# Patient Record
Sex: MALE | Race: BLACK OR AFRICAN AMERICAN | NOT HISPANIC OR LATINO | URBAN - METROPOLITAN AREA
[De-identification: names, ages, dates, MRNs, and addresses within clinical notes are randomized per-mention and may not be internally consistent; named-entity substitution may affect disease eponyms.]

---

## 2017-10-10 PROBLEM — Z00.00 ENCOUNTER FOR PREVENTIVE HEALTH EXAMINATION: Status: ACTIVE | Noted: 2017-10-10

## 2017-12-08 ENCOUNTER — OUTPATIENT (OUTPATIENT)
Dept: OUTPATIENT SERVICES | Facility: HOSPITAL | Age: 77
LOS: 1 days | Discharge: HOME | End: 2017-12-08

## 2017-12-08 ENCOUNTER — APPOINTMENT (OUTPATIENT)
Dept: PULMONOLOGY | Facility: CLINIC | Age: 77
End: 2017-12-08

## 2017-12-08 VITALS
WEIGHT: 196 LBS | HEIGHT: 67 IN | DIASTOLIC BLOOD PRESSURE: 77 MMHG | HEART RATE: 96 BPM | BODY MASS INDEX: 30.76 KG/M2 | SYSTOLIC BLOOD PRESSURE: 133 MMHG

## 2017-12-08 DIAGNOSIS — Z78.9 OTHER SPECIFIED HEALTH STATUS: ICD-10-CM

## 2017-12-11 DIAGNOSIS — R06.02 SHORTNESS OF BREATH: ICD-10-CM

## 2017-12-11 DIAGNOSIS — G47.33 OBSTRUCTIVE SLEEP APNEA (ADULT) (PEDIATRIC): ICD-10-CM

## 2017-12-11 DIAGNOSIS — J44.9 CHRONIC OBSTRUCTIVE PULMONARY DISEASE, UNSPECIFIED: ICD-10-CM

## 2017-12-12 ENCOUNTER — OUTPATIENT (OUTPATIENT)
Dept: OUTPATIENT SERVICES | Facility: HOSPITAL | Age: 77
LOS: 1 days | Discharge: HOME | End: 2017-12-12

## 2017-12-12 DIAGNOSIS — R06.02 SHORTNESS OF BREATH: ICD-10-CM

## 2017-12-12 DIAGNOSIS — J44.9 CHRONIC OBSTRUCTIVE PULMONARY DISEASE, UNSPECIFIED: ICD-10-CM

## 2017-12-12 DIAGNOSIS — G47.33 OBSTRUCTIVE SLEEP APNEA (ADULT) (PEDIATRIC): ICD-10-CM

## 2018-02-08 ENCOUNTER — APPOINTMENT (OUTPATIENT)
Dept: UROLOGY | Facility: CLINIC | Age: 78
End: 2018-02-08
Payer: MEDICARE

## 2018-02-08 VITALS
HEART RATE: 97 BPM | SYSTOLIC BLOOD PRESSURE: 156 MMHG | DIASTOLIC BLOOD PRESSURE: 102 MMHG | BODY MASS INDEX: 31.66 KG/M2 | HEIGHT: 66 IN | WEIGHT: 197 LBS

## 2018-02-08 DIAGNOSIS — T83.490A OTHER MECHANICAL COMPLICATION OF IMPLANTED PENILE PROSTHESIS, INITIAL ENCOUNTER: ICD-10-CM

## 2018-02-08 DIAGNOSIS — R35.1 NOCTURIA: ICD-10-CM

## 2018-02-08 PROCEDURE — 99203 OFFICE O/P NEW LOW 30 MIN: CPT

## 2018-02-08 PROCEDURE — 51798 US URINE CAPACITY MEASURE: CPT

## 2018-02-08 RX ORDER — QUINAPRIL HYDROCHLORIDE 20 MG/1
20 TABLET, FILM COATED ORAL
Refills: 0 | Status: COMPLETED | COMMUNITY
End: 2018-02-08

## 2018-02-08 RX ORDER — OXYBUTYNIN CHLORIDE 5 MG/1
5 TABLET ORAL
Refills: 0 | Status: COMPLETED | COMMUNITY
End: 2018-02-08

## 2018-02-08 RX ORDER — INSULIN GLARGINE 100 [IU]/ML
100 INJECTION, SOLUTION SUBCUTANEOUS
Qty: 3 | Refills: 0 | Status: COMPLETED | COMMUNITY
Start: 2017-06-12 | End: 2018-02-08

## 2018-02-08 RX ORDER — GLIPIZIDE 10 MG/1
10 TABLET, FILM COATED, EXTENDED RELEASE ORAL
Refills: 0 | Status: COMPLETED | COMMUNITY
End: 2018-02-08

## 2018-02-08 RX ORDER — ESOMEPRAZOLE MAGNESIUM 40 MG/1
40 CAPSULE, DELAYED RELEASE ORAL
Refills: 0 | Status: COMPLETED | COMMUNITY
End: 2018-02-08

## 2018-02-08 RX ORDER — NIFEDIPINE 60 MG/1
60 TABLET, EXTENDED RELEASE ORAL
Qty: 30 | Refills: 0 | Status: COMPLETED | COMMUNITY
Start: 2018-01-29 | End: 2018-02-08

## 2018-02-08 RX ORDER — NIFEDIPINE 90 MG/1
90 TABLET, EXTENDED RELEASE ORAL
Qty: 30 | Refills: 0 | Status: COMPLETED | COMMUNITY
Start: 2017-06-12 | End: 2018-02-08

## 2018-02-08 RX ORDER — BLOOD-GLUCOSE METER
70 EACH MISCELLANEOUS
Qty: 100 | Refills: 0 | Status: COMPLETED | COMMUNITY
Start: 2017-08-08 | End: 2018-02-08

## 2018-02-08 RX ORDER — ASPIRIN ENTERIC COATED TABLETS 81 MG 81 MG/1
81 TABLET, DELAYED RELEASE ORAL
Qty: 30 | Refills: 0 | Status: COMPLETED | COMMUNITY
Start: 2017-08-08 | End: 2018-02-08

## 2018-02-08 NOTE — HISTORY OF PRESENT ILLNESS
[Urinary Retention] : urinary retention [Urinary Urgency] : urinary urgency [Urinary Frequency] : urinary frequency [Nocturia] : nocturia [Straining] : straining [Weak Stream] : weak stream [Intermittency] : intermittency [None] : None [Erectile Dysfunction] : Erectile Dysfunction [FreeTextEntry1] : EZ ALCARAZ is a 77 year old male with a past medical history of DM. Presents to the office today for LUTS. Patient currently experiencing urinary urgency and frequency along with weak stream and straining for approx 5 years that has been worsening over time. Nocturia 5-6 x a night. Patient states he has seen a Urologist approx 2 years ago and was prescribed Oxybutynin ER 5 mg daily, patient continues to take Oxybutynin but states that he does not find it to be working.\par currently\par IPSS = 30\par Incomplete Emptying = 5\par Frequency = 4\par Intermittency = 5\par Urgency = 5\par Weak Stream = 4\par Straining = 2\par Nocturia = 5\par \par implant done and now not working last time was 1990 [Hematuria - Gross] : no gross hematuria

## 2018-02-08 NOTE — ASSESSMENT
[FreeTextEntry1] : He has been worked up in the past and been tried on anticholinergics therapy but we do not have any of the data.\par \par Whatever he has had done was many many months in the past and I also wonder if some of this is secondary to poor glucose control\par \par He will have him keep a record of his intake and output; he will consider a flow study. As far as the previous evaluation he will try and get us the records.\par \par With respect to his lack of erections, he would like to be able to once again have sex and if possible he would like the implant replaced. I would need his Deforest criteria classification and if his doctor feels he is an acceptable candidate then we will have him see Dr. Omalley. I’m also going to check his hormones and PSA and we will check his urine to make sure there is nothing in the bladder that may be contributing to his symptoms as well as a renal ultrasound to make sure there is no upper tract damage.\par

## 2018-02-08 NOTE — LETTER BODY
[Dear  ___] : Dear  [unfilled], [Attached please find my note.] : Attached please find my note. [Thank you very much for allowing me to participate in the care of this patient. If you have any questions, please do not hesitate to contact me] : Thank you very much for allowing me to participate in the care of this patient. If you have any questions, please do not hesitate to contact me. [FreeTextEntry2] : [facility]\par Dr Ganga Holt\par [address]\[place] [address]

## 2018-02-08 NOTE — PHYSICAL EXAM
[General Appearance - Well Developed] : well developed [General Appearance - Well Nourished] : well nourished [Normal Appearance] : normal appearance [Well Groomed] : well groomed [General Appearance - In No Acute Distress] : no acute distress [Edema] : no peripheral edema [Respiration, Rhythm And Depth] : normal respiratory rhythm and effort [Exaggerated Use Of Accessory Muscles For Inspiration] : no accessory muscle use [Abdomen Soft] : soft [Abdomen Tenderness] : non-tender [Costovertebral Angle Tenderness] : no ~M costovertebral angle tenderness [Normal Station and Gait] : the gait and station were normal for the patient's age [] : no rash [No Focal Deficits] : no focal deficits [Oriented To Time, Place, And Person] : oriented to person, place, and time [Affect] : the affect was normal [Mood] : the mood was normal [Not Anxious] : not anxious [Heart Rate And Rhythm] : Heart rate and rhythm were normal [Abdomen Hernia] : no hernia was discovered [Penis Abnormality] : normal uncircumcised penis [Anus Abnormality] : the anus and perineum were normal [Rectal Exam - Rectum] : digital rectal exam was normal [Prostate Enlargement] : the prostate was not enlarged [No Prostate Nodules] : no prostate nodules [Prostate Size ___ gm] : prostate size [unfilled] gm [FreeTextEntry1] : peripheral and BC reflexes are intact

## 2018-02-08 NOTE — LETTER HEADER
[Angelica Moreno MD] : Angelica Moreno MD [Director of Urology] : Director of Urology [[address]] : [address] [[address]] : [address] [[place] [address]] : [place] [address] [Tel [phone]] : Tel [phone] [Fax [phone]] : Fax [phone]

## 2018-02-08 NOTE — REVIEW OF SYSTEMS
[see HPI] : see HPI [Nocturia] : nocturia [Negative] : Heme/Lymph [Erectile Dysfunction] : erectile dysfunction [Dysuria] : no dysuria [Incontinence] : no incontinence [Testicular Pain] : no testicular pain

## 2018-03-16 ENCOUNTER — APPOINTMENT (OUTPATIENT)
Dept: PULMONOLOGY | Facility: CLINIC | Age: 78
End: 2018-03-16

## 2018-04-13 ENCOUNTER — APPOINTMENT (OUTPATIENT)
Dept: UROLOGY | Facility: CLINIC | Age: 78
End: 2018-04-13
Payer: MEDICARE

## 2018-04-13 VITALS
WEIGHT: 197 LBS | HEART RATE: 76 BPM | DIASTOLIC BLOOD PRESSURE: 82 MMHG | HEIGHT: 66 IN | BODY MASS INDEX: 31.66 KG/M2 | SYSTOLIC BLOOD PRESSURE: 150 MMHG

## 2018-04-13 DIAGNOSIS — R39.198 OTHER DIFFICULTIES WITH MICTURITION: ICD-10-CM

## 2018-04-13 DIAGNOSIS — R30.0 DYSURIA: ICD-10-CM

## 2018-04-13 DIAGNOSIS — R79.89 OTHER SPECIFIED ABNORMAL FINDINGS OF BLOOD CHEMISTRY: ICD-10-CM

## 2018-04-13 DIAGNOSIS — R39.9 UNSPECIFIED SYMPTOMS AND SIGNS INVOLVING THE GENITOURINARY SYSTEM: ICD-10-CM

## 2018-04-13 LAB
BILIRUB UR QL STRIP: NORMAL
CLARITY UR: CLEAR
COLLECTION METHOD: NORMAL
GLUCOSE UR-MCNC: NORMAL
HCG UR QL: NORMAL EU/DL
HGB UR QL STRIP.AUTO: NORMAL
KETONES UR-MCNC: NORMAL
LEUKOCYTE ESTERASE UR QL STRIP: NORMAL
NITRITE UR QL STRIP: NORMAL
PH UR STRIP: 7
PROT UR STRIP-MCNC: NORMAL
SP GR UR STRIP: 1

## 2018-04-13 PROCEDURE — 51798 US URINE CAPACITY MEASURE: CPT

## 2018-04-13 PROCEDURE — 99213 OFFICE O/P EST LOW 20 MIN: CPT | Mod: 57,25

## 2018-04-13 PROCEDURE — 81003 URINALYSIS AUTO W/O SCOPE: CPT | Mod: QW

## 2018-04-13 PROCEDURE — 51741 ELECTRO-UROFLOWMETRY FIRST: CPT

## 2018-04-13 RX ORDER — OXYBUTYNIN CHLORIDE 5 MG/1
5 TABLET, EXTENDED RELEASE ORAL
Qty: 30 | Refills: 0 | Status: COMPLETED | COMMUNITY
Start: 2017-08-08 | End: 2018-04-13

## 2018-04-13 RX ORDER — OXYCODONE AND ACETAMINOPHEN 5; 325 MG/1; MG/1
5-325 TABLET ORAL
Qty: 90 | Refills: 0 | Status: COMPLETED | COMMUNITY
Start: 2017-08-15 | End: 2018-04-13

## 2018-04-13 RX ORDER — OXYCODONE AND ACETAMINOPHEN 10; 325 MG/1; MG/1
10-325 TABLET ORAL
Qty: 90 | Refills: 0 | Status: COMPLETED | COMMUNITY
Start: 2018-04-10

## 2018-04-13 NOTE — REVIEW OF SYSTEMS
[see HPI] : see HPI [Dysuria] : no dysuria [Incontinence] : no incontinence [Nocturia] : nocturia [Testicular Pain] : no testicular pain [Erectile Dysfunction] : erectile dysfunction [Negative] : Heme/Lymph

## 2018-04-13 NOTE — LETTER BODY
[Dear  ___] : Dear  [unfilled], [Courtesy Letter:] : I had the pleasure of seeing your patient, [unfilled], in my office today. [Please see my note below.] : Please see my note below. [Sincerely,] : Sincerely, [FreeTextEntry2] : [facility]\par Dr Ganga Holt\par [address]\[place] [address]

## 2018-04-13 NOTE — ASSESSMENT
[FreeTextEntry1] : The uroflow looks like he is voiding strictly with Julianne, Wiley. He tells me this is the way he’s been urinating essentially by pushing for a long time so I wonder if he is a diabetic neurogenic bladder. His residual here was high but his residual by the ultrasound at the radiologist was much better and I just have a feeling he was made to wait too long so he overfilled. I need to have a record of his intake and output so I can see if any of this is subject to behavior modification especially if he has a neurogenic bladder.\par \par With respect to the implant I need to know his Bunkie criteria classification and once I know that, and assuming it is equal to “I” and his voiding is not going to need surgical intervention then we can arrange to replace the implant. The question of what to do with his low hormones is another issue. He is 77 years old, he has a normal libido and testosterone replacement is not without risk. However if we keep his testosterone at a hypogonadal level, acknowledging that the range listed is normal for free and bioavailable are more prevalence and not normal healthy levels, then he has an increased risk of metabolic syndrome something that is ready an issue because of his diabetes and obesity.\par

## 2018-04-13 NOTE — PHYSICAL EXAM
[General Appearance - Well Developed] : well developed [General Appearance - Well Nourished] : well nourished [Normal Appearance] : normal appearance [Well Groomed] : well groomed [General Appearance - In No Acute Distress] : no acute distress [FreeTextEntry1] : mild obesity [] : no respiratory distress [Respiration, Rhythm And Depth] : normal respiratory rhythm and effort [Exaggerated Use Of Accessory Muscles For Inspiration] : no accessory muscle use [Oriented To Time, Place, And Person] : oriented to person, place, and time [Affect] : the affect was normal [Mood] : the mood was normal [Not Anxious] : not anxious [Normal Station and Gait] : the gait and station were normal for the patient's age

## 2018-04-13 NOTE — LETTER HEADER
[Angelica Moreno MD] : Angelica Moreno MD [Director of Urology] : Director of Urology [Director of Male Infertility] : Director of Male Infertility [[address] : [address] [[address]] : [address] [[place] [address]] : [place] [address] [Tel [phone]] : Tel [phone] [Fax [phone]] : Fax [phone]

## 2018-04-13 NOTE — HISTORY OF PRESENT ILLNESS
[FreeTextEntry1] : Jeremy is a very pleasant 77-year-old -American male who was seen in February. He had significant lower urinary tract symptoms, and he also had a broken implant. He had had numerous workups in the past, he was supposed to try and get me the results, he was supposed to get me the Kite criteria classification, keep a record of his intake and output and come in and depending on the results would get a flow. He was sent for an ultrasound, urine tests as well as a check of his hormone values.\par \par He came in today; he tells me we did not ask keep a record of his intake and output. He asked his doctor for the Kite criteria classification but I do not seed in the chart. So we went ahead with the uroflow. Please see the flow study for the specifics.\par  [Urinary Frequency] : urinary frequency [Nocturia] : nocturia [Straining] : straining [Weak Stream] : weak stream [Intermittency] : intermittency [Post-Void Dribbling] : post-void dribbling [Erectile Dysfunction] : Erectile Dysfunction [None] : None

## 2018-06-01 ENCOUNTER — APPOINTMENT (OUTPATIENT)
Dept: PULMONOLOGY | Facility: CLINIC | Age: 78
End: 2018-06-01

## 2018-06-01 ENCOUNTER — OUTPATIENT (OUTPATIENT)
Dept: OUTPATIENT SERVICES | Facility: HOSPITAL | Age: 78
LOS: 1 days | Discharge: HOME | End: 2018-06-01

## 2018-06-01 VITALS
HEART RATE: 72 BPM | DIASTOLIC BLOOD PRESSURE: 74 MMHG | WEIGHT: 196 LBS | HEIGHT: 66 IN | BODY MASS INDEX: 31.5 KG/M2 | SYSTOLIC BLOOD PRESSURE: 114 MMHG

## 2018-06-01 NOTE — HISTORY OF PRESENT ILLNESS
[de-identified] : 76M with a PMH of HTN, DMII, DLD, Chronic pancreatitis presents for follow up for SOB. He states the dyspnea with worse with exertion.He recently saw his cardiologist who performed an echocardiogram which he does not know the result of. he states he feels like he is choking sensation on his saliva at night.

## 2018-06-01 NOTE — PLAN
[FreeTextEntry1] : 76M with a PMH of DM, chronic pancreatitis, HTN, DLD presents for worsening dyspnea that he states is now with mild exertion. He recently say his cardiologist and had an echo done there.\par \par Dyspnea\par -currently non-smoker has a 45pack/yr history\par - Chest xray PA/Lateral completed and unremarkable\par -PFT done\par -Sleep study as he has a history of CHESTER currently not treated\par - 2D echo results from Dr. Patel pending, on stress test in 2016, resting EF was 62 and stressed was 73%\par \par

## 2018-07-23 PROBLEM — Z78.9 ALCOHOL USE: Status: ACTIVE | Noted: 2017-12-08

## 2019-05-30 ENCOUNTER — OUTPATIENT (OUTPATIENT)
Dept: OUTPATIENT SERVICES | Facility: HOSPITAL | Age: 79
LOS: 1 days | Discharge: HOME | End: 2019-05-30

## 2019-05-30 DIAGNOSIS — R05 COUGH: ICD-10-CM

## 2019-08-02 ENCOUNTER — OUTPATIENT (OUTPATIENT)
Dept: OUTPATIENT SERVICES | Facility: HOSPITAL | Age: 79
LOS: 1 days | Discharge: HOME | End: 2019-08-02

## 2019-08-05 DIAGNOSIS — G47.33 OBSTRUCTIVE SLEEP APNEA (ADULT) (PEDIATRIC): ICD-10-CM

## 2019-09-24 ENCOUNTER — APPOINTMENT (OUTPATIENT)
Dept: VASCULAR SURGERY | Facility: CLINIC | Age: 79
End: 2019-09-24
Payer: MEDICARE

## 2019-09-24 VITALS
BODY MASS INDEX: 34.55 KG/M2 | HEIGHT: 66 IN | WEIGHT: 215 LBS | DIASTOLIC BLOOD PRESSURE: 84 MMHG | SYSTOLIC BLOOD PRESSURE: 120 MMHG

## 2019-09-24 PROCEDURE — 99213 OFFICE O/P EST LOW 20 MIN: CPT

## 2019-09-24 PROCEDURE — 93925 LOWER EXTREMITY STUDY: CPT

## 2019-09-24 NOTE — HISTORY OF PRESENT ILLNESS
[FreeTextEntry1] : The patient is a 78-year-old male with a history of a left superficial femoral artery angioplasty and stent. The patient presents today complaining of lower extremity pain which radiates from his hips down to his feet. He has a history of lumbar sacral spine disease. The patient has a history of DM and know PVD. in 2014 has a left SFA stent.

## 2019-09-24 NOTE — ASSESSMENT
[FreeTextEntry1] : The patient is a 78-year-old gentleman with particular symptoms is likely arising from his lumbar sacral spine. The patient has classic diabetic disease with single-vessel runoff to the foot. He is a left facial femoral artery stent in place with no evidence of restenosis. The patient denies claudication symptoms at this time. I like to monitor him conservatively and see him back in my office in 6 months time for repeat arterial duplex.

## 2019-09-24 NOTE — REASON FOR VISIT
[Follow-Up: _____] : a [unfilled] follow-up visit [FreeTextEntry1] : lower extremity leg pain which radiates from his buttock down to his foot.

## 2019-09-24 NOTE — DATA REVIEWED
[FreeTextEntry1] : Arterial duplex-\par  right diffuse atherosclerotic plaque flush out the femoral tibial and arteries with some flow disturbance noted in the proximal segment of the superficial femoral artery. The popliteal artery and one-vessel peroneal artery at the foot her pain and diminished well. \par \par Left SFA stent the superficial femoral artery stent is patent no evidence of in-stent stenosis of the total artery and one-vessel runoff via posterior tibial artery to the foot.

## 2020-05-20 ENCOUNTER — RX RENEWAL (OUTPATIENT)
Age: 80
End: 2020-05-20

## 2020-05-22 ENCOUNTER — RX RENEWAL (OUTPATIENT)
Age: 80
End: 2020-05-22

## 2020-06-23 ENCOUNTER — APPOINTMENT (OUTPATIENT)
Dept: VASCULAR SURGERY | Facility: CLINIC | Age: 80
End: 2020-06-23

## 2021-04-14 ENCOUNTER — APPOINTMENT (OUTPATIENT)
Dept: VASCULAR SURGERY | Facility: CLINIC | Age: 81
End: 2021-04-14
Payer: MEDICARE

## 2021-04-14 PROCEDURE — 99072 ADDL SUPL MATRL&STAF TM PHE: CPT

## 2021-04-14 PROCEDURE — 99213 OFFICE O/P EST LOW 20 MIN: CPT

## 2021-04-14 PROCEDURE — 93925 LOWER EXTREMITY STUDY: CPT

## 2021-04-14 NOTE — HISTORY OF PRESENT ILLNESS
[FreeTextEntry1] : The patient is a 80-year-old gentleman a former patient of my partner with a history of a left superficial femoral artery angioplasty and stenting in 2015 for a left foot wound which is healed. The patient also has known history for lumbar sacral spine disease with radiating symptoms down his legs. Patient presents today with a new right foot second toe ulcer which is nonhealing.

## 2021-04-21 ENCOUNTER — OUTPATIENT (OUTPATIENT)
Dept: OUTPATIENT SERVICES | Facility: HOSPITAL | Age: 81
LOS: 1 days | Discharge: HOME | End: 2021-04-21
Payer: MEDICARE

## 2021-04-21 ENCOUNTER — RESULT REVIEW (OUTPATIENT)
Age: 81
End: 2021-04-21

## 2021-04-21 VITALS
HEIGHT: 66 IN | TEMPERATURE: 98 F | DIASTOLIC BLOOD PRESSURE: 74 MMHG | WEIGHT: 179.46 LBS | OXYGEN SATURATION: 99 % | HEART RATE: 76 BPM | RESPIRATION RATE: 18 BRPM | SYSTOLIC BLOOD PRESSURE: 166 MMHG

## 2021-04-21 DIAGNOSIS — Z87.19 PERSONAL HISTORY OF OTHER DISEASES OF THE DIGESTIVE SYSTEM: Chronic | ICD-10-CM

## 2021-04-21 DIAGNOSIS — Z01.818 ENCOUNTER FOR OTHER PREPROCEDURAL EXAMINATION: ICD-10-CM

## 2021-04-21 DIAGNOSIS — Z95.0 PRESENCE OF CARDIAC PACEMAKER: Chronic | ICD-10-CM

## 2021-04-21 DIAGNOSIS — Z98.890 OTHER SPECIFIED POSTPROCEDURAL STATES: Chronic | ICD-10-CM

## 2021-04-21 DIAGNOSIS — I70.213 ATHEROSCLEROSIS OF NATIVE ARTERIES OF EXTREMITIES WITH INTERMITTENT CLAUDICATION, BILATERAL LEGS: ICD-10-CM

## 2021-04-21 DIAGNOSIS — Z90.49 ACQUIRED ABSENCE OF OTHER SPECIFIED PARTS OF DIGESTIVE TRACT: Chronic | ICD-10-CM

## 2021-04-21 DIAGNOSIS — Z96.0 PRESENCE OF UROGENITAL IMPLANTS: Chronic | ICD-10-CM

## 2021-04-21 LAB
ALBUMIN SERPL ELPH-MCNC: 4 G/DL — SIGNIFICANT CHANGE UP (ref 3.5–5.2)
ALP SERPL-CCNC: 87 U/L — SIGNIFICANT CHANGE UP (ref 30–115)
ALT FLD-CCNC: 23 U/L — SIGNIFICANT CHANGE UP (ref 0–41)
ANION GAP SERPL CALC-SCNC: 13 MMOL/L — SIGNIFICANT CHANGE UP (ref 7–14)
APTT BLD: 37.2 SEC — SIGNIFICANT CHANGE UP (ref 27–39.2)
AST SERPL-CCNC: 48 U/L — HIGH (ref 0–41)
BASOPHILS # BLD AUTO: 0.02 K/UL — SIGNIFICANT CHANGE UP (ref 0–0.2)
BASOPHILS NFR BLD AUTO: 0.3 % — SIGNIFICANT CHANGE UP (ref 0–1)
BILIRUB SERPL-MCNC: 0.5 MG/DL — SIGNIFICANT CHANGE UP (ref 0.2–1.2)
BUN SERPL-MCNC: 7 MG/DL — LOW (ref 10–20)
CALCIUM SERPL-MCNC: 9.4 MG/DL — SIGNIFICANT CHANGE UP (ref 8.5–10.1)
CHLORIDE SERPL-SCNC: 101 MMOL/L — SIGNIFICANT CHANGE UP (ref 98–110)
CO2 SERPL-SCNC: 27 MMOL/L — SIGNIFICANT CHANGE UP (ref 17–32)
CREAT SERPL-MCNC: 0.8 MG/DL — SIGNIFICANT CHANGE UP (ref 0.7–1.5)
EOSINOPHIL # BLD AUTO: 0.17 K/UL — SIGNIFICANT CHANGE UP (ref 0–0.7)
EOSINOPHIL NFR BLD AUTO: 2.8 % — SIGNIFICANT CHANGE UP (ref 0–8)
GLUCOSE SERPL-MCNC: 115 MG/DL — HIGH (ref 70–99)
HCT VFR BLD CALC: 39.2 % — LOW (ref 42–52)
HGB BLD-MCNC: 13.4 G/DL — LOW (ref 14–18)
IMM GRANULOCYTES NFR BLD AUTO: 0.2 % — SIGNIFICANT CHANGE UP (ref 0.1–0.3)
INR BLD: 1.24 RATIO — SIGNIFICANT CHANGE UP (ref 0.65–1.3)
LYMPHOCYTES # BLD AUTO: 2.24 K/UL — SIGNIFICANT CHANGE UP (ref 1.2–3.4)
LYMPHOCYTES # BLD AUTO: 37.3 % — SIGNIFICANT CHANGE UP (ref 20.5–51.1)
MCHC RBC-ENTMCNC: 31.2 PG — HIGH (ref 27–31)
MCHC RBC-ENTMCNC: 34.2 G/DL — SIGNIFICANT CHANGE UP (ref 32–37)
MCV RBC AUTO: 91.2 FL — SIGNIFICANT CHANGE UP (ref 80–94)
MONOCYTES # BLD AUTO: 0.7 K/UL — HIGH (ref 0.1–0.6)
MONOCYTES NFR BLD AUTO: 11.7 % — HIGH (ref 1.7–9.3)
NEUTROPHILS # BLD AUTO: 2.86 K/UL — SIGNIFICANT CHANGE UP (ref 1.4–6.5)
NEUTROPHILS NFR BLD AUTO: 47.7 % — SIGNIFICANT CHANGE UP (ref 42.2–75.2)
NRBC # BLD: 0 /100 WBCS — SIGNIFICANT CHANGE UP (ref 0–0)
PLATELET # BLD AUTO: 190 K/UL — SIGNIFICANT CHANGE UP (ref 130–400)
POTASSIUM SERPL-MCNC: 3.6 MMOL/L — SIGNIFICANT CHANGE UP (ref 3.5–5)
POTASSIUM SERPL-SCNC: 3.6 MMOL/L — SIGNIFICANT CHANGE UP (ref 3.5–5)
PROT SERPL-MCNC: 7.5 G/DL — SIGNIFICANT CHANGE UP (ref 6–8)
PROTHROM AB SERPL-ACNC: 14.3 SEC — HIGH (ref 9.95–12.87)
RBC # BLD: 4.3 M/UL — LOW (ref 4.7–6.1)
RBC # FLD: 13.4 % — SIGNIFICANT CHANGE UP (ref 11.5–14.5)
SODIUM SERPL-SCNC: 141 MMOL/L — SIGNIFICANT CHANGE UP (ref 135–146)
WBC # BLD: 6 K/UL — SIGNIFICANT CHANGE UP (ref 4.8–10.8)
WBC # FLD AUTO: 6 K/UL — SIGNIFICANT CHANGE UP (ref 4.8–10.8)

## 2021-04-21 PROCEDURE — 71046 X-RAY EXAM CHEST 2 VIEWS: CPT | Mod: 26

## 2021-04-21 PROCEDURE — 93010 ELECTROCARDIOGRAM REPORT: CPT

## 2021-04-21 RX ORDER — EMPAGLIFLOZIN 10 MG/1
1 TABLET, FILM COATED ORAL
Qty: 0 | Refills: 0 | DISCHARGE

## 2021-04-21 NOTE — H&P PST ADULT - VENOUS THROMBOEMBOLISM CURRENT STATUS
(1) abnormal pulmonary function (COPD)/(1) swollen legs (current)/(1) varicose veins/(1) other risk factor (includes escalating BMI, pack-years of smoking, diabetes requiring insulin, chemotherapy, female gender and length of surgery)

## 2021-04-21 NOTE — H&P PST ADULT - HISTORY OF PRESENT ILLNESS
79 y/o male presents to PAST in preparation for right lower extremity angiogram, possible endovascular revascularization in [facility] with Dr. Evans under LSB on 5/3/21  Pt has a hx of a left superficial femoral artery angioplasty and stenting in 2015 for a left foot wound which is healed. Pt has a right foot second toe wound which is nonhealing. A Arterial duplex was completed that showed deep femoral artery stenosis and the posterior tibial artery of the foot is severely disease. No evidence of flow seen in the anterior tibial or dorsalis pedis artery suggesting occlusion. It was recommended to have above procedure due to pt having tibial vessel disease with non healing wound.     Pt has COPD, currently on O2 at home. In office without O2 sat at 99%. Lungs CTA B/L. Pt is managed by Dr. Augustus Arthur.   Pt has a hx of chronic pancreatitis being followed by pcp.  Pt has pacemaker in place since 1995, being followed by Dr. Elizabeth. [employer] last implant 9/25/15 Model # [id number]    PATIENT CURRENTLY DENIES CHEST PAIN  SHORTNESS OF BREATH  PALPITATIONS,  DYSURIA, OR UPPER RESPIRATORY INFECTION IN PAST 2 WEEKS  EXERCISE  TOLERANCE: pt states can walk max 50 ft without getting short of breath. Pt gets short of breath very easily.  denies travel outside the [place] in the past 30 days  pt denies any covid s/s, or tested positive in the past  pt advised self quarantine till day of procedure  Pt states fully vaccinated (pfizer) 3/20/21    Anesthesia Alert  NO--Difficult Airway, Class IV airway  NO--History of neck surgery or radiation  NO--Limited ROM of neck  NO--History of Malignant hyperthermia  NO--No personal or family history of Pseudocholinesterase deficiency.  NO--Prior Anesthesia Complication  NO--Latex Allergy  NO--Loose teeth  NO--History of Rheumatoid Arthritis  Yes--CHESTER, on cpap but not currently using it  NO--Other_____  Class IV airway    ^[id number]                                                           05/03/21 [facility]

## 2021-04-21 NOTE — H&P PST ADULT - NSICDXPASTSURGICALHX_GEN_ALL_CORE_FT
PAST SURGICAL HISTORY:  Cardiac pacemaker     H/O intestinal obstruction     History of cholecystectomy     History of pancreatic surgery     History of penile implant

## 2021-04-21 NOTE — H&P PST ADULT - REASON FOR ADMISSION
79 y/o male presents to PAST in preparation for right lower extremity angiogram, possible endovascular revascularization in [facility] with Dr. Evans under LSB on 5/3/21

## 2021-04-21 NOTE — H&P PST ADULT - NSICDXPASTMEDICALHX_GEN_ALL_CORE_FT
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

## 2021-04-22 LAB
A1C WITH ESTIMATED AVERAGE GLUCOSE RESULT: 6.8 % — HIGH (ref 4–5.6)
ESTIMATED AVERAGE GLUCOSE: 148 MG/DL — HIGH (ref 68–114)

## 2021-04-30 ENCOUNTER — LABORATORY RESULT (OUTPATIENT)
Age: 81
End: 2021-04-30

## 2021-04-30 ENCOUNTER — OUTPATIENT (OUTPATIENT)
Dept: OUTPATIENT SERVICES | Facility: HOSPITAL | Age: 81
LOS: 1 days | Discharge: HOME | End: 2021-04-30

## 2021-04-30 DIAGNOSIS — Z87.19 PERSONAL HISTORY OF OTHER DISEASES OF THE DIGESTIVE SYSTEM: Chronic | ICD-10-CM

## 2021-04-30 DIAGNOSIS — Z95.0 PRESENCE OF CARDIAC PACEMAKER: Chronic | ICD-10-CM

## 2021-04-30 DIAGNOSIS — Z11.59 ENCOUNTER FOR SCREENING FOR OTHER VIRAL DISEASES: ICD-10-CM

## 2021-04-30 DIAGNOSIS — Z98.890 OTHER SPECIFIED POSTPROCEDURAL STATES: Chronic | ICD-10-CM

## 2021-04-30 DIAGNOSIS — Z96.0 PRESENCE OF UROGENITAL IMPLANTS: Chronic | ICD-10-CM

## 2021-04-30 DIAGNOSIS — Z90.49 ACQUIRED ABSENCE OF OTHER SPECIFIED PARTS OF DIGESTIVE TRACT: Chronic | ICD-10-CM

## 2021-04-30 PROBLEM — G47.33 OBSTRUCTIVE SLEEP APNEA (ADULT) (PEDIATRIC): Chronic | Status: ACTIVE | Noted: 2021-04-21

## 2021-04-30 PROBLEM — E78.5 HYPERLIPIDEMIA, UNSPECIFIED: Chronic | Status: ACTIVE | Noted: 2021-04-21

## 2021-04-30 PROBLEM — J44.9 CHRONIC OBSTRUCTIVE PULMONARY DISEASE, UNSPECIFIED: Chronic | Status: ACTIVE | Noted: 2021-04-21

## 2021-04-30 PROBLEM — E11.9 TYPE 2 DIABETES MELLITUS WITHOUT COMPLICATIONS: Chronic | Status: ACTIVE | Noted: 2021-04-21

## 2021-04-30 PROBLEM — I10 ESSENTIAL (PRIMARY) HYPERTENSION: Chronic | Status: ACTIVE | Noted: 2021-04-21

## 2021-04-30 PROBLEM — I70.209 UNSPECIFIED ATHEROSCLEROSIS OF NATIVE ARTERIES OF EXTREMITIES, UNSPECIFIED EXTREMITY: Chronic | Status: ACTIVE | Noted: 2021-04-21

## 2021-05-03 ENCOUNTER — OUTPATIENT (OUTPATIENT)
Dept: OUTPATIENT SERVICES | Facility: HOSPITAL | Age: 81
LOS: 1 days | Discharge: HOME | End: 2021-05-03
Payer: MEDICARE

## 2021-05-03 ENCOUNTER — APPOINTMENT (OUTPATIENT)
Dept: VASCULAR SURGERY | Facility: HOSPITAL | Age: 81
End: 2021-05-03

## 2021-05-03 VITALS — RESPIRATION RATE: 18 BRPM | OXYGEN SATURATION: 99 % | HEART RATE: 61 BPM

## 2021-05-03 VITALS
DIASTOLIC BLOOD PRESSURE: 78 MMHG | HEIGHT: 66.5 IN | HEART RATE: 57 BPM | OXYGEN SATURATION: 100 % | WEIGHT: 173.94 LBS | TEMPERATURE: 98 F | SYSTOLIC BLOOD PRESSURE: 169 MMHG

## 2021-05-03 DIAGNOSIS — L97.509 NON-PRESSURE CHRONIC ULCER OF OTHER PART OF UNSPECIFIED FOOT WITH UNSPECIFIED SEVERITY: ICD-10-CM

## 2021-05-03 DIAGNOSIS — Z96.0 PRESENCE OF UROGENITAL IMPLANTS: Chronic | ICD-10-CM

## 2021-05-03 DIAGNOSIS — Z90.49 ACQUIRED ABSENCE OF OTHER SPECIFIED PARTS OF DIGESTIVE TRACT: Chronic | ICD-10-CM

## 2021-05-03 DIAGNOSIS — Z98.890 OTHER SPECIFIED POSTPROCEDURAL STATES: Chronic | ICD-10-CM

## 2021-05-03 DIAGNOSIS — Z95.0 PRESENCE OF CARDIAC PACEMAKER: Chronic | ICD-10-CM

## 2021-05-03 DIAGNOSIS — Z87.19 PERSONAL HISTORY OF OTHER DISEASES OF THE DIGESTIVE SYSTEM: Chronic | ICD-10-CM

## 2021-05-03 LAB — GLUCOSE BLDC GLUCOMTR-MCNC: 133 MG/DL — HIGH (ref 70–99)

## 2021-05-03 PROCEDURE — 75630 X-RAY AORTA LEG ARTERIES: CPT | Mod: 26

## 2021-05-03 PROCEDURE — 75710 ARTERY X-RAYS ARM/LEG: CPT | Mod: 26,59

## 2021-05-03 PROCEDURE — 76937 US GUIDE VASCULAR ACCESS: CPT | Mod: 26

## 2021-05-03 PROCEDURE — 37225: CPT | Mod: RT

## 2021-05-03 PROCEDURE — 36247 INS CATH ABD/L-EXT ART 3RD: CPT

## 2021-05-03 PROCEDURE — 36248 INS CATH ABD/L-EXT ART ADDL: CPT | Mod: 59

## 2021-05-03 RX ORDER — HYDROMORPHONE HYDROCHLORIDE 2 MG/ML
2 INJECTION INTRAMUSCULAR; INTRAVENOUS; SUBCUTANEOUS
Refills: 0 | Status: DISCONTINUED | OUTPATIENT
Start: 2021-05-03 | End: 2021-05-03

## 2021-05-03 RX ORDER — HYDROMORPHONE HYDROCHLORIDE 2 MG/ML
1 INJECTION INTRAMUSCULAR; INTRAVENOUS; SUBCUTANEOUS
Refills: 0 | Status: DISCONTINUED | OUTPATIENT
Start: 2021-05-03 | End: 2021-05-03

## 2021-05-03 RX ORDER — MEPERIDINE HYDROCHLORIDE 50 MG/ML
12.5 INJECTION INTRAMUSCULAR; INTRAVENOUS; SUBCUTANEOUS
Refills: 0 | Status: DISCONTINUED | OUTPATIENT
Start: 2021-05-03 | End: 2021-05-03

## 2021-05-03 RX ORDER — OXYCODONE AND ACETAMINOPHEN 5; 325 MG/1; MG/1
2 TABLET ORAL ONCE
Refills: 0 | Status: DISCONTINUED | OUTPATIENT
Start: 2021-05-03 | End: 2021-05-03

## 2021-05-03 RX ORDER — ONDANSETRON 8 MG/1
4 TABLET, FILM COATED ORAL ONCE
Refills: 0 | Status: DISCONTINUED | OUTPATIENT
Start: 2021-05-03 | End: 2021-05-03

## 2021-05-03 RX ORDER — OXYCODONE AND ACETAMINOPHEN 5; 325 MG/1; MG/1
1 TABLET ORAL ONCE
Refills: 0 | Status: DISCONTINUED | OUTPATIENT
Start: 2021-05-03 | End: 2021-05-03

## 2021-05-03 RX ORDER — SODIUM CHLORIDE 9 MG/ML
1000 INJECTION, SOLUTION INTRAVENOUS
Refills: 0 | Status: DISCONTINUED | OUTPATIENT
Start: 2021-05-03 | End: 2021-05-03

## 2021-05-03 RX ADMIN — HYDROMORPHONE HYDROCHLORIDE 1 MILLIGRAM(S): 2 INJECTION INTRAMUSCULAR; INTRAVENOUS; SUBCUTANEOUS at 12:09

## 2021-05-03 RX ADMIN — HYDROMORPHONE HYDROCHLORIDE 1 MILLIGRAM(S): 2 INJECTION INTRAMUSCULAR; INTRAVENOUS; SUBCUTANEOUS at 12:25

## 2021-05-03 NOTE — ASU DISCHARGE PLAN (ADULT/PEDIATRIC) - CARE PROVIDER_API CALL
Clyde Evans)  Surgery; Vascular Surgery  [address] [place] [address]  Phone: [phone]  Fax: [phone]  Follow Up Time: 2 weeks

## 2021-05-03 NOTE — ASU PATIENT PROFILE, ADULT - PSH
Cardiac pacemaker    H/O intestinal obstruction    History of cholecystectomy    History of pancreatic surgery    History of penile implant

## 2021-05-03 NOTE — ASU PATIENT PROFILE, ADULT - PMH
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

## 2021-05-12 DIAGNOSIS — L97.519 NON-PRESSURE CHRONIC ULCER OF OTHER PART OF RIGHT FOOT WITH UNSPECIFIED SEVERITY: ICD-10-CM

## 2021-05-12 DIAGNOSIS — Z95.0 PRESENCE OF CARDIAC PACEMAKER: ICD-10-CM

## 2021-05-12 DIAGNOSIS — I10 ESSENTIAL (PRIMARY) HYPERTENSION: ICD-10-CM

## 2021-05-12 DIAGNOSIS — G47.33 OBSTRUCTIVE SLEEP APNEA (ADULT) (PEDIATRIC): ICD-10-CM

## 2021-05-12 DIAGNOSIS — E78.00 PURE HYPERCHOLESTEROLEMIA, UNSPECIFIED: ICD-10-CM

## 2021-05-12 DIAGNOSIS — I70.234 ATHEROSCLEROSIS OF NATIVE ARTERIES OF RIGHT LEG WITH ULCERATION OF HEEL AND MIDFOOT: ICD-10-CM

## 2021-05-12 DIAGNOSIS — Z87.891 PERSONAL HISTORY OF NICOTINE DEPENDENCE: ICD-10-CM

## 2021-05-12 DIAGNOSIS — E11.9 TYPE 2 DIABETES MELLITUS WITHOUT COMPLICATIONS: ICD-10-CM

## 2021-05-12 DIAGNOSIS — I48.91 UNSPECIFIED ATRIAL FIBRILLATION: ICD-10-CM

## 2021-05-12 DIAGNOSIS — J44.9 CHRONIC OBSTRUCTIVE PULMONARY DISEASE, UNSPECIFIED: ICD-10-CM

## 2021-05-12 DIAGNOSIS — Z79.01 LONG TERM (CURRENT) USE OF ANTICOAGULANTS: ICD-10-CM

## 2021-05-19 ENCOUNTER — APPOINTMENT (OUTPATIENT)
Dept: VASCULAR SURGERY | Facility: CLINIC | Age: 81
End: 2021-05-19
Payer: MEDICARE

## 2021-05-19 PROCEDURE — 99213 OFFICE O/P EST LOW 20 MIN: CPT

## 2021-05-19 NOTE — REASON FOR VISIT
[Follow-Up: _____] : a [unfilled] follow-up visit [FreeTextEntry1] : for fright toe wound S/p Procedure- right leg angio SFA angioplasty with shock-wave

## 2021-05-19 NOTE — HISTORY OF PRESENT ILLNESS
[FreeTextEntry1] : The patient is a 80-year-old gentleman a former patient of my partner with a history of a left superficial femoral artery angioplasty and stenting in 2015 for a left foot wound which is healed. The patient also has known history for lumbar sacral spine disease with radiating symptoms down his legs. Patient presents today sp Right Shock wave therapy of the SFA. The patient right foot wound is healing well.

## 2021-05-19 NOTE — PHYSICAL EXAM
[2+] : left 2+ [0] : right 0 [1+] : left 1+ [FreeTextEntry1] : right foot second toe ulcer decreased in size.

## 2021-08-13 ENCOUNTER — OUTPATIENT (OUTPATIENT)
Dept: OUTPATIENT SERVICES | Facility: HOSPITAL | Age: 81
LOS: 1 days | Discharge: HOME | End: 2021-08-13

## 2021-08-13 DIAGNOSIS — Z90.49 ACQUIRED ABSENCE OF OTHER SPECIFIED PARTS OF DIGESTIVE TRACT: Chronic | ICD-10-CM

## 2021-08-13 DIAGNOSIS — Z95.0 PRESENCE OF CARDIAC PACEMAKER: Chronic | ICD-10-CM

## 2021-08-13 DIAGNOSIS — Z87.19 PERSONAL HISTORY OF OTHER DISEASES OF THE DIGESTIVE SYSTEM: Chronic | ICD-10-CM

## 2021-08-13 DIAGNOSIS — Z96.0 PRESENCE OF UROGENITAL IMPLANTS: Chronic | ICD-10-CM

## 2021-08-13 DIAGNOSIS — Z11.59 ENCOUNTER FOR SCREENING FOR OTHER VIRAL DISEASES: ICD-10-CM

## 2021-08-13 DIAGNOSIS — Z98.890 OTHER SPECIFIED POSTPROCEDURAL STATES: Chronic | ICD-10-CM

## 2021-08-16 ENCOUNTER — RESULT REVIEW (OUTPATIENT)
Age: 81
End: 2021-08-16

## 2021-08-16 ENCOUNTER — OUTPATIENT (OUTPATIENT)
Dept: OUTPATIENT SERVICES | Facility: HOSPITAL | Age: 81
LOS: 1 days | Discharge: HOME | End: 2021-08-16

## 2021-08-16 ENCOUNTER — OUTPATIENT (OUTPATIENT)
Dept: OUTPATIENT SERVICES | Facility: HOSPITAL | Age: 81
LOS: 1 days | Discharge: HOME | End: 2021-08-16
Payer: MEDICARE

## 2021-08-16 DIAGNOSIS — Z95.0 PRESENCE OF CARDIAC PACEMAKER: Chronic | ICD-10-CM

## 2021-08-16 DIAGNOSIS — Z87.19 PERSONAL HISTORY OF OTHER DISEASES OF THE DIGESTIVE SYSTEM: Chronic | ICD-10-CM

## 2021-08-16 DIAGNOSIS — Z90.49 ACQUIRED ABSENCE OF OTHER SPECIFIED PARTS OF DIGESTIVE TRACT: Chronic | ICD-10-CM

## 2021-08-16 DIAGNOSIS — M54.5 LOW BACK PAIN: ICD-10-CM

## 2021-08-16 DIAGNOSIS — Z96.0 PRESENCE OF UROGENITAL IMPLANTS: Chronic | ICD-10-CM

## 2021-08-16 DIAGNOSIS — Z11.59 ENCOUNTER FOR SCREENING FOR OTHER VIRAL DISEASES: ICD-10-CM

## 2021-08-16 DIAGNOSIS — M54.2 CERVICALGIA: ICD-10-CM

## 2021-08-16 DIAGNOSIS — Z98.890 OTHER SPECIFIED POSTPROCEDURAL STATES: Chronic | ICD-10-CM

## 2021-08-16 LAB — GLUCOSE BLDC GLUCOMTR-MCNC: 149 MG/DL — HIGH (ref 70–99)

## 2021-08-16 PROCEDURE — 72270 MYELOGPHY 2/> SPINE REGIONS: CPT | Mod: 26,59

## 2021-08-16 PROCEDURE — 72125 CT NECK SPINE W/O DYE: CPT | Mod: 26

## 2021-08-16 PROCEDURE — 72131 CT LUMBAR SPINE W/O DYE: CPT | Mod: 26

## 2021-08-19 ENCOUNTER — OUTPATIENT (OUTPATIENT)
Dept: OUTPATIENT SERVICES | Facility: HOSPITAL | Age: 81
LOS: 1 days | Discharge: HOME | End: 2021-08-19

## 2021-08-19 DIAGNOSIS — M54.5 LOW BACK PAIN: ICD-10-CM

## 2021-08-19 DIAGNOSIS — M54.2 CERVICALGIA: ICD-10-CM

## 2021-08-19 DIAGNOSIS — Z95.0 PRESENCE OF CARDIAC PACEMAKER: Chronic | ICD-10-CM

## 2021-08-19 DIAGNOSIS — Z87.19 PERSONAL HISTORY OF OTHER DISEASES OF THE DIGESTIVE SYSTEM: Chronic | ICD-10-CM

## 2021-08-19 DIAGNOSIS — Z98.890 OTHER SPECIFIED POSTPROCEDURAL STATES: Chronic | ICD-10-CM

## 2021-08-19 DIAGNOSIS — Z96.0 PRESENCE OF UROGENITAL IMPLANTS: Chronic | ICD-10-CM

## 2021-08-19 DIAGNOSIS — Z90.49 ACQUIRED ABSENCE OF OTHER SPECIFIED PARTS OF DIGESTIVE TRACT: Chronic | ICD-10-CM

## 2021-08-25 ENCOUNTER — APPOINTMENT (OUTPATIENT)
Dept: VASCULAR SURGERY | Facility: CLINIC | Age: 81
End: 2021-08-25
Payer: MEDICARE

## 2021-08-25 VITALS — HEART RATE: 76 BPM | SYSTOLIC BLOOD PRESSURE: 143 MMHG | DIASTOLIC BLOOD PRESSURE: 70 MMHG | TEMPERATURE: 94.5 F

## 2021-08-25 PROCEDURE — 93925 LOWER EXTREMITY STUDY: CPT

## 2021-08-25 PROCEDURE — 99214 OFFICE O/P EST MOD 30 MIN: CPT

## 2021-09-03 ENCOUNTER — OUTPATIENT (OUTPATIENT)
Dept: OUTPATIENT SERVICES | Facility: HOSPITAL | Age: 81
LOS: 1 days | Discharge: HOME | End: 2021-09-03
Payer: MEDICARE

## 2021-09-03 DIAGNOSIS — Z87.19 PERSONAL HISTORY OF OTHER DISEASES OF THE DIGESTIVE SYSTEM: Chronic | ICD-10-CM

## 2021-09-03 DIAGNOSIS — Z95.0 PRESENCE OF CARDIAC PACEMAKER: Chronic | ICD-10-CM

## 2021-09-03 DIAGNOSIS — R63.0 ANOREXIA: ICD-10-CM

## 2021-09-03 DIAGNOSIS — Z96.0 PRESENCE OF UROGENITAL IMPLANTS: Chronic | ICD-10-CM

## 2021-09-03 DIAGNOSIS — Z90.49 ACQUIRED ABSENCE OF OTHER SPECIFIED PARTS OF DIGESTIVE TRACT: Chronic | ICD-10-CM

## 2021-09-03 DIAGNOSIS — Z98.890 OTHER SPECIFIED POSTPROCEDURAL STATES: Chronic | ICD-10-CM

## 2021-09-03 PROCEDURE — 76700 US EXAM ABDOM COMPLETE: CPT | Mod: 26

## 2021-10-06 NOTE — H&P PST ADULT - SPO2 (%)
Ongoing SW/CM Assessment/Plan of Care Note     See SW/CM flowsheets for goals and other objective data.    Patient/Family discharge goal (s):  Goal #1: Psychosocial needs assessed          PT Recommendation:     Recommendation for Discharge: PT IL: Patient requires  intermittent assistance to perform mobility and/or ADLs safely    OT Recommendation:     Recommendations for Discharge: OT IL: Patient requires  intermittent assistance to perform mobility and/or ADLs safely    SLP Recommendation:       Disposition:       Progress note:   Message left for jesus at [id number] [phone] to follow up with wound vac acceptance. Awaiting call back.TW    2:54 PM   Spoke to jesus at [facility] [phone].  Informed [employer] form signed and uploaded to [employer]. She will follow up with wound vac acceptance and delivery. Jesus verbalize earliest delivery will be on tomorrow once approved. CM to follow up with approval and delivery.TW         99

## 2022-03-23 ENCOUNTER — APPOINTMENT (OUTPATIENT)
Dept: VASCULAR SURGERY | Facility: CLINIC | Age: 82
End: 2022-03-23
Payer: MEDICARE

## 2022-03-23 VITALS
BODY MASS INDEX: 27.16 KG/M2 | DIASTOLIC BLOOD PRESSURE: 89 MMHG | WEIGHT: 169 LBS | HEIGHT: 66 IN | SYSTOLIC BLOOD PRESSURE: 139 MMHG

## 2022-03-23 PROCEDURE — 99213 OFFICE O/P EST LOW 20 MIN: CPT

## 2022-03-23 PROCEDURE — 93925 LOWER EXTREMITY STUDY: CPT

## 2022-05-26 ENCOUNTER — INPATIENT (INPATIENT)
Facility: HOSPITAL | Age: 82
LOS: 9 days | Discharge: NURSING, REHAB OR LONG-TERM CARE | End: 2022-06-05
Attending: SURGERY | Admitting: SURGERY
Payer: MEDICARE

## 2022-05-26 VITALS
OXYGEN SATURATION: 100 % | DIASTOLIC BLOOD PRESSURE: 87 MMHG | HEART RATE: 105 BPM | SYSTOLIC BLOOD PRESSURE: 141 MMHG | RESPIRATION RATE: 18 BRPM | HEIGHT: 66.5 IN | TEMPERATURE: 98 F

## 2022-05-26 DIAGNOSIS — Z95.0 PRESENCE OF CARDIAC PACEMAKER: Chronic | ICD-10-CM

## 2022-05-26 DIAGNOSIS — Z87.19 PERSONAL HISTORY OF OTHER DISEASES OF THE DIGESTIVE SYSTEM: Chronic | ICD-10-CM

## 2022-05-26 DIAGNOSIS — Z96.0 PRESENCE OF UROGENITAL IMPLANTS: Chronic | ICD-10-CM

## 2022-05-26 DIAGNOSIS — Z90.49 ACQUIRED ABSENCE OF OTHER SPECIFIED PARTS OF DIGESTIVE TRACT: Chronic | ICD-10-CM

## 2022-05-26 DIAGNOSIS — Z98.890 OTHER SPECIFIED POSTPROCEDURAL STATES: Chronic | ICD-10-CM

## 2022-05-26 LAB
ALBUMIN SERPL ELPH-MCNC: 3.6 G/DL — SIGNIFICANT CHANGE UP (ref 3.5–5.2)
ALP SERPL-CCNC: 90 U/L — SIGNIFICANT CHANGE UP (ref 30–115)
ALT FLD-CCNC: 19 U/L — SIGNIFICANT CHANGE UP (ref 0–41)
ANION GAP SERPL CALC-SCNC: 13 MMOL/L — SIGNIFICANT CHANGE UP (ref 7–14)
APPEARANCE UR: CLEAR — SIGNIFICANT CHANGE UP
AST SERPL-CCNC: 39 U/L — SIGNIFICANT CHANGE UP (ref 0–41)
BASOPHILS # BLD AUTO: 0.02 K/UL — SIGNIFICANT CHANGE UP (ref 0–0.2)
BASOPHILS NFR BLD AUTO: 0.3 % — SIGNIFICANT CHANGE UP (ref 0–1)
BILIRUB SERPL-MCNC: 0.6 MG/DL — SIGNIFICANT CHANGE UP (ref 0.2–1.2)
BILIRUB UR-MCNC: NEGATIVE — SIGNIFICANT CHANGE UP
BUN SERPL-MCNC: 16 MG/DL — SIGNIFICANT CHANGE UP (ref 10–20)
CALCIUM SERPL-MCNC: 9.7 MG/DL — SIGNIFICANT CHANGE UP (ref 8.5–10.1)
CHLORIDE SERPL-SCNC: 99 MMOL/L — SIGNIFICANT CHANGE UP (ref 98–110)
CO2 SERPL-SCNC: 26 MMOL/L — SIGNIFICANT CHANGE UP (ref 17–32)
COLOR SPEC: YELLOW — SIGNIFICANT CHANGE UP
CREAT SERPL-MCNC: 0.9 MG/DL — SIGNIFICANT CHANGE UP (ref 0.7–1.5)
DIFF PNL FLD: NEGATIVE — SIGNIFICANT CHANGE UP
EGFR: 86 ML/MIN/1.73M2 — SIGNIFICANT CHANGE UP
EOSINOPHIL # BLD AUTO: 0.17 K/UL — SIGNIFICANT CHANGE UP (ref 0–0.7)
EOSINOPHIL NFR BLD AUTO: 2.4 % — SIGNIFICANT CHANGE UP (ref 0–8)
GLUCOSE SERPL-MCNC: 142 MG/DL — HIGH (ref 70–99)
GLUCOSE UR QL: NEGATIVE — SIGNIFICANT CHANGE UP
HCT VFR BLD CALC: 37.6 % — LOW (ref 42–52)
HGB BLD-MCNC: 12.8 G/DL — LOW (ref 14–18)
IMM GRANULOCYTES NFR BLD AUTO: 0.4 % — HIGH (ref 0.1–0.3)
KETONES UR-MCNC: ABNORMAL
LACTATE SERPL-SCNC: 2.4 MMOL/L — HIGH (ref 0.7–2)
LEUKOCYTE ESTERASE UR-ACNC: NEGATIVE — SIGNIFICANT CHANGE UP
LIDOCAIN IGE QN: 9 U/L — SIGNIFICANT CHANGE UP (ref 7–60)
LYMPHOCYTES # BLD AUTO: 1.1 K/UL — LOW (ref 1.2–3.4)
LYMPHOCYTES # BLD AUTO: 15.5 % — LOW (ref 20.5–51.1)
MCHC RBC-ENTMCNC: 31.6 PG — HIGH (ref 27–31)
MCHC RBC-ENTMCNC: 34 G/DL — SIGNIFICANT CHANGE UP (ref 32–37)
MCV RBC AUTO: 92.8 FL — SIGNIFICANT CHANGE UP (ref 80–94)
MONOCYTES # BLD AUTO: 0.68 K/UL — HIGH (ref 0.1–0.6)
MONOCYTES NFR BLD AUTO: 9.6 % — HIGH (ref 1.7–9.3)
NEUTROPHILS # BLD AUTO: 5.1 K/UL — SIGNIFICANT CHANGE UP (ref 1.4–6.5)
NEUTROPHILS NFR BLD AUTO: 71.8 % — SIGNIFICANT CHANGE UP (ref 42.2–75.2)
NITRITE UR-MCNC: NEGATIVE — SIGNIFICANT CHANGE UP
NRBC # BLD: 0 /100 WBCS — SIGNIFICANT CHANGE UP (ref 0–0)
PH UR: 7.5 — SIGNIFICANT CHANGE UP (ref 5–8)
PLATELET # BLD AUTO: 349 K/UL — SIGNIFICANT CHANGE UP (ref 130–400)
POTASSIUM SERPL-MCNC: 4.2 MMOL/L — SIGNIFICANT CHANGE UP (ref 3.5–5)
POTASSIUM SERPL-SCNC: 4.2 MMOL/L — SIGNIFICANT CHANGE UP (ref 3.5–5)
PROT SERPL-MCNC: 7.3 G/DL — SIGNIFICANT CHANGE UP (ref 6–8)
PROT UR-MCNC: SIGNIFICANT CHANGE UP
RBC # BLD: 4.05 M/UL — LOW (ref 4.7–6.1)
RBC # FLD: 13.7 % — SIGNIFICANT CHANGE UP (ref 11.5–14.5)
SARS-COV-2 RNA SPEC QL NAA+PROBE: SIGNIFICANT CHANGE UP
SODIUM SERPL-SCNC: 138 MMOL/L — SIGNIFICANT CHANGE UP (ref 135–146)
SP GR SPEC: 1.02 — SIGNIFICANT CHANGE UP (ref 1.01–1.03)
UROBILINOGEN FLD QL: ABNORMAL
WBC # BLD: 7.1 K/UL — SIGNIFICANT CHANGE UP (ref 4.8–10.8)
WBC # FLD AUTO: 7.1 K/UL — SIGNIFICANT CHANGE UP (ref 4.8–10.8)

## 2022-05-26 PROCEDURE — 74177 CT ABD & PELVIS W/CONTRAST: CPT | Mod: 26,MA

## 2022-05-26 PROCEDURE — 99285 EMERGENCY DEPT VISIT HI MDM: CPT | Mod: 25

## 2022-05-26 PROCEDURE — 43753 TX GASTRO INTUB W/ASP: CPT

## 2022-05-26 RX ORDER — SODIUM CHLORIDE 9 MG/ML
1000 INJECTION, SOLUTION INTRAVENOUS ONCE
Refills: 0 | Status: COMPLETED | OUTPATIENT
Start: 2022-05-26 | End: 2022-05-26

## 2022-05-26 RX ORDER — DIATRIZOATE MEGLUMINE 180 MG/ML
30 INJECTION, SOLUTION INTRAVESICAL ONCE
Refills: 0 | Status: COMPLETED | OUTPATIENT
Start: 2022-05-26 | End: 2022-05-26

## 2022-05-26 RX ADMIN — DIATRIZOATE MEGLUMINE 30 MILLILITER(S): 180 INJECTION, SOLUTION INTRAVESICAL at 19:47

## 2022-05-26 RX ADMIN — SODIUM CHLORIDE 1000 MILLILITER(S): 9 INJECTION, SOLUTION INTRAVENOUS at 20:50

## 2022-05-26 NOTE — ED PROVIDER NOTE - PROGRESS NOTE DETAILS
I supervised PA fellow care ngt placed by JENNY Chambers, tolerated well. xray for placement confirmation ordered. awaiting further surg recs.

## 2022-05-26 NOTE — ED PROVIDER NOTE - OBJECTIVE STATEMENT
81-year-old male past medical history of chronic pancreatitis, COPD, heart failure, type 2 diabetes, hypertension, hyperlipidemia, GERD, presents to the ED for rule out SBO from [facility].  Patient states he has been having intermittent mid epigastric abdominal pain for the last 4 days made worse with eating.  Patient states he had 1 episode of vomiting last night and has been able to move his bowels over the last 4 days.  Patient states he has a history of cholecysectomy 50 years ago.  Denies chest pain, shortness of breath, fever or chills, dysuria,

## 2022-05-26 NOTE — ED PROVIDER NOTE - PHYSICAL EXAMINATION
VITAL SIGNS: I have reviewed nursing notes and confirm.  CONSTITUTIONAL: Well-developed; well-nourished; in no acute distress.  SKIN: Skin exam is warm and dry,   EYES:  EOM intact; conjunctiva and sclera clear.  ENT: No nasal discharge; airway clear. TMs clear.  NECK: Supple; non tender.  CARD: S1, S2 normal; no murmurs, gallops, or rubs. paced rate and rhythm.  RESP: No wheezes, rales or rhonchi. Speaking in full sentences.   ABD: large healed surgical scar from suprapubic area up to epigastric region. multiple herniations surrounding scar. ttp of epigastric region, Normal bowel sounds; soft; non-distended; No rebound or guarding. No CVA tenderness.  EXT: decreased  ROM in lower extremities b/l. No clubbing, cyanosis or edema.  NEURO: Alert, oriented. Grossly unremarkable. No focal deficits.

## 2022-05-26 NOTE — ED PROVIDER NOTE - CLINICAL SUMMARY MEDICAL DECISION MAKING FREE TEXT BOX
81-year-old male past medical history of chronic pancreatitis, COPD, heart failure, type 2 diabetes, hypertension, hyperlipidemia, GERD, presents to the ED for rule out SBO from [facility]. Labs and imaging reviewed. SBO confirmed on imaging NGT placed, and surgery consulted. Pt admitted for further mgmt.

## 2022-05-26 NOTE — ED ADULT TRIAGE NOTE - CHIEF COMPLAINT QUOTE
pt c/o constipation x2 days and abdominal pain. pt has distended abdomen. sent in by kendra ELDRIDGE to rule out SBO

## 2022-05-26 NOTE — ED PROVIDER NOTE - NS ED ROS FT
Review of Systems  Constitutional:  No fever, chills, malaise, generalized weakness.  Eyes:  No visual changes, eye pain, or discharge.  ENMT:  No hearing changes, pain, or discharge. No nasal congestion, discharge, or bleeding. No throat pain, swelling, or difficulty swallowing.  Cardiac:  No chest pain, palpitations, syncope, or edema.  Respiratory:  No dyspnea, orthopnea, stridor, wheezing, or cough. No hemoptysis.  GI:  No nausea,  1 episode vomiting, - diarrhea,  abdominal pain.  :  No dysuria,  MS:  No myalgia, muscle weakness, gait abnormality, joint swelling, joint pain, or back pain.  Skin:  No skin rash, pruritis, jaundice, or lesions.  Neuro:  No headache, dizziness, loss of sensation, or focal weakness.

## 2022-05-26 NOTE — ED PROVIDER NOTE - ATTENDING APP SHARED VISIT CONTRIBUTION OF CARE
I personally evaluated the patient. I reviewed the Resident’s or Physician Assistant’s note (as assigned above), and agree with the findings and plan except as documented in my note.     81 year old male here for abdominal discomfort with nausea but no vomiting or obstipation or constipation. Pt is a limited historian to [facility] but admits to prior gall bladder surgery ~>50 years ago with laparotomy.     ROS otherwise unremarkable    PE: male in no distress. CV: pulses intact. CHEST: normal work of breathing. ABD: midline scar noted. anterior wall hernias noted reducible. tender to palpation. mild distention noted. SKIN: normal. NEURO: deconditioned, no gross focal deficits    Impression: abdominal pain    Plan: IV labs imaging supportive care and reevaluation

## 2022-05-27 LAB
ANION GAP SERPL CALC-SCNC: 17 MMOL/L — HIGH (ref 7–14)
APTT BLD: 31.6 SEC — SIGNIFICANT CHANGE UP (ref 27–39.2)
BLD GP AB SCN SERPL QL: SIGNIFICANT CHANGE UP
BUN SERPL-MCNC: 21 MG/DL — HIGH (ref 10–20)
CALCIUM SERPL-MCNC: 9.2 MG/DL — SIGNIFICANT CHANGE UP (ref 8.5–10.1)
CHLORIDE SERPL-SCNC: 101 MMOL/L — SIGNIFICANT CHANGE UP (ref 98–110)
CO2 SERPL-SCNC: 22 MMOL/L — SIGNIFICANT CHANGE UP (ref 17–32)
CREAT SERPL-MCNC: 1 MG/DL — SIGNIFICANT CHANGE UP (ref 0.7–1.5)
EGFR: 76 ML/MIN/1.73M2 — SIGNIFICANT CHANGE UP
GLUCOSE BLDC GLUCOMTR-MCNC: 128 MG/DL — HIGH (ref 70–99)
GLUCOSE BLDC GLUCOMTR-MCNC: 157 MG/DL — HIGH (ref 70–99)
GLUCOSE BLDC GLUCOMTR-MCNC: 197 MG/DL — HIGH (ref 70–99)
GLUCOSE SERPL-MCNC: 126 MG/DL — HIGH (ref 70–99)
HCT VFR BLD CALC: 31.7 % — LOW (ref 42–52)
HGB BLD-MCNC: 10.7 G/DL — LOW (ref 14–18)
INR BLD: 1.34 RATIO — HIGH (ref 0.65–1.3)
LACTATE SERPL-SCNC: 0.7 MMOL/L — SIGNIFICANT CHANGE UP (ref 0.7–2)
MAGNESIUM SERPL-MCNC: 2.4 MG/DL — SIGNIFICANT CHANGE UP (ref 1.8–2.4)
MCHC RBC-ENTMCNC: 31.6 PG — HIGH (ref 27–31)
MCHC RBC-ENTMCNC: 33.8 G/DL — SIGNIFICANT CHANGE UP (ref 32–37)
MCV RBC AUTO: 93.5 FL — SIGNIFICANT CHANGE UP (ref 80–94)
NRBC # BLD: 0 /100 WBCS — SIGNIFICANT CHANGE UP (ref 0–0)
PHOSPHATE SERPL-MCNC: 4 MG/DL — SIGNIFICANT CHANGE UP (ref 2.1–4.9)
PLATELET # BLD AUTO: 371 K/UL — SIGNIFICANT CHANGE UP (ref 130–400)
POTASSIUM SERPL-MCNC: 4.4 MMOL/L — SIGNIFICANT CHANGE UP (ref 3.5–5)
POTASSIUM SERPL-SCNC: 4.4 MMOL/L — SIGNIFICANT CHANGE UP (ref 3.5–5)
PROTHROM AB SERPL-ACNC: 15.4 SEC — HIGH (ref 9.95–12.87)
RBC # BLD: 3.39 M/UL — LOW (ref 4.7–6.1)
RBC # FLD: 13.7 % — SIGNIFICANT CHANGE UP (ref 11.5–14.5)
SODIUM SERPL-SCNC: 140 MMOL/L — SIGNIFICANT CHANGE UP (ref 135–146)
WBC # BLD: 12.28 K/UL — HIGH (ref 4.8–10.8)
WBC # FLD AUTO: 12.28 K/UL — HIGH (ref 4.8–10.8)

## 2022-05-27 PROCEDURE — 93280 PM DEVICE PROGR EVAL DUAL: CPT | Mod: 26

## 2022-05-27 PROCEDURE — 71045 X-RAY EXAM CHEST 1 VIEW: CPT | Mod: 26,77

## 2022-05-27 PROCEDURE — 99223 1ST HOSP IP/OBS HIGH 75: CPT | Mod: NC

## 2022-05-27 PROCEDURE — 93306 TTE W/DOPPLER COMPLETE: CPT | Mod: 26

## 2022-05-27 PROCEDURE — 99223 1ST HOSP IP/OBS HIGH 75: CPT

## 2022-05-27 PROCEDURE — 93010 ELECTROCARDIOGRAM REPORT: CPT

## 2022-05-27 PROCEDURE — 74018 RADEX ABDOMEN 1 VIEW: CPT | Mod: 26

## 2022-05-27 PROCEDURE — 93010 ELECTROCARDIOGRAM REPORT: CPT | Mod: 77

## 2022-05-27 PROCEDURE — 71045 X-RAY EXAM CHEST 1 VIEW: CPT | Mod: 26

## 2022-05-27 RX ORDER — DIAZEPAM 5 MG
2 TABLET ORAL ONCE
Refills: 0 | Status: DISCONTINUED | OUTPATIENT
Start: 2022-05-27 | End: 2022-05-27

## 2022-05-27 RX ORDER — HEPARIN SODIUM 5000 [USP'U]/ML
5000 INJECTION INTRAVENOUS; SUBCUTANEOUS EVERY 8 HOURS
Refills: 0 | Status: DISCONTINUED | OUTPATIENT
Start: 2022-05-27 | End: 2022-05-27

## 2022-05-27 RX ORDER — INSULIN LISPRO 100/ML
VIAL (ML) SUBCUTANEOUS
Refills: 0 | Status: DISCONTINUED | OUTPATIENT
Start: 2022-05-27 | End: 2022-05-27

## 2022-05-27 RX ORDER — BUDESONIDE AND FORMOTEROL FUMARATE DIHYDRATE 160; 4.5 UG/1; UG/1
2 AEROSOL RESPIRATORY (INHALATION)
Refills: 0 | Status: DISCONTINUED | OUTPATIENT
Start: 2022-05-27 | End: 2022-06-05

## 2022-05-27 RX ORDER — PANTOPRAZOLE SODIUM 20 MG/1
40 TABLET, DELAYED RELEASE ORAL DAILY
Refills: 0 | Status: DISCONTINUED | OUTPATIENT
Start: 2022-05-27 | End: 2022-06-04

## 2022-05-27 RX ORDER — HALOPERIDOL DECANOATE 100 MG/ML
5 INJECTION INTRAMUSCULAR ONCE
Refills: 0 | Status: COMPLETED | OUTPATIENT
Start: 2022-05-27 | End: 2022-05-27

## 2022-05-27 RX ORDER — OLANZAPINE 15 MG/1
5 TABLET, FILM COATED ORAL ONCE
Refills: 0 | Status: COMPLETED | OUTPATIENT
Start: 2022-05-27 | End: 2022-05-27

## 2022-05-27 RX ORDER — FUROSEMIDE 40 MG
0 TABLET ORAL
Qty: 0 | Refills: 0 | DISCHARGE

## 2022-05-27 RX ORDER — FUROSEMIDE 40 MG
1 TABLET ORAL
Qty: 0 | Refills: 0 | DISCHARGE

## 2022-05-27 RX ORDER — AZELASTINE HCL 0.05 %
1 DROPS OPHTHALMIC (EYE)
Qty: 0 | Refills: 0 | DISCHARGE

## 2022-05-27 RX ORDER — OXYBUTYNIN CHLORIDE 5 MG
1 TABLET ORAL
Qty: 0 | Refills: 0 | DISCHARGE

## 2022-05-27 RX ORDER — MORPHINE SULFATE 50 MG/1
4 CAPSULE, EXTENDED RELEASE ORAL
Refills: 0 | Status: DISCONTINUED | OUTPATIENT
Start: 2022-05-27 | End: 2022-05-27

## 2022-05-27 RX ORDER — SODIUM CHLORIDE 9 MG/ML
1000 INJECTION, SOLUTION INTRAVENOUS
Refills: 0 | Status: DISCONTINUED | OUTPATIENT
Start: 2022-05-27 | End: 2022-05-29

## 2022-05-27 RX ORDER — IVABRADINE 7.5 MG/1
0 TABLET, FILM COATED ORAL
Qty: 0 | Refills: 0 | DISCHARGE

## 2022-05-27 RX ORDER — MUPIROCIN 20 MG/G
1 OINTMENT TOPICAL
Qty: 0 | Refills: 0 | DISCHARGE

## 2022-05-27 RX ORDER — OXYMORPHONE HYDROCHLORIDE 10 MG/1
0 TABLET, EXTENDED RELEASE ORAL
Qty: 0 | Refills: 0 | DISCHARGE

## 2022-05-27 RX ORDER — CARVEDILOL PHOSPHATE 80 MG/1
1 CAPSULE, EXTENDED RELEASE ORAL
Qty: 0 | Refills: 0 | DISCHARGE

## 2022-05-27 RX ORDER — DULAGLUTIDE 4.5 MG/.5ML
0 INJECTION, SOLUTION SUBCUTANEOUS
Qty: 0 | Refills: 0 | DISCHARGE

## 2022-05-27 RX ORDER — PIPERACILLIN AND TAZOBACTAM 4; .5 G/20ML; G/20ML
3.38 INJECTION, POWDER, LYOPHILIZED, FOR SOLUTION INTRAVENOUS EVERY 8 HOURS
Refills: 0 | Status: DISCONTINUED | OUTPATIENT
Start: 2022-05-27 | End: 2022-05-27

## 2022-05-27 RX ORDER — MAGNESIUM SULFATE 500 MG/ML
2 VIAL (ML) INJECTION ONCE
Refills: 0 | Status: COMPLETED | OUTPATIENT
Start: 2022-05-27 | End: 2022-05-27

## 2022-05-27 RX ORDER — ENOXAPARIN SODIUM 100 MG/ML
75 INJECTION SUBCUTANEOUS EVERY 12 HOURS
Refills: 0 | Status: DISCONTINUED | OUTPATIENT
Start: 2022-05-27 | End: 2022-05-31

## 2022-05-27 RX ORDER — APIXABAN 2.5 MG/1
1 TABLET, FILM COATED ORAL
Qty: 0 | Refills: 0 | DISCHARGE

## 2022-05-27 RX ORDER — METOPROLOL TARTRATE 50 MG
5 TABLET ORAL EVERY 6 HOURS
Refills: 0 | Status: DISCONTINUED | OUTPATIENT
Start: 2022-05-27 | End: 2022-05-29

## 2022-05-27 RX ORDER — SODIUM CHLORIDE 9 MG/ML
250 INJECTION INTRAMUSCULAR; INTRAVENOUS; SUBCUTANEOUS ONCE
Refills: 0 | Status: COMPLETED | OUTPATIENT
Start: 2022-05-27 | End: 2022-05-27

## 2022-05-27 RX ORDER — AZELASTINE HCL 0.05 %
0 DROPS OPHTHALMIC (EYE)
Qty: 0 | Refills: 3 | DISCHARGE

## 2022-05-27 RX ORDER — PIPERACILLIN AND TAZOBACTAM 4; .5 G/20ML; G/20ML
3.38 INJECTION, POWDER, LYOPHILIZED, FOR SOLUTION INTRAVENOUS ONCE
Refills: 0 | Status: COMPLETED | OUTPATIENT
Start: 2022-05-27 | End: 2022-05-27

## 2022-05-27 RX ORDER — CHLORHEXIDINE GLUCONATE 213 G/1000ML
1 SOLUTION TOPICAL
Refills: 0 | Status: DISCONTINUED | OUTPATIENT
Start: 2022-05-27 | End: 2022-06-05

## 2022-05-27 RX ORDER — NALOXONE HYDROCHLORIDE 4 MG/.1ML
1 SPRAY NASAL
Qty: 0 | Refills: 0 | DISCHARGE

## 2022-05-27 RX ORDER — METOPROLOL TARTRATE 50 MG
5 TABLET ORAL EVERY 6 HOURS
Refills: 0 | Status: DISCONTINUED | OUTPATIENT
Start: 2022-05-27 | End: 2022-05-27

## 2022-05-27 RX ORDER — SODIUM CHLORIDE 9 MG/ML
250 INJECTION, SOLUTION INTRAVENOUS ONCE
Refills: 0 | Status: DISCONTINUED | OUTPATIENT
Start: 2022-05-27 | End: 2022-05-27

## 2022-05-27 RX ORDER — ACETAMINOPHEN 500 MG
1000 TABLET ORAL ONCE
Refills: 0 | Status: COMPLETED | OUTPATIENT
Start: 2022-05-27 | End: 2022-05-27

## 2022-05-27 RX ORDER — INSULIN LISPRO 100/ML
VIAL (ML) SUBCUTANEOUS EVERY 6 HOURS
Refills: 0 | Status: DISCONTINUED | OUTPATIENT
Start: 2022-05-27 | End: 2022-05-29

## 2022-05-27 RX ADMIN — PANTOPRAZOLE SODIUM 40 MILLIGRAM(S): 20 TABLET, DELAYED RELEASE ORAL at 11:58

## 2022-05-27 RX ADMIN — Medication 2 MILLIGRAM(S): at 21:17

## 2022-05-27 RX ADMIN — Medication 400 MILLIGRAM(S): at 06:57

## 2022-05-27 RX ADMIN — Medication 5 MILLIGRAM(S): at 17:00

## 2022-05-27 RX ADMIN — PIPERACILLIN AND TAZOBACTAM 25 GRAM(S): 4; .5 INJECTION, POWDER, LYOPHILIZED, FOR SOLUTION INTRAVENOUS at 06:58

## 2022-05-27 RX ADMIN — Medication 25 GRAM(S): at 11:57

## 2022-05-27 RX ADMIN — HEPARIN SODIUM 5000 UNIT(S): 5000 INJECTION INTRAVENOUS; SUBCUTANEOUS at 06:57

## 2022-05-27 RX ADMIN — OLANZAPINE 5 MILLIGRAM(S): 15 TABLET, FILM COATED ORAL at 09:00

## 2022-05-27 RX ADMIN — OLANZAPINE 5 MILLIGRAM(S): 15 TABLET, FILM COATED ORAL at 18:41

## 2022-05-27 RX ADMIN — HALOPERIDOL DECANOATE 5 MILLIGRAM(S): 100 INJECTION INTRAMUSCULAR at 05:32

## 2022-05-27 RX ADMIN — Medication 1: at 11:28

## 2022-05-27 RX ADMIN — SODIUM CHLORIDE 3000 MILLILITER(S): 9 INJECTION INTRAMUSCULAR; INTRAVENOUS; SUBCUTANEOUS at 18:09

## 2022-05-27 RX ADMIN — PIPERACILLIN AND TAZOBACTAM 200 GRAM(S): 4; .5 INJECTION, POWDER, LYOPHILIZED, FOR SOLUTION INTRAVENOUS at 05:57

## 2022-05-27 RX ADMIN — Medication 1: at 07:00

## 2022-05-27 NOTE — H&P ADULT - ATTENDING COMMENTS
Patient seen and examined with surgery PA in SICU and discussed management plans with surgical resident Patient has been refusing NG. Now has condom cath for urine  Medical and surgical history reviewed and CT scan reviewed.  Prior small bowel anastomosis left mid abdomen. Abd distended multiple small hernias along the incision reducible with secondary healed scar  midline.  supportive therapy now with iv fluids and NG. hold eliquis for now in case requires surgical intervention

## 2022-05-27 NOTE — CONSULT NOTE ADULT - ASSESSMENT
Assessment & Plan    81y Male pmh copd, chf, dm, pacemaker, dementia, pancreatitis, htn, hld p/w SBO    NEURO:    AOx0, unk baseline, confused and agitated  Tylenol and morphine PRN for pain    RESP:   Sating well on RA  hx of COPD, nebs prn      Current Rx: budesonide  80 MICROgram(s)/formoterol 4.5 MICROgram(s) Inhaler 2 two times a day    Home Rx: Breo Ellipta 100 mcg-25 mcg/inh inhalation powder  montelukast 10 mg oral tablet  montelukast 10 mg tablet      CARDS:   Tachycardic  Hx chf w/ pacemaker  f/u ekg  f/u echo    GI/NUTR:   SBO  -med management vs OR  -NG tube  -NPO  -Protonix    /RENAL:     Labs:          BUN/Cr- 16/0.9  -->          Electrolytes-Na 138 // K 4.2 // Mg -- //  Phos -- (05-26 @ 19:34)      Home Rx:          HEME/ONC:       DVT prophylaxis-heparin   Injectable    Labs: Hb/Hct:  12.8/37.6  -->                      Plts:  349  -->                 PTT/INR:        Home Rx: clopidogrel 75 mg tablet:   Eliquis 5 mg tablet:         Blood Consent-    ID:  WBC- 7.10  --->>  Temp trend- 24hrs T(F): 100.8 (05-27 @ 05:01), Max: 100.8 (05-27 @ 05:01)  Antibiotics-piperacillin/tazobactam IVPB. 3.375 once  piperacillin/tazobactam IVPB.. 3.375 every 8 hours    Cultures:           ENDO:     Glucose, Serum: 142 (05-26 @ 19:34)       HA1C     MSK:    LINES/DRAINS:  PIV,     ADVANCED DIRECTIVES:  Full Code    HCP/Emergency Contact-    INDICATION FOR SICU:  SBO requiring hemodynamic monitoring    DISPO:   SICU Case discussed with attending Dr. Malloy

## 2022-05-27 NOTE — H&P ADULT - HISTORY OF PRESENT ILLNESS
GENERAL SURGERY CONSULT NOTE    Patient: EZ ALCARAZ , 81y ([birth year])Male   MRN: [id number]  Location: [facility] Hold 016 A  Visit: 05-27-22 Inpatient  Date: 05-27-22 @ 03:16    HPI:  81 year old male with a past medical hx of chronic pancreatitis, COPD, CHF, T2D, Pacemaker, HTN. HLD, surgical history of cholecystectomy, exploratory laparotomy with small bowel resection, presented to the ED for abdominal pain. Per report, patient has been constipated for the past 4 days, and was sent in from [facility] to rule out sbo. Admits to 1 episode of vomiting last night. In the ED, vitals stable, CT showing small bowel obstruction with dilated loops of small bowel in left abdomen with a transition point in LLQ. Patient family not bedside and report obtained from verbal with witness. WBC 7.1    ACCESS DEVICES:  [x] Peripheral IV  [ ] Central Venous Line	[ ] R	[ ] L	[ ] IJ	[ ] Fem	[ ] SC	Placed:   [ ] Arterial Line		[ ] R	[ ] L	[ ] Fem	[ ] Rad	[ ] Ax	Placed:   [ ] PICC:					[ ] Mediport  [ ] Urinary Catheter, Date Placed:

## 2022-05-27 NOTE — CHART NOTE - NSCHARTNOTEFT_GEN_A_CORE
Spoke with Dr. Roberson regarding patient's medications and surgical risk stratification. Per Dr. Roberson (who the patient has established cardiac care with the group), patient has a history of TIA and A.fib and therefore needs to be on therapeutic anticoagulation as patient is high risk. He recommended a heparin drip with goal PTT at 55-65 and can be held 2 hrs before surgery, if surgery is needed. Patient has been tachycardic to 107 recently but a high of 155. If patient becomes severely tachycardic again, Dr. Roberson recommends either esmolol drip if patient's blood pressure can handle it or lopressor pushes every 4-6 hrs. Dr. Roberson will see patient on 5/28 and leave a risk stratification if patient doesn't acutely decompensate and need to be taken to the OR.

## 2022-05-27 NOTE — H&P ADULT - NSHPPHYSICALEXAM_GEN_ALL_CORE
VITALS:  T(F): 95.6 (05-26-22 @ 22:09), Max: 98 (05-26-22 @ 15:05)  HR: 88 (05-27-22 @ 02:40) (88 - 105)  BP: 142/97 (05-27-22 @ 02:40) (141/87 - 152/74)  RR: 18 (05-27-22 @ 02:40) (18 - 88)  SpO2: 88% (05-27-22 @ 02:40) (88% - 100%)    PHYSICAL EXAM:  General: NAD, AAOx2  HEENT: NCAT, CALVIN, EOMI, Trachea ML, Neck supple  Cardiac: RRR S1, S2, no Murmurs, rubs or gallops  Respiratory: CTAB, normal respiratory effort  Abdomen: Soft, distended, non-tender, no rebound, no guarding. prior surgical scar noted  Musculoskeletal: ROM intact, compartments soft

## 2022-05-27 NOTE — ED ADULT NURSE REASSESSMENT NOTE - NS ED NURSE REASSESS COMMENT FT1
Pt belonging phone giving to security. Pt AOx2 Pt pulled out Ng tube. [phone] made aware. As per  will place Ng tube when pt is calm. Pt agitated.

## 2022-05-27 NOTE — CONSULT NOTE ADULT - ATTENDING COMMENTS
81y Male pmh copd, chf, dm, pacemaker, dementia, pancreatitis, htn, hld p/w SBO         AOx0, unk baseline, confused and agitated  Tylenol and morphine PRN for pain     Current Rx: budesonide  80 MICROgram(s)/formoterol 4.5 MICROgram(s) Inhaler 2 two times a day    Home Rx: Breo Ellipta 100 mcg-25 mcg/inh inhalation powder  montelukast 10 mg oral tablet  montelukast 10 mg tablet      C   Tachycardic-- nicom  Hx chf w/ pacemaker  f/u ekg  f/u echo    GI/NUTR:   SBO  -med management vs OR  -NG tube  -NPO  -Protonix    /RENAL:     Labs:          BUN/Cr- 16/0.9  -->          Electrolytes-Na 138 // K 4.2 // Mg -- //  Phos -- (05-26 @ 19:34)      Home Rx:          HEME/ONC:       DVT prophylaxis-heparin   Injectable    ID:  WBC- 7.10  --->>  Temp trend- 24hrs T(F): 100.8 (05-27 @ 05:01), Max: 100.8 (05-27 @ 05:01)  Antibiotics-piperacillin/tazobactam IVPB. 3.375 once  piperacillin/tazobactam IVPB.. 3.375 every 8 hours

## 2022-05-27 NOTE — H&P ADULT - NSHPLABSRESULTS_GEN_ALL_CORE
LAB/STUDIES:                        12.8   7.10  )-----------( 349      ( 26 May 2022 19:36 )             37.6         138  |  99  |  16  ----------------------------<  142<H>  4.2   |  26  |  0.9    Ca    9.7      26 May 2022 19:34    TPro  7.3  /  Alb  3.6  /  TBili  0.6  /  DBili  x   /  AST  39  /  ALT  19  /  AlkPhos  90        LIVER FUNCTIONS - ( 26 May 2022 19:34 )  Alb: 3.6 g/dL / Pro: 7.3 g/dL / ALK PHOS: 90 U/L / ALT: 19 U/L / AST: 39 U/L / GGT: x           Urinalysis Basic - ( 26 May 2022 21:50 )    Color: Yellow / Appearance: Clear / S.023 / pH: x  Gluc: x / Ketone: Moderate  / Bili: Negative / Urobili: 12 mg/dL   Blood: x / Protein: Trace / Nitrite: Negative   Leuk Esterase: Negative / RBC: x / WBC x   Sq Epi: x / Non Sq Epi: x / Bacteria: x      IMAGING:  < from: CT Abdomen and Pelvis w/ Oral Cont and w/ IV Cont (22 @ 22:45) >    PERITONEUM/MESENTERY/BOWEL: Oral contrast predominantly within the   stomach. Small bowel anastomosis within the left hemiabdomen. Multiple   dilated loops within the left hemiabdomen with fecalization measuring up   to 2.9cm (series 6, image 280). Transition point within the left lower   quadrant (series 6, image 318). Extensive colonic diverticulosis. No   intraperitoneal free air. Normal caliber appendix.    BONES/SOFT TISSUES: Degenerative changes to the thoracolumbar spine.   Gynecomastia.    VASCULAR: Calcified atherosclerotic disease of the abdominal aorta.   Extensive renal vascular calcifications. Venous collaterals within the   right lateral chest wall. Ventral hernia containing segment of colon   (series6, image 172). Additional fat-containing ventral hernia (series   6, image 182).      IMPRESSION:    Small bowel obstruction with dilated loops of bowel within the left   abdomen measuring up to 2.9 cm. Transition point in the left lower   quadrant.    Intrahepatic biliary ductal dilatation. No definite obstructing lesion   identified within the common bile duct. Consider MRI/MRCP for further   evaluation.    Sequela of chronic pancreatitis including pancreatic atrophy and   pancreatic calculations.    --- End of Report ---      < end of copied text >

## 2022-05-27 NOTE — H&P ADULT - ASSESSMENT
ASSESSMENT:  81 year old male with a past medical hx of chronic pancreatitis, COPD, CHF, T2D, Pacemaker, HTN. HLD, surgical history of cholecystectomy, exploratory laparotomy with small bowel resection, presented to the ED for abdominal pain. CT findings of small bowel obstruction  s/p NGT    PLAN:  - NPO, NGT  - IVF resuscitation  - convert eliquis to lovenox  - Conversion of PO to IV medications  - Pain control  - DVT / GI ppx  - Intrahepatic biliary ductal dilatation. No definite obstructing lesion identified within the common bile duct. Consider MRI/MRCP  - Hospitalist consult  - Patient is elevated risk for surgery, attempted GOC with family however HCP (son) phone is going straight to voice mail, will attempt throughout the day      Above plan discussed with Attending Surgeon Dr. Booth, patient, patient family, and Primary team  05-27-22 @ 03:16

## 2022-05-27 NOTE — CHART NOTE - NSCHARTNOTEFT_GEN_A_CORE
Pt became acutely agitated in ED, ripped out NG tube, required 5mg IM haldol. Will continue to monitor for agitation, will replace NG tube when pt more compliant

## 2022-05-27 NOTE — CONSULT NOTE ADULT - ASSESSMENT
* SUMMARY:  - No contraindication to proceed with this emergent surgery for SBO.  - Moderate risk patient for moderate/high risk surgery.  - F/u TTE ordered per primary team.  - Will need to obtain collateral information per primary team regarding additional cardiac history from prior providers.  - IVF hydration gentle for sinus tachycardia. Tachycardia also in the context of agitation and fever.    * Patient-based characteristics (Functional capacity)  Patient is able to achieve more than 4 MET (walk 4 blocks, climb 2 flights of stairs, etc...)          Y [] / N [x]    High-risk patient features:  - Recent (<30 days) or active MI          Y [] / N [X]  - Unstable or severe angina          Y [] / N [X]  - Decompensated heart failure, or worsening or new-onset heart failure          Y [] / N [X]  - Severe valvular disease          Y [] / N [X]  - Significant arrhythmia (Tachy- or Bradyarrhythmia)          Y [] / N [X]    * Surgery/Procedure-based characteristics (Type of surgery)  - Low-risk procedure (outpatient procedure, elective, endoscopy, etc...)          Y [] / N []  - Elevated or Moderate-risk procedure (Inpatient)          Y [] / N []  - High-risk procedure (urgent/emergent procedure, Intrathoracic, vascular, etc...)          Y [x] / N []    * Revised Cardiac Risk Index (RCRI)  1- History of ischemic heart disease          Y [] / N [X]  2- History of congestive heart failure          Y [x] / N []  3- History of stroke/TIA          Y [] / N [X]  4- History of insulin-dependent diabetes          Y [] / N [X]  5- Chronic kidney disease (Cr >2mg/dL)          Y [] / N [X]  6- Undergoing suprainguinal vascular, intraperitoneal, or intrathoracic surgery          Y [x] / N []    Class II risk (One factor) --> 6% risk (30-day risk of death, MI, or cardiac arrest)

## 2022-05-28 LAB
GLUCOSE BLDC GLUCOMTR-MCNC: 107 MG/DL — HIGH (ref 70–99)
GLUCOSE BLDC GLUCOMTR-MCNC: 112 MG/DL — HIGH (ref 70–99)
GLUCOSE BLDC GLUCOMTR-MCNC: 122 MG/DL — HIGH (ref 70–99)
GLUCOSE BLDC GLUCOMTR-MCNC: 128 MG/DL — HIGH (ref 70–99)

## 2022-05-28 PROCEDURE — 71045 X-RAY EXAM CHEST 1 VIEW: CPT | Mod: 26

## 2022-05-28 PROCEDURE — 93010 ELECTROCARDIOGRAM REPORT: CPT

## 2022-05-28 PROCEDURE — 99291 CRITICAL CARE FIRST HOUR: CPT | Mod: 24,25

## 2022-05-28 PROCEDURE — 74018 RADEX ABDOMEN 1 VIEW: CPT | Mod: 26

## 2022-05-28 PROCEDURE — 99232 SBSQ HOSP IP/OBS MODERATE 35: CPT

## 2022-05-28 PROCEDURE — 99223 1ST HOSP IP/OBS HIGH 75: CPT

## 2022-05-28 RX ORDER — OLANZAPINE 15 MG/1
5 TABLET, FILM COATED ORAL ONCE
Refills: 0 | Status: COMPLETED | OUTPATIENT
Start: 2022-05-28 | End: 2022-05-28

## 2022-05-28 RX ORDER — MAGNESIUM SULFATE 500 MG/ML
2 VIAL (ML) INJECTION ONCE
Refills: 0 | Status: COMPLETED | OUTPATIENT
Start: 2022-05-28 | End: 2022-05-28

## 2022-05-28 RX ORDER — SODIUM CHLORIDE 9 MG/ML
500 INJECTION, SOLUTION INTRAVENOUS ONCE
Refills: 0 | Status: COMPLETED | OUTPATIENT
Start: 2022-05-28 | End: 2022-05-28

## 2022-05-28 RX ORDER — BACITRACIN ZINC 500 UNIT/G
1 OINTMENT IN PACKET (EA) TOPICAL EVERY 12 HOURS
Refills: 0 | Status: DISCONTINUED | OUTPATIENT
Start: 2022-05-28 | End: 2022-06-05

## 2022-05-28 RX ORDER — TAMSULOSIN HYDROCHLORIDE 0.4 MG/1
0.4 CAPSULE ORAL AT BEDTIME
Refills: 0 | Status: DISCONTINUED | OUTPATIENT
Start: 2022-05-28 | End: 2022-06-05

## 2022-05-28 RX ORDER — HALOPERIDOL DECANOATE 100 MG/ML
5 INJECTION INTRAMUSCULAR ONCE
Refills: 0 | Status: DISCONTINUED | OUTPATIENT
Start: 2022-05-28 | End: 2022-05-28

## 2022-05-28 RX ADMIN — PANTOPRAZOLE SODIUM 40 MILLIGRAM(S): 20 TABLET, DELAYED RELEASE ORAL at 12:41

## 2022-05-28 RX ADMIN — Medication 25 GRAM(S): at 11:08

## 2022-05-28 RX ADMIN — SODIUM CHLORIDE 1000 MILLILITER(S): 9 INJECTION, SOLUTION INTRAVENOUS at 16:28

## 2022-05-28 RX ADMIN — CHLORHEXIDINE GLUCONATE 1 APPLICATION(S): 213 SOLUTION TOPICAL at 07:01

## 2022-05-28 RX ADMIN — Medication 5 MILLIGRAM(S): at 12:41

## 2022-05-28 RX ADMIN — ENOXAPARIN SODIUM 75 MILLIGRAM(S): 100 INJECTION SUBCUTANEOUS at 17:49

## 2022-05-28 RX ADMIN — Medication 5 MILLIGRAM(S): at 00:20

## 2022-05-28 RX ADMIN — Medication 5 MILLIGRAM(S): at 17:50

## 2022-05-28 RX ADMIN — OLANZAPINE 5 MILLIGRAM(S): 15 TABLET, FILM COATED ORAL at 21:12

## 2022-05-28 RX ADMIN — Medication 5 MILLIGRAM(S): at 05:58

## 2022-05-28 RX ADMIN — Medication 1 APPLICATION(S): at 17:51

## 2022-05-28 RX ADMIN — SODIUM CHLORIDE 100 MILLILITER(S): 9 INJECTION, SOLUTION INTRAVENOUS at 08:47

## 2022-05-28 RX ADMIN — ENOXAPARIN SODIUM 75 MILLIGRAM(S): 100 INJECTION SUBCUTANEOUS at 06:00

## 2022-05-28 RX ADMIN — OLANZAPINE 5 MILLIGRAM(S): 15 TABLET, FILM COATED ORAL at 12:44

## 2022-05-28 NOTE — CONSULT NOTE ADULT - ASSESSMENT
#Patient presented with abdominal discomfort and found to have SBO. Currently in sicu, restrained, confused.   Management as per Surgical team.   RCRI risk score is >3  If patient needs to go to surgery, then patient is at elevated risk of perioperative CV complications from Bowel surgery under GA  There may be excess bleeding since pt is on eliquis. Need to wait minimum 48 hours after last dose to minimize risk.   No antibiotic prophylaxis is needed.   Monitor perioperatively for arrhythmias.   Post op do ekg.    # h/o Non obstrucitve CAD by cath in 2017.   No active intervention.     # PVD s/p left LE PTA/stent in 2010.   restart statins and plavix when possible.  Sees Clyde Christian.     # Hyperlipidemia  restart statins when possible.    Paroxysmal Afib and SSS s/p PPM, Currently in sinus tachycardia.  However current elevated heart rate is due to agitation and sbo. Hence till the underlying condition is treated, rate control may be an issue.   If patient does go into afib, then consider digoxin 0.5 mg iv push x 2 doses 6 hours apart as long as K is > 4 meq.   and if necessary further iv metoprolol 5 mg every 4-6 hours. but it is short lasting.   Resume oral meds when safe.  As for anticoagulation, since pt is npo, consider putting on IV heparin and keep ptt 55-65 sec.   Hold Iv heparin 2-4 hours before surgery if necessary if pt going to OR.   Resume eliquis as soon as safe.     # Xray chest shows b/l opacities. # History of diastolic heart failure   i would consider repeating xray later today in the early afternoon. if worsening infiltrates then dc fluids and give one does of lasix iv 40 mg   monitor oxygen saturation  aspiration precautions.     # DVT prophylaxis.     # DM   management as per primary team.   may consider getting a1c levels.     Prognosis guarded  Condition serious.

## 2022-05-28 NOTE — PROGRESS NOTE ADULT - ASSESSMENT
Assessment & Plan    81y Male pmh copd, chf, dm, pacemaker, dementia, pancreatitis, htn, hld p/w SBO    NEURO:  AOx0, baseline A&Ox3, confused and agitated, s/p zyprexa 5mg x2  Tylenol and morphine PRN for pain  -4/27 agiated overnight --> Valium 2mg IVx1  -rept ECG -> QTc 474, gave Haldol 5mg IMx1    RESP:   Sating well on RA  hx of COPD, nebs prn  Current Rx: budesonide  80 MICROgram(s)/formoterol 4.5 MICROgram(s) Inhaler 2 two times a day  Home Rx: Breo Ellipta 100 mcg-25 mcg/inh inhalation powder  montelukast 10 mg tablet    CARDS:   Tachycardic  Hx chf w/ pacemaker --> interrogated, recom EP c/s  EKG- , 2gm Mag given, rept Qtc 474  Echo- unable to eval for wall motion abnormalities, EF est 50-55%, mod LVH   cards c/s - rec full AC for afib/CVA, BB for HR note pending tomorrow  EPS c/s - reprogrammed device    GI/NUTR:   SBO  -med management vs OR  -NG tube  -NPO  -Protonix  - KUB unchanged     /RENAL:     jordan inserted -> 1L out, strict i/o's    IVF LR @100  Labs:          BUN/Cr- 16/0.9  -->,  21/1.0  -->          Electrolytes-Na 140 // K 4.4 // Mg 2.4 //  Phos 4.0 (05-27 @ 2000)         HEME/ONC:       DVT prophylaxis-LVX therapeutic 75 q12    Labs: Hb/Hct:  12.8/37.6  -->  10.7/31.7 --> 0430 pending              Plts:  349  -->   371              PTT/INR:        Home Rx: clopidogrel 75 mg tablet:   Eliquis 5 mg tablet:     ID:  WBC- 7.10  --->>12.3  d/c abx       ENDO:     Glucose, Serum: 142 (05-26 @ 19:34)     HA1C in am    LINES/DRAINS:  PIV,     ADVANCED DIRECTIVES:  Full Code    HCP/Emergency Contact-    INDICATION FOR SICU:  SBO requiring hemodynamic monitoring    DISPO:   SICU

## 2022-05-28 NOTE — PROGRESS NOTE ADULT - SUBJECTIVE AND OBJECTIVE BOX
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  COPD (chronic obstructive pulmonary disease)  CHESTER on CPAP  H/O intestinal obstruction  History of cholecystectomy  History of pancreatic surgery  History of penile implant  Cardiac pacemaker      Home Medications:  amitriptyline 25 mg tablet:  (27 May 2022 03:47)  Breo Ellipta 100 mcg-25 mcg/inh inhalation powder: 1 puff(s) inhaled once a day (27 May 2022 03:47)  clopidogrel 75 mg tablet:  (27 May 2022 03:47)  Corlanor 5 mg oral tablet: 1 tab(s) orally 2 times a day (with meals) (27 May 2022 03:47)  Eliquis 5 mg tablet:  (27 May 2022 03:47)  famotidine 40 mg oral tablet: 1 tab(s) orally once a day (at bedtime) (27 May 2022 03:47)  latanoprost 0.005% ophthalmic solution: 1 drop(s) to each affected eye once a day (in the evening) (27 May 2022 03:47)  montelukast 10 mg oral tablet: 1 tab(s) orally once a day (27 May 2022 03:47)  montelukast 10 mg tablet:  (27 May 2022 03:47)  nifedipine ER 30 mg tablet,extended release:  (27 May 2022 03:47)  omeprazole 40 mg oral delayed release capsule: 1 cap(s) orally once a day (27 May 2022 03:47)  oxyMORphone 40 mg oral tablet, extended release: 1 tab(s) orally every 12 hours, As Needed (27 May 2022 03:47)  pregabalin 75 mg capsule:  (27 May 2022 03:47)  simvastatin 10 mg oral tablet: 1 tab(s) orally once a day (at bedtime) (27 May 2022 03:47)  Trulicity Pen 0.75 mg/0.5 mL subcutaneous solution: 1 application subcutaneous once a week (27 May 2022 03:47)        24HRS EVENT:  5/27  Day  - - 2gm Mg  -agitated, zyprexa 5mg x2, restraints   -d/c zosyn  - echo -unable to eval for wall motion abnormalities, EF est 50-55%, mod LVH   - aspiration precautions  - KUB unchanged   f/u family   NGT replaced   bladder scan, jordan   sustained Vtach, lopressor 2.5mg, EPS c/s and cards c/s - rec full AC for afib/CVA, BB for HR note pending tomorrow  started LVX       5/27  Night:  -agiated overnight --> Valium 2mg IVx1  -rept ECG -> QTc 474, gave Haldol 5mg IMx1  -jordan inseted->1L out  -started therapeutic LVX 75 q12  -Hb 12.8->10.7, rept 0430        DVT PTX: therapeutic LVX     GI PTX: PPI    ***Tubes/Lines/Drains  ***  Peripheral IV      REVIEW OF SYSTEMS    [ ] A ten-point review of systems was otherwise negative except as noted.  [x ] Due to altered mental status/intubation, subjective information were not able to be obtained from the patient. History was obtained, to the extent possible, from review of the chart and collateral sources of information.       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  COPD (chronic obstructive pulmonary disease)  CHESTER on CPAP  H/O intestinal obstruction  History of cholecystectomy  History of pancreatic surgery  History of penile implant  Cardiac pacemaker      Home Medications:  amitriptyline 25 mg tablet:  (27 May 2022 03:47)  Breo Ellipta 100 mcg-25 mcg/inh inhalation powder: 1 puff(s) inhaled once a day (27 May 2022 03:47)  clopidogrel 75 mg tablet:  (27 May 2022 03:47)  Corlanor 5 mg oral tablet: 1 tab(s) orally 2 times a day (with meals) (27 May 2022 03:47)  Eliquis 5 mg tablet:  (27 May 2022 03:47)  famotidine 40 mg oral tablet: 1 tab(s) orally once a day (at bedtime) (27 May 2022 03:47)  latanoprost 0.005% ophthalmic solution: 1 drop(s) to each affected eye once a day (in the evening) (27 May 2022 03:47)  montelukast 10 mg oral tablet: 1 tab(s) orally once a day (27 May 2022 03:47)  montelukast 10 mg tablet:  (27 May 2022 03:47)  nifedipine ER 30 mg tablet,extended release:  (27 May 2022 03:47)  omeprazole 40 mg oral delayed release capsule: 1 cap(s) orally once a day (27 May 2022 03:47)  oxyMORphone 40 mg oral tablet, extended release: 1 tab(s) orally every 12 hours, As Needed (27 May 2022 03:47)  pregabalin 75 mg capsule:  (27 May 2022 03:47)  simvastatin 10 mg oral tablet: 1 tab(s) orally once a day (at bedtime) (27 May 2022 03:47)  Trulicity Pen 0.75 mg/0.5 mL subcutaneous solution: 1 application subcutaneous once a week (27 May 2022 03:47)        24HRS EVENT:    Day  - - 2gm Mg  -agitated, zyprexa 5mg x2, restraints   -d/c zosyn  - echo -unable to eval for wall motion abnormalities, EF est 50-55%, mod LVH   - aspiration precautions  - KUB unchanged   f/u family   NGT replaced   bladder scan, jordan   sustained Vtach, lopressor 2.5mg, EPS c/s and cards c/s - rec full AC for afib/CVA, BB for HR note pending tomorrow  started LVX         Night:  -agiated overnight --> Valium 2mg IVx1  -rept ECG -> QTc 474, gave Haldol 5mg IMx1  -jordan inseted->1L out  -started therapeutic LVX 75 q12  -Hb 12.8->10.7, rept 0430        DVT PTX: therapeutic LVX     GI PTX: PPI    ***Tubes/Lines/Drains  ***  Peripheral IV      REVIEW OF SYSTEMS    [ ] A ten-point review of systems was otherwise negative except as noted.  [x ] Due to altered mental status/intubation, subjective information were not able to be obtained from the patient. History was obtained, to the extent possible, from review of the chart and collateral sources of information.    Daily     Daily     Diet, NPO (22 @ 04:22)      CURRENT MEDS:  Neurologic Medications    Respiratory Medications  budesonide  80 MICROgram(s)/formoterol 4.5 MICROgram(s) Inhaler 2 Puff(s) Inhalation two times a day    Cardiovascular Medications  metoprolol tartrate Injectable 5 milliGRAM(s) IV Push every 6 hours    Gastrointestinal Medications  lactated ringers. 1000 milliLiter(s) IV Continuous <Continuous>  pantoprazole  Injectable 40 milliGRAM(s) IV Push daily    Genitourinary Medications    Hematologic/Oncologic Medications  enoxaparin Injectable 75 milliGRAM(s) SubCutaneous every 12 hours    Antimicrobial/Immunologic Medications    Endocrine/Metabolic Medications  insulin lispro (ADMELOG) corrective regimen sliding scale   SubCutaneous every 6 hours    Topical/Other Medications  BACItracin   Ointment 1 Application(s) Topical every 12 hours  chlorhexidine 4% Liquid 1 Application(s) Topical <User Schedule>      ICU Vital Signs Last 24 Hrs  T(C): 36.8 (27 May 2022 23:12), Max: 36.9 (27 May 2022 15:00)  T(F): 98.3 (27 May 2022 23:12), Max: 98.5 (27 May 2022 15:00)  HR: 89 (28 May 2022 04:00) (83 - 155)  BP: 132/58 (28 May 2022 04:00) (100/70 - 153/81)  BP(mean): 83 (28 May 2022 04:00) (78 - 109)  ABP: --  ABP(mean): --  RR: 16 (27 May 2022 20:00) (15 - 18)  SpO2: 97% (28 May 2022 01:00) (91% - 100%)      Adult Advanced Hemodynamics Last 24 Hrs  CVP(mm Hg): --  CVP(cm H2O): --  CO: 6.2 (27 May 2022 18:37) (6.2 - 6.2)  CI: 3 (27 May 2022 18:37) (3 - 3)  PA: --  PA(mean): --  PCWP: --  SVR: --  SVRI: --  PVR: --  PVRI: --          I&O's Summary    27 May 2022 07:  -  28 May 2022 07:00  --------------------------------------------------------  IN: 1450 mL / OUT: 1125 mL / NET: 325 mL    28 May 2022 07:  -  28 May 2022 08:59  --------------------------------------------------------  IN: 200 mL / OUT: 75 mL / NET: 125 mL      I&O's Detail    27 May 2022 07:  -  28 May 2022 07:00  --------------------------------------------------------  IN:    Lactated Ringers: 1200 mL    Sodium Chloride 0.9% Bolus: 250 mL  Total IN: 1450 mL    OUT:    Indwelling Catheter - Urethral (mL): 75 mL    Nasogastric/Oral tube (mL): 700 mL    Voided (mL): 350 mL  Total OUT: 1125 mL    Total NET: 325 mL      28 May 2022 07:01  -  28 May 2022 08:59  --------------------------------------------------------  IN:    Lactated Ringers: 200 mL  Total IN: 200 mL    OUT:    Indwelling Catheter - Urethral (mL): 75 mL  Total OUT: 75 mL    Total NET: 125 mL          PHYSICAL EXAM:    General/Neuro    Deficits: aox0, agitated, no focal deficits  Lungs:      clear to auscultation, Normal expansion/effort.   Cardiovascular : S1, S2.  Regular rate and rhythm.  Peripheral edema   Cardiac Rhythm: Normal Sinus Rhythm  GI: Abdomen distended and tender diffusely, NGT in place.    Extremities: Extremities warm, pink, well-perfused. Pulses:Rt     Lt  Derm: Good skin turgor, no skin breakdown.    :       Jordan catheter in place.      CXR:     LABS:  CAPILLARY BLOOD GLUCOSE      POCT Blood Glucose.: 122 mg/dL (28 May 2022 07:10)  POCT Blood Glucose.: 112 mg/dL (28 May 2022 00:42)  POCT Blood Glucose.: 128 mg/dL (27 May 2022 17:38)  POCT Blood Glucose.: 157 mg/dL (27 May 2022 11:06)                          10.7   12.28 )-----------( 371      ( 27 May 2022 20:03 )             31.7           140  |  101  |  21<H>  ----------------------------<  126<H>  4.4   |  22  |  1.0    Ca    9.2      27 May 2022 20:03  Phos  4.0       Mg     2.4         TPro  7.3  /  Alb  3.6  /  TBili  0.6  /  DBili  x   /  AST  39  /  ALT  19  /  AlkPhos  90        PT/INR - ( 27 May 2022 20:03 )   PT: 15.40 sec;   INR: 1.34 ratio         PTT - ( 27 May 2022 20:03 )  PTT:31.6 sec      Urinalysis Basic - ( 26 May 2022 21:50 )    Color: Yellow / Appearance: Clear / S.023 / pH: x  Gluc: x / Ketone: Moderate  / Bili: Negative / Urobili: 12 mg/dL   Blood: x / Protein: Trace / Nitrite: Negative   Leuk Esterase: Negative / RBC: x / WBC x   Sq Epi: x / Non Sq Epi: x / Bacteria: x

## 2022-05-28 NOTE — CONSULT NOTE ADULT - ASSESSMENT
EP: Dr Demarco ([facility])    Assessment: 81 year old male with a past medical hx of chronic pancreatitis, COPD, CHF, T2D, DC PPM (MDT), HTN. HLD, surgical history of cholecystectomy, exploratory laparotomy with small bowel resection, presented to the ED for abdominal pain. CT showing small bowel obstruction. EP consulted for PPM interrogation and was found to have undersensing and elevated thresholds. Device was reprogrammed for optimization.    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  HTN  COPD  HLD  Chronic Pancreatitis  DM II    Plan:  - Prior to discharge, will discuss with patient and family the option of adding leadless micra PPM versus possible PPM extraction and reimplant  - Patient will need to be fully optimized prior to this procedure and cleared medically  - Please repeat 2D Echo with lumason  - Cont tele monitoring  - Monitor electrolytes, maintain WNL  - Start beta blocker for HR control  - Will follow EP: Dr Demarco ([facility])    Assessment: 81 year old male with a past medical hx of chronic pancreatitis, COPD, CHF, T2D, DC PPM (MDT), HTN. HLD, surgical history of cholecystectomy, exploratory laparotomy with small bowel resection, presented to the ED for abdominal pain. CT showing small bowel obstruction. EP consulted for PPM interrogation and was found to have undersensing and elevated thresholds. Device was reprogrammed for optimization.    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

## 2022-05-28 NOTE — PROGRESS NOTE ADULT - SUBJECTIVE AND OBJECTIVE BOX
GENERAL SURGERY PROGRESS NOTE    Patient: EZ ALCARAZ , 81y (1230-41)Male   MRN: [id number]  Location: [address]  Visit: 22 Inpatient  Date: 22 @ 02:51    Hospital Day #:2  Post-Op Day #:    Procedure/Dx/Injuries: SBO    Events of past 24 hours: Started on therapeutic lovenox for anticoagulation and lopressor for rate control, Echo showing EF of 50-55%, valium 2mg IV x1 and haldol 5 IM x1 given for agitation, QTc noted to be 545 after runs of NSVT, 2 g mag given and cardiology called. Pt's hgb now 10.7 from 12.8, f/u AM repeat. Robledo inserted for no void: 1 L output.    PAST MEDICAL & SURGICAL HISTORY:  Diabetes      Hypertension      Pacemaker      Dyslipidemia      History of chronic pancreatitis      Atherosclerosis of native artery of lower extremity      COPD (chronic obstructive pulmonary disease)      CHESTER on CPAP      H/O intestinal obstruction      History of cholecystectomy      History of pancreatic surgery      History of penile implant      Cardiac pacemaker          Vitals:   T(F): 98.3 (22 @ 23:12), Max: 100.8 (22 @ 05:01)  HR: 96 (22 @ 01:00)  BP: 147/65 (22 @ 01:00)  RR: 16 (22 @ 20:00)  SpO2: 97% (22 @ 01:00)      Diet, NPO      Fluids: lactated ringers.: Solution, 1000 milliLiter(s) infuse at 100 mL/Hr      I & O's:      Bowel Movement: : [] YES [] NO  Flatus: : [] YES [] NO    PHYSICAL EXAM:  General: Agitated  HEENT: NCAT, CALVIN, EOMI, Trachea ML, Neck supple  Cardiac: RRR S1, S2, no Murmurs, rubs or gallops  Respiratory: CTAB, normal respiratory effort, breath sounds equal BL, no wheeze, rhonchi or crackles  Abdomen: Distended, minimally tender    MEDICATIONS  (STANDING):  budesonide  80 MICROgram(s)/formoterol 4.5 MICROgram(s) Inhaler 2 Puff(s) Inhalation two times a day  chlorhexidine 4% Liquid 1 Application(s) Topical <User Schedule>  enoxaparin Injectable 75 milliGRAM(s) SubCutaneous every 12 hours  insulin lispro (ADMELOG) corrective regimen sliding scale   SubCutaneous every 6 hours  lactated ringers. 1000 milliLiter(s) (100 mL/Hr) IV Continuous <Continuous>  magnesium sulfate  IVPB 2 Gram(s) IV Intermittent once  metoprolol tartrate Injectable 5 milliGRAM(s) IV Push every 6 hours  pantoprazole  Injectable 40 milliGRAM(s) IV Push daily    MEDICATIONS  (PRN):      DVT PROPHYLAXIS: enoxaparin Injectable 75 milliGRAM(s) SubCutaneous every 12 hours    GI PROPHYLAXIS: pantoprazole  Injectable 40 milliGRAM(s) IV Push daily    ANTICOAGULATION:   ANTIBIOTICS:            LAB/STUDIES:  Labs:  CAPILLARY BLOOD GLUCOSE      POCT Blood Glucose.: 112 mg/dL (28 May 2022 00:42)  POCT Blood Glucose.: 128 mg/dL (27 May 2022 17:38)  POCT Blood Glucose.: 157 mg/dL (27 May 2022 11:06)  POCT Blood Glucose.: 197 mg/dL (27 May 2022 06:45)                          10.7   12.28 )-----------( 371      ( 27 May 2022 20:03 )             31.7             140  |  101  |  21<H>  ----------------------------<  126<H>  4.4   |  22  |  1.0      Calcium, Total Serum: 9.2 mg/dL (22 @ 20:03)      LFTs:             7.3  | 0.6  | 39       ------------------[90      ( 26 May 2022 19:34 )  3.6  | x    | 19          Lipase:9      Amylase:x         Lactate, Blood: 0.7 mmol/L (22 @ 05:04)  Lactate, Blood: 2.4 mmol/L (22 @ 19:34)      Coags:     15.40  ----< 1.34    ( 27 May 2022 20:03 )     31.6                Urinalysis Basic - ( 26 May 2022 21:50 )    Color: Yellow / Appearance: Clear / S.023 / pH: x  Gluc: x / Ketone: Moderate  / Bili: Negative / Urobili: 12 mg/dL   Blood: x / Protein: Trace / Nitrite: Negative   Leuk Esterase: Negative / RBC: x / WBC x   Sq Epi: x / Non Sq Epi: x / Bacteria: x                   GENERAL SURGERY PROGRESS NOTE    Patient: EZ ALCARAZ , 81y (1230-41)Male   MRN: [id number]  Location: [address]  Visit: 22 Inpatient  Date: 22 @ 02:51    Hospital Day #:2  Post-Op Day #:    Procedure/Dx/Injuries: SBO    Events of past 24 hours: Started on therapeutic lovenox for anticoagulation and lopressor for rate control, Echo showing EF of 50-55%, valium 2mg IV x1 and haldol 5 IM x1 given for agitation, QTc noted to be 545 after runs of NSVT, 2 g mag given and cardiology called. Pt's hgb now 10.7 from 12.8, f/u AM repeat. Robledo inserted for no void: 1 L output.    PAST MEDICAL & SURGICAL HISTORY:  Diabetes      Hypertension      Pacemaker      Dyslipidemia      History of chronic pancreatitis      Atherosclerosis of native artery of lower extremity      COPD (chronic obstructive pulmonary disease)      CHESTER on CPAP      H/O intestinal obstruction      History of cholecystectomy      History of pancreatic surgery      History of penile implant      Cardiac pacemaker          Vitals:   T(F): 98.3 (22 @ 23:12), Max: 100.8 (22 @ 05:01)  HR: 96 (22 @ 01:00)  BP: 147/65 (22 @ 01:00)  RR: 16 (22 @ 20:00)  SpO2: 97% (22 @ 01:00)      Diet, NPO      Fluids: lactated ringers.: Solution, 1000 milliLiter(s) infuse at 100 mL/Hr      I & O's:      Bowel Movement: : [] YES [] NO  Flatus: : [] YES [] NO    PHYSICAL EXAM:  General: Agitated  HEENT: NCAT, CALVIN, EOMI, Trachea ML, Neck supple  Cardiac: RRR S1, S2, no Murmurs, rubs or gallops  Respiratory: CTAB, normal respiratory effort, breath sounds equal BL, no wheeze, rhonchi or crackles  Abdomen: Distended, minimally tender    MEDICATIONS  (STANDING):  budesonide  80 MICROgram(s)/formoterol 4.5 MICROgram(s) Inhaler 2 Puff(s) Inhalation two times a day  chlorhexidine 4% Liquid 1 Application(s) Topical <User Schedule>  enoxaparin Injectable 75 milliGRAM(s) SubCutaneous every 12 hours  insulin lispro (ADMELOG) corrective regimen sliding scale   SubCutaneous every 6 hours  lactated ringers. 1000 milliLiter(s) (100 mL/Hr) IV Continuous <Continuous>  magnesium sulfate  IVPB 2 Gram(s) IV Intermittent once  metoprolol tartrate Injectable 5 milliGRAM(s) IV Push every 6 hours  pantoprazole  Injectable 40 milliGRAM(s) IV Push daily    MEDICATIONS  (PRN):      DVT PROPHYLAXIS: enoxaparin Injectable 75 milliGRAM(s) SubCutaneous every 12 hours    GI PROPHYLAXIS: pantoprazole  Injectable 40 milliGRAM(s) IV Push daily    ANTICOAGULATION:   ANTIBIOTICS:          LAB/STUDIES:  Labs:  CAPILLARY BLOOD GLUCOSE      POCT Blood Glucose.: 112 mg/dL (28 May 2022 00:42)  POCT Blood Glucose.: 128 mg/dL (27 May 2022 17:38)  POCT Blood Glucose.: 157 mg/dL (27 May 2022 11:06)  POCT Blood Glucose.: 197 mg/dL (27 May 2022 06:45)                          10.7   12.28 )-----------( 371      ( 27 May 2022 20:03 )             31.7             140  |  101  |  21<H>  ----------------------------<  126<H>  4.4   |  22  |  1.0      Calcium, Total Serum: 9.2 mg/dL (22 @ 20:03)      LFTs:             7.3  | 0.6  | 39       ------------------[90      ( 26 May 2022 19:34 )  3.6  | x    | 19          Lipase:9      Amylase:x         Lactate, Blood: 0.7 mmol/L (22 @ 05:04)  Lactate, Blood: 2.4 mmol/L (22 @ 19:34)      Coags:     15.40  ----< 1.34    ( 27 May 2022 20:03 )     31.6                Urinalysis Basic - ( 26 May 2022 21:50 )    Color: Yellow / Appearance: Clear / S.023 / pH: x  Gluc: x / Ketone: Moderate  / Bili: Negative / Urobili: 12 mg/dL   Blood: x / Protein: Trace / Nitrite: Negative   Leuk Esterase: Negative / RBC: x / WBC x   Sq Epi: x / Non Sq Epi: x / Bacteria: x

## 2022-05-28 NOTE — PROGRESS NOTE ADULT - ATTENDING COMMENTS
82yo male with PMHx fo DM, HTN, HLD, GERD, COPD, HF, pacemaker, chronic pancreatitis, atrial fibrillation, TIA, previous laparotomy admitted for small bowel obstruction. In ICU for close monitoring. NG tube in place with minimal output. +BM this morning. Monitor NG tube output for possible removal later today. On exam, abdomen is tender in right side, non-tender left side, non-peritoneal. Labs reviewed - WBC 12.28, BUN/Cr 21/1.0, lactate 0.7. Primary care as per ICU.

## 2022-05-28 NOTE — CONSULT NOTE ADULT - NS ATTEND AMEND GEN_ALL_CORE FT
complex patient   cardio: Dr. Roberson  EP: Dr. Demarco    pacemaker malfunction   poor sensing on RV lead leading to inappropriate pacing  PAF    recommend:  -Pacemaker temprarly reprogrammed until SBO resolve  -Echo with lumason  -Micra Pacemaker implant once clinically stable, on day of discharge

## 2022-05-28 NOTE — PROGRESS NOTE ADULT - ASSESSMENT
81 year old male with a past medical hx of chronic pancreatitis, COPD, CHF, T2D, Pacemaker, HTN. HLD, surgical history of cholecystectomy, exploratory laparotomy with small bowel resection, presented to the ED for abdominal pain. CT findings of small bowel obstruction  s/p NGT    PLAN:  - NPO, NGT  - IVF resuscitation  - Therapeutic lovenox  - Hgb now 10.8 from 12.8, am repeat  - Robledo placed 1 L output for retention  - ECHO: EF 50-55%  - Conversion of PO to IV medications  - Pain control  - GI ppx  - Intrahepatic biliary ductal dilatation. No definite obstructing lesion identified within the common bile duct. Consider MRI/MRCP  - Hospitalist consult  - Patient is elevated risk for surgery, attempted GOC with family however HCP (son) phone is going straight to voice mail, will attempt throughout the day  - EPP appreciated  - F/U Cards recs for  with NSVT s/p haldol  - Cards BB and AC for A fib  - Continues to be agitated    Spectra: [id number]

## 2022-05-28 NOTE — PROGRESS NOTE ADULT - ATTENDING COMMENTS
Patient was agitated overnight and required treatment with Zyprexa.   ml/24h.  Had large bowel movement this am.  Abdomen: soft.    ASSESSMENT:  82 y/o male with SBO.  Agitation.  Atelectasis.  COPD.  Paced Tachycardia.  OTC interval prolongation.  At risk for hemodynamic instability.  A.fib.  Frail elderly.    PLAN:  - pain control  - do not use Haldol   - needs OOB, family to help orient him  - aspiration precaution  - encourage IS  - keep normotensive  - Cardiology and EPS eval  - keep NPO  - follow serum electrolytes and UOP  - on Lovenox therapeutic  - PT eval Patient was agitated overnight and required treatment with Zyprexa.   ml/24h.  Had large bowel movement this am.  Abdomen: soft.    ASSESSMENT:  80 y/o male with SBO.  Agitation.  Atelectasis.  COPD.  A.fib. PPM  QTC interval prolongation.  At risk for hemodynamic instability.  A.fib.  Frail elderly.    PLAN:  - pain control  - do not use Haldol   - needs OOB, family to help orient him  - aspiration precaution  - encourage IS  - keep normotensive  - Cardiology and EPS eval  - keep NPO  - follow serum electrolytes and UOP  - on Lovenox therapeutic  - PT eval

## 2022-05-29 LAB
ANION GAP SERPL CALC-SCNC: 15 MMOL/L — HIGH (ref 7–14)
ANION GAP SERPL CALC-SCNC: 19 MMOL/L — HIGH (ref 7–14)
BUN SERPL-MCNC: 10 MG/DL — SIGNIFICANT CHANGE UP (ref 10–20)
BUN SERPL-MCNC: 15 MG/DL — SIGNIFICANT CHANGE UP (ref 10–20)
CALCIUM SERPL-MCNC: 8.7 MG/DL — SIGNIFICANT CHANGE UP (ref 8.5–10.1)
CALCIUM SERPL-MCNC: 8.8 MG/DL — SIGNIFICANT CHANGE UP (ref 8.5–10.1)
CHLORIDE SERPL-SCNC: 101 MMOL/L — SIGNIFICANT CHANGE UP (ref 98–110)
CHLORIDE SERPL-SCNC: 103 MMOL/L — SIGNIFICANT CHANGE UP (ref 98–110)
CO2 SERPL-SCNC: 19 MMOL/L — SIGNIFICANT CHANGE UP (ref 17–32)
CO2 SERPL-SCNC: 21 MMOL/L — SIGNIFICANT CHANGE UP (ref 17–32)
CREAT SERPL-MCNC: 0.7 MG/DL — SIGNIFICANT CHANGE UP (ref 0.7–1.5)
CREAT SERPL-MCNC: 0.8 MG/DL — SIGNIFICANT CHANGE UP (ref 0.7–1.5)
EGFR: 89 ML/MIN/1.73M2 — SIGNIFICANT CHANGE UP
EGFR: 93 ML/MIN/1.73M2 — SIGNIFICANT CHANGE UP
GLUCOSE BLDC GLUCOMTR-MCNC: 137 MG/DL — HIGH (ref 70–99)
GLUCOSE BLDC GLUCOMTR-MCNC: 151 MG/DL — HIGH (ref 70–99)
GLUCOSE BLDC GLUCOMTR-MCNC: 89 MG/DL — SIGNIFICANT CHANGE UP (ref 70–99)
GLUCOSE BLDC GLUCOMTR-MCNC: 90 MG/DL — SIGNIFICANT CHANGE UP (ref 70–99)
GLUCOSE BLDC GLUCOMTR-MCNC: 97 MG/DL — SIGNIFICANT CHANGE UP (ref 70–99)
GLUCOSE BLDC GLUCOMTR-MCNC: 98 MG/DL — SIGNIFICANT CHANGE UP (ref 70–99)
GLUCOSE SERPL-MCNC: 110 MG/DL — HIGH (ref 70–99)
GLUCOSE SERPL-MCNC: 81 MG/DL — SIGNIFICANT CHANGE UP (ref 70–99)
HCT VFR BLD CALC: 29.1 % — LOW (ref 42–52)
HCT VFR BLD CALC: 30.1 % — LOW (ref 42–52)
HGB BLD-MCNC: 10 G/DL — LOW (ref 14–18)
HGB BLD-MCNC: 10.4 G/DL — LOW (ref 14–18)
MAGNESIUM SERPL-MCNC: 1.8 MG/DL — SIGNIFICANT CHANGE UP (ref 1.8–2.4)
MAGNESIUM SERPL-MCNC: 2.2 MG/DL — SIGNIFICANT CHANGE UP (ref 1.8–2.4)
MCHC RBC-ENTMCNC: 31 PG — SIGNIFICANT CHANGE UP (ref 27–31)
MCHC RBC-ENTMCNC: 31.9 PG — HIGH (ref 27–31)
MCHC RBC-ENTMCNC: 34.4 G/DL — SIGNIFICANT CHANGE UP (ref 32–37)
MCHC RBC-ENTMCNC: 34.6 G/DL — SIGNIFICANT CHANGE UP (ref 32–37)
MCV RBC AUTO: 89.9 FL — SIGNIFICANT CHANGE UP (ref 80–94)
MCV RBC AUTO: 93 FL — SIGNIFICANT CHANGE UP (ref 80–94)
NRBC # BLD: 0 /100 WBCS — SIGNIFICANT CHANGE UP (ref 0–0)
NRBC # BLD: 0 /100 WBCS — SIGNIFICANT CHANGE UP (ref 0–0)
PHOSPHATE SERPL-MCNC: 2.9 MG/DL — SIGNIFICANT CHANGE UP (ref 2.1–4.9)
PHOSPHATE SERPL-MCNC: 2.9 MG/DL — SIGNIFICANT CHANGE UP (ref 2.1–4.9)
PLATELET # BLD AUTO: 356 K/UL — SIGNIFICANT CHANGE UP (ref 130–400)
PLATELET # BLD AUTO: 383 K/UL — SIGNIFICANT CHANGE UP (ref 130–400)
POTASSIUM SERPL-MCNC: 3.5 MMOL/L — SIGNIFICANT CHANGE UP (ref 3.5–5)
POTASSIUM SERPL-MCNC: 3.8 MMOL/L — SIGNIFICANT CHANGE UP (ref 3.5–5)
POTASSIUM SERPL-SCNC: 3.5 MMOL/L — SIGNIFICANT CHANGE UP (ref 3.5–5)
POTASSIUM SERPL-SCNC: 3.8 MMOL/L — SIGNIFICANT CHANGE UP (ref 3.5–5)
RBC # BLD: 3.13 M/UL — LOW (ref 4.7–6.1)
RBC # BLD: 3.35 M/UL — LOW (ref 4.7–6.1)
RBC # FLD: 13.2 % — SIGNIFICANT CHANGE UP (ref 11.5–14.5)
RBC # FLD: 13.6 % — SIGNIFICANT CHANGE UP (ref 11.5–14.5)
SODIUM SERPL-SCNC: 139 MMOL/L — SIGNIFICANT CHANGE UP (ref 135–146)
SODIUM SERPL-SCNC: 139 MMOL/L — SIGNIFICANT CHANGE UP (ref 135–146)
WBC # BLD: 11.73 K/UL — HIGH (ref 4.8–10.8)
WBC # BLD: 8.65 K/UL — SIGNIFICANT CHANGE UP (ref 4.8–10.8)
WBC # FLD AUTO: 11.73 K/UL — HIGH (ref 4.8–10.8)
WBC # FLD AUTO: 8.65 K/UL — SIGNIFICANT CHANGE UP (ref 4.8–10.8)

## 2022-05-29 PROCEDURE — 99291 CRITICAL CARE FIRST HOUR: CPT | Mod: 24,25

## 2022-05-29 PROCEDURE — 93306 TTE W/DOPPLER COMPLETE: CPT | Mod: 26

## 2022-05-29 PROCEDURE — 93010 ELECTROCARDIOGRAM REPORT: CPT

## 2022-05-29 PROCEDURE — 99232 SBSQ HOSP IP/OBS MODERATE 35: CPT

## 2022-05-29 PROCEDURE — 71045 X-RAY EXAM CHEST 1 VIEW: CPT | Mod: 26

## 2022-05-29 RX ORDER — MAGNESIUM SULFATE 500 MG/ML
2 VIAL (ML) INJECTION ONCE
Refills: 0 | Status: COMPLETED | OUTPATIENT
Start: 2022-05-29 | End: 2022-05-30

## 2022-05-29 RX ORDER — SODIUM CHLORIDE 9 MG/ML
1000 INJECTION, SOLUTION INTRAVENOUS
Refills: 0 | Status: DISCONTINUED | OUTPATIENT
Start: 2022-05-29 | End: 2022-05-30

## 2022-05-29 RX ORDER — ACETAMINOPHEN 500 MG
1000 TABLET ORAL ONCE
Refills: 0 | Status: COMPLETED | OUTPATIENT
Start: 2022-05-29 | End: 2022-05-29

## 2022-05-29 RX ORDER — ACETAMINOPHEN 500 MG
650 TABLET ORAL EVERY 6 HOURS
Refills: 0 | Status: DISCONTINUED | OUTPATIENT
Start: 2022-05-29 | End: 2022-06-05

## 2022-05-29 RX ORDER — METOPROLOL TARTRATE 50 MG
12.5 TABLET ORAL EVERY 12 HOURS
Refills: 0 | Status: DISCONTINUED | OUTPATIENT
Start: 2022-05-29 | End: 2022-06-05

## 2022-05-29 RX ORDER — POTASSIUM PHOSPHATE, MONOBASIC POTASSIUM PHOSPHATE, DIBASIC 236; 224 MG/ML; MG/ML
15 INJECTION, SOLUTION INTRAVENOUS ONCE
Refills: 0 | Status: COMPLETED | OUTPATIENT
Start: 2022-05-29 | End: 2022-05-29

## 2022-05-29 RX ORDER — INSULIN LISPRO 100/ML
VIAL (ML) SUBCUTANEOUS
Refills: 0 | Status: DISCONTINUED | OUTPATIENT
Start: 2022-05-29 | End: 2022-06-05

## 2022-05-29 RX ORDER — POTASSIUM CHLORIDE 20 MEQ
20 PACKET (EA) ORAL ONCE
Refills: 0 | Status: COMPLETED | OUTPATIENT
Start: 2022-05-29 | End: 2022-05-29

## 2022-05-29 RX ADMIN — SODIUM CHLORIDE 100 MILLILITER(S): 9 INJECTION, SOLUTION INTRAVENOUS at 08:49

## 2022-05-29 RX ADMIN — Medication 1000 MILLIGRAM(S): at 13:46

## 2022-05-29 RX ADMIN — Medication 5 MILLIGRAM(S): at 00:09

## 2022-05-29 RX ADMIN — POTASSIUM PHOSPHATE, MONOBASIC POTASSIUM PHOSPHATE, DIBASIC 62.5 MILLIMOLE(S): 236; 224 INJECTION, SOLUTION INTRAVENOUS at 05:03

## 2022-05-29 RX ADMIN — Medication 50 MILLIEQUIVALENT(S): at 01:20

## 2022-05-29 RX ADMIN — ENOXAPARIN SODIUM 75 MILLIGRAM(S): 100 INJECTION SUBCUTANEOUS at 17:51

## 2022-05-29 RX ADMIN — Medication 400 MILLIGRAM(S): at 13:31

## 2022-05-29 RX ADMIN — Medication 1: at 17:01

## 2022-05-29 RX ADMIN — Medication 5 MILLIGRAM(S): at 17:52

## 2022-05-29 RX ADMIN — PANTOPRAZOLE SODIUM 40 MILLIGRAM(S): 20 TABLET, DELAYED RELEASE ORAL at 12:30

## 2022-05-29 RX ADMIN — Medication 1 APPLICATION(S): at 05:08

## 2022-05-29 RX ADMIN — SODIUM CHLORIDE 100 MILLILITER(S): 9 INJECTION, SOLUTION INTRAVENOUS at 21:42

## 2022-05-29 RX ADMIN — Medication 1 APPLICATION(S): at 17:51

## 2022-05-29 RX ADMIN — Medication 5 MILLIGRAM(S): at 05:05

## 2022-05-29 RX ADMIN — CHLORHEXIDINE GLUCONATE 1 APPLICATION(S): 213 SOLUTION TOPICAL at 06:30

## 2022-05-29 RX ADMIN — TAMSULOSIN HYDROCHLORIDE 0.4 MILLIGRAM(S): 0.4 CAPSULE ORAL at 21:42

## 2022-05-29 RX ADMIN — Medication 5 MILLIGRAM(S): at 12:31

## 2022-05-29 RX ADMIN — ENOXAPARIN SODIUM 75 MILLIGRAM(S): 100 INJECTION SUBCUTANEOUS at 05:03

## 2022-05-29 NOTE — PROGRESS NOTE ADULT - SUBJECTIVE AND OBJECTIVE BOX
EZ [place]  [id number]  81y Male    Indication for ICU admission: SBO  Admit Date: 5/27  ICU Date: 5/27  OR Date:    No Known Allergies    PAST MEDICAL & SURGICAL HISTORY:  Diabetes  Hypertension  Pacemaker  Dyslipidemia  History of chronic pancreatitis  Atherosclerosis of native artery of lower extremity  COPD (chronic obstructive pulmonary disease)  CHESTER on CPAP  H/O intestinal obstruction  History of cholecystectomy  History of pancreatic surgery  History of penile implant  Cardiac pacemaker      Home Medications:  amitriptyline 25 mg tablet:  (27 May 2022 03:47)  Breo Ellipta 100 mcg-25 mcg/inh inhalation powder: 1 puff(s) inhaled once a day (27 May 2022 03:47)  clopidogrel 75 mg tablet:  (27 May 2022 03:47)  Corlanor 5 mg oral tablet: 1 tab(s) orally 2 times a day (with meals) (27 May 2022 03:47)  Eliquis 5 mg tablet:  (27 May 2022 03:47)  famotidine 40 mg oral tablet: 1 tab(s) orally once a day (at bedtime) (27 May 2022 03:47)  latanoprost 0.005% ophthalmic solution: 1 drop(s) to each affected eye once a day (in the evening) (27 May 2022 03:47)  montelukast 10 mg oral tablet: 1 tab(s) orally once a day (27 May 2022 03:47)  montelukast 10 mg tablet:  (27 May 2022 03:47)  nifedipine ER 30 mg tablet,extended release:  (27 May 2022 03:47)  omeprazole 40 mg oral delayed release capsule: 1 cap(s) orally once a day (27 May 2022 03:47)  oxyMORphone 40 mg oral tablet, extended release: 1 tab(s) orally every 12 hours, As Needed (27 May 2022 03:47)  pregabalin 75 mg capsule:  (27 May 2022 03:47)  simvastatin 10 mg oral tablet: 1 tab(s) orally once a day (at bedtime) (27 May 2022 03:47)  Trulicity Pen 0.75 mg/0.5 mL subcutaneous solution: 1 application subcutaneous once a week (27 May 2022 03:47)        24HRS EVENT:  Day  - Rpt EKG- QTC  - F/U with EPS- pacemaker reprogrammed, may need to change device or add a leadless device once pt is optimized ( not necesaarily in this admission)  - Echo with Lumason  - KUB  - OOB  - DG cancelled due to run of VT- 2 Mg given, EPS called, paced tachycardia  - 1:1 observation  -D/C NGT in pm- monitor output  -u/o 20cc/h concentrated, bolused 500cc LR     5/28  Night:  -Midline placed  -Zyprexa 5mg x 1  -did not attempt bedside swallow eval, as patient was agitated   -will need official SLP, once more cooperative   -K3.5, IP 2.9 --> KCL 20, KPhos 15    DVT PTX: therapeutic LVX     GI PTX: PPI    ***Tubes/Lines/Drains  ***  Peripheral IV      REVIEW OF SYSTEMS    [ ] A ten-point review of systems was otherwise negative except as noted.  [x ] Due to altered mental status/intubation, subjective information were not able to be obtained from the patient. History was obtained, to the extent possible, from review of the chart and collateral sources of information.         EZ [place]  [id number]  81y Male    Indication for ICU admission: SBO  Admit Date: 5/27  ICU Date: 5/27  OR Date:    No Known Allergies    PAST MEDICAL & SURGICAL HISTORY:  Diabetes  Hypertension  Pacemaker  Dyslipidemia  History of chronic pancreatitis  Atherosclerosis of native artery of lower extremity  COPD (chronic obstructive pulmonary disease)  CHESTER on CPAP  H/O intestinal obstruction  History of cholecystectomy  History of pancreatic surgery  History of penile implant  Cardiac pacemaker      Home Medications:  amitriptyline 25 mg tablet:  (27 May 2022 03:47)  Breo Ellipta 100 mcg-25 mcg/inh inhalation powder: 1 puff(s) inhaled once a day (27 May 2022 03:47)  clopidogrel 75 mg tablet:  (27 May 2022 03:47)  Corlanor 5 mg oral tablet: 1 tab(s) orally 2 times a day (with meals) (27 May 2022 03:47)  Eliquis 5 mg tablet:  (27 May 2022 03:47)  famotidine 40 mg oral tablet: 1 tab(s) orally once a day (at bedtime) (27 May 2022 03:47)  latanoprost 0.005% ophthalmic solution: 1 drop(s) to each affected eye once a day (in the evening) (27 May 2022 03:47)  montelukast 10 mg oral tablet: 1 tab(s) orally once a day (27 May 2022 03:47)  montelukast 10 mg tablet:  (27 May 2022 03:47)  nifedipine ER 30 mg tablet,extended release:  (27 May 2022 03:47)  omeprazole 40 mg oral delayed release capsule: 1 cap(s) orally once a day (27 May 2022 03:47)  oxyMORphone 40 mg oral tablet, extended release: 1 tab(s) orally every 12 hours, As Needed (27 May 2022 03:47)  pregabalin 75 mg capsule:  (27 May 2022 03:47)  simvastatin 10 mg oral tablet: 1 tab(s) orally once a day (at bedtime) (27 May 2022 03:47)  Trulicity Pen 0.75 mg/0.5 mL subcutaneous solution: 1 application subcutaneous once a week (27 May 2022 03:47)        24HRS EVENT:  Day  - Rpt EKG- QTC  - F/U with EPS- pacemaker reprogrammed, may need to change device or add a leadless device once pt is optimized ( not necesaarily in this admission)  - Echo with Lumason  - KUB  - OOB  - DG cancelled due to run of VT- 2 Mg given, EPS called, paced tachycardia  - 1:1 observation  -D/C NGT in pm- monitor output  -u/o 20cc/h concentrated, bolused 500cc LR     5/28  Night:  -Midline placed  -Zyprexa 5mg x 1  -did not attempt bedside swallow eval, as patient was agitated   -will need official SLP, once more cooperative   -K3.5, IP 2.9 --> KCL 20, KPhos 15    DVT PTX: therapeutic LVX     GI PTX: PPI    Physical Exam:  Neuro- alert and oriented x 0  Resp- clear, no crackle, no rhonchi  Cardiac- S1 S2, RRR, no murmur  Abd- soft, NT, ND, well healed midline scar  MSK- ROM x 4, distal pulses intact- 2+, no pedal edema, no calf tenderness      ***Tubes/Lines/Drains  ***  LUE Midline catheter      REVIEW OF SYSTEMS    [ ] A ten-point review of systems was otherwise negative except as noted.  [x ] Due to altered mental status/intubation, subjective information were not able to be obtained from the patient. History was obtained, to the extent possible, from review of the chart and collateral sources of information.         EZ [place]  [id number]  81y Male    Indication for ICU admission: SBO  Admit Date: 5/27  ICU Date: 5/27  OR Date:    No Known Allergies    PAST MEDICAL & SURGICAL HISTORY:  Diabetes  Hypertension  Pacemaker  Dyslipidemia  History of chronic pancreatitis  Atherosclerosis of native artery of lower extremity  COPD (chronic obstructive pulmonary disease)  CHESTER on CPAP  H/O intestinal obstruction  History of cholecystectomy  History of pancreatic surgery  History of penile implant  Cardiac pacemaker      Home Medications:  amitriptyline 25 mg tablet:  (27 May 2022 03:47)  Breo Ellipta 100 mcg-25 mcg/inh inhalation powder: 1 puff(s) inhaled once a day (27 May 2022 03:47)  clopidogrel 75 mg tablet:  (27 May 2022 03:47)  Corlanor 5 mg oral tablet: 1 tab(s) orally 2 times a day (with meals) (27 May 2022 03:47)  Eliquis 5 mg tablet:  (27 May 2022 03:47)  famotidine 40 mg oral tablet: 1 tab(s) orally once a day (at bedtime) (27 May 2022 03:47)  latanoprost 0.005% ophthalmic solution: 1 drop(s) to each affected eye once a day (in the evening) (27 May 2022 03:47)  montelukast 10 mg oral tablet: 1 tab(s) orally once a day (27 May 2022 03:47)  montelukast 10 mg tablet:  (27 May 2022 03:47)  nifedipine ER 30 mg tablet,extended release:  (27 May 2022 03:47)  omeprazole 40 mg oral delayed release capsule: 1 cap(s) orally once a day (27 May 2022 03:47)  oxyMORphone 40 mg oral tablet, extended release: 1 tab(s) orally every 12 hours, As Needed (27 May 2022 03:47)  pregabalin 75 mg capsule:  (27 May 2022 03:47)  simvastatin 10 mg oral tablet: 1 tab(s) orally once a day (at bedtime) (27 May 2022 03:47)  Trulicity Pen 0.75 mg/0.5 mL subcutaneous solution: 1 application subcutaneous once a week (27 May 2022 03:47)        24HRS EVENT:  Day  - Rpt EKG- QTC  - F/U with EPS- pacemaker reprogrammed, may need to change device or add a leadless device once pt is optimized ( not necessarily in this admission)  - Echo with Lumason  - KUB  - OOB  - DG cancelled due to run of VT- 2 Mg given, EPS called, paced tachycardia  - 1:1 observation  -D/C NGT in pm- monitor output  -u/o 20cc/h concentrated, bolused 500cc LR     5/28  Night:  -Midline placed  -Zyprexa 5mg x 1  -did not attempt bedside swallow eval, as patient was agitated   -will need official SLP, once more cooperative   -K3.5, IP 2.9 --> KCL 20, KPhos 15    DVT PTX: therapeutic LVX     GI PTX: PPI    Physical Exam:  Neuro- alert and oriented x 0  Resp- clear, no crackle, no rhonchi  Cardiac- S1 S2, RRR, no murmur  Abd- soft, NT, ND, well healed midline scar  MSK- ROM x 4, distal pulses intact- 2+, no pedal edema, no calf tenderness      ***Tubes/Lines/Drains  ***  LUE Midline catheter      REVIEW OF SYSTEMS    [ ] A ten-point review of systems was otherwise negative except as noted.  [x ] Due to altered mental status/intubation, subjective information were not able to be obtained from the patient. History was obtained, to the extent possible, from review of the chart and collateral sources of information.

## 2022-05-29 NOTE — PROGRESS NOTE ADULT - ATTENDING COMMENTS
80yo male with PMHx fo DM, HTN, HLD, GERD, COPD, HF, pacemaker, chronic pancreatitis, atrial fibrillation, TIA, previous laparotomy admitted for small bowel obstruction. In ICU for close monitoring. NG tube removed yesterday. +BM/flatus. This morning, patient states that he is feeling better, abdominal pain improving. On exam, abdomen is mildly tender diffusely, non-peritoneal. Labs reviewed - WBC 11.73 from 12.28, BUN/Cr 15/0.8 from 21/1.0. Pending speech and swallow to start CLD if passes. Primary care as per ICU.

## 2022-05-29 NOTE — PROGRESS NOTE ADULT - ATTENDING COMMENTS
Patient had bowel movement and passing flatus, NGT removed  Refused S&S eval yesterday, still NPO this am.  Had some agitation  overnight, but looks cooperative this am.  Still on  1:1 seats.  Was seen by EPS yesterday and again confirmed episode of paced tachy yesterday.  Abdomen: +BS, soft, nontender.    ASSESSMENT:  82 y/o male with SBO.  Agitation.  Atelectasis.  COPD.  A.fib. PPM  QTC interval prolongation.  At risk for hemodynamic instability.  A.fib.  Frail elderly.    PLAN:  - 1:1 seats for intermittent agitation  - needs OOB, family to help orient him  - keep on aspiration precaution  - encourage IS  - keep normotensive cardiac monitoring  - EPS follow up  - get S&S eval today  - follow serum electrolytes and UOP  - on Lovenox therapeutic for A.fib

## 2022-05-29 NOTE — PROGRESS NOTE ADULT - ASSESSMENT
ASSESSMENT:  81 year old male with a past medical hx of chronic pancreatitis, COPD, CHF, T2D, Pacemaker, HTN. HLD, surgical history of cholecystectomy, exploratory laparotomy with small bowel resection, presented to the ED for abdominal pain. CT findings of small bowel obstruction.     PLAN:  - NPO pending S/S eval   - IVF   - Patient having BM and abdomen is now nondistended, nontender, and soft.   - Abdominal exams  - 1:1 sit   - Per EPS: patient will likely need new pacemaker or leadless device once stable   - Monitor heart rhythm   - Therapeutic lvx for hx of A.fib and TIA   - GI ppx     BLUE TEAM SPECTRA: [id number]

## 2022-05-29 NOTE — CHART NOTE - NSCHARTNOTEFT_GEN_A_CORE
SICU DOWNGRADE NOTE    81y Male transferred to floor from SICU    SUBJECTIVE:  -------------------------------------------------------------------------------------------  PMHx/PSHx: PAST MEDICAL & SURGICAL HISTORY:  Diabetes  Hypertension  Pacemaker  Dyslipidemia  History of chronic pancreatitis  Atherosclerosis of native artery of lower extremity  COPD (chronic obstructive pulmonary disease)  CHESTER on CPAP  H/O intestinal obstruction  History of cholecystectomy  History of pancreatic surgery  History of penile implant  Cardiac pacemaker    Allergies: No Known Allergies    -------------------------------------------------------------------------------------------    OBJECTIVE:  -------------------------------------------------------------------------------------------  Vitals:  T(C): 36.2 (05-29-22 @ 04:00), Max: 36.4 (05-28-22 @ 17:00)  HR: 94 (05-29-22 @ 08:00) (82 - 111)  BP: 125/57 (05-29-22 @ 08:00) (101/70 - 154/75)  RR: 19 (05-29-22 @ 08:00) (19 - 26)  SpO2: 100% (05-29-22 @ 08:00) (99% - 100%)  Wt(kg): --    -------------------------------------------------------------------------------------------  I&O's Summary    28 May 2022 07:01  -  29 May 2022 07:00  --------------------------------------------------------  IN: 2650 mL / OUT: 1660 mL / NET: 990 mL    29 May 2022 07:01  -  29 May 2022 10:31  --------------------------------------------------------  IN: 200 mL / OUT: 75 mL / NET: 125 mL  -------------------------------------------------------------------------------------------    Physical Exam:  Neuro- alert and oriented x 0  Resp- clear, no crackles, no wheezing  Cardiac- S1 S2, RRR, no murmur  Abd- soft, NT, ND, + bowel sounds, well- healed midline scar  MSK- ROM x 4,    Labs:  CAPILLARY BLOOD GLUCOSE    POCT Blood Glucose.: 90 mg/dL (29 May 2022 08:10)  POCT Blood Glucose.: 98 mg/dL (29 May 2022 05:56)  POCT Blood Glucose.: 89 mg/dL (29 May 2022 00:06)  POCT Blood Glucose.: 107 mg/dL (28 May 2022 17:29)  POCT Blood Glucose.: 128 mg/dL (28 May 2022 12:50)                          10.0   11.73 )-----------( 356      ( 29 May 2022 00:20 )             29.1         05-29    139  |  101  |  15  ----------------------------<  81  3.5   |  19  |  0.8      Calcium, Total Serum: 8.8 mg/dL (05-29-22 @ 00:20)      LFTs:  Lactate, Blood: 0.7 mmol/L (05-27-22 @ 05:04)  Lactate, Blood: 2.4 mmol/L (05-26-22 @ 19:34)      Coags:     15.40  ----< 1.34    ( 27 May 2022 20:03 )     31.6        -------------------------------------------------------------------------------------------  Active Medications:  MEDICATIONS  (STANDING):  BACItracin   Ointment 1 Application(s) Topical every 12 hours  budesonide  80 MICROgram(s)/formoterol 4.5 MICROgram(s) Inhaler 2 Puff(s) Inhalation two times a day  chlorhexidine 4% Liquid 1 Application(s) Topical <User Schedule>  dextrose 5% + lactated ringers. 1000 milliLiter(s) (100 mL/Hr) IV Continuous <Continuous>  enoxaparin Injectable 75 milliGRAM(s) SubCutaneous every 12 hours  insulin lispro (ADMELOG) corrective regimen sliding scale   SubCutaneous every 6 hours  metoprolol tartrate Injectable 5 milliGRAM(s) IV Push every 6 hours  pantoprazole  Injectable 40 milliGRAM(s) IV Push daily  tamsulosin 0.4 milliGRAM(s) Oral at bedtime    MEDICATIONS  (PRN):    -------------------------------------------------------------------------------------------  Assessment & Plan    81y Male pmh copd, chf, dm, pacemaker, dementia, pancreatitis, htn, hld p/w SBO    NEURO:  Delirium- AOx0, confused and agitated, con't PRN  Zyprexa 5mg IM PRN for agitation  Acute pain- controlled with Tylenol     RESP:   Sating well on RA  hx of COPD, nebs prn  Current Rx: budesonide  80 MICROgram(s)/formoterol 4.5 MICROgram(s) Inhaler 2 two times a day  Home Rx: Breo Ellipta 100 mcg-25 mcg/inh inhalation powder (held)  montelukast 10 mg tablet (held)    CARDS:   Tachycardic  PMHx CHF w/ pacemaker --> interrogated  EKG- , 2gm Mag given  Echo- unable to eval for wall motion abnormalities, EF est 50-55%, mod LVH   cards c/s - rec full AC for afib/CVA, BB for HR   Rpt Echo with [facility] - pending  EPS c/s - reprogrammed device- may need to change device or add a leadless device once pt is optimized  - Arrythmia- wide complex tachycardia noted- paced tachycardias per EPS- NTD    GI/NUTR:   SBO  -med management vs OR  - NG tube removed today  - maintain NPO, formal SLP today  -Protonix for GI ppx  - KUB 5/27- unchanged, normal gas pattern  -  last BM 5/29       /RENAL:      Urinary retention- start Flomax once cleared by SLP     TOV @ 8 pm on 5/29  Labs:          BUN/Cr- 21/1.0  -->,  15/0.8  -->          Electrolytes-Na 139 // K 3.5 // Mg 2.2 //  Phos 2.9 (05-29 @ 00:20)  -hypokalemia and hypophosphotemia repleted       HEME/ONC:       DVT prophylaxis-LVX therapeutic 75 q12 for A-fib      Labs: Hb/Hct:  12.8/37.6  -->  10.7/31.7 -->10/29.1               Plts:  349  -->   371->356              PTT/INR:        Home Rx: clopidogrel 75 mg tablet (held)  Eliquis 5 mg table (held)    ID:  Afebrile  WBC- 7.10  --->>12.3-->11.7  d/c abx       ENDO:     Glucose, Serum: 142 (05-26 @ 19:34)     HA1C in am    LINES/DRAINS:  LUE Midline catheter    Dispo: Downgrade to floor - Tele  Discussed with Dr. Murray    For Follow-up  - TOV @ 8pm tonight  - F/U with EPS for pacemaker exchange vs. adding additional leads  - F/U Echo with Lum[facility]  - F/U SLP eval  - F/U PT/OT consult    SIGN OUT AT 05-29-22 @ 10:31 GIVEN TO: SICU DOWNGRADE NOTE    81y Male transferred to floor from SICU    SUBJECTIVE:  -------------------------------------------------------------------------------------------  PMHx/PSHx: PAST MEDICAL & SURGICAL HISTORY:  Diabetes  Hypertension  Pacemaker  Dyslipidemia  History of chronic pancreatitis  Atherosclerosis of native artery of lower extremity  COPD (chronic obstructive pulmonary disease)  CHESTER on CPAP  H/O intestinal obstruction  History of cholecystectomy  History of pancreatic surgery  History of penile implant  Cardiac pacemaker    Allergies: No Known Allergies    -------------------------------------------------------------------------------------------    OBJECTIVE:  -------------------------------------------------------------------------------------------  Vitals:  T(C): 36.2 (05-29-22 @ 04:00), Max: 36.4 (05-28-22 @ 17:00)  HR: 94 (05-29-22 @ 08:00) (82 - 111)  BP: 125/57 (05-29-22 @ 08:00) (101/70 - 154/75)  RR: 19 (05-29-22 @ 08:00) (19 - 26)  SpO2: 100% (05-29-22 @ 08:00) (99% - 100%)  Wt(kg): --    -------------------------------------------------------------------------------------------  I&O's Summary    28 May 2022 07:01  -  29 May 2022 07:00  --------------------------------------------------------  IN: 2650 mL / OUT: 1660 mL / NET: 990 mL    29 May 2022 07:01  -  29 May 2022 10:31  --------------------------------------------------------  IN: 200 mL / OUT: 75 mL / NET: 125 mL  -------------------------------------------------------------------------------------------    Physical Exam:  Neuro- alert and oriented x 0  Resp- clear, no crackles, no wheezing  Cardiac- S1 S2, RRR, no murmur  Abd- soft, NT, ND, + bowel sounds, well- healed midline scar  MSK- ROM x 4,    Labs:  CAPILLARY BLOOD GLUCOSE    POCT Blood Glucose.: 90 mg/dL (29 May 2022 08:10)  POCT Blood Glucose.: 98 mg/dL (29 May 2022 05:56)  POCT Blood Glucose.: 89 mg/dL (29 May 2022 00:06)  POCT Blood Glucose.: 107 mg/dL (28 May 2022 17:29)  POCT Blood Glucose.: 128 mg/dL (28 May 2022 12:50)                          10.0   11.73 )-----------( 356      ( 29 May 2022 00:20 )             29.1         05-29    139  |  101  |  15  ----------------------------<  81  3.5   |  19  |  0.8      Calcium, Total Serum: 8.8 mg/dL (05-29-22 @ 00:20)      LFTs:  Lactate, Blood: 0.7 mmol/L (05-27-22 @ 05:04)  Lactate, Blood: 2.4 mmol/L (05-26-22 @ 19:34)      Coags:     15.40  ----< 1.34    ( 27 May 2022 20:03 )     31.6        -------------------------------------------------------------------------------------------  Active Medications:  MEDICATIONS  (STANDING):  BACItracin   Ointment 1 Application(s) Topical every 12 hours  budesonide  80 MICROgram(s)/formoterol 4.5 MICROgram(s) Inhaler 2 Puff(s) Inhalation two times a day  chlorhexidine 4% Liquid 1 Application(s) Topical <User Schedule>  dextrose 5% + lactated ringers. 1000 milliLiter(s) (100 mL/Hr) IV Continuous <Continuous>  enoxaparin Injectable 75 milliGRAM(s) SubCutaneous every 12 hours  insulin lispro (ADMELOG) corrective regimen sliding scale   SubCutaneous every 6 hours  metoprolol tartrate Injectable 5 milliGRAM(s) IV Push every 6 hours  pantoprazole  Injectable 40 milliGRAM(s) IV Push daily  tamsulosin 0.4 milliGRAM(s) Oral at bedtime    MEDICATIONS  (PRN):    -------------------------------------------------------------------------------------------  Assessment & Plan    81y Male pmh copd, chf, dm, pacemaker, dementia, pancreatitis, htn, hld p/w SBO    NEURO:  Delirium- AOx0, confused and agitated, con't PRN  Zyprexa 5mg IM PRN for agitation  Acute pain- controlled with Tylenol     RESP:   Sating well on RA  hx of COPD, nebs prn  Current Rx: budesonide  80 MICROgram(s)/formoterol 4.5 MICROgram(s) Inhaler 2 two times a day  Home Rx: Breo Ellipta 100 mcg-25 mcg/inh inhalation powder (held)  montelukast 10 mg tablet (held)    CARDS:   Tachycardic  PMHx CHF w/ pacemaker --> interrogated  EKG- , 2gm Mag given  Echo- unable to eval for wall motion abnormalities, EF est 50-55%, mod LVH   cards c/s - rec full AC for afib/CVA, BB for HR   Rpt Echo with [facility] - pending  EPS c/s - reprogrammed device- may need to change device or add a leadless device once pt is optimized  - Arrythmia- wide complex tachycardia noted- paced tachycardias per EPS- NTD    GI/NUTR:   SBO  -med management vs OR  - NG tube removed today  - maintain NPO, formal SLP today  -Protonix for GI ppx  - KUB 5/27- unchanged, normal gas pattern  -  last BM 5/29       /RENAL:      Urinary retention- start Flomax once cleared by SLP     TOV @ 8 pm on 5/29  Labs:          BUN/Cr- 21/1.0  -->,  15/0.8  -->          Electrolytes-Na 139 // K 3.5 // Mg 2.2 //  Phos 2.9 (05-29 @ 00:20)  -hypokalemia and hypophosphotemia repleted       HEME/ONC:       DVT prophylaxis-LVX therapeutic 75 q12 for A-fib      Labs: Hb/Hct:  12.8/37.6  -->  10.7/31.7 -->10/29.1               Plts:  349  -->   371->356              PTT/INR:        Home Rx: clopidogrel 75 mg tablet (held)  Eliquis 5 mg table (held)    ID:  Afebrile  WBC- 7.10  --->>12.3-->11.7  d/c abx       ENDO:     Glucose, Serum: 142 (05-26 @ 19:34)     HA1C in am    LINES/DRAINS:  LUE Midline catheter    Dispo: Downgrade to floor - Tele  Discussed with Dr. Murray    For Follow-up  - TOV @ 8pm tonight  - F/U with EPS for pacemaker exchange vs. adding additional leads  - F/U Echo with [facility]  - F/U SLP eval  - F/U PT/OT consult    SIGN OUT AT 05-29-22 @ 10:31 GIVEN TO: JOHN SICU DOWNGRADE NOTE    81y Male transferred to floor from SICU    SUBJECTIVE:  -------------------------------------------------------------------------------------------  PMHx/PSHx: PAST MEDICAL & SURGICAL HISTORY:  Diabetes  Hypertension  Pacemaker  Dyslipidemia  History of chronic pancreatitis  Atherosclerosis of native artery of lower extremity  COPD (chronic obstructive pulmonary disease)  CHESTER on CPAP  H/O intestinal obstruction  History of cholecystectomy  History of pancreatic surgery  History of penile implant  Cardiac pacemaker    Allergies: No Known Allergies    -------------------------------------------------------------------------------------------    OBJECTIVE:  -------------------------------------------------------------------------------------------  Vitals:  T(C): 36.2 (05-29-22 @ 04:00), Max: 36.4 (05-28-22 @ 17:00)  HR: 94 (05-29-22 @ 08:00) (82 - 111)  BP: 125/57 (05-29-22 @ 08:00) (101/70 - 154/75)  RR: 19 (05-29-22 @ 08:00) (19 - 26)  SpO2: 100% (05-29-22 @ 08:00) (99% - 100%)  Wt(kg): --    -------------------------------------------------------------------------------------------  I&O's Summary    28 May 2022 07:01  -  29 May 2022 07:00  --------------------------------------------------------  IN: 2650 mL / OUT: 1660 mL / NET: 990 mL    29 May 2022 07:01  -  29 May 2022 10:31  --------------------------------------------------------  IN: 200 mL / OUT: 75 mL / NET: 125 mL  -------------------------------------------------------------------------------------------    Physical Exam:  Neuro- alert and oriented x 0  Resp- clear, no crackles, no wheezing  Cardiac- S1 S2, RRR, no murmur  Abd- soft, NT, ND, + bowel sounds, well- healed midline scar  MSK- ROM x 4,    Labs:  CAPILLARY BLOOD GLUCOSE    POCT Blood Glucose.: 90 mg/dL (29 May 2022 08:10)  POCT Blood Glucose.: 98 mg/dL (29 May 2022 05:56)  POCT Blood Glucose.: 89 mg/dL (29 May 2022 00:06)  POCT Blood Glucose.: 107 mg/dL (28 May 2022 17:29)  POCT Blood Glucose.: 128 mg/dL (28 May 2022 12:50)                          10.0   11.73 )-----------( 356      ( 29 May 2022 00:20 )             29.1         05-29    139  |  101  |  15  ----------------------------<  81  3.5   |  19  |  0.8      Calcium, Total Serum: 8.8 mg/dL (05-29-22 @ 00:20)      LFTs:  Lactate, Blood: 0.7 mmol/L (05-27-22 @ 05:04)  Lactate, Blood: 2.4 mmol/L (05-26-22 @ 19:34)      Coags:     15.40  ----< 1.34    ( 27 May 2022 20:03 )     31.6        -------------------------------------------------------------------------------------------  Active Medications:  MEDICATIONS  (STANDING):  BACItracin   Ointment 1 Application(s) Topical every 12 hours  budesonide  80 MICROgram(s)/formoterol 4.5 MICROgram(s) Inhaler 2 Puff(s) Inhalation two times a day  chlorhexidine 4% Liquid 1 Application(s) Topical <User Schedule>  dextrose 5% + lactated ringers. 1000 milliLiter(s) (100 mL/Hr) IV Continuous <Continuous>  enoxaparin Injectable 75 milliGRAM(s) SubCutaneous every 12 hours  insulin lispro (ADMELOG) corrective regimen sliding scale   SubCutaneous every 6 hours  metoprolol tartrate Injectable 5 milliGRAM(s) IV Push every 6 hours  pantoprazole  Injectable 40 milliGRAM(s) IV Push daily  tamsulosin 0.4 milliGRAM(s) Oral at bedtime    MEDICATIONS  (PRN):    -------------------------------------------------------------------------------------------  Assessment & Plan    81y Male pmh copd, chf, dm, pacemaker, dementia, pancreatitis, htn, hld p/w SBO    NEURO:  Delirium- AOx0, confused and agitated, con't PRN  Zyprexa 5mg IM PRN for agitation  Acute pain- controlled with Tylenol     RESP:   Sating well on RA  hx of COPD, nebs prn  Current Rx: budesonide  80 MICROgram(s)/formoterol 4.5 MICROgram(s) Inhaler 2 two times a day  Home Rx: Breo Ellipta 100 mcg-25 mcg/inh inhalation powder (held)  montelukast 10 mg tablet (held)    CARDS:   Tachycardic  PMHx CHF w/ pacemaker --> interrogated  EKG- 5/29- ,  Echo- unable to eval for wall motion abnormalities, EF est 50-55%, mod LVH   cards c/s - rec full AC for afib/CVA, BB for HR   Rpt Echo with [facility] - pending  EPS c/s - reprogrammed device- may need to change device or add a leadless device once pt is optimized  - Arrythmia- wide complex tachycardia noted- paced tachycardias per EPS- NTD    GI/NUTR:   SBO  -med management vs OR  - NG tube removed today  - maintain NPO, formal SLP today  -Protonix for GI ppx  - KUB 5/27- unchanged, normal gas pattern  -  last BM 5/29       /RENAL:      Urinary retention- start Flomax once cleared by SLP     TOV @ 8 pm on 5/29  Labs:          BUN/Cr- 21/1.0  -->,  15/0.8  -->          Electrolytes-Na 139 // K 3.5 // Mg 2.2 //  Phos 2.9 (05-29 @ 00:20)  -hypokalemia and hypophosphotemia repleted       HEME/ONC:       DVT prophylaxis-LVX therapeutic 75 q12 for A-fib      Labs: Hb/Hct:  12.8/37.6  -->  10.7/31.7 -->10/29.1               Plts:  349  -->   371->356              PTT/INR:        Home Rx: clopidogrel 75 mg tablet (held)  Eliquis 5 mg table (held)    ID:  Afebrile, no abx  WBC- 7.10  --->>12.3-->11.7        ENDO:     Glucose, Serum: 142 (05-26 @ 19:34)     HA1C 4/21- 6.8  Con't RISS before meals    LINES/DRAINS:  LUE Midline catheter    Dispo: Downgrade to floor - Tele  Discussed with Dr. Murray    For Follow-up  - TOV @ 8pm tonight  - F/U with EPS for pacemaker exchange vs. adding additional leads  - F/U Echo with [facility]  - F/U SLP eval  - F/U PT/OT consult    SIGN OUT AT 05-29-22 @ 10:31 GIVEN TO: JOHN SICU DOWNGRADE NOTE    81y Male transferred to floor from SICU    SUBJECTIVE:  -------------------------------------------------------------------------------------------  PMHx/PSHx: PAST MEDICAL & SURGICAL HISTORY:  Diabetes  Hypertension  Pacemaker  Dyslipidemia  History of chronic pancreatitis  Atherosclerosis of native artery of lower extremity  COPD (chronic obstructive pulmonary disease)  CHESTER on CPAP  H/O intestinal obstruction  History of cholecystectomy  History of pancreatic surgery  History of penile implant  Cardiac pacemaker    Allergies: No Known Allergies    -------------------------------------------------------------------------------------------    OBJECTIVE:  -------------------------------------------------------------------------------------------  Vitals:  T(C): 36.2 (05-29-22 @ 04:00), Max: 36.4 (05-28-22 @ 17:00)  HR: 94 (05-29-22 @ 08:00) (82 - 111)  BP: 125/57 (05-29-22 @ 08:00) (101/70 - 154/75)  RR: 19 (05-29-22 @ 08:00) (19 - 26)  SpO2: 100% (05-29-22 @ 08:00) (99% - 100%)  Wt(kg): --    -------------------------------------------------------------------------------------------  I&O's Summary    28 May 2022 07:01  -  29 May 2022 07:00  --------------------------------------------------------  IN: 2650 mL / OUT: 1660 mL / NET: 990 mL    29 May 2022 07:01  -  29 May 2022 10:31  --------------------------------------------------------  IN: 200 mL / OUT: 75 mL / NET: 125 mL  -------------------------------------------------------------------------------------------    Physical Exam:  Neuro- alert and oriented x 0  Resp- clear, no crackles, no wheezing  Cardiac- S1 S2, RRR, no murmur  Abd- soft, NT, ND, + bowel sounds, well- healed midline scar  MSK- ROM x 4,    Labs:  CAPILLARY BLOOD GLUCOSE    POCT Blood Glucose.: 90 mg/dL (29 May 2022 08:10)  POCT Blood Glucose.: 98 mg/dL (29 May 2022 05:56)  POCT Blood Glucose.: 89 mg/dL (29 May 2022 00:06)  POCT Blood Glucose.: 107 mg/dL (28 May 2022 17:29)  POCT Blood Glucose.: 128 mg/dL (28 May 2022 12:50)                          10.0   11.73 )-----------( 356      ( 29 May 2022 00:20 )             29.1         05-29    139  |  101  |  15  ----------------------------<  81  3.5   |  19  |  0.8      Calcium, Total Serum: 8.8 mg/dL (05-29-22 @ 00:20)      LFTs:  Lactate, Blood: 0.7 mmol/L (05-27-22 @ 05:04)  Lactate, Blood: 2.4 mmol/L (05-26-22 @ 19:34)      Coags:     15.40  ----< 1.34    ( 27 May 2022 20:03 )     31.6        -------------------------------------------------------------------------------------------  Active Medications:  MEDICATIONS  (STANDING):  BACItracin   Ointment 1 Application(s) Topical every 12 hours  budesonide  80 MICROgram(s)/formoterol 4.5 MICROgram(s) Inhaler 2 Puff(s) Inhalation two times a day  chlorhexidine 4% Liquid 1 Application(s) Topical <User Schedule>  dextrose 5% + lactated ringers. 1000 milliLiter(s) (100 mL/Hr) IV Continuous <Continuous>  enoxaparin Injectable 75 milliGRAM(s) SubCutaneous every 12 hours  insulin lispro (ADMELOG) corrective regimen sliding scale   SubCutaneous every 6 hours  metoprolol tartrate Injectable 5 milliGRAM(s) IV Push every 6 hours  pantoprazole  Injectable 40 milliGRAM(s) IV Push daily  tamsulosin 0.4 milliGRAM(s) Oral at bedtime    MEDICATIONS  (PRN):    -------------------------------------------------------------------------------------------  Assessment & Plan    81y Male pmh copd, chf, dm, pacemaker, dementia, pancreatitis, htn, hld p/w SBO    NEURO:  Delirium- AOx0, confused and agitated, con't PRN  Zyprexa 5mg IM PRN for agitation  Acute pain- controlled with Tylenol     RESP:   Sating well on RA  hx of COPD, nebs prn  Current Rx: budesonide  80 MICROgram(s)/formoterol 4.5 MICROgram(s) Inhaler 2 two times a day  Home Rx: Breo Ellipta 100 mcg-25 mcg/inh inhalation powder (held)  montelukast 10 mg tablet (held)    CARDS:   Tachycardic  PMHx CHF w/ pacemaker --> interrogated  EKG- 5/29- ,  Echo- unable to eval for wall motion abnormalities, EF est 50-55%, mod LVH   cards c/s - rec full AC for afib/CVA, BB for HR   Rpt Echo with [facility] - pending  EPS c/s - reprogrammed device- may need to change device or add a leadless device once pt is optimized  - Arrythmia- wide complex tachycardia noted- paced tachycardias per EPS- NTD    GI/NUTR:   SBO  -med management vs OR  - NG tube removed today  - maintain NPO, formal SLP today  -Protonix for GI ppx  - KUB 5/27- unchanged, normal gas pattern  -  last BM 5/29       /RENAL:      Urinary retention- start Flomax once cleared by SLP     TOV @ 8 pm on 5/29  Labs:          BUN/Cr- 21/1.0  -->,  15/0.8  -->          Electrolytes-Na 139 // K 3.5 // Mg 2.2 //  Phos 2.9 (05-29 @ 00:20)  -hypokalemia and hypophosphotemia repleted       HEME/ONC:       DVT prophylaxis-LVX therapeutic 75 q12 for A-fib      Labs: Hb/Hct:  12.8/37.6  -->  10.7/31.7 -->10/29.1               Plts:  349  -->   371->356              PTT/INR:        Home Rx: clopidogrel 75 mg tablet (held)  Eliquis 5 mg table (held)    ID:  Afebrile, no abx  WBC- 7.10  --->>12.3-->11.7        ENDO:     Glucose, Serum: 142 (05-26 @ 19:34)     HA1C 4/21- 6.8  Con't RISS before meals    LINES/DRAINS:  LUE Midline catheter    Dispo: Downgrade to floor - Tele  Discussed with Dr. Murray    For Follow-up  - TOV @ 8pm tonight  - F/U with EPS for pacemaker exchange vs. adding additional leads  - F/U Echo with [facility]  - F/U SLP eval  - F/U PT/OT consult  - Resume home meds     SIGN OUT AT 05-29-22 @ 10:31 GIVEN TO: JOHN

## 2022-05-29 NOTE — PROGRESS NOTE ADULT - ASSESSMENT
Assessment & Plan    81y Male pmh copd, chf, dm, pacemaker, dementia, pancreatitis, htn, hld p/w SBO    NEURO:  Delirium- AOx0, confused and agitated, con't PRN  Syprexa 5mg IM (got last night)  Acute pain- controlled with Tylenol and morphine PRN for pain    RESP:   Sating well on RA  hx of COPD, nebs prn  Current Rx: budesonide  80 MICROgram(s)/formoterol 4.5 MICROgram(s) Inhaler 2 two times a day  Home Rx: Breo Ellipta 100 mcg-25 mcg/inh inhalation powder  montelukast 10 mg tablet    CARDS:   Tachycardic  PMHx CHF w/ pacemaker --> interrogated  EKG- , 2gm Mag given  Echo- unable to eval for wall motion abnormalities, EF est 50-55%, mod LVH   cards c/s - rec full AC for afib/CVA, BB for HR   Rpt Echo with Lumason - pending  EPS c/s - reprogrammed device- may need to change device or add a leadless device once pt is optimized  - Arrythmia- wide complex tachycardia noted- paced tachycardiaas per EPS- NTD    GI/NUTR:   SBO  -med management vs OR  - NG tube removed today  - maintiain NPO  -Protonix  - KUB unchanged   +BM 5/28  -did not attempt bedside swallow eval, as patient was agitated   -will need official SLP, once more cooperative     /RENAL:      Urinary retention- jordan inserted -> 1L out, strict i/o's, Q/O- 25/hr    IVF LR @100 and 500 bolus x 1 (5/28) for low U/O  Labs:          BUN/Cr- 21/1.0  -->,  15/0.8  -->          Electrolytes-Na 139 // K 3.5 // Mg 2.2 //  Phos 2.9 (05-29 @ 00:20)  -hypokalemia and hypophosphotemia repleted       HEME/ONC:       DVT prophylaxis-LVX therapeutic 75 q12 for A-fib    Labs: Hb/Hct:  12.8/37.6  -->  10.7/31.7 -->10/29.1               Plts:  349  -->   371->356              PTT/INR:        Home Rx: clopidogrel 75 mg tablet:   Eliquis 5 mg tablet:     ID:  WBC- 7.10  --->>12.3-->11.7  d/c abx       ENDO:     Glucose, Serum: 142 (05-26 @ 19:34)     HA1C in am    LINES/DRAINS:  PIV,     ADVANCED DIRECTIVES:  Full Code    HCP/Emergency Contact-    INDICATION FOR SICU:  SBO requiring hemodynamic monitoring    DISPO:   SICU    Assessment & Plan    81y Male pmh copd, chf, dm, pacemaker, dementia, pancreatitis, htn, hld p/w SBO    NEURO:  Delirium- AOx0, confused and agitated, con't PRN  Zyprexa 5mg IM (got last night)  Acute pain- controlled with Tylenol and morphine PRN for pain    RESP:   Sating well on RA  hx of COPD, nebs prn  Current Rx: budesonide  80 MICROgram(s)/formoterol 4.5 MICROgram(s) Inhaler 2 two times a day  Home Rx: Breo Ellipta 100 mcg-25 mcg/inh inhalation powder  montelukast 10 mg tablet    CARDS:   Tachycardic  PMHx CHF w/ pacemaker --> interrogated  EKG- , 2gm Mag given  Echo- unable to eval for wall motion abnormalities, EF est 50-55%, mod LVH   cards c/s - rec full AC for afib/CVA, BB for HR   Rpt Echo with Lumason - pending  EPS c/s - reprogrammed device- may need to change device or add a leadless device once pt is optimized  - Arrythmia- wide complex tachycardia noted- paced tachycardiaas per EPS- NTD    GI/NUTR:   SBO  -med management vs OR  - NG tube removed today  - maintiain NPO  -Protonix  - KUB unchanged   +BM 5/28  -did not attempt bedside swallow eval, as patient was agitated   -will need official SLP, once more cooperative     /RENAL:      Urinary retention- jordan inserted -> 1L out, strict i/o's, Q/O- 25/hr    IVF LR @100 and 500 bolus x 1 (5/28) for low U/O  Labs:          BUN/Cr- 21/1.0  -->,  15/0.8  -->          Electrolytes-Na 139 // K 3.5 // Mg 2.2 //  Phos 2.9 (05-29 @ 00:20)  -hypokalemia and hypophosphotemia repleted       HEME/ONC:       DVT prophylaxis-LVX therapeutic 75 q12 for A-fib    Labs: Hb/Hct:  12.8/37.6  -->  10.7/31.7 -->10/29.1               Plts:  349  -->   371->356              PTT/INR:        Home Rx: clopidogrel 75 mg tablet:   Eliquis 5 mg tablet:     ID:  WBC- 7.10  --->>12.3-->11.7  d/c abx       ENDO:     Glucose, Serum: 142 (05-26 @ 19:34)     HA1C in am    LINES/DRAINS:  LUE midline catheter     ADVANCED DIRECTIVES:  Full Code    HCP/Emergency Contact-    INDICATION FOR SICU:  SBO requiring hemodynamic monitoring    DISPO:   SICU    Assessment & Plan    81y Male pmh copd, chf, dm, pacemaker, dementia, pancreatitis, htn, hld p/w SBO    NEURO:  Delirium- AOx0, confused and agitated, con't PRN  Zyprexa 5mg IM (got last night)  Acute pain- controlled with Tylenol and morphine PRN for pain    RESP:   Sating well on RA  hx of COPD, nebs prn  Current Rx: budesonide  80 MICROgram(s)/formoterol 4.5 MICROgram(s) Inhaler 2 two times a day  Home Rx: Breo Ellipta 100 mcg-25 mcg/inh inhalation powder  montelukast 10 mg tablet    CARDS:   Tachycardic  PMHx CHF w/ pacemaker --> interrogated  EKG- , 2gm Mag given  Echo- unable to eval for wall motion abnormalities, EF est 50-55%, mod LVH   cards c/s - rec full AC for afib/CVA, BB for HR   Rpt Echo with Lumason - pending  EPS c/s - reprogrammed device- may need to change device or add a leadless device once pt is optimized  - Arrythmia- wide complex tachycardia noted- paced tachycardiaas per EPS- NTD    GI/NUTR:   SBO  -med management vs OR  - NG tube removed today  - maintiain NPO  -Protonix  - KUB unchanged   +BM 5/28  -did not attempt bedside swallow eval, as patient was agitated   -will need official SLP, once more cooperative     /RENAL:      Urinary retention- jordan inserted -> 1L out, strict i/o's, Q/O- 25/hr    IVF LR @100 and 500 bolus x 1 (5/28) for low U/O  Labs:          BUN/Cr- 21/1.0  -->,  15/0.8  -->          Electrolytes-Na 139 // K 3.5 // Mg 2.2 //  Phos 2.9 (05-29 @ 00:20)  -hypokalemia and hypophosphotemia repleted       HEME/ONC:       DVT prophylaxis-LVX therapeutic 75 q12 for A-fib    Labs: Hb/Hct:  12.8/37.6  -->  10.7/31.7 -->10/29.1               Plts:  349  -->   371->356              PTT/INR:        Home Rx: clopidogrel 75 mg tablet:   Eliquis 5 mg tablet:     ID:  WBC- 7.10  --->>12.3-->11.7  d/c abx       ENDO:     Glucose, Serum: 142 (05-26 @ 19:34)     HA1C in am    LINES/DRAINS:  LUE midline catheter     ADVANCED DIRECTIVES:  Full Code    HCP/Emergency Contact-    INDICATION FOR SICU:  SBO requiring hemodynamic monitoring    DISPO:   Downgrade to floor- Tele  Discussed with Dr. Arroyo   Assessment & Plan    81y Male pmh copd, chf, dm, pacemaker, dementia, pancreatitis, htn, hld p/w SBO    NEURO:  Delirium- AOx0, confused and agitated, con't PRN  Zyprexa 5mg IM (got last night)  Acute pain- controlled with Tylenol and morphine PRN for pain    RESP:   Sating well on RA  hx of COPD, nebs prn  Current Rx: budesonide  80 MICROgram(s)/formoterol 4.5 MICROgram(s) Inhaler 2 two times a day  Home Rx: Breo Ellipta 100 mcg-25 mcg/inh inhalation powder  montelukast 10 mg tablet    CARDS:   Tachycardic  PMHx CHF w/ pacemaker --> interrogated  EKG- , 2gm Mag given  Echo- unable to eval for wall motion abnormalities, EF est 50-55%, mod LVH   cards c/s - rec full AC for afib/CVA, BB for HR   Rpt Echo with Lumason - pending  EPS c/s - reprogrammed device- may need to change device or add a leadless device once pt is optimized  - Arrythmia- wide complex tachycardia noted- paced tachycardiaas per EPS- NTD    GI/NUTR:   SBO  -med management vs OR  - NG tube removed today  - maintiain NPO  -Protonix  - KUB unchanged   +BM 5/28  -did not attempt bedside swallow eval, as patient was agitated   -will need official SLP, once more cooperative     /RENAL:      Urinary retention- jordan inserted -> 1L out, strict i/o's, Q/O- 25/hr    IVF LR @100 and 500 bolus x 1 (5/28) for low U/O  Labs:          BUN/Cr- 21/1.0  -->,  15/0.8  -->          Electrolytes-Na 139 // K 3.5 // Mg 2.2 //  Phos 2.9 (05-29 @ 00:20)  -hypokalemia and hypophosphotemia repleted       HEME/ONC:       DVT prophylaxis-LVX therapeutic 75 q12 for A-fib    Labs: Hb/Hct:  12.8/37.6  -->  10.7/31.7 -->10/29.1               Plts:  349  -->   371->356              PTT/INR:        Home Rx: clopidogrel 75 mg tablet:   Eliquis 5 mg tablet:     ID:  WBC- 7.10  --->>12.3-->11.7  d/c abx       ENDO:     Glucose, Serum: 142 (05-26 @ 19:34)     HA1C in am    LINES/DRAINS:  LUE midline catheter     ADVANCED DIRECTIVES:  Full Code    HCP/Emergency Contact-    INDICATION FOR SICU:  SBO requiring hemodynamic monitoring    DISPO:   Downgrade to floor- Tele  Discussed with Dr. Murray

## 2022-05-29 NOTE — PROGRESS NOTE ADULT - SUBJECTIVE AND OBJECTIVE BOX
GENERAL SURGERY PROGRESS NOTE    Patient: EZ ALCARAZ , 81y ([birth year])Male   MRN: [id number]  Location: [address]  Visit: 05-27-22 Inpatient  Date: 05-29-22 @ 03:03    Hospital Day #: 3    Procedure/Dx/Injuries: SBO    Events of past 24 hours: Patient was seen by EPS who stated that patient will likely need new pacemaker or a leadless device once stable. Per EPS, they also recommended Echo with Lumason. Patient had large bowel movement yesterday and repeat KUB showed no obstruction and contrast in colon. Patient had a run of VT and therefore as kept in SICU. Patient grew more agitated as day went on and was removing IV's, therefor a midline was placed and Zyprexa 5mg x1 was given. Pending official S/S eval as patient is uncooperative at present moment.     PAST MEDICAL & SURGICAL HISTORY:  Diabetes      Hypertension      Pacemaker      Dyslipidemia      History of chronic pancreatitis      Atherosclerosis of native artery of lower extremity      COPD (chronic obstructive pulmonary disease)      CHESTER on CPAP      H/O intestinal obstruction      History of cholecystectomy      History of pancreatic surgery      History of penile implant      Cardiac pacemaker          Vitals:   T(F): 97.2 (05-29-22 @ 00:00), Max: 97.6 (05-28-22 @ 08:00)  HR: 97 (05-29-22 @ 02:00)  BP: 132/56 (05-29-22 @ 02:00)  RR: 22 (05-29-22 @ 02:00)  SpO2: 100% (05-29-22 @ 02:00)      Diet, NPO      Fluids:     I & O's:    05-27-22 @ 07:01  -  05-28-22 @ 07:00  --------------------------------------------------------  IN:    Lactated Ringers: 1200 mL    Sodium Chloride 0.9% Bolus: 250 mL  Total IN: 1450 mL    OUT:    Indwelling Catheter - Urethral (mL): 75 mL    Nasogastric/Oral tube (mL): 600 mL    Voided (mL): 350 mL  Total OUT: 1025 mL    Total NET: 425 mL    PHYSICAL EXAM:  General: Agitated  HEENT: NCAT, CALVIN, EOMI, Trachea ML, Neck supple  Cardiac: RRR S1, S2, no Murmurs, rubs or gallops  Respiratory: CTAB, normal respiratory effort, breath sounds equal BL, no wheeze, rhonchi or crackles  Abdomen: Nondistended, nontender, soft     MEDICATIONS  (STANDING):  BACItracin   Ointment 1 Application(s) Topical every 12 hours  budesonide  80 MICROgram(s)/formoterol 4.5 MICROgram(s) Inhaler 2 Puff(s) Inhalation two times a day  chlorhexidine 4% Liquid 1 Application(s) Topical <User Schedule>  enoxaparin Injectable 75 milliGRAM(s) SubCutaneous every 12 hours  insulin lispro (ADMELOG) corrective regimen sliding scale   SubCutaneous every 6 hours  lactated ringers. 1000 milliLiter(s) (100 mL/Hr) IV Continuous <Continuous>  metoprolol tartrate Injectable 5 milliGRAM(s) IV Push every 6 hours  pantoprazole  Injectable 40 milliGRAM(s) IV Push daily  potassium phosphate IVPB 15 milliMole(s) IV Intermittent once  tamsulosin 0.4 milliGRAM(s) Oral at bedtime    MEDICATIONS  (PRN):      DVT PROPHYLAXIS: enoxaparin Injectable 75 milliGRAM(s) SubCutaneous every 12 hours    GI PROPHYLAXIS: pantoprazole  Injectable 40 milliGRAM(s) IV Push daily    ANTICOAGULATION:   ANTIBIOTICS:            LAB/STUDIES:  Labs:  CAPILLARY BLOOD GLUCOSE      POCT Blood Glucose.: 89 mg/dL (29 May 2022 00:06)  POCT Blood Glucose.: 107 mg/dL (28 May 2022 17:29)  POCT Blood Glucose.: 128 mg/dL (28 May 2022 12:50)  POCT Blood Glucose.: 122 mg/dL (28 May 2022 07:10)                          10.0   11.73 )-----------( 356      ( 29 May 2022 00:20 )             29.1         05-29    139  |  101  |  15  ----------------------------<  81  3.5   |  19  |  0.8      Calcium, Total Serum: 8.8 mg/dL (05-29-22 @ 00:20)      LFTs:     Lactate, Blood: 0.7 mmol/L (05-27-22 @ 05:04)  Lactate, Blood: 2.4 mmol/L (05-26-22 @ 19:34)      Coags:     15.40  ----< 1.34    ( 27 May 2022 20:03 )     31.6      IMAGING:  < from: Xray Kidney Ureter Bladder (05.28.22 @ 10:13) >  IMPRESSION: Normal bowel gas pattern. Residual contrast in colon    < end of copied text >

## 2022-05-29 NOTE — PROCEDURE NOTE - ADDITIONAL PROCEDURE DETAILS
EP: Dr. Demarco    No VT on device  AF 3/.5 hours on 2/22/2022  Multiple episodes of 1:1 tachycardia: differential sinus Tc vs. AVNRT vs. AT    Device has poor parameters on RA and RV leads  Impedance Ok, sensing on RV poor, Thresholds on RA and RV elevated  periods of RV undersensing leading to inappropriate RV pacing    I reprogrammed device:  V sensitivity lowered from 0.9 to 0.6  Atrial Outrputs increased to 5.0 V at 1.5 ms    Recommend:   -2D echo  -EP consult in am
Bard Power glide 10 cm Power injectable Midline

## 2022-05-30 LAB
ABO RH CONFIRMATION: SIGNIFICANT CHANGE UP
ANION GAP SERPL CALC-SCNC: 11 MMOL/L — SIGNIFICANT CHANGE UP (ref 7–14)
BUN SERPL-MCNC: 5 MG/DL — LOW (ref 10–20)
CALCIUM SERPL-MCNC: 9 MG/DL — SIGNIFICANT CHANGE UP (ref 8.5–10.1)
CHLORIDE SERPL-SCNC: 106 MMOL/L — SIGNIFICANT CHANGE UP (ref 98–110)
CO2 SERPL-SCNC: 24 MMOL/L — SIGNIFICANT CHANGE UP (ref 17–32)
CREAT SERPL-MCNC: 0.7 MG/DL — SIGNIFICANT CHANGE UP (ref 0.7–1.5)
EGFR: 93 ML/MIN/1.73M2 — SIGNIFICANT CHANGE UP
GLUCOSE BLDC GLUCOMTR-MCNC: 114 MG/DL — HIGH (ref 70–99)
GLUCOSE BLDC GLUCOMTR-MCNC: 129 MG/DL — HIGH (ref 70–99)
GLUCOSE BLDC GLUCOMTR-MCNC: 166 MG/DL — HIGH (ref 70–99)
GLUCOSE SERPL-MCNC: 169 MG/DL — HIGH (ref 70–99)
HCT VFR BLD CALC: 33.2 % — LOW (ref 42–52)
HGB BLD-MCNC: 11.5 G/DL — LOW (ref 14–18)
MAGNESIUM SERPL-MCNC: 1.8 MG/DL — SIGNIFICANT CHANGE UP (ref 1.8–2.4)
MCHC RBC-ENTMCNC: 31 PG — SIGNIFICANT CHANGE UP (ref 27–31)
MCHC RBC-ENTMCNC: 34.6 G/DL — SIGNIFICANT CHANGE UP (ref 32–37)
MCV RBC AUTO: 89.5 FL — SIGNIFICANT CHANGE UP (ref 80–94)
NRBC # BLD: 0 /100 WBCS — SIGNIFICANT CHANGE UP (ref 0–0)
PHOSPHATE SERPL-MCNC: 3.3 MG/DL — SIGNIFICANT CHANGE UP (ref 2.1–4.9)
PLATELET # BLD AUTO: 433 K/UL — HIGH (ref 130–400)
POTASSIUM SERPL-MCNC: 3.6 MMOL/L — SIGNIFICANT CHANGE UP (ref 3.5–5)
POTASSIUM SERPL-SCNC: 3.6 MMOL/L — SIGNIFICANT CHANGE UP (ref 3.5–5)
RBC # BLD: 3.71 M/UL — LOW (ref 4.7–6.1)
RBC # FLD: 13 % — SIGNIFICANT CHANGE UP (ref 11.5–14.5)
SODIUM SERPL-SCNC: 141 MMOL/L — SIGNIFICANT CHANGE UP (ref 135–146)
WBC # BLD: 5.84 K/UL — SIGNIFICANT CHANGE UP (ref 4.8–10.8)
WBC # FLD AUTO: 5.84 K/UL — SIGNIFICANT CHANGE UP (ref 4.8–10.8)

## 2022-05-30 PROCEDURE — 71045 X-RAY EXAM CHEST 1 VIEW: CPT | Mod: 26

## 2022-05-30 PROCEDURE — 99232 SBSQ HOSP IP/OBS MODERATE 35: CPT

## 2022-05-30 RX ORDER — POTASSIUM CHLORIDE 20 MEQ
20 PACKET (EA) ORAL
Refills: 0 | Status: COMPLETED | OUTPATIENT
Start: 2022-05-30 | End: 2022-05-31

## 2022-05-30 RX ORDER — MAGNESIUM SULFATE 500 MG/ML
2 VIAL (ML) INJECTION ONCE
Refills: 0 | Status: COMPLETED | OUTPATIENT
Start: 2022-05-30 | End: 2022-05-30

## 2022-05-30 RX ORDER — CLOPIDOGREL BISULFATE 75 MG/1
75 TABLET, FILM COATED ORAL DAILY
Refills: 0 | Status: DISCONTINUED | OUTPATIENT
Start: 2022-05-30 | End: 2022-06-05

## 2022-05-30 RX ORDER — AMITRIPTYLINE HCL 25 MG
25 TABLET ORAL DAILY
Refills: 0 | Status: DISCONTINUED | OUTPATIENT
Start: 2022-05-30 | End: 2022-06-05

## 2022-05-30 RX ADMIN — Medication 650 MILLIGRAM(S): at 06:06

## 2022-05-30 RX ADMIN — CHLORHEXIDINE GLUCONATE 1 APPLICATION(S): 213 SOLUTION TOPICAL at 06:08

## 2022-05-30 RX ADMIN — PANTOPRAZOLE SODIUM 40 MILLIGRAM(S): 20 TABLET, DELAYED RELEASE ORAL at 12:29

## 2022-05-30 RX ADMIN — Medication 650 MILLIGRAM(S): at 07:27

## 2022-05-30 RX ADMIN — POTASSIUM PHOSPHATE, MONOBASIC POTASSIUM PHOSPHATE, DIBASIC 62.5 MILLIMOLE(S): 236; 224 INJECTION, SOLUTION INTRAVENOUS at 00:18

## 2022-05-30 RX ADMIN — ENOXAPARIN SODIUM 75 MILLIGRAM(S): 100 INJECTION SUBCUTANEOUS at 06:08

## 2022-05-30 RX ADMIN — Medication 1 APPLICATION(S): at 06:07

## 2022-05-30 RX ADMIN — TAMSULOSIN HYDROCHLORIDE 0.4 MILLIGRAM(S): 0.4 CAPSULE ORAL at 21:39

## 2022-05-30 RX ADMIN — Medication 12.5 MILLIGRAM(S): at 06:07

## 2022-05-30 RX ADMIN — ENOXAPARIN SODIUM 75 MILLIGRAM(S): 100 INJECTION SUBCUTANEOUS at 18:02

## 2022-05-30 RX ADMIN — Medication 12.5 MILLIGRAM(S): at 18:03

## 2022-05-30 RX ADMIN — Medication 650 MILLIGRAM(S): at 12:29

## 2022-05-30 RX ADMIN — Medication 1 APPLICATION(S): at 18:03

## 2022-05-30 RX ADMIN — CLOPIDOGREL BISULFATE 75 MILLIGRAM(S): 75 TABLET, FILM COATED ORAL at 12:27

## 2022-05-30 RX ADMIN — Medication 75 MILLIGRAM(S): at 12:28

## 2022-05-30 RX ADMIN — Medication 25 MILLIGRAM(S): at 12:30

## 2022-05-30 RX ADMIN — Medication 25 GRAM(S): at 00:17

## 2022-05-30 NOTE — PROGRESS NOTE ADULT - ATTENDING COMMENTS
82yo male with PMHx fo DM, HTN, HLD, GERD, COPD, HF, pacemaker, chronic pancreatitis, atrial fibrillation, TIA, previous laparotomy admitted for small bowel obstruction. In ICU for close monitoring. NG tube removed 2 days ago. +BM this morning. Tolerating full liquid diet, advance as tolerated. On exam, abdomen is tender diffusely but non-peritoneal. Exam is unreliable. Labs reviewed - WBC 8.65 from 12.28. Patient downgraded to telemetry. Encourage OOB, incentive spirometer, DVT ppx. Has one-to-one for agitation. PT/rehab evaluation. Discharge planning to NH.

## 2022-05-30 NOTE — CONSULT NOTE ADULT - ASSESSMENT
IMPRESSION: Rehab of Debility secondary to chronic medical condition and current SBO. Also, with impaired STM. Cooperative, conversant and following commands    PRECAUTIONS: [  ] Cardiac  [  ] Respiratory  [  ] Seizures [  ] Contact Isolation  [  ] Droplet Isolation  [  ] Other    Weight Bearing Status: wbat    RECOMMENDATION:    Out of Bed to Chair     DVT/Decubiti Prophylaxis    REHAB PLAN:     [ x  ] Bedside P/T 3-5 times a week   [   ]   Bedside O/T  2-3 times a week             [   ] No Rehab Therapy Indicated                   [   ]  Speech Therapy   Conditioning/ROM                                    ADL  Bed Mobility                                               Conditioning/ROM  Transfers                                                     Bed Mobility  Sitting /Standing Balance                         Transfers                                        Gait Training                                               Sitting/Standing Balance  Stair Training [   ]Applicable                    Home equipment Eval                                                                        Splinting  [   ] Only      GOALS:   ADL   [   ]   Independent                    Transfers  [   ] Independent                          Ambulation  [   ] Independent     [   x ] With device                            [ x  ]  CG                                                         [ x  ]  CG/ sup                                                                  [x   ] CG/ sup                            [    ] Min A                                                   [   ] Min A                                                              [   ] Min  A          DISCHARGE PLAN:   [   ]  Good candidate for Intensive Rehabilitation/Hospital based-[facility]A [facility]                                             Will tolerate 3hrs Intensive Rehab Daily                                       [ xx   ]  Short Term Rehab in Skilled Nursing Facility                                       [    ]  Home with Outpatient or VN services                                         [    ]  Possible Candidate for Intensive Hospital based Rehab

## 2022-05-30 NOTE — PROGRESS NOTE ADULT - SUBJECTIVE AND OBJECTIVE BOX
GENERAL SURGERY PROGRESS NOTE    Patient: EZ ALCARAZ , 81y ([birth year])Male   MRN: [id number]  Location: [address]  Visit: 05-27-22 Inpatient  Date: 05-30-22 @ 04:50      Hospital Day #: 4    Procedure/Dx/Injuries: SBO    Events of past 24 hours:  5/29  Night:  -kept IVF due to poor PO intake   -jordan re-inserted  -changed Lopressor to  PO -12.5mg q12  -Mg 1.8, IP 2.9, K3.8 --> KPhos 15mmol, Mg 2g    5/29  Day  - Echo with Lumason- EF 45-55%, entire inf. septum and basal and mid inf. wall are hypokinetic, mildly decrased global LV systolic fxn.  - Jordan remioved- TOV @ 8pm   - DG to Tele  -   - passed SLP for soft, bite sized consistency    PAST MEDICAL & SURGICAL HISTORY:  Diabetes      Hypertension      Pacemaker      Dyslipidemia      History of chronic pancreatitis      Atherosclerosis of native artery of lower extremity      COPD (chronic obstructive pulmonary disease)      CHESTER on CPAP      H/O intestinal obstruction      History of cholecystectomy      History of pancreatic surgery      History of penile implant      Cardiac pacemaker          Vitals:   T(F): 97.2 (05-29-22 @ 23:16), Max: 99.8 (05-29-22 @ 08:00)  HR: 70 (05-30-22 @ 04:00)  BP: 131/108 (05-30-22 @ 04:00)  RR: 18 (05-30-22 @ 04:00)  SpO2: 96% (05-30-22 @ 04:00)          Fluids:     I & O's:    05-28-22 @ 07:01  -  05-29-22 @ 07:00  --------------------------------------------------------  IN:    IV PiggyBack: 350 mL    Lactated Ringers: 1800 mL    Lactated Ringers Bolus: 500 mL  Total IN: 2650 mL    OUT:    Indwelling Catheter - Urethral (mL): 1610 mL    Nasogastric/Oral tube (mL): 50 mL    Oral Fluid: 0 mL  Total OUT: 1660 mL    Total NET: 990 mL    PHYSICAL EXAM:  General: Agitated  HEENT: NCAT, CALVIN, EOMI, Trachea ML, Neck supple  Cardiac: RRR S1, S2, no Murmurs, rubs or gallops  Respiratory: CTAB, normal respiratory effort, breath sounds equal BL, no wheeze, rhonchi or crackles  Abdomen: Nondistended, nontender, soft     MEDICATIONS  (STANDING):  BACItracin   Ointment 1 Application(s) Topical every 12 hours  budesonide  80 MICROgram(s)/formoterol 4.5 MICROgram(s) Inhaler 2 Puff(s) Inhalation two times a day  chlorhexidine 4% Liquid 1 Application(s) Topical <User Schedule>  dextrose 5% + lactated ringers. 1000 milliLiter(s) (100 mL/Hr) IV Continuous <Continuous>  enoxaparin Injectable 75 milliGRAM(s) SubCutaneous every 12 hours  insulin lispro (ADMELOG) corrective regimen sliding scale   SubCutaneous three times a day before meals  metoprolol tartrate 12.5 milliGRAM(s) Oral every 12 hours  pantoprazole  Injectable 40 milliGRAM(s) IV Push daily  tamsulosin 0.4 milliGRAM(s) Oral at bedtime    MEDICATIONS  (PRN):  acetaminophen     Tablet .. 650 milliGRAM(s) Oral every 6 hours PRN Temp greater or equal to 38C (100.4F), Mild Pain (1 - 3)      DVT PROPHYLAXIS: enoxaparin Injectable 75 milliGRAM(s) SubCutaneous every 12 hours    GI PROPHYLAXIS: pantoprazole  Injectable 40 milliGRAM(s) IV Push daily    ANTICOAGULATION:   ANTIBIOTICS:            LAB/STUDIES:  Labs:  CAPILLARY BLOOD GLUCOSE      POCT Blood Glucose.: 137 mg/dL (29 May 2022 22:05)  POCT Blood Glucose.: 151 mg/dL (29 May 2022 16:37)  POCT Blood Glucose.: 97 mg/dL (29 May 2022 12:32)  POCT Blood Glucose.: 90 mg/dL (29 May 2022 08:10)  POCT Blood Glucose.: 98 mg/dL (29 May 2022 05:56)                          10.4   8.65  )-----------( 383      ( 29 May 2022 21:24 )             30.1         05-29    139  |  103  |  10  ----------------------------<  110<H>  3.8   |  21  |  0.7      Calcium, Total Serum: 8.7 mg/dL (05-29-22 @ 21:24)      LFTs:     Lactate, Blood: 0.7 mmol/L (05-27-22 @ 05:04)      Coags:                        IMAGING:      ACCESS/ DEVICES:  [ ] Peripheral IV  [ ] Central Venous Line	[ ] R	[ ] L	[ ] IJ	[ ] Fem	[ ] SC	Placed:   [ ] Arterial Line		[ ] R	[ ] L	[ ] Fem	[ ] Rad	[ ] Ax	Placed:   [ ] PICC:					[ ] Mediport  [ ] Urinary Catheter,  Date Placed:   [ ] Chest tube: [ ] Right, [ ] Left  [ ] MIGUEL ANGEL/Mitesh Drains    \   GENERAL SURGERY PROGRESS NOTE    Patient: EZ ALCARAZ , 81y ([birth year])Male   MRN: [id number]  Location: [address]  Visit: 05-27-22 Inpatient  Date: 05-30-22 @ 04:50      Hospital Day #: 4    Procedure/Dx/Injuries: SBO    Events of past 24 hours:  5/29  Night:  -kept IVF due to poor PO intake   -jordan re-inserted  -changed Lopressor to  PO -12.5mg q12  -Mg 1.8, IP 2.9, K3.8 --> KPhos 15mmol, Mg 2g    5/29  Day  - Echo with Lumason- EF 45-55%, entire inf. septum and basal and mid inf. wall are hypokinetic, mildly decrased global LV systolic fxn.  - Jordan remioved- TOV @ 8pm   - DG to Tele  -   - passed SLP for soft, bite sized consistency    PAST MEDICAL & SURGICAL HISTORY:  Diabetes  Hypertension  Pacemaker  Dyslipidemia  History of chronic pancreatitis  Atherosclerosis of native artery of lower extremity  COPD (chronic obstructive pulmonary disease)  CHESTER on CPAP  H/O intestinal obstruction  History of cholecystectomy  History of pancreatic surgery  History of penile implant  Cardiac pacemaker    Vitals:   T(F): 97.2 (05-29-22 @ 23:16), Max: 99.8 (05-29-22 @ 08:00)  HR: 70 (05-30-22 @ 04:00)  BP: 131/108 (05-30-22 @ 04:00)  RR: 18 (05-30-22 @ 04:00)  SpO2: 96% (05-30-22 @ 04:00)    Fluids:     I & O's:    05-28-22 @ 07:01  -  05-29-22 @ 07:00  --------------------------------------------------------  IN:    IV PiggyBack: 350 mL    Lactated Ringers: 1800 mL    Lactated Ringers Bolus: 500 mL  Total IN: 2650 mL    OUT:    Indwelling Catheter - Urethral (mL): 1610 mL    Nasogastric/Oral tube (mL): 50 mL    Oral Fluid: 0 mL  Total OUT: 1660 mL    Total NET: 990 mL    PHYSICAL EXAM:  General: Agitated  HEENT: NCAT, CALVIN, EOMI, Trachea ML, Neck supple  Cardiac: RRR S1, S2, no Murmurs, rubs or gallops  Respiratory: CTAB, normal respiratory effort, breath sounds equal BL, no wheeze, rhonchi or crackles  Abdomen: Nondistended, nontender, soft     MEDICATIONS  (STANDING):  BACItracin   Ointment 1 Application(s) Topical every 12 hours  budesonide  80 MICROgram(s)/formoterol 4.5 MICROgram(s) Inhaler 2 Puff(s) Inhalation two times a day  chlorhexidine 4% Liquid 1 Application(s) Topical <User Schedule>  dextrose 5% + lactated ringers. 1000 milliLiter(s) (100 mL/Hr) IV Continuous <Continuous>  enoxaparin Injectable 75 milliGRAM(s) SubCutaneous every 12 hours  insulin lispro (ADMELOG) corrective regimen sliding scale   SubCutaneous three times a day before meals  metoprolol tartrate 12.5 milliGRAM(s) Oral every 12 hours  pantoprazole  Injectable 40 milliGRAM(s) IV Push daily  tamsulosin 0.4 milliGRAM(s) Oral at bedtime    MEDICATIONS  (PRN):  acetaminophen     Tablet .. 650 milliGRAM(s) Oral every 6 hours PRN Temp greater or equal to 38C (100.4F), Mild Pain (1 - 3)    DVT PROPHYLAXIS: enoxaparin Injectable 75 milliGRAM(s) SubCutaneous every 12 hours  GI PROPHYLAXIS: pantoprazole  Injectable 40 milliGRAM(s) IV Push daily    ANTICOAGULATION:   ANTIBIOTICS:      LAB/STUDIES:  Labs:  CAPILLARY BLOOD GLUCOSE  POCT Blood Glucose.: 137 mg/dL (29 May 2022 22:05)  POCT Blood Glucose.: 151 mg/dL (29 May 2022 16:37)  POCT Blood Glucose.: 97 mg/dL (29 May 2022 12:32)  POCT Blood Glucose.: 90 mg/dL (29 May 2022 08:10)  POCT Blood Glucose.: 98 mg/dL (29 May 2022 05:56)                          10.4   8.65  )-----------( 383      ( 29 May 2022 21:24 )             30.1         05-29    139  |  103  |  10  ----------------------------<  110<H>  3.8   |  21  |  0.7    Calcium, Total Serum: 8.7 mg/dL (05-29-22 @ 21:24)    LFTs:     Lactate, Blood: 0.7 mmol/L (05-27-22 @ 05:04)    Coags:    IMAGING:    ACCESS/ DEVICES:  [ ] Peripheral IV  [ ] Central Venous Line	[ ] R	[ ] L	[ ] IJ	[ ] Fem	[ ] SC	Placed:   [ ] Arterial Line		[ ] R	[ ] L	[ ] Fem	[ ] Rad	[ ] Ax	Placed:   [ ] PICC:					[ ] Mediport  [ ] Urinary Catheter,  Date Placed:   [ ] Chest tube: [ ] Right, [ ] Left  [ ] MIGUEL ANGEL/Mitesh Drains

## 2022-05-30 NOTE — PROGRESS NOTE ADULT - ASSESSMENT
Assessment & Plan    81y Male pmh copd, chf, dm, pacemaker, dementia, pancreatitis, htn, hld p/w SBO    NEURO:  Delirium- AOx0, confused and agitated, con't PRN  Zyprexa 5mg IM   Acute pain- controlled with Tylenol     RESP:   Sating well on RA  hx of COPD, nebs prn  Current Rx: budesonide  80 MICROgram(s)/formoterol 4.5 MICROgram(s) Inhaler 2 two times a day  Home Rx: Breo Ellipta 100 mcg-25 mcg/inh inhalation powder- held  montelukast 10 mg tablet-held    CARDS:   Episodes of wide complex paced tachycardia- last episode 5/28- EPS following, NTD  PMHx CHF w/ pacemaker --> interrogated and reprogrammed  EKG-   Echo with Lumason- EF 45-55%, entire inf. septum and basal and mid inf. wall are hypokinetic, mildly decrased global LV systolic fxn.  EPS c/s - reprogrammed device- may need to change device or add a leadless device once pt is optimized  -changed Lopressor to  PO -12.5mg q12    GI/NUTR:   SBO- managed conservatively with NGT  - NG tube removed on 5/28, no nausea or vomitting  - + bowel movement  - passed SLP for soft and bite-sized consitency diet  - GI ppx with Protonix    /RENAL:      Urinary retention- jordan re-inserted 5/29    IVF: D5LR @100 (kept due to poor PO intake)   Labs:          BUN/Cr- 15/0.8  -->,  10/0.7  -->          Electrolytes-Na 139 // K 3.8 // Mg 1.8 //  Phos 2.9 (05-29 @ 21:24)  -hypomagnesimia, hypokalemia, and hypophosphotemia treated    HEME/ONC:       DVT prophylaxis-LVX therapeutic 75 q12 for A-fib    Labs: Hb/Hct:  12.8/37.6  -->  10.7/31.7 -->10/29.1-> 10.4/30.1              Plts:  349  -->   371->356->381              PTT/INR:        Home Rx: clopidogrel 75 mg tablet: - held  Eliquis 5 mg tablet: -held    ID:  Afebrile, no Abx  WBC- 7.10  --->>12.3-->11.7->8.6    ENDO:     Glucose, Serum: 142 (05-26 @ 19:34)     HA1C 4/21- 6.8    LINES/DRAINS:  LUE midline catheter    ADVANCED DIRECTIVES:  Full Code    HCP/Emergency Contact-    DISPO:   Downgrade to floor-Tele       Assessment & Plan    81y Male pmh copd, chf, dm, pacemaker, dementia, pancreatitis, htn, hld p/w SBO    NEURO:  Delirium- AOx0, confused and agitated, con't PRN  Zyprexa 5mg IM   Acute pain- controlled with Tylenol     RESP:   Sating well on RA  hx of COPD, nebs prn  Current Rx: budesonide  80 MICROgram(s)/formoterol 4.5 MICROgram(s) Inhaler 2 two times a day  Home Rx: Breo Ellipta 100 mcg-25 mcg/inh inhalation powder- held  montelukast 10 mg tablet-held    CARDS:   Episodes of wide complex paced tachycardia- last episode 5/28- EPS following, NTD  PMHx CHF w/ pacemaker --> interrogated and reprogrammed  EKG-   Echo with Lumason- EF 45-55%, entire inf. septum and basal and mid inf. wall are hypokinetic, mildly decrased global LV systolic fxn.  EPS c/s - reprogrammed device- may need to change device or add a leadless device once pt is optimized  -changed Lopressor to  PO -12.5mg q12    GI/NUTR:   SBO- managed conservatively with NGT, d/c 5/28  - + bowel movement  - passed SLP for soft and bite-sized consitency diet  - GI ppx with Protonix    /RENAL:      Urinary retention- jordan re-inserted 5/29    IVL   Labs:          BUN/Cr- 15/0.8  -->,  10/0.7  -->          Electrolytes-Na 139 // K 3.8 // Mg 1.8 //  Phos 2.9 (05-29 @ 21:24)  -hypomagnesimia, hypokalemia, and hypophosphotemia treated    HEME/ONC:       DVT prophylaxis-LVX therapeutic 75 q12 for A-fib    Labs: Hb/Hct:  12.8/37.6  -->  10.7/31.7 -->10/29.1-> 10.4/30.1              Plts:  349  -->   371->356->381              PTT/INR:        Home Rx: clopidogrel 75 mg tablet: - held  Eliquis 5 mg tablet: -held    ID:  Afebrile, no Abx  WBC- 7.10  --->>12.3-->11.7->8.6 > 11.7    ENDO:     Glucose, Serum: 142 (05-26 @ 19:34)     HA1C 4/21- 6.8    LINES/DRAINS:  LUE midline catheter    ADVANCED DIRECTIVES:  Full Code    HCP/Emergency Contact-    DISPO:   Downgrade to floor-Tele

## 2022-05-30 NOTE — PROGRESS NOTE ADULT - ASSESSMENT
ASSESSMENT:  81 year old male with a past medical hx of chronic pancreatitis, COPD, CHF, T2D, Pacemaker, HTN. HLD, surgical history of cholecystectomy, exploratory laparotomy with small bowel resection, presented to the ED for abdominal pain. CT findings of small bowel obstruction.     PLAN:  -  S/S eval - passed started on softs  - IVF   - Patient having BM   - Abdominal exams  - 1:1 sit   - Per EPS: patient will likely need new pacemaker or leadless device once stable   - Monitor heart rhythm   - Therapeutic lvx for hx of A.fib and TIA   - GI ppx     BLUE TEAM SPECTRA: [id number]   ASSESSMENT:  81 year old male with a past medical hx of chronic pancreatitis, COPD, CHF, T2D, Pacemaker, HTN. HLD, surgical history of cholecystectomy, exploratory laparotomy with small bowel resection, presented to the ED for abdominal pain. CT findings of small bowel obstruction.     PLAN:  -  S/S eval - passed started on softs  - IVF   - Patient having BM   - Abdominal exams  - 1:1 sit   - Per EPS: patient will likely need new pacemaker or leadless device once stable   - Monitor heart rhythm   - Therapeutic lvx for hx of A.fib and TIA   - GI ppx     BLUE TEAM SPECTRA: [id number]

## 2022-05-30 NOTE — PROGRESS NOTE ADULT - SUBJECTIVE AND OBJECTIVE BOX
EZ [place]  [id number]  81y Male    Indication for ICU admission: SBO  Admit Date: 5/27  ICU Date: 5/27  OR Date:    No Known Allergies    PAST MEDICAL & SURGICAL HISTORY:  Diabetes  Hypertension  Pacemaker  Dyslipidemia  History of chronic pancreatitis  Atherosclerosis of native artery of lower extremity  COPD (chronic obstructive pulmonary disease)  CHESTER on CPAP  H/O intestinal obstruction  History of cholecystectomy  History of pancreatic surgery  History of penile implant  Cardiac pacemaker      Home Medications:  amitriptyline 25 mg tablet:  (27 May 2022 03:47)  Breo Ellipta 100 mcg-25 mcg/inh inhalation powder: 1 puff(s) inhaled once a day (27 May 2022 03:47)  clopidogrel 75 mg tablet:  (27 May 2022 03:47)  Corlanor 5 mg oral tablet: 1 tab(s) orally 2 times a day (with meals) (27 May 2022 03:47)  Eliquis 5 mg tablet:  (27 May 2022 03:47)  famotidine 40 mg oral tablet: 1 tab(s) orally once a day (at bedtime) (27 May 2022 03:47)  latanoprost 0.005% ophthalmic solution: 1 drop(s) to each affected eye once a day (in the evening) (27 May 2022 03:47)  montelukast 10 mg oral tablet: 1 tab(s) orally once a day (27 May 2022 03:47)  montelukast 10 mg tablet:  (27 May 2022 03:47)  nifedipine ER 30 mg tablet,extended release:  (27 May 2022 03:47)  omeprazole 40 mg oral delayed release capsule: 1 cap(s) orally once a day (27 May 2022 03:47)  oxyMORphone 40 mg oral tablet, extended release: 1 tab(s) orally every 12 hours, As Needed (27 May 2022 03:47)  pregabalin 75 mg capsule:  (27 May 2022 03:47)  simvastatin 10 mg oral tablet: 1 tab(s) orally once a day (at bedtime) (27 May 2022 03:47)  Trulicity Pen 0.75 mg/0.5 mL subcutaneous solution: 1 application subcutaneous once a week (27 May 2022 03:47)        24HRS EVENT:  5/29  Day  - Echo with Lumason- EF 45-55%, entire inf. septum and basal and mid inf. wall are hypokinetic, mildly decrased global LV systolic fxn.  - Jordan remioved- TOV @ 8pm   - DG to Tele  -   - passed SLP for soft, bite sized consistency    5/29  Night:  -kept IVF due to poor PO intake   -jordan re-inserted  -changed Lopressor to  PO -12.5mg q12  -Mg 1.8, IP 2.9, K3.8 --> KPhos 15mmol, Mg 2g      DVT PTX: therapeutic LVX     GI PTX: PPI    ***Tubes/Lines/Drains  ***  Peripheral IV      REVIEW OF SYSTEMS    [ ] A ten-point review of systems was otherwise negative except as noted.  [x ] Due to altered mental status/intubation, subjective information were not able to be obtained from the patient. History was obtained, to the extent possible, from review of the chart and collateral sources of information.       EZ [place]  [id number]  81y Male    Indication for ICU admission: SBO  Admit Date: 5/27  ICU Date: 5/27  OR Date:    No Known Allergies    PAST MEDICAL & SURGICAL HISTORY:  Diabetes  Hypertension  Pacemaker  Dyslipidemia  History of chronic pancreatitis  Atherosclerosis of native artery of lower extremity  COPD (chronic obstructive pulmonary disease)  CHESTER on CPAP  H/O intestinal obstruction  History of cholecystectomy  History of pancreatic surgery  History of penile implant  Cardiac pacemaker      Home Medications:  amitriptyline 25 mg tablet:  (27 May 2022 03:47)  Breo Ellipta 100 mcg-25 mcg/inh inhalation powder: 1 puff(s) inhaled once a day (27 May 2022 03:47)  clopidogrel 75 mg tablet:  (27 May 2022 03:47)  Corlanor 5 mg oral tablet: 1 tab(s) orally 2 times a day (with meals) (27 May 2022 03:47)  Eliquis 5 mg tablet:  (27 May 2022 03:47)  famotidine 40 mg oral tablet: 1 tab(s) orally once a day (at bedtime) (27 May 2022 03:47)  latanoprost 0.005% ophthalmic solution: 1 drop(s) to each affected eye once a day (in the evening) (27 May 2022 03:47)  montelukast 10 mg oral tablet: 1 tab(s) orally once a day (27 May 2022 03:47)  montelukast 10 mg tablet:  (27 May 2022 03:47)  nifedipine ER 30 mg tablet,extended release:  (27 May 2022 03:47)  omeprazole 40 mg oral delayed release capsule: 1 cap(s) orally once a day (27 May 2022 03:47)  oxyMORphone 40 mg oral tablet, extended release: 1 tab(s) orally every 12 hours, As Needed (27 May 2022 03:47)  pregabalin 75 mg capsule:  (27 May 2022 03:47)  simvastatin 10 mg oral tablet: 1 tab(s) orally once a day (at bedtime) (27 May 2022 03:47)  Trulicity Pen 0.75 mg/0.5 mL subcutaneous solution: 1 application subcutaneous once a week (27 May 2022 03:47)        24HRS EVENT:  5/29  Day  - Echo with Lumason- EF 45-55%, entire inf. septum and basal and mid inf. wall are hypokinetic, mildly decrased global LV systolic fxn.  - Jordan remioved- TOV @ 8pm   - DG to Tele  -   - passed SLP for soft, bite sized consistency    5/29  Night:  -kept IVF due to poor PO intake   -jordan re-inserted  -changed Lopressor to  PO -12.5mg q12  -Mg 1.8, IP 2.9, K3.8 --> KPhos 15mmol, Mg 2g      DVT PTX: therapeutic LVX     GI PTX: PPI    ***Tubes/Lines/Drains  ***  Peripheral IV      REVIEW OF SYSTEMS    [x ] A ten-point review of systems was otherwise negative except as noted.  [] Due to altered mental status/intubation, subjective information were not able to be obtained from the patient. History was obtained, to the extent possible, from review of the chart and collateral sources of information.    Daily     Daily         CURRENT MEDS:  Neurologic Medications  acetaminophen     Tablet .. 650 milliGRAM(s) Oral every 6 hours PRN Temp greater or equal to 38C (100.4F), Mild Pain (1 - 3)    Respiratory Medications  budesonide  80 MICROgram(s)/formoterol 4.5 MICROgram(s) Inhaler 2 Puff(s) Inhalation two times a day    Cardiovascular Medications  metoprolol tartrate 12.5 milliGRAM(s) Oral every 12 hours  tamsulosin 0.4 milliGRAM(s) Oral at bedtime    Gastrointestinal Medications  pantoprazole  Injectable 40 milliGRAM(s) IV Push daily    Genitourinary Medications    Hematologic/Oncologic Medications  enoxaparin Injectable 75 milliGRAM(s) SubCutaneous every 12 hours    Antimicrobial/Immunologic Medications    Endocrine/Metabolic Medications  insulin lispro (ADMELOG) corrective regimen sliding scale   SubCutaneous three times a day before meals    Topical/Other Medications  BACItracin   Ointment 1 Application(s) Topical every 12 hours  chlorhexidine 4% Liquid 1 Application(s) Topical <User Schedule>      ICU Vital Signs Last 24 Hrs  T(C): 36.2 (29 May 2022 23:16), Max: 37.7 (29 May 2022 08:00)  T(F): 97.2 (29 May 2022 23:16), Max: 99.8 (29 May 2022 08:00)  HR: 70 (30 May 2022 04:00) (66 - 94)  BP: 131/108 (30 May 2022 04:00) (122/57 - 132/91)  BP(mean): 118 (30 May 2022 04:00) (78 - 118)  ABP: --  ABP(mean): --  RR: 18 (30 May 2022 04:00) (16 - 22)  SpO2: 96% (30 May 2022 04:00) (96% - 100%)            I&O's Summary    29 May 2022 07:01  -  30 May 2022 07:00  --------------------------------------------------------  IN: 2640 mL / OUT: 1250 mL / NET: 1390 mL      I&O's Detail    29 May 2022 07:01  -  30 May 2022 07:00  --------------------------------------------------------  IN:    dextrose 5% + lactated ringers: 2200 mL    Lactated Ringers: 200 mL    Oral Fluid: 240 mL  Total IN: 2640 mL    OUT:    Indwelling Catheter - Urethral (mL): 1200 mL    Voided (mL): 50 mL  Total OUT: 1250 mL    Total NET: 1390 mL          PHYSICAL EXAM:    General/Neuro  Deficits:                             alert & oriented x 3, no focal deficits  Pupils:    Lungs:      clear to auscultation, Normal expansion/effort.     Cardiovascular : S1, S2.  Regular rate and rhythm.  No Peripheral edema   Cardiac Rhythm: Normal Sinus Rhythm    GI: Abdomen soft, Non-tender, Non-distended.        Extremities: Extremities warm, pink, well-perfused. Pulses: palpable dp     Derm: Good skin turgor, no skin breakdown.      :       Jordan catheter in place.      CXR:     LABS:  CAPILLARY BLOOD GLUCOSE      POCT Blood Glucose.: 129 mg/dL (30 May 2022 06:06)  POCT Blood Glucose.: 137 mg/dL (29 May 2022 22:05)  POCT Blood Glucose.: 151 mg/dL (29 May 2022 16:37)  POCT Blood Glucose.: 97 mg/dL (29 May 2022 12:32)  POCT Blood Glucose.: 90 mg/dL (29 May 2022 08:10)                          10.4   8.65  )-----------( 383      ( 29 May 2022 21:24 )             30.1       05-29    139  |  103  |  10  ----------------------------<  110<H>  3.8   |  21  |  0.7    Ca    8.7      29 May 2022 21:24  Phos  2.9     05-29  Mg     1.8     05-29

## 2022-05-31 LAB
ANION GAP SERPL CALC-SCNC: 11 MMOL/L — SIGNIFICANT CHANGE UP (ref 7–14)
APTT BLD: 36.9 SEC — SIGNIFICANT CHANGE UP (ref 27–39.2)
BUN SERPL-MCNC: 6 MG/DL — LOW (ref 10–20)
CALCIUM SERPL-MCNC: 8.6 MG/DL — SIGNIFICANT CHANGE UP (ref 8.5–10.1)
CHLORIDE SERPL-SCNC: 104 MMOL/L — SIGNIFICANT CHANGE UP (ref 98–110)
CO2 SERPL-SCNC: 21 MMOL/L — SIGNIFICANT CHANGE UP (ref 17–32)
CREAT SERPL-MCNC: 0.8 MG/DL — SIGNIFICANT CHANGE UP (ref 0.7–1.5)
EGFR: 89 ML/MIN/1.73M2 — SIGNIFICANT CHANGE UP
GLUCOSE BLDC GLUCOMTR-MCNC: 141 MG/DL — HIGH (ref 70–99)
GLUCOSE BLDC GLUCOMTR-MCNC: 142 MG/DL — HIGH (ref 70–99)
GLUCOSE BLDC GLUCOMTR-MCNC: 157 MG/DL — HIGH (ref 70–99)
GLUCOSE SERPL-MCNC: 134 MG/DL — HIGH (ref 70–99)
HCT VFR BLD CALC: 32.9 % — LOW (ref 42–52)
HGB BLD-MCNC: 11.4 G/DL — LOW (ref 14–18)
INR BLD: 1.06 RATIO — SIGNIFICANT CHANGE UP (ref 0.65–1.3)
MAGNESIUM SERPL-MCNC: 2 MG/DL — SIGNIFICANT CHANGE UP (ref 1.8–2.4)
MCHC RBC-ENTMCNC: 31.1 PG — HIGH (ref 27–31)
MCHC RBC-ENTMCNC: 34.7 G/DL — SIGNIFICANT CHANGE UP (ref 32–37)
MCV RBC AUTO: 89.9 FL — SIGNIFICANT CHANGE UP (ref 80–94)
NRBC # BLD: 0 /100 WBCS — SIGNIFICANT CHANGE UP (ref 0–0)
PHOSPHATE SERPL-MCNC: 3.1 MG/DL — SIGNIFICANT CHANGE UP (ref 2.1–4.9)
PLATELET # BLD AUTO: 406 K/UL — HIGH (ref 130–400)
POTASSIUM SERPL-MCNC: 4.2 MMOL/L — SIGNIFICANT CHANGE UP (ref 3.5–5)
POTASSIUM SERPL-SCNC: 4.2 MMOL/L — SIGNIFICANT CHANGE UP (ref 3.5–5)
PROTHROM AB SERPL-ACNC: 12.2 SEC — SIGNIFICANT CHANGE UP (ref 9.95–12.87)
RBC # BLD: 3.66 M/UL — LOW (ref 4.7–6.1)
RBC # FLD: 13.5 % — SIGNIFICANT CHANGE UP (ref 11.5–14.5)
SODIUM SERPL-SCNC: 136 MMOL/L — SIGNIFICANT CHANGE UP (ref 135–146)
WBC # BLD: 6.33 K/UL — SIGNIFICANT CHANGE UP (ref 4.8–10.8)
WBC # FLD AUTO: 6.33 K/UL — SIGNIFICANT CHANGE UP (ref 4.8–10.8)

## 2022-05-31 PROCEDURE — 99232 SBSQ HOSP IP/OBS MODERATE 35: CPT

## 2022-05-31 PROCEDURE — 99233 SBSQ HOSP IP/OBS HIGH 50: CPT | Mod: 57

## 2022-05-31 PROCEDURE — 99233 SBSQ HOSP IP/OBS HIGH 50: CPT | Mod: NC

## 2022-05-31 PROCEDURE — 71045 X-RAY EXAM CHEST 1 VIEW: CPT | Mod: 26

## 2022-05-31 RX ORDER — ENOXAPARIN SODIUM 100 MG/ML
75 INJECTION SUBCUTANEOUS ONCE
Refills: 0 | Status: COMPLETED | OUTPATIENT
Start: 2022-05-31 | End: 2022-05-31

## 2022-05-31 RX ORDER — SODIUM CHLORIDE 9 MG/ML
1000 INJECTION, SOLUTION INTRAVENOUS
Refills: 0 | Status: DISCONTINUED | OUTPATIENT
Start: 2022-05-31 | End: 2022-06-01

## 2022-05-31 RX ADMIN — ENOXAPARIN SODIUM 75 MILLIGRAM(S): 100 INJECTION SUBCUTANEOUS at 17:43

## 2022-05-31 RX ADMIN — PANTOPRAZOLE SODIUM 40 MILLIGRAM(S): 20 TABLET, DELAYED RELEASE ORAL at 12:40

## 2022-05-31 RX ADMIN — Medication 25 MILLIGRAM(S): at 12:40

## 2022-05-31 RX ADMIN — Medication 1: at 07:11

## 2022-05-31 RX ADMIN — CHLORHEXIDINE GLUCONATE 1 APPLICATION(S): 213 SOLUTION TOPICAL at 07:00

## 2022-05-31 RX ADMIN — ENOXAPARIN SODIUM 75 MILLIGRAM(S): 100 INJECTION SUBCUTANEOUS at 05:10

## 2022-05-31 RX ADMIN — TAMSULOSIN HYDROCHLORIDE 0.4 MILLIGRAM(S): 0.4 CAPSULE ORAL at 22:25

## 2022-05-31 RX ADMIN — Medication 1 APPLICATION(S): at 05:10

## 2022-05-31 RX ADMIN — Medication 50 MILLIEQUIVALENT(S): at 00:10

## 2022-05-31 RX ADMIN — Medication 1 APPLICATION(S): at 17:31

## 2022-05-31 RX ADMIN — CLOPIDOGREL BISULFATE 75 MILLIGRAM(S): 75 TABLET, FILM COATED ORAL at 12:40

## 2022-05-31 RX ADMIN — Medication 12.5 MILLIGRAM(S): at 05:10

## 2022-05-31 RX ADMIN — Medication 75 MILLIGRAM(S): at 12:40

## 2022-05-31 RX ADMIN — Medication 25 GRAM(S): at 00:09

## 2022-05-31 RX ADMIN — Medication 650 MILLIGRAM(S): at 22:24

## 2022-05-31 RX ADMIN — Medication 50 MILLIEQUIVALENT(S): at 02:25

## 2022-05-31 RX ADMIN — Medication 12.5 MILLIGRAM(S): at 19:39

## 2022-05-31 NOTE — PROGRESS NOTE ADULT - SUBJECTIVE AND OBJECTIVE BOX
GENERAL SURGERY PROGRESS NOTE    Patient: EZ ALCARAZ , 81y ([birth year])Male   MRN: [id number]  Location: [address]  Visit: 05-27-22 Inpatient  Date: 05-31-22 @ 06:50    Hospital Day #: 5  Post-Op Day #: None    Procedure/Dx/Injuries: 81 year old male with a past medical hx of chronic pancreatitis, COPD, CHF, T2D, Pacemaker, HTN. HLD, surgical history of cholecystectomy, exploratory laparotomy with small bowel resection, presented to the ED for abdominal pain. CT findings of small bowel obstruction.     Events of past 24 hours:   NIGHT  -Mg 1.8->2g, K 3.6 --> KCL 20x2  -still waiting for 3C bed     DAY  - pending tele bed  - restart lyrica, amitriptyline, plavix      PAST MEDICAL & SURGICAL HISTORY:  Diabetes      Hypertension      Pacemaker      Dyslipidemia      History of chronic pancreatitis      Atherosclerosis of native artery of lower extremity      COPD (chronic obstructive pulmonary disease)      CHESTER on CPAP      H/O intestinal obstruction      History of cholecystectomy      History of pancreatic surgery      History of penile implant      Cardiac pacemaker          Vitals:   T(F): 97 (05-31-22 @ 00:33), Max: 97.4 (05-30-22 @ 07:00)  HR: 87 (05-31-22 @ 04:00)  BP: 114/58 (05-31-22 @ 04:00)  RR: 18 (05-31-22 @ 00:00)  SpO2: 98% (05-31-22 @ 04:00)          Fluids:     I & O's:    05-29-22 @ 07:01  -  05-30-22 @ 07:00  --------------------------------------------------------  IN:    dextrose 5% + lactated ringers: 2200 mL    Lactated Ringers: 200 mL    Oral Fluid: 520 mL  Total IN: 2920 mL    OUT:    Indwelling Catheter - Urethral (mL): 1200 mL    Voided (mL): 50 mL  Total OUT: 1250 mL    Total NET: 1670 mL    Bowel Movement : [x] YES [] NO  Flatus : [x] YES [] NO    PHYSICAL EXAM:  General: NAD  HEENT: NCAT, CALVIN, EOMI, Trachea ML, Neck supple  Cardiac: RRR S1, S2, no Murmurs, rubs or gallops  Respiratory: CTAB, normal respiratory effort, breath sounds equal BL, no wheeze, rhonchi or crackles  Abdomen: Nondistended, nontender, soft     MEDICATIONS  (STANDING):  amitriptyline 25 milliGRAM(s) Oral daily  BACItracin   Ointment 1 Application(s) Topical every 12 hours  budesonide  80 MICROgram(s)/formoterol 4.5 MICROgram(s) Inhaler 2 Puff(s) Inhalation two times a day  chlorhexidine 4% Liquid 1 Application(s) Topical <User Schedule>  clopidogrel Tablet 75 milliGRAM(s) Oral daily  enoxaparin Injectable 75 milliGRAM(s) SubCutaneous every 12 hours  insulin lispro (ADMELOG) corrective regimen sliding scale   SubCutaneous three times a day before meals  metoprolol tartrate 12.5 milliGRAM(s) Oral every 12 hours  pantoprazole  Injectable 40 milliGRAM(s) IV Push daily  pregabalin 75 milliGRAM(s) Oral daily  tamsulosin 0.4 milliGRAM(s) Oral at bedtime    MEDICATIONS  (PRN):  acetaminophen     Tablet .. 650 milliGRAM(s) Oral every 6 hours PRN Temp greater or equal to 38C (100.4F), Mild Pain (1 - 3)      DVT PROPHYLAXIS: enoxaparin Injectable 75 milliGRAM(s) SubCutaneous every 12 hours    GI PROPHYLAXIS: pantoprazole  Injectable 40 milliGRAM(s) IV Push daily    ANTICOAGULATION:   ANTIBIOTICS:            LAB/STUDIES:  Labs:  CAPILLARY BLOOD GLUCOSE      POCT Blood Glucose.: 157 mg/dL (31 May 2022 05:17)  POCT Blood Glucose.: 166 mg/dL (30 May 2022 21:33)  POCT Blood Glucose.: 114 mg/dL (30 May 2022 18:01)                          11.5   5.84  )-----------( 433      ( 30 May 2022 21:34 )             33.2         05-30    141  |  106  |  5<L>  ----------------------------<  169<H>  3.6   |  24  |  0.7      Calcium, Total Serum: 9.0 mg/dL (05-30-22 @ 21:34)    IMAGING:  < from: Xray Chest 1 View- PORTABLE-Routine (05.30.22 @ 06:33) >    IMPRESSION:    Increased bilateral patchy opacities.    < end of copied text >      ACCESS/ DEVICES:  -LUE midline catheter, jordan

## 2022-05-31 NOTE — PROGRESS NOTE ADULT - ATTENDING COMMENTS
Patient seen and examined with surgery PA in SICU and discussed management plans. SBO resolved on regular diet with + bowel function.  Abd soft no acute discomfort. Cardiology evaluation pending discharge. No remaining surgical issue prior to transfer to his rehab.

## 2022-05-31 NOTE — SWALLOW BEDSIDE ASSESSMENT ADULT - NS SPL SWALLOW CLINIC TRIAL FT
Mild oral dysphagia w/regular solids characterized by prolonged mastication and delayed ANNELIESE + tolerance for regular solids w/thin liquids w/o overt s/s of penetration/ aspiration
mild oral dysphagia

## 2022-05-31 NOTE — PHYSICAL THERAPY INITIAL EVALUATION ADULT - GENERAL OBSERVATIONS, REHAB EVAL
PT IE [id number]. Chart reviewed and case discussed with AMOL Hill. Pt encountered semi-reclined in bed. In NAD. + IV line disconnected by RN for mobility, + cardiac monitoring on, + VSS, . + jameel jordan observer at b/s.

## 2022-05-31 NOTE — PROGRESS NOTE ADULT - SUBJECTIVE AND OBJECTIVE BOX
INTERVAL HPI/OVERNIGHT EVENTS:  Pt status improving. In NSR, no tele events noted.   Patient denies fever, chills, dizziness, syncope, chest pain, palpitations.     MEDICATIONS  (STANDING):  amitriptyline 25 milliGRAM(s) Oral daily  BACItracin   Ointment 1 Application(s) Topical every 12 hours  budesonide  80 MICROgram(s)/formoterol 4.5 MICROgram(s) Inhaler 2 Puff(s) Inhalation two times a day  chlorhexidine 4% Liquid 1 Application(s) Topical <User Schedule>  clopidogrel Tablet 75 milliGRAM(s) Oral daily  enoxaparin Injectable 75 milliGRAM(s) SubCutaneous every 12 hours  insulin lispro (ADMELOG) corrective regimen sliding scale   SubCutaneous three times a day before meals  metoprolol tartrate 12.5 milliGRAM(s) Oral every 12 hours  pantoprazole  Injectable 40 milliGRAM(s) IV Push daily  pregabalin 75 milliGRAM(s) Oral daily  tamsulosin 0.4 milliGRAM(s) Oral at bedtime    MEDICATIONS  (PRN):  acetaminophen     Tablet .. 650 milliGRAM(s) Oral every 6 hours PRN Temp greater or equal to 38C (100.4F), Mild Pain (1 - 3)      Allergies    No Known Allergies    Intolerances        REVIEW OF SYSTEMS    [x] A ten-point review of systems was otherwise negative except as noted.  [ ] Due to altered mental status/intubation, subjective information were not able to be obtained from the patient. History was obtained, to the extent possible, from review of the chart and collateral sources of information.      Vital Signs Last 24 Hrs  T(C): 36.2 (31 May 2022 15:31), Max: 36.7 (31 May 2022 07:00)  T(F): 97.2 (31 May 2022 15:31), Max: 98 (31 May 2022 07:00)  HR: 78 (31 May 2022 15:31) (65 - 87)  BP: 127/54 (31 May 2022 15:31) (107/53 - 166/71)  BP(mean): 78 (31 May 2022 15:31) (77 - 102)  RR: 18 (31 May 2022 15:31) (16 - 18)  SpO2: 99% (31 May 2022 15:31) (98% - 99%)    05-30-22 @ 07:01  -  05-31-22 @ 07:00  --------------------------------------------------------  IN: 238 mL / OUT: 1700 mL / NET: -1462 mL        Physical Exam  GENERAL: In no apparent distress, well nourished, and hydrated.  HEART: Regular rate and rhythm; No murmurs, rubs, or gallops.  PULMONARY: Clear to auscultation and perfusion.  No rales, wheezing, or rhonchi bilaterally.  ABDOMEN: Soft, Nontender, Nondistended; Bowel sounds present  EXTREMITIES:  2+ Peripheral Pulses, No clubbing, cyanosis, or edema  NEUROLOGICAL: AO x3 ,BENSON, speech clear (mental status waxes and wanes)    LABS:                        11.5   5.84  )-----------( 433      ( 30 May 2022 21:34 )             33.2     05-30    141  |  106  |  5<L>  ----------------------------<  169<H>  3.6   |  24  |  0.7    Ca    9.0      30 May 2022 21:34  Phos  3.3     05-30  Mg     1.8     05-30 05-30-22 @ 07:01  -  05-31-22 @ 07:00  --------------------------------------------------------  IN: 238 mL / OUT: 1700 mL / NET: -1462 mL        05-30-22 @ 07:01  -  05-31-22 @ 07:00  --------------------------------------------------------  IN: 238 mL / OUT: 1700 mL / NET: -1462 mL        RADIOLOGY:  < from: TTE Echo Complete w/o Contrast w/ Doppler (05.29.22 @ 13:34) >  Summary:   1. Endocardial visualization was enhanced with intravenous echo contrast.   2. Left ventricular ejection fraction, by visual estimation, is 45 to   50%.   3. Mildly decreased global left ventricular systolic function.   4. Entire inferior septum and basal and mid inferior wall are abnormal   as described above.    PHYSICIAN INTERPRETATION:  Left Ventricle: Endocardial visualization was enhanced with intravenous   echo contrast. Global LV systolic function was mildly decreased. Left   ventricular ejection fraction, by visual estimation, is 45 to 50%.    < end of copied text >

## 2022-05-31 NOTE — SWALLOW BEDSIDE ASSESSMENT ADULT - SWALLOW EVAL: DIAGNOSIS
mild oral dysphagia for soft & bite sized, thins w/no overt s/s of aspiration/penetration
Mild oral dysphagia w/regular solids. + tolerance for regular solids w/thin liquids w/o overt s/s of penetration/ aspiration

## 2022-05-31 NOTE — SWALLOW BEDSIDE ASSESSMENT ADULT - NS ASR SWALLOW FINDINGS DISCUS
RN MD Liz ext [phone] made aware./Physician/Nursing
RNpt received confused. on RA./Physician/Nursing/Patient

## 2022-05-31 NOTE — PROGRESS NOTE ADULT - SUBJECTIVE AND OBJECTIVE BOX
[place]  [id number]  81y Male    Indication for ICU admission: SBO  Admit Date: 5/27  ICU Date: 5/27  OR Date:    No Known Allergies    PAST MEDICAL & SURGICAL HISTORY:  Diabetes  Hypertension  Pacemaker  Dyslipidemia  History of chronic pancreatitis  Atherosclerosis of native artery of lower extremity  COPD (chronic obstructive pulmonary disease)  CHESTER on CPAP  H/O intestinal obstruction  History of cholecystectomy  History of pancreatic surgery  History of penile implant  Cardiac pacemaker      Home Medications:  amitriptyline 25 mg tablet:  (27 May 2022 03:47)  Breo Ellipta 100 mcg-25 mcg/inh inhalation powder: 1 puff(s) inhaled once a day (27 May 2022 03:47)  clopidogrel 75 mg tablet:  (27 May 2022 03:47)  Corlanor 5 mg oral tablet: 1 tab(s) orally 2 times a day (with meals) (27 May 2022 03:47)  Eliquis 5 mg tablet:  (27 May 2022 03:47)  famotidine 40 mg oral tablet: 1 tab(s) orally once a day (at bedtime) (27 May 2022 03:47)  latanoprost 0.005% ophthalmic solution: 1 drop(s) to each affected eye once a day (in the evening) (27 May 2022 03:47)  montelukast 10 mg oral tablet: 1 tab(s) orally once a day (27 May 2022 03:47)  montelukast 10 mg tablet:  (27 May 2022 03:47)  nifedipine ER 30 mg tablet,extended release:  (27 May 2022 03:47)  omeprazole 40 mg oral delayed release capsule: 1 cap(s) orally once a day (27 May 2022 03:47)  oxyMORphone 40 mg oral tablet, extended release: 1 tab(s) orally every 12 hours, As Needed (27 May 2022 03:47)  pregabalin 75 mg capsule:  (27 May 2022 03:47)  simvastatin 10 mg oral tablet: 1 tab(s) orally once a day (at bedtime) (27 May 2022 03:47)  Trulicity Pen 0.75 mg/0.5 mL subcutaneous solution: 1 application subcutaneous once a week (27 May 2022 03:47)        24HRS EVENT:  5/30  day   - pending tele bed  - restart lyrica, amitriptyline, plavix    5/30  Night:  -Mg 1.8->2g, K 3.6 --> KCL 20x2  -still waiting for 3C bed       DVT PTX: therapeutic LVX     GI PTX: PPI    ***Tubes/Lines/Drains  ***  Peripheral IV      REVIEW OF SYSTEMS    [ ] A ten-point review of systems was otherwise negative except as noted.  [x ] Due to altered mental status/intubation, subjective information were not able to be obtained from the patient. History was obtained, to the extent possible, from review of the chart and collateral sources of information.

## 2022-05-31 NOTE — SWALLOW BEDSIDE ASSESSMENT ADULT - SLP PERTINENT HISTORY OF CURRENT PROBLEM
81 year old male with a past medical hx of chronic pancreatitis, COPD, CHF, T2D, Pacemaker, HTN. HLD, surgical history of cholecystectomy, exploratory laparotomy with small bowel resection, presented to the ED for abdominal pain
81 year old male with a past medical hx of chronic pancreatitis, COPD, CHF, T2D, Pacemaker, HTN. HLD, surgical history of cholecystectomy, exploratory laparotomy with small bowel resection, presented to the ED for abdominal pain

## 2022-05-31 NOTE — PROGRESS NOTE ADULT - ASSESSMENT
ASSESSMENT:  81 year old male with a past medical hx of chronic pancreatitis, COPD, CHF, T2D, Pacemaker, HTN. HLD, surgical history of cholecystectomy, exploratory laparotomy with small bowel resection, presented to the ED for abdominal pain. CT findings of small bowel obstruction.     PLAN:  - F/u SW/CM for dispo to EGR  - Patient no longer agitated  - Pain Management  - Strict Ins and Out  - K>4, MG>2, Phos>3    Lines/Tubes: LUE midline catheter, jordan    BLUE TEAM SPECTRA: [id number]

## 2022-05-31 NOTE — PHYSICAL THERAPY INITIAL EVALUATION ADULT - PERTINENT HX OF CURRENT PROBLEM, REHAB EVAL
81 year old male with a past medical hx of chronic pancreatitis, COPD, CHF, T2D, Pacemaker, HTN. HLD, surgical history of cholecystectomy, exploratory laparotomy with small bowel resection, presented to the ED for abdominal pain.

## 2022-05-31 NOTE — PROGRESS NOTE ADULT - ASSESSMENT
EP: Dr Demarco ([facility])  cardiology: Dr. Rboerson    Assessment: 81 year old male with a past medical hx of chronic pancreatitis, COPD, CHF, T2D, DC PPM (MDT), HTN. HLD, surgical history of cholecystectomy, exploratory laparotomy with small bowel resection, presented to the ED for abdominal pain. CT showing small bowel obstruction. EP consulted for PPM interrogation and was found to have undersensing and elevated thresholds. Device was reprogrammed for optimization.    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 Roberson    Assessment: 81 year old male with a past medical hx of chronic pancreatitis, COPD, CHF, T2D, DC PPM (MDT), HTN. HLD, surgical history of cholecystectomy, exploratory laparotomy with small bowel resection, presented to the ED for abdominal pain. CT showing small bowel obstruction. EP consulted for PPM interrogation and was found to have undersensing and elevated thresholds. Device was reprogrammed for optimization.    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

## 2022-05-31 NOTE — PROGRESS NOTE ADULT - ASSESSMENT
Assessment & Plan    81y Male pmh copd, chf, dm, pacemaker, dementia, pancreatitis, htn, hld p/w SBO    NEURO:  Delirium improved, A&O 2-3, con't PRN  Zyprexa 5mg IM   Acute pain- controlled with Tylenol   restart home lyrica, amitriptyline    RESP:   Sating well on RA  hx of COPD, nebs prn  Current Rx: budesonide  80 MICROgram(s)/formoterol 4.5 MICROgram(s) Inhaler 2 two times a day  Home Rx: Breo Ellipta 100 mcg-25 mcg/inh inhalation powder- held  montelukast 10 mg tablet-held    CARDS:   Episodes of wide complex paced tachycardia- last episode 5/28- EPS following, NTD  PMHx CHF w/ pacemaker --> interrogated and reprogrammed  EKG-   Echo with Lumason- EF 45-55%, entire inf. septum and basal and mid inf. wall are hypokinetic, mildly decrased global LV systolic fxn.  EPS c/s - reprogrammed device- may need to change device or add a leadless device once pt is optimized  -changed Lopressor to  PO -12.5mg q12    GI/NUTR:   SBO- managed conservatively with NGT  - NG tube removed on 5/28, no nausea or vomitting  - + bowel movement  - passed SLP for soft and bite-sized consitency diet  - GI ppx with Protonix    /RENAL:      Urinary retention- jordan re-inserted 5/29, started on flomax     IVL  Labs:          BUN/Cr- 10/0.7  -->,  5/0.7  -->          Electrolytes-Na 141 // K 3.6 // Mg 1.8 //  Phos 3.3 (05-30 @ 21:34)  -hypomagnesimia and hypokalemia repleted    HEME/ONC:       DVT prophylaxis-LVX therapeutic 75 q12 for A-fib    Labs: Hb/Hct: 10.7/31.7 -->10/29.1-> 10.4/30.1->11.5/33.2              Plts:  349  --> 371->356->381->433              PTT/INR:        Home Rx: clopidogrel 75 mg tablet: - restarted 5/30  Eliquis 5 mg tablet: -held    ID:  Afebrile, no Abx  WBC- 7.10  --->>12.3-->11.7->8.6-->5.8    ENDO:     Glucose, Serum: 142 (05-26 @ 19:34)     HA1C 4/21- 6.8    LINES/DRAINS:  LUE midline catheter, jordan    ADVANCED DIRECTIVES:  Full Code    HCP/Emergency Contact-    DISPO:   Downgrade to floor-Tele

## 2022-06-01 LAB
ANION GAP SERPL CALC-SCNC: 12 MMOL/L — SIGNIFICANT CHANGE UP (ref 7–14)
APTT BLD: 41.4 SEC — HIGH (ref 27–39.2)
BLD GP AB SCN SERPL QL: SIGNIFICANT CHANGE UP
BUN SERPL-MCNC: 5 MG/DL — LOW (ref 10–20)
CALCIUM SERPL-MCNC: 8.9 MG/DL — SIGNIFICANT CHANGE UP (ref 8.5–10.1)
CHLORIDE SERPL-SCNC: 108 MMOL/L — SIGNIFICANT CHANGE UP (ref 98–110)
CO2 SERPL-SCNC: 21 MMOL/L — SIGNIFICANT CHANGE UP (ref 17–32)
CREAT SERPL-MCNC: 0.7 MG/DL — SIGNIFICANT CHANGE UP (ref 0.7–1.5)
EGFR: 93 ML/MIN/1.73M2 — SIGNIFICANT CHANGE UP
GLUCOSE BLDC GLUCOMTR-MCNC: 102 MG/DL — HIGH (ref 70–99)
GLUCOSE BLDC GLUCOMTR-MCNC: 126 MG/DL — HIGH (ref 70–99)
GLUCOSE BLDC GLUCOMTR-MCNC: 138 MG/DL — HIGH (ref 70–99)
GLUCOSE BLDC GLUCOMTR-MCNC: 94 MG/DL — SIGNIFICANT CHANGE UP (ref 70–99)
GLUCOSE SERPL-MCNC: 112 MG/DL — HIGH (ref 70–99)
HCT VFR BLD CALC: 33.5 % — LOW (ref 42–52)
HGB BLD-MCNC: 11.7 G/DL — LOW (ref 14–18)
INR BLD: 1.11 RATIO — SIGNIFICANT CHANGE UP (ref 0.65–1.3)
MAGNESIUM SERPL-MCNC: 1.8 MG/DL — SIGNIFICANT CHANGE UP (ref 1.8–2.4)
MCHC RBC-ENTMCNC: 31.1 PG — HIGH (ref 27–31)
MCHC RBC-ENTMCNC: 34.9 G/DL — SIGNIFICANT CHANGE UP (ref 32–37)
MCV RBC AUTO: 89.1 FL — SIGNIFICANT CHANGE UP (ref 80–94)
NRBC # BLD: 0 /100 WBCS — SIGNIFICANT CHANGE UP (ref 0–0)
PHOSPHATE SERPL-MCNC: 3.1 MG/DL — SIGNIFICANT CHANGE UP (ref 2.1–4.9)
PLATELET # BLD AUTO: 437 K/UL — HIGH (ref 130–400)
POTASSIUM SERPL-MCNC: 4.2 MMOL/L — SIGNIFICANT CHANGE UP (ref 3.5–5)
POTASSIUM SERPL-SCNC: 4.2 MMOL/L — SIGNIFICANT CHANGE UP (ref 3.5–5)
PROTHROM AB SERPL-ACNC: 12.8 SEC — SIGNIFICANT CHANGE UP (ref 9.95–12.87)
RBC # BLD: 3.76 M/UL — LOW (ref 4.7–6.1)
RBC # FLD: 13.3 % — SIGNIFICANT CHANGE UP (ref 11.5–14.5)
SARS-COV-2 RNA SPEC QL NAA+PROBE: SIGNIFICANT CHANGE UP
SARS-COV-2 RNA SPEC QL NAA+PROBE: SIGNIFICANT CHANGE UP
SODIUM SERPL-SCNC: 141 MMOL/L — SIGNIFICANT CHANGE UP (ref 135–146)
WBC # BLD: 5.75 K/UL — SIGNIFICANT CHANGE UP (ref 4.8–10.8)
WBC # FLD AUTO: 5.75 K/UL — SIGNIFICANT CHANGE UP (ref 4.8–10.8)

## 2022-06-01 PROCEDURE — 71045 X-RAY EXAM CHEST 1 VIEW: CPT | Mod: 26

## 2022-06-01 PROCEDURE — 99233 SBSQ HOSP IP/OBS HIGH 50: CPT

## 2022-06-01 PROCEDURE — 99231 SBSQ HOSP IP/OBS SF/LOW 25: CPT

## 2022-06-01 RX ORDER — SODIUM CHLORIDE 9 MG/ML
1000 INJECTION, SOLUTION INTRAVENOUS
Refills: 0 | Status: DISCONTINUED | OUTPATIENT
Start: 2022-06-01 | End: 2022-06-02

## 2022-06-01 RX ORDER — ENOXAPARIN SODIUM 100 MG/ML
75 INJECTION SUBCUTANEOUS EVERY 12 HOURS
Refills: 0 | Status: COMPLETED | OUTPATIENT
Start: 2022-06-01 | End: 2022-06-01

## 2022-06-01 RX ORDER — SODIUM CHLORIDE 9 MG/ML
1000 INJECTION, SOLUTION INTRAVENOUS
Refills: 0 | Status: DISCONTINUED | OUTPATIENT
Start: 2022-06-01 | End: 2022-06-01

## 2022-06-01 RX ORDER — ENOXAPARIN SODIUM 100 MG/ML
40 INJECTION SUBCUTANEOUS EVERY 24 HOURS
Refills: 0 | Status: DISCONTINUED | OUTPATIENT
Start: 2022-06-01 | End: 2022-06-02

## 2022-06-01 RX ORDER — MAGNESIUM SULFATE 500 MG/ML
2 VIAL (ML) INJECTION ONCE
Refills: 0 | Status: COMPLETED | OUTPATIENT
Start: 2022-06-01 | End: 2022-06-02

## 2022-06-01 RX ADMIN — ENOXAPARIN SODIUM 40 MILLIGRAM(S): 100 INJECTION SUBCUTANEOUS at 23:05

## 2022-06-01 RX ADMIN — Medication 12.5 MILLIGRAM(S): at 17:51

## 2022-06-01 RX ADMIN — Medication 1 APPLICATION(S): at 06:06

## 2022-06-01 RX ADMIN — ENOXAPARIN SODIUM 75 MILLIGRAM(S): 100 INJECTION SUBCUTANEOUS at 17:51

## 2022-06-01 RX ADMIN — SODIUM CHLORIDE 100 MILLILITER(S): 9 INJECTION, SOLUTION INTRAVENOUS at 00:00

## 2022-06-01 RX ADMIN — ENOXAPARIN SODIUM 75 MILLIGRAM(S): 100 INJECTION SUBCUTANEOUS at 11:43

## 2022-06-01 RX ADMIN — Medication 650 MILLIGRAM(S): at 09:44

## 2022-06-01 RX ADMIN — Medication 650 MILLIGRAM(S): at 10:18

## 2022-06-01 RX ADMIN — SODIUM CHLORIDE 100 MILLILITER(S): 9 INJECTION, SOLUTION INTRAVENOUS at 09:45

## 2022-06-01 RX ADMIN — Medication 12.5 MILLIGRAM(S): at 06:06

## 2022-06-01 RX ADMIN — Medication 1 APPLICATION(S): at 17:51

## 2022-06-01 RX ADMIN — TAMSULOSIN HYDROCHLORIDE 0.4 MILLIGRAM(S): 0.4 CAPSULE ORAL at 23:04

## 2022-06-01 RX ADMIN — Medication 650 MILLIGRAM(S): at 23:04

## 2022-06-01 RX ADMIN — CHLORHEXIDINE GLUCONATE 1 APPLICATION(S): 213 SOLUTION TOPICAL at 06:07

## 2022-06-01 NOTE — PROGRESS NOTE ADULT - NS ATTEND AMEND GEN_ALL_CORE FT
doing well. on diet. vitals stable.
I interrogated device in person today  Thresholds stable  Sensing appropriate since RV sensitivity reprogrammed at 0.45  Patient is not pacemaker dependent  Device currently working properly  I called son Myra and I called Dr. Demarco (patient electrophysiologist)  Both patient and son did not want lead reivison or Leadless pacemaker implant at this time  THey want to follow with Dr. Demarco as outpatient
doing well, NG out. diet per kandyry team.
norberto osman, EP for pacemker change. vitals stable. good urine output.
complex patient  dual chamber pacemaker malfunction  device reprogrammed for now  will benefit from Leadless pacemaker implant rather than transvenous lead revision or laser extraction  Will discuss with patient's EP at [facility] [[email or web address]] Dr. Demarco

## 2022-06-01 NOTE — CONSULT NOTE ADULT - CONSULT REQUESTED DATE/TIME
27-May-2022 05:38
27-May-2022 09:28
28-May-2022 09:40
28-May-2022 10:14
01-Jun-2022 16:25
30-May-2022 12:54

## 2022-06-01 NOTE — DIETITIAN INITIAL EVALUATION ADULT - PERTINENT LABORATORY DATA
05-31    136  |  104  |  6<L>  ----------------------------<  134<H>  4.2   |  21  |  0.8    Ca    8.6      31 May 2022 22:00  Phos  3.1     05-31  Mg     2.0     05-31    POCT Blood Glucose.: 94 mg/dL (06-01-22 @ 11:39)  -

## 2022-06-01 NOTE — DIETITIAN INITIAL EVALUATION ADULT - ORAL INTAKE PTA/DIET HISTORY
Hx obtained from pt at bedside. Reports good PO intake PTA. Endorses his diet is balanced with all food groups. Endorses he avoids salt and sugar given his PMHx. No food allergy/intolerance. No supplement use. No chewing/swallowing issues. UBW 160lbs. Ht 5'7".

## 2022-06-01 NOTE — PROGRESS NOTE ADULT - ASSESSMENT
EP: Dr Demarco ([facility])  cardiology: Dr. Roberson    Assessment: 81 year old male with a past medical hx of chronic pancreatitis, COPD, CHF, T2D, DC PPM (MDT), HTN. HLD, surgical history of cholecystectomy, exploratory laparotomy with small bowel resection, presented to the ED for abdominal pain. CT showing small bowel obstruction. EP consulted for PPM interrogation and was found to have undersensing and elevated thresholds. Device was reprogrammed for optimization.  Reinterrogation showed thresholds are stable and sensing is appropriate.  Device is functioning at this time.      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 Law spoke to both Dr. Demarco and pt's son.  Explained to them the initial issues with the PPM and that the PPM has been reprogrammed for optimal functioning.  Both Dr. Demarco and the pt's son would like to hold off on Micra or any intervention at this time.  If pt's PPM fails, then plan would be for Micra.   No procedures planned at this time  Pt can be discharged from EP standpoint  EP will sign off.  Please recall EP if needed

## 2022-06-01 NOTE — DIETITIAN INITIAL EVALUATION ADULT - NS FNS DIET ORDER
Diet, NPO after Midnight:      NPO Start Date: 01-Jun-2022,   NPO Start Time: 23:59 (06-01-22 @ 11:36)  -

## 2022-06-01 NOTE — DIETITIAN INITIAL EVALUATION ADULT - ORAL NUTRITION SUPPLEMENTS
Add Ensure Enlive 1can BID (350kcal, 20g protein each; noted with hx DM but BG within target range 120-180 in-house; Glucerna lower in kcal and pro)  Prosource gelatein 1can BID (160kcal, 20g protein each; same reason as above)

## 2022-06-01 NOTE — PROGRESS NOTE ADULT - SUBJECTIVE AND OBJECTIVE BOX
INTERVAL HPI/OVERNIGHT EVENTS:  no new events    MEDICATIONS  (STANDING):  amitriptyline 25 milliGRAM(s) Oral daily  BACItracin   Ointment 1 Application(s) Topical every 12 hours  budesonide  80 MICROgram(s)/formoterol 4.5 MICROgram(s) Inhaler 2 Puff(s) Inhalation two times a day  chlorhexidine 4% Liquid 1 Application(s) Topical <User Schedule>  clopidogrel Tablet 75 milliGRAM(s) Oral daily  enoxaparin Injectable 75 milliGRAM(s) SubCutaneous every 12 hours  insulin lispro (ADMELOG) corrective regimen sliding scale   SubCutaneous three times a day before meals  metoprolol tartrate 12.5 milliGRAM(s) Oral every 12 hours  pantoprazole  Injectable 40 milliGRAM(s) IV Push daily  pregabalin 75 milliGRAM(s) Oral daily  tamsulosin 0.4 milliGRAM(s) Oral at bedtime    MEDICATIONS  (PRN):  acetaminophen     Tablet .. 650 milliGRAM(s) Oral every 6 hours PRN Temp greater or equal to 38C (100.4F), Mild Pain (1 - 3)    Allergies    No Known Allergies    Intolerances    REVIEW OF SYSTEMS  10 point ROS is negative    Vital Signs Last 24 Hrs  T(C): 36.4 (01 Jun 2022 04:00), Max: 36.4 (01 Jun 2022 04:00)  T(F): 97.6 (01 Jun 2022 04:00), Max: 97.6 (01 Jun 2022 04:00)  HR: 78 (01 Jun 2022 12:00) (69 - 91)  BP: 146/63 (01 Jun 2022 12:00) (110/55 - 146/63)  BP(mean): 90 (01 Jun 2022 12:00) (73 - 96)  RR: 18 (31 May 2022 20:00) (18 - 18)  SpO2: 100% (01 Jun 2022 12:00) (97% - 100%)    05-31-22 @ 07:01  -  06-01-22 @ 07:00  --------------------------------------------------------  IN: 400 mL / OUT: 2500 mL / NET: -2100 mL    06-01-22 @ 07:01  -  06-01-22 @ 15:40  --------------------------------------------------------  IN: 400 mL / OUT: 675 mL / NET: -275 mL      Physical Exam    GENERAL: In no apparent distress, well nourished, and hydrated.  HEART: Regular rate and rhythm; No murmurs, rubs, or gallops.  PULMONARY: Clear to auscultation and perfusion.  No rales, wheezing, or rhonchi bilaterally.  ABDOMEN: Soft, Nontender, Nondistended; Bowel sounds present  EXTREMITIES:  2+ Peripheral Pulses, No clubbing, cyanosis, or edema  NEUROLOGICAL: Grossly nonfocal    LABS:                        11.4   6.33  )-----------( 406      ( 31 May 2022 22:00 )             32.9     05-31    136  |  104  |  6<L>  ----------------------------<  134<H>  4.2   |  21  |  0.8    Ca    8.6      31 May 2022 22:00  Phos  3.1     05-31  Mg     2.0     05-31      PT/INR - ( 31 May 2022 22:00 )   PT: 12.20 sec;   INR: 1.06 ratio         PTT - ( 31 May 2022 22:00 )  PTT:36.9 sec      RADIOLOGY & ADDITIONAL TESTS:

## 2022-06-01 NOTE — PROGRESS NOTE ADULT - ASSESSMENT
ASSESSMENT:  81 year old male with a past medical hx of chronic pancreatitis, COPD, CHF, T2D, Pacemaker, HTN. HLD, surgical history of cholecystectomy, exploratory laparotomy with small bowel resection, presented to the ED for abdominal pain. CT findings of small bowel obstruction.     PLAN:  - F/u SW/CM for dispo to EGR  - EPS to do replacement of pacemaker today  - Maintain Pre-Op Status  - No surgical management necessary at this time  - Patient no longer agitated  - Pain Management  - Strict Ins and Out  - K>4, MG>2, Phos>3    Lines/Tubes: LUE midline catheter, jordan    BLUE TEAM SPECTRA: [id number]

## 2022-06-01 NOTE — PROGRESS NOTE ADULT - SUBJECTIVE AND OBJECTIVE BOX
EZ [place]  [id number]  81y Male    Indication for ICU admission: SBO  Admit Date: 5/27  ICU Date: 5/27  OR Date:    No Known Allergies    PAST MEDICAL & SURGICAL HISTORY:  Diabetes  Hypertension  Pacemaker  Dyslipidemia  History of chronic pancreatitis  Atherosclerosis of native artery of lower extremity  COPD (chronic obstructive pulmonary disease)  CHESTER on CPAP  H/O intestinal obstruction  History of cholecystectomy  History of pancreatic surgery  History of penile implant  Cardiac pacemaker      Home Medications:  amitriptyline 25 mg tablet:  (27 May 2022 03:47)  Breo Ellipta 100 mcg-25 mcg/inh inhalation powder: 1 puff(s) inhaled once a day (27 May 2022 03:47)  clopidogrel 75 mg tablet:  (27 May 2022 03:47)  Corlanor 5 mg oral tablet: 1 tab(s) orally 2 times a day (with meals) (27 May 2022 03:47)  Eliquis 5 mg tablet:  (27 May 2022 03:47)  famotidine 40 mg oral tablet: 1 tab(s) orally once a day (at bedtime) (27 May 2022 03:47)  latanoprost 0.005% ophthalmic solution: 1 drop(s) to each affected eye once a day (in the evening) (27 May 2022 03:47)  montelukast 10 mg oral tablet: 1 tab(s) orally once a day (27 May 2022 03:47)  montelukast 10 mg tablet:  (27 May 2022 03:47)  nifedipine ER 30 mg tablet,extended release:  (27 May 2022 03:47)  omeprazole 40 mg oral delayed release capsule: 1 cap(s) orally once a day (27 May 2022 03:47)  oxyMORphone 40 mg oral tablet, extended release: 1 tab(s) orally every 12 hours, As Needed (27 May 2022 03:47)  pregabalin 75 mg capsule:  (27 May 2022 03:47)  simvastatin 10 mg oral tablet: 1 tab(s) orally once a day (at bedtime) (27 May 2022 03:47)  Trulicity Pen 0.75 mg/0.5 mL subcutaneous solution: 1 application subcutaneous once a week (27 May 2022 03:47)        24HRS EVENT:  Night  -no repletions  -npo after midnight  -waiting for bed    Day  -Pending bed on 3C  -Discussed with EP, added on to schedule 6/1: NPOmn, COVID, hold AM Therapeutic LVX - must restart if not going    DVT PTX: therapeutic LVX     GI PTX: PPI    ***Tubes/Lines/Drains  ***  Peripheral IV      REVIEW OF SYSTEMS    [ ] A ten-point review of systems was otherwise negative except as noted.  [x ] Due to altered mental status/intubation, subjective information were not able to be obtained from the patient. History was obtained, to the extent possible, from review of the chart and collateral sources of information.         EZ [place]  [id number]  81y Male    Indication for ICU admission: SBO  Admit Date: 5/27  ICU Date: 5/27  OR Date:    No Known Allergies    PAST MEDICAL & SURGICAL HISTORY:  Diabetes  Hypertension  Pacemaker  Dyslipidemia  History of chronic pancreatitis  Atherosclerosis of native artery of lower extremity  COPD (chronic obstructive pulmonary disease)  CHESTER on CPAP  H/O intestinal obstruction  History of cholecystectomy  History of pancreatic surgery  History of penile implant  Cardiac pacemaker      Home Medications:  amitriptyline 25 mg tablet:  (27 May 2022 03:47)  Breo Ellipta 100 mcg-25 mcg/inh inhalation powder: 1 puff(s) inhaled once a day (27 May 2022 03:47)  clopidogrel 75 mg tablet:  (27 May 2022 03:47)  Corlanor 5 mg oral tablet: 1 tab(s) orally 2 times a day (with meals) (27 May 2022 03:47)  Eliquis 5 mg tablet:  (27 May 2022 03:47)  famotidine 40 mg oral tablet: 1 tab(s) orally once a day (at bedtime) (27 May 2022 03:47)  latanoprost 0.005% ophthalmic solution: 1 drop(s) to each affected eye once a day (in the evening) (27 May 2022 03:47)  montelukast 10 mg oral tablet: 1 tab(s) orally once a day (27 May 2022 03:47)  montelukast 10 mg tablet:  (27 May 2022 03:47)  nifedipine ER 30 mg tablet,extended release:  (27 May 2022 03:47)  omeprazole 40 mg oral delayed release capsule: 1 cap(s) orally once a day (27 May 2022 03:47)  oxyMORphone 40 mg oral tablet, extended release: 1 tab(s) orally every 12 hours, As Needed (27 May 2022 03:47)  pregabalin 75 mg capsule:  (27 May 2022 03:47)  simvastatin 10 mg oral tablet: 1 tab(s) orally once a day (at bedtime) (27 May 2022 03:47)  Trulicity Pen 0.75 mg/0.5 mL subcutaneous solution: 1 application subcutaneous once a week (27 May 2022 03:47)        24HRS EVENT:  Night  -no repletions  -npo after midnight  -waiting for bed    Day  -Pending bed on 3C  -Discussed with EP, added on to schedule 6/1: NPOmn, COVID, hold AM Therapeutic LVX - must restart if not going    DVT PTX: therapeutic LVX     GI PTX: PPI    ***Tubes/Lines/Drains  ***  Peripheral IV      REVIEW OF SYSTEMS    [ ] A ten-point review of systems was otherwise negative except as noted.  [x ] Due to altered mental status/intubation, subjective information were not able to be obtained from the patient. History was obtained, to the extent possible, from review of the chart and collateral sources of information.        Daily     Daily     Diet, NPO after Midnight:      NPO Start Date: 01-Jun-2022,   NPO Start Time: 23:59 (06-01-22 @ 11:36)      CURRENT MEDS:  Neurologic Medications  acetaminophen     Tablet .. 650 milliGRAM(s) Oral every 6 hours PRN Temp greater or equal to 38C (100.4F), Mild Pain (1 - 3)  amitriptyline 25 milliGRAM(s) Oral daily  pregabalin 75 milliGRAM(s) Oral daily    Respiratory Medications  budesonide  80 MICROgram(s)/formoterol 4.5 MICROgram(s) Inhaler 2 Puff(s) Inhalation two times a day    Cardiovascular Medications  metoprolol tartrate 12.5 milliGRAM(s) Oral every 12 hours  tamsulosin 0.4 milliGRAM(s) Oral at bedtime    Gastrointestinal Medications  pantoprazole  Injectable 40 milliGRAM(s) IV Push daily    Genitourinary Medications    Hematologic/Oncologic Medications  clopidogrel Tablet 75 milliGRAM(s) Oral daily  enoxaparin Injectable 75 milliGRAM(s) SubCutaneous every 12 hours    Antimicrobial/Immunologic Medications    Endocrine/Metabolic Medications  insulin lispro (ADMELOG) corrective regimen sliding scale   SubCutaneous three times a day before meals    Topical/Other Medications  BACItracin   Ointment 1 Application(s) Topical every 12 hours  chlorhexidine 4% Liquid 1 Application(s) Topical <User Schedule>      ICU Vital Signs Last 24 Hrs  T(C): 36.4 (01 Jun 2022 04:00), Max: 36.4 (01 Jun 2022 04:00)  T(F): 97.6 (01 Jun 2022 04:00), Max: 97.6 (01 Jun 2022 04:00)  HR: 74 (01 Jun 2022 07:00) (69 - 91)  BP: 139/61 (01 Jun 2022 07:00) (110/55 - 144/60)  BP(mean): 88 (01 Jun 2022 07:00) (73 - 96)  ABP: --  ABP(mean): --  RR: 18 (31 May 2022 20:00) (18 - 18)  SpO2: 98% (01 Jun 2022 07:00) (97% - 100%)      Adult Advanced Hemodynamics Last 24 Hrs  CVP(mm Hg): --  CVP(cm H2O): --  CO: --  CI: --  PA: --  PA(mean): --  PCWP: --  SVR: --  SVRI: --  PVR: --  PVRI: --          I&O's Summary    31 May 2022 07:01  -  01 Jun 2022 07:00  --------------------------------------------------------  IN: 400 mL / OUT: 2500 mL / NET: -2100 mL    01 Jun 2022 07:01  -  01 Jun 2022 12:31  --------------------------------------------------------  IN: 400 mL / OUT: 0 mL / NET: 400 mL      I&O's Detail    31 May 2022 07:01  -  01 Jun 2022 07:00  --------------------------------------------------------  IN:    Lactated Ringers: 400 mL  Total IN: 400 mL    OUT:    Indwelling Catheter - Urethral (mL): 2500 mL  Total OUT: 2500 mL    Total NET: -2100 mL      01 Jun 2022 07:01  -  01 Jun 2022 12:31  --------------------------------------------------------  IN:    Lactated Ringers: 400 mL  Total IN: 400 mL    OUT:  Total OUT: 0 mL    Total NET: 400 mL          PHYSICAL EXAM:    General/Neuro:  GCS:  15   = E 4  / V 5  / M 6     Deficits:   alert & oriented x 3, no focal deficits, Moving all extremities.    Lungs:   clear to auscultation bilaterally, Normal expansion/effort.     Cardiovascular : S1, S2.  Regular rate and rhythm.  Cardiac Rhythm: Normal Sinus Rhythm    GI: Abdomen soft, Non-tender, Non-distended. Bowel sound present in all 4 quadrants.    Extremities: Extremities warm, pink, and dry.    Derm: Good skin turgor, no skin breakdown.      :       Robledo catheter in place.      CXR:     LABS:  CAPILLARY BLOOD GLUCOSE      POCT Blood Glucose.: 94 mg/dL (01 Jun 2022 11:39)  POCT Blood Glucose.: 102 mg/dL (01 Jun 2022 06:27)  POCT Blood Glucose.: 141 mg/dL (31 May 2022 18:25)  POCT Blood Glucose.: 142 mg/dL (31 May 2022 14:01)                          11.4   6.33  )-----------( 406      ( 31 May 2022 22:00 )             32.9       05-31    136  |  104  |  6<L>  ----------------------------<  134<H>  4.2   |  21  |  0.8    Ca    8.6      31 May 2022 22:00  Phos  3.1     05-31  Mg     2.0     05-31        PT/INR - ( 31 May 2022 22:00 )   PT: 12.20 sec;   INR: 1.06 ratio         PTT - ( 31 May 2022 22:00 )  PTT:36.9 sec

## 2022-06-01 NOTE — CONSULT NOTE ADULT - SUBJECTIVE AND OBJECTIVE BOX
LISSA, EZ  81y, Male  Allergy: No Known Allergies    Hospital Day: 5d    Patient seen and examined earlier today in SICU.     PMH/PSH:    Diabetes  Hypertension  S/P Pacemaker  Dyslipidemia  History of chronic pancreatitis  Atherosclerosis of native artery of lower extremity  COPD (chronic obstructive pulmonary disease)  CHESTER on CPAP      VITALS:  T(F): 98.5 (06-01-22 @ 14:00), Max: 98.5 (06-01-22 @ 14:00)  HR: 79 (06-01-22 @ 15:00)  BP: 145/58 (06-01-22 @ 15:00) (110/55 - 146/63)  RR: 18 (05-31-22 @ 20:00)  SpO2: 97% (06-01-22 @ 15:00) on room air      TESTS & MEASUREMENTS:  Weight/Height/BMI  Height (cm): 168.9 (05-26-22 @ 15:05)  Weight (kg): 75.7 (05-27-22 @ 20:42)  BMI (kg/m2): 26.5 (05-27-22 @ 20:42)    05-30-22 @ 07:01  -  05-31-22 @ 07:00  --------------------------------------------------------  IN: 238 mL / OUT: 1700 mL / NET: -1462 mL    05-31-22 @ 07:01  -  06-01-22 @ 07:00  --------------------------------------------------------  IN: 400 mL / OUT: 2500 mL / NET: -2100 mL    06-01-22 @ 07:01  -  06-01-22 @ 16:35  --------------------------------------------------------  IN: 400 mL / OUT: 675 mL / NET: -275 mL                            11.4   6.33  )-----------( 406      ( 31 May 2022 22:00 )             32.9     PT/INR - ( 31 May 2022 22:00 )   PT: 12.20 sec;   INR: 1.06 ratio         PTT - ( 31 May 2022 22:00 )  PTT:36.9 sec  INR: 1.06 ratio (05-31-22 @ 22:00)  INR: 1.34 ratio (05-27-22 @ 20:03)    05-31    136  |  104  |  6<L>  ----------------------------<  134<H>  4.2   |  21  |  0.8    Ca    8.6      31 May 2022 22:00  Phos  3.1     05-31  Mg     2.0     05-31        COVID-19 PCR: NotDetec (06-01-22 @ 09:33)  COVID-19 PCR: NotDetec (05-31-22 @ 22:00)  COVID-19 PCR: NotDetec (05-26-22 @ 17:49)    Indwelling Urethral Catheter:     Connect To:  Straight Drainage/[place]    Indication:  Urinary Retention / Obstruction (06-01-22 @ 09:27) (not performed)      RADIOLOGY, ECG, & ADDITIONAL TESTS:  12 Lead ECG:   Ventricular Rate 80 BPM  Sinus rhythm with 1st degree A-V block with Premature atrial complexes  Left axis deviation  Right bundle branch block  Possible Lateral infarct , age undetermined  Abnormal ECG    Confirmed by Abdias Melgar ([id number]) on 5/29/2022 3:04:09 PM (05-29-22 @ 10:13)      MEDICATIONS:  MEDICATIONS  (STANDING):  amitriptyline 25 milliGRAM(s) Oral daily  BACItracin   Ointment 1 Application(s) Topical every 12 hours  budesonide  80 MICROgram(s)/formoterol 4.5 MICROgram(s) Inhaler 2 Puff(s) Inhalation two times a day  chlorhexidine 4% Liquid 1 Application(s) Topical <User Schedule>  clopidogrel Tablet 75 milliGRAM(s) Oral daily  enoxaparin Injectable 75 milliGRAM(s) SubCutaneous every 12 hours  insulin lispro (ADMELOG) corrective regimen sliding scale   SubCutaneous three times a day before meals  metoprolol tartrate 12.5 milliGRAM(s) Oral every 12 hours  pantoprazole  Injectable 40 milliGRAM(s) IV Push daily  pregabalin 75 milliGRAM(s) Oral daily  tamsulosin 0.4 milliGRAM(s) Oral at bedtime    MEDICATIONS  (PRN):  acetaminophen     Tablet .. 650 milliGRAM(s) Oral every 6 hours PRN Temp greater or equal to 38C (100.4F), Mild Pain (1 - 3)      HOME MEDICATIONS:  amitriptyline 25 mg tablet (05-27)  Breo Ellipta 100 mcg-25 mcg/inh inhalation powder (05-27)  clopidogrel 75 mg tablet (05-27)  Corlanor 5 mg oral tablet (05-27)  Eliquis 5 mg tablet (05-27)  famotidine 40 mg oral tablet (05-27)  latanoprost 0.005% ophthalmic solution (05-27)  montelukast 10 mg oral tablet (05-27)  montelukast 10 mg tablet (05-27)  nifedipine ER 30 mg tablet,extended release (05-27)  omeprazole 40 mg oral delayed release capsule (05-27)  oxyMORphone 40 mg oral tablet, extended release (05-27)  pregabalin 75 mg capsule (05-27)  simvastatin 10 mg oral tablet (05-27)  Trulicity Pen 0.75 mg/0.5 mL subcutaneous solution (05-27)      PHYSICAL EXAM:  GENERAL: in NAD/P, answers appropriately and A&Ox3 at the time of the examination. Able to recall and identify his primary care physician by name and previous medical care.   CHEST/LUNG: no wheezing, good air entry   HEART: S1S2  ABDOMEN: Soft, multiple old scars  EXTREMITIES:  Chronic skin changes       
EZ ALCARAZ 81y (1940) Male    Patient is a 81y old  Male who presents with a chief complaint of small bowel obstruction (28 May 2022 09:36)      HPI:  GENERAL SURGERY CONSULT NOTE    Patient: EZ ALCARAZ , 81y ([birth year])Male   MRN: [id number]  Location: [address]  Visit: 05-27-22 Inpatient  Date: 05-27-22 @ 03:16    HPI:  81 year old male with a past medical hx of chronic pancreatitis, COPD, CHF, T2D, Pacemaker, HTN. HLD, surgical history of cholecystectomy, exploratory laparotomy with small bowel resection, presented to the ED for abdominal pain. Per report, patient has been constipated for the past 4 days, and was sent in from [facility] to rule out sbo. Admits to 1 episode of vomiting last night. In the ED, vitals stable, CT showing small bowel obstruction with dilated loops of small bowel in left abdomen with a transition point in LLQ. Patient family not bedside and report obtained from verbal with witness. WBC 7.1    Cardiology addendum   pt is in restraints, confused. unable to obtain hx from pt. reviewed above, pt examined.     ACCESS DEVICES:  [x] Peripheral IV  [ ] Central Venous Line	[ ] R	[ ] L	[ ] IJ	[ ] Fem	[ ] SC	Placed:   [ ] Arterial Line		[ ] R	[ ] L	[ ] Fem	[ ] Rad	[ ] Ax	Placed:   [ ] PICC:					[ ] Mediport  [ ] Urinary Catheter, Date Placed:    (27 May 2022 03:16)      PAST MEDICAL & SURGICAL HISTORY:  Diabetes  Hypertension  Pacemaker  Dyslipidemia  History of chronic pancreatitis  Atherosclerosis of native artery of lower extremity  COPD (chronic obstructive pulmonary disease)  CHESTER on CPAP  H/O intestinal obstruction  History of cholecystectomy  History of pancreatic surgery  History of penile implant    FAMILY HISTORY:      Home Medications:  amitriptyline 25 mg tablet:  (27 May 2022 03:47)  Breo Ellipta 100 mcg-25 mcg/inh inhalation powder: 1 puff(s) inhaled once a day (27 May 2022 03:47)  clopidogrel 75 mg tablet:  (27 May 2022 03:47)  Corlanor 5 mg oral tablet: 1 tab(s) orally 2 times a day (with meals) (27 May 2022 03:47)  Eliquis 5 mg tablet:  (27 May 2022 03:47)  famotidine 40 mg oral tablet: 1 tab(s) orally once a day (at bedtime) (27 May 2022 03:47)  latanoprost 0.005% ophthalmic solution: 1 drop(s) to each affected eye once a day (in the evening) (27 May 2022 03:47)  montelukast 10 mg oral tablet: 1 tab(s) orally once a day (27 May 2022 03:47)  montelukast 10 mg tablet:  (27 May 2022 03:47)  nifedipine ER 30 mg tablet,extended release:  (27 May 2022 03:47)  omeprazole 40 mg oral delayed release capsule: 1 cap(s) orally once a day (27 May 2022 03:47)  oxyMORphone 40 mg oral tablet, extended release: 1 tab(s) orally every 12 hours, As Needed (27 May 2022 03:47)  pregabalin 75 mg capsule:  (27 May 2022 03:47)  simvastatin 10 mg oral tablet: 1 tab(s) orally once a day (at bedtime) (27 May 2022 03:47)  Trulicity Pen 0.75 mg/0.5 mL subcutaneous solution: 1 application subcutaneous once a week (27 May 2022 03:47)      REVIEW OF SYSTEMS:    cannot obtain from pt.    ON EXAM:  Vital Signs Last 24 Hrs  T(C): 36.4 (28 May 2022 08:00), Max: 36.9 (27 May 2022 15:00)  T(F): 97.6 (28 May 2022 08:00), Max: 98.5 (27 May 2022 15:00)  HR: 104 (28 May 2022 08:00) (83 - 107)  BP: 139/60 (28 May 2022 08:00) (120/60 - 153/81)  BP(mean): 86 (28 May 2022 08:00) (83 - 109)  RR: 22 (28 May 2022 08:00) (15 - 22)  SpO2: 98% (28 May 2022 08:00) (91% - 98%)    GENERAL: distressed due to confusion and restraints  SKIN: No rashes or lesions  HEAD:  Atraumatic, Normocephalic  EYES:  conjunctiva and sclera clear  ENMT: Moist mucous membranes  NECK: Supple, No JVD  CHEST/LUNG: decreased air entry b/l  HEART: S1, S2 appreciated. Rate , tachycardia. regular.   ABDOMEN/GI: Soft, Nontender, Nondistended; Bowel sounds present  EXTREMITIES: pheral Pulses. No edema  NERVOUS SYSTEM:  Alert but confused.     LABS:                                10.7   12.28 )-----------( 371      ( 27 May 2022 20:03 )             31.7       CBC Full  -  ( 27 May 2022 20:03 )  WBC Count : 12.28 K/uL  RBC Count : 3.39 M/uL  Hemoglobin : 10.7 g/dL  Hematocrit : 31.7 %  Platelet Count - Automated : 371 K/uL  Mean Cell Volume : 93.5 fL  Mean Cell Hemoglobin : 31.6 pg  Mean Cell Hemoglobin Concentration : 33.8 g/dL  Auto Neutrophil # : x  Auto Lymphocyte # : x  Auto Monocyte # : x  Auto Eosinophil # : x  Auto Basophil # : x  Auto Neutrophil % : x  Auto Lymphocyte % : x  Auto Monocyte % : x  Auto Eosinophil % : x  Auto Basophil % : x      05-27    140  |  101  |  21<H>  ----------------------------<  126<H>  4.4   |  22  |  1.0    Ca    9.2      27 May 2022 20:03  Phos  4.0     05-27  Mg     2.4     05-27    TPro  7.3  /  Alb  3.6  /  TBili  0.6  /  DBili  x   /  AST  39  /  ALT  19  /  AlkPhos  90  05-26      MEDICATIONS  (STANDING):  BACItracin   Ointment 1 Application(s) Topical every 12 hours  budesonide  80 MICROgram(s)/formoterol 4.5 MICROgram(s) Inhaler 2 Puff(s) Inhalation two times a day  chlorhexidine 4% Liquid 1 Application(s) Topical <User Schedule>  enoxaparin Injectable 75 milliGRAM(s) SubCutaneous every 12 hours  insulin lispro (ADMELOG) corrective regimen sliding scale   SubCutaneous every 6 hours  lactated ringers. 1000 milliLiter(s) (100 mL/Hr) IV Continuous <Continuous>  metoprolol tartrate Injectable 5 milliGRAM(s) IV Push every 6 hours  pantoprazole  Injectable 40 milliGRAM(s) IV Push daily    MEDICATIONS  (PRN):      ekg sinus tachycardia, prolonged 1st degree av block. rbbb, lahb, pvcs.           
HPI:  GENERAL SURGERY CONSULT NOTE    Patient: EZ ALCARAZ , 81y ([birth year])Male   MRN: [id number]  Location: [facility]  Visit: 05-27-22 Inpatient  Date: 05-27-22 @ 03:16    HPI:  81 year old male with a past medical hx of chronic pancreatitis, COPD, CHF, T2D, Pacemaker, HTN. HLD, surgical history of cholecystectomy, exploratory laparotomy with small bowel resection, presented to the ED for abdominal pain. Per report, patient has been constipated for the past 4 days, and was sent in from [facility] to rule out sbo. Admits to 1 episode of vomiting last night. In the ED, vitals stable, CT showing small bowel obstruction with dilated loops of small bowel in left abdomen with a transition point in LLQ. Patient family not bedside and report obtained from verbal with witness. WBC 7.1  He describes that legs legs have been getting weak for years and he uses a walker from home and has difficulty getting out of a chair.    PAST MEDICAL & SURGICAL HISTORY:  Diabetes      Hypertension      Pacemaker      Dyslipidemia      History of chronic pancreatitis      Atherosclerosis of native artery of lower extremity      COPD (chronic obstructive pulmonary disease)      CHESTER on CPAP      H/O intestinal obstruction      History of cholecystectomy      History of pancreatic surgery      History of penile implant      Cardiac pacemaker          Hospital Course: CT findings of small bowel obstruction. -  S/S eval - passed started on softs  - IVF   - Patient having BM   - Abdominal exams  - 1:1 sit   - Per EPS: patient will likely need new pacemaker or leadless device once stable   - Monitor heart rhythm   - Therapeutic lvx for hx of A.fib and TIA   - GI ppx     TODAY'S SUBJECTIVE & REVIEW OF SYMPTOMS:     Constitutional weakness, chronic abdominal pain   Cardio WNL   Resp WNL   GI WNL  Heme WNL  Endo WNL  Skin WNL  MSK WNL  Neuro WNL  Cognitive memory loss  Psych WNL      MEDICATIONS  (STANDING):  amitriptyline 25 milliGRAM(s) Oral daily  BACItracin   Ointment 1 Application(s) Topical every 12 hours  budesonide  80 MICROgram(s)/formoterol 4.5 MICROgram(s) Inhaler 2 Puff(s) Inhalation two times a day  chlorhexidine 4% Liquid 1 Application(s) Topical <User Schedule>  clopidogrel Tablet 75 milliGRAM(s) Oral daily  enoxaparin Injectable 75 milliGRAM(s) SubCutaneous every 12 hours  insulin lispro (ADMELOG) corrective regimen sliding scale   SubCutaneous three times a day before meals  metoprolol tartrate 12.5 milliGRAM(s) Oral every 12 hours  pantoprazole  Injectable 40 milliGRAM(s) IV Push daily  pregabalin 75 milliGRAM(s) Oral daily  tamsulosin 0.4 milliGRAM(s) Oral at bedtime    MEDICATIONS  (PRN):  acetaminophen     Tablet .. 650 milliGRAM(s) Oral every 6 hours PRN Temp greater or equal to 38C (100.4F), Mild Pain (1 - 3)      FAMILY HISTORY:      Allergies    No Known Allergies    Intolerances        SOCIAL HISTORY:    [ x ] Etoh - occasional, social  [  ] Smoking - denies  [  ] Substance abuse     Home Environment:  [  ] Home Alone  [ x ] Lives with Family - wife  [  ] Home Health Aid    Dwelling:  [ x ] Apartment  [  ] Private House  [  ] Adult Home  [  ] Skilled Nursing Facility      [  ] Short Term  [  ] Long Term  [  ] Stairs       Elevator [ x ]    FUNCTIONAL STATUS PTA: (Check all that apply)  Ambulation: [ x  ]Independent   [   ] Requires Assistance   [  ] Dependent     [  ] Non-Ambulatory       Assistive Device: [  ] SA Cane  [  ]  Q Cane  [ x ] Walker  [  ]  Wheelchair  ADL : [ x ] Independent    [   ] Requires Assistance    [  ]  Dependent       Vital Signs Last 24 Hrs  T(C): 36.3 (30 May 2022 07:00), Max: 36.7 (29 May 2022 16:00)  T(F): 97.4 (30 May 2022 07:00), Max: 98 (29 May 2022 16:00)  HR: 74 (30 May 2022 12:00) (66 - 78)  BP: 105/59 (30 May 2022 12:00) (105/59 - 132/59)  BP(mean): 72 (30 May 2022 12:00) (72 - 118)  RR: 16 (30 May 2022 12:00) (16 - 22)  SpO2: 99% (30 May 2022 12:00) (96% - 100%)      PHYSICAL EXAM: Alert & Oriented X3  GENERAL: NAD, well-groomed, well-developed  HEAD:  Atraumatic, Normocephalic  EYES: EOMI, PERRL. S/P cataract/ lens placement left  NECK: Supple  CHEST/LUNG: Clear to auscultation  HEART: Regular rate and rhythm  ABDOMEN: Soft, Nontender, Nondistended, NABS  EXTREMITIES:  [ x ]  No clubbing, cyanosis, or edema  PROM:  [   ] WFL all extremities  [  ] Abnormal    NERVOUS SYSTEM: Alert and oriented x 3. Speech fluent. STM- 0/3 in 5 minutes. Follows commands well  Cranial Nerves 2-12: [ x  ] intact  [  ] Abnormal   Motor Strength: [   ] WFL all extremities   [x  ] Abnormal:  BUE WFL, though difficulty fully abducting shoulders; RLE 4/5 prox, 5/5 distal; LLE 3+/5 prox, 5/5 distal  Sensation: [ x  ] intact to light touch  [  ] Abnormal   Reflexes: [  x ] Symmetric  [  ]  Abnormal     FUNCTIONAL STATUS: TBA - PT to see  Bed Mobility: [   ]Independent  [  ] Supervision  [  ] Needs Assistance   [  ] N/A   Transfers: [   ] Independent  [  ] Supervision  [  ] Needs Assistance  [  ]  N/A   Ambulation: [   ] Independent  [  ] Supervision  [  ] Needs Assistance  [  ] N/A   ADL: [   ] Independent  [  ] Requires Assistance  [  ] N/A       LABS:                        10.4   8.65  )-----------( 383      ( 29 May 2022 21:24 )             30.1     05-29    139  |  103  |  10  ----------------------------<  110<H>  3.8   |  21  |  0.7    Ca    8.7      29 May 2022 21:24  Phos  2.9     05-29  Mg     1.8     05-29            RADIOLOGY & ADDITIONAL STUDIES:            Assesment:
HISTORY OF PRESENT ILLNESS:   81 year old male with a past medical hx of chronic pancreatitis, COPD, CHF, T2D, Pacemaker, HTN. HLD presented for SBO. Patient planned for OR and cardiology consulted for pre-op. At the time of this eval, patient AOX1 and agitated. Unable to reach family for collateral information. Patient otherwise denied active chest pain; no signs of HF with minimal edema in LE, negative JVP and without resp distress. Patient appears in sinus tachycardia on tele. Unable to assess baseline functional status otherwise.    PAST MEDICAL & SURGICAL HISTORY  Diabetes    Hypertension    Pacemaker    Dyslipidemia    History of chronic pancreatitis    Atherosclerosis of native artery of lower extremity    COPD (chronic obstructive pulmonary disease)    CHESTER on CPAP    H/O intestinal obstruction    History of cholecystectomy    History of pancreatic surgery    History of penile implant    Cardiac pacemaker        FAMILY HISTORY:  FAMILY HISTORY:      SOCIAL HISTORY:  []smoker  []Alcohol  []Drug    ROS:  Negative except as mentioned in HPI    ALLERGIES:  No Known Allergies      MEDICATIONS:  MEDICATIONS  (STANDING):  budesonide  80 MICROgram(s)/formoterol 4.5 MICROgram(s) Inhaler 2 Puff(s) Inhalation two times a day  chlorhexidine 4% Liquid 1 Application(s) Topical <User Schedule>  heparin   Injectable 5000 Unit(s) SubCutaneous every 8 hours  insulin lispro (ADMELOG) corrective regimen sliding scale   SubCutaneous every 6 hours  lactated ringers Bolus 250 milliLiter(s) IV Bolus once  lactated ringers. 1000 milliLiter(s) (100 mL/Hr) IV Continuous <Continuous>  magnesium sulfate  IVPB 2 Gram(s) IV Intermittent once  OLANZapine Injectable 5 milliGRAM(s) IntraMuscular once  pantoprazole  Injectable 40 milliGRAM(s) IV Push daily    MEDICATIONS  (PRN):      HOME MEDICATIONS:  Home Medications:  amitriptyline 25 mg tablet:  (27 May 2022 03:47)  Breo Ellipta 100 mcg-25 mcg/inh inhalation powder: 1 puff(s) inhaled once a day (27 May 2022 03:47)  clopidogrel 75 mg tablet:  (27 May 2022 03:47)  Corlanor 5 mg oral tablet: 1 tab(s) orally 2 times a day (with meals) (27 May 2022 03:47)  Eliquis 5 mg tablet:  (27 May 2022 03:47)  famotidine 40 mg oral tablet: 1 tab(s) orally once a day (at bedtime) (27 May 2022 03:47)  latanoprost 0.005% ophthalmic solution: 1 drop(s) to each affected eye once a day (in the evening) (27 May 2022 03:47)  montelukast 10 mg oral tablet: 1 tab(s) orally once a day (27 May 2022 03:47)  montelukast 10 mg tablet:  (27 May 2022 03:47)  nifedipine ER 30 mg tablet,extended release:  (27 May 2022 03:47)  omeprazole 40 mg oral delayed release capsule: 1 cap(s) orally once a day (27 May 2022 03:47)  oxyMORphone 40 mg oral tablet, extended release: 1 tab(s) orally every 12 hours, As Needed (27 May 2022 03:47)  pregabalin 75 mg capsule:  (27 May 2022 03:47)  simvastatin 10 mg oral tablet: 1 tab(s) orally once a day (at bedtime) (27 May 2022 03:47)  Trulicity Pen 0.75 mg/0.5 mL subcutaneous solution: 1 application subcutaneous once a week (27 May 2022 03:47)      VITALS:   T(F): 98.2 (05-27 @ 05:55), Max: 100.8 (05-27 @ 05:01)  HR: 105 (05-27 @ 08:00) (88 - 119)  BP: 101/64 (05-27 @ 08:00) (101/64 - 166/87)  BP(mean): 76 (05-27 @ 08:00) (76 - 76)  RR: 17 (05-27 @ 08:00) (17 - 88)  SpO2: 98% (05-27 @ 08:00) (88% - 100%)    I&O's Summary      PHYSICAL EXAM:  GEN: Not in acute distress  HEENT: NCAT, PERRL, EOMI  LUNGS: Clear to auscultation bilaterally   CARDIOVASCULAR: tachycardia  ABD: distended  EXT: 1+ LE swelling  SKIN: Intact  NEURO: AAOx0    LABS:                        12.8   7.10  )-----------( 349      ( 26 May 2022 19:36 )             37.6     05-26    138  |  99  |  16  ----------------------------<  142<H>  4.2   |  26  |  0.9    Ca    9.7      26 May 2022 19:34    TPro  7.3  /  Alb  3.6  /  TBili  0.6  /  DBili  x   /  AST  39  /  ALT  19  /  AlkPhos  90  05-26      Lactate, Blood: 0.7 mmol/L (05-27-22 @ 05:04)  Lactate, Blood: 2.4 mmol/L *H* (05-26-22 @ 19:34)          Hemoglobin A1C   Thyroid    Tele: sinus tachy  EKG: sinus tachy, RBBB unchanged  CXR: r opacities  
SICU Consultation Note  =====================================================  HPI:  GENERAL SURGERY CONSULT NOTE    Patient: EZ ALCARAZ , 81y ([birth year])Male   MRN: [id number]  Location: [facility] A  Visit: 05-27-22 Inpatient  Date: 05-27-22 @ 03:16    HPI:  81 year old male with a past medical hx of chronic pancreatitis, COPD, CHF, T2D, Pacemaker, HTN. HLD, surgical history of cholecystectomy, exploratory laparotomy with small bowel resection, presented to the ED for abdominal pain. Per report, patient has been constipated for the past 4 days, and was sent in from [facility] to rule out sbo. Admits to 1 episode of vomiting last night. In the ED, vitals stable, CT showing small bowel obstruction with dilated loops of small bowel in left abdomen with a transition point in LLQ. Patient family not bedside and report obtained from verbal with witness. SBO seen on CT scan.     PAST MEDICAL & SURGICAL HISTORY:  Diabetes  Hypertension  Pacemaker  Dyslipidemia  History of chronic pancreatitis  Atherosclerosis of native artery of lower extremity  COPD (chronic obstructive pulmonary disease)  CHESTER on CPAP  H/O intestinal obstruction  History of cholecystectomy  History of pancreatic surgery  History of penile implant  Cardiac pacemaker          Allergies  No Known Allergies    SH:  Home Medications:  amitriptyline 25 mg tablet:  (27 May 2022 03:47)  Breo Ellipta 100 mcg-25 mcg/inh inhalation powder: 1 puff(s) inhaled once a day (27 May 2022 03:47)  clopidogrel 75 mg tablet:  (27 May 2022 03:47)  Corlanor 5 mg oral tablet: 1 tab(s) orally 2 times a day (with meals) (27 May 2022 03:47)  Eliquis 5 mg tablet:  (27 May 2022 03:47)  famotidine 40 mg oral tablet: 1 tab(s) orally once a day (at bedtime) (27 May 2022 03:47)  latanoprost 0.005% ophthalmic solution: 1 drop(s) to each affected eye once a day (in the evening) (27 May 2022 03:47)  montelukast 10 mg oral tablet: 1 tab(s) orally once a day (27 May 2022 03:47)  montelukast 10 mg tablet:  (27 May 2022 03:47)  nifedipine ER 30 mg tablet,extended release:  (27 May 2022 03:47)  omeprazole 40 mg oral delayed release capsule: 1 cap(s) orally once a day (27 May 2022 03:47)  oxyMORphone 40 mg oral tablet, extended release: 1 tab(s) orally every 12 hours, As Needed (27 May 2022 03:47)  pregabalin 75 mg capsule:  (27 May 2022 03:47)  simvastatin 10 mg oral tablet: 1 tab(s) orally once a day (at bedtime) (27 May 2022 03:47)  Trulicity Pen 0.75 mg/0.5 mL subcutaneous solution: 1 application subcutaneous once a week (27 May 2022 03:47)    Advanced Directives: Full Code     ROS:  [ ] A ten-point review of systems was otherwise negative except as noted.  [x] Due to altered mental status/intubation, subjective information were not able to be obtained from the patient. History was obtained, to the extent possible, from review of the chart and collateral sources of information.        CURRENT MEDICATIONS:   --------------------------------------------------------------------------------------  Neurologic Medications  acetaminophen   IVPB .. 1000 milliGRAM(s) IV Intermittent once  morphine  - Injectable 4 milliGRAM(s) IV Push five times a day PRN Severe Pain (7 - 10)    Respiratory Medications  budesonide  80 MICROgram(s)/formoterol 4.5 MICROgram(s) Inhaler 2 Puff(s) Inhalation two times a day    Cardiovascular Medications    Gastrointestinal Medications  lactated ringers. 1000 milliLiter(s) IV Continuous <Continuous>  pantoprazole  Injectable 40 milliGRAM(s) IV Push daily    Genitourinary Medications    Hematologic/Oncologic Medications  heparin   Injectable 5000 Unit(s) SubCutaneous every 8 hours    Antimicrobial/Immunologic Medications  piperacillin/tazobactam IVPB. 3.375 Gram(s) IV Intermittent once  piperacillin/tazobactam IVPB.. 3.375 Gram(s) IV Intermittent every 8 hours    Endocrine/Metabolic Medications  insulin lispro (ADMELOG) corrective regimen sliding scale   SubCutaneous three times a day before meals    Topical/Other Medications  chlorhexidine 4% Liquid 1 Application(s) Topical <User Schedule>    --------------------------------------------------------------------------------------    Vital Signs Last 24 Hrs  T(C): 38.2 (27 May 2022 05:01), Max: 38.2 (27 May 2022 05:01)  T(F): 100.8 (27 May 2022 05:01), Max: 100.8 (27 May 2022 05:01)  HR: 115 (27 May 2022 04:41) (88 - 115)  BP: 138/58 (27 May 2022 04:41) (138/58 - 152/74)  BP(mean): --  RR: 18 (27 May 2022 04:41) (18 - 88)  SpO2: 95% (27 May 2022 04:41) (88% - 100%)  I&O's Detail    I&O's Summary      LABS:                          12.8   7.10  )-----------( 349      ( 26 May 2022 19:36 )             37.6     05-26    138  |  99  |  16  ----------------------------<  142<H>  4.2   |  26  |  0.9    Ca    9.7      26 May 2022 19:34    TPro  7.3  /  Alb  3.6  /  TBili  0.6  /  DBili  x   /  AST  39  /  ALT  19  /  AlkPhos  90  05-26    LIVER FUNCTIONS - ( 26 May 2022 19:34 )  Alb: 3.6 g/dL / Pro: 7.3 g/dL / ALK PHOS: 90 U/L / ALT: 19 U/L / AST: 39 U/L / GGT: x               EXAM:    Exam: Pt extremely confused, AOx0, baseline unknown     Respiratory  Exam: Lungs clear to auscultation, Normal expansion/effort on room air    Cardiovascular  Exam: S1, S2.  Patient tachycardic, regular rhythm. Pacemaker present.     GI  Exam: Abdomen tense, diffusely tender, distended, positive rebound and guarding. NGT in place with 300cc gastric content output    Extremities  Exam: Extremities warm, pink, well-perfused.      Derm:  Exam: Good skin turgor, no skin breakdown.      :   No jordan catheter in place    Tubes/Lines/Drains  ***  [x] Peripheral IV      LABS  --------------------------------------------------------------------------------------    OTHER LABS    IMAGING RESULTS      ACC: [id number] EXAM:  CT ABDOMEN AND PELVIS OC IC                          PROCEDURE DATE:  05/26/2022          INTERPRETATION:  CLINICAL HISTORY / REASON FOR EXAM: Abdominal pain.    TECHNIQUE: Contiguous axial CT images were obtained from the lower chest   to the pubic symphysis following administration of 75 mL Omnipaque 350   intravenous contrast. Oral contrast was administered. Reformatted images   in the coronal and sagittal planes were acquired.    COMPARISON CT: None    OTHER STUDIES USED FOR CORRELATION: None.      FINDINGS:    LOWER CHEST: Bibasilar atelectasis/scarring.    HEPATOBILIARY: Intrahepatic biliary ductal dilatation. Post   cholecystectomy.    SPLEEN: Unremarkable.    PANCREAS: Diffuse pancreatic atrophy with coarse calcifications at the   pancreatic head and uncinate process.    ADRENAL GLANDS: Unremarkable.    KIDNEYS: Right renal upper pole cyst. Symmetric enhancement bilaterally.   No evidence of hydronephrosis.    ABDOMINOPELVIC NODES: Unremarkable.    PELVIC ORGANS: Penile prosthesis.    PERITONEUM/MESENTERY/BOWEL: Oral contrast predominantly within the   stomach. Small bowel anastomosis within the left hemiabdomen. Multiple   dilated loops within the left hemiabdomen with fecalization measuring up   to 2.9 cm (series 6, image 280). Transition point within the left lower   quadrant (series 6, image 318). Extensive colonic diverticulosis. No   intraperitoneal free air. Normal caliber appendix.    BONES/SOFT TISSUES: Degenerative changes to the thoracolumbar spine.   Gynecomastia.    VASCULAR: Calcified atherosclerotic disease of the abdominal aorta.   Extensive renal vascular calcifications. Venous collaterals within the   right lateral chest wall. Ventral hernia containing segment of colon   (series 6, image 172). Additional fat-containing ventral hernia (series   6, image 182).      IMPRESSION:    Small bowel obstruction with dilated loops of bowel within the left   abdomen measuring up to 2.9 cm. Transition point in the left lower   quadrant.    Intrahepatic biliary ductal dilatation. No definite obstructing lesion   identified within the common bile duct. Consider MRI/MRCP for further   evaluation.    Sequela of chronic pancreatitis including pancreatic atrophy and   pancreatic calculations.    --- End of Report ---            DANICA THOMAS MD; Attending Radiologist  This document has been electronically signed. May 26 2022 11:08PM    
Patient is a 81y old  Male who presents with a chief complaint of small bowel obstruction (28 May 2022 02:51)    HPI: Patient is an 81 year old male with a past medical hx of chronic pancreatitis, COPD, CHF, T2D, DC PPM (MDT), HTN. HLD, surgical history of cholecystectomy, exploratory laparotomy with small bowel resection, presented to the ED for abdominal pain. Per report, patient has been constipated for the past 4 days, and was sent in from [facility] to rule out sbo. Admits to 1 episode of vomiting last night. In the ED, vitals stable, CT showing small bowel obstruction with dilated loops of small bowel in left abdomen with a transition point in LLQ. Patient family not bedside and report obtained from verbal with witness. EP consulted for PPM interrogation and was found to have undersensing and elevated thresholds. Device was reprogrammed for optimization. Follows with Dr Demarco at [facility] for EP.    PAST MEDICAL & SURGICAL HISTORY:  Diabetes      Hypertension      Pacemaker      Dyslipidemia      History of chronic pancreatitis      Atherosclerosis of native artery of lower extremity      COPD (chronic obstructive pulmonary disease)      CHESTER on CPAP      H/O intestinal obstruction      History of cholecystectomy      History of pancreatic surgery      History of penile implant      Cardiac pacemaker    PREVIOUS DIAGNOSTIC TESTING:      ECHO  FINDINGS:  < from: TTE Echo Complete w/o Contrast w/ Doppler (22 @ 10:21) >  Summary:   1. Suboptimal apical windows. LVEF is not well visualized. recommend   repeat exam with echo contrast (lumason). Unable to evaluate for wall   motion abnormalities.   2. Left ventricular ejection fraction, by visual estimation, is 50 to   55%.   3. Moderately increased LV wall thickness.    < end of copied text >    STRESS  FINDINGS:    CATHETERIZATION  FINDINGS:    ELECTROPHYSIOLOGY STUDY  FINDINGS:    CAROTID ULTRASOUND:  FINDINGS    VENOUS DUPLEX SCAN:  FINDINGS:    CHEST CT PULMONARY ANGIO with IV Contrast:  FINDINGS:    MEDICATIONS  (STANDING):  BACItracin   Ointment 1 Application(s) Topical every 12 hours  budesonide  80 MICROgram(s)/formoterol 4.5 MICROgram(s) Inhaler 2 Puff(s) Inhalation two times a day  chlorhexidine 4% Liquid 1 Application(s) Topical <User Schedule>  enoxaparin Injectable 75 milliGRAM(s) SubCutaneous every 12 hours  insulin lispro (ADMELOG) corrective regimen sliding scale   SubCutaneous every 6 hours  lactated ringers. 1000 milliLiter(s) (100 mL/Hr) IV Continuous <Continuous>  metoprolol tartrate Injectable 5 milliGRAM(s) IV Push every 6 hours  pantoprazole  Injectable 40 milliGRAM(s) IV Push daily    MEDICATIONS  (PRN):      FAMILY HISTORY: No significant hx    SOCIAL HISTORY: No smoking, ETOH or illicit drug use    Past Surgical History:  History of cholecystectomy  History of pancreatic surgery  History of penile implant  Cardiac pacemaker    Allergies:  No Known Allergies      REVIEW OF SYSTEMS:  CONSTITUTIONAL: No fever, weight loss, chills, shakes, or fatigue  RESPIRATORY: No cough, wheezing, hemoptysis, or shortness of breath  CARDIOVASCULAR: No chest pain, dyspnea, palpitations, dizziness, syncope, paroxysmal nocturnal dyspnea, orthopnea, or arm or leg swelling  GASTROINTESTINAL: + Abdominal pain, N/V and constipation  NEUROLOGICAL: No headaches, memory loss, slurred speech, limb weakness, loss of strength, numbness, or tremors  MUSCULOSKELETAL: No joint pain or swelling, muscle, back, or extremity pain      Vital Signs Last 24 Hrs  T(C): 36.8 (27 May 2022 23:12), Max: 36.9 (27 May 2022 15:00)  T(F): 98.3 (27 May 2022 23:12), Max: 98.5 (27 May 2022 15:00)  HR: 89 (28 May 2022 04:00) (83 - 107)  BP: 132/58 (28 May 2022 04:00) (114/56 - 153/81)  BP(mean): 83 (28 May 2022 04:00) (81 - 109)  RR: 16 (27 May 2022 20:00) (15 - 18)  SpO2: 97% (28 May 2022 01:00) (91% - 99%)    PHYSICAL EXAM:  GENERAL: In no apparent distress, well nourished, and hydrated.  ENT: +NGT in place  NECK: Supple, No JVD   HEART: Regular rate and rhythm; No murmurs, rubs, or gallops.  PULMONARY: Clear to auscultation and perfusion.  No rales, wheezing, or rhonchi bilaterally.  EXTREMITIES:  2+ Peripheral Pulses, no LE edema BL  NEUROLOGICAL: Grossly nonfocal      INTERPRETATION OF TELEMETRY: NSR 82 bpm    ECG:  < from: 12 Lead ECG (22 @ 06:40) >  Ventricular Rate 129 BPM    Atrial Rate 129 BPM    P-R Interval 242 ms    QRS Duration 152 ms    Q-T Interval 382 ms    QTC Calculation(Bazett) 559 ms    R Axis -83 degrees    T Axis 68 degrees    Diagnosis Line Sinus tachycardia with 1st degree A-V block with occasional and consecutive   Premature ventricular complexes and Fusion complexes  Left axis deviation  Right bundle branch block  Possible Lateral infarct , age undetermined  Abnormal ECG    Confirmed by Dereje Aguero ([id number]) on 2022 12:25:22 PM    < end of copied text >      I&O's Detail    27 May 2022 07:01  -  28 May 2022 07:00  --------------------------------------------------------  IN:    Lactated Ringers: 1200 mL    Sodium Chloride 0.9% Bolus: 250 mL  Total IN: 1450 mL    OUT:    Indwelling Catheter - Urethral (mL): 75 mL    Nasogastric/Oral tube (mL): 700 mL    Voided (mL): 350 mL  Total OUT: 1125 mL    Total NET: 325 mL      28 May 2022 07:01  -  28 May 2022 09:40  --------------------------------------------------------  IN:    Lactated Ringers: 200 mL  Total IN: 200 mL    OUT:    Indwelling Catheter - Urethral (mL): 75 mL  Total OUT: 75 mL    Total NET: 125 mL          LABS:                        10.7   12. )-----------( 371      ( 27 May 2022 20:03 )             31.7         140  |  101  |  21<H>  ----------------------------<  126<H>  4.4   |  22  |  1.0    Ca    9.2      27 May 2022 20:03  Phos  4.0       Mg     2.4         TPro  7.3  /  Alb  3.6  /  TBili  0.6  /  DBili  x   /  AST  39  /  ALT  19  /  AlkPhos  90          PT/INR - ( 27 May 2022 20:03 )   PT: 15.40 sec;   INR: 1.34 ratio         PTT - ( 27 May 2022 20:03 )  PTT:31.6 sec  Urinalysis Basic - ( 26 May 2022 21:50 )    Color: Yellow / Appearance: Clear / S.023 / pH: x  Gluc: x / Ketone: Moderate  / Bili: Negative / Urobili: 12 mg/dL   Blood: x / Protein: Trace / Nitrite: Negative   Leuk Esterase: Negative / RBC: x / WBC x   Sq Epi: x / Non Sq Epi: x / Bacteria: x      BNP  I&O's Detail    27 May 2022 07:01  -  28 May 2022 07:00  --------------------------------------------------------  IN:    Lactated Ringers: 1200 mL    Sodium Chloride 0.9% Bolus: 250 mL  Total IN: 1450 mL    OUT:    Indwelling Catheter - Urethral (mL): 75 mL    Nasogastric/Oral tube (mL): 700 mL    Voided (mL): 350 mL  Total OUT: 1125 mL    Total NET: 325 mL      28 May 2022 07:01  -  28 May 2022 09:40  --------------------------------------------------------  IN:    Lactated Ringers: 200 mL  Total IN: 200 mL    OUT:    Indwelling Catheter - Urethral (mL): 75 mL  Total OUT: 75 mL    Total NET: 125 mL        Daily     Daily     RADIOLOGY & ADDITIONAL STUDIES:

## 2022-06-01 NOTE — DIETITIAN INITIAL EVALUATION ADULT - PERTINENT MEDS FT
MEDICATIONS  (STANDING):  amitriptyline 25 milliGRAM(s) Oral daily  BACItracin   Ointment 1 Application(s) Topical every 12 hours  budesonide  80 MICROgram(s)/formoterol 4.5 MICROgram(s) Inhaler 2 Puff(s) Inhalation two times a day  chlorhexidine 4% Liquid 1 Application(s) Topical <User Schedule>  clopidogrel Tablet 75 milliGRAM(s) Oral daily  enoxaparin Injectable 75 milliGRAM(s) SubCutaneous every 12 hours  insulin lispro (ADMELOG) corrective regimen sliding scale   SubCutaneous three times a day before meals  metoprolol tartrate 12.5 milliGRAM(s) Oral every 12 hours  pantoprazole  Injectable 40 milliGRAM(s) IV Push daily  pregabalin 75 milliGRAM(s) Oral daily  tamsulosin 0.4 milliGRAM(s) Oral at bedtime    MEDICATIONS  (PRN):  acetaminophen     Tablet .. 650 milliGRAM(s) Oral every 6 hours PRN Temp greater or equal to 38C (100.4F), Mild Pain (1 - 3)  -

## 2022-06-01 NOTE — PROGRESS NOTE ADULT - NS ATTEND OPT1 GEN_ALL_CORE
I attest my time as attending is greater than 50% of the total combined time spent on qualifying patient care activities by the PA/NP and attending.
I independently performed the documented:
I attest my time as attending is greater than 50% of the total combined time spent on qualifying patient care activities by the PA/NP and attending.

## 2022-06-01 NOTE — CONSULT NOTE ADULT - ASSESSMENT
Mr Álvaro is a pleasant gentleman admitted currently under surgery service, we were requested for comanagement of active medical issues.   ·	Resolving bowel obstruction ; under care of primary surgery team   ·	S/P chronic PPM, evaluated by EP; as per finalized EP note: no further need for inpatient cardiac work-up or procedures.   ·	 Mr Álvaro is a pleasant gentleman admitted currently under surgery service, we were requested for comanagement of active medical issues.   ·	Resolving bowel obstruction ; under care of primary surgery team   ·	S/P chronic PPM, evaluated by EP; as per finalized EP note: no further need for inpatient cardiac work-up or procedures.   ·	Chronic, stable  diabetes and HTN    ·	COPD, no evidence of acute exacerbation   ·	NON-obstructive CAD  ·	Paroxysmal AFib, rate controlled; on Novel Oral Anti-Coagulant (NOAC)     REC  ·	Advance diet as tolerate   ·	Once oral intake is established, may resume outpatient medication regimen.   ·	Patient can Follow up with his primary care physician dr Javed as outpatient and cardiology Dr Roberson.   ·	Ambulate as tolerated with assistance    I discussed this case with surgery Attending Dr Booth on the phone; at this time there is no active medical issues.   Discharge and dispo plan as per primary surgery team      Please recall for any issues.

## 2022-06-01 NOTE — PROGRESS NOTE ADULT - NS ATTEND OPT1A GEN_ALL_CORE
History/Medical decision making
History/Exam/Medical decision making
History/Medical decision making

## 2022-06-01 NOTE — DIETITIAN INITIAL EVALUATION ADULT - ENERGY INTAKE
Per flowsheets, pt had 0-50% tray. Pt states he does not like food provided. Denies n/v/d/c. Reports last BM this AM. soft & bite sized, thins

## 2022-06-01 NOTE — PROGRESS NOTE ADULT - ATTENDING COMMENTS
Patient seen and examined with surgery PA in SICU and discussed management plans with ICU fellow.   SBO has resolved and patient is awaiting cardiology for pacemaker change. Patient will be transferred to medical care.

## 2022-06-01 NOTE — DIETITIAN INITIAL EVALUATION ADULT - NSFNSGIIOFT_GEN_A_CORE
-  I&O's Detail    31 May 2022 07:01  -  01 Jun 2022 07:00  --------------------------------------------------------  IN:    Lactated Ringers: 400 mL  Total IN: 400 mL    OUT:    Indwelling Catheter - Urethral (mL): 2500 mL  Total OUT: 2500 mL    Total NET: -2100 mL

## 2022-06-01 NOTE — PROGRESS NOTE ADULT - ASSESSMENT
Assessment & Plan    81y Male pmh copd, chf, dm, pacemaker, dementia, pancreatitis, htn, hld p/w SBO    NEURO:  Delirium improved, A&O 2-3, con't PRN  Zyprexa 5mg IM   Acute pain- controlled with Tylenol   restart home lyrica, amitriptyline    RESP:   Sating well on RA  hx of COPD, nebs prn  Current Rx: budesonide  80 MICROgram(s)/formoterol 4.5 MICROgram(s) Inhaler 2 two times a day  Home Rx: Breo Ellipta 100 mcg-25 mcg/inh inhalation powder- held  montelukast 10 mg tablet-held    CARDS:   Episodes of wide complex paced tachycardia- last episode 5/28- EPS following, NTD  PMHx CHF w/ pacemaker --> interrogated and reprogrammed  EKG-   Echo with Lumason- EF 45-55%, entire inf. septum and basal and mid inf. wall are hypokinetic, mildly decrased global LV systolic fxn.  EPS c/s - plan to change device prior to discharge.       -Added on to schedule 6/1  -changed Lopressor to  PO -12.5mg q12 on 5/31    GI/NUTR:   SBO- managed conservatively with NGT  - NG tube removed on 5/28, no nausea or vomitting  - + bowel movement  - passed SLP for soft and bite-sized consitency diet  - GI ppx with Protonix    /RENAL:      Urinary retention- jordan re-inserted 5/29, started on flomax     IVL    Labs:          BUN/Cr- 5/0.7  -->,  6/0.8  -->          Electrolytes-Na 136 // K 4.2 // Mg 2.0 //  Phos 3.    HEME/ONC:       DVT prophylaxis-LVX therapeutic 75 q12 for A-fib    Labs: Hb/Hct: 11.5/33.2 --> 11.4/32              Plts:  433 --> 406              PTT/INR:        Home Rx: clopidogrel 75 mg tablet: - restarted 5/30  Eliquis 5 mg tablet: -held    ID:  Afebrile, no Abx  WBC- 5.8  --> 6.3    ENDO:     Glucose, Serum: 134     HA1C 4/21- 6.8    LINES/DRAINS:  LUE midline catheter, jordan    ADVANCED DIRECTIVES:  Full Code    HCP/Emergency Contact-    DISPO:   Downgrade to floor-Tele     Assessment & Plan    81y Male pmh copd, chf, dm, pacemaker, dementia, pancreatitis, htn, hld p/w SBO    NEURO:  Delirium improved, A&O 2-3, con't PRN  Zyprexa 5mg IM   Acute pain- controlled with Tylenol   restart home lyrica, amitriptyline    RESP:   Sating well on RA  hx of COPD, nebs prn  Current Rx: budesonide  80 MICROgram(s)/formoterol 4.5 MICROgram(s) Inhaler 2 two times a day  Home Rx: Breo Ellipta 100 mcg-25 mcg/inh inhalation powder- held  montelukast 10 mg tablet-held    CARDS:   Episodes of wide complex paced tachycardia- last episode 5/28- EPS following  PMHx CHF w/ pacemaker --> interrogated and reprogrammed  EKG-   Echo with Lumason- EF 45-55%, entire inf. septum and basal and mid inf. wall are hypokinetic, mildly decrased global LV systolic fxn.  EPS c/s - plan to change device prior to discharge.       -Added on to schedule 6/1 --> cancelled --> possibly 6/2  -changed Lopressor to  PO -12.5mg q12 on 5/31    GI/NUTR:   SBO- managed conservatively with NGT  - NG tube removed on 5/28, no nausea or vomitting  - + bowel movement  - passed SLP for soft and bite-sized consitency diet  - GI ppx with Protonix    /RENAL:      Urinary retention- jordan re-inserted 5/29, started on flomax     IVL    Labs:          BUN/Cr- 5/0.7  -->,  6/0.8  -->          Electrolytes-Na 136 // K 4.2 // Mg 2.0 //  Phos 3.    HEME/ONC:       DVT prophylaxis-LVX therapeutic 75 q12 for A-fib    Labs: Hb/Hct: 11.5/33.2 --> 11.4/32              Plts:  433 --> 406              PTT/INR:        Home Rx: clopidogrel 75 mg tablet: - restarted 5/30  Eliquis 5 mg tablet: -held    ID:  Afebrile, no Abx  WBC- 5.8  --> 6.3    ENDO:     Glucose, Serum: 134     HA1C 4/21- 6.8    LINES/DRAINS:  LUE midline catheter, jordan    ADVANCED DIRECTIVES:  Full Code    HCP/Emergency Contact-    DISPO:   Downgrade to floor-Tele

## 2022-06-01 NOTE — CONSULT NOTE ADULT - CONSULT REASON
Pre-op
SBO requiring hemodynamic monitoring
decreased mobility
Co-Management of Medical Issues (refer to [facility] orders)
Device Interrogation
pre op evaluation.

## 2022-06-01 NOTE — PROGRESS NOTE ADULT - SUBJECTIVE AND OBJECTIVE BOX
GENERAL SURGERY PROGRESS NOTE    Patient: EZ ALCARAZ , 81y ([birth year])Male   MRN: [id number]  Location: [address]  Visit: 05-27-22 Inpatient  Date: 06-01-22 @ 08:55    Hospital Day #: 6  Post-Op Day #: None    Procedure/Dx/Injuries: 81 year old male with a past medical hx of chronic pancreatitis, COPD, CHF, T2D, Pacemaker, HTN. HLD, surgical history of cholecystectomy, exploratory laparotomy with small bowel resection, presented to the ED for abdominal pain. CT findings of small bowel obstruction.     Events of past 24 hours:  NIGHT  -no repletions  -npo after midnight  -waiting for bed  -Pre-Op for OR with EPS    DAY  -Pending bed on 3C  -Discussed with EP, added on to schedule 6/1: NPOmn, COVID, hold AM Therapeutic LVX - must restart if not going      PAST MEDICAL & SURGICAL HISTORY:  Diabetes      Hypertension      Pacemaker      Dyslipidemia      History of chronic pancreatitis      Atherosclerosis of native artery of lower extremity      COPD (chronic obstructive pulmonary disease)      CHESTER on CPAP      H/O intestinal obstruction      History of cholecystectomy      History of pancreatic surgery      History of penile implant      Cardiac pacemaker          Vitals:   T(F): 97.6 (06-01-22 @ 04:00), Max: 97.9 (05-31-22 @ 12:00)  HR: 74 (06-01-22 @ 07:00)  BP: 139/61 (06-01-22 @ 07:00)  RR: 18 (05-31-22 @ 20:00)  SpO2: 98% (06-01-22 @ 07:00)      Diet, NPO after Midnight:      NPO Start Date: 31-May-2022,   NPO Start Time: 23:59      Fluids: lactated ringers.: Solution, 1000 milliLiter(s) infuse at 100 mL/Hr      I & O's:    05-31-22 @ 07:01  -  06-01-22 @ 07:00  --------------------------------------------------------  IN:    Lactated Ringers: 400 mL  Total IN: 400 mL    OUT:    Indwelling Catheter - Urethral (mL): 2500 mL  Total OUT: 2500 mL    Total NET: -2100 mL    Bowel Movement : [x] YES [] NO  Flatus : [x] YES [] NO    PHYSICAL EXAM:  General: NAD  HEENT: NCAT, CALVIN, EOMI, Trachea ML, Neck supple  Cardiac: RRR S1, S2, no Murmurs, rubs or gallops  Respiratory: CTAB, normal respiratory effort, breath sounds equal BL, no wheeze, rhonchi or crackles  Abdomen: Nondistended, nontender, soft     MEDICATIONS  (STANDING):  amitriptyline 25 milliGRAM(s) Oral daily  BACItracin   Ointment 1 Application(s) Topical every 12 hours  budesonide  80 MICROgram(s)/formoterol 4.5 MICROgram(s) Inhaler 2 Puff(s) Inhalation two times a day  chlorhexidine 4% Liquid 1 Application(s) Topical <User Schedule>  clopidogrel Tablet 75 milliGRAM(s) Oral daily  insulin lispro (ADMELOG) corrective regimen sliding scale   SubCutaneous three times a day before meals  lactated ringers. 1000 milliLiter(s) (100 mL/Hr) IV Continuous <Continuous>  metoprolol tartrate 12.5 milliGRAM(s) Oral every 12 hours  pantoprazole  Injectable 40 milliGRAM(s) IV Push daily  pregabalin 75 milliGRAM(s) Oral daily  tamsulosin 0.4 milliGRAM(s) Oral at bedtime    MEDICATIONS  (PRN):  acetaminophen     Tablet .. 650 milliGRAM(s) Oral every 6 hours PRN Temp greater or equal to 38C (100.4F), Mild Pain (1 - 3)      DVT PROPHYLAXIS:   GI PROPHYLAXIS: pantoprazole  Injectable 40 milliGRAM(s) IV Push daily    ANTICOAGULATION:   ANTIBIOTICS:            LAB/STUDIES:  Labs:  CAPILLARY BLOOD GLUCOSE      POCT Blood Glucose.: 102 mg/dL (01 Jun 2022 06:27)  POCT Blood Glucose.: 141 mg/dL (31 May 2022 18:25)  POCT Blood Glucose.: 142 mg/dL (31 May 2022 14:01)                          11.4   6.33  )-----------( 406      ( 31 May 2022 22:00 )             32.9         05-31    136  |  104  |  6<L>  ----------------------------<  134<H>  4.2   |  21  |  0.8      Calcium, Total Serum: 8.6 mg/dL (05-31-22 @ 22:00)      LFTs:         Coags:     12.20  ----< 1.06    ( 31 May 2022 22:00 )     36.9      LINES/DRAINS:  LUE midline catheter, jordan

## 2022-06-02 ENCOUNTER — TRANSCRIPTION ENCOUNTER (OUTPATIENT)
Age: 82
End: 2022-06-02

## 2022-06-02 LAB
GLUCOSE BLDC GLUCOMTR-MCNC: 109 MG/DL — HIGH (ref 70–99)
GLUCOSE BLDC GLUCOMTR-MCNC: 125 MG/DL — HIGH (ref 70–99)
GLUCOSE BLDC GLUCOMTR-MCNC: 130 MG/DL — HIGH (ref 70–99)

## 2022-06-02 PROCEDURE — 99231 SBSQ HOSP IP/OBS SF/LOW 25: CPT

## 2022-06-02 PROCEDURE — 71045 X-RAY EXAM CHEST 1 VIEW: CPT | Mod: 26

## 2022-06-02 RX ORDER — POLYETHYLENE GLYCOL 3350 17 G/17G
17 POWDER, FOR SOLUTION ORAL
Qty: 119 | Refills: 0
Start: 2022-06-02 | End: 2022-06-08

## 2022-06-02 RX ORDER — APIXABAN 2.5 MG/1
5 TABLET, FILM COATED ORAL
Refills: 0 | Status: DISCONTINUED | OUTPATIENT
Start: 2022-06-02 | End: 2022-06-05

## 2022-06-02 RX ORDER — MONTELUKAST 4 MG/1
0 TABLET, CHEWABLE ORAL
Qty: 0 | Refills: 0 | DISCHARGE

## 2022-06-02 RX ADMIN — Medication 25 MILLIGRAM(S): at 11:28

## 2022-06-02 RX ADMIN — Medication 1 APPLICATION(S): at 17:20

## 2022-06-02 RX ADMIN — Medication 1 APPLICATION(S): at 06:05

## 2022-06-02 RX ADMIN — BUDESONIDE AND FORMOTEROL FUMARATE DIHYDRATE 2 PUFF(S): 160; 4.5 AEROSOL RESPIRATORY (INHALATION) at 11:30

## 2022-06-02 RX ADMIN — Medication 650 MILLIGRAM(S): at 16:21

## 2022-06-02 RX ADMIN — APIXABAN 5 MILLIGRAM(S): 2.5 TABLET, FILM COATED ORAL at 17:20

## 2022-06-02 RX ADMIN — CHLORHEXIDINE GLUCONATE 1 APPLICATION(S): 213 SOLUTION TOPICAL at 06:06

## 2022-06-02 RX ADMIN — SODIUM CHLORIDE 100 MILLILITER(S): 9 INJECTION, SOLUTION INTRAVENOUS at 00:00

## 2022-06-02 RX ADMIN — PANTOPRAZOLE SODIUM 40 MILLIGRAM(S): 20 TABLET, DELAYED RELEASE ORAL at 11:28

## 2022-06-02 RX ADMIN — CLOPIDOGREL BISULFATE 75 MILLIGRAM(S): 75 TABLET, FILM COATED ORAL at 11:28

## 2022-06-02 RX ADMIN — TAMSULOSIN HYDROCHLORIDE 0.4 MILLIGRAM(S): 0.4 CAPSULE ORAL at 22:23

## 2022-06-02 RX ADMIN — Medication 12.5 MILLIGRAM(S): at 06:05

## 2022-06-02 RX ADMIN — Medication 25 GRAM(S): at 06:05

## 2022-06-02 RX ADMIN — Medication 75 MILLIGRAM(S): at 11:29

## 2022-06-02 RX ADMIN — Medication 12.5 MILLIGRAM(S): at 17:21

## 2022-06-02 NOTE — PROGRESS NOTE ADULT - ATTENDING COMMENTS
Patient seen and examined with surgery resident on floor and discussed management plans. Transferred from SICU last night no complains except some weakness Cardiology EPS note reviewed. no further treatment required at this time . Patient taking diet PO and pending transfer back to rehab home.

## 2022-06-02 NOTE — DISCHARGE NOTE PROVIDER - NSDCFUSCHEDAPPT_GEN_ALL_CORE_FT
Clyde Evans  [facility]  Surg [address]  Scheduled Appointment: 06/22/2022

## 2022-06-02 NOTE — PROGRESS NOTE ADULT - SUBJECTIVE AND OBJECTIVE BOX
GENERAL SURGERY PROGRESS NOTE    Patient: EZ ALCARAZ , 81y ([birth year])Male   MRN: [id number]  Location: [address]  Visit: 05-27-22 Inpatient  Date: 06-02-22 @ 06:39    Hospital Day #:7    Procedure/Dx/Injuries: SBO     Events of past 24 hours: Pt seen and examined at bedside. Pt reports passing gas and having BM. Pt downgraded to 3C from SICU. EP reports that current PPM has been reprogrammed for optimal functioning. Medicine consulted for co-management. No acute overnight events. Afebrile     PAST MEDICAL & SURGICAL HISTORY:  Diabetes  Hypertension  Pacemaker  Dyslipidemia  History of chronic pancreatitis  Atherosclerosis of native artery of lower extremity  COPD (chronic obstructive pulmonary disease)  CHESTER on CPAP  H/O intestinal obstruction  History of cholecystectomy  History of pancreatic surgery  History of penile implant  Cardiac pacemaker    Vitals:   T(F): 98.2 (06-02-22 @ 04:46), Max: 98.5 (06-01-22 @ 14:00)  HR: 77 (06-02-22 @ 04:46)  BP: 140/63 (06-02-22 @ 04:46)  RR: 18 (06-02-22 @ 04:46)  SpO2: 97% (06-01-22 @ 15:00    Diet, NPO after Midnight:      NPO Start Date: 01-Jun-2022,   NPO Start Time: 23:59    Fluids: lactated ringers.: Solution, 1000 milliLiter(s) infuse at 100 mL/Hr    I & O's:  05-31-22 @ 07:01  -  06-01-22 @ 07:00  --------------------------------------------------------  IN:    Lactated Ringers: 400 mL  Total IN: 400 mL  OUT:    Indwelling Catheter - Urethral (mL): 2500 mL  Total OUT: 2500 mL  Total NET: -2100 mL    PHYSICAL EXAM:  General: NAD, AAOx3, calm and cooperative  HEENT: NCAT, EOMI  Cardiac: S1, S2  Respiratory: normal respiratory effort, b/l breath sounds   Abdomen: Soft, non-distended, non-tender, no rebound, no guarding  Skin: No jaundice    MEDICATIONS  (STANDING):  amitriptyline 25 milliGRAM(s) Oral daily  BACItracin   Ointment 1 Application(s) Topical every 12 hours  budesonide  80 MICROgram(s)/formoterol 4.5 MICROgram(s) Inhaler 2 Puff(s) Inhalation two times a day  chlorhexidine 4% Liquid 1 Application(s) Topical <User Schedule>  clopidogrel Tablet 75 milliGRAM(s) Oral daily  enoxaparin Injectable 40 milliGRAM(s) SubCutaneous every 24 hours  insulin lispro (ADMELOG) corrective regimen sliding scale   SubCutaneous three times a day before meals  lactated ringers. 1000 milliLiter(s) (100 mL/Hr) IV Continuous <Continuous>  metoprolol tartrate 12.5 milliGRAM(s) Oral every 12 hours  pantoprazole  Injectable 40 milliGRAM(s) IV Push daily  pregabalin 75 milliGRAM(s) Oral daily  tamsulosin 0.4 milliGRAM(s) Oral at bedtime    MEDICATIONS  (PRN):  acetaminophen     Tablet .. 650 milliGRAM(s) Oral every 6 hours PRN Temp greater or equal to 38C (100.4F), Mild Pain (1 - 3)    DVT PROPHYLAXIS: enoxaparin Injectable 40 milliGRAM(s) SubCutaneous every 24 hours  GI PROPHYLAXIS: pantoprazole  Injectable 40 milliGRAM(s) IV Push daily    LAB/STUDIES:  Labs:  CAPILLARY BLOOD GLUCOSE  POCT Blood Glucose.: 138 mg/dL (01 Jun 2022 21:32)  POCT Blood Glucose.: 126 mg/dL (01 Jun 2022 17:34)  POCT Blood Glucose.: 94 mg/dL (01 Jun 2022 11:39)                        11.7   5.75  )-----------( 437      ( 01 Jun 2022 22:32 )             33.5       06-01    141  |  108  |  5<L>  ----------------------------<  112<H>  4.2   |  21  |  0.7    Calcium, Total Serum: 8.9 mg/dL (06-01-22 @ 22:32)    Coags:     12.80  ----< 1.11    ( 01 Jun 2022 22:32 )     41.4      IMAGING:  < from: Xray Chest 1 View- PORTABLE-Routine (06.01.22 @ 06:05) >  Impression:  Bibasilar opacities  --- End of Report ---

## 2022-06-02 NOTE — DISCHARGE NOTE PROVIDER - PROVIDER TOKENS
PROVIDER:[TOKEN:[[id number]],FOLLOWUP:[2 weeks]],PROVIDER:[TOKEN:[[id number]],FOLLOWUP:[2 weeks]],PROVIDER:[TOKEN:[[id number]],FOLLOWUP:[2 weeks]]

## 2022-06-02 NOTE — DISCHARGE NOTE PROVIDER - CARE PROVIDER_API CALL
Behzad Booth)  Surgery  [address] [place] [address]  Phone: [phone]  Fax: [phone]  Follow Up Time: 2 weeks    Saad Demarco)  Cardiac Electrophysiology; Cardiovascular Disease  [address] [place] [address]  Phone: [phone]  Fax: [phone]  Follow Up Time: 2 weeks    Alex Roberson)  Cardiovascular Disease  [address] [facility], [address]  [place] [address]  Phone: [phone]  Fax: [phone]  Follow Up Time: 2 weeks

## 2022-06-02 NOTE — PROGRESS NOTE ADULT - ASSESSMENT
ASSESSMENT:  81 year old male with a past medical hx of chronic pancreatitis, COPD, CHF, T2D, Pacemaker, HTN. HLD, surgical history of cholecystectomy, exploratory laparotomy with small bowel resection admitted for SBO    PLAN:  -SW/JIMI for dispo to [facility]  -Patient no longer agitated; Off of 1:1  -EPS: current pacemaker has been reprogrammed for optimal functioning. Can be discharged and f/u outpatient   -Medicine: no active medical issues, can follow up outpatient  -Monitor bowel function  -Pain control  -DVT/GI ppx     BLUE TEAM SPECTRA: [id number]

## 2022-06-02 NOTE — DISCHARGE NOTE PROVIDER - CARE PROVIDERS DIRECT ADDRESSES
,[email or web address],DirectAddress_Unknown,DirectAddress_Unknown

## 2022-06-02 NOTE — DISCHARGE NOTE PROVIDER - HOSPITAL COURSE
81 year old male with a past medical hx of chronic pancreatitis, COPD, CHF, T2D, Pacemaker, HTN. HLD, surgical history of cholecystectomy, exploratory laparotomy with small bowel resection, presented to the ED for abdominal pain 5/27. Per report, patient has been constipated for the past 4 days, and was sent in from [facility] to rule out sbo. Admitted to 1 episode of vomiting night prior to presentation. In the ED, vitals stable, CT showing small bowel obstruction with dilated loops of small bowel in left abdomen with a transition point in LLQ. Patient family not bedside and report obtained from verbal with witness. SBO seen on CT scan. Patient was admitted to SICU for monitoring, diet was advanced as tolerated as well as monitoring for gas and bowel movements. EPS saw patient for PPM, interrogated pacemaker with plans for replacement at a later date outpatient. Cardiology followed patient for history of afib, recommended continuation of Eliquis on d/c. Patient was downgraded from SICU 5/29, was passing gas and having bowel movements. On the floors was HD stable, was seen by PT/Rehab. Will be discharged back to nursing home.

## 2022-06-02 NOTE — DISCHARGE NOTE PROVIDER - NSDCMRMEDTOKEN_GEN_ALL_CORE_FT
amitriptyline 25 mg tablet:   Breo Ellipta 100 mcg-25 mcg/inh inhalation powder: 1 puff(s) inhaled once a day  clopidogrel 75 mg tablet:   Corlanor 5 mg oral tablet: 1 tab(s) orally 2 times a day (with meals)  Eliquis 5 mg tablet:   famotidine 40 mg oral tablet: 1 tab(s) orally once a day (at bedtime)  latanoprost 0.005% ophthalmic solution: 1 drop(s) to each affected eye once a day (in the evening)  montelukast 10 mg oral tablet: 1 tab(s) orally once a day  nifedipine ER 30 mg tablet,extended release:   omeprazole 40 mg oral delayed release capsule: 1 cap(s) orally once a day  oxyMORphone 40 mg oral tablet, extended release: 1 tab(s) orally every 12 hours, As Needed  pregabalin 75 mg capsule:   simvastatin 10 mg oral tablet: 1 tab(s) orally once a day (at bedtime)  Trulicity Pen 0.75 mg/0.5 mL subcutaneous solution: 1 application subcutaneous once a week   amitriptyline 25 mg tablet:   Breo Ellipta 100 mcg-25 mcg/inh inhalation powder: 1 puff(s) inhaled once a day  clopidogrel 75 mg tablet:   Corlanor 5 mg oral tablet: 1 tab(s) orally 2 times a day (with meals)  Eliquis 5 mg tablet:   famotidine 40 mg oral tablet: 1 tab(s) orally once a day (at bedtime)  latanoprost 0.005% ophthalmic solution: 1 drop(s) to each affected eye once a day (in the evening)  MiraLax oral powder for reconstitution: 17 gram(s) orally once a day (at bedtime)   montelukast 10 mg oral tablet: 1 tab(s) orally once a day  nifedipine ER 30 mg tablet,extended release:   omeprazole 40 mg oral delayed release capsule: 1 cap(s) orally once a day  oxyMORphone 40 mg oral tablet, extended release: 1 tab(s) orally every 12 hours, As Needed  pregabalin 75 mg capsule:   simvastatin 10 mg oral tablet: 1 tab(s) orally once a day (at bedtime)  Trulicity Pen 0.75 mg/0.5 mL subcutaneous solution: 1 application subcutaneous once a week

## 2022-06-03 ENCOUNTER — TRANSCRIPTION ENCOUNTER (OUTPATIENT)
Age: 82
End: 2022-06-03

## 2022-06-03 LAB
GLUCOSE BLDC GLUCOMTR-MCNC: 109 MG/DL — HIGH (ref 70–99)
GLUCOSE BLDC GLUCOMTR-MCNC: 124 MG/DL — HIGH (ref 70–99)
GLUCOSE BLDC GLUCOMTR-MCNC: 134 MG/DL — HIGH (ref 70–99)
GLUCOSE BLDC GLUCOMTR-MCNC: 138 MG/DL — HIGH (ref 70–99)

## 2022-06-03 PROCEDURE — 99238 HOSP IP/OBS DSCHRG MGMT 30/<: CPT

## 2022-06-03 RX ORDER — METOPROLOL TARTRATE 50 MG
12.5 TABLET ORAL
Qty: 0 | Refills: 0 | DISCHARGE
Start: 2022-06-03

## 2022-06-03 RX ORDER — NIFEDIPINE 30 MG
30 TABLET, EXTENDED RELEASE 24 HR ORAL ONCE
Refills: 0 | Status: COMPLETED | OUTPATIENT
Start: 2022-06-03 | End: 2022-06-03

## 2022-06-03 RX ORDER — HYDRALAZINE HCL 50 MG
10 TABLET ORAL ONCE
Refills: 0 | Status: COMPLETED | OUTPATIENT
Start: 2022-06-03 | End: 2022-06-03

## 2022-06-03 RX ORDER — NIFEDIPINE 30 MG
1 TABLET, EXTENDED RELEASE 24 HR ORAL
Qty: 30 | Refills: 0
Start: 2022-06-03 | End: 2022-07-02

## 2022-06-03 RX ORDER — OXYCODONE HYDROCHLORIDE 5 MG/1
5 TABLET ORAL ONCE
Refills: 0 | Status: DISCONTINUED | OUTPATIENT
Start: 2022-06-03 | End: 2022-06-03

## 2022-06-03 RX ORDER — METOPROLOL TARTRATE 50 MG
5 TABLET ORAL ONCE
Refills: 0 | Status: COMPLETED | OUTPATIENT
Start: 2022-06-03 | End: 2022-06-03

## 2022-06-03 RX ORDER — KETOROLAC TROMETHAMINE 30 MG/ML
15 SYRINGE (ML) INJECTION ONCE
Refills: 0 | Status: DISCONTINUED | OUTPATIENT
Start: 2022-06-03 | End: 2022-06-03

## 2022-06-03 RX ORDER — NIFEDIPINE 30 MG
0 TABLET, EXTENDED RELEASE 24 HR ORAL
Qty: 0 | Refills: 1 | DISCHARGE

## 2022-06-03 RX ADMIN — PANTOPRAZOLE SODIUM 40 MILLIGRAM(S): 20 TABLET, DELAYED RELEASE ORAL at 11:56

## 2022-06-03 RX ADMIN — Medication 12.5 MILLIGRAM(S): at 16:57

## 2022-06-03 RX ADMIN — Medication 25 MILLIGRAM(S): at 11:56

## 2022-06-03 RX ADMIN — Medication 75 MILLIGRAM(S): at 11:56

## 2022-06-03 RX ADMIN — BUDESONIDE AND FORMOTEROL FUMARATE DIHYDRATE 2 PUFF(S): 160; 4.5 AEROSOL RESPIRATORY (INHALATION) at 17:58

## 2022-06-03 RX ADMIN — OXYCODONE HYDROCHLORIDE 5 MILLIGRAM(S): 5 TABLET ORAL at 19:18

## 2022-06-03 RX ADMIN — CHLORHEXIDINE GLUCONATE 1 APPLICATION(S): 213 SOLUTION TOPICAL at 05:21

## 2022-06-03 RX ADMIN — Medication 5 MILLIGRAM(S): at 21:05

## 2022-06-03 RX ADMIN — Medication 650 MILLIGRAM(S): at 06:54

## 2022-06-03 RX ADMIN — Medication 30 MILLIGRAM(S): at 17:57

## 2022-06-03 RX ADMIN — TAMSULOSIN HYDROCHLORIDE 0.4 MILLIGRAM(S): 0.4 CAPSULE ORAL at 21:24

## 2022-06-03 RX ADMIN — CLOPIDOGREL BISULFATE 75 MILLIGRAM(S): 75 TABLET, FILM COATED ORAL at 11:56

## 2022-06-03 RX ADMIN — APIXABAN 5 MILLIGRAM(S): 2.5 TABLET, FILM COATED ORAL at 05:23

## 2022-06-03 RX ADMIN — Medication 12.5 MILLIGRAM(S): at 05:22

## 2022-06-03 RX ADMIN — Medication 1 APPLICATION(S): at 17:59

## 2022-06-03 RX ADMIN — APIXABAN 5 MILLIGRAM(S): 2.5 TABLET, FILM COATED ORAL at 17:58

## 2022-06-03 RX ADMIN — Medication 10 MILLIGRAM(S): at 23:41

## 2022-06-03 RX ADMIN — Medication 1 APPLICATION(S): at 05:21

## 2022-06-03 NOTE — DISCHARGE NOTE NURSING/CASE MANAGEMENT/SOCIAL WORK - PATIENT PORTAL LINK FT
You can access the FollowMyHealth Patient Portal offered by [facility] by registering at the following website: [email or web address]. By joining [facility]’s FollowMyHealth portal, you will also be able to view your health information using other applications (apps) compatible with our system.

## 2022-06-03 NOTE — DISCHARGE NOTE NURSING/CASE MANAGEMENT/SOCIAL WORK - NSDCPEFALRISK_GEN_ALL_CORE
For information on Fall & Injury Prevention, visit: [email or web address] OR  [email or web address] OR  https://www.cdc.gov/steadi/patient.html

## 2022-06-03 NOTE — PROGRESS NOTE ADULT - SUBJECTIVE AND OBJECTIVE BOX
GENERAL SURGERY PROGRESS NOTE    Patient: EZ ALCARAZ , 81y ([birth year])Male   MRN: [id number]  Location: [address]  Visit: 05-27-22 Inpatient  Date: 06-03-22 @ 09:27    Hospital Day #:8    Procedure/Dx/Injuries: SBO    Events of past 24 hours: Pt seen and examined at bedside. Pt denies having pain. Reports still passing gas and having BM. Dispo planning. Restarted home eliquis. Passed TOV. No acute overnight events. Afebrile     PAST MEDICAL & SURGICAL HISTORY:  Diabetes  Hypertension  Pacemaker  Dyslipidemia  History of chronic pancreatitis  Atherosclerosis of native artery of lower extremity  COPD (chronic obstructive pulmonary disease)  CHESTER on CPAP  H/O intestinal obstruction  History of cholecystectomy  History of pancreatic surgery  History of penile implant  Cardiac pacemaker    Vitals:   T(F): 98.8 (06-03-22 @ 04:10), Max: 98.8 (06-03-22 @ 04:10)  HR: 73 (06-03-22 @ 04:10)  BP: 134/60 (06-03-22 @ 04:10)  RR: 18 (06-03-22 @ 04:10)  SpO2: 98% (06-03-22 @ 08:08)    I & O's:  06-02-22 @ 07:01  -  06-03-22 @ 07:00  --------------------------------------------------------  IN:    Oral Fluid: 1008 mL  Total IN: 1008 mL  OUT:    Voided (mL): 1000 mL  Total OUT: 1000 mL  Total NET: 8 mL    PHYSICAL EXAM:  General: NAD, AAOx3, calm and cooperative  HEENT: NCAT, CALVIN, EOMI, Trachea ML, Neck supple  Cardiac: S1, S2  Respiratory: normal respiratory effort, b/l breath sounds  Abdomen: Soft, non-distended, non-tender, no rebound, no guarding  Skin: Warm/dry, normal color, no jaundice    MEDICATIONS  (STANDING):  amitriptyline 25 milliGRAM(s) Oral daily  apixaban 5 milliGRAM(s) Oral two times a day  BACItracin   Ointment 1 Application(s) Topical every 12 hours  budesonide  80 MICROgram(s)/formoterol 4.5 MICROgram(s) Inhaler 2 Puff(s) Inhalation two times a day  chlorhexidine 4% Liquid 1 Application(s) Topical <User Schedule>  clopidogrel Tablet 75 milliGRAM(s) Oral daily  insulin lispro (ADMELOG) corrective regimen sliding scale   SubCutaneous three times a day before meals  metoprolol tartrate 12.5 milliGRAM(s) Oral every 12 hours  pantoprazole  Injectable 40 milliGRAM(s) IV Push daily  pregabalin 75 milliGRAM(s) Oral daily  tamsulosin 0.4 milliGRAM(s) Oral at bedtime    MEDICATIONS  (PRN):  acetaminophen     Tablet .. 650 milliGRAM(s) Oral every 6 hours PRN Temp greater or equal to 38C (100.4F), Mild Pain (1 - 3)    GI PROPHYLAXIS: pantoprazole  Injectable 40 milliGRAM(s) IV Push daily    LAB/STUDIES:  Labs:  CAPILLARY BLOOD GLUCOSE  POCT Blood Glucose.: 109 mg/dL (03 Jun 2022 07:31)  POCT Blood Glucose.: 130 mg/dL (02 Jun 2022 21:37)  POCT Blood Glucose.: 125 mg/dL (02 Jun 2022 11:23)                        11.7   5.75  )-----------( 437      ( 01 Jun 2022 22:32 )             33.5       06-01    141  |  108  |  5<L>  ----------------------------<  112<H>  4.2   |  21  |  0.7    Coags:     12.80  ----< 1.11    ( 01 Jun 2022 22:32 )     41.4     IMAGING:  < from: Xray Chest 1 View- PORTABLE-Routine (06.02.22 @ 06:34) >  IMPRESSION:  Unchanged left lung opacity.  --- End of Report ---

## 2022-06-03 NOTE — PROGRESS NOTE ADULT - ASSESSMENT
ASSESSMENT:  81 year old male with a past medical hx of chronic pancreatitis, COPD, CHF, T2D, Pacemaker, HTN. HLD, surgical history of cholecystectomy, exploratory laparotomy with small bowel resection admitted for SBO    PLAN:  -CHIARA/JIMI for dispo to [facility]; authorization pending   -Patient no longer agitated; Off of 1:1  -EPS: current pacemaker has been reprogrammed for optimal functioning. Can be discharged and f/u outpatient   -Medicine: no active medical issues, can follow up outpatient  -Monitor bowel function  -Pain control  -Eliquis restarted   -GI ppx    BLUE TEAM SPECTRA: [id number]

## 2022-06-03 NOTE — PROGRESS NOTE ADULT - ATTENDING COMMENTS
Patient seen and examined with surgery resident  and discussed management plans. Awaiting transfer back to nursing home SBO resolved on reg diet.

## 2022-06-04 LAB
GLUCOSE BLDC GLUCOMTR-MCNC: 112 MG/DL — HIGH (ref 70–99)
GLUCOSE BLDC GLUCOMTR-MCNC: 119 MG/DL — HIGH (ref 70–99)
GLUCOSE BLDC GLUCOMTR-MCNC: 173 MG/DL — HIGH (ref 70–99)
SARS-COV-2 RNA SPEC QL NAA+PROBE: SIGNIFICANT CHANGE UP

## 2022-06-04 PROCEDURE — 99233 SBSQ HOSP IP/OBS HIGH 50: CPT

## 2022-06-04 RX ORDER — PANTOPRAZOLE SODIUM 20 MG/1
40 TABLET, DELAYED RELEASE ORAL
Refills: 0 | Status: DISCONTINUED | OUTPATIENT
Start: 2022-06-04 | End: 2022-06-05

## 2022-06-04 RX ORDER — NIFEDIPINE 30 MG
30 TABLET, EXTENDED RELEASE 24 HR ORAL DAILY
Refills: 0 | Status: DISCONTINUED | OUTPATIENT
Start: 2022-06-04 | End: 2022-06-05

## 2022-06-04 RX ORDER — NIFEDIPINE 30 MG
1 TABLET, EXTENDED RELEASE 24 HR ORAL
Qty: 0 | Refills: 0 | DISCHARGE
Start: 2022-06-04

## 2022-06-04 RX ADMIN — Medication 1: at 11:33

## 2022-06-04 RX ADMIN — Medication 650 MILLIGRAM(S): at 12:20

## 2022-06-04 RX ADMIN — Medication 12.5 MILLIGRAM(S): at 17:16

## 2022-06-04 RX ADMIN — CHLORHEXIDINE GLUCONATE 1 APPLICATION(S): 213 SOLUTION TOPICAL at 05:29

## 2022-06-04 RX ADMIN — Medication 1 APPLICATION(S): at 05:30

## 2022-06-04 RX ADMIN — Medication 25 MILLIGRAM(S): at 11:33

## 2022-06-04 RX ADMIN — PANTOPRAZOLE SODIUM 40 MILLIGRAM(S): 20 TABLET, DELAYED RELEASE ORAL at 12:20

## 2022-06-04 RX ADMIN — Medication 650 MILLIGRAM(S): at 01:32

## 2022-06-04 RX ADMIN — CLOPIDOGREL BISULFATE 75 MILLIGRAM(S): 75 TABLET, FILM COATED ORAL at 11:34

## 2022-06-04 RX ADMIN — Medication 12.5 MILLIGRAM(S): at 05:29

## 2022-06-04 RX ADMIN — TAMSULOSIN HYDROCHLORIDE 0.4 MILLIGRAM(S): 0.4 CAPSULE ORAL at 21:52

## 2022-06-04 RX ADMIN — Medication 30 MILLIGRAM(S): at 05:29

## 2022-06-04 RX ADMIN — Medication 650 MILLIGRAM(S): at 12:50

## 2022-06-04 RX ADMIN — Medication 1 APPLICATION(S): at 17:17

## 2022-06-04 RX ADMIN — Medication 75 MILLIGRAM(S): at 11:33

## 2022-06-04 RX ADMIN — BUDESONIDE AND FORMOTEROL FUMARATE DIHYDRATE 2 PUFF(S): 160; 4.5 AEROSOL RESPIRATORY (INHALATION) at 09:04

## 2022-06-04 RX ADMIN — APIXABAN 5 MILLIGRAM(S): 2.5 TABLET, FILM COATED ORAL at 17:16

## 2022-06-04 RX ADMIN — BUDESONIDE AND FORMOTEROL FUMARATE DIHYDRATE 2 PUFF(S): 160; 4.5 AEROSOL RESPIRATORY (INHALATION) at 20:45

## 2022-06-04 RX ADMIN — APIXABAN 5 MILLIGRAM(S): 2.5 TABLET, FILM COATED ORAL at 05:29

## 2022-06-04 NOTE — PROGRESS NOTE ADULT - SUBJECTIVE AND OBJECTIVE BOX
GENERAL SURGERY PROGRESS NOTE    Patient: EZ ALCARAZ , 81y ([birth year])Male   MRN: [id number]  Location: [address]  Visit: 05-27-22 Inpatient  Date: 06-04-22 @ 02:20    Hospital Day #:9    Procedure/Dx/Injuries: SBO    Events of past 24 hours: Pt seen and examined at bedside. Pt was discharged today but became hypertensive to 180s. Transport denied due hypertension. Pt given home dose of nifedipine, lopressor 5mg IV x1 and hydralazine 10mg x1 throughout the day. Current BP in 152/70. Afebrile     PAST MEDICAL & SURGICAL HISTORY:  Diabetes  Hypertension  Pacemaker  Dyslipidemia  History of chronic pancreatitis  Atherosclerosis of native artery of lower extremity  COPD (chronic obstructive pulmonary disease)  CHESTER on CPAP  H/O intestinal obstruction  History of cholecystectomy  History of pancreatic surgery  History of penile implant  Cardiac pacemaker    Vitals:   T(F): 96.7 (06-03-22 @ 19:49), Max: 98.8 (06-03-22 @ 04:10)  HR: 80 (06-04-22 @ 01:15)  BP: 152/70 (06-04-22 @ 01:15)  RR: 18 (06-03-22 @ 19:49)  SpO2: 98% (06-03-22 @ 08:08)    I & O's:  06-02-22 @ 07:01  -  06-03-22 @ 07:00  --------------------------------------------------------  IN:    Oral Fluid: 1008 mL  Total IN: 1008 mL  OUT:    Voided (mL): 1000 mL  Total OUT: 1000 mL  Total NET: 8 mL    PHYSICAL EXAM:  General: NAD, AAOx3, calm and cooperative  HEENT: NCAT, EOMI  Cardiac: S1, S2  Respiratory: normal respiratory effort, BL breath sounds   Abdomen: Soft, non-distended, non-tender, no rebound, no guarding  Skin: Warm/dry, normal color, no jaundice    MEDICATIONS  (STANDING):  amitriptyline 25 milliGRAM(s) Oral daily  apixaban 5 milliGRAM(s) Oral two times a day  BACItracin   Ointment 1 Application(s) Topical every 12 hours  budesonide  80 MICROgram(s)/formoterol 4.5 MICROgram(s) Inhaler 2 Puff(s) Inhalation two times a day  chlorhexidine 4% Liquid 1 Application(s) Topical <User Schedule>  clopidogrel Tablet 75 milliGRAM(s) Oral daily  insulin lispro (ADMELOG) corrective regimen sliding scale   SubCutaneous three times a day before meals  metoprolol tartrate 12.5 milliGRAM(s) Oral every 12 hours  NIFEdipine XL 30 milliGRAM(s) Oral daily  pantoprazole  Injectable 40 milliGRAM(s) IV Push daily  pregabalin 75 milliGRAM(s) Oral daily  tamsulosin 0.4 milliGRAM(s) Oral at bedtime    MEDICATIONS  (PRN):  acetaminophen     Tablet .. 650 milliGRAM(s) Oral every 6 hours PRN Temp greater or equal to 38C (100.4F), Mild Pain (1 - 3)    GI PROPHYLAXIS: pantoprazole  Injectable 40 milliGRAM(s) IV Push daily    LAB/STUDIES:  Labs:  CAPILLARY BLOOD GLUCOSE  POCT Blood Glucose.: 124 mg/dL (03 Jun 2022 21:43)  POCT Blood Glucose.: 134 mg/dL (03 Jun 2022 16:40)  POCT Blood Glucose.: 138 mg/dL (03 Jun 2022 11:24)  POCT Blood Glucose.: 109 mg/dL (03 Jun 2022 07:31)     IMAGING:  No new imaging past 24hrs

## 2022-06-04 NOTE — PROGRESS NOTE ADULT - ATTENDING COMMENTS
patient has soft abdomen, tolerating diet with positive bowel functions , waiting for placement at NH

## 2022-06-04 NOTE — PROGRESS NOTE ADULT - ASSESSMENT
ASSESSMENT:  81 year old male with a past medical hx of chronic pancreatitis, COPD, CHF, T2D, Pacemaker, HTN. HLD, surgical history of cholecystectomy, exploratory laparotomy with small bowel resection admitted for SBO    PLAN:  -Dispo to [facility]   -Monitor HTN; restarted home rx   -Patient no longer agitated; Off of 1:1  -EPS: current pacemaker has been reprogrammed for optimal functioning. Can be discharged and f/u outpatient   -Medicine: no active medical issues, can follow up outpatient  -Monitor bowel function  -Pain control  -Eliquis for afib   -GI ppx    BLUE TEAM SPECTRA: [id number]

## 2022-06-05 VITALS — HEART RATE: 74 BPM | DIASTOLIC BLOOD PRESSURE: 58 MMHG | SYSTOLIC BLOOD PRESSURE: 112 MMHG | TEMPERATURE: 98 F

## 2022-06-05 LAB — GLUCOSE BLDC GLUCOMTR-MCNC: 136 MG/DL — HIGH (ref 70–99)

## 2022-06-05 PROCEDURE — 99231 SBSQ HOSP IP/OBS SF/LOW 25: CPT

## 2022-06-05 RX ADMIN — PANTOPRAZOLE SODIUM 40 MILLIGRAM(S): 20 TABLET, DELAYED RELEASE ORAL at 06:03

## 2022-06-05 RX ADMIN — Medication 1 APPLICATION(S): at 06:00

## 2022-06-05 RX ADMIN — BUDESONIDE AND FORMOTEROL FUMARATE DIHYDRATE 2 PUFF(S): 160; 4.5 AEROSOL RESPIRATORY (INHALATION) at 08:16

## 2022-06-05 RX ADMIN — Medication 30 MILLIGRAM(S): at 06:00

## 2022-06-05 RX ADMIN — Medication 12.5 MILLIGRAM(S): at 06:00

## 2022-06-05 RX ADMIN — Medication 650 MILLIGRAM(S): at 02:45

## 2022-06-05 RX ADMIN — APIXABAN 5 MILLIGRAM(S): 2.5 TABLET, FILM COATED ORAL at 06:00

## 2022-06-05 NOTE — PROGRESS NOTE ADULT - ATTENDING SUPERVISION STATEMENT
Resident
Resident/Fellow
Resident/Fellow

## 2022-06-05 NOTE — PROGRESS NOTE ADULT - ASSESSMENT
81 year old male with a past medical hx of chronic pancreatitis, COPD, CHF, T2D, Pacemaker, HTN. HLD, surgical history of cholecystectomy, exploratory laparotomy with small bowel resection admitted for SBO.    PLAN:  -Dispo to [facility]   -Monitor BP; restarted home rx for HTN  -Patient no longer agitated; Off of 1:1  -EPS: current pacemaker has been reprogrammed for optimal functioning. Can be discharged and f/u outpatient   -Medicine: no active medical issues, can follow up outpatient  -Monitor bowel function  -Pain control  -Eliquis for afib   -GI ppx    BLUE TEAM SPECTRA: [id number]

## 2022-06-05 NOTE — PROGRESS NOTE ADULT - SUBJECTIVE AND OBJECTIVE BOX
GENERAL SURGERY PROGRESS NOTE    Patient: EZ ALCARAZ , 81y ([birth year])Male   MRN: [id number]  Location: [address]  Visit: 05-27-22 Inpatient  Date: 06-05-22 @ 08:36    Hospital Day #: 10    Procedure/Dx/Injuries: SBO    Events of past 24 hours: Pt seen and examined at bedside. Afebrile, VSS. Awaiting discharge to [facility]. No acute overnight events.     PAST MEDICAL & SURGICAL HISTORY:  Diabetes  Hypertension  Pacemaker  Dyslipidemia  History of chronic pancreatitis  Atherosclerosis of native artery of lower extremity  COPD (chronic obstructive pulmonary disease)  CHESTER on CPAP  H/O intestinal obstruction  History of cholecystectomy  History of pancreatic surgery  History of penile implant  Cardiac pacemaker    Vitals:   T(F): 97.6 (06-05-22 @ 06:00), Max: 98.3 (06-04-22 @ 13:55)  HR: 74 (06-05-22 @ 06:00)  BP: 112/58 (06-05-22 @ 06:00)  RR: --  SpO2: --    Fluids:     I & O's:    06-04-22 @ 07:01  -  06-05-22 @ 07:00  --------------------------------------------------------  IN:    Oral Fluid: 696 mL  Total IN: 696 mL    OUT:    Voided (mL): 325 mL  Total OUT: 325 mL    Total NET: 371 mL    Bowel Movement: : [x] YES [] NO  Flatus: : [x] YES [] NO    PHYSICAL EXAM:  General: NAD, AAOx3, calm and cooperative  HEENT: NCAT, EOMI  Cardiac: S1, S2  Respiratory: normal respiratory effort, BL breath sounds   Abdomen: Midline abdominal scar from previous sx. Soft, non-distended, non-tender, no rebound, no guarding  Skin: Warm/dry, normal color, no jaundice    MEDICATIONS  (STANDING):  amitriptyline 25 milliGRAM(s) Oral daily  apixaban 5 milliGRAM(s) Oral two times a day  BACItracin   Ointment 1 Application(s) Topical every 12 hours  budesonide  80 MICROgram(s)/formoterol 4.5 MICROgram(s) Inhaler 2 Puff(s) Inhalation two times a day  chlorhexidine 4% Liquid 1 Application(s) Topical <User Schedule>  clopidogrel Tablet 75 milliGRAM(s) Oral daily  insulin lispro (ADMELOG) corrective regimen sliding scale   SubCutaneous three times a day before meals  metoprolol tartrate 12.5 milliGRAM(s) Oral every 12 hours  NIFEdipine XL 30 milliGRAM(s) Oral daily  pantoprazole    Tablet 40 milliGRAM(s) Oral before breakfast  pregabalin 75 milliGRAM(s) Oral daily  tamsulosin 0.4 milliGRAM(s) Oral at bedtime    MEDICATIONS  (PRN):  acetaminophen     Tablet .. 650 milliGRAM(s) Oral every 6 hours PRN Temp greater or equal to 38C (100.4F), Mild Pain (1 - 3)      GI PROPHYLAXIS: pantoprazole    Tablet 40 milliGRAM(s) Oral before breakfast    LAB/STUDIES:  Labs:  CAPILLARY BLOOD GLUCOSE  POCT Blood Glucose.: 136 mg/dL (05 Jun 2022 07:54)  POCT Blood Glucose.: 112 mg/dL (04 Jun 2022 16:53)  POCT Blood Glucose.: 173 mg/dL (04 Jun 2022 11:24)

## 2022-06-05 NOTE — PROGRESS NOTE ADULT - PROVIDER SPECIALTY LIST ADULT
SICU
SICU
Surgery
Electrophysiology
SICU
Surgery
Electrophysiology
Surgery

## 2022-06-05 NOTE — PROGRESS NOTE ADULT - REASON FOR ADMISSION
small bowel obstruction

## 2022-06-08 ENCOUNTER — NON-APPOINTMENT (OUTPATIENT)
Age: 82
End: 2022-06-08

## 2022-06-09 ENCOUNTER — APPOINTMENT (OUTPATIENT)
Dept: SURGERY | Facility: CLINIC | Age: 82
End: 2022-06-09

## 2022-06-13 DIAGNOSIS — Z90.49 ACQUIRED ABSENCE OF OTHER SPECIFIED PARTS OF DIGESTIVE TRACT: ICD-10-CM

## 2022-06-13 DIAGNOSIS — I50.32 CHRONIC DIASTOLIC (CONGESTIVE) HEART FAILURE: ICD-10-CM

## 2022-06-13 DIAGNOSIS — I48.0 PAROXYSMAL ATRIAL FIBRILLATION: ICD-10-CM

## 2022-06-13 DIAGNOSIS — R94.31 ABNORMAL ELECTROCARDIOGRAM [ECG] [EKG]: ICD-10-CM

## 2022-06-13 DIAGNOSIS — E78.5 HYPERLIPIDEMIA, UNSPECIFIED: ICD-10-CM

## 2022-06-13 DIAGNOSIS — G47.33 OBSTRUCTIVE SLEEP APNEA (ADULT) (PEDIATRIC): ICD-10-CM

## 2022-06-13 DIAGNOSIS — J44.9 CHRONIC OBSTRUCTIVE PULMONARY DISEASE, UNSPECIFIED: ICD-10-CM

## 2022-06-13 DIAGNOSIS — Z79.02 LONG TERM (CURRENT) USE OF ANTITHROMBOTICS/ANTIPLATELETS: ICD-10-CM

## 2022-06-13 DIAGNOSIS — G47.30 SLEEP APNEA, UNSPECIFIED: ICD-10-CM

## 2022-06-13 DIAGNOSIS — Z79.01 LONG TERM (CURRENT) USE OF ANTICOAGULANTS: ICD-10-CM

## 2022-06-13 DIAGNOSIS — I25.10 ATHEROSCLEROTIC HEART DISEASE OF NATIVE CORONARY ARTERY WITHOUT ANGINA PECTORIS: ICD-10-CM

## 2022-06-13 DIAGNOSIS — K86.1 OTHER CHRONIC PANCREATITIS: ICD-10-CM

## 2022-06-13 DIAGNOSIS — K21.9 GASTRO-ESOPHAGEAL REFLUX DISEASE WITHOUT ESOPHAGITIS: ICD-10-CM

## 2022-06-13 DIAGNOSIS — I44.0 ATRIOVENTRICULAR BLOCK, FIRST DEGREE: ICD-10-CM

## 2022-06-13 DIAGNOSIS — I11.0 HYPERTENSIVE HEART DISEASE WITH HEART FAILURE: ICD-10-CM

## 2022-06-13 DIAGNOSIS — R00.0 TACHYCARDIA, UNSPECIFIED: ICD-10-CM

## 2022-06-13 DIAGNOSIS — Z99.89 DEPENDENCE ON OTHER ENABLING MACHINES AND DEVICES: ICD-10-CM

## 2022-06-13 DIAGNOSIS — E11.51 TYPE 2 DIABETES MELLITUS WITH DIABETIC PERIPHERAL ANGIOPATHY WITHOUT GANGRENE: ICD-10-CM

## 2022-06-13 DIAGNOSIS — K56.600 PARTIAL INTESTINAL OBSTRUCTION, UNSPECIFIED AS TO CAUSE: ICD-10-CM

## 2022-06-13 DIAGNOSIS — Z95.0 PRESENCE OF CARDIAC PACEMAKER: ICD-10-CM

## 2022-06-13 DIAGNOSIS — Z79.899 OTHER LONG TERM (CURRENT) DRUG THERAPY: ICD-10-CM

## 2022-06-13 DIAGNOSIS — F03.91 UNSPECIFIED DEMENTIA WITH BEHAVIORAL DISTURBANCE: ICD-10-CM

## 2022-06-20 ENCOUNTER — INPATIENT (INPATIENT)
Facility: HOSPITAL | Age: 82
LOS: 8 days | Discharge: HOME HEALTH | End: 2022-06-29
Attending: STUDENT IN AN ORGANIZED HEALTH CARE EDUCATION/TRAINING PROGRAM | Admitting: STUDENT IN AN ORGANIZED HEALTH CARE EDUCATION/TRAINING PROGRAM
Payer: MEDICARE

## 2022-06-20 VITALS
RESPIRATION RATE: 18 BRPM | OXYGEN SATURATION: 100 % | TEMPERATURE: 96 F | DIASTOLIC BLOOD PRESSURE: 88 MMHG | HEIGHT: 66.5 IN | SYSTOLIC BLOOD PRESSURE: 122 MMHG | HEART RATE: 105 BPM

## 2022-06-20 DIAGNOSIS — Z90.49 ACQUIRED ABSENCE OF OTHER SPECIFIED PARTS OF DIGESTIVE TRACT: Chronic | ICD-10-CM

## 2022-06-20 DIAGNOSIS — Z98.890 OTHER SPECIFIED POSTPROCEDURAL STATES: Chronic | ICD-10-CM

## 2022-06-20 DIAGNOSIS — Z95.0 PRESENCE OF CARDIAC PACEMAKER: Chronic | ICD-10-CM

## 2022-06-20 DIAGNOSIS — Z87.19 PERSONAL HISTORY OF OTHER DISEASES OF THE DIGESTIVE SYSTEM: Chronic | ICD-10-CM

## 2022-06-20 DIAGNOSIS — Z96.0 PRESENCE OF UROGENITAL IMPLANTS: Chronic | ICD-10-CM

## 2022-06-20 LAB
ALBUMIN SERPL ELPH-MCNC: 3.8 G/DL — SIGNIFICANT CHANGE UP (ref 3.5–5.2)
ALP SERPL-CCNC: 94 U/L — SIGNIFICANT CHANGE UP (ref 30–115)
ALT FLD-CCNC: 9 U/L — SIGNIFICANT CHANGE UP (ref 0–41)
ANION GAP SERPL CALC-SCNC: 13 MMOL/L — SIGNIFICANT CHANGE UP (ref 7–14)
APPEARANCE UR: CLEAR — SIGNIFICANT CHANGE UP
AST SERPL-CCNC: 13 U/L — SIGNIFICANT CHANGE UP (ref 0–41)
BACTERIA # UR AUTO: NEGATIVE — SIGNIFICANT CHANGE UP
BASOPHILS # BLD AUTO: 0.01 K/UL — SIGNIFICANT CHANGE UP (ref 0–0.2)
BASOPHILS NFR BLD AUTO: 0.1 % — SIGNIFICANT CHANGE UP (ref 0–1)
BILIRUB SERPL-MCNC: 0.6 MG/DL — SIGNIFICANT CHANGE UP (ref 0.2–1.2)
BILIRUB UR-MCNC: NEGATIVE — SIGNIFICANT CHANGE UP
BUN SERPL-MCNC: 16 MG/DL — SIGNIFICANT CHANGE UP (ref 10–20)
CALCIUM SERPL-MCNC: 9.4 MG/DL — SIGNIFICANT CHANGE UP (ref 8.5–10.1)
CHLORIDE SERPL-SCNC: 95 MMOL/L — LOW (ref 98–110)
CO2 SERPL-SCNC: 27 MMOL/L — SIGNIFICANT CHANGE UP (ref 17–32)
COLOR SPEC: YELLOW — SIGNIFICANT CHANGE UP
CREAT SERPL-MCNC: 0.8 MG/DL — SIGNIFICANT CHANGE UP (ref 0.7–1.5)
DIFF PNL FLD: NEGATIVE — SIGNIFICANT CHANGE UP
EGFR: 89 ML/MIN/1.73M2 — SIGNIFICANT CHANGE UP
EOSINOPHIL # BLD AUTO: 0.17 K/UL — SIGNIFICANT CHANGE UP (ref 0–0.7)
EOSINOPHIL NFR BLD AUTO: 1.8 % — SIGNIFICANT CHANGE UP (ref 0–8)
EPI CELLS # UR: 1 /HPF — SIGNIFICANT CHANGE UP (ref 0–5)
GLUCOSE SERPL-MCNC: 179 MG/DL — HIGH (ref 70–99)
GLUCOSE UR QL: ABNORMAL
HCT VFR BLD CALC: 37.1 % — LOW (ref 42–52)
HGB BLD-MCNC: 12.4 G/DL — LOW (ref 14–18)
HYALINE CASTS # UR AUTO: 1 /LPF — SIGNIFICANT CHANGE UP (ref 0–7)
IMM GRANULOCYTES NFR BLD AUTO: 0.6 % — HIGH (ref 0.1–0.3)
KETONES UR-MCNC: ABNORMAL
LACTATE SERPL-SCNC: 2.9 MMOL/L — HIGH (ref 0.7–2)
LACTATE SERPL-SCNC: 2.9 MMOL/L — HIGH (ref 0.7–2)
LEUKOCYTE ESTERASE UR-ACNC: NEGATIVE — SIGNIFICANT CHANGE UP
LIDOCAIN IGE QN: 10 U/L — SIGNIFICANT CHANGE UP (ref 7–60)
LYMPHOCYTES # BLD AUTO: 1.23 K/UL — SIGNIFICANT CHANGE UP (ref 1.2–3.4)
LYMPHOCYTES # BLD AUTO: 13.3 % — LOW (ref 20.5–51.1)
MCHC RBC-ENTMCNC: 30.2 PG — SIGNIFICANT CHANGE UP (ref 27–31)
MCHC RBC-ENTMCNC: 33.4 G/DL — SIGNIFICANT CHANGE UP (ref 32–37)
MCV RBC AUTO: 90.5 FL — SIGNIFICANT CHANGE UP (ref 80–94)
MONOCYTES # BLD AUTO: 0.93 K/UL — HIGH (ref 0.1–0.6)
MONOCYTES NFR BLD AUTO: 10.1 % — HIGH (ref 1.7–9.3)
NEUTROPHILS # BLD AUTO: 6.84 K/UL — HIGH (ref 1.4–6.5)
NEUTROPHILS NFR BLD AUTO: 74.1 % — SIGNIFICANT CHANGE UP (ref 42.2–75.2)
NITRITE UR-MCNC: NEGATIVE — SIGNIFICANT CHANGE UP
NRBC # BLD: 0 /100 WBCS — SIGNIFICANT CHANGE UP (ref 0–0)
NT-PROBNP SERPL-SCNC: 337 PG/ML — HIGH (ref 0–300)
PH UR: 6.5 — SIGNIFICANT CHANGE UP (ref 5–8)
PLATELET # BLD AUTO: 288 K/UL — SIGNIFICANT CHANGE UP (ref 130–400)
POTASSIUM SERPL-MCNC: 3.9 MMOL/L — SIGNIFICANT CHANGE UP (ref 3.5–5)
POTASSIUM SERPL-SCNC: 3.9 MMOL/L — SIGNIFICANT CHANGE UP (ref 3.5–5)
PROT SERPL-MCNC: 7.3 G/DL — SIGNIFICANT CHANGE UP (ref 6–8)
PROT UR-MCNC: ABNORMAL
RBC # BLD: 4.1 M/UL — LOW (ref 4.7–6.1)
RBC # FLD: 13.5 % — SIGNIFICANT CHANGE UP (ref 11.5–14.5)
RBC CASTS # UR COMP ASSIST: 2 /HPF — SIGNIFICANT CHANGE UP (ref 0–4)
SARS-COV-2 RNA SPEC QL NAA+PROBE: SIGNIFICANT CHANGE UP
SODIUM SERPL-SCNC: 135 MMOL/L — SIGNIFICANT CHANGE UP (ref 135–146)
SP GR SPEC: >1.05 (ref 1.01–1.03)
TROPONIN T SERPL-MCNC: <0.01 NG/ML — SIGNIFICANT CHANGE UP
UROBILINOGEN FLD QL: ABNORMAL
WBC # BLD: 9.24 K/UL — SIGNIFICANT CHANGE UP (ref 4.8–10.8)
WBC # FLD AUTO: 9.24 K/UL — SIGNIFICANT CHANGE UP (ref 4.8–10.8)
WBC UR QL: 2 /HPF — SIGNIFICANT CHANGE UP (ref 0–5)

## 2022-06-20 PROCEDURE — 99406 BEHAV CHNG SMOKING 3-10 MIN: CPT

## 2022-06-20 PROCEDURE — 93010 ELECTROCARDIOGRAM REPORT: CPT

## 2022-06-20 PROCEDURE — 99285 EMERGENCY DEPT VISIT HI MDM: CPT

## 2022-06-20 PROCEDURE — 71045 X-RAY EXAM CHEST 1 VIEW: CPT | Mod: 26

## 2022-06-20 PROCEDURE — 74177 CT ABD & PELVIS W/CONTRAST: CPT | Mod: 26,MA

## 2022-06-20 PROCEDURE — 70450 CT HEAD/BRAIN W/O DYE: CPT | Mod: 26,MA

## 2022-06-20 PROCEDURE — 93280 PM DEVICE PROGR EVAL DUAL: CPT | Mod: 26

## 2022-06-20 PROCEDURE — 99223 1ST HOSP IP/OBS HIGH 75: CPT | Mod: 25

## 2022-06-20 RX ORDER — SIMVASTATIN 20 MG/1
10 TABLET, FILM COATED ORAL AT BEDTIME
Refills: 0 | Status: DISCONTINUED | OUTPATIENT
Start: 2022-06-20 | End: 2022-06-29

## 2022-06-20 RX ORDER — AMITRIPTYLINE HCL 25 MG
25 TABLET ORAL DAILY
Refills: 0 | Status: DISCONTINUED | OUTPATIENT
Start: 2022-06-20 | End: 2022-06-29

## 2022-06-20 RX ORDER — HALOPERIDOL DECANOATE 100 MG/ML
2.5 INJECTION INTRAMUSCULAR ONCE
Refills: 0 | Status: COMPLETED | OUTPATIENT
Start: 2022-06-20 | End: 2022-06-20

## 2022-06-20 RX ORDER — MONTELUKAST 4 MG/1
10 TABLET, CHEWABLE ORAL DAILY
Refills: 0 | Status: DISCONTINUED | OUTPATIENT
Start: 2022-06-20 | End: 2022-06-29

## 2022-06-20 RX ORDER — OXYMORPHONE HYDROCHLORIDE 10 MG/1
1 TABLET, EXTENDED RELEASE ORAL
Qty: 0 | Refills: 0 | DISCHARGE

## 2022-06-20 RX ORDER — NIFEDIPINE 30 MG
60 TABLET, EXTENDED RELEASE 24 HR ORAL DAILY
Refills: 0 | Status: DISCONTINUED | OUTPATIENT
Start: 2022-06-20 | End: 2022-06-21

## 2022-06-20 RX ORDER — ONDANSETRON 8 MG/1
4 TABLET, FILM COATED ORAL EVERY 8 HOURS
Refills: 0 | Status: DISCONTINUED | OUTPATIENT
Start: 2022-06-20 | End: 2022-06-29

## 2022-06-20 RX ORDER — LANOLIN ALCOHOL/MO/W.PET/CERES
3 CREAM (GRAM) TOPICAL AT BEDTIME
Refills: 0 | Status: DISCONTINUED | OUTPATIENT
Start: 2022-06-20 | End: 2022-06-29

## 2022-06-20 RX ORDER — LABETALOL HCL 100 MG
100 TABLET ORAL ONCE
Refills: 0 | Status: COMPLETED | OUTPATIENT
Start: 2022-06-20 | End: 2022-06-20

## 2022-06-20 RX ORDER — ACETAMINOPHEN 500 MG
650 TABLET ORAL EVERY 6 HOURS
Refills: 0 | Status: DISCONTINUED | OUTPATIENT
Start: 2022-06-20 | End: 2022-06-29

## 2022-06-20 RX ORDER — CHLORHEXIDINE GLUCONATE 213 G/1000ML
1 SOLUTION TOPICAL
Refills: 0 | Status: DISCONTINUED | OUTPATIENT
Start: 2022-06-20 | End: 2022-06-29

## 2022-06-20 RX ORDER — FAMOTIDINE 10 MG/ML
40 INJECTION INTRAVENOUS AT BEDTIME
Refills: 0 | Status: DISCONTINUED | OUTPATIENT
Start: 2022-06-20 | End: 2022-06-29

## 2022-06-20 RX ORDER — CLOPIDOGREL BISULFATE 75 MG/1
75 TABLET, FILM COATED ORAL DAILY
Refills: 0 | Status: DISCONTINUED | OUTPATIENT
Start: 2022-06-20 | End: 2022-06-29

## 2022-06-20 RX ORDER — APIXABAN 2.5 MG/1
5 TABLET, FILM COATED ORAL
Refills: 0 | Status: DISCONTINUED | OUTPATIENT
Start: 2022-06-20 | End: 2022-06-29

## 2022-06-20 RX ORDER — SODIUM CHLORIDE 9 MG/ML
500 INJECTION, SOLUTION INTRAVENOUS ONCE
Refills: 0 | Status: COMPLETED | OUTPATIENT
Start: 2022-06-20 | End: 2022-06-20

## 2022-06-20 RX ADMIN — SODIUM CHLORIDE 500 MILLILITER(S): 9 INJECTION, SOLUTION INTRAVENOUS at 15:51

## 2022-06-20 RX ADMIN — SIMVASTATIN 10 MILLIGRAM(S): 20 TABLET, FILM COATED ORAL at 21:42

## 2022-06-20 RX ADMIN — Medication 100 MILLIGRAM(S): at 20:16

## 2022-06-20 RX ADMIN — HALOPERIDOL DECANOATE 2.5 MILLIGRAM(S): 100 INJECTION INTRAMUSCULAR at 15:50

## 2022-06-20 RX ADMIN — FAMOTIDINE 40 MILLIGRAM(S): 10 INJECTION INTRAVENOUS at 21:42

## 2022-06-20 NOTE — ED ADULT NURSE NOTE - CHIEF COMPLAINT QUOTE
Pt had syncopal episode at nursing home and fell to floor. As per staff, no head injury. Pt takes eliquis.

## 2022-06-20 NOTE — ED PROVIDER NOTE - CLINICAL SUMMARY MEDICAL DECISION MAKING FREE TEXT BOX
81-year-old man past medical history as documented status post syncope at SNF this morning.  EKG with no ischemia or acute changes.  All diagnostic testing reviewed.  Patient became agitated while in the ED requiring Haldol IM.  EP consulted.  Patient admitted to telemetry.

## 2022-06-20 NOTE — ED PROVIDER NOTE - PROGRESS NOTE DETAILS
EP CONSULTED. As per SNF, patient's AICD fired.  Patient does not have an AICD, has a pacemaker. While in active ED patient, patient became agitated, requesting to leave.  Patient picked up an oxygen tank and threw it several nurses.  Patient placed in four-point restraints.  Afterwards, patient given Haldol IM for agitation.  We will continue to monitor.  One-to-one at bedside.

## 2022-06-20 NOTE — ED ADULT NURSE REASSESSMENT NOTE - NS ED NURSE REASSESS COMMENT FT1
Patient assessed bedside.  A/O x 2.  Cardiac monitoring maintained, 1:1 constant observation maintained.  No acute distress or pain at this time.  Pt awaiting neurology consult, EEG, PT eval, and Electrophysiology consult.  Stretcher alarm in place.  Pt admitted to Tele awaiting bed placement.  Safety and comfort maintained.

## 2022-06-20 NOTE — H&P ADULT - NSHPLABSRESULTS_GEN_ALL_CORE
12.4   9.24  )-----------( 288      ( 20 Jun 2022 10:00 )             37.1       06-20    135  |  95<L>  |  16  ----------------------------<  179<H>  3.9   |  27  |  0.8    Ca    9.4      20 Jun 2022 10:00    TPro  7.3  /  Alb  3.8  /  TBili  0.6  /  DBili  x   /  AST  13  /  ALT  9   /  AlkPhos  94  06-20                      Lactate Trend  06-20 @ 10:00 Lactate:2.9       CARDIAC MARKERS ( 20 Jun 2022 10:00 )  x     / <0.01 ng/mL / x     / x     / x            CAPILLARY BLOOD GLUCOSE      ACC: [id number] EXAM:  CT ABDOMEN AND PELVIS IC                          *** ADDENDUM***    Again demonstrated is significant intrahepatic and extra hepatic biliary   ductal dilatation. A short elevated liver function tests, ERCP is   recommended, given the presence of pacer wires and surgical clips from   the cholecystectomy    --- End of Report ---    *** END OF ADDENDUM***      PROCEDURE DATE:  06/20/2022      ACC: [id number] EXAM:  CT BRAIN                          PROCEDURE DATE:  06/20/2022          INTERPRETATION:  CLINICAL INDICATION: Syncope versus seizure    TECHNIQUE: CT of the head was performed without the administration of   intravenous contrast.    COMPARISON: None available    FINDINGS:    There is prominence of the sulci, sylvian fissures, and ventricles,   reflecting mild diffuse parenchymal volume loss.    There is no evidence of acute territorial infarct or intracranial   hemorrhage. There is no space-occupying lesion or midline shift.    There is no evidence of hydrocephalus. There are no extra-axial fluid   collections.    Status post left cataract surgery. The imaged portions of the paranasal   sinuses are aerated. The mastoid air cells are aerated. The visualized   soft tissues and osseous structures appear normal.      IMPRESSION:    No acute intracranial pathology.    Mild cerebral atrophy.    --- End of Report ---

## 2022-06-20 NOTE — H&P ADULT - HISTORY OF PRESENT ILLNESS
81-year-old man past medical history significant for hypertension, dyslipidemia, diabetes, CAD, CHF, COPD, PPM, glaucoma, A. fib on Eliquis, GERD, chronic pancreatitis, admitted last month for small bowel obstruction, status post near syncope versus seizure at the SNF today.  As per patient, he approached the nursing station because he had been having abdominal pain and was going to request to go to the hospital.  As per SNF staff via telephone, patient approached the nursing station and then became dizzy and fell to the ground.  Questionable LOC.  There was a few second episode of patient's arms and legs shaking patient and he was confused after the episode.  Patient with no history of seizures.  Patient now complaining of abdominal pain.  No nausea, vomiting.  Patient reports normal BMs.  No fever, chills.  No chest pain, shortness of breath.  No cough or URI symptoms.  Patient reports normal urination.    In the ED   - ischemic work-up neg: EKG NSL - troponin <0.1 -   81-year-old man past medical history significant for hypertension, dyslipidemia, diabetes, CAD, CHF, COPD, PPM, glaucoma, A. fib on Eliquis, GERD, chronic pancreatitis, admitted last month for small bowel obstruction, status post near syncope versus seizure at the SNF today.    As per patient after interview by ED attending, he approached the nursing station because he had been having abdominal pain and was going to request to go to the hospital.  As per SNF staff via telephone, patient approached the nursing station and then became dizzy and fell to the ground.  He is a very poor historian upon bedside interview his baseline mental status is ANox3, but now is ANox1. Patient has some delusions after questioning; he stated that people at [facility] (his SNF) are not his friends  He also complains of diffuse abdominal pain located to epigastric area and has tenderness diffusely.      Questionable LOC.  There was a few second episode of patient's arms and legs shaking patient and he was confused after the episode.    Patient has no history of seizures.    Patient reports normal BMs.  No fever, chills.  No chest pain, shortness of breath.  Mild cough or URI symptoms.  Patient reports decreasing urine output .    In the ED, vitals significant for paced rythm tachycardia and HTN reaching 180 SBP - afebrile - on RA satting well   He received haldol for agitation and was placed on 1:1 constant observation - calmed down after receiving haldol   CBC/CMP non-significant   - ischemic work-up neg: EKG NSL - troponin <0.1 -    - Lactate 2.9   - UA pending   - CXR showed non-significant RUL opacity   - CTH negative - CTAP significant intrahepatic and extra arachnoid ductal dilatation 81-year-old man past medical history significant for hypertension, dyslipidemia, diabetes, CAD, CHF, COPD, PPM, glaucoma, A. fib on Eliquis, GERD, chronic pancreatitis, admitted last month for small bowel obstruction, status post near syncope versus seizure at the SNF today.    As per patient after interview by ED attending, he approached the nursing station because he had been having abdominal pain and was going to request to go to the hospital.  As per SNF staff via telephone, patient approached the nursing station and then became dizzy and fell to the ground.  He is a very poor historian upon bedside interview his baseline mental status is ANox3, but now is ANox1. Patient has some delusions after questioning; he stated that people at [facility] (his SNF) are not his friends  He also complains of diffuse abdominal pain located to epigastric area and has tenderness diffusely.      Questionable LOC.  There was a few second episode of patient's arms and legs shaking patient and he was confused after the episode.    Patient has no history of seizures.    Patient reports normal BMs.  No fever, chills.  No chest pain, shortness of breath.  Mild cough or URI symptoms.  Patient reports decreasing urine output . No dysuria/frequency     In the ED, vitals significant for paced rythm tachycardia and HTN reaching 180 SBP - afebrile - on RA satting well   He received haldol for agitation and was placed on 1:1 constant observation - calmed down after receiving haldol   CBC/CMP non-significant   - ischemic work-up neg: EKG NSL - troponin <0.1 -    - Lactate 2.9   - UA pending   - CXR showed non-significant RUL opacity   - CTH negative - CTAP significant intrahepatic and extra arachnoid ductal dilatation 81-year-old man past medical history significant for hypertension, dyslipidemia, diabetes, CAD, CHF, COPD, PPM, glaucoma, A. fib on Eliquis, GERD, chronic pancreatitis, admitted last month for small bowel obstruction s/p resection, status post near syncope versus seizure at the SNF today.    As per patient after interview by ED attending, he approached the nursing station because he had been having abdominal pain and was going to request to go to the hospital.  As per SNF staff via telephone, patient approached the nursing station and then became dizzy and fell to the ground.  He is a very poor historian upon bedside interview his baseline mental status is ANox3, but now is ANox1. Patient has some delusions after questioning; he stated that people at [facility] (his SNF) are not his friends  He also complains of diffuse abdominal pain located to epigastric area and has tenderness diffusely.      Questionable LOC.  There was a few second episode of patient's arms and legs shaking patient and he was confused after the episode.    Patient has no history of seizures.    Patient reports normal BMs.  No fever, chills.  No chest pain, shortness of breath.  Mild cough or URI symptoms.  Patient reports decreasing urine output . No dysuria/frequency     In the ED, vitals significant for paced rythm tachycardia and HTN reaching 180 SBP - afebrile - on RA satting well   He received haldol for agitation and was placed on 1:1 constant observation - calmed down after receiving haldol   CBC/CMP non-significant   - ischemic work-up neg: EKG NSL - troponin <0.1 -    - Lactate 2.9   - UA pending   - CXR showed non-significant RUL opacity   - CTH negative - CTAP significant intrahepatic and extra arachnoid ductal dilatation

## 2022-06-20 NOTE — H&P ADULT - NSICDXFAMILYHX_GEN_ALL_CORE_FT
FAMILY HISTORY:  Sibling  Still living? Unknown  FH: hypertension, Age at diagnosis: Age Unknown

## 2022-06-20 NOTE — ED ADULT NURSE REASSESSMENT NOTE - NS ED NURSE REASSESS COMMENT FT1
Patient got out of bed, ripped his gown off, pulled out his IV access threw it on the floor, picked up an oxygen tank and launched it at 2 RNS. RNS dodged the oxygen tank. Security called, Pt 4 pointed in leather restraints for safety. Pt with baseline confusion. Bed alarm on and audible during duration of ER stay. Pt placed on 1:1 sit. Connected to cardiac monitor

## 2022-06-20 NOTE — ED PROVIDER NOTE - NS ED ROS FT
Constitutional: No fever.  Eyes:  No visual changes, eye pain or discharge.  ENMT:  No hearing changes, pain, no sore throat or runny nose, no difficulty swallowing  Cardiac:  No chest pain, SOB or edema. No chest pain with exertion.  Respiratory:  No cough or respiratory distress. No hemoptysis. No history of asthma or RAD.  GI: +abd pain. No nausea, vomiting, diarrhea  :  No dysuria, frequency or burning.  MS:  No myalgia, muscle weakness, joint pain or back pain.  Neuro: +dizziness, near-syncope, ?LOC. No headache or weakness.  Skin:  No skin rash.

## 2022-06-20 NOTE — H&P ADULT - ASSESSMENT
81-year-old man past medical history significant for hypertension, dyslipidemia, diabetes, CAD, CHF, COPD, PPM, glaucoma, A. fib on Eliquis, GERD, chronic pancreatitis, admitted last month for small bowel obstruction, status post near syncope versus seizure at the SNF today    #Witnessed fall   #Post-ictal confusion   Lab: lactate 2.9   Imaging: CTH negative - CTAP significant intrahepatic and extra arachnoid ductal dilatation  Plan  - Labs f/u: Lactate - CPK - INR/PTT   - Orthostatics - PT evaluation needed - ambulate with assistance - fall precautions   - Neurology consult - EEG f/u     #CAD (Plavix)  #HFmrEF (Corlanor 5)   #Sick sinus syndrome s/p PPM   #AFib (on Eliquis)   #HTN (nifedipine 30XL) / DLD (simvastatin 10) / DM (trulicity)  PPM interrogated by Electrophysiology - no abnormalities   - ischemic work-up neg: EKG NSL - troponin <0.1 -    - not overloaded on exam   - A1c/Lipid panel in AM     #COPD (breo-ellipta)   - not on oxygen     #chronic pancreatitis   - CTAP no acute signs of inflammation - lipase 10   - evaluate for need of Creon     #GERD (pepcid/pantoprazole)     #DVT prophylaxis - lovenox sq     #GI prophylaxis - pantoprazole     #Diet - DASH/CC    #Activity - IAT with assistance - pending orthostatics - baseline walks     #Code: Full code    #Disposition: Skilled nursing home  81-year-old man past medical history significant for hypertension, dyslipidemia, diabetes, CAD, CHF, COPD, PPM, glaucoma, A. fib on Eliquis, GERD, chronic pancreatitis, admitted last month for small bowel obstruction, status post near syncope versus seizure at the SNF today    #Witnessed fall   #Post-ictal confusion   Imaging: CTH negative - CTAP significant intrahepatic and extra arachnoid ductal dilatation  Plan  - Labs f/u: Lactate - CPK - INR/PTT   - Orthostatics - PT evaluation needed - ambulate with assistance - fall precautions   - Neurology consult - EEG f/u     #Elevated lactate   #possible D-lactate   small bowel resection on previous admission   Lactate 2.9 - repeat 2.9  - AG 13  - BCx f/u   - f/u UA     #Paced rythm - tachycardic   #Sick sinus syndrome s/p PPM   Not on any anti-arrythmics   - consult EP     #CAD (Plavix)  #HFmrEF (Corlanor 5)   #Sick sinus syndrome s/p PPM   #AFib (on Eliquis)   #HTN (nifedipine 30XL) / DLD (simvastatin 10) / DM (trulicity)  PPM interrogated by Electrophysiology - no abnormalities   - ischemic work-up neg: EKG NSL - troponin <0.1 -    - not overloaded on exam   - A1c/Lipid panel in AM - sliding scale     #COPD (breo-ellipta)   - not on oxygen     #chronic pancreatitis   - CTAP no acute signs of inflammation - lipase 10   - evaluate for need of Creon     #GERD (pepcid/pantoprazole)     #DVT prophylaxis - lovenox sq     #GI prophylaxis - pantoprazole     #Diet - DASH/CC    #Activity - IAT with assistance - pending orthostatics - baseline walks     #Code: Full code    #Disposition: Skilled nursing home from [facility]  81-year-old man past medical history significant for hypertension, dyslipidemia, diabetes, CAD, CHF, COPD, PPM, glaucoma, A. fib on Eliquis, GERD, chronic pancreatitis, admitted last month for small bowel obstruction, status post near syncope versus seizure at the SNF today    Weight is not documented please document for sliding scale and switching heparin sq to lovenox for DVt proph     #Witnessed fall   #Post-ictal confusion   Imaging: CTH negative - CTAP significant intrahepatic and extra arachnoid ductal dilatation  Plan  - Labs f/u: Lactate - CPK - INR/PTT   - Orthostatics - PT evaluation needed - ambulate with assistance - fall precautions   - Neurology consult - EEG f/u     #Elevated lactate   #possible D-lactate   small bowel resection on previous admission   Lactate 2.9 - repeat 2.9  - AG 13  - BCx f/u   - f/u UA     #Paced rythm - tachycardic   #Sick sinus syndrome s/p PPM   Not on any anti-arrythmics   - consult EP     #CAD (Plavix)  #HFmrEF (Corlanor 5)   #Sick sinus syndrome s/p PPM   #AFib (on Eliquis)   #HTN (nifedipine 30XL) / DLD (simvastatin 10) / DM (trulicity)  PPM interrogated by Electrophysiology - no abnormalities   - ischemic work-up neg: EKG NSL - troponin <0.1 -    - not overloaded on exam   - A1c/Lipid panel in AM - sliding scale     #COPD (breo-ellipta)   - not on oxygen     #chronic pancreatitis   - CTAP no acute signs of inflammation - lipase 10   - evaluate for need of Creon     #GERD (pepcid/pantoprazole)     #DVT prophylaxis - lovenox sq     #GI prophylaxis - pantoprazole     #Diet - DASH/CC    #Activity - IAT with assistance - pending orthostatics - baseline walks     #Code: Full code    #Disposition: Skilled nursing home from [facility]  81-year-old man past medical history significant for hypertension, dyslipidemia, diabetes, CAD, CHF, COPD, PPM, glaucoma, A. fib on Eliquis, GERD, chronic pancreatitis, admitted last month for small bowel obstruction, status post near syncope versus seizure at the SNF today    Weight is not documented please document for sliding scale and switching heparin sq to lovenox for DVt proph     #Witnessed fall   #Post-ictal confusion   Imaging: CTH negative - CTAP significant intrahepatic and extra arachnoid ductal dilatation  Plan  - Labs f/u: Lactate - CPK - INR/PTT   - Orthostatics - PT evaluation needed - ambulate with assistance - fall precautions   - Neurology consult - EEG f/u     #Elevated lactate   #possible D-lactate   small bowel resection on previous admission   Lactate 2.9 - repeat 2.9  - AG 13  - BCx f/u   - f/u UA     #Paced rythm - tachycardic   #Sick sinus syndrome s/p PPM   Not on any anti-arrythmics   - consult EP     #CAD (Plavix)  #HFmrEF (Corlanor 5)   #Sick sinus syndrome s/p PPM   #AFib (on Eliquis)   #HTN (nifedipine 30XL) / DLD (simvastatin 10) / DM (trulicity)  PPM interrogated by Electrophysiology - no abnormalities   - ischemic work-up neg: EKG NSL - troponin <0.1 -    - not overloaded on exam   - A1c/Lipid panel in AM - sliding scale     #COPD (breo-ellipta)   - not on oxygen     #chronic pancreatitis   - CTAP no acute signs of inflammation - lipase 10   - evaluate for need of Creon     #GERD (pepcid/pantoprazole)     #DVT prophylaxis - heparin sq     #GI prophylaxis - pantoprazole     #Diet - DASH/CC    #Activity - IAT with assistance - pending orthostatics - baseline walks     #Code: Full code    #Disposition: Skilled nursing home from [facility]  81-year-old man past medical history significant for hypertension, dyslipidemia, diabetes, CAD, CHF, COPD, PPM, glaucoma, A. fib on Eliquis, GERD, chronic pancreatitis, admitted last month for small bowel obstruction, status post near syncope versus seizure at the SNF today    Weight is not documented please document for sliding scale     #Witnessed fall   #Post-ictal confusion   Imaging: CTH negative - CTAP significant intrahepatic and extra arachnoid ductal dilatation  Plan  - Labs f/u: Lactate - CPK - INR/PTT   - Orthostatics - PT evaluation needed - ambulate with assistance - fall precautions   - Neurology consult - EEG f/u     #Elevated lactate   #possible D-lactate   small bowel resection on previous admission   Lactate 2.9 - repeat 2.9  - AG 13  - BCx f/u   - f/u UA     #Paced rythm - tachycardic   #Sick sinus syndrome s/p PPM   Not on any anti-arrythmics   - consult EP     #CAD (Plavix)  #HFmrEF (Corlanor 5)   #Sick sinus syndrome s/p PPM   #AFib (on Eliquis)   #HTN (nifedipine 30XL) / DLD (simvastatin 10) / DM (trulicity)  PPM interrogated by Electrophysiology - no abnormalities   - ischemic work-up neg: EKG NSL - troponin <0.1 -    - not overloaded on exam   - A1c/Lipid panel in AM - sliding scale     #COPD (breo-ellipta)   - not on oxygen     #chronic pancreatitis   - CTAP no acute signs of inflammation - lipase 10   - evaluate for need of Creon     #GERD (pepcid/pantoprazole)     #DVT prophylaxis - Eliquis    #GI prophylaxis - pantoprazole     #Diet - DASH/CC    #Activity - IAT with assistance - pending orthostatics - baseline walks     #Code: Full code    #Disposition: Skilled nursing home from [facility]  81-year-old man past medical history significant for hypertension, dyslipidemia, diabetes, CAD, CHF, COPD, PPM, glaucoma, A. fib on Eliquis, GERD, chronic pancreatitis, admitted last month for small bowel obstruction, status post near syncope versus seizure at the SNF today    Weight is not documented please document for sliding scale     #Witnessed fall   #Post-ictal confusion   #Lactate elevated possible D-lactate vs seizure induced   Imaging: CTH negative - CTAP significant intrahepatic and extra arachnoid ductal dilatation  Plan  - Labs f/u: Lactate - CPK - INR/PTT   - Orthostatics - PT evaluation needed - ambulate with assistance - fall precautions   - Neurology consult - EEG f/u     #Elevated lactate   #possible D-lactate vs seizure induced   small bowel resection on previous admission   Lactate 2.9 - repeat 2.9  - AG 13  - BCx f/u   - f/u UA     #Paced rythm - tachycardic   #Sick sinus syndrome s/p PPM   Not on any anti-arrythmics   - consult EP     #CAD (Plavix)  #HFmrEF (Corlanor 5)   #Sick sinus syndrome s/p PPM   #AFib (on Eliquis)   #HTN (nifedipine 30XL) / DLD (simvastatin 10) / DM (trulicity)  PPM interrogated by Electrophysiology - no abnormalities   - ischemic work-up neg: EKG NSL - troponin <0.1 -    - not overloaded on exam   - A1c/Lipid panel in AM - sliding scale   --Nifedipine switched to 60 for HTN -     #COPD (breo-ellipta)   - not on oxygen     #chronic pancreatitis   - CTAP no acute signs of inflammation - lipase 10   - evaluate for need of Creon     #GERD (pepcid/pantoprazole)     #DVT prophylaxis - Eliquis    #GI prophylaxis - pantoprazole     #Diet - DASH/CC    #Activity - IAT with assistance - pending orthostatics - baseline walks     #Code: Full code    #Disposition: Skilled nursing home from [facility]  81-year-old man past medical history significant for hypertension, dyslipidemia, diabetes, CAD, CHF, COPD, PPM, glaucoma, A. fib on Eliquis, GERD, chronic pancreatitis, admitted last month for small bowel obstruction, status post near syncope versus seizure at the SNF today    Weight is not documented please document for sliding scale     #Witnessed fall   #Post-ictal confusion   #Lactate elevated possible D-lactate vs seizure induced   Imaging: CTH negative - CTAP significant intrahepatic and extra arachnoid ductal dilatation  Plan  - Labs f/u: Lactate - CPK - INR/PTT   - Orthostatics - PT evaluation needed - ambulate with assistance - fall precautions   - Neurology consult - EEG f/u     #Elevated lactate   #possible D-lactate vs seizure induced   small bowel resection on previous admission   Lactate 2.9 - repeat 2.9  - AG 13  - BCx f/u   - f/u UA     #Paced rythm - tachycardic   #Sick sinus syndrome s/p PPM   Not on any anti-arrythmics   - consult EP     #CAD (Plavix)  #HFmrEF (Corlanor 5)   #Sick sinus syndrome s/p PPM   #AFib (on Eliquis)   #HTN (nifedipine 30XL) / DLD (simvastatin 10) / DM (trulicity)  PPM interrogated by Electrophysiology - no abnormalities   - ischemic work-up neg: EKG NSL - troponin <0.1 -    - not overloaded on exam   - A1c/Lipid panel in AM - sliding scale   --Nifedipine switched to 60 for HTN - Labetalol 100 x1    #COPD (breo-ellipta)   - not on oxygen     #chronic pancreatitis   - CTAP no acute signs of inflammation - lipase 10   - evaluate for need of Creon     #GERD (pepcid/pantoprazole)     #DVT prophylaxis - Eliquis    #GI prophylaxis - pantoprazole     #Diet - DASH/CC    #Activity - IAT with assistance - pending orthostatics - baseline walks     #Code: Full code    #Disposition: Skilled nursing home from [facility]

## 2022-06-20 NOTE — ED PROVIDER NOTE - OBJECTIVE STATEMENT
80 y/o M with PMH A fib on Eliquis, COPD not on home O2, chronic pancreatitis, HTN, HLD, DM, CAD, CHF, GERD, recently discharged from hospital for SBO s/p exploratory laparotomy and small bowel resection BIBEMS from SNF for ?near-syncope v seizure. Per EMS, pt shocked by AICD -- pt does not have AICD per chart review. Pt states he remembers feeling shaky and then falling. c/o epigastric abd pain. Per SNF staff, pt appeared dizzy and fell to ground, unknown LOC, appeared to have shaking of arms and legs; deny head trauma; confused after event

## 2022-06-20 NOTE — ED PROVIDER NOTE - PRIOR EKG STATUS
COVID positive on 10/15. Patient admitted to hospital medicine and continued to deteriorate needing higher oxygen requirements and thus Critical Care Medicine was consulted. Intubated 10/26 and eventually placed on NMB and proned. Peak and plateau pressures remain at goal    - s/p remdesivir (10/18-10/22)  - s/p Dexamethasone (10/27)  - s/p vanc x7 days (end date 11/01)  - duo nebs q4 scheduled and q6 PRN  - continue Heparin gtt D-dimer >33  - continue LPV and pronation for PF <150   - received convalescent plasma 2 units 11/7  - Sputum culture sent 10/28 NGTD, reordered 10/30 for leukocytosis and febrile all negative; reordered 11/7 per ID (increasing WBC)  - Zosyn escalated to donta. Fluconazole escalated to micafungun 11/7. Vancomycin restarted and renally dosed.  - Suspect aspiration event 11/4 with worsening oxygenation.    - CTS evaluated; not a candidate for ECMO     - Jessica started 11/4  - Passed away 11/09/2020 due to multi-organ failure 2/2 COVID-19      5/2022/the EKG is unchanged from prior EKG

## 2022-06-20 NOTE — H&P ADULT - ATTENDING COMMENTS
GENERAL: NAD   HEAD:  Atraumatic, Normocephalic  EYES: EOMI, Sclera White   NECK: Supple, No JVD  CHEST/LUNG: clear b/l breath sounds; No wheezing, rhonchi, or crackles  HEART: Regular rate and rhythm; s1, s2, No murmurs, rubs, or gallops  ABDOMEN: Soft, mildy distended, diffusely tender; Bowel sounds present, No rebound or guarding noted   EXTREMITIES:  No lower extremity edema or calf tenderness to palpation.  No clubbing or cyanosis  PSYCH: AAOx3, pleasant, cooperative, not anxious  NEUROLOGY: CN intact, 5/5 strength in all extremities, Sensation grossly intact.   SKIN: No rashes or lesions    Pt is a poor historian; was not able to remember his chief complain. Offers no new complains at my encounter.     81-year-old man with hx of dementia, hypertension, dyslipidemia, diabetes, CAD, CHF, COPD, PPM, glaucoma, A. fib on Eliquis, GERD, chronic pancreatitis, admitted last month for small bowel obstruction, status post near syncope versus seizure at the SNF today    # Witnessed Syncope   - seizure vs neurovascular vs arrhythmia vs orthostatic hypotension vs infection   - CTH negative   - CTAP significant intrahepatic and extra arachnoid ductal dilatation  - check orthostatics   - REEG  - interrogation of PPM  - telemonitoring   - Neurology consult   - GI consult     # Nicotine Abuse  - counselled to quit smoking (3 mins)  - refused nicotine patch     # Rest as above    **Pt seen on 06/20/22

## 2022-06-20 NOTE — H&P ADULT - NSHPPHYSICALEXAM_GEN_ALL_CORE
LOS:     VITALS:   T(C): 37.1 (06-20-22 @ 10:24), Max: 37.1 (06-20-22 @ 10:24)  HR: 116 (06-20-22 @ 16:42) (94 - 140)  BP: 176/100 (06-20-22 @ 16:42) (122/88 - 180/98)  RR: 19 (06-20-22 @ 16:42) (18 - 22)  SpO2: 97% (06-20-22 @ 16:42) (97% - 100%)    GENERAL: NAD, lying in bed comfortably  NECK: Supple, No JVD  CHEST/LUNG: Clear to auscultation bilaterally; No rales, rhonchi, wheezing, or rubs. Unlabored respirations  HEART: Regular rate and rhythm; No murmurs, rubs, or gallops  ABDOMEN: BSx4; Soft, nontender, nondistended  EXTREMITIES:  2+ Peripheral Pulses, brisk capillary refill. No clubbing, cyanosis, or edema  NERVOUS SYSTEM:  A&Ox3, no focal deficits   SKIN: No rashes or lesions LOS:     VITALS:   T(C): 37.1 (06-20-22 @ 10:24), Max: 37.1 (06-20-22 @ 10:24)  HR: 116 (06-20-22 @ 16:42) (94 - 140)  BP: 176/100 (06-20-22 @ 16:42) (122/88 - 180/98)  RR: 19 (06-20-22 @ 16:42) (18 - 22)  SpO2: 97% (06-20-22 @ 16:42) (97% - 100%)    GENERAL: NAD, lying in bed comfortably  NECK: Supple, No JVD  CHEST/LUNG: Clear to auscultation bilaterally; No rales, rhonchi, wheezing, crackles or rubs. Unlabored respirations  HEART: Regular rate and rhythm; No murmurs, rubs, or gallops  ABDOMEN: BSx4; Soft, diffusely tender, distended, non-rigid   EXTREMITIES:  2+ Peripheral Pulses, brisk capillary refill. +LE edema   NERVOUS SYSTEM:  A&Ox3, no focal deficits   SKIN: No rashes or lesions

## 2022-06-20 NOTE — ED ADULT TRIAGE NOTE - CHIEF COMPLAINT QUOTE
Pt had syncopal episode at nursing home and fell to floor. As per staff, no head injury. Pt states he felt his AICD fire. Pt had syncopal episode at nursing home and fell to floor. As per staff, no head injury. Pt takes eliquis.

## 2022-06-20 NOTE — ED PROVIDER NOTE - ATTENDING CONTRIBUTION TO CARE
I personally evaluated the patient. I reviewed the Resident’s or Physician Assistant’s note (as assigned above), and agree with the findings and plan except as documented in my note.   81-year-old man past medical history significant for hypertension, dyslipidemia, diabetes, CAD, CHF, COPD, PPM, glaucoma, A. fib on Eliquis, GERD, I personally evaluated the patient. I reviewed the Resident’s or Physician Assistant’s note (as assigned above), and agree with the findings and plan except as documented in my note.   81-year-old man past medical history significant for hypertension, dyslipidemia, diabetes, CAD, CHF, COPD, PPM, glaucoma, A. fib on Eliquis, GERD, chronic pancreatitis, admitted last month for small bowel obstruction, status post near syncope versus seizure at the SNF today.  As per patient, he approached the nursing station because he had been having abdominal pain and was going to request to go to the hospital.  As per SNF staff via telephone, patient approached the nursing station and then became dizzy and fell to the ground.  Questionable LOC.  There was a few second episode of patient's arms and legs shaking patient and he was confused after the episode.  Patient with no history of seizures.  Patient now complaining of abdominal pain.  No nausea, vomiting.  Patient reports normal BMs.  No fever, chills.  No chest pain, shortness of breath.  No cough or URI symptoms.  Patient reports normal urination.  Vitals noted.  CONSTITUTIONAL: Chronically ill, in no apparent distress.   HEAD: Normocephalic; atraumatic.   EYES: PERRL; EOM intact. Conjunctiva normal B/L.   ENT: Normal pharynx with no tonsillar hypertrophy. MMM.  NECK: Supple; non-tender; no cervical lymphadenopathy.   CHEST: Normal chest excursion with respiration. +PPM.  CARDIOVASCULAR: Normal S1, S2; no murmurs, rubs, or gallops.   RESPIRATORY: + Rhonchi B/L.   GI/: Normal bowel sounds; non-distended; + diffuse tenderness. No rebound or guarding. Well healed midline surgical scar. + ventral hernia.   BACK: No evidence of trauma or deformity. Non-tender to palpation. No CVA tenderness.   EXT: Normal ROM in all four extremities; non-tender to palpation; distal pulses are normal. No leg edema B/L.   SKIN: Normal for age and race; warm; dry; good turgor.  NEURO: A & O x 4; CN 2-12 intact. Grossly unremarkable.

## 2022-06-20 NOTE — CHART NOTE - NSCHARTNOTEFT_GEN_A_CORE
Electrophysiology    Pts device _DC PPM ([employer])_____ was interrogated on 06-20-22  Device working properly  Events: ST ~9am (arrival to ED timing correlates),  setting adjusted to increase device longevity  Preliminary results d/w with primary team  Awaiting / Reviewed by attending    Contact EP ACP with any questions [phone]

## 2022-06-20 NOTE — ED PROVIDER NOTE - PHYSICAL EXAMINATION
CONSTITUTIONAL: Well-developed; well-nourished; in no acute distress.   SKIN: Warm, dry  HEAD: Normocephalic; atraumatic  EYES: PERRL, EOMI, normal sclera and conjunctiva   ENT: No nasal discharge; airway clear.  NECK: Supple; non tender.  CARD:  Regular rate and rhythm. Normal S1, S2. No chest wall tenderness  RESP: No increased WOB. Rhonchi b/l without wheezes, crackles  ABD: Normoactive BS. Soft, nondistended, diffuse tenderness  EXT: Normal ROM.   LYMPH: No acute cervical adenopathy.  NEURO: AAO x3, normal speech, no facial asymmetry  PSYCH: Cooperative, appropriate.

## 2022-06-20 NOTE — ED ADULT NURSE NOTE - OBJECTIVE STATEMENT
Pt. brought in by EMS.  Pt. fell to floor in nursing home. Witnessed by nursing home staff. On blood thinners.  No head injury.  Denies pain.  Defribrillator went off prior to fall.

## 2022-06-20 NOTE — H&P ADULT - NSHPSOCIALHISTORY_GEN_ALL_CORE
Patient has a 30y smoking history Substance Use (street drugs): ( x ) never used  (  ) other:  Tobacco Usage:  (   ) never smoked   (   ) former smoker   ( x  ) current smoker  (     ) pack year  Alcohol Usage: socially

## 2022-06-21 ENCOUNTER — TRANSCRIPTION ENCOUNTER (OUTPATIENT)
Age: 82
End: 2022-06-21

## 2022-06-21 LAB
A1C WITH ESTIMATED AVERAGE GLUCOSE RESULT: 7.3 % — HIGH (ref 4–5.6)
ALBUMIN SERPL ELPH-MCNC: 3.2 G/DL — LOW (ref 3.5–5.2)
ALP SERPL-CCNC: 79 U/L — SIGNIFICANT CHANGE UP (ref 30–115)
ALT FLD-CCNC: 7 U/L — SIGNIFICANT CHANGE UP (ref 0–41)
ANION GAP SERPL CALC-SCNC: 12 MMOL/L — SIGNIFICANT CHANGE UP (ref 7–14)
APTT BLD: 32 SEC — SIGNIFICANT CHANGE UP (ref 27–39.2)
AST SERPL-CCNC: 12 U/L — SIGNIFICANT CHANGE UP (ref 0–41)
BASOPHILS # BLD AUTO: 0.01 K/UL — SIGNIFICANT CHANGE UP (ref 0–0.2)
BASOPHILS NFR BLD AUTO: 0.2 % — SIGNIFICANT CHANGE UP (ref 0–1)
BILIRUB SERPL-MCNC: 0.5 MG/DL — SIGNIFICANT CHANGE UP (ref 0.2–1.2)
BUN SERPL-MCNC: 13 MG/DL — SIGNIFICANT CHANGE UP (ref 10–20)
CALCIUM SERPL-MCNC: 8.9 MG/DL — SIGNIFICANT CHANGE UP (ref 8.5–10.1)
CHLORIDE SERPL-SCNC: 97 MMOL/L — LOW (ref 98–110)
CHOLEST SERPL-MCNC: 127 MG/DL — SIGNIFICANT CHANGE UP
CK SERPL-CCNC: 81 U/L — SIGNIFICANT CHANGE UP (ref 0–225)
CO2 SERPL-SCNC: 25 MMOL/L — SIGNIFICANT CHANGE UP (ref 17–32)
CREAT SERPL-MCNC: 0.6 MG/DL — LOW (ref 0.7–1.5)
EGFR: 98 ML/MIN/1.73M2 — SIGNIFICANT CHANGE UP
EOSINOPHIL # BLD AUTO: 0.26 K/UL — SIGNIFICANT CHANGE UP (ref 0–0.7)
EOSINOPHIL NFR BLD AUTO: 4.9 % — SIGNIFICANT CHANGE UP (ref 0–8)
ESTIMATED AVERAGE GLUCOSE: 163 MG/DL — HIGH (ref 68–114)
GLUCOSE BLDC GLUCOMTR-MCNC: 112 MG/DL — HIGH (ref 70–99)
GLUCOSE BLDC GLUCOMTR-MCNC: 143 MG/DL — HIGH (ref 70–99)
GLUCOSE BLDC GLUCOMTR-MCNC: 151 MG/DL — HIGH (ref 70–99)
GLUCOSE SERPL-MCNC: 102 MG/DL — HIGH (ref 70–99)
HCT VFR BLD CALC: 35.5 % — LOW (ref 42–52)
HDLC SERPL-MCNC: 52 MG/DL — SIGNIFICANT CHANGE UP
HGB BLD-MCNC: 12.1 G/DL — LOW (ref 14–18)
IMM GRANULOCYTES NFR BLD AUTO: 0.4 % — HIGH (ref 0.1–0.3)
INR BLD: 1.33 RATIO — HIGH (ref 0.65–1.3)
LACTATE SERPL-SCNC: 1.3 MMOL/L — SIGNIFICANT CHANGE UP (ref 0.7–2)
LIPID PNL WITH DIRECT LDL SERPL: 58 MG/DL — SIGNIFICANT CHANGE UP
LYMPHOCYTES # BLD AUTO: 1.53 K/UL — SIGNIFICANT CHANGE UP (ref 1.2–3.4)
LYMPHOCYTES # BLD AUTO: 28.5 % — SIGNIFICANT CHANGE UP (ref 20.5–51.1)
MAGNESIUM SERPL-MCNC: 1.7 MG/DL — LOW (ref 1.8–2.4)
MCHC RBC-ENTMCNC: 30.1 PG — SIGNIFICANT CHANGE UP (ref 27–31)
MCHC RBC-ENTMCNC: 34.1 G/DL — SIGNIFICANT CHANGE UP (ref 32–37)
MCV RBC AUTO: 88.3 FL — SIGNIFICANT CHANGE UP (ref 80–94)
MONOCYTES # BLD AUTO: 0.74 K/UL — HIGH (ref 0.1–0.6)
MONOCYTES NFR BLD AUTO: 13.8 % — HIGH (ref 1.7–9.3)
NEUTROPHILS # BLD AUTO: 2.8 K/UL — SIGNIFICANT CHANGE UP (ref 1.4–6.5)
NEUTROPHILS NFR BLD AUTO: 52.2 % — SIGNIFICANT CHANGE UP (ref 42.2–75.2)
NON HDL CHOLESTEROL: 75 MG/DL — SIGNIFICANT CHANGE UP
NRBC # BLD: 0 /100 WBCS — SIGNIFICANT CHANGE UP (ref 0–0)
PLATELET # BLD AUTO: 290 K/UL — SIGNIFICANT CHANGE UP (ref 130–400)
POTASSIUM SERPL-MCNC: 3.6 MMOL/L — SIGNIFICANT CHANGE UP (ref 3.5–5)
POTASSIUM SERPL-SCNC: 3.6 MMOL/L — SIGNIFICANT CHANGE UP (ref 3.5–5)
PROT SERPL-MCNC: 6.4 G/DL — SIGNIFICANT CHANGE UP (ref 6–8)
PROTHROM AB SERPL-ACNC: 15.2 SEC — HIGH (ref 9.95–12.87)
RBC # BLD: 4.02 M/UL — LOW (ref 4.7–6.1)
RBC # FLD: 13.3 % — SIGNIFICANT CHANGE UP (ref 11.5–14.5)
SODIUM SERPL-SCNC: 134 MMOL/L — LOW (ref 135–146)
TRIGL SERPL-MCNC: 88 MG/DL — SIGNIFICANT CHANGE UP
WBC # BLD: 5.36 K/UL — SIGNIFICANT CHANGE UP (ref 4.8–10.8)
WBC # FLD AUTO: 5.36 K/UL — SIGNIFICANT CHANGE UP (ref 4.8–10.8)

## 2022-06-21 PROCEDURE — 99221 1ST HOSP IP/OBS SF/LOW 40: CPT

## 2022-06-21 PROCEDURE — 99233 SBSQ HOSP IP/OBS HIGH 50: CPT

## 2022-06-21 RX ORDER — LOSARTAN POTASSIUM 100 MG/1
25 TABLET, FILM COATED ORAL DAILY
Refills: 0 | Status: DISCONTINUED | OUTPATIENT
Start: 2022-06-21 | End: 2022-06-22

## 2022-06-21 RX ORDER — NIFEDIPINE 30 MG
30 TABLET, EXTENDED RELEASE 24 HR ORAL DAILY
Refills: 0 | Status: DISCONTINUED | OUTPATIENT
Start: 2022-06-21 | End: 2022-06-22

## 2022-06-21 RX ORDER — AMITRIPTYLINE HCL 25 MG
0 TABLET ORAL
Qty: 0 | Refills: 0 | DISCHARGE

## 2022-06-21 RX ORDER — APIXABAN 2.5 MG/1
0 TABLET, FILM COATED ORAL
Qty: 0 | Refills: 0 | DISCHARGE

## 2022-06-21 RX ORDER — CLOPIDOGREL BISULFATE 75 MG/1
0 TABLET, FILM COATED ORAL
Qty: 0 | Refills: 0 | DISCHARGE

## 2022-06-21 RX ADMIN — MONTELUKAST 10 MILLIGRAM(S): 4 TABLET, CHEWABLE ORAL at 12:26

## 2022-06-21 RX ADMIN — CLOPIDOGREL BISULFATE 75 MILLIGRAM(S): 75 TABLET, FILM COATED ORAL at 12:26

## 2022-06-21 RX ADMIN — SIMVASTATIN 10 MILLIGRAM(S): 20 TABLET, FILM COATED ORAL at 21:32

## 2022-06-21 RX ADMIN — APIXABAN 5 MILLIGRAM(S): 2.5 TABLET, FILM COATED ORAL at 17:36

## 2022-06-21 RX ADMIN — Medication 50 MILLIGRAM(S): at 12:27

## 2022-06-21 RX ADMIN — Medication 60 MILLIGRAM(S): at 05:26

## 2022-06-21 RX ADMIN — Medication 25 MILLIGRAM(S): at 12:27

## 2022-06-21 RX ADMIN — APIXABAN 5 MILLIGRAM(S): 2.5 TABLET, FILM COATED ORAL at 05:26

## 2022-06-21 RX ADMIN — FAMOTIDINE 40 MILLIGRAM(S): 10 INJECTION INTRAVENOUS at 21:32

## 2022-06-21 NOTE — DISCHARGE NOTE PROVIDER - HOSPITAL COURSE
81-year-old man past medical history significant for hypertension, dyslipidemia, diabetes, CAD, CHF, COPD, PPM, glaucoma, A.fib on Eliquis, GERD, chronic pancreatitis, admitted last month for small bowel obstruction s/p resection, status post near syncope versus seizure at the SNF today.    As per patient after interview by ED attending, he approached the nursing station because he had been having abdominal pain and was going to request to go to the hospital.  As per SNF staff via telephone, patient approached the nursing station and then became dizzy and fell to the ground.  He is a very poor historian upon bedside interview his baseline mental status is ANox3, but now is ANox1. Patient has some delusions after questioning; he stated that people at [facility] (his SNF) are not his friends  He also complains of diffuse abdominal pain located to epigastric area and has tenderness diffusely.      Questionable LOC.  There was a few second episode of patient's arms and legs shaking patient and he was confused after the episode.    Patient has no history of seizures.    Patient reports normal BMs.  No fever, chills.  No chest pain, shortness of breath.  Mild cough or URI symptoms.  Patient reports decreasing urine output . No dysuria/frequency     In the ED, vitals significant for paced rhythm tachycardia and HTN reaching 180 SBP - afebrile - on RA satting well   He received haldol for agitation and was placed on 1:1 constant observation - calmed down after receiving haldol   CBC/CMP non-significant   - ischemic work-up neg: EKG NSL - troponin <0.1 -    - Lactate 2.9   - UA pending   - CXR showed non-significant RUL opacity   - CTH negative - CTAP significant intrahepatic and extra arachnoid ductal dilatation    During hospital stay, patient was evaluated by Neuro, underwent an eeg (which was negative for seizure-like activity) and monitored. Patient was found have positive orthostatics which was likely the cause of his fall - reanna stockings applied. Patient was seen by PT and ultimately remained stable - bp controlled, repeat labs unremarkable. Patient cleared for d/c with outpatient follow up.    81-year-old man past medical history significant for hypertension, dyslipidemia, diabetes, CAD, CHF, COPD, PPM, glaucoma, A.fib on Eliquis, GERD, chronic pancreatitis, admitted last month for small bowel obstruction s/p resection, status post near syncope versus seizure at the SNF today.    As per patient after interview by ED attending, he approached the nursing station because he had been having abdominal pain and was going to request to go to the hospital.  As per SNF staff via telephone, patient approached the nursing station and then became dizzy and fell to the ground.  He is a very poor historian upon bedside interview his baseline mental status is ANox3, but now is ANox1. Patient has some delusions after questioning; he stated that people at [facility] (his SNF) are not his friends  He also complains of diffuse abdominal pain located to epigastric area and has tenderness diffusely.      Questionable LOC.  There was a few second episode of patient's arms and legs shaking patient and he was confused after the episode.    Patient has no history of seizures.    Patient reports normal BMs.  No fever, chills.  No chest pain, shortness of breath.  Mild cough or URI symptoms.  Patient reports decreasing urine output . No dysuria/frequency     In the ED, vitals significant for paced rhythm tachycardia and HTN reaching 180 SBP - afebrile - on RA satting well   He received haldol for agitation and was placed on 1:1 constant observation - calmed down after receiving haldol   CBC/CMP non-significant   - ischemic work-up neg: EKG NSL - troponin <0.1 -    - Lactate 2.9   - UA pending   - CXR showed non-significant RUL opacity   - CTH negative - CTAP significant intrahepatic and extra arachnoid ductal dilatation    During hospital stay, patient was evaluated by Neuro, underwent an eeg (which was negative for seizure-like activity) and monitored. Patient was found have positive orthostatics which was likely the cause of his fall .Chetan stockings ordered.  Course complicated by lactic acidosis which resolved with IVFs, and hernia/strangulation was ruled out by surgery.   He endorsed having chronic abdominal pain so trial of simethicone was given and pain seem to have improved.   Advanced GI consulted and started creon for chronic pancreatitis.   Course was further complicated by 2 recurrent near syncope episodes- he felt dizzy after getting up from the bathroom. Each time, his BP was undetectable/low and he had to be given midodrine stat, IVF boluses. CTH was negative for any acute intracranial pathologies for the ?questionable fall.    81-year-old man past medical history significant for hypertension, dyslipidemia, diabetes, CAD, CHF, COPD, PPM, glaucoma, A.fib on Eliquis, GERD, chronic pancreatitis, admitted last month for small bowel obstruction s/p resection, status post near syncope versus seizure at the SNF today.    As per patient after interview by ED attending, he approached the nursing station because he had been having abdominal pain and was going to request to go to the hospital.  As per SNF staff via telephone, patient approached the nursing station and then became dizzy and fell to the ground.  He is a very poor historian upon bedside interview his baseline mental status is ANox3, but now is ANox1. Patient has some delusions after questioning; he stated that people at [facility] (his SNF) are not his friends  He also complains of diffuse abdominal pain located to epigastric area and has tenderness diffusely.      Questionable LOC.  There was a few second episode of patient's arms and legs shaking patient and he was confused after the episode.    Patient has no history of seizures.    Patient reports normal BMs.  No fever, chills.  No chest pain, shortness of breath.  Mild cough or URI symptoms.  Patient reports decreasing urine output . No dysuria/frequency     In the ED, vitals significant for paced rhythm tachycardia and HTN reaching 180 SBP - afebrile - on RA satting well   He received haldol for agitation and was placed on 1:1 constant observation - calmed down after receiving haldol   CBC/CMP non-significant   - ischemic work-up neg: EKG NSL - troponin <0.1 -    - Lactate 2.9   - UA pending   - CXR showed non-significant RUL opacity   - CTH negative - CTAP significant intrahepatic and extra arachnoid ductal dilatation    During hospital stay, patient was evaluated by Neuro, underwent an eeg (which was negative for seizure-like activity) and monitored. Patient was found have positive orthostatics which was likely the cause of his fall .Chetan stockings ordered.  Course complicated by lactic acidosis which resolved with IVFs, and hernia/strangulation was ruled out by surgery.   He endorsed having chronic abdominal pain so trial of simethicone was given and pain seem to have improved.   Advanced GI consulted and started creon for chronic pancreatitis.   Course was further complicated by 2 recurrent near syncope episodes- he felt dizzy after getting up from the bathroom. Each time, his BP was undetectable/low and he had to be given midodrine stat, IVF boluses. CTH was negative for any acute intracranial pathologies for the ?questionable fall. Cardiology consulted, c/w current medication management. Vitals stable and pt worked with PT. Stable for discharge with home services. 81-year-old man past medical history significant for hypertension, dyslipidemia, diabetes, CAD, CHF, COPD, PPM, glaucoma, A.fib on Eliquis, GERD, chronic pancreatitis, admitted last month for small bowel obstruction s/p resection, status post near syncope versus seizure at the SNF today.    As per patient after interview by ED attending, he approached the nursing station because he had been having abdominal pain and was going to request to go to the hospital.  As per SNF staff via telephone, patient approached the nursing station and then became dizzy and fell to the ground.  He is a very poor historian upon bedside interview his baseline mental status is ANox3, but now is ANox1. Patient has some delusions after questioning; he stated that people at [facility] (his SNF) are not his friends  He also complains of diffuse abdominal pain located to epigastric area and has tenderness diffusely.      Questionable LOC.  There was a few second episode of patient's arms and legs shaking patient and he was confused after the episode.    Patient has no history of seizures.    Patient reports normal BMs.  No fever, chills.  No chest pain, shortness of breath.  Mild cough or URI symptoms.  Patient reports decreasing urine output . No dysuria/frequency     In the ED, vitals significant for paced rhythm tachycardia and HTN reaching 180 SBP - afebrile - on RA satting well   He received haldol for agitation and was placed on 1:1 constant observation - calmed down after receiving haldol   CBC/CMP non-significant   - ischemic work-up neg: EKG NSL - troponin <0.1 -    - Lactate 2.9   - UA pending   - CXR showed non-significant RUL opacity   - CTH negative - CTAP significant intrahepatic and extra arachnoid ductal dilatation    During hospital stay, patient was evaluated by Neuro, underwent an eeg (which was negative for seizure-like activity) and monitored. Patient was found have positive orthostatics which was likely the cause of his fall .Chetan stockings ordered.  Course complicated by lactic acidosis which resolved with IVFs, and hernia/strangulation was ruled out by surgery.   He endorsed having chronic abdominal pain so trial of simethicone was given and pain seem to have improved.   Advanced GI consulted and started creon for chronic pancreatitis.   Course was further complicated by 2 recurrent near syncope episodes- he felt dizzy after getting up from the bathroom. Each time, his BP was undetectable/low and he had to be given midodrine stat, IVF boluses. CTH was negative for any acute intracranial pathologies for the ?questionable fall. Cardiology consulted, c/w current medication management. Vitals stable and pt worked with PT. Stable for discharge with home services.     I have personally seen and examined patient on the above date.  I discussed the case with Dr. Lew and I agree with findings and plan as detailed per note above, which I have amended where appropriate.

## 2022-06-21 NOTE — DISCHARGE NOTE PROVIDER - NSDCFUSCHEDAPPT_GEN_ALL_CORE_FT
Clyde Evans  [facility]  Surg Vasc [address]  Scheduled Appointment: 06/22/2022

## 2022-06-21 NOTE — DISCHARGE NOTE PROVIDER - CARE PROVIDER_API CALL
RENZO ALVAREZ  Internal Medicine  [address] [place] [id number]  Phone: [phone]  Fax: [phone])-  Follow Up Time:    RENZO ALVAREZ  Internal Medicine  [address] [place] [id number]  Phone: ()-  Fax: ()-  Follow Up Time:     Alex Roberson)  Cardiovascular Disease  [address] [facility], [address]  [place] [address]  Phone: [phone]  Fax: [phone]  Follow Up Time: 2 weeks    John Kang)  Gastroenterology; Internal Medicine  [address] [place] [address]  Phone: [phone]  Fax: [phone]  Follow Up Time: 2 weeks

## 2022-06-21 NOTE — ED ADULT NURSE REASSESSMENT NOTE - NS ED NURSE REASSESS COMMENT FT1
Assumed care from previous RN & roomed in to ED 3 bed 1 from critical care  c/o abdominal pain , dizziness , s/p fall from SNF . Continue constant observation as ordered c/o agitation , delusion , anxiousness , restlessness , confusion . Pt calm as of this time , awake , follows simple commands , no grimaced face noted , no labored breathing noted , denies dizziness this time , no seizure episode , denies chest pain , denies abdominal pain , fall alarm on , fall prevention health teachings done , negative syncopal episode noted , nursing rounds done . PCA at the bedside , continue constant observation as ordered

## 2022-06-21 NOTE — CONSULT NOTE ADULT - SUBJECTIVE AND OBJECTIVE BOX
CC: Fall        HPI:  81-yo m with pMHX significant for hypertension, dyslipidemia, diabetes, CAD, CHF, COPD, PPM, glaucoma, A.fib on Eliquis, GERD, chronic pancreatitis, admitted last month for small bowel obstruction s/p resection, status post near syncope versus seizure at the SNF today. As per SNF staff patient approached the nursing station and then became dizzy and fell to the ground and had a few second episode of LOC after fall with UE/LE shaking and he was confused after the episode. His baseline mental status is A/ox3,  has no history of seizures.  Denies fever chills, or dysuria/frequency . He received haldol for agitation and was placed on 1:1 constant observation - calmed down after receiving haldol.         Home Medications:  Amitriptyline 25 mg tablet:  (20 Jun 2022 18:22)  Breo Ellipta 100 mcg-25 mcg/inh inhalation powder: 1 puff(s) inhaled once a day (20 Jun 2022 18:22)  Clopidogrel 75 mg tablet:  (20 Jun 2022 18:22)  Corlanor 5 mg oral tablet: 1 tab(s) orally 2 times a day (with meals) (20 Jun 2022 18:22)  Eliquis 5 mg tablet:  (20 Jun 2022 18:22)  Famotidine 40 mg oral tablet: 1 tab(s) orally once a day (at bedtime) (20 Jun 2022 18:22)  Latanoprost 0.005% ophthalmic solution: 1 drop(s) to each affected eye once a day (in the evening) (20 Jun 2022 18:22)  Montelukast 10 mg oral tablet: 1 tab(s) orally once a day (20 Jun 2022 18:22)  Nifedipine 30 mg oral tablet, extended release: 1 tab(s) orally once a day (20 Jun 2022 18:22)  Omeprazole 40 mg oral delayed release capsule: 1 cap(s) orally once a day (20 Jun 2022 18:22)  Oxymorphone 40 mg oral tablet, extended release: 1 tab(s) orally every 12 hours (20 Jun 2022 18:26)  Pregabalin 75 mg capsule:  (20 Jun 2022 18:22)  Simvastatin 10 mg oral tablet: 1 tab(s) orally once a day (at bedtime) (20 Jun 2022 18:22)  Trulicity Pen 0.75 mg/0.5 mL subcutaneous solution: 1 application subcutaneous once a week (20 Jun 2022 18:22)        Social history  Unable to obtain info        Neuro Exam:  Orientation: Patient is awake confused and agitated and combative has a 1:1 sit. Following random commands, Not co-operative with exam  Attention: not attentive  Fund of knowledge: Unable to give history  Cranial Nerves: grossly intact except for slurred speech.  Motor: Moving all 4 exts vigorously  Sensory exam: withdrawing to pain in all 4 exts  Coordination:. Unable to assess  Gait: deferred          Allergies    No Known Allergies      Intolerances      MEDICATIONS  (STANDING):  Amitriptyline Oral Tab/Cap - Peds 25 milligram Oral daily  Apixaban 5 milligram Oral two times a day  Chlorhexidine 2% Cloths 1 Application(s) Topical <User Schedule>  Clopidogrel Tablet 75 milligram Oral daily  Famotidine  Oral Tab/Cap - Peds 40 milligrams Oral at bedtime  Montelukast 10 milligram Oral daily  Nifedipine XL 60 milligram Oral daily  Pregabalin 75 milligrams Oral daily  Simvastatin 10 milligram Oral at bedtime        MEDICATIONS  (PRN):  acetaminophen     Tablet .. 650 milliGRAM(s) Oral every 6 hours PRN Temp greater or equal to 38C (100.4F), Mild Pain (1 - 3)  aluminum hydroxide/magnesium hydroxide/simethicone Suspension 30 milliLiter(s) Oral every 4 hours PRN Dyspepsia  melatonin 3 milliGRAM(s) Oral at bedtime PRN Insomnia  ondansetron Injectable 4 milliGRAM(s) IV Push every 8 hours PRN Nausea and/or Vomiting      LABS:                        12.4   9.24  )-----------( 288      ( 20 Jun 2022 10:00 )             37.1     06-20    135  |  95<L>  |  16  ----------------------------<  179<H>  3.9   |  27  |  0.8    Ca    9.4      20 Jun 2022 10:00    TPro  7.3  /  Alb  3.8  /  TBili  0.6  /  DBili  x   /  AST  13  /  ALT  9   /  AlkPhos  94  06-20    COVID-19 PCR (06.20.22 @ 10:00)   COVID-19 PCR: NotDetec        Neuro Imaging:  < from: CT Head No Cont (06.20.22 @ 13:05) >  FINDINGS:    There is prominence of the sulci, sylvian fissures, and ventricles,   reflecting mild diffuse parenchymal volume loss.    There is no evidence of acute territorial infarct or intracranial   hemorrhage. There is no space-occupying lesion or midline shift.    There is no evidence of hydrocephalus. There are no extra-axial fluid   collections.    Status post left cataract surgery. The imaged portions of the paranasal   sinuses are aerated. The mastoid air cells are aerated. The visualized   soft tissues and osseous structures appear normal.      IMPRESSION:    No acute intracranial pathology.    Mild cerebral atrophy.    --- End of Report ---      ANNE KLEIN MD; Attending Radiologist  This document has been electronically signed. Jun 20 2022  3:25PM    < end of copied text >        EEG:     Echo:   Carotid Doppler: N/A  Telemetry:

## 2022-06-21 NOTE — DISCHARGE NOTE PROVIDER - NSDCCPCAREPLAN_GEN_ALL_CORE_FT
PRINCIPAL DISCHARGE DIAGNOSIS  Diagnosis: Syncope  Assessment and Plan of Treatment: You presented after a fall which was likely from orthostatic hypotension which is a drop in blood pressure that occurs when moving from a laying down position to a standing position. You were evaluated by neurology team and underwent an eeg, which ruled out neurologic causes. You were evaluated by cardiac electrophysiologist and had your device interrogated which ruled out cardiac cause. PINA stockings were applied and you are cleared for discharged back to [facility] for continued care.       PRINCIPAL DISCHARGE DIAGNOSIS  Diagnosis: Syncope  Assessment and Plan of Treatment: You presented after a fall which was likely from orthostatic hypotension which is a drop in blood pressure that occurs when moving from a laying down position to a standing position. You were evaluated by neurology team and underwent an eeg, which ruled out neurologic causes. You were evaluated by cardiac electrophysiologist and had your device interrogated which ruled out cardiac cause. PINA stockings were applied.  You had elevated lactate levels that were resolved with fluids and as per surgery, this is not secondary to a hernia causing strangulation. Simethicone seemed to have improved your chronic abdominal pain.  You had 2 recurrent near syncope events where your blood pressure became undetectable after you felt dizzy post going to the bathroom. You had to be given medications and fluids to improve your blood pressure. CT head was negative for any acute pathologies.

## 2022-06-21 NOTE — PROGRESS NOTE ADULT - ASSESSMENT
81-year-old man past medical history significant for hypertension, dyslipidemia, diabetes, CAD, CHF, COPD, PPM, glaucoma, A. fib on Eliquis, GERD, chronic pancreatitis, admitted last month for small bowel obstruction, status post near syncope versus seizure at the SNF today        #Witnessed fall   #Post-ictal confusion   #Lactate elevated possible D-lactate vs seizure induced   - EEG Follow up ic +  Imaging: CTH negative - CTAP significant intrahepatic and extra arachnoid ductal dilatation      - Labs Follow up: Lactate - CPK - INR/PTT   - Orthostatics +, PINA stalkings fluid, if abd binder needed, recheck orthostats  - Neurology consult appreciated  - EEG f/u   - check echo again had inferior wall defecits   - cardiology consult Dr. Roberson   - check trop x2      #Elevated lactate   #possible D-lactate vs seizure induced   small bowel resection on previous admission   lactate trended down 1.3  - BCx f/u   - UA neg       #Paced rythm - tachycardic   #Sick sinus syndrome s/p PPM   - consult EP interrogation no events    #abd pain  -on exam tender, has hernia, elevated lactate possible strangulation?  - lactate stable   - surgical consult    #intrahepatic and extra hepatic biliary ductal dilatation seen on CT  - Advanced GI consult- may need ERCP  - pt has abd tenderness    #CAD (Plavix)  #HFmrEF (Corlanor 5)   #Sick sinus syndrome s/p PPM   #AFib (on Eliquis)   #HTN (nifedipine 30XL) / DLD (simvastatin 10) / DM (trulicity)  - ischemic work-up neg: EKG NSL - troponin <0.1 -    - not overloaded on exam   - continue procardia 30 mg XL added losartan     #COPD (breo-ellipta)   - not on oxygen     #chronic pancreatitis   - CTAP no acute signs of inflammation - lipase 10   - evaluate for need of Creon     #GERD (pepcid/pantoprazole)     #Progress Note Handoff  Pending (specify):  follow up cardiology, GI, Surgery  Disposition: [facility]  Anticipated date: 48 hrs possible 6/23, will anticpate

## 2022-06-21 NOTE — DISCHARGE NOTE PROVIDER - CARE PROVIDERS DIRECT ADDRESSES
,DirectAddress_Unknown ,DirectAddress_Unknown,DirectAddress_Unknown,[email or web address]

## 2022-06-21 NOTE — PROGRESS NOTE ADULT - SUBJECTIVE AND OBJECTIVE BOX
Pt seen and examined at bedside. No CP or SOB.          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    COPD (chronic obstructive pulmonary disease)    CHESTER on CPAP    H/O intestinal obstruction    History of cholecystectomy    History of pancreatic surgery    History of penile implant    Cardiac pacemaker        VITAL SIGNS (Last 24 hrs):  T(C): 36.3 (06-21-22 @ 15:00), Max: 36.8 (06-20-22 @ 23:20)  HR: 69 (06-21-22 @ 15:00) (66 - 124)  BP: 120/59 (06-21-22 @ 15:00) (120/59 - 198/87)  RR: 18 (06-21-22 @ 15:00) (18 - 18)  SpO2: 98% (06-21-22 @ 15:00) (97% - 99%)  Wt(kg): --  Daily Height in cm: 168.9 (21 Jun 2022 15:00)    Daily     I&O's Summary      PHYSICAL EXAM:  GENERAL: NAD, well-developed  HEAD:  Atraumatic, Normocephalic  EYES: EOMI, PERRLA, conjunctiva and sclera clear  NECK: Supple, No JVD  CHEST/LUNG: Clear to auscultation bilaterally; No wheeze  HEART: Regular rate and rhythm; No murmurs, rubs, or gallops  ABDOMEN: Soft, tender over surgical site, Nondistended; Bowel sounds present  EXTREMITIES:  2+ Peripheral Pulses, No clubbing, cyanosis, or edema  PSYCH: alert   NEUROLOGY: non-focal  SKIN: No rashes or lesions    Labs Reviewed  Spoke to patient in regards to abnormal labs.    CBC Full  -  ( 21 Jun 2022 06:49 )  WBC Count : 5.36 K/uL  Hemoglobin : 12.1 g/dL  Hematocrit : 35.5 %  Platelet Count - Automated : 290 K/uL  Mean Cell Volume : 88.3 fL  Mean Cell Hemoglobin : 30.1 pg  Mean Cell Hemoglobin Concentration : 34.1 g/dL  Auto Neutrophil # : 2.80 K/uL  Auto Lymphocyte # : 1.53 K/uL  Auto Monocyte # : 0.74 K/uL  Auto Eosinophil # : 0.26 K/uL  Auto Basophil # : 0.01 K/uL  Auto Neutrophil % : 52.2 %  Auto Lymphocyte % : 28.5 %  Auto Monocyte % : 13.8 %  Auto Eosinophil % : 4.9 %  Auto Basophil % : 0.2 %    BMP:    06-21 @ 06:49    Blood Urea Nitrogen - 13  Calcium - 8.9  Carbond Dioxide - 25  Chloride - 97  Creatinine - 0.6  Glucose - 102  Potassium - 3.6  Sodium - 134      Hemoglobin A1c -   PT/INR - ( 21 Jun 2022 06:49 )   PT: 15.20 sec;   INR: 1.33 ratio         PTT - ( 21 Jun 2022 06:49 )  PTT:32.0 sec  Urine Culture:        COVID Labs  CRP:      D-Dimer:            Imaging reviewed independently and reviewed read  < from: CT Abdomen and Pelvis w/ IV Cont (06.20.22 @ 13:08) >  IMPRESSION:    Again demonstrated is significant intrahepatic and extra arachnoid ductal   dilatation. Patient has elevated liver function tests, ureters CT is   recommended given the presence of pacer wires and surgical clips from the   cholecystectomy.    Findings suggesting chronic pancreatitis. No pseudocyst or evidence of   pancreatic necrosis    Resolution ofpreviously noted small bowel obstruction.    < end of copied text >    < from: CT Abdomen and Pelvis w/ IV Cont (06.20.22 @ 13:08) >  Again demonstrated is significant intrahepatic and extra hepatic biliary   ductal dilatation. A short elevated liver function tests, ERCP is   recommended, given the presence of pacer wires and surgical clips from   the cholecystectomy    < end of copied text >      < from: TTE Echo Complete w/o Contrast w/ Doppler (05.29.22 @ 13:34) >  Summary:   1. Endocardial visualization was enhanced with intravenous echo contrast.   2. Left ventricular ejection fraction, by visual estimation, is 45 to   50%.   3. Mildly decreased global left ventricular systolic function.   4. Entire inferior septum and basal and mid inferior wall are abnormal   as described above.    < end of copied text >        MEDICATIONS  (STANDING):  amitriptyline Oral Tab/Cap - Peds 25 milliGRAM(s) Oral daily  apixaban 5 milliGRAM(s) Oral two times a day  chlorhexidine 2% Cloths 1 Application(s) Topical <User Schedule>  clopidogrel Tablet 75 milliGRAM(s) Oral daily  famotidine  Oral Tab/Cap - Peds 40 milliGRAM(s) Oral at bedtime  losartan 25 milliGRAM(s) Oral daily  montelukast 10 milliGRAM(s) Oral daily  NIFEdipine XL 30 milliGRAM(s) Oral daily  pregabalin 75 milliGRAM(s) Oral daily  simvastatin 10 milliGRAM(s) Oral at bedtime    MEDICATIONS  (PRN):  acetaminophen     Tablet .. 650 milliGRAM(s) Oral every 6 hours PRN Temp greater or equal to 38C (100.4F), Mild Pain (1 - 3)  aluminum hydroxide/magnesium hydroxide/simethicone Suspension 30 milliLiter(s) Oral every 4 hours PRN Dyspepsia  melatonin 3 milliGRAM(s) Oral at bedtime PRN Insomnia  ondansetron Injectable 4 milliGRAM(s) IV Push every 8 hours PRN Nausea and/or Vomiting

## 2022-06-21 NOTE — DISCHARGE NOTE PROVIDER - NSDCHHENCOUNTER_GEN_ALL_CORE
In the morning:   -Wash affected areas with Benzoyl peroxide 5% wash (face)   -Ok to continue Pro active exfoliating wash  -Continue Azelaic acid to affected areas.  -CeraVe AM facial moisturizer for dry skin    In the evening:  -Wash affected areas with Continue Pro active exfoliating or gentle cleanser  -Start Tazorac 0.1% gel Pump a pea-sized amount diffusely to all affected areas.   -CeraVe PM facial moisturizer for dry skin     29-Jun-2022

## 2022-06-21 NOTE — DISCHARGE NOTE PROVIDER - NSDCMRMEDTOKEN_GEN_ALL_CORE_FT
amitriptyline 25 mg tablet: 1 tab(s) orally once a day  Breo Ellipta 100 mcg-25 mcg/inh inhalation powder: 1 puff(s) inhaled once a day  clopidogrel 75 mg tablet: 1 tab(s) orally once a day  Corlanor 5 mg oral tablet: 1 tab(s) orally 2 times a day (with meals)  Eliquis 5 mg tablet: 1 tab(s) orally every 12 hours  famotidine 40 mg oral tablet: 1 tab(s) orally once a day (at bedtime)  latanoprost 0.005% ophthalmic solution: 1 drop(s) to each affected eye once a day (in the evening)  MiraLax oral powder for reconstitution: 17 gram(s) orally once a day (at bedtime)   montelukast 10 mg oral tablet: 1 tab(s) orally once a day  NIFEdipine 30 mg oral tablet, extended release: 1 tab(s) orally once a day  omeprazole 40 mg oral delayed release capsule: 1 cap(s) orally once a day  oxyMORphone 40 mg oral tablet, extended release: 1 tab(s) orally every 12 hours  pregabalin 75 mg capsule: 1 cap(s) orally once a day  simvastatin 10 mg oral tablet: 1 tab(s) orally once a day (at bedtime)  Trulicity Pen 0.75 mg/0.5 mL subcutaneous solution: 1 application subcutaneous once a week   amitriptyline 25 mg tablet: 1 tab(s) orally once a day  Breo Ellipta 100 mcg-25 mcg/inh inhalation powder: 1 puff(s) inhaled once a day  clopidogrel 75 mg tablet: 1 tab(s) orally once a day  Corlanor 5 mg oral tablet: 1 tab(s) orally 2 times a day (with meals)  Eliquis 5 mg tablet: 1 tab(s) orally every 12 hours  famotidine 40 mg oral tablet: 1 tab(s) orally once a day (at bedtime)  latanoprost 0.005% ophthalmic solution: 1 drop(s) to each affected eye once a day (in the evening)  losartan 25 mg oral tablet: 1 tab(s) orally once a day  magnesium oxide 400 mg oral tablet: 1 tab(s) orally 3 times a day (with meals)  midodrine 2.5 mg oral tablet: 1 tab(s) orally 3 times a day  MiraLax oral powder for reconstitution: 17 gram(s) orally once a day (at bedtime)   montelukast 10 mg oral tablet: 1 tab(s) orally once a day  OLANZapine 2.5 mg oral tablet: 1 tab(s) orally once a day (at bedtime)  omeprazole 40 mg oral delayed release capsule: 1 cap(s) orally once a day  oxyMORphone 40 mg oral tablet, extended release: 1 tab(s) orally every 12 hours  pancrelipase 12,000 units-38,000 units-60,000 units oral delayed release capsule: 3 cap(s) orally 4 times a day (with meals and at bedtime)  pregabalin 75 mg capsule: 1 cap(s) orally once a day  simethicone 80 mg oral tablet, chewable: 1 tab(s) orally every 8 hours  simvastatin 10 mg oral tablet: 1 tab(s) orally once a day (at bedtime)  Trulicity Pen 0.75 mg/0.5 mL subcutaneous solution: 1 application subcutaneous once a week

## 2022-06-21 NOTE — PHYSICAL THERAPY INITIAL EVALUATION ADULT - PERTINENT HX OF CURRENT PROBLEM, REHAB EVAL
81-year-old man past medical history significant for hypertension, dyslipidemia, diabetes, CAD, CHF, COPD, PPM, glaucoma, A. fib on Eliquis, GERD, chronic pancreatitis, admitted last month for small bowel obstruction s/p resection, status post near syncope versus seizure at the SNF.

## 2022-06-21 NOTE — PATIENT PROFILE ADULT - FALL HARM RISK - HARM RISK INTERVENTIONS
Assistance with ambulation/Assistance OOB with selected safe patient handling equipment/Communicate Risk of Fall with Harm to all staff/Discuss with provider need for PT consult/Monitor for mental status changes/Monitor gait and stability/Move patient closer to nurses' station/Provide patient with walking aids - walker, cane, crutches/Reinforce activity limits and safety measures with patient and family/Reorient to person, place and time as needed/Tailored Fall Risk Interventions/Toileting schedule using arm’s reach rule for commode and bathroom/Use of alarms - bed, chair and/or voice tab/Visual Cue: Yellow wristband and red socks/Bed in lowest position, wheels locked, appropriate side rails in place/Call bell, personal items and telephone in reach/Instruct patient to call for assistance before getting out of bed or chair/Non-slip footwear when patient is out of bed/[place] to call system/Physically safe environment - no spills, clutter or unnecessary equipment/Purposeful Proactive Rounding/Room/bathroom lighting operational, light cord in reach

## 2022-06-21 NOTE — CONSULT NOTE ADULT - NS ATTEND AMEND GEN_ALL_CORE FT
I have personally seen and examined this patient on 6/21.  I have fully participated in the care of this patient.  I have reviewed all pertinent clinical information, including history, physical exam, plan and note. 81-yo m with pMHX significant for hypertension, dyslipidemia, diabetes, CAD, CHF, COPD, PPM, glaucoma, A.fib on Eliquis, GERD, chronic pancreatitis who is admitted for episode of LOC at the nursing home. Patient is poor historian and denies any episode. He is otherwise calm and cooperative with the exam. Denies history of seizure or head trauma in the past. Reports abdominal pain and reports occasional dizzy feeling upon standing up fast. Exam otherwise shows no focal deficit. Seizure is unlikely but can not totally be ruled out. In any case with first episode, would not recommend AED. PPM showed ST per report. Recommend to obtain REEG and check orthostatics. Cardiology and medical work up for syncopal event. No further work up if REEG shows no seizure or epileptiform activity.     I have reviewed all pertinent clinical information and reviewed all relevant imaging and diagnostic studies personally.  Recommendations as above.  Agree with above assessment except as noted.

## 2022-06-21 NOTE — DISCHARGE NOTE PROVIDER - PROVIDER TOKENS
PROVIDER:[TOKEN:[[id number]]] PROVIDER:[TOKEN:[[id number]]],PROVIDER:[TOKEN:[[id number]],FOLLOWUP:[2 weeks]],PROVIDER:[TOKEN:[[id number]],FOLLOWUP:[2 weeks]]

## 2022-06-21 NOTE — PHYSICAL THERAPY INITIAL EVALUATION ADULT - ADDITIONAL COMMENTS
Pt brought to [facility] from [facility], with PLF needing RW and assist from [facility] staff with ADL's but it able to ambulate short distances. Information acquired from 201 form due to patients current cognitive status.

## 2022-06-22 ENCOUNTER — APPOINTMENT (OUTPATIENT)
Dept: VASCULAR SURGERY | Facility: CLINIC | Age: 82
End: 2022-06-22

## 2022-06-22 LAB
GLUCOSE BLDC GLUCOMTR-MCNC: 114 MG/DL — HIGH (ref 70–99)
GLUCOSE BLDC GLUCOMTR-MCNC: 150 MG/DL — HIGH (ref 70–99)
LACTATE SERPL-SCNC: 2.5 MMOL/L — HIGH (ref 0.7–2)
TROPONIN T SERPL-MCNC: <0.01 NG/ML — SIGNIFICANT CHANGE UP
TSH SERPL-MCNC: 1.7 UIU/ML — SIGNIFICANT CHANGE UP (ref 0.27–4.2)

## 2022-06-22 PROCEDURE — 99233 SBSQ HOSP IP/OBS HIGH 50: CPT

## 2022-06-22 PROCEDURE — 99221 1ST HOSP IP/OBS SF/LOW 40: CPT

## 2022-06-22 PROCEDURE — 95816 EEG AWAKE AND DROWSY: CPT | Mod: 26

## 2022-06-22 RX ORDER — LOSARTAN POTASSIUM 100 MG/1
25 TABLET, FILM COATED ORAL ONCE
Refills: 0 | Status: COMPLETED | OUTPATIENT
Start: 2022-06-22 | End: 2022-06-22

## 2022-06-22 RX ORDER — SODIUM CHLORIDE 9 MG/ML
1000 INJECTION INTRAMUSCULAR; INTRAVENOUS; SUBCUTANEOUS
Refills: 0 | Status: DISCONTINUED | OUTPATIENT
Start: 2022-06-22 | End: 2022-06-22

## 2022-06-22 RX ORDER — LOSARTAN POTASSIUM 100 MG/1
25 TABLET, FILM COATED ORAL
Refills: 0 | Status: DISCONTINUED | OUTPATIENT
Start: 2022-06-22 | End: 2022-06-22

## 2022-06-22 RX ORDER — LOSARTAN POTASSIUM 100 MG/1
25 TABLET, FILM COATED ORAL
Refills: 0 | Status: DISCONTINUED | OUTPATIENT
Start: 2022-06-23 | End: 2022-06-24

## 2022-06-22 RX ORDER — MIDODRINE HYDROCHLORIDE 2.5 MG/1
2.5 TABLET ORAL THREE TIMES A DAY
Refills: 0 | Status: DISCONTINUED | OUTPATIENT
Start: 2022-06-22 | End: 2022-06-23

## 2022-06-22 RX ORDER — SODIUM CHLORIDE 9 MG/ML
1000 INJECTION INTRAMUSCULAR; INTRAVENOUS; SUBCUTANEOUS
Refills: 0 | Status: DISCONTINUED | OUTPATIENT
Start: 2022-06-22 | End: 2022-06-23

## 2022-06-22 RX ADMIN — SIMVASTATIN 10 MILLIGRAM(S): 20 TABLET, FILM COATED ORAL at 21:05

## 2022-06-22 RX ADMIN — Medication 650 MILLIGRAM(S): at 15:00

## 2022-06-22 RX ADMIN — SODIUM CHLORIDE 50 MILLILITER(S): 9 INJECTION INTRAMUSCULAR; INTRAVENOUS; SUBCUTANEOUS at 21:04

## 2022-06-22 RX ADMIN — FAMOTIDINE 40 MILLIGRAM(S): 10 INJECTION INTRAVENOUS at 21:05

## 2022-06-22 RX ADMIN — LOSARTAN POTASSIUM 25 MILLIGRAM(S): 100 TABLET, FILM COATED ORAL at 11:22

## 2022-06-22 RX ADMIN — CLOPIDOGREL BISULFATE 75 MILLIGRAM(S): 75 TABLET, FILM COATED ORAL at 11:22

## 2022-06-22 RX ADMIN — Medication 650 MILLIGRAM(S): at 13:38

## 2022-06-22 RX ADMIN — APIXABAN 5 MILLIGRAM(S): 2.5 TABLET, FILM COATED ORAL at 18:42

## 2022-06-22 RX ADMIN — Medication 75 MILLIGRAM(S): at 11:22

## 2022-06-22 RX ADMIN — MONTELUKAST 10 MILLIGRAM(S): 4 TABLET, CHEWABLE ORAL at 11:22

## 2022-06-22 RX ADMIN — Medication 25 MILLIGRAM(S): at 11:23

## 2022-06-22 RX ADMIN — MIDODRINE HYDROCHLORIDE 2.5 MILLIGRAM(S): 2.5 TABLET ORAL at 18:42

## 2022-06-22 NOTE — PROGRESS NOTE ADULT - ASSESSMENT
81-year-old man past medical history significant for hypertension, dyslipidemia, diabetes, CAD, CHF, COPD, PPM, glaucoma, A. fib on Eliquis, GERD, chronic pancreatitis, admitted last month for small bowel obstruction, status post near syncope versus seizure at the SNF today    #Witnessed fall secondary to orthostatic hypotension  #Post-ictal confusion   Imaging: CTH negative - CTAP significant intrahepatic and extra arachnoid ductal dilatation  Plan  - EEG negative for seizures  - evaluated by neuro  - given PINA stockings for orthostatic hypotension. f/u repeat orthostatics    #Elevated lactate   #possible D-lactate vs seizure induced   small bowel resection on previous admission   Lactate 2.9 - repeat 2.9>1.3>2.5  - BCx NGTD  - trend lactate    #Abdominal pain  - unlikely to be strangulation    #Paced rythm - tachycardic   #Sick sinus syndrome s/p PPM   Not on any anti-arrythmics   - no events on EP interrogation    #CAD (Plavix)  #HFmrEF (Corlanor 5)   #Sick sinus syndrome s/p PPM   #AFib (on Eliquis)   #HTN (nifedipine 30XL) / DLD (simvastatin 10) / DM (trulicity)  PPM interrogated by Electrophysiology - no abnormalities   - ischemic work-up neg: EKG NSL - troponin <0.1 -    - not overloaded on exam   - lipid profile within normal limits, a1c 7.3  --Nifedipine switched to 60 for HTN   - prior echo showed inferior wall abnormalities  - f/u cardiology consult (Dr. Roberson)    #DM  - a1c 7.3  - on home trulicity  - monitor FS, start basal/bolus regimen if >180    #COPD (breo-ellipta)   - not on oxygen     #chronic pancreatitis   #intrahepatic and extrahepatic biliary ductal dilation seen on CT  - CTAP no acute signs of inflammation - lipase 10   - evaluate for need of Creon   - f/u GI consult  - OP GI f/u    #GERD (pepcid/pantoprazole)     #DVT prophylaxis - Eliquis  #GI prophylaxis - pantoprazole   #Diet - DASH/CC  #Activity - IAT with assistance  #Code: Full code  #Disposition: Skilled nursing home from [facility]   #Pending: GI, cardio eval, orthostatics, trend lactate     81-year-old man past medical history significant for hypertension, dyslipidemia, diabetes, CAD, CHF, COPD, PPM, glaucoma, A. fib on Eliquis, GERD, chronic pancreatitis, admitted last month for small bowel obstruction, status post near syncope versus seizure at the SNF today    #Witnessed fall secondary to orthostatic hypotension  #Post-ictal confusion   Imaging: CTH negative - CTAP significant intrahepatic and extra arachnoid ductal dilatation  Plan  - EEG negative for seizures  - evaluated by neuro  - given PINA stockings for orthostatic hypotension. f/u repeat orthostatics    #Elevated lactate   #possible D-lactate vs seizure induced   small bowel resection on previous admission   Lactate 2.9 - repeat 2.9>1.3>2.5  - BCx NGTD  - trend lactate  - start NS @75cc/hr for 24 hours    #Abdominal pain  - unlikely to be strangulation    #Paced rythm - tachycardic   #Sick sinus syndrome s/p PPM   Not on any anti-arrythmics   - no events on EP interrogation    #CAD (Plavix)  #HFmrEF (Corlanor 5)   #Sick sinus syndrome s/p PPM   #AFib (on Eliquis)   #HTN (nifedipine 30XL) / DLD (simvastatin 10) / DM (trulicity)  PPM interrogated by Electrophysiology - no abnormalities   - ischemic work-up neg: EKG NSL - troponin <0.1 -    - not overloaded on exam   - lipid profile within normal limits, a1c 7.3  --Nifedipine switched to 60 for HTN   - prior echo showed inferior wall abnormalities  - f/u cardiology consult (Dr. Roberson)    #DM  - a1c 7.3  - on home trulicity  - monitor FS, start basal/bolus regimen if >180    #COPD (breo-ellipta)   - not on oxygen     #chronic pancreatitis   #intrahepatic and extrahepatic biliary ductal dilation seen on CT  - CTAP no acute signs of inflammation - lipase 10   - evaluate for need of Creon   - f/u GI consult  - OP GI f/u    #GERD (pepcid/pantoprazole)     #DVT prophylaxis - Eliquis  #GI prophylaxis - pantoprazole   #Diet - DASH/CC  #Activity - IAT with assistance  #Code: Full code  #Disposition: Skilled nursing home from [facility]   #Pending: GI, cardio eval, orthostatics, trend lactate     81-year-old man past medical history significant for hypertension, dyslipidemia, diabetes, CAD, CHF, COPD, PPM, glaucoma, A. fib on Eliquis, GERD, chronic pancreatitis, admitted last month for small bowel obstruction, status post near syncope versus seizure at the SNF today    #Witnessed fall secondary to orthostatic hypotension  #Post-ictal confusion   Imaging: CTH negative - CTAP significant intrahepatic and extra arachnoid ductal dilatation  Plan  - EEG negative for seizures  - evaluated by neuro  - given PINA stockings for orthostatic hypotension. f/u repeat orthostatics    #Elevated lactate   #possible D-lactate vs seizure induced   small bowel resection on previous admission   Lactate 2.9 - repeat 2.9>1.3>2.5  - BCx NGTD  - trend lactate  - start NS @50cc/hr for 24 hours  - monitor for any signs of overload as pt with HF hx    #Abdominal pain  - unlikely to be strangulation    #Paced rythm - tachycardic   #Sick sinus syndrome s/p PPM   Not on any anti-arrythmics   - no events on EP interrogation    #CAD (Plavix)  #HFmrEF (Corlanor 5)   #Sick sinus syndrome s/p PPM   #AFib (on Eliquis)   #HTN (nifedipine 30XL) / DLD (simvastatin 10) / DM (trulicity)  PPM interrogated by Electrophysiology - no abnormalities   - ischemic work-up neg: EKG NSL - troponin <0.1 -    - not overloaded on exam   - lipid profile within normal limits, a1c 7.3  --Nifedipine switched to 60 for HTN   - prior echo showed inferior wall abnormalities  - f/u cardiology consult (Dr. Roberson)    #DM  - a1c 7.3  - on home trulicity  - monitor FS, start basal/bolus regimen if >180    #COPD (breo-ellipta)   - not on oxygen     #chronic pancreatitis   #intrahepatic and extrahepatic biliary ductal dilation seen on CT  - CTAP no acute signs of inflammation - lipase 10   - evaluate for need of Creon   - f/u GI consult  - OP GI f/u    #GERD (pepcid/pantoprazole)     #DVT prophylaxis - Eliquis  #GI prophylaxis - pantoprazole   #Diet - DASH/CC  #Activity - IAT with assistance  #Code: Full code  #Disposition: Skilled nursing home from [facility]   #Pending: GI, cardio eval, orthostatics, trend lactate     81-year-old man past medical history significant for hypertension, dyslipidemia, diabetes, CAD, CHF, COPD, PPM, glaucoma, A. fib on Eliquis, GERD, chronic pancreatitis, admitted last month for small bowel obstruction, status post near syncope versus seizure at the SNF today    #Witnessed fall secondary to orthostatic hypotension  #Post-ictal confusion   Imaging: CTH negative - CTAP significant intrahepatic and extra arachnoid ductal dilatation  Plan  - EEG negative for seizures  - evaluated by neuro  - given PINA stockings for orthostatic hypotension. f/u repeat orthostatics    #Elevated lactate   #possible D-lactate vs seizure induced   small bowel resection on previous admission   Lactate 2.9 - repeat 2.9>1.3>2.5  - BCx NGTD  - trend lactate  - start NS @50cc/hr for 24 hours  - monitor for any signs of overload as pt with HF hx  - f/u surgery consult    #Abdominal pain  - unlikely to be strangulation    #Paced rythm - tachycardic   #Sick sinus syndrome s/p PPM   Not on any anti-arrythmics   - no events on EP interrogation    #CAD (Plavix)  #HFmrEF (Corlanor 5)   #Sick sinus syndrome s/p PPM   #AFib (on Eliquis)   #HTN (nifedipine 30XL) / DLD (simvastatin 10) / DM (trulicity)  PPM interrogated by Electrophysiology - no abnormalities   - ischemic work-up neg: EKG NSL - troponin <0.1 -    - not overloaded on exam   - lipid profile within normal limits, a1c 7.3  --Nifedipine switched to 60 for HTN   - prior echo showed inferior wall abnormalities  - f/u cardiology consult (Dr. Roberson)    #DM  - a1c 7.3  - on home trulicity  - monitor FS, start basal/bolus regimen if >180    #COPD (breo-ellipta)   - not on oxygen     #chronic pancreatitis   #intrahepatic and extrahepatic biliary ductal dilation seen on CT  - CTAP no acute signs of inflammation - lipase 10   - evaluate for need of Creon   - f/u GI consult  - OP GI f/u    #GERD (pepcid/pantoprazole)     #DVT prophylaxis - Eliquis  #GI prophylaxis - pantoprazole   #Diet - DASH/CC  #Activity - IAT with assistance  #Code: Full code  #Disposition: Skilled nursing home from [facility]   #Pending: GI, cardio eval, orthostatics, trend lactate

## 2022-06-22 NOTE — PROGRESS NOTE ADULT - ATTENDING COMMENTS
Pt admitted for Fall / Poor Historian   Found to have Orthostatic Hypotension     Pt with PPM will need Interrogation   Get CV Evaluation   Check TSH/B12/FA/TSH/RPR  Trend LA   Psych Eval ?Amytryptyline ?Orthostasis role - Psych rec to c/w this medication and f/u with outpt Psych  d/c Amlopidine/Nifedipine can cause othrostatics   Increase Losartan to 25mg to BID (I know it's once a day medication trying to equalize BP 1st)  Start Midodrine 2.5mg po TID   Abdominal Binders  TEDs Stockings high pressure     Dispo: Acute f/u above w/up / Possible d/c in 48hrs if improves Pt admitted for Witnessed Fall vs Syncope / Poor Historian   - Found to have Orthostatic Hypotension   - Pt with PPM will need Interrogation   - Get CV Evaluation   - Check TSH/B12/FA/TSH/RPR  - Trend LA   - Psych Eval ?Amytryptyline ?Orthostasis role - Psych rec to c/w this medication and f/u with outpt Psych  - d/c Amlopidine/Nifedipine can cause orthrostatics   - Increase Losartan to 25mg to BID (I know it's once a day medication trying to equalize BP 1st)  - Start Midodrine 2.5mg po TID   - Abdominal Binders  - TEDs Stockings high pressure     Unchanged Intrahepatic and Extrahepatic Biliary Dialation   - f/u GI clinic   - may need ERCP/MRCP    Chronic Pancreatitis   - check stool calprotectin / elastace  - may need creon     Dispo: Acute f/u above w/up / Possible d/c in 48hrs if improves

## 2022-06-22 NOTE — PROGRESS NOTE ADULT - SUBJECTIVE AND OBJECTIVE BOX
EZ ALCARAZ 81y Male  MRN#: [id number]   Hospital Day: 2d    SUBJECTIVE  Patient is a 81y old Male who presents with a chief complaint of Witnessed Fall (22 Jun 2022 08:22)  Currently admitted to medicine with the primary diagnosis of Syncope    INTERVAL HPI AND OVERNIGHT EVENTS:  Patient was examined and seen at bedside. This morning he told me to leave him alone or he will "knock me out."    OBJECTIVE  PAST MEDICAL & SURGICAL HISTORY  Diabetes    Hypertension    Pacemaker    Dyslipidemia    History of chronic pancreatitis    Atherosclerosis of native artery of lower extremity    COPD (chronic obstructive pulmonary disease)    CHESTER on CPAP    H/O intestinal obstruction    History of cholecystectomy    History of pancreatic surgery    History of penile implant    Cardiac pacemaker      ALLERGIES:  No Known Allergies    MEDICATIONS:  STANDING MEDICATIONS  amitriptyline Oral Tab/Cap - Peds 25 milliGRAM(s) Oral daily  apixaban 5 milliGRAM(s) Oral two times a day  chlorhexidine 2% Cloths 1 Application(s) Topical <User Schedule>  clopidogrel Tablet 75 milliGRAM(s) Oral daily  famotidine  Oral Tab/Cap - Peds 40 milliGRAM(s) Oral at bedtime  montelukast 10 milliGRAM(s) Oral daily  pregabalin 75 milliGRAM(s) Oral daily  simvastatin 10 milliGRAM(s) Oral at bedtime    PRN MEDICATIONS  acetaminophen     Tablet .. 650 milliGRAM(s) Oral every 6 hours PRN  aluminum hydroxide/magnesium hydroxide/simethicone Suspension 30 milliLiter(s) Oral every 4 hours PRN  melatonin 3 milliGRAM(s) Oral at bedtime PRN  ondansetron Injectable 4 milliGRAM(s) IV Push every 8 hours PRN      VITAL SIGNS: Last 24 Hours  T(C): 35.7 (22 Jun 2022 13:14), Max: 36.5 (21 Jun 2022 14:16)  T(F): 96.2 (22 Jun 2022 13:14), Max: 97.7 (21 Jun 2022 14:16)  HR: 73 (22 Jun 2022 13:14) (66 - 85)  BP: 167/77 (22 Jun 2022 13:14) (111/53 - 167/77)  BP(mean): --  RR: 16 (22 Jun 2022 13:14) (16 - 18)  SpO2: 96% (22 Jun 2022 05:14) (96% - 99%)    LABS:                        12.1   5.36  )-----------( 290      ( 21 Jun 2022 06:49 )             35.5     06-21    134<L>  |  97<L>  |  13  ----------------------------<  102<H>  3.6   |  25  |  0.6<L>    Ca    8.9      21 Jun 2022 06:49  Mg     1.7     06-21    TPro  6.4  /  Alb  3.2<L>  /  TBili  0.5  /  DBili  x   /  AST  12  /  ALT  7   /  AlkPhos  79  06-21    PT/INR - ( 21 Jun 2022 06:49 )   PT: 15.20 sec;   INR: 1.33 ratio         PTT - ( 21 Jun 2022 06:49 )  PTT:32.0 sec  Urinalysis Basic - ( 20 Jun 2022 20:05 )    Color: Yellow / Appearance: Clear / SG: >1.050 / pH: x  Gluc: x / Ketone: Large  / Bili: Negative / Urobili: 3 mg/dL   Blood: x / Protein: 30 mg/dL / Nitrite: Negative   Leuk Esterase: Negative / RBC: 2 /HPF / WBC 2 /HPF   Sq Epi: x / Non Sq Epi: 1 /HPF / Bacteria: Negative        Lactate, Blood: 2.5 mmol/L *H* (06-22-22 @ 11:31)  Troponin T, Serum: <0.01 ng/mL (06-21-22 @ 23:26)      Culture - Blood (collected 20 Jun 2022 21:40)  Source: .Blood None  Preliminary Report (22 Jun 2022 09:01):    No growth to date.      CARDIAC MARKERS ( 21 Jun 2022 23:26 )  x     / <0.01 ng/mL / x     / x     / x      CARDIAC MARKERS ( 21 Jun 2022 06:49 )  x     / x     / 81 U/L / x     / x          RADIOLOGY:    PHYSICAL EXAM:  pt refused exam this AM

## 2022-06-22 NOTE — CONSULT NOTE ADULT - SUBJECTIVE AND OBJECTIVE BOX
GENERAL SURGERY CONSULT NOTE    Patient: EZ ALCARAZ , 81y ([birth year])Male   MRN: [id number]  Location: [facility] T23A 015 B  Visit: 06-20-22 Inpatient  Date: 06-22-22 @ 20:50    HPI:  81-year-old man past medical history significant for hypertension, dyslipidemia, diabetes, CAD, CHF, COPD, PPM, glaucoma, A. fib on Eliquis, GERD, chronic pancreatitis, admitted last month for small bowel obstruction s/p resection, status post near syncope versus seizure at the SNF today.    As per patient after interview by ED attending, he approached the nursing station because he had been having abdominal pain and was going to request to go to the hospital.  As per SNF staff via telephone, patient approached the nursing station and then became dizzy and fell to the ground.  He is a very poor historian upon bedside interview his baseline mental status is ANox3, but now is ANox1. Patient has some delusions after questioning; he stated that people at [facility] (his SNF) are not his friends  He also complains of diffuse abdominal pain located to epigastric area and has tenderness diffusely.      Questionable LOC.  There was a few second episode of patient's arms and legs shaking patient and he was confused after the episode.    Patient has no history of seizures.    Patient reports normal BMs.  No fever, chills.  No chest pain, shortness of breath.  Mild cough or URI symptoms.  Patient reports decreasing urine output . No dysuria/frequency     In the ED, vitals significant for paced rythm tachycardia and HTN reaching 180 SBP - afebrile - on RA satting well   He received haldol for agitation and was placed on 1:1 constant observation - calmed down after receiving haldol   CBC/CMP non-significant   - ischemic work-up neg: EKG NSL - troponin <0.1 -    - Lactate 2.9   - UA pending   - CXR showed non-significant RUL opacity   - CTH negative - CTAP significant intrahepatic and extra arachnoid ductal dilatation (20 Jun 2022 16:52)    Surgery called for abdominal pain  patient well known to surgical service, hx significant for chronic pancreatitis, recent SBO which resolved conservatively, recent discharge to facility, however, patient fell and was brought to the hospital for further workup. denies dizziness/lightheadedness but does not recall event. Currently, patient tolerating diet and having regular BM's, Last bowel movement was this morning. Patient with known abdominal pain/tenderness that is chronic, and unchanged from baseline exam      PAST MEDICAL & SURGICAL HISTORY:  Diabetes      Hypertension      Pacemaker      Dyslipidemia      History of chronic pancreatitis      Atherosclerosis of native artery of lower extremity      COPD (chronic obstructive pulmonary disease)      CHESTER on CPAP      H/O intestinal obstruction      History of cholecystectomy      History of pancreatic surgery      History of penile implant      Cardiac pacemaker          Home Medications:  amitriptyline 25 mg tablet: 1 tab(s) orally once a day (21 Jun 2022 14:17)  Breo Ellipta 100 mcg-25 mcg/inh inhalation powder: 1 puff(s) inhaled once a day (20 Jun 2022 18:22)  clopidogrel 75 mg tablet: 1 tab(s) orally once a day (21 Jun 2022 14:17)  Corlanor 5 mg oral tablet: 1 tab(s) orally 2 times a day (with meals) (20 Jun 2022 18:22)  Eliquis 5 mg tablet: 1 tab(s) orally every 12 hours (21 Jun 2022 14:17)  famotidine 40 mg oral tablet: 1 tab(s) orally once a day (at bedtime) (20 Jun 2022 18:22)  latanoprost 0.005% ophthalmic solution: 1 drop(s) to each affected eye once a day (in the evening) (20 Jun 2022 18:22)  montelukast 10 mg oral tablet: 1 tab(s) orally once a day (20 Jun 2022 18:22)  NIFEdipine 30 mg oral tablet, extended release: 1 tab(s) orally once a day (20 Jun 2022 18:22)  omeprazole 40 mg oral delayed release capsule: 1 cap(s) orally once a day (20 Jun 2022 18:22)  oxyMORphone 40 mg oral tablet, extended release: 1 tab(s) orally every 12 hours (20 Jun 2022 18:26)  pregabalin 75 mg capsule: 1 cap(s) orally once a day (21 Jun 2022 14:17)  simvastatin 10 mg oral tablet: 1 tab(s) orally once a day (at bedtime) (20 Jun 2022 18:22)  Trulicity Pen 0.75 mg/0.5 mL subcutaneous solution: 1 application subcutaneous once a week (20 Jun 2022 18:22)        VITALS:  T(F): 96.2 (06-22-22 @ 13:14), Max: 97.4 (06-22-22 @ 05:14)  HR: 73 (06-22-22 @ 13:14) (69 - 85)  BP: 167/77 (06-22-22 @ 13:14) (144/65 - 167/77)  RR: 16 (06-22-22 @ 13:14) (16 - 18)  SpO2: 96% (06-22-22 @ 05:14) (96% - 96%)    PHYSICAL EXAM:  General: NAD, AAOx3, calm and cooperative  HEENT: NCAT, CALVIN, EOMI, Trachea ML, Neck supple  Cardiac: RRR S1, S2, no Murmurs, rubs or gallops  Respiratory: CTAB, normal respiratory effort, breath sounds equal BL, no wheeze, rhonchi or crackles  Abdomen: Soft, non-distended, moderately tender (although stable from patients baseline), no rebound, no guarding. +BS. No hernias appreciated  Musculoskeletal: Strength 5/5 BL UE/LE, ROM intact, compartments soft  Neuro: Sensation grossly intact and equal throughout, no focal deficits  Vascular: Pulses 2+ throughout, extremities well perfused  Skin: Warm/dry, normal color, no jaundice  Incision/wound: healing well, dressings in place, clean, dry and intact    MEDICATIONS  (STANDING):  amitriptyline Oral Tab/Cap - Peds 25 milliGRAM(s) Oral daily  apixaban 5 milliGRAM(s) Oral two times a day  chlorhexidine 2% Cloths 1 Application(s) Topical <User Schedule>  clopidogrel Tablet 75 milliGRAM(s) Oral daily  famotidine  Oral Tab/Cap - Peds 40 milliGRAM(s) Oral at bedtime  midodrine. 2.5 milliGRAM(s) Oral three times a day  montelukast 10 milliGRAM(s) Oral daily  pregabalin 75 milliGRAM(s) Oral daily  simvastatin 10 milliGRAM(s) Oral at bedtime  sodium chloride 0.9%. 1000 milliLiter(s) (50 mL/Hr) IV Continuous <Continuous>    MEDICATIONS  (PRN):  acetaminophen     Tablet .. 650 milliGRAM(s) Oral every 6 hours PRN Temp greater or equal to 38C (100.4F), Mild Pain (1 - 3)  aluminum hydroxide/magnesium hydroxide/simethicone Suspension 30 milliLiter(s) Oral every 4 hours PRN Dyspepsia  melatonin 3 milliGRAM(s) Oral at bedtime PRN Insomnia  ondansetron Injectable 4 milliGRAM(s) IV Push every 8 hours PRN Nausea and/or Vomiting      LAB/STUDIES:                        12.1   5.36  )-----------( 290      ( 21 Jun 2022 06:49 )             35.5     06-21    134<L>  |  97<L>  |  13  ----------------------------<  102<H>  3.6   |  25  |  0.6<L>    Ca    8.9      21 Jun 2022 06:49  Mg     1.7     06-21    TPro  6.4  /  Alb  3.2<L>  /  TBili  0.5  /  DBili  x   /  AST  12  /  ALT  7   /  AlkPhos  79  06-21    PT/INR - ( 21 Jun 2022 06:49 )   PT: 15.20 sec;   INR: 1.33 ratio         PTT - ( 21 Jun 2022 06:49 )  PTT:32.0 sec  LIVER FUNCTIONS - ( 21 Jun 2022 06:49 )  Alb: 3.2 g/dL / Pro: 6.4 g/dL / ALK PHOS: 79 U/L / ALT: 7 U/L / AST: 12 U/L / GGT: x             CARDIAC MARKERS ( 21 Jun 2022 23:26 )  x     / <0.01 ng/mL / x     / x     / x      CARDIAC MARKERS ( 21 Jun 2022 06:49 )  x     / x     / 81 U/L / x     / x                  Culture - Blood (collected 20 Jun 2022 21:40)  Source: .Blood None  Preliminary Report (22 Jun 2022 09:01):    No growth to date.      IMAGING:      ACCESS DEVICES:  [x] Peripheral IV  [ ] Central Venous Line	[ ] R	[ ] L	[ ] IJ	[ ] Fem	[ ] SC	Placed:   [ ] Arterial Line		[ ] R	[ ] L	[ ] Fem	[ ] Rad	[ ] Ax	Placed:   [ ] PICC:					[ ] Mediport  [ ] Urinary Catheter, Date Placed:

## 2022-06-22 NOTE — CONSULT NOTE ADULT - ATTENDING COMMENTS
Patient seen and examined with surgery resident and discussed management plans. Patient well known to service from prior hospitalization for SBO which resolved with nonoperative management and was discharged back to Nursing home. Patient presented again with fall and hospitalized and c/o abd pain and physical exam showed abdominal tenderness prompting surgical consultation.    Patient does report to pain abd non distended when distracted no significant abd guarding or rigidity. Patient tolerating diet and having BM. continue po diet and no need for any surgical intervention at this time.

## 2022-06-22 NOTE — CONSULT NOTE ADULT - SUBJECTIVE AND OBJECTIVE BOX
Patient is an 81-year-old man past medical history significant for hypertension, dyslipidemia, diabetes, CAD, CHF, COPD, PPM, glaucoma, A. fib on Eliquis, GERD, chronic pancreatitis, admitted last month for small bowel obstruction s/p resection, status post near syncope versus seizure. Advanced GI consulted along with other specialties for ductal dilation. Patient comfortable sleeping, and does not cooperate with ROS/ interview as is tired. Sit noted at bedside.       PAST MEDICAL & SURGICAL HISTORY:  Diabetes  Hypertension  Pacemaker  Dyslipidemia  History of chronic pancreatitis  Atherosclerosis of native artery of lower extremity  COPD (chronic obstructive pulmonary disease)  CHESTER on CPAP  H/O intestinal obstruction  History of cholecystectomy  History of pancreatic surgery  History of penile implant  Cardiac pacemaker            MEDICATIONS  (STANDING):  amitriptyline Oral Tab/Cap - Peds 25 milliGRAM(s) Oral daily  apixaban 5 milliGRAM(s) Oral two times a day  chlorhexidine 2% Cloths 1 Application(s) Topical <User Schedule>  clopidogrel Tablet 75 milliGRAM(s) Oral daily  famotidine  Oral Tab/Cap - Peds 40 milliGRAM(s) Oral at bedtime  losartan 25 milliGRAM(s) Oral daily  montelukast 10 milliGRAM(s) Oral daily  NIFEdipine XL 30 milliGRAM(s) Oral daily  pregabalin 75 milliGRAM(s) Oral daily  simvastatin 10 milliGRAM(s) Oral at bedtime    MEDICATIONS  (PRN):  acetaminophen     Tablet .. 650 milliGRAM(s) Oral every 6 hours PRN Temp greater or equal to 38C (100.4F), Mild Pain (1 - 3)  aluminum hydroxide/magnesium hydroxide/simethicone Suspension 30 milliLiter(s) Oral every 4 hours PRN Dyspepsia  melatonin 3 milliGRAM(s) Oral at bedtime PRN Insomnia  ondansetron Injectable 4 milliGRAM(s) IV Push every 8 hours PRN Nausea and/or Vomiting      Allergies  No Known Allergies          REVIEW OF SYSTEMS  Due to altered mental status/intubation, subjective information were not able to be obtained from the patient. History was obtained, to the extent possible, from review of the chart and collateral sources of information.      Vital Signs Last 24 Hrs  T(C): 36.3 (22 Jun 2022 05:14), Max: 36.5 (21 Jun 2022 14:16)  T(F): 97.4 (22 Jun 2022 05:14), Max: 97.7 (21 Jun 2022 14:16)  HR: 69 (22 Jun 2022 05:14) (66 - 73)  BP: 144/65 (22 Jun 2022 05:14) (111/53 - 144/65)  RR: 18 (22 Jun 2022 05:14) (17 - 18)  SpO2: 96% (22 Jun 2022 05:14) (96% - 99%)  Physical Exam  Gen: NAD  HEENT: NC/AT, Mucosal Membranes  Cardio: S1/S2 No S3/S4, Regular  Resp: CTA B/L  Abdomen: Soft, ND/NT  Extremities: FROM x 4  Skin: No jaundice         Labs:                          12.1   5.36  )-----------( 290      ( 21 Jun 2022 06:49 )             35.5         06-21    134<L>  |  97<L>  |  13  ----------------------------<  102<H>  3.6   |  25  |  0.6<L>          LFTs:             6.4  | 0.5  | 12       ------------------[79      ( 21 Jun 2022 06:49 )  3.2  | x    | 7               Lactate, Blood: 1.3 mmol/L (06-21-22 @ 06:49)      Coags:     15.20  ----< 1.33    ( 21 Jun 2022 06:49 )     32.0        CARDIAC MARKERS ( 21 Jun 2022 23:26 )  x     / <0.01 ng/mL / x     / x     / x      CARDIAC MARKERS ( 21 Jun 2022 06:49 )  x     / x     / 81 U/L / x     / x      CARDIAC MARKERS ( 20 Jun 2022 10:00 )  x     / <0.01 ng/mL / x     / x     / x          Serum Pro-Brain Natriuretic Peptide: 337 pg/mL (06-20-22 @ 10:00)        RADIOLOGY & ADDITIONAL STUDIES:

## 2022-06-23 LAB
ALBUMIN SERPL ELPH-MCNC: 3.3 G/DL — LOW (ref 3.5–5.2)
ALP SERPL-CCNC: 84 U/L — SIGNIFICANT CHANGE UP (ref 30–115)
ALT FLD-CCNC: 7 U/L — SIGNIFICANT CHANGE UP (ref 0–41)
ANION GAP SERPL CALC-SCNC: 12 MMOL/L — SIGNIFICANT CHANGE UP (ref 7–14)
AST SERPL-CCNC: 17 U/L — SIGNIFICANT CHANGE UP (ref 0–41)
BASOPHILS # BLD AUTO: 0.03 K/UL — SIGNIFICANT CHANGE UP (ref 0–0.2)
BASOPHILS NFR BLD AUTO: 0.7 % — SIGNIFICANT CHANGE UP (ref 0–1)
BILIRUB SERPL-MCNC: 0.4 MG/DL — SIGNIFICANT CHANGE UP (ref 0.2–1.2)
BUN SERPL-MCNC: 7 MG/DL — LOW (ref 10–20)
CALCIUM SERPL-MCNC: 9.1 MG/DL — SIGNIFICANT CHANGE UP (ref 8.5–10.1)
CHLORIDE SERPL-SCNC: 101 MMOL/L — SIGNIFICANT CHANGE UP (ref 98–110)
CO2 SERPL-SCNC: 25 MMOL/L — SIGNIFICANT CHANGE UP (ref 17–32)
CREAT SERPL-MCNC: 0.6 MG/DL — LOW (ref 0.7–1.5)
EGFR: 98 ML/MIN/1.73M2 — SIGNIFICANT CHANGE UP
EOSINOPHIL # BLD AUTO: 0.32 K/UL — SIGNIFICANT CHANGE UP (ref 0–0.7)
EOSINOPHIL NFR BLD AUTO: 7.1 % — SIGNIFICANT CHANGE UP (ref 0–8)
FOLATE SERPL-MCNC: 12.7 NG/ML — SIGNIFICANT CHANGE UP
GLUCOSE SERPL-MCNC: 107 MG/DL — HIGH (ref 70–99)
HCT VFR BLD CALC: 38.3 % — LOW (ref 42–52)
HGB BLD-MCNC: 13.1 G/DL — LOW (ref 14–18)
IMM GRANULOCYTES NFR BLD AUTO: 0.2 % — SIGNIFICANT CHANGE UP (ref 0.1–0.3)
LACTATE SERPL-SCNC: 1.4 MMOL/L — SIGNIFICANT CHANGE UP (ref 0.7–2)
LYMPHOCYTES # BLD AUTO: 1.85 K/UL — SIGNIFICANT CHANGE UP (ref 1.2–3.4)
LYMPHOCYTES # BLD AUTO: 41.1 % — SIGNIFICANT CHANGE UP (ref 20.5–51.1)
MAGNESIUM SERPL-MCNC: 1.7 MG/DL — LOW (ref 1.8–2.4)
MCHC RBC-ENTMCNC: 29.5 PG — SIGNIFICANT CHANGE UP (ref 27–31)
MCHC RBC-ENTMCNC: 34.2 G/DL — SIGNIFICANT CHANGE UP (ref 32–37)
MCV RBC AUTO: 86.3 FL — SIGNIFICANT CHANGE UP (ref 80–94)
MONOCYTES # BLD AUTO: 0.58 K/UL — SIGNIFICANT CHANGE UP (ref 0.1–0.6)
MONOCYTES NFR BLD AUTO: 12.9 % — HIGH (ref 1.7–9.3)
NEUTROPHILS # BLD AUTO: 1.71 K/UL — SIGNIFICANT CHANGE UP (ref 1.4–6.5)
NEUTROPHILS NFR BLD AUTO: 38 % — LOW (ref 42.2–75.2)
NRBC # BLD: 0 /100 WBCS — SIGNIFICANT CHANGE UP (ref 0–0)
PLATELET # BLD AUTO: 351 K/UL — SIGNIFICANT CHANGE UP (ref 130–400)
POTASSIUM SERPL-MCNC: 4.2 MMOL/L — SIGNIFICANT CHANGE UP (ref 3.5–5)
POTASSIUM SERPL-SCNC: 4.2 MMOL/L — SIGNIFICANT CHANGE UP (ref 3.5–5)
PROT SERPL-MCNC: 6.7 G/DL — SIGNIFICANT CHANGE UP (ref 6–8)
RBC # BLD: 4.44 M/UL — LOW (ref 4.7–6.1)
RBC # FLD: 13 % — SIGNIFICANT CHANGE UP (ref 11.5–14.5)
SARS-COV-2 RNA SPEC QL NAA+PROBE: SIGNIFICANT CHANGE UP
SODIUM SERPL-SCNC: 138 MMOL/L — SIGNIFICANT CHANGE UP (ref 135–146)
T PALLIDUM AB TITR SER: NEGATIVE — SIGNIFICANT CHANGE UP
VIT B12 SERPL-MCNC: 951 PG/ML — SIGNIFICANT CHANGE UP (ref 232–1245)
WBC # BLD: 4.5 K/UL — LOW (ref 4.8–10.8)
WBC # FLD AUTO: 4.5 K/UL — LOW (ref 4.8–10.8)

## 2022-06-23 PROCEDURE — 99233 SBSQ HOSP IP/OBS HIGH 50: CPT

## 2022-06-23 PROCEDURE — 99231 SBSQ HOSP IP/OBS SF/LOW 25: CPT

## 2022-06-23 RX ORDER — SIMETHICONE 80 MG/1
80 TABLET, CHEWABLE ORAL EVERY 8 HOURS
Refills: 0 | Status: DISCONTINUED | OUTPATIENT
Start: 2022-06-23 | End: 2022-06-29

## 2022-06-23 RX ORDER — LIPASE/PROTEASE/AMYLASE 16-48-48K
1 CAPSULE,DELAYED RELEASE (ENTERIC COATED) ORAL
Refills: 0 | Status: DISCONTINUED | OUTPATIENT
Start: 2022-06-23 | End: 2022-06-25

## 2022-06-23 RX ORDER — MIDODRINE HYDROCHLORIDE 2.5 MG/1
5 TABLET ORAL ONCE
Refills: 0 | Status: COMPLETED | OUTPATIENT
Start: 2022-06-23 | End: 2022-06-23

## 2022-06-23 RX ORDER — SODIUM CHLORIDE 9 MG/ML
500 INJECTION INTRAMUSCULAR; INTRAVENOUS; SUBCUTANEOUS ONCE
Refills: 0 | Status: COMPLETED | OUTPATIENT
Start: 2022-06-23 | End: 2022-06-23

## 2022-06-23 RX ORDER — MIDODRINE HYDROCHLORIDE 2.5 MG/1
2.5 TABLET ORAL THREE TIMES A DAY
Refills: 0 | Status: DISCONTINUED | OUTPATIENT
Start: 2022-06-23 | End: 2022-06-29

## 2022-06-23 RX ORDER — MAGNESIUM SULFATE 500 MG/ML
2 VIAL (ML) INJECTION ONCE
Refills: 0 | Status: COMPLETED | OUTPATIENT
Start: 2022-06-23 | End: 2022-06-23

## 2022-06-23 RX ADMIN — Medication 25 GRAM(S): at 12:31

## 2022-06-23 RX ADMIN — LOSARTAN POTASSIUM 25 MILLIGRAM(S): 100 TABLET, FILM COATED ORAL at 05:37

## 2022-06-23 RX ADMIN — Medication 1 CAPSULE(S): at 23:06

## 2022-06-23 RX ADMIN — SIMETHICONE 80 MILLIGRAM(S): 80 TABLET, CHEWABLE ORAL at 10:40

## 2022-06-23 RX ADMIN — MIDODRINE HYDROCHLORIDE 5 MILLIGRAM(S): 2.5 TABLET ORAL at 10:40

## 2022-06-23 RX ADMIN — Medication 650 MILLIGRAM(S): at 00:37

## 2022-06-23 RX ADMIN — SIMVASTATIN 10 MILLIGRAM(S): 20 TABLET, FILM COATED ORAL at 22:27

## 2022-06-23 RX ADMIN — SODIUM CHLORIDE 2000 MILLILITER(S): 9 INJECTION INTRAMUSCULAR; INTRAVENOUS; SUBCUTANEOUS at 11:16

## 2022-06-23 RX ADMIN — FAMOTIDINE 40 MILLIGRAM(S): 10 INJECTION INTRAVENOUS at 22:27

## 2022-06-23 RX ADMIN — Medication 650 MILLIGRAM(S): at 01:07

## 2022-06-23 RX ADMIN — APIXABAN 5 MILLIGRAM(S): 2.5 TABLET, FILM COATED ORAL at 05:37

## 2022-06-23 RX ADMIN — CLOPIDOGREL BISULFATE 75 MILLIGRAM(S): 75 TABLET, FILM COATED ORAL at 11:23

## 2022-06-23 RX ADMIN — Medication 25 MILLIGRAM(S): at 11:23

## 2022-06-23 RX ADMIN — Medication 1 CAPSULE(S): at 17:36

## 2022-06-23 RX ADMIN — SIMETHICONE 80 MILLIGRAM(S): 80 TABLET, CHEWABLE ORAL at 13:15

## 2022-06-23 RX ADMIN — MONTELUKAST 10 MILLIGRAM(S): 4 TABLET, CHEWABLE ORAL at 11:23

## 2022-06-23 RX ADMIN — MIDODRINE HYDROCHLORIDE 2.5 MILLIGRAM(S): 2.5 TABLET ORAL at 17:35

## 2022-06-23 RX ADMIN — LOSARTAN POTASSIUM 25 MILLIGRAM(S): 100 TABLET, FILM COATED ORAL at 17:35

## 2022-06-23 RX ADMIN — APIXABAN 5 MILLIGRAM(S): 2.5 TABLET, FILM COATED ORAL at 17:36

## 2022-06-23 RX ADMIN — CHLORHEXIDINE GLUCONATE 1 APPLICATION(S): 213 SOLUTION TOPICAL at 05:36

## 2022-06-23 RX ADMIN — Medication 75 MILLIGRAM(S): at 11:23

## 2022-06-23 RX ADMIN — SIMETHICONE 80 MILLIGRAM(S): 80 TABLET, CHEWABLE ORAL at 22:27

## 2022-06-23 RX ADMIN — Medication 30 MILLILITER(S): at 09:22

## 2022-06-23 NOTE — PROGRESS NOTE ADULT - ATTENDING COMMENTS
Patient seen and examined with surgery PA on floor and discussed management plans. Patient and PCA noted that while he was in bathroom patient felt dizzy and brought back to bed no new fall. Initial exam revealed bradycardia to 55 with decreased BP. Patient had no IV prior to going to bathroom as it had pulled out. Patient awake and responsive with c/o continued abd pain no nausea or vomiting reported +BM.  abd not distended mild diffuse abd tenderness only  / etiology. Patient continues to tolerate diet. Labs remain normal. will follow.

## 2022-06-23 NOTE — PROGRESS NOTE ADULT - ASSESSMENT
81-year-old man past medical history significant for hypertension, dyslipidemia, diabetes, CAD, CHF, COPD, PPM, glaucoma, A. fib on Eliquis, GERD, chronic pancreatitis, admitted last month for small bowel obstruction, status post near syncope versus seizure at the SNF today    #Witnessed fall secondary to orthostatic hypotension  #Post-ictal confusion   Imaging: CTH negative - CTAP significant intrahepatic and extra arachnoid ductal dilatation  Plan  - EEG negative for seizures  - evaluated by neuro  - given PINA stockings and abdominal binder for orthostatic hypotension  - c/w midodrine 2.5mg q8  - monitor BP    #Elevated lactate - resolved  #possible D-lactate vs seizure induced   small bowel resection on previous admission   Lactate 2.9 - repeat 2.9>1.3>2.5>1.4  - BCx NGTD  - s/p IVF  - monitor for any signs of overload as pt with HF hx    #Abdominal pain  - unlikely to be strangulation  - as per surgery, no concerns for any hernia or strangulation  - trial on simethicone for possible gas pain    #Paced rythm - tachycardic   #Sick sinus syndrome s/p PPM   Not on any anti-arrythmics   - no events on EP interrogation    #CAD (Plavix)  #HFmrEF (Corlanor 5)   #Sick sinus syndrome s/p PPM   #AFib (on Eliquis)   #HTN (nifedipine 30XL) / DLD (simvastatin 10) / DM (trulicity)  PPM interrogated by Electrophysiology - no abnormalities   - ischemic work-up neg: EKG NSL - troponin <0.1 -    - not overloaded on exam   - lipid profile within normal limits, a1c 7.3  - prior echo showed inferior wall abnormalities  - 6/22: nifedipine dc'ed  - f/u cardiology consult (Dr. Roberson)    #DM  - a1c 7.3  - on home trulicity  - monitor FS, start basal/bolus regimen if >180    #COPD (breo-ellipta)   - not on oxygen     #chronic pancreatitis   #intrahepatic and extrahepatic biliary ductal dilation seen on CT  - CTAP no acute signs of inflammation - lipase 10   - evaluate for need of Creon   - 6/23: as per GI recs, started creon 36,000 QID  - OP GI f/u    #GERD (pepcid/pantoprazole)     #DVT prophylaxis - Eliquis  #GI prophylaxis - pantoprazole   #Diet - DASH/CC  #Activity - IAT with assistance  #Code: Full code  #Disposition: Skilled nursing home from [facility]   #Pending: cardio eval, orthostatic improvement, abdominal pain improvement

## 2022-06-23 NOTE — PROGRESS NOTE ADULT - SUBJECTIVE AND OBJECTIVE BOX
GENERAL SURGERY PROGRESS NOTE    Patient: EZ ALCARAZ , 81y ([birth year])Male   MRN: [id number]  Location: [address]  Visit: 06-20-22 Inpatient  Date: 06-23-22 @ 08:11    Hospital Day #: 4    Procedure/Dx/Injuries: syncope; chronic pancreatitis    Events of past 24 hours: no acute overnight events    PAST MEDICAL & SURGICAL HISTORY:  Diabetes  Hypertension  Pacemaker  Dyslipidemia  History of chronic pancreatitis  Atherosclerosis of native artery of lower extremity  COPD (chronic obstructive pulmonary disease)  CHESTER on CPAP  H/O intestinal obstruction  History of cholecystectomy  History of pancreatic surgery  History of penile implant  Cardiac pacemaker    Vitals:   T(F): 97.5 (06-23-22 @ 05:21), Max: 97.5 (06-23-22 @ 05:21)  HR: 78 (06-23-22 @ 05:21)  BP: 143/65 (06-23-22 @ 05:21)  RR: 18 (06-23-22 @ 05:21)  SpO2: 97% (06-23-22 @ 05:21)    Diet, Regular:   Consistent Carbohydrate No Snacks  DASH/TLC Sodium & Cholesterol Restricted  800mL Fluid Restriction ([id number])    Fluids: sodium chloride 0.9%.: Solution, 1000 milliLiter(s) infuse at 50 mL/Hr, Stop After 24 Hours    I & O's:    PHYSICAL EXAM:  General: NAD, AAOx3, calm and cooperative  Cardiac: RRR S1, S2  Respiratory: CTAB, normal respiratory effort  Abdomen: Soft, non-distended, moderately tender (although stable from patients baseline), no rebound, no guarding. +BS. No hernias appreciated  Vascular: Pulses 2+ throughout, extremities well perfused  Skin: Warm/dry, normal color, no jaundice    MEDICATIONS  (STANDING):  amitriptyline Oral Tab/Cap - Peds 25 milliGRAM(s) Oral daily  apixaban 5 milliGRAM(s) Oral two times a day  chlorhexidine 2% Cloths 1 Application(s) Topical <User Schedule>  clopidogrel Tablet 75 milliGRAM(s) Oral daily  famotidine  Oral Tab/Cap - Peds 40 milliGRAM(s) Oral at bedtime  losartan 25 milliGRAM(s) Oral two times a day  midodrine. 2.5 milliGRAM(s) Oral three times a day  montelukast 10 milliGRAM(s) Oral daily  pregabalin 75 milliGRAM(s) Oral daily  simvastatin 10 milliGRAM(s) Oral at bedtime  sodium chloride 0.9%. 1000 milliLiter(s) (50 mL/Hr) IV Continuous <Continuous>    MEDICATIONS  (PRN):  acetaminophen     Tablet .. 650 milliGRAM(s) Oral every 6 hours PRN Temp greater or equal to 38C (100.4F), Mild Pain (1 - 3)  aluminum hydroxide/magnesium hydroxide/simethicone Suspension 30 milliLiter(s) Oral every 4 hours PRN Dyspepsia  melatonin 3 milliGRAM(s) Oral at bedtime PRN Insomnia  ondansetron Injectable 4 milliGRAM(s) IV Push every 8 hours PRN Nausea and/or Vomiting      LAB/STUDIES:  Labs:  CAPILLARY BLOOD GLUCOSE  POCT Blood Glucose.: 114 mg/dL (22 Jun 2022 16:12)  POCT Blood Glucose.: 150 mg/dL (22 Jun 2022 11:13)                    13.1   4.50  )-----------( 351      ( 23 Jun 2022 05:36 )             38.3       Auto Neutrophil %: 38.0 % (06-23-22 @ 05:36)  Auto Immature Granulocyte %: 0.2 % (06-23-22 @ 05:36)    06-23    138  |  101  |  7<L>  ----------------------------<  107<H>  4.2   |  25  |  0.6<L>      Calcium, Total Serum: 9.1 mg/dL (06-23-22 @ 05:36)    LFTs:             6.7  | 0.4  | 17       ------------------[84      ( 23 Jun 2022 05:36 )  3.3  | x    | 7           Lipase:x      Amylase:x         Lactate, Blood: 2.5 mmol/L (06-22-22 @ 11:31)  Lactate, Blood: 1.3 mmol/L (06-21-22 @ 06:49)  Lactate, Blood: 2.9 mmol/L (06-20-22 @ 17:06)  Lactate, Blood: 2.9 mmol/L (06-20-22 @ 10:00)    Coags:    CARDIAC MARKERS ( 21 Jun 2022 23:26 )  x     / <0.01 ng/mL / x     / x     / x        Serum Pro-Brain Natriuretic Peptide: 337 pg/mL (06-20-22 @ 10:00)    Culture - Blood (collected 20 Jun 2022 21:40)  Source: .Blood None  Preliminary Report (22 Jun 2022 09:01):    No growth to date.

## 2022-06-23 NOTE — PROGRESS NOTE ADULT - SUBJECTIVE AND OBJECTIVE BOX
EZ ALCARAZ 81y Male  MRN#: [id number]   Hospital Day: 3d    SUBJECTIVE  Patient is a 81y old Male who presents with a chief complaint of Witnessed Fall (23 Jun 2022 08:05)  Currently admitted to medicine with the primary diagnosis of Syncope    INTERVAL HPI AND OVERNIGHT EVENTS:  Patient was examined and seen at bedside. This morning he is resting comfortably in bed, reports still having abdominal pain.     OBJECTIVE  PAST MEDICAL & SURGICAL HISTORY  Diabetes    Hypertension    Pacemaker    Dyslipidemia    History of chronic pancreatitis    Atherosclerosis of native artery of lower extremity    COPD (chronic obstructive pulmonary disease)    CHESTER on CPAP    H/O intestinal obstruction    History of cholecystectomy    History of pancreatic surgery    History of penile implant    Cardiac pacemaker      ALLERGIES:  No Known Allergies    MEDICATIONS:  STANDING MEDICATIONS  amitriptyline Oral Tab/Cap - Peds 25 milliGRAM(s) Oral daily  apixaban 5 milliGRAM(s) Oral two times a day  chlorhexidine 2% Cloths 1 Application(s) Topical <User Schedule>  clopidogrel Tablet 75 milliGRAM(s) Oral daily  famotidine  Oral Tab/Cap - Peds 40 milliGRAM(s) Oral at bedtime  losartan 25 milliGRAM(s) Oral two times a day  montelukast 10 milliGRAM(s) Oral daily  pregabalin 75 milliGRAM(s) Oral daily  simethicone 80 milliGRAM(s) Chew every 8 hours  simvastatin 10 milliGRAM(s) Oral at bedtime    PRN MEDICATIONS  acetaminophen     Tablet .. 650 milliGRAM(s) Oral every 6 hours PRN  aluminum hydroxide/magnesium hydroxide/simethicone Suspension 30 milliLiter(s) Oral every 4 hours PRN  melatonin 3 milliGRAM(s) Oral at bedtime PRN  ondansetron Injectable 4 milliGRAM(s) IV Push every 8 hours PRN      VITAL SIGNS: Last 24 Hours  T(C): 36 (23 Jun 2022 12:54), Max: 36.4 (23 Jun 2022 05:21)  T(F): 96.8 (23 Jun 2022 12:54), Max: 97.5 (23 Jun 2022 05:21)  HR: 89 (23 Jun 2022 12:54) (69 - 89)  BP: 144/76 (23 Jun 2022 12:56) (142/74 - 183/83)  BP(mean): --  RR: 16 (23 Jun 2022 12:54) (16 - 18)  SpO2: 97% (23 Jun 2022 05:21) (97% - 99%)    LABS:                        13.1   4.50  )-----------( 351      ( 23 Jun 2022 05:36 )             38.3     06-23    138  |  101  |  7<L>  ----------------------------<  107<H>  4.2   |  25  |  0.6<L>    Ca    9.1      23 Jun 2022 05:36  Mg     1.7     06-23    TPro  6.7  /  Alb  3.3<L>  /  TBili  0.4  /  DBili  x   /  AST  17  /  ALT  7   /  AlkPhos  84  06-23          Lactate, Blood: 1.4 mmol/L (06-23-22 @ 05:36)      Culture - Blood (collected 20 Jun 2022 21:40)  Source: .Blood None  Preliminary Report (22 Jun 2022 09:01):    No growth to date.      CARDIAC MARKERS ( 21 Jun 2022 23:26 )  x     / <0.01 ng/mL / x     / x     / x          RADIOLOGY:    PHYSICAL EXAM:  CONSTITUTIONAL: No acute distress, well-developed, well-groomed, AAOx3  PULMONARY: Clear to auscultation bilaterally; no wheezes, rales, or rhonchi  CARDIOVASCULAR: Regular rate and rhythm; no murmurs, rubs, or gallops  GASTROINTESTINAL: mid abdominal tenderness to palpation  MUSCULOSKELETAL: 2+ peripheral pulses; no clubbing, no cyanosis, no edema  NEUROLOGY: non-focal  SKIN: No rashes or lesions; warm and dry

## 2022-06-23 NOTE — PROGRESS NOTE ADULT - ATTENDING COMMENTS
Pt admitted for Witnessed Fall vs Syncope / Poor Historian   - Found to have Orthostatic Hypotension   - Pt with PPM will need Interrogation   - Get CV Evaluation   - LA now back to NL   - Psych rec to c/w this medication and f/u with outpt Psych  - OFF Amlopidine/Nifedipine can cause orthrostatics   - c/w Losartan to 25mg to BID (I know it's once a day medication trying to equalize BP 1st)  - c/w Midodrine 2.5mg po TID   - c/w Abdominal Binders  - c/w TEDs Stockings high pressure   - IVF Bolus given today due to near syncope episode 2/2 orthostatic hypotension     Unchanged Intrahepatic and Extrahepatic Biliary Dialation   - f/u GI clinic   - may need ERCP/MRCP    Chronic Pancreatitis   - check stool calprotectin / elastace  - may need creon     Acute Near Syncope Episode today am   - IVF / Midodrine stat  - Fall precautions   - CV f/u     Dispo: Acute f/u above w/up / Possible d/c in 48hrs if improves.     Time-based billing (NON-critical care).     35 minutes spent on total encounter; more than 50% of the visit was spent counseling and / or coordinating care by the attending physician.  The necessity of the time spent during the encounter on this date of service was due to:     direct pt care and interdisciplinary rounds Pt admitted for Witnessed Fall vs Syncope / Poor Historian   - Found to have Orthostatic Hypotension   - Pt with PPM will need Interrogation   - Get CV Evaluation   - LA now back to NL   - Psych rec to c/w this medication and f/u with outpt Psych  - OFF Amlopidine/Nifedipine can cause orthrostatics   - c/w Losartan to 25mg to BID (I know it's once a day medication trying to equalize BP 1st)  - c/w Midodrine 2.5mg po TID   - c/w Abdominal Binders  - c/w TEDs Stockings high pressure   - IVF Bolus given today due to near syncope episode 2/2 orthostatic hypotension     Unchanged Intrahepatic and Extrahepatic Biliary Dialation   - f/u GI clinic   - may need ERCP/MRCP    Chronic Pancreatitis   - check stool calprotectin / elastace  - may need creon     Acute Near Syncope Episode today am   - IVF / Midodrine stat  - Fall precautions   - CV f/u     Abdominal Pain RLQ  - 6-7/10   - trial of simethycone  - cleared by surgery  - no dr or constipation   - will follow     Dispo: Acute f/u above w/up / Possible d/c in 48hrs if improves.     Time-based billing (NON-critical care).     35 minutes spent on total encounter; more than 50% of the visit was spent counseling and / or coordinating care by the attending physician.  The necessity of the time spent during the encounter on this date of service was due to:     direct pt care and interdisciplinary rounds

## 2022-06-23 NOTE — PROGRESS NOTE ADULT - ASSESSMENT
81yM w/ PMHx as above as well as chronic pancreatitis who presented with unwitnessed fall, currently undergoing syncopal workup. Physical exam findings, imaging, and labs as documented above.     PLAN:  -Abdominal pain/tenderness at patient's baseline  -No acute surgical intervention  -Surgery will follow  -Pain control  -F/u syncopal workup

## 2022-06-24 LAB
ALBUMIN SERPL ELPH-MCNC: 3.1 G/DL — LOW (ref 3.5–5.2)
ALP SERPL-CCNC: 79 U/L — SIGNIFICANT CHANGE UP (ref 30–115)
ALT FLD-CCNC: 8 U/L — SIGNIFICANT CHANGE UP (ref 0–41)
ANION GAP SERPL CALC-SCNC: 10 MMOL/L — SIGNIFICANT CHANGE UP (ref 7–14)
AST SERPL-CCNC: 16 U/L — SIGNIFICANT CHANGE UP (ref 0–41)
BILIRUB SERPL-MCNC: 0.3 MG/DL — SIGNIFICANT CHANGE UP (ref 0.2–1.2)
BUN SERPL-MCNC: 11 MG/DL — SIGNIFICANT CHANGE UP (ref 10–20)
CALCIUM SERPL-MCNC: 9.2 MG/DL — SIGNIFICANT CHANGE UP (ref 8.5–10.1)
CHLORIDE SERPL-SCNC: 103 MMOL/L — SIGNIFICANT CHANGE UP (ref 98–110)
CO2 SERPL-SCNC: 27 MMOL/L — SIGNIFICANT CHANGE UP (ref 17–32)
CREAT SERPL-MCNC: 0.8 MG/DL — SIGNIFICANT CHANGE UP (ref 0.7–1.5)
EGFR: 89 ML/MIN/1.73M2 — SIGNIFICANT CHANGE UP
GLUCOSE SERPL-MCNC: 140 MG/DL — HIGH (ref 70–99)
MAGNESIUM SERPL-MCNC: 2 MG/DL — SIGNIFICANT CHANGE UP (ref 1.8–2.4)
POTASSIUM SERPL-MCNC: 3.5 MMOL/L — SIGNIFICANT CHANGE UP (ref 3.5–5)
POTASSIUM SERPL-SCNC: 3.5 MMOL/L — SIGNIFICANT CHANGE UP (ref 3.5–5)
PROT SERPL-MCNC: 6.5 G/DL — SIGNIFICANT CHANGE UP (ref 6–8)
SODIUM SERPL-SCNC: 140 MMOL/L — SIGNIFICANT CHANGE UP (ref 135–146)

## 2022-06-24 PROCEDURE — 93010 ELECTROCARDIOGRAM REPORT: CPT

## 2022-06-24 PROCEDURE — 99232 SBSQ HOSP IP/OBS MODERATE 35: CPT

## 2022-06-24 PROCEDURE — 70450 CT HEAD/BRAIN W/O DYE: CPT | Mod: 26

## 2022-06-24 PROCEDURE — 99233 SBSQ HOSP IP/OBS HIGH 50: CPT

## 2022-06-24 PROCEDURE — 74018 RADEX ABDOMEN 1 VIEW: CPT | Mod: 26

## 2022-06-24 RX ORDER — LOSARTAN POTASSIUM 100 MG/1
25 TABLET, FILM COATED ORAL DAILY
Refills: 0 | Status: DISCONTINUED | OUTPATIENT
Start: 2022-06-25 | End: 2022-06-29

## 2022-06-24 RX ORDER — SODIUM CHLORIDE 9 MG/ML
500 INJECTION INTRAMUSCULAR; INTRAVENOUS; SUBCUTANEOUS ONCE
Refills: 0 | Status: COMPLETED | OUTPATIENT
Start: 2022-06-24 | End: 2022-06-24

## 2022-06-24 RX ORDER — MIDODRINE HYDROCHLORIDE 2.5 MG/1
5 TABLET ORAL ONCE
Refills: 0 | Status: COMPLETED | OUTPATIENT
Start: 2022-06-24 | End: 2022-06-24

## 2022-06-24 RX ORDER — SODIUM CHLORIDE 9 MG/ML
1000 INJECTION INTRAMUSCULAR; INTRAVENOUS; SUBCUTANEOUS
Refills: 0 | Status: DISCONTINUED | OUTPATIENT
Start: 2022-06-24 | End: 2022-06-25

## 2022-06-24 RX ADMIN — MONTELUKAST 10 MILLIGRAM(S): 4 TABLET, CHEWABLE ORAL at 12:25

## 2022-06-24 RX ADMIN — FAMOTIDINE 40 MILLIGRAM(S): 10 INJECTION INTRAVENOUS at 21:44

## 2022-06-24 RX ADMIN — SIMVASTATIN 10 MILLIGRAM(S): 20 TABLET, FILM COATED ORAL at 21:44

## 2022-06-24 RX ADMIN — SIMETHICONE 80 MILLIGRAM(S): 80 TABLET, CHEWABLE ORAL at 05:38

## 2022-06-24 RX ADMIN — SIMETHICONE 80 MILLIGRAM(S): 80 TABLET, CHEWABLE ORAL at 14:26

## 2022-06-24 RX ADMIN — Medication 75 MILLIGRAM(S): at 12:27

## 2022-06-24 RX ADMIN — SODIUM CHLORIDE 75 MILLILITER(S): 9 INJECTION INTRAMUSCULAR; INTRAVENOUS; SUBCUTANEOUS at 14:26

## 2022-06-24 RX ADMIN — SODIUM CHLORIDE 2000 MILLILITER(S): 9 INJECTION INTRAMUSCULAR; INTRAVENOUS; SUBCUTANEOUS at 12:24

## 2022-06-24 RX ADMIN — APIXABAN 5 MILLIGRAM(S): 2.5 TABLET, FILM COATED ORAL at 17:35

## 2022-06-24 RX ADMIN — Medication 650 MILLIGRAM(S): at 20:01

## 2022-06-24 RX ADMIN — LOSARTAN POTASSIUM 25 MILLIGRAM(S): 100 TABLET, FILM COATED ORAL at 05:38

## 2022-06-24 RX ADMIN — SIMETHICONE 80 MILLIGRAM(S): 80 TABLET, CHEWABLE ORAL at 21:44

## 2022-06-24 RX ADMIN — Medication 1 CAPSULE(S): at 08:49

## 2022-06-24 RX ADMIN — MIDODRINE HYDROCHLORIDE 2.5 MILLIGRAM(S): 2.5 TABLET ORAL at 17:35

## 2022-06-24 RX ADMIN — APIXABAN 5 MILLIGRAM(S): 2.5 TABLET, FILM COATED ORAL at 05:38

## 2022-06-24 RX ADMIN — Medication 1 CAPSULE(S): at 17:36

## 2022-06-24 RX ADMIN — MIDODRINE HYDROCHLORIDE 5 MILLIGRAM(S): 2.5 TABLET ORAL at 12:25

## 2022-06-24 RX ADMIN — CHLORHEXIDINE GLUCONATE 1 APPLICATION(S): 213 SOLUTION TOPICAL at 05:48

## 2022-06-24 RX ADMIN — Medication 1 CAPSULE(S): at 21:47

## 2022-06-24 RX ADMIN — Medication 650 MILLIGRAM(S): at 19:31

## 2022-06-24 NOTE — CHART NOTE - NSCHARTNOTEFT_GEN_A_CORE
At 12pm, was notified by RN staff that pt was found sitting on the floor of the bathroom and was not sure if he fell or not. Pt was seen at bedside. He stated that he got up to go to the bathroom and as he was getting up from the toilet, he felt dizzy so he went to sat down on the floor to avoid falling over. He denies any LOC or head trauma and remembers everything that happened. On exam, pt was holding his R temporal area of his head and was endorsing he felt dizzy. No cuts, wounds or bumps seen on head exam. No spinal tenderness, moves all extremities on exam, sensory intact. BP was 70s/40s, tachycardic to 117. Before fluid bolus was given, BP improved to 130s/70s, HR 70s.     Plan:  - CTH non contrast to r/o bleed  - stat bolus of fluids  - stat midodrine 5mg   - ECG to assess tachycardia  - CBC to assess for any acute drop in Hgb  - bedrest and constant observation At 12pm, was notified by RN staff that pt was found sitting on the floor of the bathroom and was not sure if he fell or not. Pt was seen at bedside. He stated that he got up to go to the bathroom and as he was getting up from the toilet, he felt dizzy so he went to sat down on the floor to avoid falling over. He denies any LOC or head trauma and remembers everything that happened. On exam, pt was holding his R temporal area of his head and was endorsing he felt dizzy. No cuts, wounds or bumps seen on head exam. No spinal tenderness, moves all extremities on exam, sensory intact. BP was 70s/40s, tachycardic to 117. Before fluid bolus was given, BP improved to 130s/70s, HR 70s.     Plan:  - CTH non contrast to r/o bleed  - stat bolus of fluids  - stat midodrine 5mg   - ECG to assess tachycardia  - bedrest and constant observation

## 2022-06-24 NOTE — PROGRESS NOTE ADULT - SUBJECTIVE AND OBJECTIVE BOX
EZ ALCARAZ 81y Male  MRN#: [id number]   Hospital Day: 4d    SUBJECTIVE  Patient is a 81y old Male who presents with a chief complaint of Witnessed Fall (24 Jun 2022 01:17)  Currently admitted to medicine with the primary diagnosis of Syncope    INTERVAL HPI AND OVERNIGHT EVENTS:  Patient was examined and seen at bedside. This morning he is agitated and yelled at everyone to get out.   In the PM, please refer to chart note about the ?fall episode    OBJECTIVE  PAST MEDICAL & SURGICAL HISTORY  Diabetes    Hypertension    Pacemaker    Dyslipidemia    History of chronic pancreatitis    Atherosclerosis of native artery of lower extremity    COPD (chronic obstructive pulmonary disease)    CHESTER on CPAP    H/O intestinal obstruction    History of cholecystectomy    History of pancreatic surgery    History of penile implant    Cardiac pacemaker      ALLERGIES:  No Known Allergies    MEDICATIONS:  STANDING MEDICATIONS  amitriptyline Oral Tab/Cap - Peds 25 milliGRAM(s) Oral daily  apixaban 5 milliGRAM(s) Oral two times a day  chlorhexidine 2% Cloths 1 Application(s) Topical <User Schedule>  clopidogrel Tablet 75 milliGRAM(s) Oral daily  famotidine  Oral Tab/Cap - Peds 40 milliGRAM(s) Oral at bedtime  midodrine. 2.5 milliGRAM(s) Oral three times a day  montelukast 10 milliGRAM(s) Oral daily  pancrelipase  (CREON 36,000 Lipase Units) 1 Capsule(s) Oral four times a day with meals  pregabalin 75 milliGRAM(s) Oral daily  simethicone 80 milliGRAM(s) Chew every 8 hours  simvastatin 10 milliGRAM(s) Oral at bedtime  sodium chloride 0.9%. 1000 milliLiter(s) IV Continuous <Continuous>    PRN MEDICATIONS  acetaminophen     Tablet .. 650 milliGRAM(s) Oral every 6 hours PRN  aluminum hydroxide/magnesium hydroxide/simethicone Suspension 30 milliLiter(s) Oral every 4 hours PRN  melatonin 3 milliGRAM(s) Oral at bedtime PRN  ondansetron Injectable 4 milliGRAM(s) IV Push every 8 hours PRN      VITAL SIGNS: Last 24 Hours  T(C): 36.1 (24 Jun 2022 12:40), Max: 36.5 (24 Jun 2022 05:29)  T(F): 97 (24 Jun 2022 12:40), Max: 97.7 (24 Jun 2022 05:29)  HR: 77 (24 Jun 2022 12:40) (77 - 82)  BP: 128/61 (24 Jun 2022 12:40) (80/54 - 166/74)  BP(mean): --  RR: 18 (24 Jun 2022 12:40) (17 - 18)  SpO2: 99% (24 Jun 2022 11:45) (99% - 99%)    LABS:                        13.1   4.50  )-----------( 351      ( 23 Jun 2022 05:36 )             38.3     06-23    138  |  101  |  7<L>  ----------------------------<  107<H>  4.2   |  25  |  0.6<L>    Ca    9.1      23 Jun 2022 05:36  Mg     1.7     06-23    TPro  6.7  /  Alb  3.3<L>  /  TBili  0.4  /  DBili  x   /  AST  17  /  ALT  7   /  AlkPhos  84  06-23                  RADIOLOGY:    PHYSICAL EXAM:  CONSTITUTIONAL: No acute distress, well-developed, well-groomed, AAOx3  PULMONARY: Clear to auscultation bilaterally; no wheezes, rales, or rhonchi  CARDIOVASCULAR: Regular rate and rhythm; no murmurs, rubs, or gallops  GASTROINTESTINAL: Soft, non-tender, non-distended; bowel sounds present  MUSCULOSKELETAL: 2+ peripheral pulses; no clubbing, no cyanosis, no edema  NEUROLOGY: non-focal  SKIN: No rashes or lesions; warm and dry   EZ ALCARAZ 81y Male  MRN#: [id number]   Hospital Day: 4d    SUBJECTIVE  Patient is a 81y old Male who presents with a chief complaint of Witnessed Fall (24 Jun 2022 01:17)  Currently admitted to medicine with the primary diagnosis of Syncope    INTERVAL HPI AND OVERNIGHT EVENTS:  Patient was examined and seen at bedside. This morning he is agitated and yelled at everyone to get out.   In the PM, please refer to chart note about the ?fall episode    OBJECTIVE  PAST MEDICAL & SURGICAL HISTORY  Diabetes    Hypertension    Pacemaker    Dyslipidemia    History of chronic pancreatitis    Atherosclerosis of native artery of lower extremity    COPD (chronic obstructive pulmonary disease)    CHESTER on CPAP    H/O intestinal obstruction    History of cholecystectomy    History of pancreatic surgery    History of penile implant    Cardiac pacemaker    ALLERGIES:  No Known Allergies    MEDICATIONS:  STANDING MEDICATIONS  amitriptyline Oral Tab/Cap - Peds 25 milliGRAM(s) Oral daily  apixaban 5 milliGRAM(s) Oral two times a day  chlorhexidine 2% Cloths 1 Application(s) Topical <User Schedule>  clopidogrel Tablet 75 milliGRAM(s) Oral daily  famotidine  Oral Tab/Cap - Peds 40 milliGRAM(s) Oral at bedtime  midodrine. 2.5 milliGRAM(s) Oral three times a day  montelukast 10 milliGRAM(s) Oral daily  pancrelipase  (CREON 36,000 Lipase Units) 1 Capsule(s) Oral four times a day with meals  pregabalin 75 milliGRAM(s) Oral daily  simethicone 80 milliGRAM(s) Chew every 8 hours  simvastatin 10 milliGRAM(s) Oral at bedtime  sodium chloride 0.9%. 1000 milliLiter(s) IV Continuous <Continuous>    PRN MEDICATIONS  acetaminophen     Tablet .. 650 milliGRAM(s) Oral every 6 hours PRN  aluminum hydroxide/magnesium hydroxide/simethicone Suspension 30 milliLiter(s) Oral every 4 hours PRN  melatonin 3 milliGRAM(s) Oral at bedtime PRN  ondansetron Injectable 4 milliGRAM(s) IV Push every 8 hours PRN      VITAL SIGNS: Last 24 Hours  T(C): 36.1 (24 Jun 2022 12:40), Max: 36.5 (24 Jun 2022 05:29)  T(F): 97 (24 Jun 2022 12:40), Max: 97.7 (24 Jun 2022 05:29)  HR: 77 (24 Jun 2022 12:40) (77 - 82)  BP: 128/61 (24 Jun 2022 12:40) (80/54 - 166/74)  RR: 18 (24 Jun 2022 12:40) (17 - 18)  SpO2: 99% (24 Jun 2022 11:45) (99% - 99%)    LABS:                        13.1   4.50  )-----------( 351      ( 23 Jun 2022 05:36 )             38.3     06-23    138  |  101  |  7<L>  ----------------------------<  107<H>  4.2   |  25  |  0.6<L>    Ca    9.1      23 Jun 2022 05:36    Mg     1.7     06-23    TPro  6.7  /  Alb  3.3<L>  /  TBili  0.4  /  DBili  x   /  AST  17  /  ALT  7   /  AlkPhos  84  06-23    RADIOLOGY: reviewed     PHYSICAL EXAM:  CONSTITUTIONAL: No acute distress, well-developed, well-groomed, AAOx3  PULMONARY: Clear to auscultation bilaterally; no wheezes, rales, or rhonchi  CARDIOVASCULAR: Regular rate and rhythm; no murmurs, rubs, or gallops  GASTROINTESTINAL: Soft, non-tender, non-distended; bowel sounds present  MUSCULOSKELETAL: 2+ peripheral pulses; no clubbing, no cyanosis, no edema  NEUROLOGY: non-focal  SKIN: No rashes or lesions; warm and dry

## 2022-06-24 NOTE — PROGRESS NOTE ADULT - ASSESSMENT
81-year-old man past medical history significant for hypertension, dyslipidemia, diabetes, CAD, CHF, COPD, PPM, glaucoma, A. fib on Eliquis, GERD, chronic pancreatitis, admitted last month for small bowel obstruction, status post near syncope versus seizure at the SNF.    #Witnessed fall secondary to orthostatic hypotension  #Post-ictal confusion   Imaging: CTH negative - CTAP significant intrahepatic and extra arachnoid ductal dilatation  Plan  - EEG negative for seizures  - evaluated by neuro  - PINA stockings and abdominal binder ordered for orthostatic hypotension  - c/w midodrine 2.5mg q8  - monitor BP  - 6/24: had a orthostatic episode/?fall (refer to chart note)  - repeat CTH negative for any acute pathologies  - c/w IVFs NS @75cc/hr for 24 hours  - strict bedrest    #Elevated lactate - resolved  #possible D-lactate vs seizure induced   small bowel resection on previous admission   Lactate 2.9 - repeat 2.9>1.3>2.5>1.4  - BCx NGTD  - s/p IVF  - monitor for any signs of overload as pt with HF hx    #Abdominal pain  - unlikely to be strangulation  - as per surgery, no concerns for any hernia or strangulation  - trial on simethicone for possible gas pain  - f/u KUB as per surgery to r/o obstruction    #Paced rythm - tachycardic   #Sick sinus syndrome s/p PPM   Not on any anti-arrythmics   - no events on EP interrogation    #CAD (Plavix)  #HFmrEF (Corlanor 5)   #Sick sinus syndrome s/p PPM   #AFib (on Eliquis)   #HTN (nifedipine 30XL) / DLD (simvastatin 10) / DM (trulicity)  PPM interrogated by Electrophysiology - no abnormalities   - ischemic work-up neg: EKG NSL - troponin <0.1 -    - not overloaded on exam   - lipid profile within normal limits, a1c 7.3  - prior echo showed inferior wall abnormalities  - 6/22: nifedipine dc'ed  - 6/24: losartan 25mg BID decreased to daily  - f/u cardiology consult (Dr. Roberson)    #DM  - a1c 7.3  - on home trulicity  - monitor FS, start basal/bolus regimen if >180    #COPD (breo-ellipta)   - not on oxygen     #chronic pancreatitis   #intrahepatic and extrahepatic biliary ductal dilation seen on CT  - CTAP no acute signs of inflammation - lipase 10   - evaluate for need of Creon   - 6/23: as per GI recs, started creon 36,000 QID  - OP GI f/u    #GERD (pepcid/pantoprazole)     #DVT prophylaxis - Eliquis  #GI prophylaxis - pantoprazole   #Diet - DASH/CC  #Activity - IAT with assistance  #Code: Full code  #Disposition: Skilled nursing home from [facility]   #Pending: cardio eval, orthostatic improvement, abdominal pain improvement, KUB

## 2022-06-24 NOTE — PROGRESS NOTE ADULT - ATTENDING COMMENTS
Pt admitted for:    Syncope 2/2 Orthostatic Hypotension   - Pt has been experiencing re-current near syncopes while in the hospital in the last 3 days   - IVF / Midodrine / Abdominal Binders / TEDS stockings / 1:1 fall risk   - Change Losartan to 25mg daily NOT BID     Delirium 2/2 Being Hospitalized - foreign environment     Dispo: Still Acute severely orthostatics pending improvement

## 2022-06-24 NOTE — PROGRESS NOTE ADULT - ATTENDING COMMENTS
Patient seen and examined with surgery PA on floor and discussed management plans with medical resident on floor.  Patient confused tachycardiac with decreased BP Resident stated that patient fell again in bathroom probable orthostatic. plan for head Ct and repeat labs, abdominal exam unchanged remains soft but tender. EKg reviewed medical management and support increase Iv fluids.

## 2022-06-24 NOTE — PROGRESS NOTE ADULT - SUBJECTIVE AND OBJECTIVE BOX
GENERAL SURGERY PROGRESS NOTE    Patient: EZ ALCARAZ , 81y ([birth year])Male   MRN: [id number]  Location: [address]  Visit: 06-20-22 Inpatient  Date: 06-24-22 @ 01:17    Hospital Day #: 5  Post-Op Day #:    Procedure/Dx/Injuries: syncope; chronic pancreatitis    Events of past 24 hours: Patient having gas and bowel movements, having some diffuse tenderness to palpation, abdominal pain, denies nausea/vomiting.    PAST MEDICAL & SURGICAL HISTORY:  Diabetes      Hypertension      Pacemaker      Dyslipidemia      History of chronic pancreatitis      Atherosclerosis of native artery of lower extremity      COPD (chronic obstructive pulmonary disease)      CHESTER on CPAP      H/O intestinal obstruction      History of cholecystectomy      History of pancreatic surgery      History of penile implant      Cardiac pacemaker          Vitals:   T(F): 97.6 (06-23-22 @ 20:49), Max: 97.6 (06-23-22 @ 20:49)  HR: 77 (06-23-22 @ 20:49)  BP: 166/74 (06-23-22 @ 20:49)  RR: 17 (06-23-22 @ 20:49)  SpO2: 97% (06-23-22 @ 05:21)      Diet, Regular:   Consistent Carbohydrate No Snacks  DASH/TLC Sodium & Cholesterol Restricted  800mL Fluid Restriction ([id number])      Fluids:     I & O's:        PHYSICAL EXAM:  General: NAD, AAOx3, calm and cooperative  HEENT: NCAT, CALVIN, EOMI, Trachea ML, Neck supple  Cardiac: RRR S1, S2, no Murmurs, rubs or gallops  Respiratory: CTAB, normal respiratory effort, breath sounds equal BL, no wheeze, rhonchi or crackles  Abdomen: Soft, diffusely tender, no rebound, +guarding    MEDICATIONS  (STANDING):  amitriptyline Oral Tab/Cap - Peds 25 milliGRAM(s) Oral daily  apixaban 5 milliGRAM(s) Oral two times a day  chlorhexidine 2% Cloths 1 Application(s) Topical <User Schedule>  clopidogrel Tablet 75 milliGRAM(s) Oral daily  famotidine  Oral Tab/Cap - Peds 40 milliGRAM(s) Oral at bedtime  losartan 25 milliGRAM(s) Oral two times a day  midodrine. 2.5 milliGRAM(s) Oral three times a day  montelukast 10 milliGRAM(s) Oral daily  pancrelipase  (CREON 36,000 Lipase Units) 1 Capsule(s) Oral four times a day with meals  pregabalin 75 milliGRAM(s) Oral daily  simethicone 80 milliGRAM(s) Chew every 8 hours  simvastatin 10 milliGRAM(s) Oral at bedtime    MEDICATIONS  (PRN):  acetaminophen     Tablet .. 650 milliGRAM(s) Oral every 6 hours PRN Temp greater or equal to 38C (100.4F), Mild Pain (1 - 3)  aluminum hydroxide/magnesium hydroxide/simethicone Suspension 30 milliLiter(s) Oral every 4 hours PRN Dyspepsia  melatonin 3 milliGRAM(s) Oral at bedtime PRN Insomnia  ondansetron Injectable 4 milliGRAM(s) IV Push every 8 hours PRN Nausea and/or Vomiting      DVT PROPHYLAXIS:   GI PROPHYLAXIS:   ANTICOAGULATION:   ANTIBIOTICS:            LAB/STUDIES:  Labs:  CAPILLARY BLOOD GLUCOSE                              13.1   4.50  )-----------( 351      ( 23 Jun 2022 05:36 )             38.3       Auto Neutrophil %: 38.0 % (06-23-22 @ 05:36)  Auto Immature Granulocyte %: 0.2 % (06-23-22 @ 05:36)    06-23    138  |  101  |  7<L>  ----------------------------<  107<H>  4.2   |  25  |  0.6<L>      Calcium, Total Serum: 9.1 mg/dL (06-23-22 @ 05:36)      LFTs:             6.7  | 0.4  | 17       ------------------[84      ( 23 Jun 2022 05:36 )  3.3  | x    | 7           Lipase:x      Amylase:x         Lactate, Blood: 1.4 mmol/L (06-23-22 @ 05:36)  Lactate, Blood: 2.5 mmol/L (06-22-22 @ 11:31)  Lactate, Blood: 1.3 mmol/L (06-21-22 @ 06:49)      Coags:        Serum Pro-Brain Natriuretic Peptide: 337 pg/mL (06-20-22 @ 10:00)

## 2022-06-25 LAB
ALBUMIN SERPL ELPH-MCNC: 3 G/DL — LOW (ref 3.5–5.2)
ALP SERPL-CCNC: 71 U/L — SIGNIFICANT CHANGE UP (ref 30–115)
ALT FLD-CCNC: 7 U/L — SIGNIFICANT CHANGE UP (ref 0–41)
ANION GAP SERPL CALC-SCNC: 16 MMOL/L — HIGH (ref 7–14)
AST SERPL-CCNC: 15 U/L — SIGNIFICANT CHANGE UP (ref 0–41)
BASOPHILS # BLD AUTO: 0.02 K/UL — SIGNIFICANT CHANGE UP (ref 0–0.2)
BASOPHILS NFR BLD AUTO: 0.4 % — SIGNIFICANT CHANGE UP (ref 0–1)
BILIRUB SERPL-MCNC: 0.3 MG/DL — SIGNIFICANT CHANGE UP (ref 0.2–1.2)
BUN SERPL-MCNC: 10 MG/DL — SIGNIFICANT CHANGE UP (ref 10–20)
CALCIUM SERPL-MCNC: 8.7 MG/DL — SIGNIFICANT CHANGE UP (ref 8.5–10.1)
CHLORIDE SERPL-SCNC: 102 MMOL/L — SIGNIFICANT CHANGE UP (ref 98–110)
CO2 SERPL-SCNC: 20 MMOL/L — SIGNIFICANT CHANGE UP (ref 17–32)
CREAT SERPL-MCNC: 0.8 MG/DL — SIGNIFICANT CHANGE UP (ref 0.7–1.5)
EGFR: 89 ML/MIN/1.73M2 — SIGNIFICANT CHANGE UP
EOSINOPHIL # BLD AUTO: 0.34 K/UL — SIGNIFICANT CHANGE UP (ref 0–0.7)
EOSINOPHIL NFR BLD AUTO: 7.6 % — SIGNIFICANT CHANGE UP (ref 0–8)
GLUCOSE BLDC GLUCOMTR-MCNC: 150 MG/DL — HIGH (ref 70–99)
GLUCOSE SERPL-MCNC: 103 MG/DL — HIGH (ref 70–99)
HCT VFR BLD CALC: 35.6 % — LOW (ref 42–52)
HGB BLD-MCNC: 12 G/DL — LOW (ref 14–18)
IMM GRANULOCYTES NFR BLD AUTO: 0.2 % — SIGNIFICANT CHANGE UP (ref 0.1–0.3)
LYMPHOCYTES # BLD AUTO: 1.76 K/UL — SIGNIFICANT CHANGE UP (ref 1.2–3.4)
LYMPHOCYTES # BLD AUTO: 39.6 % — SIGNIFICANT CHANGE UP (ref 20.5–51.1)
MAGNESIUM SERPL-MCNC: 1.9 MG/DL — SIGNIFICANT CHANGE UP (ref 1.8–2.4)
MCHC RBC-ENTMCNC: 29.8 PG — SIGNIFICANT CHANGE UP (ref 27–31)
MCHC RBC-ENTMCNC: 33.7 G/DL — SIGNIFICANT CHANGE UP (ref 32–37)
MCV RBC AUTO: 88.3 FL — SIGNIFICANT CHANGE UP (ref 80–94)
MONOCYTES # BLD AUTO: 0.63 K/UL — HIGH (ref 0.1–0.6)
MONOCYTES NFR BLD AUTO: 14.2 % — HIGH (ref 1.7–9.3)
NEUTROPHILS # BLD AUTO: 1.69 K/UL — SIGNIFICANT CHANGE UP (ref 1.4–6.5)
NEUTROPHILS NFR BLD AUTO: 38 % — LOW (ref 42.2–75.2)
NRBC # BLD: 0 /100 WBCS — SIGNIFICANT CHANGE UP (ref 0–0)
PLATELET # BLD AUTO: 312 K/UL — SIGNIFICANT CHANGE UP (ref 130–400)
POTASSIUM SERPL-MCNC: 3.7 MMOL/L — SIGNIFICANT CHANGE UP (ref 3.5–5)
POTASSIUM SERPL-SCNC: 3.7 MMOL/L — SIGNIFICANT CHANGE UP (ref 3.5–5)
PROT SERPL-MCNC: 6 G/DL — SIGNIFICANT CHANGE UP (ref 6–8)
RBC # BLD: 4.03 M/UL — LOW (ref 4.7–6.1)
RBC # FLD: 13.4 % — SIGNIFICANT CHANGE UP (ref 11.5–14.5)
SODIUM SERPL-SCNC: 138 MMOL/L — SIGNIFICANT CHANGE UP (ref 135–146)
WBC # BLD: 4.45 K/UL — LOW (ref 4.8–10.8)
WBC # FLD AUTO: 4.45 K/UL — LOW (ref 4.8–10.8)

## 2022-06-25 PROCEDURE — 99233 SBSQ HOSP IP/OBS HIGH 50: CPT

## 2022-06-25 RX ORDER — LIPASE/PROTEASE/AMYLASE 16-48-48K
3 CAPSULE,DELAYED RELEASE (ENTERIC COATED) ORAL
Refills: 0 | Status: DISCONTINUED | OUTPATIENT
Start: 2022-06-25 | End: 2022-06-29

## 2022-06-25 RX ORDER — SODIUM CHLORIDE 9 MG/ML
1000 INJECTION INTRAMUSCULAR; INTRAVENOUS; SUBCUTANEOUS
Refills: 0 | Status: DISCONTINUED | OUTPATIENT
Start: 2022-06-25 | End: 2022-06-27

## 2022-06-25 RX ADMIN — SIMETHICONE 80 MILLIGRAM(S): 80 TABLET, CHEWABLE ORAL at 21:31

## 2022-06-25 RX ADMIN — SIMETHICONE 80 MILLIGRAM(S): 80 TABLET, CHEWABLE ORAL at 14:55

## 2022-06-25 RX ADMIN — SIMETHICONE 80 MILLIGRAM(S): 80 TABLET, CHEWABLE ORAL at 05:27

## 2022-06-25 RX ADMIN — Medication 650 MILLIGRAM(S): at 08:01

## 2022-06-25 RX ADMIN — MONTELUKAST 10 MILLIGRAM(S): 4 TABLET, CHEWABLE ORAL at 11:55

## 2022-06-25 RX ADMIN — LOSARTAN POTASSIUM 25 MILLIGRAM(S): 100 TABLET, FILM COATED ORAL at 05:27

## 2022-06-25 RX ADMIN — Medication 1 CAPSULE(S): at 08:44

## 2022-06-25 RX ADMIN — SODIUM CHLORIDE 75 MILLILITER(S): 9 INJECTION INTRAMUSCULAR; INTRAVENOUS; SUBCUTANEOUS at 06:55

## 2022-06-25 RX ADMIN — Medication 3 CAPSULE(S): at 17:22

## 2022-06-25 RX ADMIN — Medication 650 MILLIGRAM(S): at 18:27

## 2022-06-25 RX ADMIN — MIDODRINE HYDROCHLORIDE 2.5 MILLIGRAM(S): 2.5 TABLET ORAL at 05:28

## 2022-06-25 RX ADMIN — APIXABAN 5 MILLIGRAM(S): 2.5 TABLET, FILM COATED ORAL at 17:27

## 2022-06-25 RX ADMIN — SODIUM CHLORIDE 75 MILLILITER(S): 9 INJECTION INTRAMUSCULAR; INTRAVENOUS; SUBCUTANEOUS at 02:42

## 2022-06-25 RX ADMIN — Medication 650 MILLIGRAM(S): at 17:27

## 2022-06-25 RX ADMIN — MIDODRINE HYDROCHLORIDE 2.5 MILLIGRAM(S): 2.5 TABLET ORAL at 11:55

## 2022-06-25 RX ADMIN — APIXABAN 5 MILLIGRAM(S): 2.5 TABLET, FILM COATED ORAL at 05:27

## 2022-06-25 RX ADMIN — SODIUM CHLORIDE 75 MILLILITER(S): 9 INJECTION INTRAMUSCULAR; INTRAVENOUS; SUBCUTANEOUS at 21:37

## 2022-06-25 RX ADMIN — MIDODRINE HYDROCHLORIDE 2.5 MILLIGRAM(S): 2.5 TABLET ORAL at 17:22

## 2022-06-25 RX ADMIN — Medication 650 MILLIGRAM(S): at 09:45

## 2022-06-25 RX ADMIN — FAMOTIDINE 40 MILLIGRAM(S): 10 INJECTION INTRAVENOUS at 21:32

## 2022-06-25 RX ADMIN — Medication 3 CAPSULE(S): at 11:55

## 2022-06-25 RX ADMIN — CHLORHEXIDINE GLUCONATE 1 APPLICATION(S): 213 SOLUTION TOPICAL at 05:27

## 2022-06-25 RX ADMIN — Medication 75 MILLIGRAM(S): at 11:55

## 2022-06-25 RX ADMIN — SIMVASTATIN 10 MILLIGRAM(S): 20 TABLET, FILM COATED ORAL at 21:32

## 2022-06-25 RX ADMIN — Medication 25 MILLIGRAM(S): at 11:56

## 2022-06-25 RX ADMIN — CLOPIDOGREL BISULFATE 75 MILLIGRAM(S): 75 TABLET, FILM COATED ORAL at 11:55

## 2022-06-25 RX ADMIN — Medication 3 CAPSULE(S): at 21:31

## 2022-06-25 NOTE — PROGRESS NOTE ADULT - ASSESSMENT
81-year-old man past medical history significant for hypertension, dyslipidemia, diabetes, CAD, CHF, COPD, PPM, glaucoma, A. fib on Eliquis, GERD, chronic pancreatitis, admitted last month for small bowel obstruction, status post near syncope versus seizure at the SNF.    #Witnessed fall secondary to orthostatic hypotension  #Post-ictal Confusion   Imaging: CTH negative - CTAP significant intrahepatic and extra arachnoid ductal dilatation  Plan  - EEG negative for seizures  - evaluated by neuro  - PINA stockings and abdominal binder ordered for orthostatic hypotension  - c/w midodrine 2.5mg q8  - monitor BP   - 6/24: Near Syncope Episode 2/2 Orthostatic Hypotension   - repeat CTH negative for any acute pathologies  - c/w IVFs NS @75cc/hr for 24 hours  - strict bedrest     #Elevated lactate - resolved  #possible D-lactate vs seizure induced   small bowel resection on previous admission   Lactate 2.9 - repeat 2.9>1.3>2.5>1.4  - BCx NGTD  - s/p IVF  - monitor for any signs of overload as pt with HF hx    #Abdominal pain  - unlikely to be strangulation  - as per surgery, no concerns for any hernia or strangulation  - trial on simethicone for possible gas pain  - f/u KUB as per surgery to r/o obstruction    #Paced rythm - tachycardic   #Sick sinus syndrome s/p PPM   Not on any anti-arrythmics   - no events on EP interrogation    #CAD (Plavix)  #HFmrEF (Corlanor 5)   #Sick sinus syndrome s/p PPM   #Chronic AFib (on Eliquis)   #HTN (nifedipine 30XL) / DLD (simvastatin 10) / DM (trulicity)  PPM interrogated by Electrophysiology - no abnormalities   - ischemic work-up neg: EKG NSL - troponin <0.1 -    - not overloaded on exam   - lipid profile within normal limits, a1c 7.3  - prior echo showed inferior wall abnormalities  - 6/22: nifedipine dc'ed  - 6/24: losartan 25mg BID decreased to daily  - f/u cardiology consult (Dr. Roberson)    #DM2  - a1c 7.3  - on home trulicity  - monitor FS, start basal/bolus regimen if >180    #COPD (breo-ellipta)   - not on oxygen    #chronic pancreatitis   #intrahepatic and extrahepatic biliary ductal dilation seen on CT  - CTAP no acute signs of inflammation - lipase 10   - evaluate for need of Creon   - 6/23: as per GI recs, started creon 36,000 QID  - OP GI f/u    #GERD (pepcid/pantoprazole)     #DVT prophylaxis - Eliquis  #GI prophylaxis - pantoprazole   #Diet - DASH/CC  #Activity - IAT with assistance  #Code: Full code    #Disposition: Skilled nursing home from [facility].     #Pending: Orthostatic Improvement, recurrent near syncope, abdominal pain improvement cleared by Surgery / not making lactate - suspecting abd pain gas related?

## 2022-06-25 NOTE — CHART NOTE - NSCHARTNOTEFT_GEN_A_CORE
This is a late entry:  I attempted to see patient for cardiac eval yesterday.  He declined to be examined.   Will re-attempt to see patient this weekend.    Bill Bosch MD (covering for Dr. Alex Roberson)

## 2022-06-25 NOTE — PROGRESS NOTE ADULT - SUBJECTIVE AND OBJECTIVE BOX
EZ ALCARAZ 81y Male  MRN#: [id number]     Hospital Day: 5 days     SUBJECTIVE  Patient is a 81y old Male who presents with a chief complaint of Witnessed Fall (24 Jun 2022 01:17)  Currently admitted to medicine with the primary diagnosis of Syncope    INTERVAL HPI AND OVERNIGHT EVENTS:  Patient was examined and seen at bedside. This morning he is agitated and yelled at everyone to get out.   In the PM, please refer to chart note about the ?fall episode    OBJECTIVE  PAST MEDICAL & SURGICAL HISTORY  Diabetes    Hypertension    Pacemaker    Dyslipidemia    History of chronic pancreatitis    Atherosclerosis of native artery of lower extremity    COPD (chronic obstructive pulmonary disease)    CHESTER on CPAP    H/O intestinal obstruction    History of cholecystectomy    History of pancreatic surgery    History of penile implant    Cardiac pacemaker    ALLERGIES:  No Known Allergies    MEDICATIONS:  STANDING MEDICATIONS  amitriptyline Oral Tab/Cap - Peds 25 milliGRAM(s) Oral daily  apixaban 5 milliGRAM(s) Oral two times a day  chlorhexidine 2% Cloths 1 Application(s) Topical <User Schedule>  clopidogrel Tablet 75 milliGRAM(s) Oral daily  famotidine  Oral Tab/Cap - Peds 40 milliGRAM(s) Oral at bedtime  midodrine. 2.5 milliGRAM(s) Oral three times a day  montelukast 10 milliGRAM(s) Oral daily  pancrelipase  (CREON 36,000 Lipase Units) 1 Capsule(s) Oral four times a day with meals  pregabalin 75 milliGRAM(s) Oral daily  simethicone 80 milliGRAM(s) Chew every 8 hours  simvastatin 10 milliGRAM(s) Oral at bedtime  sodium chloride 0.9%. 1000 milliLiter(s) IV Continuous <Continuous>    PRN MEDICATIONS  acetaminophen     Tablet .. 650 milliGRAM(s) Oral every 6 hours PRN  aluminum hydroxide/magnesium hydroxide/simethicone Suspension 30 milliLiter(s) Oral every 4 hours PRN  melatonin 3 milliGRAM(s) Oral at bedtime PRN  ondansetron Injectable 4 milliGRAM(s) IV Push every 8 hours PRN      VITAL SIGNS: Last 24 Hours  T(C): 35.9 (25 Jun 2022 05:00), Max: 36.1 (24 Jun 2022 21:18)  T(F): 96.7 (25 Jun 2022 05:00), Max: 97 (24 Jun 2022 21:18)  HR: 63 (25 Jun 2022 05:00) (63 - 85)  BP: 128/63 (25 Jun 2022 05:00) (91/53 - 130/62)  RR: 18 (25 Jun 2022 05:00) (18 - 18)  SpO2: 97% (25 Jun 2022 05:00) (95% - 97%)    LABS:                        12.0   4.45  )-----------( 312      ( 25 Jun 2022 05:50 )             35.6     06-25    138  |  102  |  10  ----------------------------<  103<H>  3.7   |  20  |  0.8    Ca    8.7      25 Jun 2022 05:50    Mg     1.9     06-25    TPro  6.0  /  Alb  3.0<L>  /  TBili  0.3  /  DBili  x   /  AST  15  /  ALT  7   /  AlkPhos  71  06-25                        13.1   4.50  )-----------( 351      ( 23 Jun 2022 05:36 )             38.3     06-23    138  |  101  |  7<L>  ----------------------------<  107<H>  4.2   |  25  |  0.6<L>    Ca    9.1      23 Jun 2022 05:36    Mg     1.7     06-23    TPro  6.7  /  Alb  3.3<L>  /  TBili  0.4  /  DBili  x   /  AST  17  /  ALT  7   /  AlkPhos  84  06-23    RADIOLOGY: reviewed   Supine view the abdomen.  There is a nonobstructive bowel gas pattern. There are degenerative   changes. Surgical clips are seen in the right upper quadrant.    IMPRESSION:  Nonobstructive bowel gas pattern.    PHYSICAL EXAM:  CONSTITUTIONAL: No acute distress, well-developed, well-groomed, AAOx3  PULMONARY: Clear to auscultation bilaterally; no wheezes, rales, or rhonchi  CARDIOVASCULAR: Regular rate and rhythm; no murmurs, rubs, or gallops  GASTROINTESTINAL: Soft, non-tender, non-distended; bowel sounds present  MUSCULOSKELETAL: 2+ peripheral pulses; no clubbing, no cyanosis, no edema  NEUROLOGY: non-focal  SKIN: No rashes or lesions; warm and dry

## 2022-06-26 LAB
CULTURE RESULTS: SIGNIFICANT CHANGE UP
GLUCOSE BLDC GLUCOMTR-MCNC: 135 MG/DL — HIGH (ref 70–99)
GLUCOSE BLDC GLUCOMTR-MCNC: 163 MG/DL — HIGH (ref 70–99)
SPECIMEN SOURCE: SIGNIFICANT CHANGE UP

## 2022-06-26 PROCEDURE — 99233 SBSQ HOSP IP/OBS HIGH 50: CPT

## 2022-06-26 RX ORDER — KETOROLAC TROMETHAMINE 30 MG/ML
30 SYRINGE (ML) INJECTION ONCE
Refills: 0 | Status: DISCONTINUED | OUTPATIENT
Start: 2022-06-26 | End: 2022-06-29

## 2022-06-26 RX ADMIN — APIXABAN 5 MILLIGRAM(S): 2.5 TABLET, FILM COATED ORAL at 05:27

## 2022-06-26 RX ADMIN — CLOPIDOGREL BISULFATE 75 MILLIGRAM(S): 75 TABLET, FILM COATED ORAL at 12:22

## 2022-06-26 RX ADMIN — LOSARTAN POTASSIUM 25 MILLIGRAM(S): 100 TABLET, FILM COATED ORAL at 05:27

## 2022-06-26 RX ADMIN — SIMETHICONE 80 MILLIGRAM(S): 80 TABLET, CHEWABLE ORAL at 14:25

## 2022-06-26 RX ADMIN — Medication 3 CAPSULE(S): at 21:26

## 2022-06-26 RX ADMIN — Medication 650 MILLIGRAM(S): at 18:25

## 2022-06-26 RX ADMIN — Medication 3 CAPSULE(S): at 17:18

## 2022-06-26 RX ADMIN — Medication 3 CAPSULE(S): at 12:23

## 2022-06-26 RX ADMIN — MONTELUKAST 10 MILLIGRAM(S): 4 TABLET, CHEWABLE ORAL at 12:23

## 2022-06-26 RX ADMIN — CHLORHEXIDINE GLUCONATE 1 APPLICATION(S): 213 SOLUTION TOPICAL at 05:27

## 2022-06-26 RX ADMIN — SIMETHICONE 80 MILLIGRAM(S): 80 TABLET, CHEWABLE ORAL at 05:27

## 2022-06-26 RX ADMIN — Medication 3 CAPSULE(S): at 07:35

## 2022-06-26 RX ADMIN — SIMVASTATIN 10 MILLIGRAM(S): 20 TABLET, FILM COATED ORAL at 21:26

## 2022-06-26 RX ADMIN — MIDODRINE HYDROCHLORIDE 2.5 MILLIGRAM(S): 2.5 TABLET ORAL at 12:22

## 2022-06-26 RX ADMIN — Medication 25 MILLIGRAM(S): at 12:23

## 2022-06-26 RX ADMIN — Medication 75 MILLIGRAM(S): at 12:24

## 2022-06-26 RX ADMIN — APIXABAN 5 MILLIGRAM(S): 2.5 TABLET, FILM COATED ORAL at 17:17

## 2022-06-26 RX ADMIN — MIDODRINE HYDROCHLORIDE 2.5 MILLIGRAM(S): 2.5 TABLET ORAL at 17:17

## 2022-06-26 RX ADMIN — Medication 650 MILLIGRAM(S): at 17:25

## 2022-06-26 RX ADMIN — SIMETHICONE 80 MILLIGRAM(S): 80 TABLET, CHEWABLE ORAL at 21:26

## 2022-06-26 RX ADMIN — FAMOTIDINE 40 MILLIGRAM(S): 10 INJECTION INTRAVENOUS at 21:26

## 2022-06-26 NOTE — CONSULT NOTE ADULT - ASSESSMENT
81-yo m with pMHX significant for hypertension, dyslipidemia, diabetes, CAD, CHF, COPD, PPM, glaucoma, A.fib on Eliquis, GERD, chronic pancreatitis, admitted last month for small bowel obstruction s/p resection, status post near syncope versus seizure at the SNF today. As per SNF staff patient approached the nursing station and then became dizzy and fell to the ground and had a few second episode of LOC with UE/LE shaking and he was confused after the episode.  His baseline mental status is A/ox3. Patient has no history of seizures. He received haldol for agitation and was placed on 1:1 constant observation - calmed down after receiving haldol . Patient is currently confused, not fully co-operative with exam, says random things, not answering specific questions.    #Fall with Syncope and seizure like activity R/o seizure      Plan  ·	REEG  ·	Fall /safety precautions  ·	Cardiology evaluation for syncope  ·	Neuro attending note will follow    
Patient is an 81-year-old man past medical history significant for hypertension, dyslipidemia, diabetes, CAD, CHF, COPD, PPM, glaucoma, A. fib on Eliquis, GERD, chronic pancreatitis, admitted last month for small bowel obstruction s/p resection, status post near syncope versus seizure. Advanced GI consulted along with other specialties for ductal dilation. Patient comfortable sleeping, and does not cooperate with ROS/ interview as is tired. Sit noted at bedside. Patient with normal LFT and stable ductal dilation . [facility]patient follow up advised at [facility] non urgent.    Ductal dilation / Normal LFT/ Chronic Pancreatitis with atrophy and calcification noted  - Stable chronic ductal dilation  - Normal LFT  - On Eliquis and Plavix  - Can get fecal calprotectin and elastase as may benefit from Creon with meals- Likely a degree of Pancreatic insufficieny given appearance of Pancreas on CT, Creon dose should be 36,000 four times daily if initiated   - Follow up with [facility] non urgent , recall advanced GI as needed   
ASSESSMENT:  81yM w/ PMHx as above as well as chronic pancreatitis  who presented with unwitnessed fall, currently undergoing syncopal workup. Physical exam findings, imaging, and labs as documented above.     PLAN:  -Abdominal pain/tenderness at patient's baseline  -No acute surgical intervention  -Surgery will follow    Lines/Tubes: PIV.    Above plan discussed with Attending Surgeon Dr. Booth , patient, and Primary team  06-22-22 @ 20:50
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 Ike Patel/Dr. Alex Roberson)  [phone] Office

## 2022-06-26 NOTE — PROGRESS NOTE ADULT - ASSESSMENT
81-year-old man past medical history significant for hypertension, dyslipidemia, diabetes, CAD, CHF, COPD, PPM, glaucoma, A. fib on Eliquis, GERD, chronic pancreatitis, admitted last month for small bowel obstruction, status post near syncope versus seizure at the SNF.    #Witnessed fall Secondary to Orthostatic Hypotension  #Post-Ictal Confusion   Imaging: CTH negative - CTAP significant intrahepatic and extra arachnoid ductal dilatation  Plan  - EEG negative for seizures  - evaluated by neuro  - PINA stockings and abdominal binder ordered for orthostatic hypotension  - c/w midodrine 2.5mg q8  - monitor BP   - 6/24: Near Syncope Episode 2/2 Orthostatic Hypotension   - repeat CTH negative for any acute pathologies  - c/w IVFs NS @75cc/hr for 24 hours  - strict bedrest     #Elevated lactate - resolved  #Possible D-lactate vs seizure induced   small bowel resection on previous admission   Lactate 2.9 - repeat 2.9>1.3>2.5>1.4  - BCx NGTD  - s/p IVF  - monitor for any signs of overload as pt with HF hx    #Abdominal pain  - unlikely to be strangulation  - as per surgery, no concerns for any hernia or strangulation  - trial on simethicone for possible gas pain  - KUB: Non obstructive bowel gas pattern     #Paced rythm - tachycardic   #Sick sinus syndrome s/p PPM   Not on any anti-arrythmics   - no events on EP interrogation    #CAD (Plavix)  #HFmrEF (Corlanor 5)   #Sick sinus Syndrome s/p PPM   #Chronic AFib (on Eliquis)   #HTN (nifedipine 30XL) / DLD (simvastatin 10) / DM (trulicity)  PPM interrogated by Electrophysiology - no abnormalities   - ischemic work-up neg: EKG NSL - troponin <0.1 -    - not overloaded on exam   - lipid profile within normal limits, a1c 7.3  - prior echo showed inferior wall abnormalities  - 6/22: nifedipine dc'ed  - 6/24: losartan 25mg BID decreased to daily  - f/u cardiology consult (Dr. Roberson)    #DM2  - a1c 7.3  - on home trulicity  - monitor FS, start basal/bolus regimen if >180    #COPD (breo-ellipta)   - Not on oxygen    #Chronic Pancreatitis   #Intrahepatic and extrahepatic biliary ductal dilation seen on CT  - CTAP no acute signs of inflammation - lipase 10   - evaluate for need of Creon   - 6/23: as per GI recs, started creon 36,000 QID  - OP GI f/u    #GERD (pepcid/pantoprazole)     #DVT prophylaxis - Eliquis  #GI prophylaxis - pantoprazole   #Diet - DASH/CC  #Activity - IAT with assistance  #Code: Full code    #Disposition: Skilled nursing home from [facility].     #Pending: Orthostatic Improvement, recurrent near syncope, abdominal pain improvement cleared by Surgery / not making lactate - suspecting abd pain gas related - simethacone standing. Needs Peer to Peer for SNF approval

## 2022-06-26 NOTE — PROGRESS NOTE ADULT - SUBJECTIVE AND OBJECTIVE BOX
EZ ALCARAZ 81y Male  MRN#: [id number]     Hospital Day: 6 days     SUBJECTIVE  Patient is a 81y old Male who presents with a chief complaint of Witnessed Fall (24 Jun 2022 01:17)  Currently admitted to medicine with the primary diagnosis of Syncope    INTERVAL HPI AND OVERNIGHT EVENTS:  Patient was examined and seen at bedside. This morning he is agitated and yelled at everyone to get out.   In the PM, please refer to chart note about the ?fall episode    OBJECTIVE  PAST MEDICAL & SURGICAL HISTORY  Diabetes    Hypertension    Pacemaker    Dyslipidemia    History of chronic pancreatitis    Atherosclerosis of native artery of lower extremity    COPD (chronic obstructive pulmonary disease)    CHESTER on CPAP    H/O intestinal obstruction    History of cholecystectomy    History of pancreatic surgery    History of penile implant    Cardiac pacemaker    ALLERGIES:  No Known Allergies    MEDICATIONS:  STANDING MEDICATIONS  amitriptyline Oral Tab/Cap - Peds 25 milliGRAM(s) Oral daily  apixaban 5 milliGRAM(s) Oral two times a day  chlorhexidine 2% Cloths 1 Application(s) Topical <User Schedule>  clopidogrel Tablet 75 milliGRAM(s) Oral daily  famotidine  Oral Tab/Cap - Peds 40 milliGRAM(s) Oral at bedtime  midodrine. 2.5 milliGRAM(s) Oral three times a day  montelukast 10 milliGRAM(s) Oral daily  pancrelipase  (CREON 36,000 Lipase Units) 1 Capsule(s) Oral four times a day with meals  pregabalin 75 milliGRAM(s) Oral daily  simethicone 80 milliGRAM(s) Chew every 8 hours  simvastatin 10 milliGRAM(s) Oral at bedtime  sodium chloride 0.9%. 1000 milliLiter(s) IV Continuous <Continuous>    PRN MEDICATIONS  acetaminophen     Tablet .. 650 milliGRAM(s) Oral every 6 hours PRN  aluminum hydroxide/magnesium hydroxide/simethicone Suspension 30 milliLiter(s) Oral every 4 hours PRN  melatonin 3 milliGRAM(s) Oral at bedtime PRN  ondansetron Injectable 4 milliGRAM(s) IV Push every 8 hours PRN      VITAL SIGNS: Last 24 Hours  T(C): 36.3 (26 Jun 2022 13:12), Max: 36.6 (25 Jun 2022 16:05)  T(F): 97.4 (26 Jun 2022 13:12), Max: 97.8 (25 Jun 2022 16:05)  HR: 92 (26 Jun 2022 13:12) (65 - 92)  BP: 137/90 (26 Jun 2022 13:12) (136/62 - 161/73)  RR: 18 (26 Jun 2022 13:12) (18 - 18)  SpO2: 98% (26 Jun 2022 13:12) (98% - 99%)    LABS:                        12.0   4.45  )-----------( 312      ( 25 Jun 2022 05:50 )             35.6     06-25    138  |  102  |  10  ----------------------------<  103<H>  3.7   |  20  |  0.8    Ca    8.7      25 Jun 2022 05:50    Mg     1.9     06-25    TPro  6.0  /  Alb  3.0<L>  /  TBili  0.3  /  DBili  x   /  AST  15  /  ALT  7   /  AlkPhos  71  06-25                        13.1   4.50  )-----------( 351      ( 23 Jun 2022 05:36 )             38.3     06-23    138  |  101  |  7<L>  ----------------------------<  107<H>  4.2   |  25  |  0.6<L>    Ca    9.1      23 Jun 2022 05:36    Mg     1.7     06-23    TPro  6.7  /  Alb  3.3<L>  /  TBili  0.4  /  DBili  x   /  AST  17  /  ALT  7   /  AlkPhos  84  06-23    RADIOLOGY: reviewed   Supine view the abdomen.  There is a nonobstructive bowel gas pattern. There are degenerative   changes. Surgical clips are seen in the right upper quadrant.    IMPRESSION:  Nonobstructive bowel gas pattern.    PHYSICAL EXAM:  CONSTITUTIONAL: No acute distress, well-developed, well-groomed, AAOx3  PULMONARY: Clear to auscultation bilaterally; no wheezes, rales, or rhonchi  CARDIOVASCULAR: Regular rate and rhythm; no murmurs, rubs, or gallops  GASTROINTESTINAL: Soft, non-tender, non-distended; bowel sounds present  MUSCULOSKELETAL: 2+ peripheral pulses; no clubbing, no cyanosis, no edema  NEUROLOGY: non-focal  SKIN: No rashes or lesions; warm and dry

## 2022-06-26 NOTE — CONSULT NOTE ADULT - SUBJECTIVE AND OBJECTIVE BOX
Patient is a 81y old  Male who presents with a chief complaint of Witnessed Fall (26 Jun 2022 13:50)      REASON FOR CONSULT     HPI:  81-year-old man past medical history significant for hypertension, dyslipidemia, diabetes, CAD, CHF, COPD, PPM, glaucoma, A. fib on Eliquis, GERD, chronic pancreatitis, admitted last month for small bowel obstruction s/p resection, status post near syncope versus seizure at the SNF today.    As per patient after interview by ED attending, he approached the nursing station because he had been having abdominal pain and was going to request to go to the hospital.  As per SNF staff via telephone, patient approached the nursing station and then became dizzy and fell to the ground.  He is a very poor historian upon bedside interview his baseline mental status is ANox3, but now is ANox1. Patient has some delusions after questioning; he stated that people at [facility] (his SNF) are not his friends  He also complains of diffuse abdominal pain located to epigastric area and has tenderness diffusely.      Questionable LOC.  There was a few second episode of patient's arms and legs shaking patient and he was confused after the episode.    Patient has no history of seizures.    Patient reports normal BMs.  No fever, chills.  No chest pain, shortness of breath.  Mild cough or URI symptoms.  Patient reports decreasing urine output . No dysuria/frequency     In the ED, vitals significant for paced rythm tachycardia and HTN reaching 180 SBP - afebrile - on RA satting well   He received haldol for agitation and was placed on 1:1 constant observation - calmed down after receiving haldol   CBC/CMP non-significant   - ischemic work-up neg: EKG NSL - troponin <0.1 -    - Lactate 2.9   - UA pending   - CXR showed non-significant RUL opacity   - CTH negative - CTAP significant intrahepatic and extra arachnoid ductal dilatation (20 Jun 2022 16:52)      PAST MEDICAL & SURGICAL HISTORY:  Diabetes      Hypertension      Pacemaker      Dyslipidemia      History of chronic pancreatitis      Atherosclerosis of native artery of lower extremity      COPD (chronic obstructive pulmonary disease)      CHESTER on CPAP      H/O intestinal obstruction      History of cholecystectomy      History of pancreatic surgery      History of penile implant      Cardiac pacemaker              SOCIAL HISTORY:     FAMILY HISTORY:  FH: hypertension (Sibling)      No Known Allergies      MEDICATIONS  (STANDING):  amitriptyline Oral Tab/Cap - Peds 25 milliGRAM(s) Oral daily  apixaban 5 milliGRAM(s) Oral two times a day  chlorhexidine 2% Cloths 1 Application(s) Topical <User Schedule>  clopidogrel Tablet 75 milliGRAM(s) Oral daily  famotidine  Oral Tab/Cap - Peds 40 milliGRAM(s) Oral at bedtime  ketorolac   Injectable 30 milliGRAM(s) IV Push once  losartan 25 milliGRAM(s) Oral daily  midodrine. 2.5 milliGRAM(s) Oral three times a day  montelukast 10 milliGRAM(s) Oral daily  pancrelipase  (CREON 12,000 Lipase Units) 3 Capsule(s) Oral four times a day with meals  pregabalin 75 milliGRAM(s) Oral daily  simethicone 80 milliGRAM(s) Chew every 8 hours  simvastatin 10 milliGRAM(s) Oral at bedtime  sodium chloride 0.9%. 1000 milliLiter(s) (75 mL/Hr) IV Continuous <Continuous>    MEDICATIONS  (PRN):  acetaminophen     Tablet .. 650 milliGRAM(s) Oral every 6 hours PRN Temp greater or equal to 38C (100.4F), Mild Pain (1 - 3)  aluminum hydroxide/magnesium hydroxide/simethicone Suspension 30 milliLiter(s) Oral every 4 hours PRN Dyspepsia  melatonin 3 milliGRAM(s) Oral at bedtime PRN Insomnia  ondansetron Injectable 4 milliGRAM(s) IV Push every 8 hours PRN Nausea and/or Vomiting      Vital Signs Last 24 Hrs  T(C): 36.5 (26 Jun 2022 20:22), Max: 36.5 (26 Jun 2022 20:22)  T(F): 97.7 (26 Jun 2022 20:22), Max: 97.7 (26 Jun 2022 20:22)  HR: 92 (26 Jun 2022 20:22) (81 - 92)  BP: 125/77 (26 Jun 2022 20:22) (125/77 - 161/73)  BP(mean): --  RR: 18 (26 Jun 2022 20:22) (18 - 18)  SpO2: 98% (26 Jun 2022 14:17) (98% - 99%) I&O's Detail    25 Jun 2022 07:01  -  26 Jun 2022 07:00  --------------------------------------------------------  IN:    Oral Fluid: 550 mL  Total IN: 550 mL    OUT:    Voided (mL): 1110 mL  Total OUT: 1110 mL    Total NET: -560 mL      26 Jun 2022 07:01 - 26 Jun 2022 21:34  --------------------------------------------------------  IN:    Oral Fluid: 600 mL  Total IN: 600 mL    OUT:    Voided (mL): 250 mL  Total OUT: 250 mL    Total NET: 350 mL        PHYSICAL EXAM:        REVIEW OF SYSTEMS              ECG:  ECHOCARDIOGRAM:  RADIOLOGY & ADDITIONAL STUDIES:      LABS:                        12.0   4.45  )-----------( 312      ( 25 Jun 2022 05:50 )             35.6     06-25    138  |  102  |  10  ----------------------------<  103<H>  3.7   |  20  |  0.8    Ca    8.7      25 Jun 2022 05:50  Mg     1.9     06-25    TPro  6.0  /  Alb  3.0<L>  /  TBili  0.3  /  DBili  x   /  AST  15  /  ALT  7   /  AlkPhos  71  06-25          I&O's Summary    25 Jun 2022 07:01 - 26 Jun 2022 07:00  --------------------------------------------------------  IN: 550 mL / OUT: 1110 mL / NET: -560 mL    26 Jun 2022 07:01  -  26 Jun 2022 21:34  --------------------------------------------------------  IN: 600 mL / OUT: 250 mL / NET: 350 mL        Patient was seen and examined by me earlier today.  Limited interview due to patient's mood disorder.    EMR reviewed.  He states that he is feeling better.  He sees Dr. Ike Patel in the office.  He denies any chest pain and shortness of breath.  _____________________________________________________________    Patient is a 81y old  Male who presents with a chief complaint of Witnessed Fall (26 Jun 2022 13:50)      REASON FOR CONSULT     HPI:  81-year-old man past medical history significant for hypertension, dyslipidemia, diabetes, CAD, CHF, COPD, PPM, glaucoma, A. fib on Eliquis, GERD, chronic pancreatitis, admitted last month for small bowel obstruction s/p resection, status post near syncope versus seizure at the SNF today.    As per patient after interview by ED attending, he approached the nursing station because he had been having abdominal pain and was going to request to go to the hospital.  As per SNF staff via telephone, patient approached the nursing station and then became dizzy and fell to the ground.  He is a very poor historian upon bedside interview his baseline mental status is ANox3, but now is ANox1. Patient has some delusions after questioning; he stated that people at [facility] (his SNF) are not his friends  He also complains of diffuse abdominal pain located to epigastric area and has tenderness diffusely.      Questionable LOC.  There was a few second episode of patient's arms and legs shaking patient and he was confused after the episode.    Patient has no history of seizures.    Patient reports normal BMs.  No fever, chills.  No chest pain, shortness of breath.  Mild cough or URI symptoms.  Patient reports decreasing urine output . No dysuria/frequency     In the ED, vitals significant for paced rhythm tachycardia and HTN reaching 180 SBP - afebrile - on RA satting well   He received haldol for agitation and was placed on 1:1 constant observation - calmed down after receiving haldol   CBC/CMP non-significant   - ischemic work-up neg: EKG NSL - troponin <0.1 -    - Lactate 2.9   - UA pending   - CXR showed non-significant RUL opacity   - CTH negative - CTAP significant intrahepatic and extra arachnoid ductal dilatation (20 Jun 2022 16:52)      PAST MEDICAL & SURGICAL HISTORY:  Diabetes      Hypertension      Pacemaker      Dyslipidemia      History of chronic pancreatitis      Atherosclerosis of native artery of lower extremity      COPD (chronic obstructive pulmonary disease)      CHESTER on CPAP      H/O intestinal obstruction      History of cholecystectomy      History of pancreatic surgery      History of penile implant      Cardiac pacemaker              SOCIAL HISTORY:     FAMILY HISTORY:  FH: hypertension (Sibling)      No Known Allergies      MEDICATIONS  (STANDING):  amitriptyline Oral Tab/Cap - Peds 25 milliGRAM(s) Oral daily  apixaban 5 milliGRAM(s) Oral two times a day  chlorhexidine 2% Cloths 1 Application(s) Topical <User Schedule>  clopidogrel Tablet 75 milliGRAM(s) Oral daily  famotidine  Oral Tab/Cap - Peds 40 milliGRAM(s) Oral at bedtime  ketorolac   Injectable 30 milliGRAM(s) IV Push once  losartan 25 milliGRAM(s) Oral daily  midodrine. 2.5 milliGRAM(s) Oral three times a day  montelukast 10 milliGRAM(s) Oral daily  pancrelipase  (CREON 12,000 Lipase Units) 3 Capsule(s) Oral four times a day with meals  pregabalin 75 milliGRAM(s) Oral daily  simethicone 80 milliGRAM(s) Chew every 8 hours  simvastatin 10 milliGRAM(s) Oral at bedtime  sodium chloride 0.9%. 1000 milliLiter(s) (75 mL/Hr) IV Continuous <Continuous>    MEDICATIONS  (PRN):  acetaminophen     Tablet .. 650 milliGRAM(s) Oral every 6 hours PRN Temp greater or equal to 38C (100.4F), Mild Pain (1 - 3)  aluminum hydroxide/magnesium hydroxide/simethicone Suspension 30 milliLiter(s) Oral every 4 hours PRN Dyspepsia  melatonin 3 milliGRAM(s) Oral at bedtime PRN Insomnia  ondansetron Injectable 4 milliGRAM(s) IV Push every 8 hours PRN Nausea and/or Vomiting      Vital Signs Last 24 Hrs  T(C): 36.5 (26 Jun 2022 20:22), Max: 36.5 (26 Jun 2022 20:22)  T(F): 97.7 (26 Jun 2022 20:22), Max: 97.7 (26 Jun 2022 20:22)  HR: 92 (26 Jun 2022 20:22) (81 - 92)  BP: 125/77 (26 Jun 2022 20:22) (125/77 - 161/73)  BP(mean): --  RR: 18 (26 Jun 2022 20:22) (18 - 18)  SpO2: 98% (26 Jun 2022 14:17) (98% - 99%) I&O's Detail    25 Jun 2022 07:01  -  26 Jun 2022 07:00  --------------------------------------------------------  IN:    Oral Fluid: 550 mL  Total IN: 550 mL    OUT:    Voided (mL): 1110 mL  Total OUT: 1110 mL    Total NET: -560 mL      26 Jun 2022 07:01  -  26 Jun 2022 21:34  --------------------------------------------------------  IN:    Oral Fluid: 600 mL  Total IN: 600 mL    OUT:    Voided (mL): 250 mL  Total OUT: 250 mL    Total NET: 350 mL        PHYSICAL EXAM:  Not in distress  Alert, following simple commands  Irregular rhythm; nl S1S2  Bilateral breath sounds  Abdomen soft  No edema  Moving all extremities    REVIEW OF SYSTEMS: Negative except as stated in HPI    ECG: Atrial Fibrillation    ECHOCARDIOGRAM:  < from: TTE Echo Complete w/o Contrast w/ Doppler (05.29.22 @ 13:34) >  Summary:   1. Endocardial visualization was enhanced with intravenous echo contrast.   2. Left ventricular ejection fraction, by visual estimation, is 45 to   50%.   3. Mildly decreased global left ventricular systolic function.   4. Entire inferior septum and basal and mid inferior wall are abnormal   as described above.    < end of copied text >    RADIOLOGY & ADDITIONAL STUDIES:      LABS:                        12.0   4.45  )-----------( 312      ( 25 Jun 2022 05:50 )             35.6     06-25    138  |  102  |  10  ----------------------------<  103<H>  3.7   |  20  |  0.8    Ca    8.7      25 Jun 2022 05:50  Mg     1.9     06-25    TPro  6.0  /  Alb  3.0<L>  /  TBili  0.3  /  DBili  x   /  AST  15  /  ALT  7   /  AlkPhos  71  06-25          I&O's Summary    25 Jun 2022 07:01 - 26 Jun 2022 07:00  --------------------------------------------------------  IN: 550 mL / OUT: 1110 mL / NET: -560 mL    26 Jun 2022 07:01  -  26 Jun 2022 21:34  --------------------------------------------------------  IN: 600 mL / OUT: 250 mL / NET: 350 mL

## 2022-06-27 LAB
ALBUMIN SERPL ELPH-MCNC: 3.3 G/DL — LOW (ref 3.5–5.2)
ALP SERPL-CCNC: 72 U/L — SIGNIFICANT CHANGE UP (ref 30–115)
ALT FLD-CCNC: 8 U/L — SIGNIFICANT CHANGE UP (ref 0–41)
ANION GAP SERPL CALC-SCNC: 16 MMOL/L — HIGH (ref 7–14)
AST SERPL-CCNC: 16 U/L — SIGNIFICANT CHANGE UP (ref 0–41)
BASOPHILS # BLD AUTO: 0.03 K/UL — SIGNIFICANT CHANGE UP (ref 0–0.2)
BASOPHILS NFR BLD AUTO: 0.5 % — SIGNIFICANT CHANGE UP (ref 0–1)
BILIRUB SERPL-MCNC: 0.3 MG/DL — SIGNIFICANT CHANGE UP (ref 0.2–1.2)
BUN SERPL-MCNC: 9 MG/DL — LOW (ref 10–20)
CALCIUM SERPL-MCNC: 9.1 MG/DL — SIGNIFICANT CHANGE UP (ref 8.5–10.1)
CHLORIDE SERPL-SCNC: 102 MMOL/L — SIGNIFICANT CHANGE UP (ref 98–110)
CO2 SERPL-SCNC: 20 MMOL/L — SIGNIFICANT CHANGE UP (ref 17–32)
CREAT SERPL-MCNC: 0.8 MG/DL — SIGNIFICANT CHANGE UP (ref 0.7–1.5)
EGFR: 89 ML/MIN/1.73M2 — SIGNIFICANT CHANGE UP
EOSINOPHIL # BLD AUTO: 0.38 K/UL — SIGNIFICANT CHANGE UP (ref 0–0.7)
EOSINOPHIL NFR BLD AUTO: 5.8 % — SIGNIFICANT CHANGE UP (ref 0–8)
GLUCOSE BLDC GLUCOMTR-MCNC: 146 MG/DL — HIGH (ref 70–99)
GLUCOSE SERPL-MCNC: 189 MG/DL — HIGH (ref 70–99)
HCT VFR BLD CALC: 34.6 % — LOW (ref 42–52)
HGB BLD-MCNC: 12 G/DL — LOW (ref 14–18)
IMM GRANULOCYTES NFR BLD AUTO: 0.3 % — SIGNIFICANT CHANGE UP (ref 0.1–0.3)
LYMPHOCYTES # BLD AUTO: 3.2 K/UL — SIGNIFICANT CHANGE UP (ref 1.2–3.4)
LYMPHOCYTES # BLD AUTO: 49.2 % — SIGNIFICANT CHANGE UP (ref 20.5–51.1)
MAGNESIUM SERPL-MCNC: 1.5 MG/DL — LOW (ref 1.8–2.4)
MCHC RBC-ENTMCNC: 30.5 PG — SIGNIFICANT CHANGE UP (ref 27–31)
MCHC RBC-ENTMCNC: 34.7 G/DL — SIGNIFICANT CHANGE UP (ref 32–37)
MCV RBC AUTO: 87.8 FL — SIGNIFICANT CHANGE UP (ref 80–94)
MONOCYTES # BLD AUTO: 0.76 K/UL — HIGH (ref 0.1–0.6)
MONOCYTES NFR BLD AUTO: 11.7 % — HIGH (ref 1.7–9.3)
NEUTROPHILS # BLD AUTO: 2.12 K/UL — SIGNIFICANT CHANGE UP (ref 1.4–6.5)
NEUTROPHILS NFR BLD AUTO: 32.5 % — LOW (ref 42.2–75.2)
NRBC # BLD: 0 /100 WBCS — SIGNIFICANT CHANGE UP (ref 0–0)
PLATELET # BLD AUTO: 331 K/UL — SIGNIFICANT CHANGE UP (ref 130–400)
POTASSIUM SERPL-MCNC: 3.2 MMOL/L — LOW (ref 3.5–5)
POTASSIUM SERPL-SCNC: 3.2 MMOL/L — LOW (ref 3.5–5)
PROT SERPL-MCNC: 6.3 G/DL — SIGNIFICANT CHANGE UP (ref 6–8)
RBC # BLD: 3.94 M/UL — LOW (ref 4.7–6.1)
RBC # FLD: 13.5 % — SIGNIFICANT CHANGE UP (ref 11.5–14.5)
SODIUM SERPL-SCNC: 138 MMOL/L — SIGNIFICANT CHANGE UP (ref 135–146)
WBC # BLD: 6.51 K/UL — SIGNIFICANT CHANGE UP (ref 4.8–10.8)
WBC # FLD AUTO: 6.51 K/UL — SIGNIFICANT CHANGE UP (ref 4.8–10.8)

## 2022-06-27 PROCEDURE — 99233 SBSQ HOSP IP/OBS HIGH 50: CPT

## 2022-06-27 RX ORDER — OLANZAPINE 15 MG/1
2.5 TABLET, FILM COATED ORAL AT BEDTIME
Refills: 0 | Status: DISCONTINUED | OUTPATIENT
Start: 2022-06-27 | End: 2022-06-29

## 2022-06-27 RX ADMIN — LOSARTAN POTASSIUM 25 MILLIGRAM(S): 100 TABLET, FILM COATED ORAL at 05:22

## 2022-06-27 RX ADMIN — MONTELUKAST 10 MILLIGRAM(S): 4 TABLET, CHEWABLE ORAL at 12:14

## 2022-06-27 RX ADMIN — SIMETHICONE 80 MILLIGRAM(S): 80 TABLET, CHEWABLE ORAL at 05:21

## 2022-06-27 RX ADMIN — Medication 3 CAPSULE(S): at 17:58

## 2022-06-27 RX ADMIN — MIDODRINE HYDROCHLORIDE 2.5 MILLIGRAM(S): 2.5 TABLET ORAL at 17:59

## 2022-06-27 RX ADMIN — APIXABAN 5 MILLIGRAM(S): 2.5 TABLET, FILM COATED ORAL at 05:21

## 2022-06-27 RX ADMIN — Medication 25 MILLIGRAM(S): at 12:15

## 2022-06-27 RX ADMIN — Medication 1 MILLIGRAM(S): at 07:04

## 2022-06-27 RX ADMIN — Medication 3 CAPSULE(S): at 12:14

## 2022-06-27 RX ADMIN — SIMVASTATIN 10 MILLIGRAM(S): 20 TABLET, FILM COATED ORAL at 21:26

## 2022-06-27 RX ADMIN — SIMETHICONE 80 MILLIGRAM(S): 80 TABLET, CHEWABLE ORAL at 21:26

## 2022-06-27 RX ADMIN — OLANZAPINE 2.5 MILLIGRAM(S): 15 TABLET, FILM COATED ORAL at 21:24

## 2022-06-27 RX ADMIN — CLOPIDOGREL BISULFATE 75 MILLIGRAM(S): 75 TABLET, FILM COATED ORAL at 12:14

## 2022-06-27 RX ADMIN — FAMOTIDINE 40 MILLIGRAM(S): 10 INJECTION INTRAVENOUS at 21:25

## 2022-06-27 RX ADMIN — MIDODRINE HYDROCHLORIDE 2.5 MILLIGRAM(S): 2.5 TABLET ORAL at 12:14

## 2022-06-27 RX ADMIN — CHLORHEXIDINE GLUCONATE 1 APPLICATION(S): 213 SOLUTION TOPICAL at 05:22

## 2022-06-27 RX ADMIN — Medication 3 CAPSULE(S): at 21:25

## 2022-06-27 RX ADMIN — Medication 75 MILLIGRAM(S): at 12:17

## 2022-06-27 RX ADMIN — APIXABAN 5 MILLIGRAM(S): 2.5 TABLET, FILM COATED ORAL at 17:58

## 2022-06-27 RX ADMIN — SIMETHICONE 80 MILLIGRAM(S): 80 TABLET, CHEWABLE ORAL at 14:20

## 2022-06-27 NOTE — PROGRESS NOTE ADULT - ASSESSMENT
81-year-old man past medical history significant for hypertension, dyslipidemia, diabetes, CAD, CHF, COPD, PPM, glaucoma, A. fib on Eliquis, GERD, chronic pancreatitis, admitted last month for small bowel obstruction, status post near syncope versus seizure at the SNF.    #Witnessed fall Secondary to Orthostatic Hypotension  #Post-Ictal Confusion   - CTH negative   - CTAP significant intrahepatic and extra arachnoid ductal dilatation  - EEG negative for seizures  - evaluated by neuro  - 6/24: Near Syncope Episode 2/2 Orthostatic Hypotension   - repeat CTH negative for any acute pathologies  - PINA stockings and abdominal binder ordered for orthostatic hypotension  - c/w midodrine 2.5mg q8  - monitor BP   - strict bedrest     #Elevated lactate - resolved  #Possible D-lactate vs seizure induced   - small bowel resection on previous admission   - Lactate 2.9 - repeat 2.9>1.3>2.5>1.4  - BCx NGTD  - s/p IVF  - monitor for any signs of overload as pt with HF hx    #Abdominal pain  - unlikely to be strangulation  - as per surgery, no concerns for any hernia or strangulation  - trial on simethicone for possible gas pain  - KUB: Non obstructive bowel gas pattern     #Paced rythm - tachycardic   #Sick sinus syndrome s/p PPM   - Not on any anti-arrythmics   - no events on EP interrogation    #CAD (Plavix)  #HFmrEF (Corlanor 5)   #Sick sinus Syndrome s/p PPM   #Chronic AFib (on Eliquis)   #HTN (nifedipine 30XL) / DLD (simvastatin 10) / DM (trulicity)  - PPM interrogated by Electrophysiology - no abnormalities   - ischemic work-up neg: EKG NSL - troponin <0.1 -    - not overloaded on exam   - lipid profile within normal limits, a1c 7.3  - prior echo showed inferior wall abnormalities  - 6/22: nifedipine dc'ed  - 6/24: losartan 25mg BID decreased to daily  - f/u cardiology consult (Dr. Roberson)    #DM2  - a1c 7.3  - on home trulicity  - monitor FS, start basal/bolus regimen if >180    #COPD (breo-ellipta)   - Not on oxygen    #Chronic Pancreatitis   #Intrahepatic and extrahepatic biliary ductal dilation seen on CT  - CTAP no acute signs of inflammation - lipase 10   - evaluate for need of Creon   - 6/23: as per GI recs, started creon 36,000 QID  - OP GI f/u    #GERD - c/w (pepcid/pantoprazole)     Misc:  #DVT ppx: Eliquis  #GI ppx: pantoprazole   #Diet - DASH/CC  #Activity - IAT with assistance  #Code: Full code  #Disposition: Skilled nursing home from [facility].      81-year-old man past medical history significant for hypertension, dyslipidemia, diabetes, CAD, CHF, COPD, PPM, glaucoma, A. fib on Eliquis, GERD, chronic pancreatitis, admitted last month for small bowel obstruction, status post near syncope versus seizure at the SNF.    #Witnessed fall Secondary to Orthostatic Hypotension  #Post-Ictal Confusion   - CTH negative   - CTAP significant intrahepatic and extra arachnoid ductal dilatation  - EEG negative for seizures  - evaluated by neuro  - 6/24: Near Syncope Episode 2/2 Orthostatic Hypotension   - repeat CTH negative for any acute pathologies  - PINA stockings and abdominal binder ordered for orthostatic hypotension  - c/w midodrine 2.5mg q8  - monitor BP   - strict bedrest     #Elevated lactate - resolved  #Possible D-lactate vs seizure induced   - small bowel resection on previous admission   - Lactate 2.9 - repeat 2.9>1.3>2.5>1.4  - BCx NGTD  - s/p IVF  - monitor for any signs of overload as pt with HF hx    #Abdominal pain  - unlikely to be strangulation  - as per surgery, no concerns for any hernia or strangulation  - trial on simethicone for possible gas pain  - KUB: Non obstructive bowel gas pattern     #Paced rythm - tachycardic   #Sick sinus syndrome s/p PPM   - Not on any anti-arrythmics   - no events on EP interrogation    #CAD (Plavix)  #HFmrEF (Corlanor 5)   #Sick sinus Syndrome s/p PPM   #Chronic AFib (on Eliquis)   #HTN (nifedipine 30XL) / DLD (simvastatin 10) / DM (trulicity)  - PPM interrogated by Electrophysiology - no abnormalities   - ischemic work-up neg: EKG NSL - troponin <0.1 -    - not overloaded on exam   - lipid profile within normal limits, a1c 7.3  - prior echo showed inferior wall abnormalities  - 6/22: nifedipine dc'ed  - 6/24: losartan 25mg BID decreased to daily  - Cardiology recs appreciated    #DM2  - a1c 7.3  - on home trulicity  - monitor FS, start basal/bolus regimen if >180    #COPD (breo-ellipta)   - Not on oxygen    #Chronic Pancreatitis   #Intrahepatic and extrahepatic biliary ductal dilation seen on CT  - CTAP no acute signs of inflammation - lipase 10   - evaluate for need of Creon   - 6/23: as per GI recs, started creon 36,000 QID  - OP GI f/u    #GERD - c/w (pepcid/pantoprazole)     Misc:  #DVT ppx: Eliquis  #GI ppx: pantoprazole   #Diet - DASH/CC  #Activity - IAT with assistance  #Code: Full code  #Disposition: Skilled nursing home from [facility].

## 2022-06-27 NOTE — PROGRESS NOTE ADULT - ASSESSMENT
81-year-old man past medical history significant for hypertension, dyslipidemia, diabetes, CAD, CHF, COPD, PPM, glaucoma, A. fib on Eliquis, GERD, chronic pancreatitis, admitted last month for small bowel obstruction, status post near syncope versus seizure at the SNF.    #Delirium due to Advance Age No acute medical condition at this time  - Pt is s/p Ativan and Restraints --> all have been discontinued   - 1:1 for safety   - Zyprexa prn at nights     #Witnessed fall Secondary to Orthostatic Hypotension  #Post-Ictal Confusion   Imaging: CTH negative - CTAP significant intrahepatic and extra arachnoid ductal dilatation  Plan  - EEG negative for seizures  - evaluated by neuro  - PINA stockings and abdominal binder ordered for orthostatic hypotension  - c/w midodrine 2.5mg q8  - monitor BP   - 6/24: Near Syncope Episode 2/2 Orthostatic Hypotension   - repeat CTH negative for any acute pathologies  - c/w IVFs NS @75cc/hr for 24 hours  - strict bedrest     #Elevated lactate - resolved  #Possible D-lactate vs seizure induced   small bowel resection on previous admission   Lactate 2.9 - repeat 2.9>1.3>2.5>1.4  - BCx NGTD  - s/p IVF  - monitor for any signs of overload as pt with HF hx    #Abdominal pain  - unlikely to be strangulation  - as per surgery, no concerns for any hernia or strangulation  - trial on simethicone for possible gas pain  - KUB: Non obstructive bowel gas pattern     #Paced rythm - tachycardic   #Sick sinus syndrome s/p PPM   Not on any anti-arrythmics   - no events on EP interrogation    #CAD (Plavix)  #HFmrEF (Corlanor 5)   #Sick sinus Syndrome s/p PPM   #Chronic AFib (on Eliquis)   #HTN (nifedipine 30XL) / DLD (simvastatin 10) / DM (trulicity)  PPM interrogated by Electrophysiology - no abnormalities   - ischemic work-up neg: EKG NSL - troponin <0.1 -    - not overloaded on exam   - lipid profile within normal limits, a1c 7.3  - prior echo showed inferior wall abnormalities  - 6/22: nifedipine dc'ed  - 6/24: losartan 25mg BID decreased to daily  - f/u cardiology consult (Dr. Roberson)    #DM2  - a1c 7.3  - on home trulicity  - monitor FS, start basal/bolus regimen if >180    #COPD (breo-ellipta)   - Not on oxygen    #Chronic Pancreatitis   #Intrahepatic and extrahepatic biliary ductal dilation seen on CT  - CTAP no acute signs of inflammation - lipase 10   - evaluate for need of Creon   - 6/23: as per GI recs, started creon 36,000 QID  - OP GI f/u    #GERD (pepcid/pantoprazole)     #DVT prophylaxis - Eliquis  #GI prophylaxis - pantoprazole   #Diet - DASH/CC  #Activity - IAT with assistance  #Code: Full code    #Disposition: LTC Facility     #Pending: Pending LTC Placement - Son on board and placement in process beginning 6/27

## 2022-06-27 NOTE — PROGRESS NOTE ADULT - SUBJECTIVE AND OBJECTIVE BOX
EZ ALCARAZ 81y Male  MRN#: [id number]     Hospital Day: 7 days     SUBJECTIVE  Patient is a 81y old Male who presents with a chief complaint of Witnessed Fall (24 Jun 2022 01:17)  Currently admitted to medicine with the primary diagnosis of Syncope    INTERVAL HPI AND OVERNIGHT EVENTS:  Patient was examined and seen at bedside. Overnight pt was very agitated and given Ativan IV and Placed on restraints - overall due to Sundowning - Delirium     OBJECTIVE  PAST MEDICAL & SURGICAL HISTORY  Diabetes    Hypertension    Pacemaker    Dyslipidemia    History of chronic pancreatitis    Atherosclerosis of native artery of lower extremity    COPD (chronic obstructive pulmonary disease)    CHESTER on CPAP    H/O intestinal obstruction    History of cholecystectomy    History of pancreatic surgery    History of penile implant    Cardiac pacemaker    ALLERGIES:  No Known Allergies    MEDICATIONS:  STANDING MEDICATIONS  amitriptyline Oral Tab/Cap - Peds 25 milliGRAM(s) Oral daily  apixaban 5 milliGRAM(s) Oral two times a day  chlorhexidine 2% Cloths 1 Application(s) Topical <User Schedule>  clopidogrel Tablet 75 milliGRAM(s) Oral daily  famotidine  Oral Tab/Cap - Peds 40 milliGRAM(s) Oral at bedtime  midodrine. 2.5 milliGRAM(s) Oral three times a day  montelukast 10 milliGRAM(s) Oral daily  pancrelipase  (CREON 36,000 Lipase Units) 1 Capsule(s) Oral four times a day with meals  pregabalin 75 milliGRAM(s) Oral daily  simethicone 80 milliGRAM(s) Chew every 8 hours  simvastatin 10 milliGRAM(s) Oral at bedtime  sodium chloride 0.9%. 1000 milliLiter(s) IV Continuous <Continuous>    PRN MEDICATIONS  acetaminophen     Tablet .. 650 milliGRAM(s) Oral every 6 hours PRN  aluminum hydroxide/magnesium hydroxide/simethicone Suspension 30 milliLiter(s) Oral every 4 hours PRN  melatonin 3 milliGRAM(s) Oral at bedtime PRN  ondansetron Injectable 4 milliGRAM(s) IV Push every 8 hours PRN      VITAL SIGNS: Last 24 Hours  T(C): 36.2 (27 Jun 2022 14:00), Max: 36.5 (26 Jun 2022 20:22)  T(F): 97.2 (27 Jun 2022 14:00), Max: 97.7 (26 Jun 2022 20:22)  HR: 95 (27 Jun 2022 14:00) (85 - 95)  BP: 108/71 (27 Jun 2022 14:00) (108/71 - 155/88)  RR: 18 (27 Jun 2022 14:00) (18 - 18)  SpO2: 100% (27 Jun 2022 05:13) (100% - 100%)      LABS:                        12.0   6.51  )-----------( 331      ( 27 Jun 2022 07:02 )             34.6     06-27    138  |  102  |  9 /  ----------------------------<  189<H>  3.2<L>   |  20  |  0.8    Ca    9.1      27 Jun 2022 07:02    Mg     1.5     06-27    TPro  6.3  /  Alb  3.3<L>  /  TBili  0.3  /  DBili  x   /  AST  16  /  ALT  8   /  AlkPhos  72  06-27                          12.0   4.45  )-----------( 312      ( 25 Jun 2022 05:50 )             35.6     06-25    138  |  102  |  10  ----------------------------<  103<H>  3.7   |  20  |  0.8    Ca    8.7      25 Jun 2022 05:50    Mg     1.9     06-25    TPro  6.0  /  Alb  3.0<L>  /  TBili  0.3  /  DBili  x   /  AST  15  /  ALT  7   /  AlkPhos  71  06-25                        13.1   4.50  )-----------( 351      ( 23 Jun 2022 05:36 )             38.3     06-23    138  |  101  |  7<L>  ----------------------------<  107<H>  4.2   |  25  |  0.6<L>    Ca    9.1      23 Jun 2022 05:36    Mg     1.7     06-23    TPro  6.7  /  Alb  3.3<L>  /  TBili  0.4  /  DBili  x   /  AST  17  /  ALT  7   /  AlkPhos  84  06-23    RADIOLOGY: reviewed   Supine view the abdomen.  There is a nonobstructive bowel gas pattern. There are degenerative   changes. Surgical clips are seen in the right upper quadrant.    IMPRESSION:  Nonobstructive bowel gas pattern.    PHYSICAL EXAM:  CONSTITUTIONAL: No acute distress, well-developed, well-groomed, AAOx3  PULMONARY: Clear to auscultation bilaterally; no wheezes, rales, or rhonchi  CARDIOVASCULAR: Regular rate and rhythm; no murmurs, rubs, or gallops  GASTROINTESTINAL: Soft, non-tender, non-distended; bowel sounds present  MUSCULOSKELETAL: 2+ peripheral pulses; no clubbing, no cyanosis, no edema  NEUROLOGY: non-focal  SKIN: No rashes or lesions; warm and dry

## 2022-06-27 NOTE — PROGRESS NOTE ADULT - SUBJECTIVE AND OBJECTIVE BOX
EZ ALCARAZ 81y Male  MRN#: [id number]     SUBJECTIVE  Patient is a 81y old Male who presents with a chief complaint of Witnessed Fall (26 Jun 2022 21:33)    Interval/Overnight Events:    Today is hospital day 7d, and this morning he is lying in bed without distress. Limited interview and exam as patient is uncooperative and states that he did not want to be bothered. Patient has refused medications, blood work, IV line, and exams despite being educated multiple times of indications, benefits, and risks of refusing medical care. Patient was agitated last night and was given ativan IM.       OBJECTIVE  PAST MEDICAL & SURGICAL HISTORY  Diabetes    Hypertension    Pacemaker    Dyslipidemia    History of chronic pancreatitis    Atherosclerosis of native artery of lower extremity    COPD (chronic obstructive pulmonary disease)    CHESTER on CPAP    H/O intestinal obstruction    History of cholecystectomy    History of pancreatic surgery    History of penile implant    Cardiac pacemaker      ALLERGIES:  No Known Allergies    MEDICATIONS:  STANDING MEDICATIONS  amitriptyline Oral Tab/Cap - Peds 25 milliGRAM(s) Oral daily  apixaban 5 milliGRAM(s) Oral two times a day  chlorhexidine 2% Cloths 1 Application(s) Topical <User Schedule>  clopidogrel Tablet 75 milliGRAM(s) Oral daily  famotidine  Oral Tab/Cap - Peds 40 milliGRAM(s) Oral at bedtime  ketorolac   Injectable 30 milliGRAM(s) IV Push once  losartan 25 milliGRAM(s) Oral daily  midodrine. 2.5 milliGRAM(s) Oral three times a day  montelukast 10 milliGRAM(s) Oral daily  pancrelipase  (CREON 12,000 Lipase Units) 3 Capsule(s) Oral four times a day with meals  pregabalin 75 milliGRAM(s) Oral daily  simethicone 80 milliGRAM(s) Chew every 8 hours  simvastatin 10 milliGRAM(s) Oral at bedtime  sodium chloride 0.9%. 1000 milliLiter(s) IV Continuous <Continuous>    PRN MEDICATIONS  acetaminophen     Tablet .. 650 milliGRAM(s) Oral every 6 hours PRN  aluminum hydroxide/magnesium hydroxide/simethicone Suspension 30 milliLiter(s) Oral every 4 hours PRN  melatonin 3 milliGRAM(s) Oral at bedtime PRN  ondansetron Injectable 4 milliGRAM(s) IV Push every 8 hours PRN    HOME MEDICATIONS  Home Medications:  amitriptyline 25 mg tablet: 1 tab(s) orally once a day (21 Jun 2022 14:17)  Breo Ellipta 100 mcg-25 mcg/inh inhalation powder: 1 puff(s) inhaled once a day (20 Jun 2022 18:22)  clopidogrel 75 mg tablet: 1 tab(s) orally once a day (21 Jun 2022 14:17)  Corlanor 5 mg oral tablet: 1 tab(s) orally 2 times a day (with meals) (20 Jun 2022 18:22)  Eliquis 5 mg tablet: 1 tab(s) orally every 12 hours (21 Jun 2022 14:17)  famotidine 40 mg oral tablet: 1 tab(s) orally once a day (at bedtime) (20 Jun 2022 18:22)  latanoprost 0.005% ophthalmic solution: 1 drop(s) to each affected eye once a day (in the evening) (20 Jun 2022 18:22)  montelukast 10 mg oral tablet: 1 tab(s) orally once a day (20 Jun 2022 18:22)  NIFEdipine 30 mg oral tablet, extended release: 1 tab(s) orally once a day (20 Jun 2022 18:22)  omeprazole 40 mg oral delayed release capsule: 1 cap(s) orally once a day (20 Jun 2022 18:22)  oxyMORphone 40 mg oral tablet, extended release: 1 tab(s) orally every 12 hours (20 Jun 2022 18:26)  pregabalin 75 mg capsule: 1 cap(s) orally once a day (21 Jun 2022 14:17)  simvastatin 10 mg oral tablet: 1 tab(s) orally once a day (at bedtime) (20 Jun 2022 18:22)  Trulicity Pen 0.75 mg/0.5 mL subcutaneous solution: 1 application subcutaneous once a week (20 Jun 2022 18:22)      LABS:                        12.0   6.51  )-----------( 331      ( 27 Jun 2022 07:02 )             34.6     06-27    138  |  102  |  9<L>  ----------------------------<  189<H>  3.2<L>   |  20  |  0.8    Ca    9.1      27 Jun 2022 07:02  Mg     1.5     06-27    TPro  6.3  /  Alb  3.3<L>  /  TBili  0.3  /  DBili  x   /  AST  16  /  ALT  8   /  AlkPhos  72  06-27    LIVER FUNCTIONS - ( 27 Jun 2022 07:02 )  Alb: 3.3 g/dL / Pro: 6.3 g/dL / ALK PHOS: 72 U/L / ALT: 8 U/L / AST: 16 U/L / GGT: x                         CAPILLARY BLOOD GLUCOSE      POCT Blood Glucose.: 135 mg/dL (26 Jun 2022 11:19)      PHYSICAL EXAM:  T(C): 36.2 (06-27-22 @ 05:13), Max: 36.5 (06-26-22 @ 20:22)  HR: 85 (06-27-22 @ 05:13) (81 - 92)  BP: 155/88 (06-27-22 @ 05:13) (125/77 - 155/88)  RR: 18 (06-27-22 @ 05:13) (18 - 18)  SpO2: 100% (06-27-22 @ 05:13) (98% - 100%)    Unable to perform physical exam as patient is uncooperative and refused to be examined.    ADMISSION SUMMARY  Patient is a 81y old Male who presents with a chief complaint of Witnessed Fall (26 Jun 2022 21:33)

## 2022-06-28 LAB
ALBUMIN SERPL ELPH-MCNC: 3.2 G/DL — LOW (ref 3.5–5.2)
ALP SERPL-CCNC: 71 U/L — SIGNIFICANT CHANGE UP (ref 30–115)
ALT FLD-CCNC: 9 U/L — SIGNIFICANT CHANGE UP (ref 0–41)
ANION GAP SERPL CALC-SCNC: 14 MMOL/L — SIGNIFICANT CHANGE UP (ref 7–14)
AST SERPL-CCNC: 18 U/L — SIGNIFICANT CHANGE UP (ref 0–41)
BASOPHILS # BLD AUTO: 0.03 K/UL — SIGNIFICANT CHANGE UP (ref 0–0.2)
BASOPHILS NFR BLD AUTO: 0.6 % — SIGNIFICANT CHANGE UP (ref 0–1)
BILIRUB SERPL-MCNC: 0.4 MG/DL — SIGNIFICANT CHANGE UP (ref 0.2–1.2)
BUN SERPL-MCNC: 7 MG/DL — LOW (ref 10–20)
CALCIUM SERPL-MCNC: 9.2 MG/DL — SIGNIFICANT CHANGE UP (ref 8.5–10.1)
CHLORIDE SERPL-SCNC: 102 MMOL/L — SIGNIFICANT CHANGE UP (ref 98–110)
CO2 SERPL-SCNC: 23 MMOL/L — SIGNIFICANT CHANGE UP (ref 17–32)
CREAT SERPL-MCNC: 0.7 MG/DL — SIGNIFICANT CHANGE UP (ref 0.7–1.5)
EGFR: 93 ML/MIN/1.73M2 — SIGNIFICANT CHANGE UP
EOSINOPHIL # BLD AUTO: 0.35 K/UL — SIGNIFICANT CHANGE UP (ref 0–0.7)
EOSINOPHIL NFR BLD AUTO: 7 % — SIGNIFICANT CHANGE UP (ref 0–8)
GLUCOSE BLDC GLUCOMTR-MCNC: 143 MG/DL — HIGH (ref 70–99)
GLUCOSE BLDC GLUCOMTR-MCNC: 195 MG/DL — HIGH (ref 70–99)
GLUCOSE SERPL-MCNC: 145 MG/DL — HIGH (ref 70–99)
HCT VFR BLD CALC: 35.5 % — LOW (ref 42–52)
HGB BLD-MCNC: 12.3 G/DL — LOW (ref 14–18)
IMM GRANULOCYTES NFR BLD AUTO: 0.2 % — SIGNIFICANT CHANGE UP (ref 0.1–0.3)
LYMPHOCYTES # BLD AUTO: 1.54 K/UL — SIGNIFICANT CHANGE UP (ref 1.2–3.4)
LYMPHOCYTES # BLD AUTO: 30.7 % — SIGNIFICANT CHANGE UP (ref 20.5–51.1)
MAGNESIUM SERPL-MCNC: 1.5 MG/DL — LOW (ref 1.8–2.4)
MCHC RBC-ENTMCNC: 29.7 PG — SIGNIFICANT CHANGE UP (ref 27–31)
MCHC RBC-ENTMCNC: 34.6 G/DL — SIGNIFICANT CHANGE UP (ref 32–37)
MCV RBC AUTO: 85.7 FL — SIGNIFICANT CHANGE UP (ref 80–94)
MONOCYTES # BLD AUTO: 0.57 K/UL — SIGNIFICANT CHANGE UP (ref 0.1–0.6)
MONOCYTES NFR BLD AUTO: 11.4 % — HIGH (ref 1.7–9.3)
NEUTROPHILS # BLD AUTO: 2.51 K/UL — SIGNIFICANT CHANGE UP (ref 1.4–6.5)
NEUTROPHILS NFR BLD AUTO: 50.1 % — SIGNIFICANT CHANGE UP (ref 42.2–75.2)
NRBC # BLD: 0 /100 WBCS — SIGNIFICANT CHANGE UP (ref 0–0)
PLATELET # BLD AUTO: 370 K/UL — SIGNIFICANT CHANGE UP (ref 130–400)
POTASSIUM SERPL-MCNC: 3.5 MMOL/L — SIGNIFICANT CHANGE UP (ref 3.5–5)
POTASSIUM SERPL-SCNC: 3.5 MMOL/L — SIGNIFICANT CHANGE UP (ref 3.5–5)
PROT SERPL-MCNC: 6.3 G/DL — SIGNIFICANT CHANGE UP (ref 6–8)
RBC # BLD: 4.14 M/UL — LOW (ref 4.7–6.1)
RBC # FLD: 13.4 % — SIGNIFICANT CHANGE UP (ref 11.5–14.5)
SODIUM SERPL-SCNC: 139 MMOL/L — SIGNIFICANT CHANGE UP (ref 135–146)
WBC # BLD: 5.01 K/UL — SIGNIFICANT CHANGE UP (ref 4.8–10.8)
WBC # FLD AUTO: 5.01 K/UL — SIGNIFICANT CHANGE UP (ref 4.8–10.8)

## 2022-06-28 PROCEDURE — 99233 SBSQ HOSP IP/OBS HIGH 50: CPT

## 2022-06-28 RX ORDER — MAGNESIUM OXIDE 400 MG ORAL TABLET 241.3 MG
400 TABLET ORAL
Refills: 0 | Status: DISCONTINUED | OUTPATIENT
Start: 2022-06-28 | End: 2022-06-29

## 2022-06-28 RX ADMIN — SIMETHICONE 80 MILLIGRAM(S): 80 TABLET, CHEWABLE ORAL at 22:20

## 2022-06-28 RX ADMIN — MAGNESIUM OXIDE 400 MG ORAL TABLET 400 MILLIGRAM(S): 241.3 TABLET ORAL at 12:28

## 2022-06-28 RX ADMIN — Medication 3 MILLIGRAM(S): at 23:27

## 2022-06-28 RX ADMIN — Medication 3 CAPSULE(S): at 17:21

## 2022-06-28 RX ADMIN — SIMETHICONE 80 MILLIGRAM(S): 80 TABLET, CHEWABLE ORAL at 06:02

## 2022-06-28 RX ADMIN — Medication 3 CAPSULE(S): at 22:19

## 2022-06-28 RX ADMIN — APIXABAN 5 MILLIGRAM(S): 2.5 TABLET, FILM COATED ORAL at 18:26

## 2022-06-28 RX ADMIN — SIMETHICONE 80 MILLIGRAM(S): 80 TABLET, CHEWABLE ORAL at 13:47

## 2022-06-28 RX ADMIN — MIDODRINE HYDROCHLORIDE 2.5 MILLIGRAM(S): 2.5 TABLET ORAL at 17:20

## 2022-06-28 RX ADMIN — MAGNESIUM OXIDE 400 MG ORAL TABLET 400 MILLIGRAM(S): 241.3 TABLET ORAL at 17:21

## 2022-06-28 RX ADMIN — APIXABAN 5 MILLIGRAM(S): 2.5 TABLET, FILM COATED ORAL at 06:02

## 2022-06-28 RX ADMIN — SIMVASTATIN 10 MILLIGRAM(S): 20 TABLET, FILM COATED ORAL at 22:19

## 2022-06-28 RX ADMIN — Medication 25 MILLIGRAM(S): at 12:28

## 2022-06-28 RX ADMIN — MONTELUKAST 10 MILLIGRAM(S): 4 TABLET, CHEWABLE ORAL at 11:02

## 2022-06-28 RX ADMIN — MIDODRINE HYDROCHLORIDE 2.5 MILLIGRAM(S): 2.5 TABLET ORAL at 06:01

## 2022-06-28 RX ADMIN — MAGNESIUM OXIDE 400 MG ORAL TABLET 400 MILLIGRAM(S): 241.3 TABLET ORAL at 10:59

## 2022-06-28 RX ADMIN — LOSARTAN POTASSIUM 25 MILLIGRAM(S): 100 TABLET, FILM COATED ORAL at 06:02

## 2022-06-28 RX ADMIN — OLANZAPINE 2.5 MILLIGRAM(S): 15 TABLET, FILM COATED ORAL at 22:20

## 2022-06-28 RX ADMIN — Medication 650 MILLIGRAM(S): at 11:08

## 2022-06-28 RX ADMIN — Medication 3 CAPSULE(S): at 08:08

## 2022-06-28 RX ADMIN — MIDODRINE HYDROCHLORIDE 2.5 MILLIGRAM(S): 2.5 TABLET ORAL at 11:03

## 2022-06-28 RX ADMIN — CLOPIDOGREL BISULFATE 75 MILLIGRAM(S): 75 TABLET, FILM COATED ORAL at 11:03

## 2022-06-28 RX ADMIN — Medication 3 CAPSULE(S): at 12:28

## 2022-06-28 RX ADMIN — CHLORHEXIDINE GLUCONATE 1 APPLICATION(S): 213 SOLUTION TOPICAL at 05:59

## 2022-06-28 RX ADMIN — Medication 650 MILLIGRAM(S): at 12:08

## 2022-06-28 RX ADMIN — FAMOTIDINE 40 MILLIGRAM(S): 10 INJECTION INTRAVENOUS at 22:21

## 2022-06-28 NOTE — PROGRESS NOTE ADULT - ASSESSMENT
81-year-old man past medical history significant for hypertension, dyslipidemia, diabetes, CAD, CHF, COPD, PPM, glaucoma, A. fib on Eliquis, GERD, chronic pancreatitis, admitted last month for small bowel obstruction, status post near syncope versus seizure at the SNF.    #Witnessed fall Secondary to Orthostatic Hypotension  #Post-Ictal Confusion   - CTH negative   - CTAP significant intrahepatic and extra arachnoid ductal dilatation  - EEG negative for seizures  - evaluated by neuro  - 6/24: Near Syncope Episode 2/2 Orthostatic Hypotension   - repeat CTH negative for any acute pathologies  - PINA stockings and abdominal binder ordered for orthostatic hypotension  - c/w midodrine 2.5mg q8  - monitor BP   - strict bedrest     #Elevated lactate - resolved  #Possible D-lactate vs seizure induced   - small bowel resection on previous admission   - Lactate 2.9 - repeat 2.9>1.3>2.5>1.4  - BCx NGTD  - s/p IVF  - monitor for any signs of overload as pt with HF hx    #Abdominal pain  - unlikely to be strangulation  - as per surgery, no concerns for any hernia or strangulation  - trial on simethicone for possible gas pain  - KUB: Non obstructive bowel gas pattern     #Paced rythm - tachycardic   #Sick sinus syndrome s/p PPM   - Not on any anti-arrythmics   - no events on EP interrogation    #CAD (Plavix)  #HFmrEF (Corlanor 5)   #Sick sinus Syndrome s/p PPM   #Chronic AFib (on Eliquis)   #HTN (nifedipine 30XL) / DLD (simvastatin 10) / DM (trulicity)  - PPM interrogated by Electrophysiology - no abnormalities   - ischemic work-up neg: EKG NSL - troponin <0.1 -    - not overloaded on exam   - lipid profile within normal limits, a1c 7.3  - prior echo showed inferior wall abnormalities  - 6/22: nifedipine dc'ed  - 6/24: losartan 25mg BID decreased to daily  - Cardiology recs appreciated    #DM2  - a1c 7.3  - on home trulicity  - monitor FS, start basal/bolus regimen if >180    #COPD (breo-ellipta)   - Not on oxygen    #Chronic Pancreatitis   #Intrahepatic and extrahepatic biliary ductal dilation seen on CT  - CTAP no acute signs of inflammation - lipase 10   - evaluate for need of Creon   - 6/23: as per GI recs, started creon 36,000 QID  - OP GI f/u    #GERD - c/w (pepcid/pantoprazole)     Misc:  #DVT ppx: Eliquis  #GI ppx: pantoprazole   #Diet - DASH/CC  #Activity - IAT with assistance  #Code: Full code  #Disposition: Skilled nursing home from [facility].

## 2022-06-28 NOTE — PROGRESS NOTE ADULT - SUBJECTIVE AND OBJECTIVE BOX
EZ ALCARAZ 81y Male  MRN#: [id number]     SUBJECTIVE  Patient is a 81y old Male who presents with a chief complaint of Witnessed Fall (27 Jun 2022 16:12)    Interval/Overnight Events:    Today is hospital day 8d, and this morning he is lying in bed without distress.   No acute overnight events.     OBJECTIVE  PAST MEDICAL & SURGICAL HISTORY  Diabetes    Hypertension    Pacemaker    Dyslipidemia    History of chronic pancreatitis    Atherosclerosis of native artery of lower extremity    COPD (chronic obstructive pulmonary disease)    CHESTER on CPAP    H/O intestinal obstruction    History of cholecystectomy    History of pancreatic surgery    History of penile implant    Cardiac pacemaker      ALLERGIES:  No Known Allergies    MEDICATIONS:  STANDING MEDICATIONS  amitriptyline Oral Tab/Cap - Peds 25 milliGRAM(s) Oral daily  apixaban 5 milliGRAM(s) Oral two times a day  chlorhexidine 2% Cloths 1 Application(s) Topical <User Schedule>  clopidogrel Tablet 75 milliGRAM(s) Oral daily  famotidine  Oral Tab/Cap - Peds 40 milliGRAM(s) Oral at bedtime  ketorolac   Injectable 30 milliGRAM(s) IV Push once  losartan 25 milliGRAM(s) Oral daily  magnesium oxide 400 milliGRAM(s) Oral three times a day with meals  midodrine. 2.5 milliGRAM(s) Oral three times a day  montelukast 10 milliGRAM(s) Oral daily  OLANZapine 2.5 milliGRAM(s) Oral at bedtime  pancrelipase  (CREON 12,000 Lipase Units) 3 Capsule(s) Oral four times a day with meals  simethicone 80 milliGRAM(s) Chew every 8 hours  simvastatin 10 milliGRAM(s) Oral at bedtime    PRN MEDICATIONS  acetaminophen     Tablet .. 650 milliGRAM(s) Oral every 6 hours PRN  aluminum hydroxide/magnesium hydroxide/simethicone Suspension 30 milliLiter(s) Oral every 4 hours PRN  melatonin 3 milliGRAM(s) Oral at bedtime PRN  ondansetron Injectable 4 milliGRAM(s) IV Push every 8 hours PRN    HOME MEDICATIONS  Home Medications:  amitriptyline 25 mg tablet: 1 tab(s) orally once a day (21 Jun 2022 14:17)  Breo Ellipta 100 mcg-25 mcg/inh inhalation powder: 1 puff(s) inhaled once a day (20 Jun 2022 18:22)  clopidogrel 75 mg tablet: 1 tab(s) orally once a day (21 Jun 2022 14:17)  Corlanor 5 mg oral tablet: 1 tab(s) orally 2 times a day (with meals) (20 Jun 2022 18:22)  Eliquis 5 mg tablet: 1 tab(s) orally every 12 hours (21 Jun 2022 14:17)  famotidine 40 mg oral tablet: 1 tab(s) orally once a day (at bedtime) (20 Jun 2022 18:22)  latanoprost 0.005% ophthalmic solution: 1 drop(s) to each affected eye once a day (in the evening) (20 Jun 2022 18:22)  montelukast 10 mg oral tablet: 1 tab(s) orally once a day (20 Jun 2022 18:22)  NIFEdipine 30 mg oral tablet, extended release: 1 tab(s) orally once a day (20 Jun 2022 18:22)  omeprazole 40 mg oral delayed release capsule: 1 cap(s) orally once a day (20 Jun 2022 18:22)  oxyMORphone 40 mg oral tablet, extended release: 1 tab(s) orally every 12 hours (20 Jun 2022 18:26)  pregabalin 75 mg capsule: 1 cap(s) orally once a day (21 Jun 2022 14:17)  simvastatin 10 mg oral tablet: 1 tab(s) orally once a day (at bedtime) (20 Jun 2022 18:22)  Trulicity Pen 0.75 mg/0.5 mL subcutaneous solution: 1 application subcutaneous once a week (20 Jun 2022 18:22)      LABS:                        12.3   5.01  )-----------( 370      ( 28 Jun 2022 05:39 )             35.5     06-28    139  |  102  |  7<L>  ----------------------------<  145<H>  3.5   |  23  |  0.7    Ca    9.2      28 Jun 2022 05:39  Mg     1.5     06-28    TPro  6.3  /  Alb  3.2<L>  /  TBili  0.4  /  DBili  x   /  AST  18  /  ALT  9   /  AlkPhos  71  06-28    LIVER FUNCTIONS - ( 28 Jun 2022 05:39 )  Alb: 3.2 g/dL / Pro: 6.3 g/dL / ALK PHOS: 71 U/L / ALT: 9 U/L / AST: 18 U/L / GGT: x                         CAPILLARY BLOOD GLUCOSE      POCT Blood Glucose.: 146 mg/dL (27 Jun 2022 16:35)      PHYSICAL EXAM:  T(C): 36.4 (06-28-22 @ 04:55), Max: 36.4 (06-28-22 @ 04:55)  HR: 101 (06-28-22 @ 04:55) (85 - 101)  BP: 110/82 (06-28-22 @ 04:55) (108/71 - 145/74)  RR: 19 (06-28-22 @ 04:55) (18 - 19)  SpO2: 97% (06-28-22 @ 04:55) (97% - 100%)    CONSTITUTIONAL: No acute distress, well-developed, well-groomed, AAOx3  PULMONARY: Clear to auscultation bilaterally; no wheezes, rales, or rhonchi  CARDIOVASCULAR: Regular rate and rhythm; no murmurs, rubs, or gallops  GASTROINTESTINAL: Soft, non-tender, non-distended; bowel sounds present  MUSCULOSKELETAL: 2+ peripheral pulses; no clubbing, no cyanosis, no edema  NEUROLOGY: non-focal  SKIN: No rashes or lesions; warm and dry    ADMISSION SUMMARY  Patient is a 81y old Male who presents with a chief complaint of Witnessed Fall (27 Jun 2022 16:12)

## 2022-06-28 NOTE — PROGRESS NOTE ADULT - ASSESSMENT
81-year-old man past medical history significant for hypertension, dyslipidemia, diabetes, CAD, CHF, COPD, PPM, glaucoma, A. fib on Eliquis, GERD, chronic pancreatitis, admitted last month for small bowel obstruction, status post near syncope versus seizure at the SNF.    #Delirium due to Advance Age No acute medical condition at this time  - Pt is s/p Ativan and Restraints --> all have been discontinued   - 1:1 for safety   - Zyprexa prn at nights     #Witnessed fall Secondary to Orthostatic Hypotension  #Post-Ictal Confusion   Imaging: CTH negative - CTAP significant intrahepatic and extra arachnoid ductal dilatation  Plan  - EEG negative for seizures  - evaluated by neuro  - PINA stockings and abdominal binder ordered for orthostatic hypotension  - c/w midodrine 2.5mg q8  - monitor BP   - 6/24: Near Syncope Episode 2/2 Orthostatic Hypotension   - repeat CTH negative for any acute pathologies  - c/w IVFs NS @75cc/hr for 24 hours  - strict bedrest     #Elevated lactate - resolved  #Possible D-lactate vs seizure induced   small bowel resection on previous admission   Lactate 2.9 - repeat 2.9>1.3>2.5>1.4  - BCx NGTD  - s/p IVF  - monitor for any signs of overload as pt with HF hx    #Abdominal pain  - unlikely to be strangulation  - as per surgery, no concerns for any hernia or strangulation  - trial on simethicone for possible gas pain  - KUB: Non obstructive bowel gas pattern     #Paced rythm - tachycardic   #Sick sinus syndrome s/p PPM   Not on any anti-arrythmics   - no events on EP interrogation    #CAD (Plavix)  #HFmrEF (Corlanor 5)   #Sick sinus Syndrome s/p PPM   #Chronic AFib (on Eliquis)   #HTN (nifedipine 30XL) / DLD (simvastatin 10) / DM (trulicity)  PPM interrogated by Electrophysiology - no abnormalities   - ischemic work-up neg: EKG NSL - troponin <0.1 -    - not overloaded on exam   - lipid profile within normal limits, a1c 7.3  - prior echo showed inferior wall abnormalities  - 6/22: nifedipine dc'ed  - 6/24: losartan 25mg BID decreased to daily  - f/u cardiology consult (Dr. Roberson)    #DM2  - a1c 7.3  - on home trulicity  - monitor FS, start basal/bolus regimen if >180    #COPD (breo-ellipta)   - Not on oxygen    #Chronic Pancreatitis   #Intrahepatic and extrahepatic biliary ductal dilation seen on CT  - CTAP no acute signs of inflammation - lipase 10   - evaluate for need of Creon   - 6/23: as per GI recs, started creon 36,000 QID  - OP GI f/u    #GERD (pepcid/pantoprazole)     #DVT prophylaxis - Eliquis  #GI prophylaxis - pantoprazole   #Diet - DASH/CC  #Activity - IAT with assistance  #Code: Full code    #Disposition: LTC Facility     #Pending: Pending LTC vs SNF Placement - Son on board and placement in process / Called [facility] to [facility] [phone] code [id number]

## 2022-06-28 NOTE — PROGRESS NOTE ADULT - TIME BILLING
direct pt care and interdisciplinary rounds
as above
direct pt care and interdisciplinary rounds
direct pt care and interdisciplinary rounds
direct pt care and interdisciplinary rounds / f/u CV eval

## 2022-06-28 NOTE — PROGRESS NOTE ADULT - SUBJECTIVE AND OBJECTIVE BOX
EZ ALCARAZ 81y Male  MRN#: [id number]     Hospital Day:  8 days     SUBJECTIVE  Patient is a 81y old Male who presents with a chief complaint of Witnessed Fall (24 Jun 2022 01:17)  Currently admitted to medicine with the primary diagnosis of Syncope    INTERVAL HPI AND OVERNIGHT EVENTS:  Patient was examined and seen at bedside. Overnight pt was very agitated and given Ativan IV and Placed on restraints - overall due to Sundowning - Delirium     OBJECTIVE  PAST MEDICAL & SURGICAL HISTORY  Diabetes    Hypertension    Pacemaker    Dyslipidemia    History of chronic pancreatitis    Atherosclerosis of native artery of lower extremity    COPD (chronic obstructive pulmonary disease)    CHESTER on CPAP    H/O intestinal obstruction    History of cholecystectomy    History of pancreatic surgery    History of penile implant    Cardiac pacemaker    ALLERGIES:  No Known Allergies    MEDICATIONS:  STANDING MEDICATIONS  amitriptyline Oral Tab/Cap - Peds 25 milliGRAM(s) Oral daily  apixaban 5 milliGRAM(s) Oral two times a day  chlorhexidine 2% Cloths 1 Application(s) Topical <User Schedule>  clopidogrel Tablet 75 milliGRAM(s) Oral daily  famotidine  Oral Tab/Cap - Peds 40 milliGRAM(s) Oral at bedtime  midodrine. 2.5 milliGRAM(s) Oral three times a day  montelukast 10 milliGRAM(s) Oral daily  pancrelipase  (CREON 36,000 Lipase Units) 1 Capsule(s) Oral four times a day with meals  pregabalin 75 milliGRAM(s) Oral daily  simethicone 80 milliGRAM(s) Chew every 8 hours  simvastatin 10 milliGRAM(s) Oral at bedtime  sodium chloride 0.9%. 1000 milliLiter(s) IV Continuous <Continuous>    PRN MEDICATIONS  acetaminophen     Tablet .. 650 milliGRAM(s) Oral every 6 hours PRN  aluminum hydroxide/magnesium hydroxide/simethicone Suspension 30 milliLiter(s) Oral every 4 hours PRN  melatonin 3 milliGRAM(s) Oral at bedtime PRN  ondansetron Injectable 4 milliGRAM(s) IV Push every 8 hours PRN      VITAL SIGNS: Last 24 Hours  T(C): 36.4 (28 Jun 2022 04:55), Max: 36.4 (28 Jun 2022 04:55)  T(F): 97.6 (28 Jun 2022 04:55), Max: 97.6 (28 Jun 2022 04:55)  HR: 101 (28 Jun 2022 04:55) (85 - 101)  BP: 110/82 (28 Jun 2022 04:55) (108/71 - 145/74)  RR: 19 (28 Jun 2022 04:55) (18 - 19)  SpO2: 97% (28 Jun 2022 04:55) (97% - 100%)      LABS:                          12.3   5.01  )-----------( 370      ( 28 Jun 2022 05:39 )             35.5     06-28    139  |  102  |  7<L>  ----------------------------<  145<H>  3.5   |  23  |  0.7    Ca    9.2      28 Jun 2022 05:39    Mg     1.5     06-28    TPro  6.3  /  Alb  3.2<L>  /  TBili  0.4  /  DBili  x   /  AST  18  /  ALT  9   /  AlkPhos  71  06-28                          12.0   6.51  )-----------( 331      ( 27 Jun 2022 07:02 )             34.6     06-27    138  |  102  |  9 /  ----------------------------<  189<H>  3.2<L>   |  20  |  0.8    Ca    9.1      27 Jun 2022 07:02    Mg     1.5     06-27    TPro  6.3  /  Alb  3.3<L>  /  TBili  0.3  /  DBili  x   /  AST  16  /  ALT  8   /  AlkPhos  72  06-27                          12.0   4.45  )-----------( 312      ( 25 Jun 2022 05:50 )             35.6     06-25    138  |  102  |  10  ----------------------------<  103<H>  3.7   |  20  |  0.8    Ca    8.7      25 Jun 2022 05:50    Mg     1.9     06-25    TPro  6.0  /  Alb  3.0<L>  /  TBili  0.3  /  DBili  x   /  AST  15  /  ALT  7   /  AlkPhos  71  06-25                        13.1   4.50  )-----------( 351      ( 23 Jun 2022 05:36 )             38.3     06-23    138  |  101  |  7<L>  ----------------------------<  107<H>  4.2   |  25  |  0.6<L>    Ca    9.1      23 Jun 2022 05:36    Mg     1.7     06-23    TPro  6.7  /  Alb  3.3<L>  /  TBili  0.4  /  DBili  x   /  AST  17  /  ALT  7   /  AlkPhos  84  06-23    RADIOLOGY: reviewed   Supine view the abdomen.  There is a nonobstructive bowel gas pattern. There are degenerative   changes. Surgical clips are seen in the right upper quadrant.    IMPRESSION:  Nonobstructive bowel gas pattern.    PHYSICAL EXAM:  CONSTITUTIONAL: No acute distress, well-developed, well-groomed, AAOx3  PULMONARY: Clear to auscultation bilaterally; no wheezes, rales, or rhonchi  CARDIOVASCULAR: Regular rate and rhythm; no murmurs, rubs, or gallops  GASTROINTESTINAL: Soft, non-tender, non-distended; bowel sounds present  MUSCULOSKELETAL: 2+ peripheral pulses; no clubbing, no cyanosis, no edema  NEUROLOGY: non-focal  SKIN: No rashes or lesions; warm and dry

## 2022-06-29 ENCOUNTER — TRANSCRIPTION ENCOUNTER (OUTPATIENT)
Age: 82
End: 2022-06-29

## 2022-06-29 VITALS
TEMPERATURE: 97 F | SYSTOLIC BLOOD PRESSURE: 139 MMHG | RESPIRATION RATE: 20 BRPM | HEART RATE: 91 BPM | DIASTOLIC BLOOD PRESSURE: 95 MMHG

## 2022-06-29 LAB
ALBUMIN SERPL ELPH-MCNC: 3.3 G/DL — LOW (ref 3.5–5.2)
ALP SERPL-CCNC: 75 U/L — SIGNIFICANT CHANGE UP (ref 30–115)
ALT FLD-CCNC: 9 U/L — SIGNIFICANT CHANGE UP (ref 0–41)
ANION GAP SERPL CALC-SCNC: 12 MMOL/L — SIGNIFICANT CHANGE UP (ref 7–14)
AST SERPL-CCNC: 17 U/L — SIGNIFICANT CHANGE UP (ref 0–41)
BASOPHILS # BLD AUTO: 0.04 K/UL — SIGNIFICANT CHANGE UP (ref 0–0.2)
BASOPHILS NFR BLD AUTO: 0.6 % — SIGNIFICANT CHANGE UP (ref 0–1)
BILIRUB SERPL-MCNC: 0.5 MG/DL — SIGNIFICANT CHANGE UP (ref 0.2–1.2)
BUN SERPL-MCNC: 13 MG/DL — SIGNIFICANT CHANGE UP (ref 10–20)
CALCIUM SERPL-MCNC: 9.1 MG/DL — SIGNIFICANT CHANGE UP (ref 8.5–10.1)
CHLORIDE SERPL-SCNC: 101 MMOL/L — SIGNIFICANT CHANGE UP (ref 98–110)
CO2 SERPL-SCNC: 25 MMOL/L — SIGNIFICANT CHANGE UP (ref 17–32)
CREAT SERPL-MCNC: 0.8 MG/DL — SIGNIFICANT CHANGE UP (ref 0.7–1.5)
EGFR: 89 ML/MIN/1.73M2 — SIGNIFICANT CHANGE UP
EOSINOPHIL # BLD AUTO: 0.22 K/UL — SIGNIFICANT CHANGE UP (ref 0–0.7)
EOSINOPHIL NFR BLD AUTO: 3.2 % — SIGNIFICANT CHANGE UP (ref 0–8)
GLUCOSE BLDC GLUCOMTR-MCNC: 115 MG/DL — HIGH (ref 70–99)
GLUCOSE SERPL-MCNC: 115 MG/DL — HIGH (ref 70–99)
HCT VFR BLD CALC: 35.8 % — LOW (ref 42–52)
HGB BLD-MCNC: 12.3 G/DL — LOW (ref 14–18)
IMM GRANULOCYTES NFR BLD AUTO: 0.3 % — SIGNIFICANT CHANGE UP (ref 0.1–0.3)
LYMPHOCYTES # BLD AUTO: 2.54 K/UL — SIGNIFICANT CHANGE UP (ref 1.2–3.4)
LYMPHOCYTES # BLD AUTO: 37.1 % — SIGNIFICANT CHANGE UP (ref 20.5–51.1)
MAGNESIUM SERPL-MCNC: 1.8 MG/DL — SIGNIFICANT CHANGE UP (ref 1.8–2.4)
MCHC RBC-ENTMCNC: 29.7 PG — SIGNIFICANT CHANGE UP (ref 27–31)
MCHC RBC-ENTMCNC: 34.4 G/DL — SIGNIFICANT CHANGE UP (ref 32–37)
MCV RBC AUTO: 86.5 FL — SIGNIFICANT CHANGE UP (ref 80–94)
MONOCYTES # BLD AUTO: 0.68 K/UL — HIGH (ref 0.1–0.6)
MONOCYTES NFR BLD AUTO: 9.9 % — HIGH (ref 1.7–9.3)
NEUTROPHILS # BLD AUTO: 3.34 K/UL — SIGNIFICANT CHANGE UP (ref 1.4–6.5)
NEUTROPHILS NFR BLD AUTO: 48.9 % — SIGNIFICANT CHANGE UP (ref 42.2–75.2)
NRBC # BLD: 0 /100 WBCS — SIGNIFICANT CHANGE UP (ref 0–0)
PLATELET # BLD AUTO: 396 K/UL — SIGNIFICANT CHANGE UP (ref 130–400)
POTASSIUM SERPL-MCNC: 3.7 MMOL/L — SIGNIFICANT CHANGE UP (ref 3.5–5)
POTASSIUM SERPL-SCNC: 3.7 MMOL/L — SIGNIFICANT CHANGE UP (ref 3.5–5)
PROT SERPL-MCNC: 6.3 G/DL — SIGNIFICANT CHANGE UP (ref 6–8)
RBC # BLD: 4.14 M/UL — LOW (ref 4.7–6.1)
RBC # FLD: 13.9 % — SIGNIFICANT CHANGE UP (ref 11.5–14.5)
SODIUM SERPL-SCNC: 138 MMOL/L — SIGNIFICANT CHANGE UP (ref 135–146)
WBC # BLD: 6.84 K/UL — SIGNIFICANT CHANGE UP (ref 4.8–10.8)
WBC # FLD AUTO: 6.84 K/UL — SIGNIFICANT CHANGE UP (ref 4.8–10.8)

## 2022-06-29 PROCEDURE — 99239 HOSP IP/OBS DSCHRG MGMT >30: CPT

## 2022-06-29 RX ORDER — LOSARTAN POTASSIUM 100 MG/1
1 TABLET, FILM COATED ORAL
Qty: 30 | Refills: 0
Start: 2022-06-29 | End: 2022-07-28

## 2022-06-29 RX ORDER — SIMETHICONE 80 MG/1
1 TABLET, CHEWABLE ORAL
Qty: 90 | Refills: 0
Start: 2022-06-29 | End: 2022-07-28

## 2022-06-29 RX ORDER — LIPASE/PROTEASE/AMYLASE 16-48-48K
3 CAPSULE,DELAYED RELEASE (ENTERIC COATED) ORAL
Qty: 360 | Refills: 0
Start: 2022-06-29 | End: 2022-07-28

## 2022-06-29 RX ORDER — MIDODRINE HYDROCHLORIDE 2.5 MG/1
1 TABLET ORAL
Qty: 90 | Refills: 0
Start: 2022-06-29 | End: 2022-07-28

## 2022-06-29 RX ORDER — OLANZAPINE 15 MG/1
1 TABLET, FILM COATED ORAL
Qty: 30 | Refills: 0
Start: 2022-06-29 | End: 2022-07-28

## 2022-06-29 RX ORDER — MAGNESIUM OXIDE 400 MG ORAL TABLET 241.3 MG
1 TABLET ORAL
Qty: 90 | Refills: 0
Start: 2022-06-29 | End: 2022-07-28

## 2022-06-29 RX ADMIN — MAGNESIUM OXIDE 400 MG ORAL TABLET 400 MILLIGRAM(S): 241.3 TABLET ORAL at 08:14

## 2022-06-29 RX ADMIN — SIMETHICONE 80 MILLIGRAM(S): 80 TABLET, CHEWABLE ORAL at 14:04

## 2022-06-29 RX ADMIN — CLOPIDOGREL BISULFATE 75 MILLIGRAM(S): 75 TABLET, FILM COATED ORAL at 12:21

## 2022-06-29 RX ADMIN — MONTELUKAST 10 MILLIGRAM(S): 4 TABLET, CHEWABLE ORAL at 12:19

## 2022-06-29 RX ADMIN — Medication 3 CAPSULE(S): at 08:14

## 2022-06-29 RX ADMIN — Medication 25 MILLIGRAM(S): at 12:20

## 2022-06-29 RX ADMIN — MAGNESIUM OXIDE 400 MG ORAL TABLET 400 MILLIGRAM(S): 241.3 TABLET ORAL at 12:21

## 2022-06-29 RX ADMIN — Medication 3 CAPSULE(S): at 12:21

## 2022-06-29 NOTE — PROGRESS NOTE ADULT - ASSESSMENT
81-year-old man past medical history significant for hypertension, dyslipidemia, diabetes, CAD, CHF, COPD, PPM, glaucoma, A. fib on Eliquis, GERD, chronic pancreatitis, admitted last month for small bowel obstruction, status post near syncope versus seizure at the SNF.    #Delirium due to Advance Age No acute medical condition at this time  - Pt is s/p Ativan and Restraints --> all have been discontinued   - Zyprexa prn at nights     #Witnessed fall Secondary to Orthostatic Hypotension  #Post-Ictal Confusion   Imaging: CTH negative - CTAP significant intrahepatic and extra arachnoid ductal dilatation  Plan  - EEG negative for seizures  - evaluated by neuro  - PINA stockings and abdominal binder ordered for orthostatic hypotension  - c/w midodrine 2.5mg q8  - monitor BP   - 6/24: Near Syncope Episode 2/2 Orthostatic Hypotension   - repeat CTH negative for any acute pathologies  - c/w IVFs NS @75cc/hr for 24 hours  - strict bedrest     #Elevated lactate - resolved  #Possible D-lactate vs seizure induced   small bowel resection on previous admission   Lactate 2.9 - repeat 2.9>1.3>2.5>1.4  - BCx NGTD  - s/p IVF  - monitor for any signs of overload as pt with HF hx    #Abdominal pain  - unlikely to be strangulation  - as per surgery, no concerns for any hernia or strangulation  - trial on simethicone for possible gas pain  - KUB: Non obstructive bowel gas pattern     #Paced rythm - tachycardic   #Sick sinus syndrome s/p PPM   Not on any anti-arrythmics   - no events on EP interrogation    #CAD (Plavix)  #HFmrEF (Corlanor 5)   #Sick sinus Syndrome s/p PPM   #Chronic AFib (on Eliquis)   #HTN (nifedipine 30XL) / DLD (simvastatin 10) / DM (trulicity)  PPM interrogated by Electrophysiology - no abnormalities   - ischemic work-up neg: EKG NSL - troponin <0.1 -    - not overloaded on exam   - lipid profile within normal limits, a1c 7.3  - prior echo showed inferior wall abnormalities  - 6/22: nifedipine dc'ed  - 6/24: losartan 25mg BID decreased to daily  - f/u cardiology consult (Dr. Roberson)    #DM2  - a1c 7.3  - on home trulicity  - monitor FS, start basal/bolus regimen if >180    #COPD (breo-ellipta)   - Not on oxygen    #Chronic Pancreatitis   #Intrahepatic and extrahepatic biliary ductal dilation seen on CT  - CTAP no acute signs of inflammation - lipase 10   - evaluate for need of Creon   - 6/23: as per GI recs, started creon 36,000 QID  - OP GI f/u    #GERD (pepcid/pantoprazole)     #DVT prophylaxis - Eliquis  #GI prophylaxis - pantoprazole   #Diet - DASH/CC  #Activity - IAT with assistance  #Code: Full code    DC home with home care  >30min spent on discharge planning and counseling on orthostatic hypotension with patient's son

## 2022-06-29 NOTE — PROGRESS NOTE ADULT - PROVIDER SPECIALTY LIST ADULT
Surgery
Surgery
Hospitalist
Internal Medicine
Hospitalist
Internal Medicine
Hospitalist
Internal Medicine
Hospitalist

## 2022-06-29 NOTE — PROGRESS NOTE ADULT - REASON FOR ADMISSION
Witnessed Fall

## 2022-06-29 NOTE — DISCHARGE NOTE NURSING/CASE MANAGEMENT/SOCIAL WORK - PATIENT PORTAL LINK FT
You can access the FollowMyHealth Patient Portal offered by [facility] by registering at the following website: [email or web address]. By joining [facility]’s FollowMyHealth portal, you will also be able to view your health information using other applications (apps) compatible with our system.

## 2022-06-29 NOTE — DISCHARGE NOTE NURSING/CASE MANAGEMENT/SOCIAL WORK - NSDCPEFALRISK_GEN_ALL_CORE
For information on Fall & Injury Prevention, visit: [email or web address] OR  [email or web address] OR  https://www.cdc.gov/steadi/patient.html

## 2022-06-29 NOTE — CHART NOTE - NSCHARTNOTEFT_GEN_A_CORE
<<<RESIDENT DISCHARGE NOTE>>>     EZ ALCARAZ  MRN-[id number]    VITAL SIGNS:  T(F): 97.2 (06-29-22 @ 14:00), Max: 97.7 (06-29-22 @ 04:50)  HR: 91 (06-29-22 @ 14:00)  BP: 139/95 (06-29-22 @ 14:00)  SpO2: 99% (06-29-22 @ 04:50)      PHYSICAL EXAMINATION:  General: NAD  Head & Neck: NC/AT  Pulmonary: CTAL bilaterally  Cardiovascular: RRR. no murmurs  Gastrointestinal/Abdomen & Pelvis: soft, nontender, distended, +BS  Neurologic/Motor: nonfocal, no sensory/motor deficits    TEST RESULTS:                        12.3   6.84  )-----------( 396      ( 29 Jun 2022 07:19 )             35.8       06-29    138  |  101  |  13  ----------------------------<  115<H>  3.7   |  25  |  0.8    Ca    9.1      29 Jun 2022 07:19  Mg     1.8     06-29    TPro  6.3  /  Alb  3.3<L>  /  TBili  0.5  /  DBili  x   /  AST  17  /  ALT  9   /  AlkPhos  75  06-29      FINAL DISCHARGE INTERVIEW:  Resident(s) Present: (Name: Tsering Webb, RN Present: (Name:  ___________)    DISCHARGE MEDICATION RECONCILIATION  reviewed with Attending (Name: Dr. Clark)    DISPOSITION:   [  ] Home,    [ X ] Home with Visiting Nursing Services,   [    ]  SNF/ NH,    [   ] Acute Rehab (4A),   [   ] Other (Specify:_________)

## 2022-06-29 NOTE — PROGRESS NOTE ADULT - SUBJECTIVE AND OBJECTIVE BOX
Patient is a 81y old  Male who presents with a chief complaint of Witnessed Fall (28 Jun 2022 12:06)      Patient seen and examined at bedside.    ALLERGIES:  No Known Allergies    MEDICATIONS:  acetaminophen     Tablet .. 650 milliGRAM(s) Oral every 6 hours PRN  aluminum hydroxide/magnesium hydroxide/simethicone Suspension 30 milliLiter(s) Oral every 4 hours PRN  amitriptyline Oral Tab/Cap - Peds 25 milliGRAM(s) Oral daily  apixaban 5 milliGRAM(s) Oral two times a day  chlorhexidine 2% Cloths 1 Application(s) Topical <User Schedule>  clopidogrel Tablet 75 milliGRAM(s) Oral daily  famotidine  Oral Tab/Cap - Peds 40 milliGRAM(s) Oral at bedtime  ketorolac   Injectable 30 milliGRAM(s) IV Push once  losartan 25 milliGRAM(s) Oral daily  magnesium oxide 400 milliGRAM(s) Oral three times a day with meals  melatonin 3 milliGRAM(s) Oral at bedtime PRN  midodrine. 2.5 milliGRAM(s) Oral three times a day  montelukast 10 milliGRAM(s) Oral daily  OLANZapine 2.5 milliGRAM(s) Oral at bedtime  ondansetron Injectable 4 milliGRAM(s) IV Push every 8 hours PRN  pancrelipase  (CREON 12,000 Lipase Units) 3 Capsule(s) Oral four times a day with meals  simethicone 80 milliGRAM(s) Chew every 8 hours  simvastatin 10 milliGRAM(s) Oral at bedtime    Vital Signs Last 24 Hrs  T(F): 97.2 (29 Jun 2022 14:00), Max: 97.7 (29 Jun 2022 04:50)  HR: 91 (29 Jun 2022 14:00) (65 - 91)  BP: 139/95 (29 Jun 2022 14:00) (119/56 - 183/84)  RR: 20 (29 Jun 2022 14:00) (19 - 20)  SpO2: 99% (29 Jun 2022 04:50) (99% - 99%)  I&O's Summary    28 Jun 2022 07:01  -  29 Jun 2022 07:00  --------------------------------------------------------  IN: 0 mL / OUT: 620 mL / NET: -620 mL        PHYSICAL EXAM:  General: NAD, Alert  ENT: MMM  Neck: Supple, No JVD  Lungs: Clear to auscultation bilaterally  Cardio: RRR, S1/S2, 2/6 systolic murmur   Abdomen: Soft, Nontender, Nondistended; Bowel sounds present  Extremities: No cyanosis, No edema    LABS:                        12.3   6.84  )-----------( 396      ( 29 Jun 2022 07:19 )             35.8     06-29    138  |  101  |  13  ----------------------------<  115  3.7   |  25  |  0.8    Ca    9.1      29 Jun 2022 07:19  Mg     1.8     06-29    TPro  6.3  /  Alb  3.3  /  TBili  0.5  /  DBili  x   /  AST  17  /  ALT  9   /  AlkPhos  75  06-29 06-21 Chol 127 mg/dL LDL -- HDL 52 mg/dL Trig 88 mg/dL              POCT Blood Glucose.: 115 mg/dL (29 Jun 2022 07:31)          COVID-19 PCR: NotDetec (06-23-22 @ 15:50)  COVID-19 PCR: NotDetec (06-20-22 @ 10:00)  COVID-19 PCR: NotDetec (06-04-22 @ 10:57)  COVID-19 PCR: NotDetec (06-01-22 @ 09:33)  COVID-19 PCR: NotDetec (05-31-22 @ 22:00)      RADIOLOGY & ADDITIONAL TESTS:    Care Discussed with Consultants/Other Providers:

## 2022-07-05 DIAGNOSIS — F05 DELIRIUM DUE TO KNOWN PHYSIOLOGICAL CONDITION: ICD-10-CM

## 2022-07-05 DIAGNOSIS — J44.9 CHRONIC OBSTRUCTIVE PULMONARY DISEASE, UNSPECIFIED: ICD-10-CM

## 2022-07-05 DIAGNOSIS — I95.1 ORTHOSTATIC HYPOTENSION: ICD-10-CM

## 2022-07-05 DIAGNOSIS — I48.91 UNSPECIFIED ATRIAL FIBRILLATION: ICD-10-CM

## 2022-07-05 DIAGNOSIS — R55 SYNCOPE AND COLLAPSE: ICD-10-CM

## 2022-07-05 DIAGNOSIS — E11.9 TYPE 2 DIABETES MELLITUS WITHOUT COMPLICATIONS: ICD-10-CM

## 2022-07-05 DIAGNOSIS — I11.0 HYPERTENSIVE HEART DISEASE WITH HEART FAILURE: ICD-10-CM

## 2022-07-05 DIAGNOSIS — I50.22 CHRONIC SYSTOLIC (CONGESTIVE) HEART FAILURE: ICD-10-CM

## 2022-07-05 DIAGNOSIS — R45.1 RESTLESSNESS AND AGITATION: ICD-10-CM
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
## 2022-07-05 DIAGNOSIS — Z79.01 LONG TERM (CURRENT) USE OF ANTICOAGULANTS: ICD-10-CM
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
## 2022-07-05 DIAGNOSIS — I25.10 ATHEROSCLEROTIC HEART DISEASE OF NATIVE CORONARY ARTERY WITHOUT ANGINA PECTORIS: ICD-10-CM

## 2022-07-05 DIAGNOSIS — Z95.0 PRESENCE OF CARDIAC PACEMAKER: ICD-10-CM

## 2022-07-05 DIAGNOSIS — E78.5 HYPERLIPIDEMIA, UNSPECIFIED: ICD-10-CM

## 2022-07-05 DIAGNOSIS — K86.1 OTHER CHRONIC PANCREATITIS: ICD-10-CM

## 2022-07-05 DIAGNOSIS — K21.9 GASTRO-ESOPHAGEAL REFLUX DISEASE WITHOUT ESOPHAGITIS: ICD-10-CM

## 2022-07-26 ENCOUNTER — APPOINTMENT (OUTPATIENT)
Dept: PAIN MANAGEMENT | Facility: CLINIC | Age: 82
End: 2022-07-26

## 2022-10-25 ENCOUNTER — LABORATORY RESULT (OUTPATIENT)
Age: 82
End: 2022-10-25

## 2022-10-25 ENCOUNTER — APPOINTMENT (OUTPATIENT)
Dept: PAIN MANAGEMENT | Facility: CLINIC | Age: 82
End: 2022-10-25

## 2022-10-25 VITALS
WEIGHT: 170 LBS | DIASTOLIC BLOOD PRESSURE: 84 MMHG | SYSTOLIC BLOOD PRESSURE: 157 MMHG | HEART RATE: 97 BPM | HEIGHT: 66 IN | BODY MASS INDEX: 27.32 KG/M2

## 2022-10-25 LAB — BARBITURATES UR-MCNC: NORMAL

## 2022-10-25 PROCEDURE — 99213 OFFICE O/P EST LOW 20 MIN: CPT

## 2022-10-25 PROCEDURE — 80305 DRUG TEST PRSMV DIR OPT OBS: CPT | Mod: QW

## 2022-10-25 NOTE — DISCUSSION/SUMMARY
[de-identified] : I have consulted the  registry for the purpose of reviewing the patient's controlled substance.\par \par Overall there is at least a 30-50% reduction in pain with the prescribed analgesics. The patient denies any adverse side effects due to the medication (sleeping disturbance, constipation, sleepiness, hallucinations and/or urination problems). \par \par Urine drug screening collected today with rapid sample result consistent with given regimen. Sample to be sent for confirmatory testing with additional relief at a later time.\par \par The patient will return to the office in 4 weeks time and is aware to contact me with any question or concerns in the interim.\par \par Amalia Weathers PA-C\par Osito Bear DO\par

## 2022-10-25 NOTE — HISTORY OF PRESENT ILLNESS
[FreeTextEntry1] : HISTORY OF PRESENT ILLNESS: ORIGINAL PRESENTATION: Mr. Rea is an 81-year-old male who has a history of chronic pancreatitis. He also suffers with chronic lumbar and radicular pain secondary to disc displacement and stenosis in addition to a history of chronic cervical pain and radiculopathy, disc bulging and facet arthropathy. Patient has a significant alcoholism history, he denies current use and states that he has abstained from alcohol for "several years". He is to be monitored closely.\par \par TODAY: The reason for the visit is for a continuing active encounter. He suffers with chronic neck and lower back pain with radiculopathy as well as chronic pancreatitis. He has not been seen since May 2022. He had a fall in June. He was hospitalized and then went into a rehabilitation center. \par \par He remains on a regimen of Percocet 10/325 mg 3 times daily along with Oxymorphone ER 40 mg twice daily and lyrica.He is no longer taking amitriptyline 25 mg. He experiences no adverse side effects. His medication regimen provides him 30-40% relief, which he has been satisfied with.\par \par UDS repeated today, 10/25/22 - see separate note. \par

## 2022-10-25 NOTE — ASSESSMENT
[FreeTextEntry1] : Mr. Rea is an 81-year-old male with chronic cervical and lumbar pain with radiculopathy, as well as pain attributed to chronic pancreatitis. He will continue all of his other medications as is. He will follow up in 4 weeks for routine care.

## 2022-10-25 NOTE — DATA REVIEWED
[FreeTextEntry1] : CT of the cervical lumbar spine at [facility]. CT of the cervical spine was completed in December 2020 and there is evidence of multilevel degenerative disc disease with formation of anterior osteophytes as well as multilevel spondylosis and spinal stenosis.\par \par CT of the lumbar spine was completed every did all radiology back in 2019. There is evidence of degenerative disc disease and vacuum phenomenon most appreciated L4-5 and L5-S1. 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 There is multilevel spondylosis and facet arthropathy. Multilevel spinal stenosis.

## 2022-10-25 NOTE — PHYSICAL EXAM
[de-identified] : Constitutional: GENERAL APPEARANCE OF PATIENT IS WELL DEVELOPED, WELL NOURISHED, BODY HABITUS NORMAL, WELL GROOMED, NO DEFORMITIES NOTED.\par Head - Atraumatic and Normocephalic\par Eyes, Nose, and Throat: External inspection of ears and nose are normal overall without scars, lesions, or masses noted. Assessment of hearing is normal\par Neck-Examination of neck shows no masses, overall appearance is normal, neck is symmetric, tracheal position is midline, no crepitus is noted. Examination of thyroid shows no enlargement, tenderness or masses\par Respiratory- Assessment of respiratory effort shows no intercostal retractions, no use of accessory muscles, unlabored breathing, and normal diaphragmatic movement.\par Cardiovascular- Examination of extremities show no edema or varicosities\par Musculoskeletal. Examination of gait is not antalgic and station is normal\par Inspection and palpation of digits and nails shows no clubbing, cyanosis, nodules, drainage, fluctuance, petechiae\par \par • Spine – inspection and palpation shows no misalignment, asymmetry, crepitation, defects, tenderness, masses, effusions. ROM is normal without crepitation or contracture. No instability or subluxation or laxity is noted. No abnormal movements.\par \par • Neck- inspection and palpation shows no misalignment, asymmetry, crepitation, defects, tenderness, masses, effusions. ROM is normal without crepitation or contracture. No instability or subluxation or laxity is noted. No abnormal movements.\par \par • RUE- inspection and palpation shows no misalignment, asymmetry, crepitation, defects, tenderness, masses, effusions. ROM is normal without crepitation or contracture. No instability or subluxation or laxity is noted. No abnormal movements.\par \par • LUE- inspection and palpation shows no misalignment, asymmetry, crepitation, defects, tenderness, masses, effusions. ROM is normal without crepitation or contracture. No instability or subluxation or laxity is noted. No abnormal movements.\par \par • RLE- inspection and palpation shows no misalignment, asymmetry, crepitation, defects, tenderness, masses, effusions. ROM is normal without crepitation or contracture. No instability or subluxation or laxity is noted. No abnormal movements.\par \par • LLE- inspection and palpation shows no misalignment, asymmetry, crepitation, defects, tenderness, masses, effusions. ROM is normal without crepitation or contracture. No instability or subluxation or laxity is noted. No abnormal movements.\par \par • Skin- Inspection of skin and subcutaneous tissue shows no rashes, lesions or ulcers. Palpation of skin and subcutaneous tissue shows no rashes, no indurations, subcutaneous nodules or tightening.\par \par • Abdomen- Nontender\par \par • Neurologic- CN 2-12 are grossly intact. No sensory or motor deficits in the upper and lower extremities. Adequate strength in upper and lower extremities\par \par • Psychiatric- Patient’s judgment and insight are intact. Oriented to time, place and person. Recent and remote memory intact.

## 2022-12-20 ENCOUNTER — LABORATORY RESULT (OUTPATIENT)
Age: 82
End: 2022-12-20

## 2022-12-20 ENCOUNTER — APPOINTMENT (OUTPATIENT)
Dept: PAIN MANAGEMENT | Facility: CLINIC | Age: 82
End: 2022-12-20

## 2022-12-20 VITALS
BODY MASS INDEX: 27.32 KG/M2 | HEIGHT: 66 IN | SYSTOLIC BLOOD PRESSURE: 122 MMHG | DIASTOLIC BLOOD PRESSURE: 65 MMHG | WEIGHT: 170 LBS | HEART RATE: 45 BPM

## 2022-12-20 LAB — PM COCAINE: NEGATIVE

## 2022-12-20 PROCEDURE — 99213 OFFICE O/P EST LOW 20 MIN: CPT

## 2022-12-20 RX ORDER — AMITRIPTYLINE HYDROCHLORIDE 25 MG/1
25 TABLET, FILM COATED ORAL
Qty: 60 | Refills: 1 | Status: DISCONTINUED | COMMUNITY
Start: 2022-07-06 | End: 2022-12-20

## 2022-12-21 NOTE — DATA REVIEWED
[FreeTextEntry1] : CT of the cervical lumbar spine at [facility]. CT of the cervical spine was completed in December 2020 and there is evidence of multilevel degenerative disc disease with formation of anterior osteophytes as well as multilevel spondylosis and spinal stenosis.\par \par CT of the lumbar spine was completed every did all radiology back in 2019. There is evidence of degenerative disc disease and vacuum phenomenon most appreciated L4-5 and L5-S1. 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 There is multilevel spondylosis and facet arthropathy. Multilevel spinal stenosis.

## 2022-12-21 NOTE — DISCUSSION/SUMMARY
[Medication Risks Reviewed] : Medication risks reviewed [de-identified] : I have consulted the  registry for the purpose of reviewing the patient's controlled substance.\par \par Overall there is at least a 30-50% reduction in pain with the prescribed analgesics. The patient denies any adverse side effects due to the medication (sleeping disturbance, constipation, sleepiness, hallucinations and/or urination problems). \par \par Urine drug screening collected today with rapid sample result consistent with given regimen. Sample to be sent for confirmatory testing with additional relief at a later time.\par \par The patient will return to the office in 4 weeks time and is aware to contact me with any question or concerns in the interim.\par \par Amalia Weathers PA-C\par Osito Bear DO\par

## 2022-12-21 NOTE — HISTORY OF PRESENT ILLNESS
[FreeTextEntry1] : HISTORY OF PRESENT ILLNESS: ORIGINAL PRESENTATION: Mr. Rea is an 81-year-old male who has a history of chronic pancreatitis. He also suffers with chronic lumbar and radicular pain secondary to disc displacement and stenosis in addition to a history of chronic cervical pain and radiculopathy, disc bulging and facet arthropathy. Patient has a significant alcoholism history, he denies current use and states that he has abstained from alcohol for "several years". He is to be monitored closely.\par \par TODAY: Last seen on 10/25/2022 and today reports that there has been no new complaints or acute changes to his condition. He suffers with chronic neck and lower back pain with radiculopathy as well as chronic pancreatitis. He continues to use a walker/rollator for ambulation.\par \par He remains on a regimen of Percocet 10/325 mg 3 times daily along with Oxymorphone ER 40 mg twice daily and Lyrica. He experiences no adverse side effects. His medication regimen provides him 30-40% relief, which he has been satisfied with.\par \par UDS done in October is negative for oxymorphone which patient is on. UDS was repeated today, 12/20/22 - see separate note. \par

## 2022-12-21 NOTE — PHYSICAL EXAM
[de-identified] : Constitutional: GENERAL APPEARANCE OF PATIENT IS WELL DEVELOPED, WELL NOURISHED, BODY HABITUS NORMAL, WELL GROOMED, NO DEFORMITIES NOTED.\par Head - Atraumatic and Normocephalic\par Eyes, Nose, and Throat: External inspection of ears and nose are normal overall without scars, lesions, or masses noted. Assessment of hearing is normal\par Neck-Examination of neck shows no masses, overall appearance is normal, neck is symmetric, tracheal position is midline, no crepitus is noted. Examination of thyroid shows no enlargement, tenderness or masses\par Respiratory- Assessment of respiratory effort shows no intercostal retractions, no use of accessory muscles, unlabored breathing, and normal diaphragmatic movement.\par Cardiovascular- Examination of extremities show no edema or varicosities\par Musculoskeletal. Examination of gait is not antalgic and station is normal\par Inspection and palpation of digits and nails shows no clubbing, cyanosis, nodules, drainage, fluctuance, petechiae\par \par • Spine – inspection and palpation shows no misalignment, asymmetry, crepitation, defects, tenderness, masses, effusions. ROM is normal without crepitation or contracture. No instability or subluxation or laxity is noted. No abnormal movements.\par \par • Neck- inspection and palpation shows no misalignment, asymmetry, crepitation, defects, tenderness, masses, effusions. ROM is normal without crepitation or contracture. No instability or subluxation or laxity is noted. No abnormal movements.\par \par • RUE- inspection and palpation shows no misalignment, asymmetry, crepitation, defects, tenderness, masses, effusions. ROM is normal without crepitation or contracture. No instability or subluxation or laxity is noted. No abnormal movements.\par \par • LUE- inspection and palpation shows no misalignment, asymmetry, crepitation, defects, tenderness, masses, effusions. ROM is normal without crepitation or contracture. No instability or subluxation or laxity is noted. No abnormal movements.\par \par • RLE- inspection and palpation shows no misalignment, asymmetry, crepitation, defects, tenderness, masses, effusions. ROM is normal without crepitation or contracture. No instability or subluxation or laxity is noted. No abnormal movements.\par \par • LLE- inspection and palpation shows no misalignment, asymmetry, crepitation, defects, tenderness, masses, effusions. ROM is normal without crepitation or contracture. No instability or subluxation or laxity is noted. No abnormal movements.\par \par • Skin- Inspection of skin and subcutaneous tissue shows no rashes, lesions or ulcers. Palpation of skin and subcutaneous tissue shows no rashes, no indurations, subcutaneous nodules or tightening.\par \par • Abdomen- Nontender\par \par • Neurologic- CN 2-12 are grossly intact. No sensory or motor deficits in the upper and lower extremities. Adequate strength in upper and lower extremities\par \par • Psychiatric- Patient’s judgment and insight are intact. Oriented to time, place and person. Recent and remote memory intact.

## 2023-01-19 ENCOUNTER — APPOINTMENT (OUTPATIENT)
Dept: PAIN MANAGEMENT | Facility: CLINIC | Age: 83
End: 2023-01-19
Payer: MEDICARE

## 2023-01-19 VITALS
WEIGHT: 170 LBS | SYSTOLIC BLOOD PRESSURE: 169 MMHG | BODY MASS INDEX: 27.32 KG/M2 | HEART RATE: 81 BPM | HEIGHT: 66 IN | DIASTOLIC BLOOD PRESSURE: 87 MMHG

## 2023-01-19 DIAGNOSIS — Z79.899 OTHER LONG TERM (CURRENT) DRUG THERAPY: ICD-10-CM

## 2023-01-19 DIAGNOSIS — G89.4 CHRONIC PAIN SYNDROME: ICD-10-CM

## 2023-01-19 PROCEDURE — 99214 OFFICE O/P EST MOD 30 MIN: CPT

## 2023-01-19 NOTE — DISCUSSION/SUMMARY
[Medication Risks Reviewed] : Medication risks reviewed [de-identified] : I have consulted the  registry for the purpose of reviewing the patient's controlled substance.\par \par Overall there is at least a 30-50% reduction in pain with the prescribed analgesics. The patient denies any adverse side effects due to the medication (sleeping disturbance, constipation, sleepiness, hallucinations and/or urination problems). \par \par Urine drug screening collected today with rapid sample result consistent with given regimen. Sample to be sent for confirmatory testing with additional relief at a later time.\par \par The patient will return to the office in 4 weeks time and is aware to contact me with any question or concerns in the interim.\par \par Amalia Weathers PA-C\par Osito Bear DO\par

## 2023-01-19 NOTE — ASSESSMENT
[FreeTextEntry1] : Mr. Rea is an 82-year-old male with chronic cervical and lumbar pain with radiculopathy, as well as pain attributed to chronic pancreatitis. He remains on a regimen of Percocet 10/325 mg 3 times daily along with Oxymorphone ER 40 mg twice daily and Lyrica. He experiences no adverse side effects. His medication regimen provides him 30-40% relief, which he has been satisfied with.  Overall there is at least a 30-50% reduction in pain with the prescribed analgesics. The patient denies any adverse side effects due to the medication (sleeping disturbance, constipation, sleepiness, hallucinations and/or urination problems). \par \par UDS completed 12/20/22 - consistent \par \par I have consulted the  registry for the purpose of reviewing the patients controlled substance\par \par I, Iraida Gillespie, attest that this documentation has been prepared under the direction and in the presence of Provider Donis Quinteros The documentation recorded by the scribe, in my presence, accurately reflects the service I personally performed, and the decisions made by me with my edits as appropriate.\par \par Best Regards, \par SUSANA [email or web address]. \par Diplomat, American Board of Anesthesiology \par Diplomat, American Board of Pain Medicine \par Diplomat, American Board of Pain Management

## 2023-01-19 NOTE — PHYSICAL EXAM
[de-identified] : Constitutional: GENERAL APPEARANCE OF PATIENT IS WELL DEVELOPED, WELL NOURISHED, BODY HABITUS NORMAL, WELL GROOMED, NO DEFORMITIES NOTED.\par Head - Atraumatic and Normocephalic\par Eyes, Nose, and Throat: External inspection of ears and nose are normal overall without scars, lesions, or masses noted. Assessment of hearing is normal\par Neck-Examination of neck shows no masses, overall appearance is normal, neck is symmetric, tracheal position is midline, no crepitus is noted. Examination of thyroid shows no enlargement, tenderness or masses\par Respiratory- Assessment of respiratory effort shows no intercostal retractions, no use of accessory muscles, unlabored breathing, and normal diaphragmatic movement.\par Cardiovascular- Examination of extremities show no edema or varicosities\par Musculoskeletal. Examination of gait is not antalgic and station is normal\par Inspection and palpation of digits and nails shows no clubbing, cyanosis, nodules, drainage, fluctuance, petechiae\par \par • Spine – inspection and palpation shows no misalignment, asymmetry, crepitation, defects, tenderness, masses, effusions. ROM is normal without crepitation or contracture. No instability or subluxation or laxity is noted. No abnormal movements.\par \par • Neck- inspection and palpation shows no misalignment, asymmetry, crepitation, defects, tenderness, masses, effusions. ROM is normal without crepitation or contracture. No instability or subluxation or laxity is noted. No abnormal movements.\par \par • RUE- inspection and palpation shows no misalignment, asymmetry, crepitation, defects, tenderness, masses, effusions. ROM is normal without crepitation or contracture. No instability or subluxation or laxity is noted. No abnormal movements.\par \par • LUE- inspection and palpation shows no misalignment, asymmetry, crepitation, defects, tenderness, masses, effusions. ROM is normal without crepitation or contracture. No instability or subluxation or laxity is noted. No abnormal movements.\par \par • RLE- inspection and palpation shows no misalignment, asymmetry, crepitation, defects, tenderness, masses, effusions. ROM is normal without crepitation or contracture. No instability or subluxation or laxity is noted. No abnormal movements.\par \par • LLE- inspection and palpation shows no misalignment, asymmetry, crepitation, defects, tenderness, masses, effusions. ROM is normal without crepitation or contracture. No instability or subluxation or laxity is noted. No abnormal movements.\par \par • Skin- Inspection of skin and subcutaneous tissue shows no rashes, lesions or ulcers. Palpation of skin and subcutaneous tissue shows no rashes, no indurations, subcutaneous nodules or tightening.\par \par • Abdomen- Nontender\par \par • Neurologic- CN 2-12 are grossly intact. No sensory or motor deficits in the upper and lower extremities. Adequate strength in upper and lower extremities\par \par • Psychiatric- Patient’s judgment and insight are intact. Oriented to time, place and person. Recent and remote memory intact.

## 2023-01-19 NOTE — DATA REVIEWED
[FreeTextEntry1] : CT of the cervical lumbar spine at [facility]. CT of the cervical spine was completed in December 2020 and there is evidence of multilevel degenerative disc disease with formation of anterior osteophytes as well as multilevel spondylosis and spinal stenosis.\par \par CT of the lumbar spine was completed every did all radiology back in 2019. There is evidence of degenerative disc disease and vacuum phenomenon most appreciated L4-5 and L5-S1. 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 There is multilevel spondylosis and facet arthropathy. Multilevel spinal stenosis.

## 2023-01-19 NOTE — HISTORY OF PRESENT ILLNESS
[FreeTextEntry1] : HISTORY OF PRESENT ILLNESS: ORIGINAL PRESENTATION: Mr. Rea is an 81-year-old male who has a history of chronic pancreatitis. He also suffers with chronic lumbar and radicular pain secondary to disc displacement and stenosis in addition to a history of chronic cervical pain and radiculopathy, disc bulging and facet arthropathy. Patient has a significant alcoholism history, he denies current use and states that he has abstained from alcohol for "several years". He is to be monitored closely.\par \par TODAY: In revisit encounter in regard to his continued chronic pain secondary to chronic pancreatitis. Today he reports that there has been no new complaints or acute changes to his condition. He suffers with chronic neck and lower back pain with radiculopathy as well as chronic pancreatitis. He continues to use a walker/rollator for ambulation.\par \par He remains on a regimen of Percocet 10/325 mg 3 times daily along with Oxymorphone ER 40 mg twice daily and Lyrica. He experiences no adverse side effects. His medication regimen provides him 30-40% relief, which he has been satisfied with.\par \par UDS was repeated 12/20/22 - see separate note. \par \par Covid 19 - This in-office encounter has occurred during a Public Health Emergency (PHE). As required by law, due to the risk of respiratory-transmitted infectious diseases, our office provided additional materials, supplies and clinical staff to assist in keeping our patients, physicians and office staff safe during this health emergency.\par

## 2023-02-17 ENCOUNTER — APPOINTMENT (OUTPATIENT)
Dept: VASCULAR SURGERY | Facility: CLINIC | Age: 83
End: 2023-02-17
Payer: MEDICARE

## 2023-02-17 VITALS — HEIGHT: 66 IN | BODY MASS INDEX: 27.64 KG/M2 | WEIGHT: 172 LBS

## 2023-02-17 PROCEDURE — 99212 OFFICE O/P EST SF 10 MIN: CPT

## 2023-02-17 PROCEDURE — 93925 LOWER EXTREMITY STUDY: CPT

## 2023-02-23 ENCOUNTER — APPOINTMENT (OUTPATIENT)
Dept: PAIN MANAGEMENT | Facility: CLINIC | Age: 83
End: 2023-02-23
Payer: MEDICARE

## 2023-02-23 VITALS
BODY MASS INDEX: 27.64 KG/M2 | HEIGHT: 66 IN | WEIGHT: 172 LBS | HEART RATE: 71 BPM | DIASTOLIC BLOOD PRESSURE: 106 MMHG | SYSTOLIC BLOOD PRESSURE: 176 MMHG

## 2023-02-23 VITALS — BODY MASS INDEX: 27.64 KG/M2 | WEIGHT: 172 LBS | HEIGHT: 66 IN

## 2023-02-23 PROCEDURE — 99213 OFFICE O/P EST LOW 20 MIN: CPT

## 2023-02-23 NOTE — PHYSICAL EXAM
[de-identified] : Constitutional: GENERAL APPEARANCE OF PATIENT IS WELL DEVELOPED, WELL NOURISHED, BODY HABITUS NORMAL, WELL GROOMED, NO DEFORMITIES NOTED.\par Head - Atraumatic and Normocephalic\par Eyes, Nose, and Throat: External inspection of ears and nose are normal overall without scars, lesions, or masses noted. Assessment of hearing is normal\par Neck-Examination of neck shows no masses, overall appearance is normal, neck is symmetric, tracheal position is midline, no crepitus is noted. Examination of thyroid shows no enlargement, tenderness or masses\par Respiratory- Assessment of respiratory effort shows no intercostal retractions, no use of accessory muscles, unlabored breathing, and normal diaphragmatic movement.\par Cardiovascular- Examination of extremities show no edema or varicosities\par Musculoskeletal. Examination of gait is not antalgic and station is normal\par Inspection and palpation of digits and nails shows no clubbing, cyanosis, nodules, drainage, fluctuance, petechiae\par \par • Spine – inspection and palpation shows no misalignment, asymmetry, crepitation, defects, tenderness, masses, effusions. ROM is normal without crepitation or contracture. No instability or subluxation or laxity is noted. No abnormal movements.\par \par • Neck- inspection and palpation shows no misalignment, asymmetry, crepitation, defects, tenderness, masses, effusions. ROM is normal without crepitation or contracture. No instability or subluxation or laxity is noted. No abnormal movements.\par \par • RUE- inspection and palpation shows no misalignment, asymmetry, crepitation, defects, tenderness, masses, effusions. ROM is normal without crepitation or contracture. No instability or subluxation or laxity is noted. No abnormal movements.\par \par • LUE- inspection and palpation shows no misalignment, asymmetry, crepitation, defects, tenderness, masses, effusions. ROM is normal without crepitation or contracture. No instability or subluxation or laxity is noted. No abnormal movements.\par \par • RLE- inspection and palpation shows no misalignment, asymmetry, crepitation, defects, tenderness, masses, effusions. ROM is normal without crepitation or contracture. No instability or subluxation or laxity is noted. No abnormal movements.\par \par • LLE- inspection and palpation shows no misalignment, asymmetry, crepitation, defects, tenderness, masses, effusions. ROM is normal without crepitation or contracture. No instability or subluxation or laxity is noted. No abnormal movements.\par \par • Skin- Inspection of skin and subcutaneous tissue shows no rashes, lesions or ulcers. Palpation of skin and subcutaneous tissue shows no rashes, no indurations, subcutaneous nodules or tightening.\par \par • Abdomen- Nontender\par \par • Neurologic- CN 2-12 are grossly intact. No sensory or motor deficits in the upper and lower extremities. Adequate strength in upper and lower extremities\par \par • Psychiatric- Patient’s judgment and insight are intact. Oriented to time, place and person. Recent and remote memory intact.

## 2023-02-23 NOTE — ASSESSMENT
[FreeTextEntry1] : Mr. Rea is an 82-year-old male with chronic cervical and lumbar pain with radiculopathy, as well as pain attributed to chronic pancreatitis. He will continue all of his other medications as is. He will follow up in 4 weeks for routine care.

## 2023-02-23 NOTE — DATA REVIEWED
[FreeTextEntry1] : CT of the cervical lumbar spine at [facility]. CT of the cervical spine was completed in December 2020 and there is evidence of multilevel degenerative disc disease with formation of anterior osteophytes as well as multilevel spondylosis and spinal stenosis.\par \par CT of the lumbar spine was completed every did all radiology back in 2019. There is evidence of degenerative disc disease and vacuum phenomenon most appreciated L4-5 and L5-S1. 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 There is multilevel spondylosis and facet arthropathy. Multilevel spinal stenosis.

## 2023-02-23 NOTE — DISCUSSION/SUMMARY
[Medication Risks Reviewed] : Medication risks reviewed [de-identified] : I have consulted the  registry for the purpose of reviewing the patient's controlled substance.\par \par Overall there is at least a 30-50% reduction in pain with the prescribed analgesics. The patient denies any adverse side effects due to the medication (sleeping disturbance, constipation, sleepiness, hallucinations and/or urination problems). \par The patient will return to the office in 4 weeks time and is aware to contact me with any question or concerns in the interim.\par \par Amalia Weathers PA-C\par Osito Bear DO\par

## 2023-02-23 NOTE — HISTORY OF PRESENT ILLNESS
[FreeTextEntry1] : HISTORY OF PRESENT ILLNESS: ORIGINAL PRESENTATION: Mr. Rea is an 82-year-old male who has a history of chronic pancreatitis. He also suffers with chronic lumbar and radicular pain secondary to disc displacement and stenosis in addition to a history of chronic cervical pain and radiculopathy, disc bulging and facet arthropathy. Patient has a significant alcoholism history, he denies current use and states that he has abstained from alcohol for "several years". He is to be monitored closely.\par \par TODAY: He presents for a revisit appointment for continued pain. His symptoms remain unchanged. He suffers with chronic neck and lower back pain with radiculopathy as well as chronic pancreatitis. He continues to use a walker/rollator for ambulation.\par \par He remains on a regimen of Percocet 10/325 mg 3 times daily along with Oxymorphone ER 40 mg twice daily and Lyrica. He applies Diclofenac gel as needed. He experiences no adverse side effects. His medication regimen provides him 30-40% relief, which he has been satisfied with.\par \par UDS: 12/2022. \par

## 2023-03-23 ENCOUNTER — APPOINTMENT (OUTPATIENT)
Dept: PAIN MANAGEMENT | Facility: CLINIC | Age: 83
End: 2023-03-23
Payer: MEDICARE

## 2023-03-23 ENCOUNTER — LABORATORY RESULT (OUTPATIENT)
Age: 83
End: 2023-03-23

## 2023-03-23 ENCOUNTER — RESULT CHARGE (OUTPATIENT)
Age: 83
End: 2023-03-23

## 2023-03-23 VITALS
SYSTOLIC BLOOD PRESSURE: 164 MMHG | HEART RATE: 87 BPM | BODY MASS INDEX: 27.64 KG/M2 | DIASTOLIC BLOOD PRESSURE: 72 MMHG | WEIGHT: 172 LBS | HEIGHT: 66 IN

## 2023-03-23 LAB
AMP / AMPHETAMINE: NEGATIVE
BAR / SECOBARBITAL: NEGATIVE
BUP / BUPRENORPHINE: NEGATIVE
BZO / OXAZEPAM: NEGATIVE
COC / COCAINE: NEGATIVE
CREATININE: 50 MG/DL
MDMA / METHYLENEDIOXYMETHAMPHETAMINE: NEGATIVE
MET / METHAMPHETAMINES: NEGATIVE
MOP / MORPHINE: NEGATIVE
MTD / METHADONE: NEGATIVE
OXY / OXYCODONE: POSITIVE
PCP / PHENCYCLIDINE: NEGATIVE
PH: 4
SPECIFIC GRAVITY: > 1.03
TEMPERATURE: 92 C
THC / MARIJUANA: NEGATIVE

## 2023-03-23 PROCEDURE — 80305 DRUG TEST PRSMV DIR OPT OBS: CPT | Mod: QW

## 2023-03-23 PROCEDURE — 99213 OFFICE O/P EST LOW 20 MIN: CPT

## 2023-03-23 NOTE — DATA REVIEWED
RT Note  Matias Cortez 55 y o  female MRN: [id number]  Unit/Bed#: ED 06 Encounter: [id number]    Assessment    Active Problems:    * No active hospital problems  *            Physical Exam:   Assessment Type: Assess only  General Appearance: Alert,Awake  Respiratory Pattern: Labored,Dyspnea at rest,Tachypneic  Chest Assessment: Chest expansion symmetrical  Bilateral Breath Sounds: Diminished,Expiratory wheezes  Cough: Non-productive,Dry  Suction: Trach    Vitals:  Blood pressure (!) 140/103, pulse 70, temperature 97 7 °F (36 5 °C), temperature source Oral, resp  rate (!) 40, SpO2 95 %  Imaging and other studies: I have personally reviewed pertinent reports  Resp Comments: (P) Patient arrived in respiratory distress on a NRB mask  Patient has a Shiley #6 cuffless trach  Trach collar placed on her at 35% @10L  She was suctioned via trach with scant results  Inner cannula had dried secretions inside, inner cannula removed, cleaned and replaced  Duoneb treatment ordered and given in-line  Post treatment patient appears to be doing better, increased airation noted  Micheal Soliz [FreeTextEntry1] : CT of the cervical lumbar spine at [facility]. CT of the cervical spine was completed in December 2020 and there is evidence of multilevel degenerative disc disease with formation of anterior osteophytes as well as multilevel spondylosis and spinal stenosis.\par \par CT of the lumbar spine was completed every did all radiology back in 2019. There is evidence of degenerative disc disease and vacuum phenomenon most appreciated L4-5 and L5-S1. 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 There is multilevel spondylosis and facet arthropathy. Multilevel spinal stenosis.

## 2023-03-23 NOTE — PHYSICAL EXAM
[de-identified] : Constitutional: GENERAL APPEARANCE OF PATIENT IS WELL DEVELOPED, WELL NOURISHED, BODY HABITUS NORMAL, WELL GROOMED, NO DEFORMITIES NOTED.\par Head - Atraumatic and Normocephalic\par Eyes, Nose, and Throat: External inspection of ears and nose are normal overall without scars, lesions, or masses noted. Assessment of hearing is normal\par Neck-Examination of neck shows no masses, overall appearance is normal, neck is symmetric, tracheal position is midline, no crepitus is noted. Examination of thyroid shows no enlargement, tenderness or masses\par Respiratory- Assessment of respiratory effort shows no intercostal retractions, no use of accessory muscles, unlabored breathing, and normal diaphragmatic movement.\par Cardiovascular- Examination of extremities show no edema or varicosities\par Musculoskeletal. Examination of gait is not antalgic and station is normal\par Inspection and palpation of digits and nails shows no clubbing, cyanosis, nodules, drainage, fluctuance, petechiae\par \par • Spine – inspection and palpation shows no misalignment, asymmetry, crepitation, defects, tenderness, masses, effusions. ROM is normal without crepitation or contracture. No instability or subluxation or laxity is noted. No abnormal movements.\par \par • Neck- inspection and palpation shows no misalignment, asymmetry, crepitation, defects, tenderness, masses, effusions. ROM is normal without crepitation or contracture. No instability or subluxation or laxity is noted. No abnormal movements.\par \par • RUE- inspection and palpation shows no misalignment, asymmetry, crepitation, defects, tenderness, masses, effusions. ROM is normal without crepitation or contracture. No instability or subluxation or laxity is noted. No abnormal movements.\par \par • LUE- inspection and palpation shows no misalignment, asymmetry, crepitation, defects, tenderness, masses, effusions. ROM is normal without crepitation or contracture. No instability or subluxation or laxity is noted. No abnormal movements.\par \par • RLE- inspection and palpation shows no misalignment, asymmetry, crepitation, defects, tenderness, masses, effusions. ROM is normal without crepitation or contracture. No instability or subluxation or laxity is noted. No abnormal movements.\par \par • LLE- inspection and palpation shows no misalignment, asymmetry, crepitation, defects, tenderness, masses, effusions. ROM is normal without crepitation or contracture. No instability or subluxation or laxity is noted. No abnormal movements.\par \par • Skin- Inspection of skin and subcutaneous tissue shows no rashes, lesions or ulcers. Palpation of skin and subcutaneous tissue shows no rashes, no indurations, subcutaneous nodules or tightening.\par \par • Abdomen- Nontender\par \par • Neurologic- CN 2-12 are grossly intact. No sensory or motor deficits in the upper and lower extremities. Adequate strength in upper and lower extremities\par \par • Psychiatric- Patient’s judgment and insight are intact. Oriented to time, place and person. Recent and remote memory intact.

## 2023-03-23 NOTE — DISCUSSION/SUMMARY
[Medication Risks Reviewed] : Medication risks reviewed [de-identified] : I have consulted the  registry for the purpose of reviewing the patient's controlled substance.\par \par Overall there is at least a 30-50% reduction in pain with the prescribed analgesics. The patient denies any adverse side effects due to the medication (sleeping disturbance, constipation, sleepiness, hallucinations and/or urination problems). \par Urine drug screening collected today with rapid sample result consistent with given regimen. Sample to be sent for confirmatory testing.\par The patient will return to the office in 4 weeks time and is aware to contact me with any question or concerns in the interim.\par \par Amalia Weathers PA-C\par Osito Bear DO\par

## 2023-03-23 NOTE — HISTORY OF PRESENT ILLNESS
[FreeTextEntry1] : HISTORY OF PRESENT ILLNESS: ORIGINAL PRESENTATION: Mr. Rea is an 82-year-old male who has a history of chronic pancreatitis. He also suffers with chronic lumbar and radicular pain secondary to disc displacement and stenosis in addition to a history of chronic cervical pain and radiculopathy, disc bulging and facet arthropathy. Patient has a significant alcoholism history, he denies current use and states that he has abstained from alcohol for "several years". He is to be monitored closely.\par \par TODAY: He presents for a revisit appointment for continued pain. His symptoms remain unchanged. He suffers with chronic neck and lower back pain with radiculopathy as well as chronic pancreatitis. He continues to use a walker/rollator for ambulation. His pain scale today is 5-6/10.\par \par He remains on a regimen of Percocet 10/325 mg 3 times daily along with Oxymorphone ER 40 mg twice daily and Lyrica. He applies Diclofenac gel as needed. He experiences no adverse side effects. His medication regimen provides him 30-40% relief, which he has been satisfied with. He wishes to continue as is.\par \par UDS: repeated today, 3/23/23 - see separate note.

## 2023-03-28 LAB

## 2023-04-18 ENCOUNTER — APPOINTMENT (OUTPATIENT)
Dept: PAIN MANAGEMENT | Facility: CLINIC | Age: 83
End: 2023-04-18
Payer: MEDICARE

## 2023-04-18 VITALS — HEIGHT: 66 IN | BODY MASS INDEX: 27.64 KG/M2 | WEIGHT: 172 LBS

## 2023-04-18 VITALS — BODY MASS INDEX: 27.64 KG/M2 | HEIGHT: 66 IN | WEIGHT: 172 LBS

## 2023-04-18 PROCEDURE — 99213 OFFICE O/P EST LOW 20 MIN: CPT

## 2023-05-17 NOTE — PHYSICAL THERAPY INITIAL EVALUATION ADULT - BED MOBILITY TRAINING, PT EVAL
Yes Yes Yes Yes Yes Yes Yes Yes Yes Yes Yes Yes Yes Yes Yes Yes Yes Pt bed mobility will increase to supervision by AMMI. Yes

## 2023-05-23 ENCOUNTER — APPOINTMENT (OUTPATIENT)
Dept: PAIN MANAGEMENT | Facility: CLINIC | Age: 83
End: 2023-05-23
Payer: MEDICARE

## 2023-05-23 PROCEDURE — 99213 OFFICE O/P EST LOW 20 MIN: CPT

## 2023-05-23 NOTE — PHYSICAL EXAM
[de-identified] : Constitutional: GENERAL APPEARANCE OF PATIENT IS WELL DEVELOPED, WELL NOURISHED, BODY HABITUS NORMAL, WELL GROOMED, NO DEFORMITIES NOTED.\par Head - Atraumatic and Normocephalic\par Eyes, Nose, and Throat: External inspection of ears and nose are normal overall without scars, lesions, or masses noted. Assessment of hearing is normal\par Neck-Examination of neck shows no masses, overall appearance is normal, neck is symmetric, tracheal position is midline, no crepitus is noted. Examination of thyroid shows no enlargement, tenderness or masses\par Respiratory- Assessment of respiratory effort shows no intercostal retractions, no use of accessory muscles, unlabored breathing, and normal diaphragmatic movement.\par Cardiovascular- Examination of extremities show no edema or varicosities\par Musculoskeletal. Examination of gait is not antalgic and station is normal\par Inspection and palpation of digits and nails shows no clubbing, cyanosis, nodules, drainage, fluctuance, petechiae\par \par • Spine – inspection and palpation shows no misalignment, asymmetry, crepitation, defects, tenderness, masses, effusions. ROM is normal without crepitation or contracture. No instability or subluxation or laxity is noted. No abnormal movements.\par \par • Neck- inspection and palpation shows no misalignment, asymmetry, crepitation, defects, tenderness, masses, effusions. ROM is normal without crepitation or contracture. No instability or subluxation or laxity is noted. No abnormal movements.\par \par • RUE- pain on palpation of right elbow.\par \par • LUE- inspection and palpation shows no misalignment, asymmetry, crepitation, defects, tenderness, masses, effusions. ROM is normal without crepitation or contracture. No instability or subluxation or laxity is noted. No abnormal movements.\par \par • RLE- inspection and palpation shows no misalignment, asymmetry, crepitation, defects, tenderness, masses, effusions. ROM is normal without crepitation or contracture. No instability or subluxation or laxity is noted. No abnormal movements.\par \par • LLE- inspection and palpation shows no misalignment, asymmetry, crepitation, defects, tenderness, masses, effusions. ROM is normal without crepitation or contracture. No instability or subluxation or laxity is noted. No abnormal movements.\par \par • Skin- Inspection of skin and subcutaneous tissue shows no rashes, lesions or ulcers. Palpation of skin and subcutaneous tissue shows no rashes, no indurations, subcutaneous nodules or tightening.\par \par • Abdomen- Nontender\par \par • Neurologic- CN 2-12 are grossly intact. No sensory or motor deficits in the upper and lower extremities. Adequate strength in upper and lower extremities\par \par • Psychiatric- Patient’s judgment and insight are intact. Oriented to time, place and person. Recent and remote memory intact.

## 2023-05-23 NOTE — ASSESSMENT
[FreeTextEntry1] : Mr. Rea is an 82-year-old male with chronic cervical and lumbar pain with radiculopathy, as well as pain attributed to chronic pancreatitis. He had a fall in the beginning of the month. He has been experiencing right sided rib pain due to contusions. I advised him to apply heat to the region and they will heal with time. As for his elbow pain, he will follow up with orthopedics for an evaluation. He will continue with all of his medications as is. He will follow up in 4 weeks for routine care. His UDS will be repeated on his next visit.

## 2023-05-23 NOTE — DATA REVIEWED
[FreeTextEntry1] : CT of the cervical lumbar spine at [facility]. CT of the cervical spine was completed in December 2020 and there is evidence of multilevel degenerative disc disease with formation of anterior osteophytes as well as multilevel spondylosis and spinal stenosis.\par \par CT of the lumbar spine was completed every did all radiology back in 2019. There is evidence of degenerative disc disease and vacuum phenomenon most appreciated L4-5 and L5-S1. 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 There is multilevel spondylosis and facet arthropathy. Multilevel spinal stenosis.

## 2023-05-23 NOTE — DISCUSSION/SUMMARY
[de-identified] : I have consulted the  registry for the purpose of reviewing the patient's controlled substance.\par \par Overall there is at least a 30-50% reduction in pain with the prescribed analgesics. The patient denies any adverse side effects due to the medication (sleeping disturbance, constipation, sleepiness, hallucinations and/or urination problems). \par The patient will return to the office in 4 weeks time and is aware to contact me with any question or concerns in the interim.\par \par Amalia Weathers PA-C\par Osito Bear DO\par

## 2023-05-23 NOTE — DATA REVIEWED
[FreeTextEntry1] : CT of the cervical lumbar spine at [facility]. CT of the cervical spine was completed in December 2020 and there is evidence of multilevel degenerative disc disease with formation of anterior osteophytes as well as multilevel spondylosis and spinal stenosis.\par \par CT of the lumbar spine was completed every did all radiology back in 2019. There is evidence of degenerative disc disease and vacuum phenomenon most appreciated L4-5 and L5-S1. 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 There is multilevel spondylosis and facet arthropathy. Multilevel spinal stenosis.

## 2023-05-23 NOTE — DISCUSSION/SUMMARY
[Medication Risks Reviewed] : Medication risks reviewed [de-identified] : I have consulted the  registry for the purpose of reviewing the patient's controlled substance.\par \par Overall there is at least a 30-50% reduction in pain with the prescribed analgesics. The patient denies any adverse side effects due to the medication (sleeping disturbance, constipation, sleepiness, hallucinations and/or urination problems). \par The patient will return to the office in 4 weeks time and is aware to contact me with any question or concerns in the interim.\par \par Amalia Weathers PA-C\par Osito Bear DO\par

## 2023-05-23 NOTE — PHYSICAL EXAM
[de-identified] : Constitutional: GENERAL APPEARANCE OF PATIENT IS WELL DEVELOPED, WELL NOURISHED, BODY HABITUS NORMAL, WELL GROOMED, NO DEFORMITIES NOTED.\par Head - Atraumatic and Normocephalic\par Eyes, Nose, and Throat: External inspection of ears and nose are normal overall without scars, lesions, or masses noted. Assessment of hearing is normal\par Neck-Examination of neck shows no masses, overall appearance is normal, neck is symmetric, tracheal position is midline, no crepitus is noted. Examination of thyroid shows no enlargement, tenderness or masses\par Respiratory- Assessment of respiratory effort shows no intercostal retractions, no use of accessory muscles, unlabored breathing, and normal diaphragmatic movement.\par Cardiovascular- Examination of extremities show no edema or varicosities\par Musculoskeletal. Examination of gait is not antalgic and station is normal\par Inspection and palpation of digits and nails shows no clubbing, cyanosis, nodules, drainage, fluctuance, petechiae\par \par • Spine – inspection and palpation shows no misalignment, asymmetry, crepitation, defects, tenderness, masses, effusions. ROM is normal without crepitation or contracture. No instability or subluxation or laxity is noted. No abnormal movements.\par \par • Neck- inspection and palpation shows no misalignment, asymmetry, crepitation, defects, tenderness, masses, effusions. ROM is normal without crepitation or contracture. No instability or subluxation or laxity is noted. No abnormal movements.\par \par • RUE- inspection and palpation shows no misalignment, asymmetry, crepitation, defects, tenderness, masses, effusions. ROM is normal without crepitation or contracture. No instability or subluxation or laxity is noted. No abnormal movements.\par \par • LUE- inspection and palpation shows no misalignment, asymmetry, crepitation, defects, tenderness, masses, effusions. ROM is normal without crepitation or contracture. No instability or subluxation or laxity is noted. No abnormal movements.\par \par • RLE- inspection and palpation shows no misalignment, asymmetry, crepitation, defects, tenderness, masses, effusions. ROM is normal without crepitation or contracture. No instability or subluxation or laxity is noted. No abnormal movements.\par \par • LLE- inspection and palpation shows no misalignment, asymmetry, crepitation, defects, tenderness, masses, effusions. ROM is normal without crepitation or contracture. No instability or subluxation or laxity is noted. No abnormal movements.\par \par • Skin- Inspection of skin and subcutaneous tissue shows no rashes, lesions or ulcers. Palpation of skin and subcutaneous tissue shows no rashes, no indurations, subcutaneous nodules or tightening.\par \par • Abdomen- Nontender\par \par • Neurologic- CN 2-12 are grossly intact. No sensory or motor deficits in the upper and lower extremities. Adequate strength in upper and lower extremities\par \par • Psychiatric- Patient’s judgment and insight are intact. Oriented to time, place and person. Recent and remote memory intact.

## 2023-05-23 NOTE — HISTORY OF PRESENT ILLNESS
[FreeTextEntry1] : HISTORY OF PRESENT ILLNESS: ORIGINAL PRESENTATION: Mr. Rea is an 82-year-old male who has a history of chronic pancreatitis. He also suffers with chronic lumbar and radicular pain secondary to disc displacement and stenosis in addition to a history of chronic cervical pain and radiculopathy, disc bulging and facet arthropathy. Patient has a significant alcoholism history, he denies current use and states that he has abstained from alcohol for "several years". He is to be monitored closely.\par \par TODAY: He presents for a revisit appointment for continued pain. He suffers with chronic neck and lower back pain with radiculopathy as well as chronic pancreatitis. He sees a GI doctor routinely. He continues to use a walker/rollator for ambulation.\par \par Of note, patient had a fall getting out of his bathtub on 5/1/23. He was seen at [facility] ER. He has been having right sided rib pain due to contusions. Patient's most pressing complaint today is right elbow pain. He is able to move his elbow; however, it is painful for him. He states a x-ray of the elbow was done in the ER, but he doesn't know the results of it. He was seen by his PCP and plans to make an appointment with orthopedics for evaluation.\par \par He remains on a regimen of Percocet 10/325 mg 3 times daily along with Oxymorphone ER 40 mg twice daily and Lyrica. He applies Diclofenac gel as needed. He experiences no adverse side effects. His medication regimen provides him 30-40% relief, which he has been satisfied with.\par \par UDS: 3/23/23 - positive Fentanyl Interp which is likely a false positive. + OXY which is consistent.

## 2023-05-23 NOTE — HISTORY OF PRESENT ILLNESS
[FreeTextEntry1] : HISTORY OF PRESENT ILLNESS: ORIGINAL PRESENTATION: Mr. Rea is an 82-year-old male who has a history of chronic pancreatitis. He also suffers with chronic lumbar and radicular pain secondary to disc displacement and stenosis in addition to a history of chronic cervical pain and radiculopathy, disc bulging and facet arthropathy. Patient has a significant alcoholism history, he denies current use and states that he has abstained from alcohol for "several years". He is to be monitored closely.\par \par TODAY: He presents for a revisit appointment for continued pain. His symptoms remain unchanged. He suffers with chronic neck and lower back pain with radiculopathy as well as chronic pancreatitis. He sees a GI doctor routinely. He continues to use a walker/rollator for ambulation. His pain scale today is 6/10.\par \par He remains on a regimen of Percocet 10/325 mg 3 times daily along with Oxymorphone ER 40 mg twice daily and Lyrica. He applies Diclofenac gel as needed. He experiences no adverse side effects. His medication regimen provides him 30-40% relief, which he has been satisfied with. He wishes to continue as is.\par \par UDS: 3/23/23 - positive Fentanyl Interp which is likely a false positive. + OXY which is consistent.

## 2023-05-23 NOTE — ASSESSMENT
[FreeTextEntry1] : Mr. Rea is an 82-year-old male with chronic cervical and lumbar pain with radiculopathy, as well as pain attributed to chronic pancreatitis. He will continue all of his medications as is. He will follow up in 4 weeks for routine care. His UDS will be repeated in the next 1-2 months.

## 2023-05-30 ENCOUNTER — INPATIENT (INPATIENT)
Facility: HOSPITAL | Age: 83
LOS: 7 days | Discharge: HOME | DRG: 103 | End: 2023-06-07
Attending: INTERNAL MEDICINE | Admitting: STUDENT IN AN ORGANIZED HEALTH CARE EDUCATION/TRAINING PROGRAM
Payer: MEDICARE

## 2023-05-30 VITALS
RESPIRATION RATE: 20 BRPM | SYSTOLIC BLOOD PRESSURE: 197 MMHG | HEIGHT: 67 IN | DIASTOLIC BLOOD PRESSURE: 88 MMHG | WEIGHT: 177.91 LBS | TEMPERATURE: 96 F | OXYGEN SATURATION: 98 % | HEART RATE: 81 BPM

## 2023-05-30 DIAGNOSIS — Z90.49 ACQUIRED ABSENCE OF OTHER SPECIFIED PARTS OF DIGESTIVE TRACT: Chronic | ICD-10-CM

## 2023-05-30 DIAGNOSIS — Z96.0 PRESENCE OF UROGENITAL IMPLANTS: Chronic | ICD-10-CM

## 2023-05-30 DIAGNOSIS — Z95.0 PRESENCE OF CARDIAC PACEMAKER: Chronic | ICD-10-CM

## 2023-05-30 DIAGNOSIS — Z98.890 OTHER SPECIFIED POSTPROCEDURAL STATES: Chronic | ICD-10-CM

## 2023-05-30 DIAGNOSIS — Z87.19 PERSONAL HISTORY OF OTHER DISEASES OF THE DIGESTIVE SYSTEM: Chronic | ICD-10-CM

## 2023-05-30 LAB
ALBUMIN SERPL ELPH-MCNC: 4.5 G/DL — SIGNIFICANT CHANGE UP (ref 3.5–5.2)
ALP SERPL-CCNC: 71 U/L — SIGNIFICANT CHANGE UP (ref 30–115)
ALT FLD-CCNC: 25 U/L — SIGNIFICANT CHANGE UP (ref 0–41)
ANION GAP SERPL CALC-SCNC: 13 MMOL/L — SIGNIFICANT CHANGE UP (ref 7–14)
APTT BLD: 33 SEC — SIGNIFICANT CHANGE UP (ref 27–39.2)
AST SERPL-CCNC: 24 U/L — SIGNIFICANT CHANGE UP (ref 0–41)
BASOPHILS # BLD AUTO: 0.02 K/UL — SIGNIFICANT CHANGE UP (ref 0–0.2)
BASOPHILS NFR BLD AUTO: 0.3 % — SIGNIFICANT CHANGE UP (ref 0–1)
BILIRUB SERPL-MCNC: 0.3 MG/DL — SIGNIFICANT CHANGE UP (ref 0.2–1.2)
BUN SERPL-MCNC: 13 MG/DL — SIGNIFICANT CHANGE UP (ref 10–20)
CALCIUM SERPL-MCNC: 9.8 MG/DL — SIGNIFICANT CHANGE UP (ref 8.4–10.5)
CHLORIDE SERPL-SCNC: 101 MMOL/L — SIGNIFICANT CHANGE UP (ref 98–110)
CO2 SERPL-SCNC: 25 MMOL/L — SIGNIFICANT CHANGE UP (ref 17–32)
CREAT SERPL-MCNC: 0.8 MG/DL — SIGNIFICANT CHANGE UP (ref 0.7–1.5)
EGFR: 89 ML/MIN/1.73M2 — SIGNIFICANT CHANGE UP
EOSINOPHIL # BLD AUTO: 0.17 K/UL — SIGNIFICANT CHANGE UP (ref 0–0.7)
EOSINOPHIL NFR BLD AUTO: 3 % — SIGNIFICANT CHANGE UP (ref 0–8)
GLUCOSE SERPL-MCNC: 123 MG/DL — HIGH (ref 70–99)
HCT VFR BLD CALC: 41.1 % — LOW (ref 42–52)
HGB BLD-MCNC: 13.9 G/DL — LOW (ref 14–18)
IMM GRANULOCYTES NFR BLD AUTO: 0.3 % — SIGNIFICANT CHANGE UP (ref 0.1–0.3)
INR BLD: 1.14 RATIO — SIGNIFICANT CHANGE UP (ref 0.65–1.3)
LIDOCAIN IGE QN: 17 U/L — SIGNIFICANT CHANGE UP (ref 7–60)
LYMPHOCYTES # BLD AUTO: 1.92 K/UL — SIGNIFICANT CHANGE UP (ref 1.2–3.4)
LYMPHOCYTES # BLD AUTO: 33.6 % — SIGNIFICANT CHANGE UP (ref 20.5–51.1)
MAGNESIUM SERPL-MCNC: 1.8 MG/DL — SIGNIFICANT CHANGE UP (ref 1.8–2.4)
MCHC RBC-ENTMCNC: 31.1 PG — HIGH (ref 27–31)
MCHC RBC-ENTMCNC: 33.8 G/DL — SIGNIFICANT CHANGE UP (ref 32–37)
MCV RBC AUTO: 91.9 FL — SIGNIFICANT CHANGE UP (ref 80–94)
MONOCYTES # BLD AUTO: 0.8 K/UL — HIGH (ref 0.1–0.6)
MONOCYTES NFR BLD AUTO: 14 % — HIGH (ref 1.7–9.3)
NEUTROPHILS # BLD AUTO: 2.79 K/UL — SIGNIFICANT CHANGE UP (ref 1.4–6.5)
NEUTROPHILS NFR BLD AUTO: 48.8 % — SIGNIFICANT CHANGE UP (ref 42.2–75.2)
NRBC # BLD: 0 /100 WBCS — SIGNIFICANT CHANGE UP (ref 0–0)
PLATELET # BLD AUTO: 221 K/UL — SIGNIFICANT CHANGE UP (ref 130–400)
PMV BLD: 10.2 FL — SIGNIFICANT CHANGE UP (ref 7.4–10.4)
POTASSIUM SERPL-MCNC: 3.7 MMOL/L — SIGNIFICANT CHANGE UP (ref 3.5–5)
POTASSIUM SERPL-SCNC: 3.7 MMOL/L — SIGNIFICANT CHANGE UP (ref 3.5–5)
PROT SERPL-MCNC: 7.7 G/DL — SIGNIFICANT CHANGE UP (ref 6–8)
PROTHROM AB SERPL-ACNC: 13.1 SEC — HIGH (ref 9.95–12.87)
RBC # BLD: 4.47 M/UL — LOW (ref 4.7–6.1)
RBC # FLD: 13.3 % — SIGNIFICANT CHANGE UP (ref 11.5–14.5)
SODIUM SERPL-SCNC: 139 MMOL/L — SIGNIFICANT CHANGE UP (ref 135–146)
TROPONIN T SERPL-MCNC: <0.01 NG/ML — SIGNIFICANT CHANGE UP
WBC # BLD: 5.72 K/UL — SIGNIFICANT CHANGE UP (ref 4.8–10.8)
WBC # FLD AUTO: 5.72 K/UL — SIGNIFICANT CHANGE UP (ref 4.8–10.8)

## 2023-05-30 PROCEDURE — 71045 X-RAY EXAM CHEST 1 VIEW: CPT | Mod: 26

## 2023-05-30 PROCEDURE — 70450 CT HEAD/BRAIN W/O DYE: CPT | Mod: 26,MA

## 2023-05-30 PROCEDURE — 73552 X-RAY EXAM OF FEMUR 2/>: CPT | Mod: 26,RT

## 2023-05-30 PROCEDURE — 93010 ELECTROCARDIOGRAM REPORT: CPT

## 2023-05-30 PROCEDURE — 72170 X-RAY EXAM OF PELVIS: CPT | Mod: 26

## 2023-05-30 PROCEDURE — 99285 EMERGENCY DEPT VISIT HI MDM: CPT

## 2023-05-30 PROCEDURE — 73090 X-RAY EXAM OF FOREARM: CPT | Mod: 26,RT

## 2023-05-30 PROCEDURE — 72125 CT NECK SPINE W/O DYE: CPT | Mod: 26,MA

## 2023-05-30 PROCEDURE — 73060 X-RAY EXAM OF HUMERUS: CPT | Mod: 26,RT

## 2023-05-30 PROCEDURE — 73080 X-RAY EXAM OF ELBOW: CPT | Mod: 26,RT

## 2023-05-30 RX ORDER — METOCLOPRAMIDE HCL 10 MG
10 TABLET ORAL ONCE
Refills: 0 | Status: COMPLETED | OUTPATIENT
Start: 2023-05-30 | End: 2023-05-30

## 2023-05-30 RX ORDER — MORPHINE SULFATE 50 MG/1
4 CAPSULE, EXTENDED RELEASE ORAL ONCE
Refills: 0 | Status: DISCONTINUED | OUTPATIENT
Start: 2023-05-30 | End: 2023-05-30

## 2023-05-30 RX ORDER — ACETAMINOPHEN 500 MG
650 TABLET ORAL ONCE
Refills: 0 | Status: COMPLETED | OUTPATIENT
Start: 2023-05-30 | End: 2023-05-30

## 2023-05-30 RX ADMIN — Medication 650 MILLIGRAM(S): at 23:20

## 2023-05-30 RX ADMIN — MORPHINE SULFATE 4 MILLIGRAM(S): 50 CAPSULE, EXTENDED RELEASE ORAL at 22:04

## 2023-05-30 RX ADMIN — Medication 104 MILLIGRAM(S): at 22:03

## 2023-05-30 RX ADMIN — Medication 10 MILLIGRAM(S): at 23:20

## 2023-05-30 RX ADMIN — MORPHINE SULFATE 4 MILLIGRAM(S): 50 CAPSULE, EXTENDED RELEASE ORAL at 23:21

## 2023-05-30 RX ADMIN — Medication 325 MILLIGRAM(S): at 22:03

## 2023-05-30 NOTE — ED PROVIDER NOTE - PHYSICAL EXAMINATION
CONSTITUTIONAL: In mild distress.   HEAD: Normocephalic; atraumatic.   EYES: Pupils are round and reactive, extra-ocular muscles are intact. Eyelids are normal in appearance without swelling or lesions.   ENT: Hearing is intact with good acuity to spoken voice.  Patient is speaking clearly, not muffled and airway is intact.   RESPIRATORY: No signs of respiratory distress. Lung sounds are clear in all lobes bilaterally without rales, rhonchi, or wheezes.  CARDIOVASCULAR: irregularly irregular.   GI: Abdomen is soft, non-tender, and without distention. Bowel sounds are present and normoactive in all four quadrants. No masses are noted.   BACK: No evidence of trauma or deformity. No midline tenderness. Normal ROM.   MS: R elbow with no obvious deformity or swelling; tender to palpation; full ROM; sensory function intact; distal pulse present. Rest of the upper and lower extremities unremarkable.   NEURO: A & O x 3. Normal speech. No focal deficit.  PSYCHOLOGICAL: Appropriate mood and affect. Good judgement and insight.

## 2023-05-30 NOTE — ED PROVIDER NOTE - PROGRESS NOTE DETAILS
Labs ok. CT head negative for injury, CT c-spine with DJD, awaiting CTA reads. Pt does not appear meningitic however he is uncomfortable. SAH seems unlikely as well given unilateral sx. Endorsed to Dr Atkinson to f/u imaging and dispo. Patient is admitted for headache. Pt is aware of the plan and agrees. Pt has been endorsed to [facility].

## 2023-05-30 NOTE — ED PROVIDER NOTE - ATTENDING APP SHARED VISIT CONTRIBUTION OF CARE
82-year-old man, history of multiple medical problems including hypertension, COPD, hyperlipidemia, diabetes, presents with unilateral right-sided headache radiating into the right posterior neck that began 3 days ago suddenly.  No visual disturbances, no trauma, no photophobia, nausea, vomiting.  No fever or chills.  On exam patient is hypertensive, and very uncomfortable, writhing in bed and holding his right temple.  He has diffusely engorged blood vessels on his scalp which are compressible, soft and nontender, without overlying rash or erythema.  There is tenderness over the right paracervical musculature and limited range of motion in both directions due to pain.  Lungs clear, CV S1-S2, abdomen soft.  Neuro grossly intact.  Given degree of discomfort, concern for bleed, less likely infectious etiology or vasculitis.  Labs, pain control, imaging, reassess.

## 2023-05-30 NOTE — ED ADULT NURSE REASSESSMENT NOTE - NS ED NURSE REASSESS COMMENT FT1
Assumed care from previous nurse Kailyn c/o headache , pt AO x 4 , denies chest pain , dizziness , abdominal pain, nausea , vomiting , palpitation this time , no SOB noted , fall alarm on

## 2023-05-30 NOTE — ED PROVIDER NOTE - CLINICAL SUMMARY MEDICAL DECISION MAKING FREE TEXT BOX
pt with intractable facial/neck pain.  labs, imaging, supportive care  afebrile.   admit for pain control and further eval.

## 2023-05-30 NOTE — ED ADULT TRIAGE NOTE - CHIEF COMPLAINT QUOTE
BIBA from home c/o headache x1 week, pt states headache radiates to his right axilla area. denies nausea/vomiting. reports he has had "blurry vision for a while". pt also c/o right arm pain after a fall he had at the end of April. pt states he takes medications for HTN

## 2023-05-30 NOTE — ED PROVIDER NOTE - OBJECTIVE STATEMENT
82-year-old male with past medical history of hypertension, hyperlipidemia, diabetes, CAD, CHF, COPD not on home oxygen, A-fib on Eliquis, glucoma, and chronic pancreatitis who presents with headache.  Reports that pain started a week ago; pain is intermittent, sharp, and a right-sided headache, and there is no alleviating or worsening factors. Reports that he had a fall about 2 weeks ago, but did not have headache immediately.  Also endorses pain in his right elbow area.  Reports that he went to an ER and had imaging done and was told they were normal.  Denies fever, shortness of breath, chest pain, nausea, vomiting, abdominal pain, urinary symptoms, change in bowel movement.

## 2023-05-31 DIAGNOSIS — R51.9 HEADACHE, UNSPECIFIED: ICD-10-CM

## 2023-05-31 LAB
GLUCOSE BLDC GLUCOMTR-MCNC: 132 MG/DL — HIGH (ref 70–99)
GLUCOSE BLDC GLUCOMTR-MCNC: 242 MG/DL — HIGH (ref 70–99)

## 2023-05-31 PROCEDURE — 72040 X-RAY EXAM NECK SPINE 2-3 VW: CPT

## 2023-05-31 PROCEDURE — 97110 THERAPEUTIC EXERCISES: CPT | Mod: GO

## 2023-05-31 PROCEDURE — 94660 CPAP INITIATION&MGMT: CPT

## 2023-05-31 PROCEDURE — 70498 CT ANGIOGRAPHY NECK: CPT | Mod: 26,MA

## 2023-05-31 PROCEDURE — 70496 CT ANGIOGRAPHY HEAD: CPT | Mod: 26,MA

## 2023-05-31 PROCEDURE — 99223 1ST HOSP IP/OBS HIGH 75: CPT

## 2023-05-31 PROCEDURE — 82550 ASSAY OF CK (CPK): CPT

## 2023-05-31 PROCEDURE — 94640 AIRWAY INHALATION TREATMENT: CPT

## 2023-05-31 PROCEDURE — 95819 EEG AWAKE AND ASLEEP: CPT

## 2023-05-31 PROCEDURE — 80061 LIPID PANEL: CPT

## 2023-05-31 PROCEDURE — 80048 BASIC METABOLIC PNL TOTAL CA: CPT

## 2023-05-31 PROCEDURE — 97116 GAIT TRAINING THERAPY: CPT | Mod: GP

## 2023-05-31 PROCEDURE — 82962 GLUCOSE BLOOD TEST: CPT

## 2023-05-31 PROCEDURE — 97166 OT EVAL MOD COMPLEX 45 MIN: CPT | Mod: GO

## 2023-05-31 PROCEDURE — 70491 CT SOFT TISSUE NECK W/DYE: CPT

## 2023-05-31 PROCEDURE — 83605 ASSAY OF LACTIC ACID: CPT

## 2023-05-31 PROCEDURE — 97535 SELF CARE MNGMENT TRAINING: CPT | Mod: GO

## 2023-05-31 PROCEDURE — 85025 COMPLETE CBC W/AUTO DIFF WBC: CPT

## 2023-05-31 PROCEDURE — 80053 COMPREHEN METABOLIC PANEL: CPT

## 2023-05-31 PROCEDURE — 36415 COLL VENOUS BLD VENIPUNCTURE: CPT

## 2023-05-31 PROCEDURE — 83036 HEMOGLOBIN GLYCOSYLATED A1C: CPT

## 2023-05-31 PROCEDURE — 93998 UNLISTD NONINVAS VASC DX STD: CPT

## 2023-05-31 PROCEDURE — 86140 C-REACTIVE PROTEIN: CPT

## 2023-05-31 PROCEDURE — 85027 COMPLETE CBC AUTOMATED: CPT

## 2023-05-31 PROCEDURE — 83735 ASSAY OF MAGNESIUM: CPT

## 2023-05-31 PROCEDURE — 97163 PT EVAL HIGH COMPLEX 45 MIN: CPT | Mod: GP

## 2023-05-31 PROCEDURE — 85652 RBC SED RATE AUTOMATED: CPT

## 2023-05-31 PROCEDURE — 86787 VARICELLA-ZOSTER ANTIBODY: CPT

## 2023-05-31 PROCEDURE — 82803 BLOOD GASES ANY COMBINATION: CPT

## 2023-05-31 PROCEDURE — 93005 ELECTROCARDIOGRAM TRACING: CPT

## 2023-05-31 RX ORDER — SODIUM CHLORIDE 9 MG/ML
1000 INJECTION, SOLUTION INTRAVENOUS
Refills: 0 | Status: DISCONTINUED | OUTPATIENT
Start: 2023-05-31 | End: 2023-06-07

## 2023-05-31 RX ORDER — NIFEDIPINE 30 MG
30 TABLET, EXTENDED RELEASE 24 HR ORAL DAILY
Refills: 0 | Status: DISCONTINUED | OUTPATIENT
Start: 2023-05-31 | End: 2023-06-04

## 2023-05-31 RX ORDER — DEXTROSE 50 % IN WATER 50 %
15 SYRINGE (ML) INTRAVENOUS ONCE
Refills: 0 | Status: DISCONTINUED | OUTPATIENT
Start: 2023-05-31 | End: 2023-06-07

## 2023-05-31 RX ORDER — SIMVASTATIN 20 MG/1
20 TABLET, FILM COATED ORAL AT BEDTIME
Refills: 0 | Status: DISCONTINUED | OUTPATIENT
Start: 2023-05-31 | End: 2023-06-07

## 2023-05-31 RX ORDER — MORPHINE SULFATE 50 MG/1
4 CAPSULE, EXTENDED RELEASE ORAL ONCE
Refills: 0 | Status: DISCONTINUED | OUTPATIENT
Start: 2023-05-31 | End: 2023-05-31

## 2023-05-31 RX ORDER — IBUPROFEN 200 MG
400 TABLET ORAL ONCE
Refills: 0 | Status: COMPLETED | OUTPATIENT
Start: 2023-05-31 | End: 2023-05-31

## 2023-05-31 RX ORDER — DULAGLUTIDE 4.5 MG/.5ML
1 INJECTION, SOLUTION SUBCUTANEOUS
Qty: 0 | Refills: 0 | DISCHARGE

## 2023-05-31 RX ORDER — IPRATROPIUM/ALBUTEROL SULFATE 18-103MCG
3 AEROSOL WITH ADAPTER (GRAM) INHALATION EVERY 6 HOURS
Refills: 0 | Status: DISCONTINUED | OUTPATIENT
Start: 2023-05-31 | End: 2023-06-06

## 2023-05-31 RX ORDER — FUROSEMIDE 40 MG
20 TABLET ORAL
Refills: 0 | Status: DISCONTINUED | OUTPATIENT
Start: 2023-05-31 | End: 2023-06-07

## 2023-05-31 RX ORDER — PANTOPRAZOLE SODIUM 20 MG/1
40 TABLET, DELAYED RELEASE ORAL
Refills: 0 | Status: DISCONTINUED | OUTPATIENT
Start: 2023-05-31 | End: 2023-06-07

## 2023-05-31 RX ORDER — CHLORHEXIDINE GLUCONATE 213 G/1000ML
1 SOLUTION TOPICAL DAILY
Refills: 0 | Status: DISCONTINUED | OUTPATIENT
Start: 2023-05-31 | End: 2023-06-07

## 2023-05-31 RX ORDER — DEXTROSE 50 % IN WATER 50 %
25 SYRINGE (ML) INTRAVENOUS ONCE
Refills: 0 | Status: DISCONTINUED | OUTPATIENT
Start: 2023-05-31 | End: 2023-06-07

## 2023-05-31 RX ORDER — DEXTROSE 50 % IN WATER 50 %
12.5 SYRINGE (ML) INTRAVENOUS ONCE
Refills: 0 | Status: DISCONTINUED | OUTPATIENT
Start: 2023-05-31 | End: 2023-06-07

## 2023-05-31 RX ORDER — METOPROLOL TARTRATE 50 MG
25 TABLET ORAL DAILY
Refills: 0 | Status: DISCONTINUED | OUTPATIENT
Start: 2023-05-31 | End: 2023-05-31

## 2023-05-31 RX ORDER — FAMOTIDINE 10 MG/ML
1 INJECTION INTRAVENOUS
Qty: 0 | Refills: 0 | DISCHARGE

## 2023-05-31 RX ORDER — GLUCAGON INJECTION, SOLUTION 0.5 MG/.1ML
1 INJECTION, SOLUTION SUBCUTANEOUS ONCE
Refills: 0 | Status: DISCONTINUED | OUTPATIENT
Start: 2023-05-31 | End: 2023-06-07

## 2023-05-31 RX ORDER — IVABRADINE 7.5 MG/1
1 TABLET, FILM COATED ORAL
Qty: 0 | Refills: 0 | DISCHARGE

## 2023-05-31 RX ORDER — INSULIN GLARGINE 100 [IU]/ML
15 INJECTION, SOLUTION SUBCUTANEOUS AT BEDTIME
Refills: 0 | Status: DISCONTINUED | OUTPATIENT
Start: 2023-05-31 | End: 2023-06-07

## 2023-05-31 RX ORDER — INSULIN LISPRO 100/ML
5 VIAL (ML) SUBCUTANEOUS
Refills: 0 | Status: DISCONTINUED | OUTPATIENT
Start: 2023-05-31 | End: 2023-06-07

## 2023-05-31 RX ORDER — ACETAMINOPHEN 500 MG
650 TABLET ORAL EVERY 6 HOURS
Refills: 0 | Status: DISCONTINUED | OUTPATIENT
Start: 2023-05-31 | End: 2023-05-31

## 2023-05-31 RX ORDER — INSULIN LISPRO 100/ML
VIAL (ML) SUBCUTANEOUS
Refills: 0 | Status: DISCONTINUED | OUTPATIENT
Start: 2023-05-31 | End: 2023-06-07

## 2023-05-31 RX ORDER — MONTELUKAST 4 MG/1
10 TABLET, CHEWABLE ORAL DAILY
Refills: 0 | Status: DISCONTINUED | OUTPATIENT
Start: 2023-05-31 | End: 2023-06-07

## 2023-05-31 RX ORDER — AMITRIPTYLINE HCL 25 MG
1 TABLET ORAL
Qty: 0 | Refills: 0 | DISCHARGE

## 2023-05-31 RX ORDER — LATANOPROST 0.05 MG/ML
1 SOLUTION/ DROPS OPHTHALMIC; TOPICAL AT BEDTIME
Refills: 0 | Status: DISCONTINUED | OUTPATIENT
Start: 2023-05-31 | End: 2023-06-07

## 2023-05-31 RX ORDER — FAMOTIDINE 10 MG/ML
20 INJECTION INTRAVENOUS DAILY
Refills: 0 | Status: DISCONTINUED | OUTPATIENT
Start: 2023-05-31 | End: 2023-05-31

## 2023-05-31 RX ORDER — APIXABAN 2.5 MG/1
5 TABLET, FILM COATED ORAL
Refills: 0 | Status: DISCONTINUED | OUTPATIENT
Start: 2023-05-31 | End: 2023-06-07

## 2023-05-31 RX ORDER — BUDESONIDE AND FORMOTEROL FUMARATE DIHYDRATE 160; 4.5 UG/1; UG/1
2 AEROSOL RESPIRATORY (INHALATION)
Refills: 0 | Status: DISCONTINUED | OUTPATIENT
Start: 2023-05-31 | End: 2023-06-07

## 2023-05-31 RX ORDER — MAGNESIUM SULFATE 500 MG/ML
2 VIAL (ML) INJECTION ONCE
Refills: 0 | Status: COMPLETED | OUTPATIENT
Start: 2023-05-31 | End: 2023-05-31

## 2023-05-31 RX ORDER — CLOPIDOGREL BISULFATE 75 MG/1
1 TABLET, FILM COATED ORAL
Qty: 0 | Refills: 0 | DISCHARGE

## 2023-05-31 RX ORDER — ALBUTEROL 90 UG/1
2 AEROSOL, METERED ORAL
Refills: 0 | Status: DISCONTINUED | OUTPATIENT
Start: 2023-05-31 | End: 2023-06-07

## 2023-05-31 RX ORDER — LIPASE/PROTEASE/AMYLASE 16-48-48K
3 CAPSULE,DELAYED RELEASE (ENTERIC COATED) ORAL
Refills: 0 | Status: DISCONTINUED | OUTPATIENT
Start: 2023-05-31 | End: 2023-06-07

## 2023-05-31 RX ORDER — SIMVASTATIN 20 MG/1
1 TABLET, FILM COATED ORAL
Qty: 0 | Refills: 0 | DISCHARGE

## 2023-05-31 RX ORDER — LOSARTAN POTASSIUM 100 MG/1
25 TABLET, FILM COATED ORAL DAILY
Refills: 0 | Status: DISCONTINUED | OUTPATIENT
Start: 2023-05-31 | End: 2023-06-07

## 2023-05-31 RX ADMIN — MORPHINE SULFATE 4 MILLIGRAM(S): 50 CAPSULE, EXTENDED RELEASE ORAL at 01:51

## 2023-05-31 RX ADMIN — Medication 3 CAPSULE(S): at 17:53

## 2023-05-31 RX ADMIN — APIXABAN 5 MILLIGRAM(S): 2.5 TABLET, FILM COATED ORAL at 17:53

## 2023-05-31 RX ADMIN — LOSARTAN POTASSIUM 25 MILLIGRAM(S): 100 TABLET, FILM COATED ORAL at 12:26

## 2023-05-31 RX ADMIN — Medication 3 MILLILITER(S): at 14:25

## 2023-05-31 RX ADMIN — INSULIN GLARGINE 15 UNIT(S): 100 INJECTION, SOLUTION SUBCUTANEOUS at 22:28

## 2023-05-31 RX ADMIN — Medication 75 MILLIGRAM(S): at 17:53

## 2023-05-31 RX ADMIN — Medication 25 GRAM(S): at 12:25

## 2023-05-31 RX ADMIN — Medication 3 CAPSULE(S): at 22:29

## 2023-05-31 RX ADMIN — Medication 400 MILLIGRAM(S): at 08:55

## 2023-05-31 RX ADMIN — Medication 30 MILLIGRAM(S): at 12:26

## 2023-05-31 RX ADMIN — SIMVASTATIN 20 MILLIGRAM(S): 20 TABLET, FILM COATED ORAL at 22:29

## 2023-05-31 RX ADMIN — Medication 3 CAPSULE(S): at 12:26

## 2023-05-31 RX ADMIN — MORPHINE SULFATE 4 MILLIGRAM(S): 50 CAPSULE, EXTENDED RELEASE ORAL at 02:17

## 2023-05-31 RX ADMIN — FAMOTIDINE 20 MILLIGRAM(S): 10 INJECTION INTRAVENOUS at 11:09

## 2023-05-31 RX ADMIN — BUDESONIDE AND FORMOTEROL FUMARATE DIHYDRATE 2 PUFF(S): 160; 4.5 AEROSOL RESPIRATORY (INHALATION) at 20:22

## 2023-05-31 RX ADMIN — LATANOPROST 1 DROP(S): 0.05 SOLUTION/ DROPS OPHTHALMIC; TOPICAL at 22:47

## 2023-05-31 RX ADMIN — Medication 3 MILLILITER(S): at 20:20

## 2023-05-31 RX ADMIN — MONTELUKAST 10 MILLIGRAM(S): 4 TABLET, CHEWABLE ORAL at 12:26

## 2023-05-31 NOTE — H&P ADULT - ASSESSMENT
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  Hypertension  Dyslipidemia  COPD not in exacerbation  Glaucoma  Osteoarthritis    PLAN:    CNS:    avoid CNS depressant  Fall risk precautions  Neurosurgery consult for Disc bulgeC3-4 w RUE weakness neck pain and mild spinal stenosis  Trauma work up negative; CT head reviewed  pain medications at home:  pregabalin 75 mg q 12  oxymorphone 40 mg BID  Oxycodone/acetaminophen 10/325 mg q 8    HEENT:     c/w latanoprost eye drop right eye  Oral care    PULMONARY:      HOB @ 45 degrees  c/w Bipap at night  Duoneb and Symbicort ordered  takes Singulair 10 mg daily at home    CARDIOVASCULAR:    Hold home Toprol 25 mg view ME prolongation; recheck EKG  start losartan 25 mg daily  c/w home nifedipine 30 mg daily  c/w Simvastatin 20 mg daily  c/w Eliquis 5 mg q 12  c/w Furosemide 20 mg every other day  Fluid restriction  intake output monitoring     GASTROINTESTINAL    GI prophylaxis.  Feeding   C/W PANCRELIPASE with meals      RENAL:      Follow up lytes.  Correct as needed    INFECTIOUS DISEASE:     no active infections  maintain normothermia      HEMATOLOGICAL:      DVT prophylaxis. Eliquis 5 mg q 12    ENDOCRINE:      Follow up FS.  Insulin protocol if needed.  Takes TOUJEO Solostar  30 units at bedtime  start Lantus 15 units daily; Lispro 5 with meals + corrective scale 1:50  keep -180  avoid hypoglycemia  check A1c lipid panel  c/w Simvastatin 40 mg daily     MUSCULOSKELETAL:    Trauma work up negative   PT/OT evaluation    Full Code    pharmacy number

## 2023-05-31 NOTE — H&P ADULT - HISTORY OF PRESENT ILLNESS
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 Patient reports a mechanical fall end of april when he was admitted to [facility] where he was diagnosed with rib contusion w/o Fx. Patient was discharged home on pain medications.  However last week patient started complaining of right sided headache, temporal side, radiating to the neck. He still reports right arm weakness secondary to the fall. He reports chronic blurry vision. No tingling or numbness. He denies fever, seizures or LOC. He has no neck stiffness. ROS otherwise negative  ED vitals remarkable for hypertension

## 2023-05-31 NOTE — H&P ADULT - ATTENDING COMMENTS
Patients seen and evaluated in ER earlier today and plan of care discussed with the resident.   Patient presented with a one week history of headache which got progressively worse. Prior to that he had a mechanical fall at home but said he did not hit his head or lose consciousness. At [facility], trauma work up was negative and he was discharged after which he developed headache.    He describes the headache as severe, initially intermittent, radiating to his right neck and shoulder, throbbing with no relief to medications. No photophobia, blurry vision, N/V or aggravating factor. No history of migraine in the past.     ICU Vital Signs Last 24 Hrs  T(C): 36.4 (31 May 2023 08:15), Max: 36.7 (30 May 2023 19:50)  T(F): 97.6 (31 May 2023 08:15), Max: 98 (30 May 2023 19:50)  HR: 75 (31 May 2023 08:15) (72 - 81)  BP: 210/95 (31 May 2023 08:15) (136/66 - 210/95)  BP(mean): 136 (31 May 2023 08:15) (91 - 136)  RR: 18 (31 May 2023 08:15) (18 - 20)  SpO2: 98% (31 May 2023 08:15) (96% - 98%)    O2 Parameters below as of 31 May 2023 08:15  Patient On (Oxygen Delivery Method): room air    General: Elderly male was in severe distress due to headache  Neurology: A&Ox3, nonfocal, BENSON x 4  Eyes: PERRLA/ EOMI, Gross vision intact  ENT/Neck: Neck supple, trachea midline, No JVD, Gross hearing intact  Respiratory: CTA B/L, No wheezing, rales, rhonchi  CV: RRR, S1S2, no murmurs, rubs or gallops  Abdominal: Soft, NT, ND +BS,   Extremities: No edema, + peripheral pulses  Skin: No Rashes, Hematoma, Ecchymosis    Reviewed his labs and radiologic workups    HE CANNOT HAVE MRI BECAUSE HE HAS A PPM    Assessment  1. Severe headache etiology unknown  2. Chronic spinal stenosis- Per patient said he was informed by neuro sx in the past that risk of surgery outweighs the benefits  3. Chronic A fib on Eliquis s/p PPM  4.Chronic pancreatitis  5. Type 2 IDDM  6. Chronic systolic HF   7. Hypertension  8. Dyslipidemia  9, COPD not in exacerbation  10. Glaucoma  11. Osteoarthritis      Plan  Patient was consoled and received stat rainey in the ER which relieved his headache after 15 minutes.   Neuro consulted for evaluation and recommendation of his chronic spinal stenosis  Patient lives alone but has family members  Continue Tylenol prn ordered for headache and resume home medications  Patient was educated about his care and was informed about his radiology tests which had no acute findings suggestive of cause of his headache.   Will observe patient overnight and if his condition remains under control, he will be discharged tomorrow for outpatient follow up with his PMD.   PFD ordered. Patients seen and evaluated in ER earlier today and plan of care discussed with the resident.   Patient presented with a one week history of headache which got progressively worse. Prior to that he had a mechanical fall at home but said he did not hit his head or lose consciousness. At [facility], trauma work up was negative and he was discharged after which he developed headache.    He describes the headache as severe, initially intermittent, radiating to his right neck and shoulder, throbbing with no relief to medications. No photophobia, blurry vision, N/V or aggravating factor. No history of migraine in the past.     ICU Vital Signs Last 24 Hrs  T(C): 36.4 (31 May 2023 08:15), Max: 36.7 (30 May 2023 19:50)  T(F): 97.6 (31 May 2023 08:15), Max: 98 (30 May 2023 19:50)  HR: 75 (31 May 2023 08:15) (72 - 81)  BP: 210/95 (31 May 2023 08:15) (136/66 - 210/95)  BP(mean): 136 (31 May 2023 08:15) (91 - 136)  RR: 18 (31 May 2023 08:15) (18 - 20)  SpO2: 98% (31 May 2023 08:15) (96% - 98%)    O2 Parameters below as of 31 May 2023 08:15  Patient On (Oxygen Delivery Method): room air    General: Elderly male was in severe distress due to headache  Neurology: A&Ox3, nonfocal, BENSON x 4  Eyes: PERRLA/ EOMI, Gross vision intact  ENT/Neck: Neck supple, trachea midline, No JVD, Gross hearing intact  Respiratory: CTA B/L, No wheezing, rales, rhonchi  CV: RRR, S1S2, no murmurs, rubs or gallops  Abdominal: Soft, NT, ND +BS,   Extremities: No edema, + peripheral pulses  Skin: No Rashes, Hematoma, Ecchymosis    Reviewed his labs and radiologic workups    HE CANNOT HAVE MRI BECAUSE HE HAS A PPM    Assessment  1. Severe headache etiology unknown probable tension headache,   2. Chronic spinal stenosis- Per patient said he was informed by neuro sx in the past that risk of surgery outweighs the benefits  3. Chronic A fib on Eliquis s/p PPM  4.Chronic pancreatitis  5. Type 2 IDDM  6. Chronic systolic HF   7. Hypertension  8. Dyslipidemia  9, COPD on home 02  10. Glaucoma  11. Osteoarthritis      Plan  Patient was consoled and received stat rainey in the ER which relieved his headache after 15 minutes.   Neuro consulted for evaluation and recommendation of his chronic spinal stenosis  Patient lives alone but has family members  Continue Tylenol prn ordered for headache and resume home medications  Patient was educated about his care and was informed about his radiology tests which had no acute findings suggestive of cause of his headache.   Will observe patient overnight and if his condition remains under control, he will be discharged tomorrow for outpatient follow up with his PMD.   PFD ordered. Patients seen and evaluated in ER earlier today and plan of care discussed with the resident.   Patient presented with a one week history of headache which got progressively worse. Prior to that he had a mechanical fall at home but said he did not hit his head or lose consciousness. At [facility], trauma work up was negative and he was discharged after which he developed headache.    He describes the headache as severe, initially intermittent, radiating to his right neck and shoulder, throbbing with no relief to medications. No photophobia, blurry vision, N/V or aggravating factor. No history of migraine in the past.     ICU Vital Signs Last 24 Hrs  T(C): 36.4 (31 May 2023 08:15), Max: 36.7 (30 May 2023 19:50)  T(F): 97.6 (31 May 2023 08:15), Max: 98 (30 May 2023 19:50)  HR: 75 (31 May 2023 08:15) (72 - 81)  BP: 210/95 (31 May 2023 08:15) (136/66 - 210/95)  BP(mean): 136 (31 May 2023 08:15) (91 - 136)  RR: 18 (31 May 2023 08:15) (18 - 20)  SpO2: 98% (31 May 2023 08:15) (96% - 98%)    O2 Parameters below as of 31 May 2023 08:15  Patient On (Oxygen Delivery Method): room air    General: Elderly male was in severe distress due to headache  Neurology: A&Ox3, nonfocal, BENSON x 4  Eyes: PERRLA/ EOMI, Gross vision intact  ENT/Neck: Neck supple, trachea midline, No JVD, Gross hearing intact  Respiratory: CTA B/L, No wheezing, rales, rhonchi  CV: RRR, S1S2, no murmurs, rubs or gallops  Abdominal: Soft, NT, ND +BS,   Extremities: No edema, + peripheral pulses  Skin: No Rashes, Hematoma, Ecchymosis    Reviewed his labs and radiologic workups    HE CANNOT HAVE MRI BECAUSE HE HAS A PPM    Assessment  1. 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 Chronic spinal stenosis- Per patient said he was informed by neuro sx in the past that risk of surgery outweighs the benefits  3. Chronic A fib on Eliquis s/p PPM  4.Chronic pancreatitis  5. Type 2 IDDM  6. Chronic systolic HF   7. Hypertension  8. Dyslipidemia  9, COPD on home 02  10. Glaucoma  11. Osteoarthritis      Plan  Patient was consoled and received stat rainey in the ER which relieved his headache after 15 minutes.   Neuro consulted for evaluation and recommendation of his chronic spinal stenosis  Patient lives alone but has family members  Continue Tylenol prn ordered for headache and resume home medications  Patient was educated about his care and was informed about his radiology tests which had no acute findings suggestive of cause of his headache.   Will observe patient overnight and if his condition remains under control, he will be discharged tomorrow for outpatient follow up with his PMD.   PFD ordered.

## 2023-05-31 NOTE — H&P ADULT - NSHPPHYSICALEXAM_GEN_ALL_CORE
PHYSICAL EXAM:      Constitutional: NAD in pain    Neck: tenderness right side    Respiratory: clear to auscultation bilaterally     Cardiovascular: RRR no audible murmur     Gastrointestinal: surgical scar, LLQ tenderness, soft    Extremities: no LE edema chronic vascular changes     Neurological: A&O x 3 cranial nerves intact ; RUE weakness, no focal motor or sensory deficit     Limited ROM in RUE secondary to pain no joint swelling

## 2023-05-31 NOTE — H&P ADULT - NSHPLABSRESULTS_GEN_ALL_CORE
13.9   5.72  )-----------( 221      ( 30 May 2023 22:20 )             41.1     05-30    139  |  101  |  13  ----------------------------<  123<H>  3.7   |  25  |  0.8    Ca    9.8      30 May 2023 22:20  Mg     1.8     05-30    TPro  7.7  /  Alb  4.5  /  TBili  0.3  /  DBili  x   /  AST  24  /  ALT  25  /  AlkPhos  71  05-30    < from: CT Angio Neck w/ IV Cont (05.31.23 @ 00:15) >    IMPRESSION:    No large vessel occlusion or high-grade vascular stenosis.    Mild atherosclerotic stenosis in the proximal bilateral cervical ICAs,   right more than left.    Mild atherosclerotic stenosis in bilateral carotid siphons.      < from: CT Head No Cont (05.30.23 @ 20:53) >    No acute intracranial hemorrhage, mass-effect or midline shift.    < end of copied text >  < from: CT Cervical Spine No Cont (05.30.23 @ 20:58) >    No acute cervical fracture or facet subluxation.    Multilevel cervical spondylosis and spondylolisthesis, worse at C3-4 with   uncovered disc bulge contributing to mild spinal stenosis, and severe   left/moderate-severe right foraminal stenosis.    < end of copied text >

## 2023-05-31 NOTE — PHYSICAL THERAPY INITIAL EVALUATION ADULT - PERTINENT HX OF CURRENT PROBLEM, REHAB EVAL
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 Patient reports a mechanical fall end of april when he was admitted to [facility] where he was diagnosed with rib contusion w/o Fx. Patient was discharged home on pain medications.  However last week patient started complaining of right sided headache, temporal side, radiating to the neck. He still reports right arm weakness secondary to the fall. He reports chronic blurry vision. No tingling or numbness. He denies fever, seizures or LOC. He has no neck stiffness. ROS otherwise negative  ED vitals remarkable for hypertension

## 2023-05-31 NOTE — PHYSICAL THERAPY INITIAL EVALUATION ADULT - GENERAL OBSERVATIONS, REHAB EVAL
[id number] pm Chart reviewed. Pt. seen semirecline in bed , in No apparent distress , + IV lock, c/o pain on right cervical area down to right elbow, Pt. agreed to activity/therapy. Pt. alert and oriented X4

## 2023-06-01 LAB
A1C WITH ESTIMATED AVERAGE GLUCOSE RESULT: 7.4 % — HIGH (ref 4–5.6)
ALBUMIN SERPL ELPH-MCNC: 4.1 G/DL — SIGNIFICANT CHANGE UP (ref 3.5–5.2)
ALP SERPL-CCNC: 63 U/L — SIGNIFICANT CHANGE UP (ref 30–115)
ALT FLD-CCNC: 29 U/L — SIGNIFICANT CHANGE UP (ref 0–41)
ANION GAP SERPL CALC-SCNC: 14 MMOL/L — SIGNIFICANT CHANGE UP (ref 7–14)
AST SERPL-CCNC: 57 U/L — HIGH (ref 0–41)
BASOPHILS # BLD AUTO: 0.01 K/UL — SIGNIFICANT CHANGE UP (ref 0–0.2)
BASOPHILS NFR BLD AUTO: 0.1 % — SIGNIFICANT CHANGE UP (ref 0–1)
BILIRUB SERPL-MCNC: 0.6 MG/DL — SIGNIFICANT CHANGE UP (ref 0.2–1.2)
BUN SERPL-MCNC: 16 MG/DL — SIGNIFICANT CHANGE UP (ref 10–20)
CALCIUM SERPL-MCNC: 9.4 MG/DL — SIGNIFICANT CHANGE UP (ref 8.4–10.4)
CHLORIDE SERPL-SCNC: 99 MMOL/L — SIGNIFICANT CHANGE UP (ref 98–110)
CHOLEST SERPL-MCNC: 165 MG/DL — SIGNIFICANT CHANGE UP
CO2 SERPL-SCNC: 21 MMOL/L — SIGNIFICANT CHANGE UP (ref 17–32)
CREAT SERPL-MCNC: 0.8 MG/DL — SIGNIFICANT CHANGE UP (ref 0.7–1.5)
CRP SERPL-MCNC: 34.5 MG/L — HIGH
EGFR: 89 ML/MIN/1.73M2 — SIGNIFICANT CHANGE UP
EOSINOPHIL # BLD AUTO: 0.22 K/UL — SIGNIFICANT CHANGE UP (ref 0–0.7)
EOSINOPHIL NFR BLD AUTO: 2.9 % — SIGNIFICANT CHANGE UP (ref 0–8)
ERYTHROCYTE [SEDIMENTATION RATE] IN BLOOD: 12 MM/HR — HIGH (ref 0–10)
ESTIMATED AVERAGE GLUCOSE: 166 MG/DL — HIGH (ref 68–114)
GLUCOSE BLDC GLUCOMTR-MCNC: 118 MG/DL — HIGH (ref 70–99)
GLUCOSE BLDC GLUCOMTR-MCNC: 140 MG/DL — HIGH (ref 70–99)
GLUCOSE BLDC GLUCOMTR-MCNC: 155 MG/DL — HIGH (ref 70–99)
GLUCOSE BLDC GLUCOMTR-MCNC: 166 MG/DL — HIGH (ref 70–99)
GLUCOSE BLDC GLUCOMTR-MCNC: 170 MG/DL — HIGH (ref 70–99)
GLUCOSE BLDC GLUCOMTR-MCNC: 186 MG/DL — HIGH (ref 70–99)
GLUCOSE SERPL-MCNC: 138 MG/DL — HIGH (ref 70–99)
HCT VFR BLD CALC: 42.4 % — SIGNIFICANT CHANGE UP (ref 42–52)
HDLC SERPL-MCNC: 67 MG/DL — SIGNIFICANT CHANGE UP
HGB BLD-MCNC: 14.5 G/DL — SIGNIFICANT CHANGE UP (ref 14–18)
IMM GRANULOCYTES NFR BLD AUTO: 0.3 % — SIGNIFICANT CHANGE UP (ref 0.1–0.3)
LIPID PNL WITH DIRECT LDL SERPL: 67 MG/DL — SIGNIFICANT CHANGE UP
LYMPHOCYTES # BLD AUTO: 1.6 K/UL — SIGNIFICANT CHANGE UP (ref 1.2–3.4)
LYMPHOCYTES # BLD AUTO: 21.3 % — SIGNIFICANT CHANGE UP (ref 20.5–51.1)
MAGNESIUM SERPL-MCNC: 2.3 MG/DL — SIGNIFICANT CHANGE UP (ref 1.8–2.4)
MCHC RBC-ENTMCNC: 31.7 PG — HIGH (ref 27–31)
MCHC RBC-ENTMCNC: 34.2 G/DL — SIGNIFICANT CHANGE UP (ref 32–37)
MCV RBC AUTO: 92.8 FL — SIGNIFICANT CHANGE UP (ref 80–94)
MONOCYTES # BLD AUTO: 0.96 K/UL — HIGH (ref 0.1–0.6)
MONOCYTES NFR BLD AUTO: 12.8 % — HIGH (ref 1.7–9.3)
NEUTROPHILS # BLD AUTO: 4.7 K/UL — SIGNIFICANT CHANGE UP (ref 1.4–6.5)
NEUTROPHILS NFR BLD AUTO: 62.6 % — SIGNIFICANT CHANGE UP (ref 42.2–75.2)
NON HDL CHOLESTEROL: 98 MG/DL — SIGNIFICANT CHANGE UP
NRBC # BLD: 0 /100 WBCS — SIGNIFICANT CHANGE UP (ref 0–0)
PLATELET # BLD AUTO: 174 K/UL — SIGNIFICANT CHANGE UP (ref 130–400)
PMV BLD: 9.7 FL — SIGNIFICANT CHANGE UP (ref 7.4–10.4)
POTASSIUM SERPL-MCNC: 4.3 MMOL/L — SIGNIFICANT CHANGE UP (ref 3.5–5)
POTASSIUM SERPL-SCNC: 4.3 MMOL/L — SIGNIFICANT CHANGE UP (ref 3.5–5)
PROT SERPL-MCNC: 7.5 G/DL — SIGNIFICANT CHANGE UP (ref 6–8)
RBC # BLD: 4.57 M/UL — LOW (ref 4.7–6.1)
RBC # FLD: 13.5 % — SIGNIFICANT CHANGE UP (ref 11.5–14.5)
SODIUM SERPL-SCNC: 134 MMOL/L — LOW (ref 135–146)
TRIGL SERPL-MCNC: 152 MG/DL — HIGH
WBC # BLD: 7.51 K/UL — SIGNIFICANT CHANGE UP (ref 4.8–10.8)
WBC # FLD AUTO: 7.51 K/UL — SIGNIFICANT CHANGE UP (ref 4.8–10.8)

## 2023-06-01 PROCEDURE — 99222 1ST HOSP IP/OBS MODERATE 55: CPT

## 2023-06-01 PROCEDURE — 99232 SBSQ HOSP IP/OBS MODERATE 35: CPT

## 2023-06-01 PROCEDURE — 93010 ELECTROCARDIOGRAM REPORT: CPT

## 2023-06-01 RX ORDER — DIPHENHYDRAMINE HCL 50 MG
25 CAPSULE ORAL ONCE
Refills: 0 | Status: COMPLETED | OUTPATIENT
Start: 2023-06-01 | End: 2023-06-01

## 2023-06-01 RX ORDER — HYDROMORPHONE HYDROCHLORIDE 2 MG/ML
0.5 INJECTION INTRAMUSCULAR; INTRAVENOUS; SUBCUTANEOUS ONCE
Refills: 0 | Status: DISCONTINUED | OUTPATIENT
Start: 2023-06-01 | End: 2023-06-01

## 2023-06-01 RX ORDER — OXYCODONE HYDROCHLORIDE 5 MG/1
5 TABLET ORAL EVERY 6 HOURS
Refills: 0 | Status: DISCONTINUED | OUTPATIENT
Start: 2023-06-01 | End: 2023-06-06

## 2023-06-01 RX ORDER — HYDRALAZINE HCL 50 MG
25 TABLET ORAL ONCE
Refills: 0 | Status: COMPLETED | OUTPATIENT
Start: 2023-06-01 | End: 2023-06-01

## 2023-06-01 RX ORDER — OXYCODONE HYDROCHLORIDE 5 MG/1
10 TABLET ORAL EVERY 6 HOURS
Refills: 0 | Status: DISCONTINUED | OUTPATIENT
Start: 2023-06-01 | End: 2023-06-01

## 2023-06-01 RX ORDER — OXYCODONE HYDROCHLORIDE 5 MG/1
5 TABLET ORAL EVERY 6 HOURS
Refills: 0 | Status: DISCONTINUED | OUTPATIENT
Start: 2023-06-01 | End: 2023-06-01

## 2023-06-01 RX ORDER — ACETAMINOPHEN 500 MG
650 TABLET ORAL EVERY 6 HOURS
Refills: 0 | Status: COMPLETED | OUTPATIENT
Start: 2023-06-01 | End: 2023-06-03

## 2023-06-01 RX ORDER — SENNA PLUS 8.6 MG/1
2 TABLET ORAL AT BEDTIME
Refills: 0 | Status: DISCONTINUED | OUTPATIENT
Start: 2023-06-01 | End: 2023-06-07

## 2023-06-01 RX ORDER — KETOROLAC TROMETHAMINE 30 MG/ML
15 SYRINGE (ML) INJECTION ONCE
Refills: 0 | Status: DISCONTINUED | OUTPATIENT
Start: 2023-06-01 | End: 2023-06-01

## 2023-06-01 RX ADMIN — Medication 3 CAPSULE(S): at 09:51

## 2023-06-01 RX ADMIN — SIMVASTATIN 20 MILLIGRAM(S): 20 TABLET, FILM COATED ORAL at 21:44

## 2023-06-01 RX ADMIN — Medication 3 CAPSULE(S): at 21:44

## 2023-06-01 RX ADMIN — LATANOPROST 1 DROP(S): 0.05 SOLUTION/ DROPS OPHTHALMIC; TOPICAL at 22:58

## 2023-06-01 RX ADMIN — Medication 650 MILLIGRAM(S): at 23:00

## 2023-06-01 RX ADMIN — Medication 3 MILLILITER(S): at 20:43

## 2023-06-01 RX ADMIN — Medication 20 MILLIGRAM(S): at 07:38

## 2023-06-01 RX ADMIN — HYDROMORPHONE HYDROCHLORIDE 0.5 MILLIGRAM(S): 2 INJECTION INTRAMUSCULAR; INTRAVENOUS; SUBCUTANEOUS at 15:49

## 2023-06-01 RX ADMIN — OXYCODONE HYDROCHLORIDE 5 MILLIGRAM(S): 5 TABLET ORAL at 22:15

## 2023-06-01 RX ADMIN — Medication 650 MILLIGRAM(S): at 23:30

## 2023-06-01 RX ADMIN — OXYCODONE HYDROCHLORIDE 5 MILLIGRAM(S): 5 TABLET ORAL at 16:12

## 2023-06-01 RX ADMIN — OXYCODONE HYDROCHLORIDE 5 MILLIGRAM(S): 5 TABLET ORAL at 21:44

## 2023-06-01 RX ADMIN — Medication 3 CAPSULE(S): at 17:34

## 2023-06-01 RX ADMIN — SENNA PLUS 2 TABLET(S): 8.6 TABLET ORAL at 21:44

## 2023-06-01 RX ADMIN — Medication 3 CAPSULE(S): at 13:24

## 2023-06-01 RX ADMIN — LOSARTAN POTASSIUM 25 MILLIGRAM(S): 100 TABLET, FILM COATED ORAL at 06:00

## 2023-06-01 RX ADMIN — Medication 30 MILLIGRAM(S): at 06:01

## 2023-06-01 RX ADMIN — Medication 25 MILLIGRAM(S): at 08:53

## 2023-06-01 RX ADMIN — APIXABAN 5 MILLIGRAM(S): 2.5 TABLET, FILM COATED ORAL at 06:02

## 2023-06-01 RX ADMIN — Medication 75 MILLIGRAM(S): at 17:33

## 2023-06-01 RX ADMIN — INSULIN GLARGINE 15 UNIT(S): 100 INJECTION, SOLUTION SUBCUTANEOUS at 22:58

## 2023-06-01 RX ADMIN — Medication 75 MILLIGRAM(S): at 06:00

## 2023-06-01 RX ADMIN — APIXABAN 5 MILLIGRAM(S): 2.5 TABLET, FILM COATED ORAL at 17:34

## 2023-06-01 RX ADMIN — Medication 25 MILLIGRAM(S): at 12:07

## 2023-06-01 RX ADMIN — OXYCODONE HYDROCHLORIDE 5 MILLIGRAM(S): 5 TABLET ORAL at 13:22

## 2023-06-01 NOTE — CONSULT NOTE ADULT - SUBJECTIVE AND OBJECTIVE BOX
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 Patient reports a mechanical fall end of april when he was admitted to [facility] where he was diagnosed with rib contusion w/o Fx. Patient was discharged home on pain medications.  However last week patient started complaining of right sided headache, temporal side, radiating to the neck. He still reports right arm weakness secondary to the fall. He reports chronic blurry vision. No tingling or numbness. He denies fever, seizures or LOC. He has no neck stiffness. ROS otherwise negative  ED vitals remarkable for hypertension  (31 May 2023 11:26)      PAST MEDICAL & SURGICAL HISTORY:  Diabetes  Hypertension  Pacemaker  Dyslipidemia  History of chronic pancreatitis  Atherosclerosis of native artery of lower extremity  COPD (chronic obstructive pulmonary disease)  CHESTER on CPAP  H/O intestinal obstruction  History of cholecystectomy  History of pancreatic surgery  History of penile implant  Cardiac pacemaker      Home Medications:  Breo Ellipta 100 mcg-25 mcg/inh inhalation powder: 1 puff(s) inhaled once a day (20 Jun 2022 18:22)  Eliquis 5 mg tablet: 1 tab(s) orally every 12 hours (21 Jun 2022 14:17)  latanoprost 0.005% ophthalmic solution: 1 drop(s) to each affected eye once a day (in the evening) (20 Jun 2022 18:22)  montelukast 10 mg oral tablet: 1 tab(s) orally once a day (20 Jun 2022 18:22)  omeprazole 40 mg oral delayed release capsule: 1 cap(s) orally once a day (20 Jun 2022 18:22)  oxyMORphone 40 mg oral tablet, extended release: 1 tab(s) orally every 12 hours (20 Jun 2022 18:26)  pregabalin 75 mg capsule: 1 cap(s) orally once a day (21 Jun 2022 14:17)      Allergies    No Known Allergies    Intolerances      ROS:  [X] A ten-point review of systems is negative except as noted   [  ] Due to altered mental status/intubation, subjective information were not able to be obtained from the patient. History was obtained, to the extent possible, from review of the chart and collateral sources of information    MEDICATIONS  (STANDING):  acetaminophen     Tablet .. 650 milliGRAM(s) Oral every 6 hours  albuterol    90 MICROgram(s) HFA Inhaler 2 Puff(s) Inhalation two times a day  albuterol/ipratropium for Nebulization 3 milliLiter(s) Nebulizer every 6 hours  apixaban 5 milliGRAM(s) Oral two times a day  budesonide 160 MICROgram(s)/formoterol 4.5 MICROgram(s) Inhaler 2 Puff(s) Inhalation two times a day  chlorhexidine 2% Cloths 1 Application(s) Topical daily  dextrose 5%. 1000 milliLiter(s) (100 mL/Hr) IV Continuous <Continuous>  dextrose 5%. 1000 milliLiter(s) (50 mL/Hr) IV Continuous <Continuous>  dextrose 50% Injectable 25 Gram(s) IV Push once  dextrose 50% Injectable 12.5 Gram(s) IV Push once  dextrose 50% Injectable 25 Gram(s) IV Push once  furosemide    Tablet 20 milliGRAM(s) Oral <User Schedule>  glucagon  Injectable 1 milliGRAM(s) IntraMuscular once  insulin glargine Injectable (LANTUS) 15 Unit(s) SubCutaneous at bedtime  insulin lispro (ADMELOG) corrective regimen sliding scale   SubCutaneous three times a day before meals  insulin lispro Injectable (ADMELOG) 5 Unit(s) SubCutaneous three times a day before meals  latanoprost 0.005% Ophthalmic Solution 1 Drop(s) Right EYE at bedtime  losartan 25 milliGRAM(s) Oral daily  montelukast 10 milliGRAM(s) Oral daily  NIFEdipine XL 30 milliGRAM(s) Oral daily  pancrelipase  (CREON 12,000 Lipase Units) 3 Capsule(s) Oral four times a day with meals  pantoprazole    Tablet 40 milliGRAM(s) Oral before breakfast  pregabalin 75 milliGRAM(s) Oral two times a day  senna 2 Tablet(s) Oral at bedtime  simvastatin 20 milliGRAM(s) Oral at bedtime    MEDICATIONS  (PRN):  dextrose Oral Gel 15 Gram(s) Oral once PRN Blood Glucose LESS THAN 70 milliGRAM(s)/deciliter  oxyCODONE    IR 5 milliGRAM(s) Oral every 6 hours PRN Moderate Pain (4 - 6)      ICU Vital Signs Last 24 Hrs  T(C): 36.3 (01 Jun 2023 17:58), Max: 37 (01 Jun 2023 04:22)  T(F): 97.3 (01 Jun 2023 17:58), Max: 98.6 (01 Jun 2023 04:22)  HR: 87 (01 Jun 2023 17:58) (68 - 89)  BP: 130/64 (01 Jun 2023 17:58) (130/64 - 179/84)  BP(mean): --  ABP: --  ABP(mean): --  RR: 18 (01 Jun 2023 17:58) (18 - 18)  SpO2: 98% (01 Jun 2023 17:58) (98% - 98%)    O2 Parameters below as of 01 Jun 2023 17:58  Patient On (Oxygen Delivery Method): room air      I&O's Detail                            14.5   7.51  )-----------( 174      ( 01 Jun 2023 06:24 )             42.4     06-01    134<L>  |  99  |  16  ----------------------------<  138<H>  4.3   |  21  |  0.8    Ca    9.4      01 Jun 2023 06:24  Mg     2.3     06-01    TPro  7.5  /  Alb  4.1  /  TBili  0.6  /  DBili  x   /  AST  57<H>  /  ALT  29  /  AlkPhos  63  06-01    CARDIAC MARKERS ( 30 May 2023 22:20 )  x     / <0.01 ng/mL / x     / x     / x          On PE:    Strength LUE 5/5  RUE 5/5 prox, wrist flex/ext 5/5,  5-/5  Tremors in RUE  B/L LE 5/5  sensation equal to light touch  + B/L mild hoffmans  BB 1+ B/L  No clonus      Radiology:  < from: CT Cervical Spine No Cont (05.30.23 @ 20:58) >  No acute cervical fracture or facet subluxation.    Multilevel cervical spondylosis and spondylolisthesis, worse at C3-4 with   uncovered disc bulge contributing to mild spinal stenosis, and severe   left/moderate-severe right foraminal stenosis.    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 Patient reports a mechanical fall end of april when he was admitted to [facility] where he was diagnosed with rib contusion w/o Fx. Patient was discharged home on pain medications.  However last week patient started complaining of right sided headache, temporal side, radiating to the neck. He still reports right arm weakness secondary to the fall. He reports chronic blurry vision. No tingling or numbness. He denies fever, seizures or LOC. He has no neck stiffness. ROS otherwise negative  ED vitals remarkable for hypertension  (31 May 2023 11:26)      PAST MEDICAL & SURGICAL HISTORY:  Diabetes  Hypertension  Pacemaker  Dyslipidemia  History of chronic pancreatitis  Atherosclerosis of native artery of lower extremity  COPD (chronic obstructive pulmonary disease)  CHESTER on CPAP  H/O intestinal obstruction  History of cholecystectomy  History of pancreatic surgery  History of penile implant  Cardiac pacemaker      Home Medications:  Breo Ellipta 100 mcg-25 mcg/inh inhalation powder: 1 puff(s) inhaled once a day (20 Jun 2022 18:22)  Eliquis 5 mg tablet: 1 tab(s) orally every 12 hours (21 Jun 2022 14:17)  latanoprost 0.005% ophthalmic solution: 1 drop(s) to each affected eye once a day (in the evening) (20 Jun 2022 18:22)  montelukast 10 mg oral tablet: 1 tab(s) orally once a day (20 Jun 2022 18:22)  omeprazole 40 mg oral delayed release capsule: 1 cap(s) orally once a day (20 Jun 2022 18:22)  oxyMORphone 40 mg oral tablet, extended release: 1 tab(s) orally every 12 hours (20 Jun 2022 18:26)  pregabalin 75 mg capsule: 1 cap(s) orally once a day (21 Jun 2022 14:17)      Allergies    No Known Allergies    Intolerances      ROS:  [X] A ten-point review of systems is negative except as noted   [  ] Due to altered mental status/intubation, subjective information were not able to be obtained from the patient. History was obtained, to the extent possible, from review of the chart and collateral sources of information    MEDICATIONS  (STANDING):  acetaminophen     Tablet .. 650 milliGRAM(s) Oral every 6 hours  albuterol    90 MICROgram(s) HFA Inhaler 2 Puff(s) Inhalation two times a day  albuterol/ipratropium for Nebulization 3 milliLiter(s) Nebulizer every 6 hours  apixaban 5 milliGRAM(s) Oral two times a day  budesonide 160 MICROgram(s)/formoterol 4.5 MICROgram(s) Inhaler 2 Puff(s) Inhalation two times a day  chlorhexidine 2% Cloths 1 Application(s) Topical daily  dextrose 5%. 1000 milliLiter(s) (100 mL/Hr) IV Continuous <Continuous>  dextrose 5%. 1000 milliLiter(s) (50 mL/Hr) IV Continuous <Continuous>  dextrose 50% Injectable 25 Gram(s) IV Push once  dextrose 50% Injectable 12.5 Gram(s) IV Push once  dextrose 50% Injectable 25 Gram(s) IV Push once  furosemide    Tablet 20 milliGRAM(s) Oral <User Schedule>  glucagon  Injectable 1 milliGRAM(s) IntraMuscular once  insulin glargine Injectable (LANTUS) 15 Unit(s) SubCutaneous at bedtime  insulin lispro (ADMELOG) corrective regimen sliding scale   SubCutaneous three times a day before meals  insulin lispro Injectable (ADMELOG) 5 Unit(s) SubCutaneous three times a day before meals  latanoprost 0.005% Ophthalmic Solution 1 Drop(s) Right EYE at bedtime  losartan 25 milliGRAM(s) Oral daily  montelukast 10 milliGRAM(s) Oral daily  NIFEdipine XL 30 milliGRAM(s) Oral daily  pancrelipase  (CREON 12,000 Lipase Units) 3 Capsule(s) Oral four times a day with meals  pantoprazole    Tablet 40 milliGRAM(s) Oral before breakfast  pregabalin 75 milliGRAM(s) Oral two times a day  senna 2 Tablet(s) Oral at bedtime  simvastatin 20 milliGRAM(s) Oral at bedtime    MEDICATIONS  (PRN):  dextrose Oral Gel 15 Gram(s) Oral once PRN Blood Glucose LESS THAN 70 milliGRAM(s)/deciliter  oxyCODONE    IR 5 milliGRAM(s) Oral every 6 hours PRN Moderate Pain (4 - 6)      ICU Vital Signs Last 24 Hrs  T(C): 36.3 (01 Jun 2023 17:58), Max: 37 (01 Jun 2023 04:22)  T(F): 97.3 (01 Jun 2023 17:58), Max: 98.6 (01 Jun 2023 04:22)  HR: 87 (01 Jun 2023 17:58) (68 - 89)  BP: 130/64 (01 Jun 2023 17:58) (130/64 - 179/84)  BP(mean): --  ABP: --  ABP(mean): --  RR: 18 (01 Jun 2023 17:58) (18 - 18)  SpO2: 98% (01 Jun 2023 17:58) (98% - 98%)    O2 Parameters below as of 01 Jun 2023 17:58  Patient On (Oxygen Delivery Method): room air      I&O's Detail                            14.5   7.51  )-----------( 174      ( 01 Jun 2023 06:24 )             42.4     06-01    134<L>  |  99  |  16  ----------------------------<  138<H>  4.3   |  21  |  0.8    Ca    9.4      01 Jun 2023 06:24  Mg     2.3     06-01    TPro  7.5  /  Alb  4.1  /  TBili  0.6  /  DBili  x   /  AST  57<H>  /  ALT  29  /  AlkPhos  63  06-01    CARDIAC MARKERS ( 30 May 2023 22:20 )  x     / <0.01 ng/mL / x     / x     / x          On PE:    Strength LUE 5/5  RUE 5/5 prox, wrist flex/ext 5/5,  5-/5  Tremors in RUE  B/L LE 5/5  sensation equal to light touch  + B/L mild hoffmans  BB 1+ B/L  No clonus      Radiology:  < from: CT Cervical Spine No Cont (05.30.23 @ 20:58) >  No acute cervical fracture or facet subluxation.    Multilevel cervical spondylosis and spondylolisthesis, worse at C3-4 with   uncovered disc bulge contributing to mild spinal stenosis, and severe   left/moderate-severe right foraminal stenosis.    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 Patient reports a mechanical fall end of april when he was admitted to [facility] where he was diagnosed with rib contusion w/o Fx. Patient was discharged home on pain medications.  However last week patient started complaining of right sided headache, temporal side, radiating to the neck. He still reports right arm weakness secondary to the fall. He reports chronic blurry vision. No tingling or numbness. He denies fever, seizures or LOC. He has no neck stiffness. ROS otherwise negative  ED vitals remarkable for hypertension  (31 May 2023 11:26)      PAST MEDICAL & SURGICAL HISTORY:  Diabetes  Hypertension  Pacemaker  Dyslipidemia  History of chronic pancreatitis  Atherosclerosis of native artery of lower extremity  COPD (chronic obstructive pulmonary disease)  CHESTER on CPAP  H/O intestinal obstruction  History of cholecystectomy  History of pancreatic surgery  History of penile implant  Cardiac pacemaker      Home Medications:  Breo Ellipta 100 mcg-25 mcg/inh inhalation powder: 1 puff(s) inhaled once a day (20 Jun 2022 18:22)  Eliquis 5 mg tablet: 1 tab(s) orally every 12 hours (21 Jun 2022 14:17)  latanoprost 0.005% ophthalmic solution: 1 drop(s) to each affected eye once a day (in the evening) (20 Jun 2022 18:22)  montelukast 10 mg oral tablet: 1 tab(s) orally once a day (20 Jun 2022 18:22)  omeprazole 40 mg oral delayed release capsule: 1 cap(s) orally once a day (20 Jun 2022 18:22)  oxyMORphone 40 mg oral tablet, extended release: 1 tab(s) orally every 12 hours (20 Jun 2022 18:26)  pregabalin 75 mg capsule: 1 cap(s) orally once a day (21 Jun 2022 14:17)      Allergies    No Known Allergies    Intolerances      ROS:  [X] A ten-point review of systems is negative except as noted   [  ] Due to altered mental status/intubation, subjective information were not able to be obtained from the patient. History was obtained, to the extent possible, from review of the chart and collateral sources of information    MEDICATIONS  (STANDING):  acetaminophen     Tablet .. 650 milliGRAM(s) Oral every 6 hours  albuterol    90 MICROgram(s) HFA Inhaler 2 Puff(s) Inhalation two times a day  albuterol/ipratropium for Nebulization 3 milliLiter(s) Nebulizer every 6 hours  apixaban 5 milliGRAM(s) Oral two times a day  budesonide 160 MICROgram(s)/formoterol 4.5 MICROgram(s) Inhaler 2 Puff(s) Inhalation two times a day  chlorhexidine 2% Cloths 1 Application(s) Topical daily  dextrose 5%. 1000 milliLiter(s) (100 mL/Hr) IV Continuous <Continuous>  dextrose 5%. 1000 milliLiter(s) (50 mL/Hr) IV Continuous <Continuous>  dextrose 50% Injectable 25 Gram(s) IV Push once  dextrose 50% Injectable 12.5 Gram(s) IV Push once  dextrose 50% Injectable 25 Gram(s) IV Push once  furosemide    Tablet 20 milliGRAM(s) Oral <User Schedule>  glucagon  Injectable 1 milliGRAM(s) IntraMuscular once  insulin glargine Injectable (LANTUS) 15 Unit(s) SubCutaneous at bedtime  insulin lispro (ADMELOG) corrective regimen sliding scale   SubCutaneous three times a day before meals  insulin lispro Injectable (ADMELOG) 5 Unit(s) SubCutaneous three times a day before meals  latanoprost 0.005% Ophthalmic Solution 1 Drop(s) Right EYE at bedtime  losartan 25 milliGRAM(s) Oral daily  montelukast 10 milliGRAM(s) Oral daily  NIFEdipine XL 30 milliGRAM(s) Oral daily  pancrelipase  (CREON 12,000 Lipase Units) 3 Capsule(s) Oral four times a day with meals  pantoprazole    Tablet 40 milliGRAM(s) Oral before breakfast  pregabalin 75 milliGRAM(s) Oral two times a day  senna 2 Tablet(s) Oral at bedtime  simvastatin 20 milliGRAM(s) Oral at bedtime    MEDICATIONS  (PRN):  dextrose Oral Gel 15 Gram(s) Oral once PRN Blood Glucose LESS THAN 70 milliGRAM(s)/deciliter  oxyCODONE    IR 5 milliGRAM(s) Oral every 6 hours PRN Moderate Pain (4 - 6)      ICU Vital Signs Last 24 Hrs  T(C): 36.3 (01 Jun 2023 17:58), Max: 37 (01 Jun 2023 04:22)  T(F): 97.3 (01 Jun 2023 17:58), Max: 98.6 (01 Jun 2023 04:22)  HR: 87 (01 Jun 2023 17:58) (68 - 89)  BP: 130/64 (01 Jun 2023 17:58) (130/64 - 179/84)  BP(mean): --  ABP: --  ABP(mean): --  RR: 18 (01 Jun 2023 17:58) (18 - 18)  SpO2: 98% (01 Jun 2023 17:58) (98% - 98%)    O2 Parameters below as of 01 Jun 2023 17:58  Patient On (Oxygen Delivery Method): room air      I&O's Detail                            14.5   7.51  )-----------( 174      ( 01 Jun 2023 06:24 )             42.4     06-01    134<L>  |  99  |  16  ----------------------------<  138<H>  4.3   |  21  |  0.8    Ca    9.4      01 Jun 2023 06:24  Mg     2.3     06-01    TPro  7.5  /  Alb  4.1  /  TBili  0.6  /  DBili  x   /  AST  57<H>  /  ALT  29  /  AlkPhos  63  06-01    CARDIAC MARKERS ( 30 May 2023 22:20 )  x     / <0.01 ng/mL / x     / x     / x          On PE:    Strength LUE 5/5  RUE 5/5 prox, wrist flex/ext 5/5,  5/5  Tremors in RUE  B/L LE 5/5  sensation equal to light touch, no saddle anesthesia  no hoffmans B/L  1+ B/L bicep and patellar DTRs  No clonus      Radiology:  < from: CT Cervical Spine No Cont (05.30.23 @ 20:58) >  No acute cervical fracture or facet subluxation.    Multilevel cervical spondylosis and spondylolisthesis, worse at C3-4 with   uncovered disc bulge contributing to mild spinal stenosis, and severe   left/moderate-severe right foraminal stenosis.    < end of copied text >      Assessment:  As above    Plan:  No need for acute neurosurgical intervention

## 2023-06-01 NOTE — CONSULT NOTE ADULT - SUBJECTIVE AND OBJECTIVE BOX
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 Patient reports a mechanical fall end of april when he was admitted to [facility] where he was diagnosed with rib contusion w/o Fx. Patient was discharged home on pain medications.  However last week patient started complaining of right sided headache, temporal side, radiating to the neck. He still reports right arm weakness secondary to the fall. He reports chronic blurry vision. No tingling or numbness. He denies fever, seizures or LOC. He has no neck stiffness. ROS otherwise negative  ED vitals remarkable for hypertension  (31 May 2023 11:26)       ROS:  Negative except for:    PAST MEDICAL & SURGICAL HISTORY:  Diabetes      Hypertension      Pacemaker      Dyslipidemia      History of chronic pancreatitis      Atherosclerosis of native artery of lower extremity      COPD (chronic obstructive pulmonary disease)      CHESTER on CPAP      H/O intestinal obstruction      History of cholecystectomy      History of pancreatic surgery      History of penile implant      Cardiac pacemaker          SOCIAL HISTORY:    FAMILY HISTORY:  FH: hypertension (Sibling)        MEDICATIONS  (STANDING):  albuterol    90 MICROgram(s) HFA Inhaler 2 Puff(s) Inhalation two times a day  albuterol/ipratropium for Nebulization 3 milliLiter(s) Nebulizer every 6 hours  apixaban 5 milliGRAM(s) Oral two times a day  budesonide 160 MICROgram(s)/formoterol 4.5 MICROgram(s) Inhaler 2 Puff(s) Inhalation two times a day  chlorhexidine 2% Cloths 1 Application(s) Topical daily  dextrose 5%. 1000 milliLiter(s) (100 mL/Hr) IV Continuous <Continuous>  dextrose 5%. 1000 milliLiter(s) (50 mL/Hr) IV Continuous <Continuous>  dextrose 50% Injectable 25 Gram(s) IV Push once  dextrose 50% Injectable 25 Gram(s) IV Push once  dextrose 50% Injectable 12.5 Gram(s) IV Push once  furosemide    Tablet 20 milliGRAM(s) Oral <User Schedule>  glucagon  Injectable 1 milliGRAM(s) IntraMuscular once  insulin glargine Injectable (LANTUS) 15 Unit(s) SubCutaneous at bedtime  insulin lispro (ADMELOG) corrective regimen sliding scale   SubCutaneous three times a day before meals  insulin lispro Injectable (ADMELOG) 5 Unit(s) SubCutaneous three times a day before meals  latanoprost 0.005% Ophthalmic Solution 1 Drop(s) Right EYE at bedtime  losartan 25 milliGRAM(s) Oral daily  montelukast 10 milliGRAM(s) Oral daily  NIFEdipine XL 30 milliGRAM(s) Oral daily  pancrelipase  (CREON 12,000 Lipase Units) 3 Capsule(s) Oral four times a day with meals  pantoprazole    Tablet 40 milliGRAM(s) Oral before breakfast  pregabalin 75 milliGRAM(s) Oral two times a day  simvastatin 20 milliGRAM(s) Oral at bedtime    MEDICATIONS  (PRN):  dextrose Oral Gel 15 Gram(s) Oral once PRN Blood Glucose LESS THAN 70 milliGRAM(s)/deciliter  oxycodone    5 mG/acetaminophen 325 mG 2 Tablet(s) Oral every 6 hours PRN Mild Pain (1 - 3)      Allergies    No Known Allergies    Intolerances        Vital Signs Last 24 Hrs  T(C): 37 (01 Jun 2023 04:22), Max: 37 (01 Jun 2023 04:22)  T(F): 98.6 (01 Jun 2023 04:22), Max: 98.6 (01 Jun 2023 04:22)  HR: 68 (01 Jun 2023 08:35) (68 - 89)  BP: 179/84 (01 Jun 2023 08:35) (130/66 - 179/84)  BP(mean): --  RR: 18 (01 Jun 2023 08:35) (18 - 18)  SpO2: 98% (01 Jun 2023 08:35) (98% - 100%)    Parameters below as of 01 Jun 2023 04:22  Patient On (Oxygen Delivery Method): room air        PHYSICAL EXAM  General: adult in NAD  HEENT: clear oropharynx, anicteric sclera, pink conjunctiva  Neck: supple  CV: normal S1/S2 with no murmur rubs or gallops  Lungs: positive air movement b/l ant lungs,clear to auscultation, no wheezes, no rales  Abdomen: soft non-tender non-distended, no hepatosplenomegaly  Ext: no clubbing cyanosis or edema  Skin: no rashes and no petechiae  Neuro: alert and oriented X 4, no focal deficits      LABS:                          14.5   7.51  )-----------( 174      ( 01 Jun 2023 06:24 )             42.4         Mean Cell Volume : 92.8 fL  Mean Cell Hemoglobin : 31.7 pg  Mean Cell Hemoglobin Concentration : 34.2 g/dL  Auto Neutrophil # : 4.70 K/uL  Auto Lymphocyte # : 1.60 K/uL  Auto Monocyte # : 0.96 K/uL  Auto Eosinophil # : 0.22 K/uL  Auto Basophil # : 0.01 K/uL  Auto Neutrophil % : 62.6 %  Auto Lymphocyte % : 21.3 %  Auto Monocyte % : 12.8 %  Auto Eosinophil % : 2.9 %  Auto Basophil % : 0.1 %      06-01    134<L>  |  99  |  16  ----------------------------<  138<H>  4.3   |  21  |  0.8    Ca    9.4      01 Jun 2023 06:24  Mg     2.3     06-01    TPro  7.5  /  Alb  4.1  /  TBili  0.6  /  DBili  x   /  AST  57<H>  /  ALT  29  /  AlkPhos  63  06-01      PT/INR - ( 30 May 2023 22:20 )   PT: 13.10 sec;   INR: 1.14 ratio         PTT - ( 30 May 2023 22:20 )  PTT:33.0 sec                BLOOD SMEAR INTERPRETATION:       RADIOLOGY & ADDITIONAL STUDIES:       Rheumatology CONSULT     Patient is a 82y old  Male who presents with a chief complaint of Right sided headache (31 May 2023 11:26)      HPI:  81 yo male, HO paroxysmal Afib on Eliquis s/p PPM, COPD on Bipap and as needed 2 L o2 at home, 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 Patient reports a mechanical fall end of april when he was admitted to [facility] [place]note 2[place] where he was diagnosed with rib contusion w/o Fx. Patient was discharged home on pain medications.  However last week patient started complaining of right sided headache, temporal side, radiating to the neck. He still reports right arm weakness secondary to the fall. He reports chronic blurry vision. No tingling or numbness. He denies fever, seizures or LOC. He has no neck stiffness. ROS otherwise negative  ED vitals remarkable for hypertension        ROS:  Negative except for: hpi  PAST MEDICAL & SURGICAL HISTORY:  Diabetes      Hypertension      Pacemaker      Dyslipidemia      History of chronic pancreatitis      Atherosclerosis of native artery of lower extremity      COPD (chronic obstructive pulmonary disease)      CHESTER on CPAP      H/O intestinal obstruction      History of cholecystectomy      History of pancreatic surgery      History of penile implant      Cardiac pacemaker          SOCIAL HISTORY: ex smoker. ex alcohol    FAMILY HISTORY:  FH: hypertension (Sibling)        MEDICATIONS  (STANDING):  albuterol    90 MICROgram(s) HFA Inhaler 2 Puff(s) Inhalation two times a day  albuterol/ipratropium for Nebulization 3 milliLiter(s) Nebulizer every 6 hours  apixaban 5 milliGRAM(s) Oral two times a day  budesonide 160 MICROgram(s)/formoterol 4.5 MICROgram(s) Inhaler 2 Puff(s) Inhalation two times a day  chlorhexidine 2% Cloths 1 Application(s) Topical daily  dextrose 5%. 1000 milliLiter(s) (100 mL/Hr) IV Continuous <Continuous>  dextrose 5%. 1000 milliLiter(s) (50 mL/Hr) IV Continuous <Continuous>  dextrose 50% Injectable 25 Gram(s) IV Push once  dextrose 50% Injectable 25 Gram(s) IV Push once  dextrose 50% Injectable 12.5 Gram(s) IV Push once  furosemide    Tablet 20 milliGRAM(s) Oral <User Schedule>  glucagon  Injectable 1 milliGRAM(s) IntraMuscular once  insulin glargine Injectable (LANTUS) 15 Unit(s) SubCutaneous at bedtime  insulin lispro (ADMELOG) corrective regimen sliding scale   SubCutaneous three times a day before meals  insulin lispro Injectable (ADMELOG) 5 Unit(s) SubCutaneous three times a day before meals  latanoprost 0.005% Ophthalmic Solution 1 Drop(s) Right EYE at bedtime  losartan 25 milliGRAM(s) Oral daily  montelukast 10 milliGRAM(s) Oral daily  NIFEdipine XL 30 milliGRAM(s) Oral daily  pancrelipase  (CREON 12,000 Lipase Units) 3 Capsule(s) Oral four times a day with meals  pantoprazole    Tablet 40 milliGRAM(s) Oral before breakfast  pregabalin 75 milliGRAM(s) Oral two times a day  simvastatin 20 milliGRAM(s) Oral at bedtime    MEDICATIONS  (PRN):  dextrose Oral Gel 15 Gram(s) Oral once PRN Blood Glucose LESS THAN 70 milliGRAM(s)/deciliter  oxycodone    5 mG/acetaminophen 325 mG 2 Tablet(s) Oral every 6 hours PRN Mild Pain (1 - 3)      Allergies    No Known Allergies    Intolerances        Vital Signs Last 24 Hrs  T(C): 37 (01 Jun 2023 04:22), Max: 37 (01 Jun 2023 04:22)  T(F): 98.6 (01 Jun 2023 04:22), Max: 98.6 (01 Jun 2023 04:22)  HR: 68 (01 Jun 2023 08:35) (68 - 89)  BP: 179/84 (01 Jun 2023 08:35) (130/66 - 179/84)  BP(mean): --  RR: 18 (01 Jun 2023 08:35) (18 - 18)  SpO2: 98% (01 Jun 2023 08:35) (98% - 100%)    Parameters below as of 01 Jun 2023 04:22  Patient On (Oxygen Delivery Method): room air        PHYSICAL EXAM  General: adult in distress  HEENT: clear oropharynx, anicteric sclera, pink conjunctiva. no facial tenderness  Neck: severe neck pain and tenderness  CV: normal S1/S2 with no murmur rubs or gallops  Lungs: positive air movement b/l ant lungs,clear to auscultation, no wheezes, no rales  Abdomen: soft non-tender non-distended, no hepatosplenomegaly  Ext: no clubbing cyanosis or edema  Skin: no rashes and no petechiae  Neuro: alert and oriented X 4, no focal deficits      LABS:                          14.5   7.51  )-----------( 174      ( 01 Jun 2023 06:24 )             42.4         Mean Cell Volume : 92.8 fL  Mean Cell Hemoglobin : 31.7 pg  Mean Cell Hemoglobin Concentration : 34.2 g/dL  Auto Neutrophil # : 4.70 K/uL  Auto Lymphocyte # : 1.60 K/uL  Auto Monocyte # : 0.96 K/uL  Auto Eosinophil # : 0.22 K/uL  Auto Basophil # : 0.01 K/uL  Auto Neutrophil % : 62.6 %  Auto Lymphocyte % : 21.3 %  Auto Monocyte % : 12.8 %  Auto Eosinophil % : 2.9 %  Auto Basophil % : 0.1 %      06-01    134<L>  |  99  |  16  ----------------------------<  138<H>  4.3   |  21  |  0.8    Ca    9.4      01 Jun 2023 06:24  Mg     2.3     06-01    TPro  7.5  /  Alb  4.1  /  TBili  0.6  /  DBili  x   /  AST  57<H>  /  ALT  29  /  AlkPhos  63  06-01      PT/INR - ( 30 May 2023 22:20 )   PT: 13.10 sec;   INR: 1.14 ratio         PTT - ( 30 May 2023 22:20 )  PTT:33.0 sec                BLOOD SMEAR INTERPRETATION:       RADIOLOGY & ADDITIONAL STUDIES:

## 2023-06-01 NOTE — OCCUPATIONAL THERAPY INITIAL EVALUATION ADULT - PERTINENT HX OF CURRENT PROBLEM, REHAB EVAL
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 Patient reports a mechanical fall end of april when he was admitted to [facility] where he was diagnosed with rib contusion w/o Fx. Patient was discharged home on pain medications.  However last week patient started complaining of right sided headache, temporal side, radiating to the neck. He still reports right arm weakness secondary to the fall. He reports chronic blurry vision. No tingling or numbness. He denies fever, seizures or LOC. He has no neck stiffness. ROS otherwise negative  ED vitals remarkable for hypertension

## 2023-06-01 NOTE — PROGRESS NOTE ADULT - ASSESSMENT
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, DL, HTN, glaucoma, chronic pancreatitis presenting for right temporal headache and progressive decrease in vision, ruling out GCA, ruling out closed angle glaucoma, possibly cervicogenic headache    # Right sided temporal headache and decreased vision more in right eye  - d/w rheum regarding GCA, recommend ESR/CRP and then possibly temporal artery biopsy if elevated depending on symptoms, will see pt later today, recommending to hold off on stress dose steroids for the moment  - CT cervical spine showing 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, will hold off on steroid taper as concerned for worsening glaucoma, c/w pregabalin, will be seen by neurosurgery however patient has PPM  - symptoms may reflect closed angle glaucoma, right sided vision has been worsening however not acutely since being here, no conjunctival injection, however has known glaucoma with decreased vision on the right and right sided HA, optho consulted for exam, c/w latanoprost  - on Eliquis will try to avoid NSAIDs as much as possible, will do standing tylenol for now and pregabalin    # HTN  - nifedipine XL 30 qd for now / losartan 25    # Afib on Eliquis  - c/w eliquis has ppm, toprol being held on admission bc of long UT interval 382 ms, will repeat EKG    # Glaucoma  - c/w latanoprost right eye  - ophtho consulted for exam    # 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

## 2023-06-01 NOTE — OCCUPATIONAL THERAPY INITIAL EVALUATION ADULT - TRANSFER TRAINING, PT EVAL
Ms. Frank,    It was nice meeting you today! You were seen for your medicare wellness visit.    We will order labs today and will call you with the results. We will also administer the pneumococcal vaccine.     Lastly, we will order physical therapy for your balance problems.     Dr. Jose Maciel       By discharge pt will increase toilet transfer to supervision

## 2023-06-01 NOTE — PROGRESS NOTE ADULT - SUBJECTIVE AND OBJECTIVE BOX
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 Patient reports a mechanical fall end of april when he was admitted to [facility] where he was diagnosed with rib contusion w/o Fx. Patient was discharged home on pain medications.  However last week patient started complaining of right sided headache, temporal side, radiating to the neck. He still reports right arm weakness secondary to the fall. He reports chronic blurry vision. No tingling or numbness. He denies fever, seizures or LOC. He has no neck stiffness. ROS otherwise negative  ED vitals remarkable for hypertension  (31 May 2023 11:26)      Currently admitted to medicine with the primary diagnosis of Headache    patient complaining of right sided headache started two weeks ago, states he is having worsening vision loss on right side however is more chronic started about 7 months ago and both sides not as severe on the left    Besides the pertinent positives and negatives described above, the ROS was within normal limits.    PAST MEDICAL & SURGICAL HISTORY  Diabetes    Hypertension    Pacemaker    Dyslipidemia    History of chronic pancreatitis    Atherosclerosis of native artery of lower extremity    COPD (chronic obstructive pulmonary disease)    CHESTER on CPAP    H/O intestinal obstruction    History of cholecystectomy    History of pancreatic surgery    History of penile implant    Cardiac pacemaker      SOCIAL HISTORY:    ALLERGIES:  No Known Allergies    MEDICATIONS:  STANDING MEDICATIONS  albuterol    90 MICROgram(s) HFA Inhaler 2 Puff(s) Inhalation two times a day  albuterol/ipratropium for Nebulization 3 milliLiter(s) Nebulizer every 6 hours  apixaban 5 milliGRAM(s) Oral two times a day  budesonide 160 MICROgram(s)/formoterol 4.5 MICROgram(s) Inhaler 2 Puff(s) Inhalation two times a day  chlorhexidine 2% Cloths 1 Application(s) Topical daily  dextrose 5%. 1000 milliLiter(s) IV Continuous <Continuous>  dextrose 5%. 1000 milliLiter(s) IV Continuous <Continuous>  dextrose 50% Injectable 25 Gram(s) IV Push once  dextrose 50% Injectable 12.5 Gram(s) IV Push once  dextrose 50% Injectable 25 Gram(s) IV Push once  furosemide    Tablet 20 milliGRAM(s) Oral <User Schedule>  glucagon  Injectable 1 milliGRAM(s) IntraMuscular once  insulin glargine Injectable (LANTUS) 15 Unit(s) SubCutaneous at bedtime  insulin lispro (ADMELOG) corrective regimen sliding scale   SubCutaneous three times a day before meals  insulin lispro Injectable (ADMELOG) 5 Unit(s) SubCutaneous three times a day before meals  latanoprost 0.005% Ophthalmic Solution 1 Drop(s) Right EYE at bedtime  losartan 25 milliGRAM(s) Oral daily  montelukast 10 milliGRAM(s) Oral daily  NIFEdipine XL 30 milliGRAM(s) Oral daily  pancrelipase  (CREON 12,000 Lipase Units) 3 Capsule(s) Oral four times a day with meals  pantoprazole    Tablet 40 milliGRAM(s) Oral before breakfast  pregabalin 75 milliGRAM(s) Oral two times a day  simvastatin 20 milliGRAM(s) Oral at bedtime    PRN MEDICATIONS  dextrose Oral Gel 15 Gram(s) Oral once PRN  oxycodone    5 mG/acetaminophen 325 mG 2 Tablet(s) Oral every 6 hours PRN    VITALS:   T(F): 98.6  HR: 68  BP: 179/84  RR: 18  SpO2: 98%    LABS:                        14.5   7.51  )-----------( 174      ( 01 Jun 2023 06:24 )             42.4     06-01    134<L>  |  99  |  16  ----------------------------<  138<H>  4.3   |  21  |  0.8    Ca    9.4      01 Jun 2023 06:24  Mg     2.3     06-01    TPro  7.5  /  Alb  4.1  /  TBili  0.6  /  DBili  x   /  AST  57<H>  /  ALT  29  /  AlkPhos  63  06-01    PT/INR - ( 30 May 2023 22:20 )   PT: 13.10 sec;   INR: 1.14 ratio         PTT - ( 30 May 2023 22:20 )  PTT:33.0 sec          CARDIAC MARKERS ( 30 May 2023 22:20 )  x     / <0.01 ng/mL / x     / x     / x          Headache      RADIOLOGY:    PHYSICAL EXAM:  General: WN/WD NAD  Neurology: A&Ox3, nonfocal, BENSON x 4  Head:  Normocephalic, atraumatic, mild right sided temporal tenderness  ENT:  Mucosa moist, no ulcerations  Neck:  Supple, no sinuses or palpable masses  Lymphatic:  No palpable cervical, supraclavicular, axillary or inguinal adenopathy  Respiratory: CTA B/L  CV: RRR, S1S2, no murmur  Abdominal: Soft, NT, ND no palpable mass  MSK: No edema, + peripheral pulses  Incisions: intact, no erythema or drainage    Intravenous access: yes  NG tube: no  Robledo Catheter: no

## 2023-06-01 NOTE — PATIENT PROFILE ADULT - FUNCTIONAL ASSESSMENT - DAILY ACTIVITY 5.
Called patient, advised that message was sent to provider, will advise at due time.   4 = No assist / stand by assistance

## 2023-06-01 NOTE — ED ADULT NURSE REASSESSMENT NOTE - NS ED NURSE REASSESS COMMENT FT1
Received patient on stretcher awake alert and oriented, able to make needs known. Complaints of pain on the right side of his head and neck, PRN pain meds given with good effect. Denies SOB or .

## 2023-06-01 NOTE — PATIENT PROFILE ADULT - FALL HARM RISK - HARM RISK INTERVENTIONS

## 2023-06-01 NOTE — CONSULT NOTE ADULT - ASSESSMENT
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    # Right temporal headache and neck pain  - Patient with 2 week hx of right sided headache radiating to neck  - Patient with 1 month hx of fall on right arm and chest  - no hx of headaches or migraine in past except in childhood  - chronic blurry vision and worsening vision since 7 months ( hx of glaucoma)  - no acute visual changes  - physical exam showing neck tenderness but no facial tenderness  - ESR 12, CRP 34  - CT neck --> worsening C3 -C4 stenosis     recs:  - doppler US of temporal artery if available  - ENT consult ( pain is around ear ?TMJ)  - consider CT soft tissue facial  - if acute visual changes --> start steroids  - if all eval is negative. consider temporal artery biopsy

## 2023-06-01 NOTE — PATIENT PROFILE ADULT - NSPROSPHOSPCHAPLAINYN_GEN_A_NUR
CONSTITUTIONAL: awake, sitting in chair in no acute distress  SKIN: Warm, dry  HEAD: Normocephalic; atraumatic  EYES: No conjunctival injection. EOMI. PERRL  ENT: No nasal discharge; oropharynx nonerythematous; airway clear  NECK: Supple; non tender, no lymphadenopathy  CARD:  Regular rate and rhythm; S1, S2 normal; no murmurs, gallops, or rubs  RESP: CTAB; No wheezes, crackles, rales or rhonchi  BACK: right>left CVA tenderness, no mildline tenderness  ABD: Soft, suprapubic tenderness, nondistended, nonrigid, no guarding or rebound tenderness  EXT: Normal ROM,  no edema, good radial pulse  NEURO: A&O x3, speaking in full clear sentences, no facial asymmetry, moving all extremities no

## 2023-06-02 ENCOUNTER — TRANSCRIPTION ENCOUNTER (OUTPATIENT)
Age: 83
End: 2023-06-02

## 2023-06-02 LAB
ALBUMIN SERPL ELPH-MCNC: 3.5 G/DL — SIGNIFICANT CHANGE UP (ref 3.5–5.2)
ALBUMIN SERPL ELPH-MCNC: 4 G/DL — SIGNIFICANT CHANGE UP (ref 3.5–5.2)
ALP SERPL-CCNC: 57 U/L — SIGNIFICANT CHANGE UP (ref 30–115)
ALP SERPL-CCNC: 60 U/L — SIGNIFICANT CHANGE UP (ref 30–115)
ALT FLD-CCNC: 20 U/L — SIGNIFICANT CHANGE UP (ref 0–41)
ALT FLD-CCNC: 23 U/L — SIGNIFICANT CHANGE UP (ref 0–41)
ANION GAP SERPL CALC-SCNC: 12 MMOL/L — SIGNIFICANT CHANGE UP (ref 7–14)
ANION GAP SERPL CALC-SCNC: 14 MMOL/L — SIGNIFICANT CHANGE UP (ref 7–14)
AST SERPL-CCNC: 21 U/L — SIGNIFICANT CHANGE UP (ref 0–41)
AST SERPL-CCNC: 25 U/L — SIGNIFICANT CHANGE UP (ref 0–41)
BILIRUB SERPL-MCNC: 0.4 MG/DL — SIGNIFICANT CHANGE UP (ref 0.2–1.2)
BILIRUB SERPL-MCNC: <0.2 MG/DL — SIGNIFICANT CHANGE UP (ref 0.2–1.2)
BUN SERPL-MCNC: 21 MG/DL — HIGH (ref 10–20)
BUN SERPL-MCNC: 23 MG/DL — HIGH (ref 10–20)
CALCIUM SERPL-MCNC: 8.7 MG/DL — SIGNIFICANT CHANGE UP (ref 8.4–10.5)
CALCIUM SERPL-MCNC: 9.1 MG/DL — SIGNIFICANT CHANGE UP (ref 8.4–10.5)
CHLORIDE SERPL-SCNC: 102 MMOL/L — SIGNIFICANT CHANGE UP (ref 98–110)
CHLORIDE SERPL-SCNC: 99 MMOL/L — SIGNIFICANT CHANGE UP (ref 98–110)
CK SERPL-CCNC: 85 U/L — SIGNIFICANT CHANGE UP (ref 0–225)
CO2 SERPL-SCNC: 22 MMOL/L — SIGNIFICANT CHANGE UP (ref 17–32)
CO2 SERPL-SCNC: 24 MMOL/L — SIGNIFICANT CHANGE UP (ref 17–32)
CREAT SERPL-MCNC: 0.9 MG/DL — SIGNIFICANT CHANGE UP (ref 0.7–1.5)
CREAT SERPL-MCNC: 1 MG/DL — SIGNIFICANT CHANGE UP (ref 0.7–1.5)
EGFR: 76 ML/MIN/1.73M2 — SIGNIFICANT CHANGE UP
EGFR: 86 ML/MIN/1.73M2 — SIGNIFICANT CHANGE UP
GLUCOSE BLDC GLUCOMTR-MCNC: 130 MG/DL — HIGH (ref 70–99)
GLUCOSE BLDC GLUCOMTR-MCNC: 131 MG/DL — HIGH (ref 70–99)
GLUCOSE BLDC GLUCOMTR-MCNC: 212 MG/DL — HIGH (ref 70–99)
GLUCOSE BLDC GLUCOMTR-MCNC: 230 MG/DL — HIGH (ref 70–99)
GLUCOSE SERPL-MCNC: 162 MG/DL — HIGH (ref 70–99)
GLUCOSE SERPL-MCNC: 247 MG/DL — HIGH (ref 70–99)
HCT VFR BLD CALC: 40.9 % — LOW (ref 42–52)
HGB BLD-MCNC: 13.9 G/DL — LOW (ref 14–18)
MAGNESIUM SERPL-MCNC: 2.1 MG/DL — SIGNIFICANT CHANGE UP (ref 1.8–2.4)
MCHC RBC-ENTMCNC: 31.8 PG — HIGH (ref 27–31)
MCHC RBC-ENTMCNC: 34 G/DL — SIGNIFICANT CHANGE UP (ref 32–37)
MCV RBC AUTO: 93.6 FL — SIGNIFICANT CHANGE UP (ref 80–94)
NRBC # BLD: 0 /100 WBCS — SIGNIFICANT CHANGE UP (ref 0–0)
PLATELET # BLD AUTO: 195 K/UL — SIGNIFICANT CHANGE UP (ref 130–400)
PMV BLD: 10.1 FL — SIGNIFICANT CHANGE UP (ref 7.4–10.4)
POTASSIUM SERPL-MCNC: 3.5 MMOL/L — SIGNIFICANT CHANGE UP (ref 3.5–5)
POTASSIUM SERPL-MCNC: 3.5 MMOL/L — SIGNIFICANT CHANGE UP (ref 3.5–5)
POTASSIUM SERPL-SCNC: 3.5 MMOL/L — SIGNIFICANT CHANGE UP (ref 3.5–5)
POTASSIUM SERPL-SCNC: 3.5 MMOL/L — SIGNIFICANT CHANGE UP (ref 3.5–5)
PROT SERPL-MCNC: 6.4 G/DL — SIGNIFICANT CHANGE UP (ref 6–8)
PROT SERPL-MCNC: 6.9 G/DL — SIGNIFICANT CHANGE UP (ref 6–8)
RBC # BLD: 4.37 M/UL — LOW (ref 4.7–6.1)
RBC # FLD: 13.7 % — SIGNIFICANT CHANGE UP (ref 11.5–14.5)
SODIUM SERPL-SCNC: 136 MMOL/L — SIGNIFICANT CHANGE UP (ref 135–146)
SODIUM SERPL-SCNC: 137 MMOL/L — SIGNIFICANT CHANGE UP (ref 135–146)
WBC # BLD: 5.29 K/UL — SIGNIFICANT CHANGE UP (ref 4.8–10.8)
WBC # FLD AUTO: 5.29 K/UL — SIGNIFICANT CHANGE UP (ref 4.8–10.8)

## 2023-06-02 PROCEDURE — 99221 1ST HOSP IP/OBS SF/LOW 40: CPT

## 2023-06-02 PROCEDURE — 92004 COMPRE OPH EXAM NEW PT 1/>: CPT

## 2023-06-02 PROCEDURE — 72040 X-RAY EXAM NECK SPINE 2-3 VW: CPT | Mod: 26

## 2023-06-02 PROCEDURE — 99232 SBSQ HOSP IP/OBS MODERATE 35: CPT

## 2023-06-02 PROCEDURE — 70491 CT SOFT TISSUE NECK W/DYE: CPT | Mod: 26

## 2023-06-02 RX ORDER — OXYCODONE HYDROCHLORIDE 5 MG/1
5 TABLET ORAL ONCE
Refills: 0 | Status: DISCONTINUED | OUTPATIENT
Start: 2023-06-02 | End: 2023-06-02

## 2023-06-02 RX ORDER — SODIUM CHLORIDE 9 MG/ML
1000 INJECTION INTRAMUSCULAR; INTRAVENOUS; SUBCUTANEOUS
Refills: 0 | Status: DISCONTINUED | OUTPATIENT
Start: 2023-06-02 | End: 2023-06-04

## 2023-06-02 RX ADMIN — Medication 75 MILLIGRAM(S): at 17:27

## 2023-06-02 RX ADMIN — Medication 3 CAPSULE(S): at 22:46

## 2023-06-02 RX ADMIN — PANTOPRAZOLE SODIUM 40 MILLIGRAM(S): 20 TABLET, DELAYED RELEASE ORAL at 06:23

## 2023-06-02 RX ADMIN — LATANOPROST 1 DROP(S): 0.05 SOLUTION/ DROPS OPHTHALMIC; TOPICAL at 21:15

## 2023-06-02 RX ADMIN — Medication 3 MILLILITER(S): at 13:56

## 2023-06-02 RX ADMIN — Medication 650 MILLIGRAM(S): at 17:24

## 2023-06-02 RX ADMIN — Medication 3 CAPSULE(S): at 17:23

## 2023-06-02 RX ADMIN — Medication 650 MILLIGRAM(S): at 05:06

## 2023-06-02 RX ADMIN — Medication 650 MILLIGRAM(S): at 05:30

## 2023-06-02 RX ADMIN — CHLORHEXIDINE GLUCONATE 1 APPLICATION(S): 213 SOLUTION TOPICAL at 11:25

## 2023-06-02 RX ADMIN — MONTELUKAST 10 MILLIGRAM(S): 4 TABLET, CHEWABLE ORAL at 11:24

## 2023-06-02 RX ADMIN — OXYCODONE HYDROCHLORIDE 5 MILLIGRAM(S): 5 TABLET ORAL at 09:58

## 2023-06-02 RX ADMIN — Medication 3 CAPSULE(S): at 11:43

## 2023-06-02 RX ADMIN — SODIUM CHLORIDE 50 MILLILITER(S): 9 INJECTION INTRAMUSCULAR; INTRAVENOUS; SUBCUTANEOUS at 16:07

## 2023-06-02 RX ADMIN — Medication 650 MILLIGRAM(S): at 11:25

## 2023-06-02 RX ADMIN — OXYCODONE HYDROCHLORIDE 5 MILLIGRAM(S): 5 TABLET ORAL at 07:12

## 2023-06-02 RX ADMIN — SIMVASTATIN 20 MILLIGRAM(S): 20 TABLET, FILM COATED ORAL at 21:14

## 2023-06-02 RX ADMIN — Medication 75 MILLIGRAM(S): at 05:08

## 2023-06-02 RX ADMIN — OXYCODONE HYDROCHLORIDE 5 MILLIGRAM(S): 5 TABLET ORAL at 06:43

## 2023-06-02 RX ADMIN — Medication 3 CAPSULE(S): at 08:18

## 2023-06-02 RX ADMIN — Medication 650 MILLIGRAM(S): at 23:34

## 2023-06-02 RX ADMIN — Medication 3 MILLILITER(S): at 19:35

## 2023-06-02 RX ADMIN — APIXABAN 5 MILLIGRAM(S): 2.5 TABLET, FILM COATED ORAL at 05:06

## 2023-06-02 RX ADMIN — Medication 1 DROP(S): at 17:40

## 2023-06-02 RX ADMIN — Medication 5 UNIT(S): at 12:19

## 2023-06-02 RX ADMIN — APIXABAN 5 MILLIGRAM(S): 2.5 TABLET, FILM COATED ORAL at 17:23

## 2023-06-02 RX ADMIN — Medication 2: at 12:20

## 2023-06-02 RX ADMIN — SENNA PLUS 2 TABLET(S): 8.6 TABLET ORAL at 21:14

## 2023-06-02 RX ADMIN — INSULIN GLARGINE 15 UNIT(S): 100 INJECTION, SOLUTION SUBCUTANEOUS at 22:00

## 2023-06-02 RX ADMIN — Medication 650 MILLIGRAM(S): at 23:04

## 2023-06-02 RX ADMIN — OXYCODONE HYDROCHLORIDE 5 MILLIGRAM(S): 5 TABLET ORAL at 16:06

## 2023-06-02 RX ADMIN — BUDESONIDE AND FORMOTEROL FUMARATE DIHYDRATE 2 PUFF(S): 160; 4.5 AEROSOL RESPIRATORY (INHALATION) at 21:14

## 2023-06-02 RX ADMIN — LOSARTAN POTASSIUM 25 MILLIGRAM(S): 100 TABLET, FILM COATED ORAL at 05:06

## 2023-06-02 RX ADMIN — Medication 30 MILLIGRAM(S): at 05:06

## 2023-06-02 NOTE — CONSULT NOTE ADULT - SUBJECTIVE AND OBJECTIVE BOX
ALLERGIES: No Known Allergies      HEALTH ISSUES------------------------------------------------------  Headache    FH: hypertension (Sibling)    Handoff    MEWS Score    Diabetes    Hypertension    Pacemaker    Dyslipidemia    History of chronic pancreatitis    Atherosclerosis of native artery of lower extremity    COPD (chronic obstructive pulmonary disease)    CHESTER on CPAP    Headache    H/O intestinal obstruction    History of cholecystectomy    History of pancreatic surgery    History of penile implant    Cardiac pacemaker    HEADACHE    90+    SysAdmin_VisitLink          PRESCRIPTIONS-----------------------------------------------------  Breo Ellipta 100 mcg-25 mcg/inh inhalation powder: 1 puff(s) inhaled once a day  Eliquis 5 mg tablet: 1 tab(s) orally every 12 hours  latanoprost 0.005% ophthalmic solution: 1 drop(s) to each affected eye once a day (in the evening)  montelukast 10 mg oral tablet: 1 tab(s) orally once a day  omeprazole 40 mg oral delayed release capsule: 1 cap(s) orally once a day  oxyMORphone 40 mg oral tablet, extended release: 1 tab(s) orally every 12 hours  pancrelipase 12,000 units-38,000 units-60,000 units oral delayed release capsule: 3 cap(s) orally 4 times a day (with meals and at bedtime)  pregabalin 75 mg capsule: 1 cap(s) orally once a day          VISIT-------------------------------------------------------------------        T(C): 35.8 (06-02-23 @ 04:00), Max: 36.3 (06-01-23 @ 17:00)  HR: 71 (06-02-23 @ 04:00) (71 - 87)  BP: 142/61 (06-02-23 @ 04:00) (130/64 - 142/61)  RR: 18 (06-01-23 @ 17:58) (18 - 18)  SpO2: 99% (06-02-23 @ 04:00) (98% - 99%)        EYE EXAM-----------------------------------------------------------  performed at eye clinic    Chief Complaint:   HA x 2 weeks, right side only   (+)glaucoma, diagnosed 1 year ago. Latanoprost OU   OS s/p CE   has glasses for reading, prescribed distance glasses but does not wear them   (+)FBS OD  endorses chronic blurry vision, denies visual changes since onset of HA    ASHLEY: 4-5 mo ago with Dr. Bundy.      Entrance Testing  VAsc: OD 20/70+2 PH 20/60+1           OS 20/30+2  Pupils: PERRL OU, (-)APD OD, OS   EOMs: full OD, OS  CF: full OD, OS    IOP taken via GAT  OD 13 mmHg   OS 14 mmHg    Anterior Segment, assessed via slit lamp  Adnexa: WNL OU  Lids: ptosis RUL/YESICA (MRD-1: 1mm OD, OS)  Conjunctiva: inferior scleral show OU, cl OU   Cornea: arcus OU  TF: tr-1+ SPK OD>OS, decreased TBUT  AC: deep & quiet   Iris:    Dilated with 1gtt tropicamide 1% OU and phenylephrine 2.5% OU @ 1:12pm    Posterior Segment, assessed via BIO+20D  Lens:   Vitreous:  Optic Nerve:  Macula:  Vessels:  Periphery:          ALLERGIES: No Known Allergies      HEALTH ISSUES------------------------------------------------------  Headache    FH: hypertension (Sibling)    Handoff    MEWS Score    Diabetes    Hypertension    Pacemaker    Dyslipidemia    History of chronic pancreatitis    Atherosclerosis of native artery of lower extremity    COPD (chronic obstructive pulmonary disease)    CHESTER on CPAP    Headache    H/O intestinal obstruction    History of cholecystectomy    History of pancreatic surgery    History of penile implant    Cardiac pacemaker    HEADACHE    90+    SysAdmin_VisitLink          PRESCRIPTIONS-----------------------------------------------------  Breo Ellipta 100 mcg-25 mcg/inh inhalation powder: 1 puff(s) inhaled once a day  Eliquis 5 mg tablet: 1 tab(s) orally every 12 hours  latanoprost 0.005% ophthalmic solution: 1 drop(s) to each affected eye once a day (in the evening)  montelukast 10 mg oral tablet: 1 tab(s) orally once a day  omeprazole 40 mg oral delayed release capsule: 1 cap(s) orally once a day  oxyMORphone 40 mg oral tablet, extended release: 1 tab(s) orally every 12 hours  pancrelipase 12,000 units-38,000 units-60,000 units oral delayed release capsule: 3 cap(s) orally 4 times a day (with meals and at bedtime)  pregabalin 75 mg capsule: 1 cap(s) orally once a day          VISIT-------------------------------------------------------------------        T(C): 35.8 (06-02-23 @ 04:00), Max: 36.3 (06-01-23 @ 17:00)  HR: 71 (06-02-23 @ 04:00) (71 - 87)  BP: 142/61 (06-02-23 @ 04:00) (130/64 - 142/61)  RR: 18 (06-01-23 @ 17:58) (18 - 18)  SpO2: 99% (06-02-23 @ 04:00) (98% - 99%)        EYE EXAM-----------------------------------------------------------  performed at eye clinic    Chief Complaint: 82 year old male with PMHx of paroxysmal Afib on Eliquis s/p PPM, COPD on Bipap and as needed 2 L o2 at home, 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 Patient reports a mechanical fall end of April when he was admitted to [facility] where he was diagnosed with rib contusion w/o Fx. Patient was discharged home on pain medications. Pt reports right arm weakness secondary to the fall.    Pt reports chronic, serve right sided headache that onset 2 weeks ago, progressively worsening. Patient denies vision changes and denies eye pain. Pt endorses foreign body sensation of the right eye. Pt endorses blurry vision OD that has been chronic for years, pt denies acute visual changes. Active diagnosis of glaucoma, using latanoprost as directed. Pt endorses right sided jaw claudication stating "the whole right side of my head/face hurts."     Ocular History:  ASHLEY: 4-5 mo ago with Dr. Bundy.    - diagnosed with glaucoma 1 year ago, currently using latanoprost 1gtt qPM OU as directed   - s/p cataract extraction OS   - prescribed glasses for reading and distance, however pt does not wear them for distance as he feels glasses do not improve his distance vision      Entrance Testing  VAsc: OD 20/70+2 PH 20/60+1           OS 20/30+2  Pupils: PERRL OU, (-)APD OD, OS   EOMs: full OD, OS  CF: full OD, OS    IOP taken via GAT  OD 13 mmHg   OS 14 mmHg    Anterior Segment, assessed via slit lamp  Adnexa: WNL OU  Lids: ptosis RUL/YESICA (MRD-1: 0.5mm OD, OS)  Conjunctiva: inferior scleral show OU, cl OU   Cornea: arcus OU  TF: tr-1+ SPK OD>OS, decreased TBUT  AC: deep & quiet   Iris: WNL OU     Dilated with 1gtt tropicamide 1% OU and phenylephrine 2.5% OU @ 1:12pm    Posterior Segment, assessed via BIO+20D  Lens:   Vitreous:  Optic Nerve:  Macula:  Vessels:  Periphery:          ALLERGIES: No Known Allergies      HEALTH ISSUES------------------------------------------------------  Headache    FH: hypertension (Sibling)    Handoff    MEWS Score    Diabetes    Hypertension    Pacemaker    Dyslipidemia    History of chronic pancreatitis    Atherosclerosis of native artery of lower extremity    COPD (chronic obstructive pulmonary disease)    CHESTER on CPAP    Headache    H/O intestinal obstruction    History of cholecystectomy    History of pancreatic surgery    History of penile implant    Cardiac pacemaker    HEADACHE    90+    SysAdmin_VisitLink          PRESCRIPTIONS-----------------------------------------------------  Breo Ellipta 100 mcg-25 mcg/inh inhalation powder: 1 puff(s) inhaled once a day  Eliquis 5 mg tablet: 1 tab(s) orally every 12 hours  latanoprost 0.005% ophthalmic solution: 1 drop(s) to each affected eye once a day (in the evening)  montelukast 10 mg oral tablet: 1 tab(s) orally once a day  omeprazole 40 mg oral delayed release capsule: 1 cap(s) orally once a day  oxyMORphone 40 mg oral tablet, extended release: 1 tab(s) orally every 12 hours  pancrelipase 12,000 units-38,000 units-60,000 units oral delayed release capsule: 3 cap(s) orally 4 times a day (with meals and at bedtime)  pregabalin 75 mg capsule: 1 cap(s) orally once a day          VISIT-------------------------------------------------------------------        T(C): 35.8 (06-02-23 @ 04:00), Max: 36.3 (06-01-23 @ 17:00)  HR: 71 (06-02-23 @ 04:00) (71 - 87)  BP: 142/61 (06-02-23 @ 04:00) (130/64 - 142/61)  RR: 18 (06-01-23 @ 17:58) (18 - 18)  SpO2: 99% (06-02-23 @ 04:00) (98% - 99%)        EYE EXAM-----------------------------------------------------------  performed at eye clinic    Chief Complaint: 82 year old male with PMHx of paroxysmal Afib on Eliquis s/p PPM, COPD on Bipap and as needed 2 L o2 at home, 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 Patient reports a mechanical fall end of April when he was admitted to [facility] where he was diagnosed with rib contusion w/o Fx. Patient was discharged home on pain medications. Pt reports right arm weakness secondary to the fall.    Pt reports chronic, serve right sided headache that onset 2 weeks ago, progressively worsening. Patient denies vision changes and denies eye pain. Pt endorses foreign body sensation of the right eye. Pt endorses blurry vision OD that has been chronic for years, pt denies acute visual changes. Active diagnosis of glaucoma, using latanoprost as directed. Pt endorses right sided jaw claudication stating "the whole right side of my head/face hurts."     Ocular History:  ASHLEY: 4-5 mo ago with Dr. Bundy.    - diagnosed with glaucoma 1 year ago, currently using latanoprost 1gtt qPM OU as directed   - s/p cataract extraction OS   - prescribed glasses for reading and distance, however pt does not wear them for distance as he feels glasses do not improve his distance vision      Entrance Testing  VAsc: OD 20/70+2 PH 20/60+1           OS 20/30+2  Pupils: PERRL OU, (-)APD OD, OS   EOMs: full OD, OS  CF: full OD, OS    IOP taken via GAT  OD 13 mmHg   OS 14 mmHg    Anterior Segment, assessed via slit lamp  Adnexa: WNL OU  Lids: ptosis RUL/YESICA (MRD-1: 0.5mm OD, OS)  Conjunctiva: inferior scleral show OU, cl OU   Cornea: arcus OU  TF: tr-1+ SPK OD>OS, decreased TBUT  AC: deep & quiet   Iris: WNL OU     Dilated with 1gtt tropicamide 1% OU and phenylephrine 2.5% OU @ 1:12pm    Posterior Segment, assessed via BIO+20D  Lens: 2-3+ NS, 2+ diffuse PSC, 1+ ACC OD; PCIOL, cl and centered OS  Vitreous: syneresis OD, PVD OS  Optic Nerve: 0.65 OD, 0.70 OS, distinct margins, well perfused OU   Macula: flat, even pigmentation OU   Vessels: normal caliber OU   Periphery: flat & intact 360 OU

## 2023-06-02 NOTE — DISCHARGE NOTE PROVIDER - ATTENDING DISCHARGE PHYSICAL EXAMINATION:
T(F): , Max: 98.6 (06-07-23 @ 05:00)  HR: 66 (06-07-23 @ 13:25) (62 - 81)  BP: 182/78 (06-07-23 @ 13:25)  RR: 18 (06-07-23 @ 13:25)  SpO2: 95% (06-07-23 @ 13:25)  General: No apparent distress  Cardiovascular: S1, S2  Gastrointestinal: Soft, Non-tender, Non-distended  Respiratory: Good air entry bilaterally  Musculoskeletal: Moves all extremities  Lymphatic: No edema  Neurologic: No gross motor deficit  Dermatologic: Skin dry                          12.5   6.26  )-----------( 216      ( 07 Jun 2023 05:06 )             37.2     06-07    134<L>  |  101  |  22<H>  ----------------------------<  129<H>  4.2   |  20  |  1.0    Ca    8.8      07 Jun 2023 05:06  Mg     2.0     06-07    TPro  6.6  /  Alb  3.8  /  TBili  0.2  /  DBili  x   /  AST  19  /  ALT  16  /  AlkPhos  57  06-07

## 2023-06-02 NOTE — CONSULT NOTE ADULT - ASSESSMENT
ASSESSMENT      RECOMMENDATIONS  ASSESSMENT        RECOMMENDATIONS  ASSESSMENT  1. Distinct Optic Nerve Margins  - optic nerves appear glaucomatous with enlarged cupping  - No signs of optic nerve edema on dilated exam or OCT imaging   2. Open Angle Glaucoma OU, indeterminate stage   3. Combined Cataracts OD  - consistent with vision  4. Pseudophakia OS   5. Dry Eye Syndrome OU       RECOMMENDATIONS  - Continue care as directed by neurology and primary team  - No ophthalmic signs or ramifications of GCA/ischemic optic neuropathy   ASSESSMENT  1. Distinct Optic Nerve Margins OU   - optic nerves appear glaucomatous with enlarged cupping  - No signs of optic nerve edema on dilated exam or OCT imaging, distinct margins OU    2. Open Angle Glaucoma OU, indeterminate stage   3. Combined Cataracts OD  - consistent with vision  4. Pseudophakia OS   5. Dry Eye Syndrome OU   6. Type 2 Diabetes Mellitus, without retinopathy OU       RECOMMENDATIONS  - Continue care as directed by neurology and primary team  - No ophthalmic signs or ramifications of GCA/ischemic optic neuropathy    - Start artificial tears 3x/day OU   - Continue latanoprost 1gtt qPM OU   - Follow up with routine eye care provider as directed    Reconsult as needed

## 2023-06-02 NOTE — DISCHARGE NOTE PROVIDER - NSDCHHENCOUNTER_GEN_ALL_CORE
Erythromycin Pregnancy And Lactation Text: This medication is Pregnancy Category B and is considered safe during pregnancy. It is also excreted in breast milk. Bactrim Counseling:  I discussed with the patient the risks of sulfa antibiotics including but not limited to GI upset, allergic reaction, drug rash, diarrhea, dizziness, photosensitivity, and yeast infections.  Rarely, more serious reactions can occur including but not limited to aplastic anemia, agranulocytosis, methemoglobinemia, blood dyscrasias, liver or kidney failure, lung infiltrates or desquamative/blistering drug rashes. Benzoyl Peroxide Pregnancy And Lactation Text: This medication is Pregnancy Category C. It is unknown if benzoyl peroxide is excreted in breast milk. Topical Sulfur Applications Pregnancy And Lactation Text: This medication is Pregnancy Category C and has an unknown safety profile during pregnancy. It is unknown if this topical medication is excreted in breast milk. Benzoyl Peroxide Counseling: Patient counseled that medicine may cause skin irritation and bleach clothing.  In the event of skin irritation, the patient was advised to reduce the amount of the drug applied or use it less frequently.   The patient verbalized understanding of the proper use and possible adverse effects of benzoyl peroxide.  All of the patient's questions and concerns were addressed. High Dose Vitamin A Pregnancy And Lactation Text: High dose vitamin A therapy is contraindicated during pregnancy and breast feeding. Tazorac Pregnancy And Lactation Text: This medication is not safe during pregnancy. It is unknown if this medication is excreted in breast milk. Spironolactone Counseling: Patient advised regarding risks of diarrhea, abdominal pain, hyperkalemia, birth defects (for female patients), liver toxicity and renal toxicity. The patient may need blood work to monitor liver and kidney function and potassium levels while on therapy. The patient verbalized understanding of the proper use and possible adverse effects of spironolactone.  All of the patient's questions and concerns were addressed. Dapsone Counseling: I discussed with the patient the risks of dapsone including but not limited to hemolytic anemia, agranulocytosis, rashes, methemoglobinemia, kidney failure, peripheral neuropathy, headaches, GI upset, and liver toxicity.  Patients who start dapsone require monitoring including baseline LFTs and weekly CBCs for the first month, then every month thereafter.  The patient verbalized understanding of the proper use and possible adverse effects of dapsone.  All of the patient's questions and concerns were addressed. Bactrim Pregnancy And Lactation Text: This medication is Pregnancy Category D and is known to cause fetal risk.  It is also excreted in breast milk. Topical Retinoid counseling:  Patient advised to apply a pea-sized amount only at bedtime and wait 30 minutes after washing their face before applying.  If too drying, patient may add a non-comedogenic moisturizer. The patient verbalized understanding of the proper use and possible adverse effects of retinoids.  All of the patient's questions and concerns were addressed. Use Enhanced Medication Counseling?: No Minocycline Pregnancy And Lactation Text: This medication is Pregnancy Category D and not consider safe during pregnancy. It is also excreted in breast milk. Detail Level: Zone Minocycline Counseling: Patient advised regarding possible photosensitivity and discoloration of the teeth, skin, lips, tongue and gums.  Patient instructed to avoid sunlight, if possible.  When exposed to sunlight, patients should wear protective clothing, sunglasses, and sunscreen.  The patient was instructed to call the office immediately if the following severe adverse effects occur:  hearing changes, easy bruising/bleeding, severe headache, or vision changes.  The patient verbalized understanding of the proper use and possible adverse effects of minocycline.  All of the patient's questions and concerns were addressed. Topical Clindamycin Counseling: Patient counseled that this medication may cause skin irritation or allergic reactions.  In the event of skin irritation, the patient was advised to reduce the amount of the drug applied or use it less frequently.   The patient verbalized understanding of the proper use and possible adverse effects of clindamycin.  All of the patient's questions and concerns were addressed. Dapsone Pregnancy And Lactation Text: This medication is Pregnancy Category C and is not considered safe during pregnancy or breast feeding. Spironolactone Pregnancy And Lactation Text: This medication can cause feminization of the male fetus and should be avoided during pregnancy. The active metabolite is also found in breast milk. Isotretinoin Counseling: Patient should get monthly blood tests, not donate blood, not drive at night if vision affected, not share medication, and not undergo elective surgery for 6 months after tx completed. Side effects reviewed, pt to contact office should one occur. Topical Retinoid Pregnancy And Lactation Text: This medication is Pregnancy Category C. It is unknown if this medication is excreted in breast milk. Sarecycline Counseling: Patient advised regarding possible photosensitivity and discoloration of the teeth, skin, lips, tongue and gums.  Patient instructed to avoid sunlight, if possible.  When exposed to sunlight, patients should wear protective clothing, sunglasses, and sunscreen.  The patient was instructed to call the office immediately if the following severe adverse effects occur:  hearing changes, easy bruising/bleeding, severe headache, or vision changes.  The patient verbalized understanding of the proper use and possible adverse effects of sarecycline.  All of the patient's questions and concerns were addressed. 05-Jun-2023 Doxycycline Pregnancy And Lactation Text: This medication is Pregnancy Category D and not consider safe during pregnancy. It is also excreted in breast milk but is considered safe for shorter treatment courses. Tetracycline Counseling: Patient counseled regarding possible photosensitivity and increased risk for sunburn.  Patient instructed to avoid sunlight, if possible.  When exposed to sunlight, patients should wear protective clothing, sunglasses, and sunscreen.  The patient was instructed to call the office immediately if the following severe adverse effects occur:  hearing changes, easy bruising/bleeding, severe headache, or vision changes.  The patient verbalized understanding of the proper use and possible adverse effects of tetracycline.  All of the patient's questions and concerns were addressed. Patient understands to avoid pregnancy while on therapy due to potential birth defects. Doxycycline Counseling:  Patient counseled regarding possible photosensitivity and increased risk for sunburn.  Patient instructed to avoid sunlight, if possible.  When exposed to sunlight, patients should wear protective clothing, sunglasses, and sunscreen.  The patient was instructed to call the office immediately if the following severe adverse effects occur:  hearing changes, easy bruising/bleeding, severe headache, or vision changes.  The patient verbalized understanding of the proper use and possible adverse effects of doxycycline.  All of the patient's questions and concerns were addressed. Isotretinoin Pregnancy And Lactation Text: This medication is Pregnancy Category X and is considered extremely dangerous during pregnancy. It is unknown if it is excreted in breast milk. Topical Clindamycin Pregnancy And Lactation Text: This medication is Pregnancy Category B and is considered safe during pregnancy. It is unknown if it is excreted in breast milk. Birth Control Pills Counseling: Birth Control Pill Counseling: I discussed with the patient the potential side effects of OCPs including but not limited to increased risk of stroke, heart attack, thrombophlebitis, deep venous thrombosis, hepatic adenomas, breast changes, GI upset, headaches, and depression.  The patient verbalized understanding of the proper use and possible adverse effects of OCPs. All of the patient's questions and concerns were addressed. Birth Control Pills Pregnancy And Lactation Text: This medication should be avoided if pregnant and for the first 30 days post-partum. Erythromycin Counseling:  I discussed with the patient the risks of erythromycin including but not limited to GI upset, allergic reaction, drug rash, diarrhea, increase in liver enzymes, and yeast infections. Azithromycin Pregnancy And Lactation Text: This medication is considered safe during pregnancy and is also secreted in breast milk. Azithromycin Counseling:  I discussed with the patient the risks of azithromycin including but not limited to GI upset, allergic reaction, drug rash, diarrhea, and yeast infections. Topical Sulfur Applications Counseling: Topical Sulfur Counseling: Patient counseled that this medication may cause skin irritation or allergic reactions.  In the event of skin irritation, the patient was advised to reduce the amount of the drug applied or use it less frequently.   The patient verbalized understanding of the proper use and possible adverse effects of topical sulfur application.  All of the patient's questions and concerns were addressed. Tazorac Counseling:  Patient advised that medication is irritating and drying.  Patient may need to apply sparingly and wash off after an hour before eventually leaving it on overnight.  The patient verbalized understanding of the proper use and possible adverse effects of tazorac.  All of the patient's questions and concerns were addressed. High Dose Vitamin A Counseling: Side effects reviewed, pt to contact office should one occur.

## 2023-06-02 NOTE — PROGRESS NOTE ADULT - ASSESSMENT
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, will hold off on steroid taper as concerned for worsening glaucoma, c/w pregabalin  - symptoms may reflect closed angle glaucoma - no acute changes in vision overnight or in eye pain on 6/2;  f/u optho   - Neurosugx consulted - f/u C-Spine Xrays   - c/w oxycodone for pain     # HTN  - c/w procardia and losartan     # Afib on Eliquis  - c/w eliquis has ppm, toprol being held on admission bc of long MA interval 382 ms, will repeat EKG    # Glaucoma  - c/w latanoprost right eye  - ophtho consulted for exam  - avoiding steroids for now     # 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

## 2023-06-02 NOTE — DISCHARGE NOTE PROVIDER - HOSPITAL COURSE
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   CT Imaging showed signficant spondylosis/spondylisthesis in c-spine.     Patient evaulated by rheumatology to r/o GCA - deemed low risk given low ESR.   Seen by optho to r/o acute angle closure glaucoma - no acute findings or concerns.   Per neurosurgery, can f/u outpatient for trigger point or epidural steroid infections.     CT Soft Tissue and ENT consult pending.      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   CT Imaging showed signficant spondylosis/spondylisthesis in c-spine.     Patient evaulated by rheumatology to r/o GCA - deemed low risk given low ESR.   Seen by optho to r/o acute angle closure glaucoma - no acute findings or concerns.   Per neurosurgery, can f/u outpatient for trigger point or epidural steroid infections.   # Right sided temporal headache w questionable dec in rt eye vision, s/p fall to right side of the body last month  #  r/u GCA VS TMJ arthritis VS rt mastoiditis   - cleared from NS for cervical radiculopathy  - atypical for GCA but given age still possible, seen by Rheum,   - TMJ arthritis ? neg CT neck soft tissue  - ID eval noted- monitor off abx. on chronic steroids. f/u IgM IgG varicella zoster  - ENT eval- no acute intervention. 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 cont eliquis 5mg BID    # Chronic pancreatitis  - cont creol    # DM  - cont diabetes insulin protocol    # COPD  #Asthma  - albuterol PRN  - cont montelukast    # HLD  - cont statin    # Glaucoma  - cont latanoprost    - dvt ppx  - gi ppx  - from home .           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   CT Imaging showed signficant spondylosis/spondylisthesis in c-spine.     Patient evaulated by rheumatology to r/o GCA - deemed low risk given low ESR.   Seen by optho to r/o acute angle closure glaucoma - no acute findings or concerns.   Per neurosurgery, can f/u outpatient for trigger point or epidural steroid infections.   # Right sided temporal headache w questionable dec in rt eye vision, s/p fall to right side of the body last month  #  r/u GCA VS TMJ arthritis VS rt mastoiditis   - cleared from NS for cervical radiculopathy  - atypical for GCA but given age still possible, seen by Rheum,   - TMJ arthritis ? neg CT neck soft tissue  - ID eval noted- monitor off abx. on chronic steroids. f/u IgM IgG varicella zoster  - ENT eval- no acute intervention. warm compress  - if not and pt still c/o pain then should get biopsy, surgery as outpt (with vascular surgery)   - pain control- pain regimen adjusted: oxycodone ER 40mg q8H, oxycodone IR 15mg q4H PRN, will keep dilaudid same as patient easily becomes lethargic, cont acetaminophen 975mg q8H, cont senna, miralax  - if GCA ruled out can benefit from trigger point injections versus epidural steroid injections  - spoke with rheum. 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 cont eliquis 5mg BID    # Chronic pancreatitis  - cont creol    # DM  - cont diabetes insulin protocol    # COPD  #Asthma  - albuterol PRN  - cont montelukast    # HLD  - cont statin    # Glaucoma  - cont latanoprost    - dvt ppx  - gi ppx  - from home .           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   CT Imaging showed signficant spondylosis/spondylisthesis in c-spine.     Patient evaulated by rheumatology to r/o GCA - deemed low risk given low ESR.   Seen by optho to r/o acute angle closure glaucoma - no acute findings or concerns.   Per neurosurgery, can f/u outpatient for trigger point or epidural steroid infections.   # Right sided temporal headache w questionable dec in rt eye vision, s/p fall to right side of the body last month  #  r/u GCA VS TMJ arthritis VS rt mastoiditis   - cleared from NS for cervical radiculopathy  - atypical for GCA but given age still possible, seen by Rheum,   - TMJ arthritis ? neg CT neck soft tissue  - ID eval noted- monitor off abx. on chronic steroids. f/u IgM IgG varicella zoster  - ENT eval- no acute intervention. warm compress  - if not and pt still c/o pain then should get biopsy, surgery as outpt (with vascular surgery)   - pain control- pain regimen adjusted: oxycodone ER 40mg q8H, oxycodone IR 15mg q4H PRN, will keep dilaudid same as patient easily becomes lethargic, cont acetaminophen 975mg q8H, cont senna, miralax  - if GCA ruled out can benefit from trigger point injections versus epidural steroid injections  - spoke with rheum. 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 cont eliquis 5mg BID    # Chronic pancreatitis  - cont creol    # DM  - cont diabetes insulin protocol    # COPD  #Asthma  - albuterol PRN  - cont montelukast    # HLD  - cont statin    # Glaucoma  - cont latanoprost    - dvt ppx  - gi ppx  - from home .

## 2023-06-02 NOTE — DISCHARGE NOTE PROVIDER - CARE PROVIDER_API CALL
Jamila Javed.  Internal Medicine  [address] [place] [id number]  Phone: [phone]  Fax: [phone]  Established Patient  Follow Up Time: 1 week

## 2023-06-02 NOTE — DISCHARGE NOTE PROVIDER - NSDCFUSCHEDAPPT_GEN_ALL_CORE_FT
Amalia Weathers  [facility]  ONCPAINMGT [id number]  Scheduled Appointment: 06/20/2023    Clyde Evans  [facility]  VASCULAR [address]  Scheduled Appointment: 08/18/2023

## 2023-06-02 NOTE — PROGRESS NOTE ADULT - ATTENDING COMMENTS
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 pending CT neck soft tissue, if neg and pt still c/o pain then should get biopsy, or if  change in vision then start high dose steroid as per rheum  - seen by opthalmology  - pain control  - if GCA ruled out can benefit from trigger point injections versus epidural steroid injections  - dvt ppx  - from home

## 2023-06-02 NOTE — PROGRESS NOTE ADULT - SUBJECTIVE AND OBJECTIVE BOX
EZ ALCARAZ 82y Male  MRN#: [id number]   Hospital Day: 2d    SUBJECTIVE  Patient is a 82y old Male who presents with a chief complaint of Right sided headache (01 Jun 2023 17:59)  Currently admitted to medicine with the primary diagnosis of Headache      INTERVAL HPI AND OVERNIGHT EVENTS:  Patient was examined and seen at bedside. This morning he is resting comfortably in bed and reports no issues or overnight events.      OBJECTIVE  PAST MEDICAL & SURGICAL HISTORY  Diabetes    Hypertension    Pacemaker    Dyslipidemia    History of chronic pancreatitis    Atherosclerosis of native artery of lower extremity    COPD (chronic obstructive pulmonary disease)    CHESTER on CPAP    H/O intestinal obstruction    History of cholecystectomy    History of pancreatic surgery    History of penile implant    Cardiac pacemaker      ALLERGIES:  No Known Allergies    MEDICATIONS:  STANDING MEDICATIONS  acetaminophen     Tablet .. 650 milliGRAM(s) Oral every 6 hours  albuterol    90 MICROgram(s) HFA Inhaler 2 Puff(s) Inhalation two times a day  albuterol/ipratropium for Nebulization 3 milliLiter(s) Nebulizer every 6 hours  apixaban 5 milliGRAM(s) Oral two times a day  budesonide 160 MICROgram(s)/formoterol 4.5 MICROgram(s) Inhaler 2 Puff(s) Inhalation two times a day  chlorhexidine 2% Cloths 1 Application(s) Topical daily  dextrose 5%. 1000 milliLiter(s) IV Continuous <Continuous>  dextrose 5%. 1000 milliLiter(s) IV Continuous <Continuous>  dextrose 50% Injectable 25 Gram(s) IV Push once  dextrose 50% Injectable 25 Gram(s) IV Push once  dextrose 50% Injectable 12.5 Gram(s) IV Push once  furosemide    Tablet 20 milliGRAM(s) Oral <User Schedule>  glucagon  Injectable 1 milliGRAM(s) IntraMuscular once  insulin glargine Injectable (LANTUS) 15 Unit(s) SubCutaneous at bedtime  insulin lispro (ADMELOG) corrective regimen sliding scale   SubCutaneous three times a day before meals  insulin lispro Injectable (ADMELOG) 5 Unit(s) SubCutaneous three times a day before meals  latanoprost 0.005% Ophthalmic Solution 1 Drop(s) Right EYE at bedtime  losartan 25 milliGRAM(s) Oral daily  montelukast 10 milliGRAM(s) Oral daily  NIFEdipine XL 30 milliGRAM(s) Oral daily  pancrelipase  (CREON 12,000 Lipase Units) 3 Capsule(s) Oral four times a day with meals  pantoprazole    Tablet 40 milliGRAM(s) Oral before breakfast  pregabalin 75 milliGRAM(s) Oral two times a day  senna 2 Tablet(s) Oral at bedtime  simvastatin 20 milliGRAM(s) Oral at bedtime    PRN MEDICATIONS  dextrose Oral Gel 15 Gram(s) Oral once PRN  oxyCODONE    IR 5 milliGRAM(s) Oral every 6 hours PRN      PHYSICAL EXAM:  CONSTITUTIONAL: No acute distress  HEAD: Atraumatic, normocephalic  PULMONARY: Clear to auscultation bilaterally; no wheezes, rales, or rhonchi  CARDIOVASCULAR: Regular rate and rhythm; no murmurs, rubs, or gallops  GASTROINTESTINAL: Soft, epigastrium tender to palpation, non-distended  MUSCULOSKELETAL:  no clubbing, no cyanosis, no edema  NEUROLOGY: non-focal      VITAL SIGNS: Last 24 Hours  T(C): 35.8 (02 Jun 2023 04:00), Max: 36.3 (01 Jun 2023 17:00)  T(F): 96.4 (02 Jun 2023 04:00), Max: 97.3 (01 Jun 2023 17:00)  HR: 71 (02 Jun 2023 04:00) (71 - 87)  BP: 142/61 (02 Jun 2023 04:00) (130/64 - 142/61)  BP(mean): --  RR: 18 (01 Jun 2023 17:58) (18 - 18)  SpO2: 99% (02 Jun 2023 04:00) (98% - 99%)    LABS:                        14.5   7.51  )-----------( 174      ( 01 Jun 2023 06:24 )             42.4     06-01    134<L>  |  99  |  16  ----------------------------<  138<H>  4.3   |  21  |  0.8    Ca    9.4      01 Jun 2023 06:24  Mg     2.3     06-01    TPro  7.5  /  Alb  4.1  /  TBili  0.6  /  DBili  x   /  AST  57<H>  /  ALT  29  /  AlkPhos  63  06-01          Sedimentation Rate, Erythrocyte: 12 mm/Hr *H* (06-01-23 @ 12:12)         EZ ALCARAZ 82y Male  MRN#: [id number]   Hospital Day: 2d    SUBJECTIVE  Patient is a 82y old Male who presents with a chief complaint of Right sided headache (01 Jun 2023 17:59)  Currently admitted to medicine with the primary diagnosis of Headache      INTERVAL HPI AND OVERNIGHT EVENTS:  Patient was examined and seen at bedside. This morning he is resting comfortably in bed and reports no issues or overnight events but still c/o right temporal HA but no vision changes       OBJECTIVE  PAST MEDICAL & SURGICAL HISTORY  Diabetes    Hypertension    Pacemaker    Dyslipidemia    History of chronic pancreatitis    Atherosclerosis of native artery of lower extremity    COPD (chronic obstructive pulmonary disease)    CHESTER on CPAP    H/O intestinal obstruction    History of cholecystectomy    History of pancreatic surgery    History of penile implant    Cardiac pacemaker      ALLERGIES:  No Known Allergies    MEDICATIONS:  STANDING MEDICATIONS  acetaminophen     Tablet .. 650 milliGRAM(s) Oral every 6 hours  albuterol    90 MICROgram(s) HFA Inhaler 2 Puff(s) Inhalation two times a day  albuterol/ipratropium for Nebulization 3 milliLiter(s) Nebulizer every 6 hours  apixaban 5 milliGRAM(s) Oral two times a day  budesonide 160 MICROgram(s)/formoterol 4.5 MICROgram(s) Inhaler 2 Puff(s) Inhalation two times a day  chlorhexidine 2% Cloths 1 Application(s) Topical daily  dextrose 5%. 1000 milliLiter(s) IV Continuous <Continuous>  dextrose 5%. 1000 milliLiter(s) IV Continuous <Continuous>  dextrose 50% Injectable 25 Gram(s) IV Push once  dextrose 50% Injectable 25 Gram(s) IV Push once  dextrose 50% Injectable 12.5 Gram(s) IV Push once  furosemide    Tablet 20 milliGRAM(s) Oral <User Schedule>  glucagon  Injectable 1 milliGRAM(s) IntraMuscular once  insulin glargine Injectable (LANTUS) 15 Unit(s) SubCutaneous at bedtime  insulin lispro (ADMELOG) corrective regimen sliding scale   SubCutaneous three times a day before meals  insulin lispro Injectable (ADMELOG) 5 Unit(s) SubCutaneous three times a day before meals  latanoprost 0.005% Ophthalmic Solution 1 Drop(s) Right EYE at bedtime  losartan 25 milliGRAM(s) Oral daily  montelukast 10 milliGRAM(s) Oral daily  NIFEdipine XL 30 milliGRAM(s) Oral daily  pancrelipase  (CREON 12,000 Lipase Units) 3 Capsule(s) Oral four times a day with meals  pantoprazole    Tablet 40 milliGRAM(s) Oral before breakfast  pregabalin 75 milliGRAM(s) Oral two times a day  senna 2 Tablet(s) Oral at bedtime  simvastatin 20 milliGRAM(s) Oral at bedtime    PRN MEDICATIONS  dextrose Oral Gel 15 Gram(s) Oral once PRN  oxyCODONE    IR 5 milliGRAM(s) Oral every 6 hours PRN      PHYSICAL EXAM:  CONSTITUTIONAL: No acute distress  HEAD: Atraumatic, normocephalic  PULMONARY: Clear to auscultation bilaterally; no wheezes, rales, or rhonchi  CARDIOVASCULAR: Regular rate and rhythm; no murmurs, rubs, or gallops  GASTROINTESTINAL: Soft, epigastrium tender to palpation, non-distended  MUSCULOSKELETAL:  no clubbing, no cyanosis, no edema  NEUROLOGY: AAOx3, moves all exts,  non-focal      VITAL SIGNS: Last 24 Hours  T(C): 35.8 (02 Jun 2023 04:00), Max: 36.3 (01 Jun 2023 17:00)  T(F): 96.4 (02 Jun 2023 04:00), Max: 97.3 (01 Jun 2023 17:00)  HR: 71 (02 Jun 2023 04:00) (71 - 87)  BP: 142/61 (02 Jun 2023 04:00) (130/64 - 142/61)  BP(mean): --  RR: 18 (01 Jun 2023 17:58) (18 - 18)  SpO2: 99% (02 Jun 2023 04:00) (98% - 99%)    LABS:                        14.5   7.51  )-----------( 174      ( 01 Jun 2023 06:24 )             42.4     06-01    134<L>  |  99  |  16  ----------------------------<  138<H>  4.3   |  21  |  0.8    Ca    9.4      01 Jun 2023 06:24  Mg     2.3     06-01    TPro  7.5  /  Alb  4.1  /  TBili  0.6  /  DBili  x   /  AST  57<H>  /  ALT  29  /  AlkPhos  63  06-01          Sedimentation Rate, Erythrocyte: 12 mm/Hr *H* (06-01-23 @ 12:12)

## 2023-06-02 NOTE — DISCHARGE NOTE PROVIDER - NSDCMRMEDTOKEN_GEN_ALL_CORE_FT
Breo Ellipta 100 mcg-25 mcg/inh inhalation powder: 1 puff(s) inhaled once a day  Eliquis 5 mg tablet: 1 tab(s) orally every 12 hours  latanoprost 0.005% ophthalmic solution: 1 drop(s) to each affected eye once a day (in the evening)  montelukast 10 mg oral tablet: 1 tab(s) orally once a day  omeprazole 40 mg oral delayed release capsule: 1 cap(s) orally once a day  oxyMORphone 40 mg oral tablet, extended release: 1 tab(s) orally every 12 hours  pancrelipase 12,000 units-38,000 units-60,000 units oral delayed release capsule: 3 cap(s) orally 4 times a day (with meals and at bedtime)  pregabalin 75 mg capsule: 1 cap(s) orally once a day   Breo Ellipta 100 mcg-25 mcg/inh inhalation powder: 1 puff(s) inhaled once a day  Eliquis 5 mg tablet: 1 tab(s) orally every 12 hours  furosemide 20 mg oral tablet: 1 tab(s) orally every other day  latanoprost 0.005% ophthalmic solution: 1 drop(s) to each affected eye once a day (in the evening)  losartan 25 mg oral tablet: 1 tab(s) orally once a day  montelukast 10 mg oral tablet: 1 tab(s) orally once a day  NIFEdipine 60 mg oral tablet, extended release: 1 tab(s) orally once a day  omeprazole 40 mg oral delayed release capsule: 1 cap(s) orally once a day  oxyMORphone 40 mg oral tablet, extended release: 1 tab(s) orally every 12 hours  pancrelipase 12,000 units-38,000 units-60,000 units oral delayed release capsule: 3 cap(s) orally 4 times a day (with meals and at bedtime)  pregabalin 75 mg capsule: 1 cap(s) orally once a day  simvastatin 20 mg oral tablet: 1 tab(s) orally once a day (at bedtime)

## 2023-06-02 NOTE — DISCHARGE NOTE PROVIDER - NSDCCPCAREPLAN_GEN_ALL_CORE_FT
PRINCIPAL DISCHARGE DIAGNOSIS  Diagnosis: Headache  Assessment and Plan of Treatment: You presented with headache with blurry vision. Your vision is bettr. With pain medications, your headache improved. Please continue taking current pain meds. Please follow up with vascular surgery for biopsy evaluation to further check your headache in case. If confirmed you have temporal arteritis (inflamamation of artery vessel) you will need to start steroids after seeing a rheumatologist.  if you have fevers, chills, headaches, chest pain, shortness of breath, abdominal pain, nausea/vomiting, please return to the hospital.

## 2023-06-03 LAB
ALBUMIN SERPL ELPH-MCNC: 3.9 G/DL — SIGNIFICANT CHANGE UP (ref 3.5–5.2)
ALP SERPL-CCNC: 62 U/L — SIGNIFICANT CHANGE UP (ref 30–115)
ALT FLD-CCNC: 21 U/L — SIGNIFICANT CHANGE UP (ref 0–41)
ANION GAP SERPL CALC-SCNC: 16 MMOL/L — HIGH (ref 7–14)
AST SERPL-CCNC: 23 U/L — SIGNIFICANT CHANGE UP (ref 0–41)
BILIRUB SERPL-MCNC: 0.4 MG/DL — SIGNIFICANT CHANGE UP (ref 0.2–1.2)
BUN SERPL-MCNC: 16 MG/DL — SIGNIFICANT CHANGE UP (ref 10–20)
CALCIUM SERPL-MCNC: 8.9 MG/DL — SIGNIFICANT CHANGE UP (ref 8.4–10.5)
CHLORIDE SERPL-SCNC: 102 MMOL/L — SIGNIFICANT CHANGE UP (ref 98–110)
CO2 SERPL-SCNC: 19 MMOL/L — SIGNIFICANT CHANGE UP (ref 17–32)
CREAT SERPL-MCNC: 0.7 MG/DL — SIGNIFICANT CHANGE UP (ref 0.7–1.5)
EGFR: 93 ML/MIN/1.73M2 — SIGNIFICANT CHANGE UP
GLUCOSE BLDC GLUCOMTR-MCNC: 134 MG/DL — HIGH (ref 70–99)
GLUCOSE BLDC GLUCOMTR-MCNC: 151 MG/DL — HIGH (ref 70–99)
GLUCOSE BLDC GLUCOMTR-MCNC: 196 MG/DL — HIGH (ref 70–99)
GLUCOSE BLDC GLUCOMTR-MCNC: 206 MG/DL — HIGH (ref 70–99)
GLUCOSE SERPL-MCNC: 141 MG/DL — HIGH (ref 70–99)
HCT VFR BLD CALC: 41.2 % — LOW (ref 42–52)
HGB BLD-MCNC: 14 G/DL — SIGNIFICANT CHANGE UP (ref 14–18)
MAGNESIUM SERPL-MCNC: 2 MG/DL — SIGNIFICANT CHANGE UP (ref 1.8–2.4)
MCHC RBC-ENTMCNC: 31.3 PG — HIGH (ref 27–31)
MCHC RBC-ENTMCNC: 34 G/DL — SIGNIFICANT CHANGE UP (ref 32–37)
MCV RBC AUTO: 92 FL — SIGNIFICANT CHANGE UP (ref 80–94)
NRBC # BLD: 0 /100 WBCS — SIGNIFICANT CHANGE UP (ref 0–0)
PLATELET # BLD AUTO: 208 K/UL — SIGNIFICANT CHANGE UP (ref 130–400)
PMV BLD: 10.1 FL — SIGNIFICANT CHANGE UP (ref 7.4–10.4)
POTASSIUM SERPL-MCNC: 3.6 MMOL/L — SIGNIFICANT CHANGE UP (ref 3.5–5)
POTASSIUM SERPL-SCNC: 3.6 MMOL/L — SIGNIFICANT CHANGE UP (ref 3.5–5)
PROT SERPL-MCNC: 7 G/DL — SIGNIFICANT CHANGE UP (ref 6–8)
RBC # BLD: 4.48 M/UL — LOW (ref 4.7–6.1)
RBC # FLD: 13.3 % — SIGNIFICANT CHANGE UP (ref 11.5–14.5)
SODIUM SERPL-SCNC: 137 MMOL/L — SIGNIFICANT CHANGE UP (ref 135–146)
WBC # BLD: 5.16 K/UL — SIGNIFICANT CHANGE UP (ref 4.8–10.8)
WBC # FLD AUTO: 5.16 K/UL — SIGNIFICANT CHANGE UP (ref 4.8–10.8)

## 2023-06-03 PROCEDURE — 99232 SBSQ HOSP IP/OBS MODERATE 35: CPT

## 2023-06-03 PROCEDURE — 93886 INTRACRANIAL COMPLETE STUDY: CPT | Mod: 26

## 2023-06-03 RX ORDER — HYDROMORPHONE HYDROCHLORIDE 2 MG/ML
0.5 INJECTION INTRAMUSCULAR; INTRAVENOUS; SUBCUTANEOUS ONCE
Refills: 0 | Status: DISCONTINUED | OUTPATIENT
Start: 2023-06-03 | End: 2023-06-03

## 2023-06-03 RX ORDER — OXYCODONE HYDROCHLORIDE 5 MG/1
5 TABLET ORAL ONCE
Refills: 0 | Status: DISCONTINUED | OUTPATIENT
Start: 2023-06-03 | End: 2023-06-03

## 2023-06-03 RX ADMIN — Medication 1: at 17:51

## 2023-06-03 RX ADMIN — OXYCODONE HYDROCHLORIDE 5 MILLIGRAM(S): 5 TABLET ORAL at 00:04

## 2023-06-03 RX ADMIN — OXYCODONE HYDROCHLORIDE 5 MILLIGRAM(S): 5 TABLET ORAL at 16:20

## 2023-06-03 RX ADMIN — Medication 3 CAPSULE(S): at 09:31

## 2023-06-03 RX ADMIN — OXYCODONE HYDROCHLORIDE 5 MILLIGRAM(S): 5 TABLET ORAL at 22:10

## 2023-06-03 RX ADMIN — Medication 20 MILLIGRAM(S): at 06:01

## 2023-06-03 RX ADMIN — SIMVASTATIN 20 MILLIGRAM(S): 20 TABLET, FILM COATED ORAL at 21:07

## 2023-06-03 RX ADMIN — PANTOPRAZOLE SODIUM 40 MILLIGRAM(S): 20 TABLET, DELAYED RELEASE ORAL at 06:01

## 2023-06-03 RX ADMIN — INSULIN GLARGINE 15 UNIT(S): 100 INJECTION, SOLUTION SUBCUTANEOUS at 21:11

## 2023-06-03 RX ADMIN — APIXABAN 5 MILLIGRAM(S): 2.5 TABLET, FILM COATED ORAL at 05:31

## 2023-06-03 RX ADMIN — Medication 1 DROP(S): at 17:50

## 2023-06-03 RX ADMIN — Medication 3 CAPSULE(S): at 17:52

## 2023-06-03 RX ADMIN — OXYCODONE HYDROCHLORIDE 5 MILLIGRAM(S): 5 TABLET ORAL at 09:32

## 2023-06-03 RX ADMIN — APIXABAN 5 MILLIGRAM(S): 2.5 TABLET, FILM COATED ORAL at 17:50

## 2023-06-03 RX ADMIN — Medication 3 CAPSULE(S): at 12:05

## 2023-06-03 RX ADMIN — Medication 75 MILLIGRAM(S): at 06:02

## 2023-06-03 RX ADMIN — Medication 5 UNIT(S): at 17:51

## 2023-06-03 RX ADMIN — Medication 3 CAPSULE(S): at 21:07

## 2023-06-03 RX ADMIN — OXYCODONE HYDROCHLORIDE 5 MILLIGRAM(S): 5 TABLET ORAL at 10:02

## 2023-06-03 RX ADMIN — OXYCODONE HYDROCHLORIDE 5 MILLIGRAM(S): 5 TABLET ORAL at 15:38

## 2023-06-03 RX ADMIN — Medication 3 MILLILITER(S): at 08:15

## 2023-06-03 RX ADMIN — Medication 5 UNIT(S): at 12:03

## 2023-06-03 RX ADMIN — LATANOPROST 1 DROP(S): 0.05 SOLUTION/ DROPS OPHTHALMIC; TOPICAL at 21:11

## 2023-06-03 RX ADMIN — CHLORHEXIDINE GLUCONATE 1 APPLICATION(S): 213 SOLUTION TOPICAL at 12:05

## 2023-06-03 RX ADMIN — Medication 2: at 12:04

## 2023-06-03 RX ADMIN — Medication 650 MILLIGRAM(S): at 05:32

## 2023-06-03 RX ADMIN — SENNA PLUS 2 TABLET(S): 8.6 TABLET ORAL at 21:07

## 2023-06-03 RX ADMIN — Medication 1 DROP(S): at 05:52

## 2023-06-03 RX ADMIN — Medication 650 MILLIGRAM(S): at 06:02

## 2023-06-03 RX ADMIN — OXYCODONE HYDROCHLORIDE 5 MILLIGRAM(S): 5 TABLET ORAL at 00:34

## 2023-06-03 RX ADMIN — MONTELUKAST 10 MILLIGRAM(S): 4 TABLET, CHEWABLE ORAL at 12:05

## 2023-06-03 RX ADMIN — Medication 75 MILLIGRAM(S): at 17:56

## 2023-06-03 RX ADMIN — LOSARTAN POTASSIUM 25 MILLIGRAM(S): 100 TABLET, FILM COATED ORAL at 05:32

## 2023-06-03 RX ADMIN — HYDROMORPHONE HYDROCHLORIDE 0.5 MILLIGRAM(S): 2 INJECTION INTRAMUSCULAR; INTRAVENOUS; SUBCUTANEOUS at 23:37

## 2023-06-03 RX ADMIN — Medication 3 MILLILITER(S): at 19:28

## 2023-06-03 RX ADMIN — Medication 3 MILLILITER(S): at 13:52

## 2023-06-03 RX ADMIN — OXYCODONE HYDROCHLORIDE 5 MILLIGRAM(S): 5 TABLET ORAL at 02:15

## 2023-06-03 RX ADMIN — Medication 30 MILLIGRAM(S): at 05:31

## 2023-06-03 RX ADMIN — OXYCODONE HYDROCHLORIDE 5 MILLIGRAM(S): 5 TABLET ORAL at 01:43

## 2023-06-03 NOTE — PROGRESS NOTE ADULT - SUBJECTIVE AND OBJECTIVE BOX
Patient is a 82y old  Male who presents with a chief complaint of Right sided headache (03 Jun 2023 08:33)      OVERNIGHT EVENTS: no vision changes or blurry vision but still have pain at rt temporal and around the ear      SUBJECTIVE / INTERVAL HPI: Patient seen and examined at bedside.     VITAL SIGNS:  Vital Signs Last 24 Hrs  T(C): 36.3 (03 Jun 2023 05:00), Max: 36.4 (02 Jun 2023 13:21)  T(F): 97.4 (03 Jun 2023 05:00), Max: 97.6 (02 Jun 2023 13:21)  HR: 80 (03 Jun 2023 05:00) (79 - 90)  BP: 149/73 (03 Jun 2023 05:00) (98/55 - 149/73)  BP(mean): --  RR: 18 (03 Jun 2023 05:00) (18 - 18)  SpO2: 95% (02 Jun 2023 20:30) (95% - 98%)    Parameters below as of 02 Jun 2023 20:30  Patient On (Oxygen Delivery Method): room air        PHYSICAL EXAM:    CONSTITUTIONAL: old male, looks in pain  HEAD: Atraumatic, normocephalic  EYES: EOM intact, PERRLA, conjunctiva and sclera clear  ENT: Supple, no masses, no thyromegaly, no bruits, no JVD; moist mucous membranes  tenderness at the rt temporal and around the right ear   PULMONARY: Clear to auscultation bilaterally; no wheezes, rales, or rhonchi  CARDIOVASCULAR: Regular rate and rhythm; no murmurs, rubs, or gallops  GASTROINTESTINAL: Soft, non-tender, non-distended; bowel sounds present  MUSCULOSKELETAL: 2+ peripheral pulses; no clubbing, no cyanosis, no edema  NEUROLOGY: AAOx4, moves all ext, non-focal  SKIN: No rashes or lesions; warm and dry    MEDICATIONS:  MEDICATIONS  (STANDING):  albuterol    90 MICROgram(s) HFA Inhaler 2 Puff(s) Inhalation two times a day  albuterol/ipratropium for Nebulization 3 milliLiter(s) Nebulizer every 6 hours  apixaban 5 milliGRAM(s) Oral two times a day  artificial  tears Solution 1 Drop(s) Both EYES two times a day  budesonide 160 MICROgram(s)/formoterol 4.5 MICROgram(s) Inhaler 2 Puff(s) Inhalation two times a day  chlorhexidine 2% Cloths 1 Application(s) Topical daily  dextrose 5%. 1000 milliLiter(s) (100 mL/Hr) IV Continuous <Continuous>  dextrose 5%. 1000 milliLiter(s) (50 mL/Hr) IV Continuous <Continuous>  dextrose 50% Injectable 25 Gram(s) IV Push once  dextrose 50% Injectable 12.5 Gram(s) IV Push once  dextrose 50% Injectable 25 Gram(s) IV Push once  furosemide    Tablet 20 milliGRAM(s) Oral <User Schedule>  glucagon  Injectable 1 milliGRAM(s) IntraMuscular once  insulin glargine Injectable (LANTUS) 15 Unit(s) SubCutaneous at bedtime  insulin lispro (ADMELOG) corrective regimen sliding scale   SubCutaneous three times a day before meals  insulin lispro Injectable (ADMELOG) 5 Unit(s) SubCutaneous three times a day before meals  latanoprost 0.005% Ophthalmic Solution 1 Drop(s) Right EYE at bedtime  losartan 25 milliGRAM(s) Oral daily  montelukast 10 milliGRAM(s) Oral daily  NIFEdipine XL 30 milliGRAM(s) Oral daily  pancrelipase  (CREON 12,000 Lipase Units) 3 Capsule(s) Oral four times a day with meals  pantoprazole    Tablet 40 milliGRAM(s) Oral before breakfast  pregabalin 75 milliGRAM(s) Oral two times a day  senna 2 Tablet(s) Oral at bedtime  simvastatin 20 milliGRAM(s) Oral at bedtime  sodium chloride 0.9%. 1000 milliLiter(s) (50 mL/Hr) IV Continuous <Continuous>    MEDICATIONS  (PRN):  dextrose Oral Gel 15 Gram(s) Oral once PRN Blood Glucose LESS THAN 70 milliGRAM(s)/deciliter  oxyCODONE    IR 5 milliGRAM(s) Oral every 6 hours PRN Moderate Pain (4 - 6)      ALLERGIES:  Allergies    No Known Allergies    Intolerances        LABS:                        14.0   5.16  )-----------( 208      ( 03 Jun 2023 07:14 )             41.2     06-03    137  |  102  |  16  ----------------------------<  141<H>  3.6   |  19  |  0.7    Ca    8.9      03 Jun 2023 07:14  Mg     2.0     06-03    TPro  7.0  /  Alb  3.9  /  TBili  0.4  /  DBili  x   /  AST  23  /  ALT  21  /  AlkPhos  62  06-03        CAPILLARY BLOOD GLUCOSE      POCT Blood Glucose.: 206 mg/dL (03 Jun 2023 11:27)      RADIOLOGY & ADDITIONAL TESTS: Reviewed.    ASSESSMENT:    PLAN:

## 2023-06-03 NOTE — PROGRESS NOTE ADULT - ASSESSMENT
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 pending CT neck soft tissue, if neg and pt still c/o pain then should get biopsy, or if  change in vision then start high dose steroid as per rheum  - seen by opthalmology  - pain control  - if GCA ruled out can benefit from trigger point injections versus epidural steroid injections  - dvt ppx  - from home . 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 pending CT neck soft tissue,  - if neg will get ID eval for mastoiditis, doubt as esr, crp and wbc not suggestive   - if neg and pt still c/o pain then should get biopsy, or if change in vision then start high dose steroid as per rheum  - seen by opthalmology  - pain control  - if GCA ruled out can benefit from trigger point injections versus epidural steroid injections  - dvt ppx  - from home .

## 2023-06-04 LAB
ALBUMIN SERPL ELPH-MCNC: 3.9 G/DL — SIGNIFICANT CHANGE UP (ref 3.5–5.2)
ALP SERPL-CCNC: 59 U/L — SIGNIFICANT CHANGE UP (ref 30–115)
ALT FLD-CCNC: 17 U/L — SIGNIFICANT CHANGE UP (ref 0–41)
ANION GAP SERPL CALC-SCNC: 14 MMOL/L — SIGNIFICANT CHANGE UP (ref 7–14)
AST SERPL-CCNC: 19 U/L — SIGNIFICANT CHANGE UP (ref 0–41)
BASE EXCESS BLDA CALC-SCNC: -4 MMOL/L — LOW (ref -2–3)
BILIRUB SERPL-MCNC: <0.2 MG/DL — SIGNIFICANT CHANGE UP (ref 0.2–1.2)
BUN SERPL-MCNC: 16 MG/DL — SIGNIFICANT CHANGE UP (ref 10–20)
CALCIUM SERPL-MCNC: 8.9 MG/DL — SIGNIFICANT CHANGE UP (ref 8.4–10.5)
CHLORIDE SERPL-SCNC: 108 MMOL/L — SIGNIFICANT CHANGE UP (ref 98–110)
CO2 SERPL-SCNC: 19 MMOL/L — SIGNIFICANT CHANGE UP (ref 17–32)
CREAT SERPL-MCNC: 0.9 MG/DL — SIGNIFICANT CHANGE UP (ref 0.7–1.5)
EGFR: 86 ML/MIN/1.73M2 — SIGNIFICANT CHANGE UP
GAS PNL BLDA: SIGNIFICANT CHANGE UP
GLUCOSE BLDC GLUCOMTR-MCNC: 149 MG/DL — HIGH (ref 70–99)
GLUCOSE BLDC GLUCOMTR-MCNC: 156 MG/DL — HIGH (ref 70–99)
GLUCOSE BLDC GLUCOMTR-MCNC: 160 MG/DL — HIGH (ref 70–99)
GLUCOSE BLDC GLUCOMTR-MCNC: 187 MG/DL — HIGH (ref 70–99)
GLUCOSE SERPL-MCNC: 155 MG/DL — HIGH (ref 70–99)
HCO3 BLDA-SCNC: 20 MMOL/L — LOW (ref 21–28)
HCT VFR BLD CALC: 38.9 % — LOW (ref 42–52)
HGB BLD-MCNC: 13.1 G/DL — LOW (ref 14–18)
HOROWITZ INDEX BLDA+IHG-RTO: 21 — SIGNIFICANT CHANGE UP
MAGNESIUM SERPL-MCNC: 1.9 MG/DL — SIGNIFICANT CHANGE UP (ref 1.8–2.4)
MCHC RBC-ENTMCNC: 31.6 PG — HIGH (ref 27–31)
MCHC RBC-ENTMCNC: 33.7 G/DL — SIGNIFICANT CHANGE UP (ref 32–37)
MCV RBC AUTO: 93.7 FL — SIGNIFICANT CHANGE UP (ref 80–94)
NRBC # BLD: 0 /100 WBCS — SIGNIFICANT CHANGE UP (ref 0–0)
PCO2 BLDA: 31 MMHG — LOW (ref 35–48)
PH BLDA: 7.41 — SIGNIFICANT CHANGE UP (ref 7.35–7.45)
PLATELET # BLD AUTO: 209 K/UL — SIGNIFICANT CHANGE UP (ref 130–400)
PMV BLD: 10.6 FL — HIGH (ref 7.4–10.4)
PO2 BLDA: 74 MMHG — LOW (ref 83–108)
POTASSIUM SERPL-MCNC: 4 MMOL/L — SIGNIFICANT CHANGE UP (ref 3.5–5)
POTASSIUM SERPL-SCNC: 4 MMOL/L — SIGNIFICANT CHANGE UP (ref 3.5–5)
PROT SERPL-MCNC: 6.7 G/DL — SIGNIFICANT CHANGE UP (ref 6–8)
RBC # BLD: 4.15 M/UL — LOW (ref 4.7–6.1)
RBC # FLD: 13.7 % — SIGNIFICANT CHANGE UP (ref 11.5–14.5)
SAO2 % BLDA: 96.7 % — SIGNIFICANT CHANGE UP (ref 94–98)
SODIUM SERPL-SCNC: 141 MMOL/L — SIGNIFICANT CHANGE UP (ref 135–146)
WBC # BLD: 5.8 K/UL — SIGNIFICANT CHANGE UP (ref 4.8–10.8)
WBC # FLD AUTO: 5.8 K/UL — SIGNIFICANT CHANGE UP (ref 4.8–10.8)

## 2023-06-04 PROCEDURE — 99232 SBSQ HOSP IP/OBS MODERATE 35: CPT

## 2023-06-04 PROCEDURE — 99222 1ST HOSP IP/OBS MODERATE 55: CPT

## 2023-06-04 RX ORDER — HYDROMORPHONE HYDROCHLORIDE 2 MG/ML
0.5 INJECTION INTRAMUSCULAR; INTRAVENOUS; SUBCUTANEOUS ONCE
Refills: 0 | Status: DISCONTINUED | OUTPATIENT
Start: 2023-06-04 | End: 2023-06-04

## 2023-06-04 RX ORDER — NIFEDIPINE 30 MG
60 TABLET, EXTENDED RELEASE 24 HR ORAL DAILY
Refills: 0 | Status: DISCONTINUED | OUTPATIENT
Start: 2023-06-05 | End: 2023-06-07

## 2023-06-04 RX ORDER — SUMATRIPTAN SUCCINATE 4 MG/.5ML
6 INJECTION, SOLUTION SUBCUTANEOUS ONCE
Refills: 0 | Status: COMPLETED | OUTPATIENT
Start: 2023-06-04 | End: 2023-06-04

## 2023-06-04 RX ADMIN — OXYCODONE HYDROCHLORIDE 5 MILLIGRAM(S): 5 TABLET ORAL at 22:44

## 2023-06-04 RX ADMIN — Medication 1: at 08:20

## 2023-06-04 RX ADMIN — HYDROMORPHONE HYDROCHLORIDE 0.5 MILLIGRAM(S): 2 INJECTION INTRAMUSCULAR; INTRAVENOUS; SUBCUTANEOUS at 04:43

## 2023-06-04 RX ADMIN — SENNA PLUS 2 TABLET(S): 8.6 TABLET ORAL at 21:37

## 2023-06-04 RX ADMIN — HYDROMORPHONE HYDROCHLORIDE 0.5 MILLIGRAM(S): 2 INJECTION INTRAMUSCULAR; INTRAVENOUS; SUBCUTANEOUS at 17:54

## 2023-06-04 RX ADMIN — Medication 3 CAPSULE(S): at 08:21

## 2023-06-04 RX ADMIN — Medication 75 MILLIGRAM(S): at 17:55

## 2023-06-04 RX ADMIN — Medication 3 MILLILITER(S): at 14:01

## 2023-06-04 RX ADMIN — Medication 1: at 17:56

## 2023-06-04 RX ADMIN — HYDROMORPHONE HYDROCHLORIDE 0.5 MILLIGRAM(S): 2 INJECTION INTRAMUSCULAR; INTRAVENOUS; SUBCUTANEOUS at 11:06

## 2023-06-04 RX ADMIN — Medication 3 CAPSULE(S): at 17:54

## 2023-06-04 RX ADMIN — MONTELUKAST 10 MILLIGRAM(S): 4 TABLET, CHEWABLE ORAL at 12:03

## 2023-06-04 RX ADMIN — Medication 5 UNIT(S): at 12:02

## 2023-06-04 RX ADMIN — OXYCODONE HYDROCHLORIDE 5 MILLIGRAM(S): 5 TABLET ORAL at 08:32

## 2023-06-04 RX ADMIN — Medication 3 MILLILITER(S): at 08:31

## 2023-06-04 RX ADMIN — Medication 3 CAPSULE(S): at 12:03

## 2023-06-04 RX ADMIN — Medication 3 MILLILITER(S): at 19:31

## 2023-06-04 RX ADMIN — LATANOPROST 1 DROP(S): 0.05 SOLUTION/ DROPS OPHTHALMIC; TOPICAL at 21:37

## 2023-06-04 RX ADMIN — Medication 3 CAPSULE(S): at 21:37

## 2023-06-04 RX ADMIN — APIXABAN 5 MILLIGRAM(S): 2.5 TABLET, FILM COATED ORAL at 17:54

## 2023-06-04 RX ADMIN — PANTOPRAZOLE SODIUM 40 MILLIGRAM(S): 20 TABLET, DELAYED RELEASE ORAL at 05:25

## 2023-06-04 RX ADMIN — LOSARTAN POTASSIUM 25 MILLIGRAM(S): 100 TABLET, FILM COATED ORAL at 05:25

## 2023-06-04 RX ADMIN — INSULIN GLARGINE 15 UNIT(S): 100 INJECTION, SOLUTION SUBCUTANEOUS at 21:02

## 2023-06-04 RX ADMIN — Medication 75 MILLIGRAM(S): at 06:25

## 2023-06-04 RX ADMIN — HYDROMORPHONE HYDROCHLORIDE 0.5 MILLIGRAM(S): 2 INJECTION INTRAMUSCULAR; INTRAVENOUS; SUBCUTANEOUS at 10:36

## 2023-06-04 RX ADMIN — Medication 5 UNIT(S): at 08:19

## 2023-06-04 RX ADMIN — Medication 5 UNIT(S): at 17:56

## 2023-06-04 RX ADMIN — OXYCODONE HYDROCHLORIDE 5 MILLIGRAM(S): 5 TABLET ORAL at 09:02

## 2023-06-04 RX ADMIN — SIMVASTATIN 20 MILLIGRAM(S): 20 TABLET, FILM COATED ORAL at 21:37

## 2023-06-04 RX ADMIN — CHLORHEXIDINE GLUCONATE 1 APPLICATION(S): 213 SOLUTION TOPICAL at 12:08

## 2023-06-04 RX ADMIN — Medication 1 DROP(S): at 06:44

## 2023-06-04 RX ADMIN — Medication 30 MILLIGRAM(S): at 05:25

## 2023-06-04 RX ADMIN — HYDROMORPHONE HYDROCHLORIDE 0.5 MILLIGRAM(S): 2 INJECTION INTRAMUSCULAR; INTRAVENOUS; SUBCUTANEOUS at 18:00

## 2023-06-04 RX ADMIN — APIXABAN 5 MILLIGRAM(S): 2.5 TABLET, FILM COATED ORAL at 05:25

## 2023-06-04 RX ADMIN — Medication 1 DROP(S): at 17:55

## 2023-06-04 NOTE — CONSULT NOTE ADULT - SUBJECTIVE AND OBJECTIVE BOX
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 Patient reports a mechanical fall end of april when he was admitted to [facility] where he was diagnosed with rib contusion w/o Fx. Patient was discharged home on pain medications. However last week patient started complaining of right sided headache, temporal side, radiating to the neck. He still reports right arm weakness secondary to the fall. He reports chronic blurry vision. No tingling or numbness. He denies fever, seizures or LOC. He has no neck stiffness. ROS otherwise negative    VASCULAR SURGERY consulted for temporal artery biopsy. Patient evaluated at bedside AAOX3, NAD reporting pain on the left temporal, maxilla, mandibula and posterior auricular area. The pain does not worsen w mastication, denies any blurry vision or any other accompanying symptoms.     PAST MEDICAL & SURGICAL HISTORY:  Diabetes      Hypertension      Pacemaker      Dyslipidemia      History of chronic pancreatitis      Atherosclerosis of native artery of lower extremity      COPD (chronic obstructive pulmonary disease)      CHESTER on CPAP      H/O intestinal obstruction      History of cholecystectomy      History of pancreatic surgery      History of penile implant      Cardiac pacemaker        No Known Allergies    Home Medications:  Breo Ellipta 100 mcg-25 mcg/inh inhalation powder: 1 puff(s) inhaled once a day (20 Jun 2022 18:22)  Eliquis 5 mg tablet: 1 tab(s) orally every 12 hours (21 Jun 2022 14:17)  latanoprost 0.005% ophthalmic solution: 1 drop(s) to each affected eye once a day (in the evening) (20 Jun 2022 18:22)  montelukast 10 mg oral tablet: 1 tab(s) orally once a day (20 Jun 2022 18:22)  omeprazole 40 mg oral delayed release capsule: 1 cap(s) orally once a day (20 Jun 2022 18:22)  oxyMORphone 40 mg oral tablet, extended release: 1 tab(s) orally every 12 hours (20 Jun 2022 18:26)  pregabalin 75 mg capsule: 1 cap(s) orally once a day (21 Jun 2022 14:17)    No permtinent family history of PVD    REVIEW OF SYSTEMS:  GENERAL:                                         negative  SKIN:                                                 negative  OPTHALMOLOGIC:                          negative  ENMT:                                               negative  RESPIRATORY AND THORAX:        negative  CARDIOVASCULAR:                         negative  GASTROINTESTINAL:                       negative  NEPHROLOGY:                                  negative  MUSCULOSKELETAL:                       negative  NEUROLOGIC:                                   negative  PSYCHIATRIC:                                    negative  HEMATOLOGY/LYMPHATICS:         negative  ENDOCRINE:                                     negative  ALLERGIC/IMMUNOLOGIC:            negative    12 point ROS otherwise normal except as stated in HPI    PHYSICAL EXAM  Vital Signs Last 24 Hrs  T(C): 36.1 (04 Jun 2023 14:00), Max: 37.1 (03 Jun 2023 19:49)  T(F): 97 (04 Jun 2023 14:00), Max: 98.7 (03 Jun 2023 19:49)  HR: 79 (04 Jun 2023 14:00) (74 - 84)  BP: 149/79 (04 Jun 2023 14:00) (137/62 - 191/78)  BP(mean): --  RR: 19 (04 Jun 2023 14:00) (18 - 19)  SpO2: 97% (04 Jun 2023 14:00) (96% - 99%)    Parameters below as of 04 Jun 2023 05:05  Patient On (Oxygen Delivery Method): room air        Appearance: Normal, AAOX3  Head:   Neck: Supple, - JVD  Cardiovascular: RRR  Respiratory: Lungs clear to auscultation  Gastrointestinal:  Soft, Non-tender, positive BS	  Extremities: Normal range of motion, No clubbing, cyanosis or edema  Neurologic: Non-focal        MEDICATIONS:   MEDICATIONS  (STANDING):  albuterol    90 MICROgram(s) HFA Inhaler 2 Puff(s) Inhalation two times a day  albuterol/ipratropium for Nebulization 3 milliLiter(s) Nebulizer every 6 hours  apixaban 5 milliGRAM(s) Oral two times a day  artificial  tears Solution 1 Drop(s) Both EYES two times a day  budesonide 160 MICROgram(s)/formoterol 4.5 MICROgram(s) Inhaler 2 Puff(s) Inhalation two times a day  chlorhexidine 2% Cloths 1 Application(s) Topical daily  dextrose 5%. 1000 milliLiter(s) (100 mL/Hr) IV Continuous <Continuous>  dextrose 5%. 1000 milliLiter(s) (50 mL/Hr) IV Continuous <Continuous>  dextrose 50% Injectable 25 Gram(s) IV Push once  dextrose 50% Injectable 12.5 Gram(s) IV Push once  dextrose 50% Injectable 25 Gram(s) IV Push once  furosemide    Tablet 20 milliGRAM(s) Oral <User Schedule>  glucagon  Injectable 1 milliGRAM(s) IntraMuscular once  insulin glargine Injectable (LANTUS) 15 Unit(s) SubCutaneous at bedtime  insulin lispro (ADMELOG) corrective regimen sliding scale   SubCutaneous three times a day before meals  insulin lispro Injectable (ADMELOG) 5 Unit(s) SubCutaneous three times a day before meals  latanoprost 0.005% Ophthalmic Solution 1 Drop(s) Right EYE at bedtime  losartan 25 milliGRAM(s) Oral daily  montelukast 10 milliGRAM(s) Oral daily  pancrelipase  (CREON 12,000 Lipase Units) 3 Capsule(s) Oral four times a day with meals  pantoprazole    Tablet 40 milliGRAM(s) Oral before breakfast  pregabalin 75 milliGRAM(s) Oral two times a day  senna 2 Tablet(s) Oral at bedtime  simvastatin 20 milliGRAM(s) Oral at bedtime    MEDICATIONS  (PRN):  dextrose Oral Gel 15 Gram(s) Oral once PRN Blood Glucose LESS THAN 70 milliGRAM(s)/deciliter  oxyCODONE    IR 5 milliGRAM(s) Oral every 6 hours PRN Moderate Pain (4 - 6)      LAB/STUDIES:                        13.1   5.80  )-----------( 209      ( 04 Jun 2023 06:59 )             38.9     06-04    141  |  108  |  16  ----------------------------<  155<H>  4.0   |  19  |  0.9    Ca    8.9      04 Jun 2023 06:59  Mg     1.9     06-04    TPro  6.7  /  Alb  3.9  /  TBili  <0.2  /  DBili  x   /  AST  19  /  ALT  17  /  AlkPhos  59  06-04      LIVER FUNCTIONS - ( 04 Jun 2023 06:59 )  Alb: 3.9 g/dL / Pro: 6.7 g/dL / ALK PHOS: 59 U/L / ALT: 17 U/L / AST: 19 U/L / GGT: x               CARDIAC MARKERS ( 02 Jun 2023 16:03 )  x     / x     / 85 U/L / x     / x              ABG - ( 04 Jun 2023 13:13 )  pH, Arterial: 7.41  pH, Blood: x     /  pCO2: 31    /  pO2: 74    / HCO3: 20    / Base Excess: -4.0  /  SaO2: 96.7                  IMAGING:    < from: [facility] Duplex Face Temporal Artery (06.03.23 @ 09:50) >  Impression:    No evidence of temporal arteritis bilaterally.    ICD-10: R51    --- End of Report ---    < end of copied text >    < from: CT Neck Soft Tissue w/ IV Cont (06.02.23 @ 23:31) >    Impression:    The etiology of the patient's jaw pain is not elucidated on the current   neck CT.    --- End of Report ---      < end of copied text >    < from: Xray Cervical Spine Flex + Extend Only (06.02.23 @ 10:30) >    IMPRESSION:    Minimal dynamic instability at C2-C3, C4-C5 and C5-C6, as above    Grade 1 intravenous C3 on C4, without dynamic instability.    Multilevel moderate/severe degenerative changes, worst at level C6-C7.    Apparent prevertebral soft tissue thickening, which may be positional   secondary to reversal of thecervical lordosis. No evidence of   prevertebral soft tissue abnormality on the recent referenced CT cervical   spine dated May 30, 2023.    --- End of Report ---      < end of copied text >  < from: CT Angio Neck w/ IV Cont (05.31.23 @ 00:15) >  IMPRESSION:    No large vessel occlusion or high-grade vascular stenosis.    Mild atherosclerotic stenosis in the proximal bilateral cervical ICAs,   right more than left.    Mild atherosclerotic stenosis in bilateral carotid siphons.      ATTENDING ADDENDUM:  Agree with above.    --- End of Report ---      < end of copied text >

## 2023-06-04 NOTE — PROGRESS NOTE ADULT - ASSESSMENT
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 neg CT neck soft tissue,  - ID eval for mastoiditis, doubt as esr, crp and wbc not suggestive   - if not and pt still c/o pain then should get biopsy, or if change in vision then start high dose steroid as per rheum  - seen by opthalmology  - pain control  - if GCA ruled out can benefit from trigger point injections versus epidural steroid injections  - dvt ppx  - from home .

## 2023-06-04 NOTE — CONSULT NOTE ADULT - ASSESSMENT
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 Patient evaluated at bedside AAOX3, NAD reporting pain on the left temporal, maxilla, mandibula and posterior auricular area. The pain does not worsen w mastication, denies any blurry vision or any other accompanying symptoms. No acute surgical intervention indicated, patient can follow up outpatient.     Vascular surgery consulted for temporal artery biopsy.    PLAN:   - care as per primary team  - no acute surgical intervention   - consider ENT consult, also recommended by Rheum  - Rheum: symptoms are not typical for GCA, I would defer obtaining a biopsy while pt is undergoing the rest of his evaluation.  - Patient can follow up outpatient w Dr Mccauley for elective temporal artery biopsy  - Patient seen/examined or Plan Discussed with Fellow, Dr. Rivas  - Plan to be discussed with Attending, Dr. Mccauley    spectra [phone]

## 2023-06-04 NOTE — PROGRESS NOTE ADULT - SUBJECTIVE AND OBJECTIVE BOX
Patient is a 82y old  Male who presents with a chief complaint of Right sided headache (03 Jun 2023 08:33)      OVERNIGHT EVENTS: no vision changes or blurry vision but still have pain at rt temporal and around the ear      SUBJECTIVE / INTERVAL HPI: Patient seen and examined at bedside.     VITAL SIGNS:  Vital Signs Last 24 Hrs  T(C): 35.8 (04 Jun 2023 05:05), Max: 37.1 (03 Jun 2023 19:49)  T(F): 96.4 (04 Jun 2023 05:05), Max: 98.7 (03 Jun 2023 19:49)  HR: 74 (04 Jun 2023 05:05) (74 - 84)  BP: 168/77 (04 Jun 2023 05:05) (137/62 - 191/78)  BP(mean): --  RR: 19 (04 Jun 2023 05:05) (18 - 19)  SpO2: 96% (04 Jun 2023 05:05) (96% - 99%)    Parameters below as of 04 Jun 2023 05:05  Patient On (Oxygen Delivery Method): room air        PHYSICAL EXAM:    CONSTITUTIONAL: old male, looks in pain  HEAD: Atraumatic, normocephalic  EYES: EOM intact, PERRLA, conjunctiva and sclera clear  ENT: Supple, no masses, no thyromegaly, no bruits, no JVD; moist mucous membranes  tenderness at the rt temporal and around the right ear   PULMONARY: Clear to auscultation bilaterally; no wheezes, rales, or rhonchi  CARDIOVASCULAR: Regular rate and rhythm; no murmurs, rubs, or gallops  GASTROINTESTINAL: Soft, non-tender, non-distended; bowel sounds present  MUSCULOSKELETAL: 2+ peripheral pulses; no clubbing, no cyanosis, no edema  NEUROLOGY: AAOx4, moves all ext, non-focal  SKIN: No rashes or lesions; warm and dry    MEDICATIONS:  MEDICATIONS  (STANDING):  albuterol    90 MICROgram(s) HFA Inhaler 2 Puff(s) Inhalation two times a day  albuterol/ipratropium for Nebulization 3 milliLiter(s) Nebulizer every 6 hours  apixaban 5 milliGRAM(s) Oral two times a day  artificial  tears Solution 1 Drop(s) Both EYES two times a day  budesonide 160 MICROgram(s)/formoterol 4.5 MICROgram(s) Inhaler 2 Puff(s) Inhalation two times a day  chlorhexidine 2% Cloths 1 Application(s) Topical daily  dextrose 5%. 1000 milliLiter(s) (50 mL/Hr) IV Continuous <Continuous>  dextrose 5%. 1000 milliLiter(s) (100 mL/Hr) IV Continuous <Continuous>  dextrose 50% Injectable 25 Gram(s) IV Push once  dextrose 50% Injectable 25 Gram(s) IV Push once  dextrose 50% Injectable 12.5 Gram(s) IV Push once  furosemide    Tablet 20 milliGRAM(s) Oral <User Schedule>  glucagon  Injectable 1 milliGRAM(s) IntraMuscular once  insulin glargine Injectable (LANTUS) 15 Unit(s) SubCutaneous at bedtime  insulin lispro (ADMELOG) corrective regimen sliding scale   SubCutaneous three times a day before meals  insulin lispro Injectable (ADMELOG) 5 Unit(s) SubCutaneous three times a day before meals  latanoprost 0.005% Ophthalmic Solution 1 Drop(s) Right EYE at bedtime  losartan 25 milliGRAM(s) Oral daily  montelukast 10 milliGRAM(s) Oral daily  NIFEdipine XL 30 milliGRAM(s) Oral daily  pancrelipase  (CREON 12,000 Lipase Units) 3 Capsule(s) Oral four times a day with meals  pantoprazole    Tablet 40 milliGRAM(s) Oral before breakfast  pregabalin 75 milliGRAM(s) Oral two times a day  senna 2 Tablet(s) Oral at bedtime  simvastatin 20 milliGRAM(s) Oral at bedtime  sodium chloride 0.9%. 1000 milliLiter(s) (50 mL/Hr) IV Continuous <Continuous>    MEDICATIONS  (PRN):  dextrose Oral Gel 15 Gram(s) Oral once PRN Blood Glucose LESS THAN 70 milliGRAM(s)/deciliter  oxyCODONE    IR 5 milliGRAM(s) Oral every 6 hours PRN Moderate Pain (4 - 6)      ALLERGIES:  Allergies    No Known Allergies    Intolerances        LABS:                        13.1   5.80  )-----------( 209      ( 04 Jun 2023 06:59 )             38.9     06-04    141  |  108  |  16  ----------------------------<  155<H>  4.0   |  19  |  0.9    Ca    8.9      04 Jun 2023 06:59  Mg     1.9     06-04    TPro  6.7  /  Alb  3.9  /  TBili  <0.2  /  DBili  x   /  AST  19  /  ALT  17  /  AlkPhos  59  06-04        CAPILLARY BLOOD GLUCOSE      POCT Blood Glucose.: 149 mg/dL (04 Jun 2023 11:09)      RADIOLOGY & ADDITIONAL TESTS: Reviewed.    ASSESSMENT:    PLAN:

## 2023-06-04 NOTE — CONSULT NOTE ADULT - ATTENDING COMMENTS
Pt with headache  will hold on TAB per rheum  no acute intervention
83 y/o man with h/o diabetes on insulin admitted with severe R-sided head and neck pain, rheumatology consulted for possible giant cell arteritis. Pt fell on his R arm 1 month ago. 1 week after the fall, he began to experience severe pain on the R side of his head, worst around the R temporal area but also in his TMJ and his R neck, with pain in the jaw when opening his mouth and TTP on his face and jaw. Unclear whether pt's pain worsens with certain positions; he was unable to provide a clear answer to this question. + Gradually worsening visual acuity, pt denies acute vision loss. Also + R elbow pain after fall. Pt denies shoulder or hip pain, chest pain. Pt's exam is notable for extreme discomfort due to pain, with TTP in the R TMJ and all areas of the R jaw. Pt had a CTA head and neck with mild atherosclerosis, as well as CT spine with severe L and moderate-severe R foraminal stenosis. He also had labs with an elevated CRP to 34.5. Pt's symptoms are not typical for giant cell arteritis. Unclear if his symptoms could be related to an injury sustained during his fall? His elevated CRP is a non-specific finding, and he also has multiple comorbidities that can be a/w inflammation.   - Would not start steroids at this point; however, if pt experiences any acute vision changes, I would recommend giving him STAT Solumedrol 1000 mg   - Consider ENT consult to further evaluate pt's pain symptoms  - Would also consider possibly obtaining a CT face soft tissue given pt's significant TTP in the jaw areas  - A temporal artery biopsy is the gold standard for diagnosing GCA; given that pt's symptoms are not typical for GCA, I would defer obtaining a biopsy while pt is undergoing the rest of his evaluation. Consider obtaining evaluation for temporal artery biopsy if no other explanation for pt's symptoms is identified

## 2023-06-05 ENCOUNTER — TRANSCRIPTION ENCOUNTER (OUTPATIENT)
Age: 83
End: 2023-06-05

## 2023-06-05 DIAGNOSIS — G43.909 MIGRAINE, UNSPECIFIED, NOT INTRACTABLE, WITHOUT STATUS MIGRAINOSUS: ICD-10-CM

## 2023-06-05 LAB
ALBUMIN SERPL ELPH-MCNC: 4.2 G/DL — SIGNIFICANT CHANGE UP (ref 3.5–5.2)
ALP SERPL-CCNC: 61 U/L — SIGNIFICANT CHANGE UP (ref 30–115)
ALT FLD-CCNC: 17 U/L — SIGNIFICANT CHANGE UP (ref 0–41)
ANION GAP SERPL CALC-SCNC: 16 MMOL/L — HIGH (ref 7–14)
AST SERPL-CCNC: 20 U/L — SIGNIFICANT CHANGE UP (ref 0–41)
BILIRUB SERPL-MCNC: 0.3 MG/DL — SIGNIFICANT CHANGE UP (ref 0.2–1.2)
BUN SERPL-MCNC: 18 MG/DL — SIGNIFICANT CHANGE UP (ref 10–20)
CALCIUM SERPL-MCNC: 9.2 MG/DL — SIGNIFICANT CHANGE UP (ref 8.4–10.5)
CHLORIDE SERPL-SCNC: 103 MMOL/L — SIGNIFICANT CHANGE UP (ref 98–110)
CO2 SERPL-SCNC: 18 MMOL/L — SIGNIFICANT CHANGE UP (ref 17–32)
CREAT SERPL-MCNC: 1.1 MG/DL — SIGNIFICANT CHANGE UP (ref 0.7–1.5)
EGFR: 67 ML/MIN/1.73M2 — SIGNIFICANT CHANGE UP
GLUCOSE BLDC GLUCOMTR-MCNC: 167 MG/DL — HIGH (ref 70–99)
GLUCOSE BLDC GLUCOMTR-MCNC: 168 MG/DL — HIGH (ref 70–99)
GLUCOSE BLDC GLUCOMTR-MCNC: 189 MG/DL — HIGH (ref 70–99)
GLUCOSE BLDC GLUCOMTR-MCNC: 73 MG/DL — SIGNIFICANT CHANGE UP (ref 70–99)
GLUCOSE SERPL-MCNC: 175 MG/DL — HIGH (ref 70–99)
HCT VFR BLD CALC: 41.7 % — LOW (ref 42–52)
HGB BLD-MCNC: 13.8 G/DL — LOW (ref 14–18)
MAGNESIUM SERPL-MCNC: 1.9 MG/DL — SIGNIFICANT CHANGE UP (ref 1.8–2.4)
MCHC RBC-ENTMCNC: 31.4 PG — HIGH (ref 27–31)
MCHC RBC-ENTMCNC: 33.1 G/DL — SIGNIFICANT CHANGE UP (ref 32–37)
MCV RBC AUTO: 95 FL — HIGH (ref 80–94)
NRBC # BLD: 0 /100 WBCS — SIGNIFICANT CHANGE UP (ref 0–0)
PLATELET # BLD AUTO: 221 K/UL — SIGNIFICANT CHANGE UP (ref 130–400)
PMV BLD: 10.1 FL — SIGNIFICANT CHANGE UP (ref 7.4–10.4)
POTASSIUM SERPL-MCNC: 4.5 MMOL/L — SIGNIFICANT CHANGE UP (ref 3.5–5)
POTASSIUM SERPL-SCNC: 4.5 MMOL/L — SIGNIFICANT CHANGE UP (ref 3.5–5)
PROT SERPL-MCNC: 7.4 G/DL — SIGNIFICANT CHANGE UP (ref 6–8)
RBC # BLD: 4.39 M/UL — LOW (ref 4.7–6.1)
RBC # FLD: 13.8 % — SIGNIFICANT CHANGE UP (ref 11.5–14.5)
SODIUM SERPL-SCNC: 137 MMOL/L — SIGNIFICANT CHANGE UP (ref 135–146)
WBC # BLD: 7.82 K/UL — SIGNIFICANT CHANGE UP (ref 4.8–10.8)
WBC # FLD AUTO: 7.82 K/UL — SIGNIFICANT CHANGE UP (ref 4.8–10.8)

## 2023-06-05 PROCEDURE — 99232 SBSQ HOSP IP/OBS MODERATE 35: CPT

## 2023-06-05 PROCEDURE — 99223 1ST HOSP IP/OBS HIGH 75: CPT

## 2023-06-05 RX ORDER — HYDROMORPHONE HYDROCHLORIDE 2 MG/ML
0.5 INJECTION INTRAMUSCULAR; INTRAVENOUS; SUBCUTANEOUS
Refills: 0 | Status: DISCONTINUED | OUTPATIENT
Start: 2023-06-05 | End: 2023-06-06

## 2023-06-05 RX ORDER — OXYCODONE HYDROCHLORIDE 5 MG/1
40 TABLET ORAL EVERY 12 HOURS
Refills: 0 | Status: DISCONTINUED | OUTPATIENT
Start: 2023-06-05 | End: 2023-06-06

## 2023-06-05 RX ORDER — ACETAMINOPHEN 500 MG
975 TABLET ORAL EVERY 8 HOURS
Refills: 0 | Status: DISCONTINUED | OUTPATIENT
Start: 2023-06-05 | End: 2023-06-07

## 2023-06-05 RX ORDER — DEXAMETHASONE 0.5 MG/5ML
4 ELIXIR ORAL ONCE
Refills: 0 | Status: COMPLETED | OUTPATIENT
Start: 2023-06-05 | End: 2023-06-05

## 2023-06-05 RX ORDER — SUMATRIPTAN SUCCINATE 4 MG/.5ML
6 INJECTION, SOLUTION SUBCUTANEOUS
Refills: 0 | Status: DISCONTINUED | OUTPATIENT
Start: 2023-06-05 | End: 2023-06-07

## 2023-06-05 RX ORDER — DIPHENHYDRAMINE HCL 50 MG
25 CAPSULE ORAL ONCE
Refills: 0 | Status: COMPLETED | OUTPATIENT
Start: 2023-06-05 | End: 2023-06-05

## 2023-06-05 RX ORDER — HYDROMORPHONE HYDROCHLORIDE 2 MG/ML
0.5 INJECTION INTRAMUSCULAR; INTRAVENOUS; SUBCUTANEOUS
Refills: 0 | Status: DISCONTINUED | OUTPATIENT
Start: 2023-06-05 | End: 2023-06-05

## 2023-06-05 RX ORDER — HYDROMORPHONE HYDROCHLORIDE 2 MG/ML
1 INJECTION INTRAMUSCULAR; INTRAVENOUS; SUBCUTANEOUS THREE TIMES A DAY
Refills: 0 | Status: DISCONTINUED | OUTPATIENT
Start: 2023-06-05 | End: 2023-06-05

## 2023-06-05 RX ADMIN — HYDROMORPHONE HYDROCHLORIDE 0.5 MILLIGRAM(S): 2 INJECTION INTRAMUSCULAR; INTRAVENOUS; SUBCUTANEOUS at 19:08

## 2023-06-05 RX ADMIN — Medication 975 MILLIGRAM(S): at 21:26

## 2023-06-05 RX ADMIN — Medication 1 DROP(S): at 08:34

## 2023-06-05 RX ADMIN — OXYCODONE HYDROCHLORIDE 40 MILLIGRAM(S): 5 TABLET ORAL at 14:16

## 2023-06-05 RX ADMIN — LATANOPROST 1 DROP(S): 0.05 SOLUTION/ DROPS OPHTHALMIC; TOPICAL at 21:24

## 2023-06-05 RX ADMIN — Medication 20 MILLIGRAM(S): at 08:35

## 2023-06-05 RX ADMIN — Medication 3 CAPSULE(S): at 08:35

## 2023-06-05 RX ADMIN — OXYCODONE HYDROCHLORIDE 5 MILLIGRAM(S): 5 TABLET ORAL at 12:21

## 2023-06-05 RX ADMIN — Medication 1 DROP(S): at 17:33

## 2023-06-05 RX ADMIN — Medication 100 MILLIGRAM(S): at 05:23

## 2023-06-05 RX ADMIN — INSULIN GLARGINE 15 UNIT(S): 100 INJECTION, SOLUTION SUBCUTANEOUS at 21:25

## 2023-06-05 RX ADMIN — PANTOPRAZOLE SODIUM 40 MILLIGRAM(S): 20 TABLET, DELAYED RELEASE ORAL at 05:21

## 2023-06-05 RX ADMIN — Medication 3 CAPSULE(S): at 11:40

## 2023-06-05 RX ADMIN — Medication 5 UNIT(S): at 11:33

## 2023-06-05 RX ADMIN — HYDROMORPHONE HYDROCHLORIDE 0.5 MILLIGRAM(S): 2 INJECTION INTRAMUSCULAR; INTRAVENOUS; SUBCUTANEOUS at 13:24

## 2023-06-05 RX ADMIN — Medication 3 CAPSULE(S): at 17:35

## 2023-06-05 RX ADMIN — OXYCODONE HYDROCHLORIDE 40 MILLIGRAM(S): 5 TABLET ORAL at 14:01

## 2023-06-05 RX ADMIN — Medication 60 MILLIGRAM(S): at 05:21

## 2023-06-05 RX ADMIN — OXYCODONE HYDROCHLORIDE 40 MILLIGRAM(S): 5 TABLET ORAL at 02:01

## 2023-06-05 RX ADMIN — CHLORHEXIDINE GLUCONATE 1 APPLICATION(S): 213 SOLUTION TOPICAL at 11:33

## 2023-06-05 RX ADMIN — APIXABAN 5 MILLIGRAM(S): 2.5 TABLET, FILM COATED ORAL at 05:21

## 2023-06-05 RX ADMIN — Medication 975 MILLIGRAM(S): at 14:00

## 2023-06-05 RX ADMIN — LOSARTAN POTASSIUM 25 MILLIGRAM(S): 100 TABLET, FILM COATED ORAL at 05:22

## 2023-06-05 RX ADMIN — OXYCODONE HYDROCHLORIDE 5 MILLIGRAM(S): 5 TABLET ORAL at 11:51

## 2023-06-05 RX ADMIN — SUMATRIPTAN SUCCINATE 6 MILLIGRAM(S): 4 INJECTION, SOLUTION SUBCUTANEOUS at 12:14

## 2023-06-05 RX ADMIN — Medication 4 MILLIGRAM(S): at 14:01

## 2023-06-05 RX ADMIN — HYDROMORPHONE HYDROCHLORIDE 0.5 MILLIGRAM(S): 2 INJECTION INTRAMUSCULAR; INTRAVENOUS; SUBCUTANEOUS at 13:45

## 2023-06-05 RX ADMIN — Medication 5 UNIT(S): at 08:36

## 2023-06-05 RX ADMIN — APIXABAN 5 MILLIGRAM(S): 2.5 TABLET, FILM COATED ORAL at 17:35

## 2023-06-05 RX ADMIN — Medication 3 CAPSULE(S): at 21:26

## 2023-06-05 RX ADMIN — Medication 1: at 08:36

## 2023-06-05 RX ADMIN — SUMATRIPTAN SUCCINATE 6 MILLIGRAM(S): 4 INJECTION, SOLUTION SUBCUTANEOUS at 00:14

## 2023-06-05 RX ADMIN — Medication 975 MILLIGRAM(S): at 14:15

## 2023-06-05 RX ADMIN — MONTELUKAST 10 MILLIGRAM(S): 4 TABLET, CHEWABLE ORAL at 11:39

## 2023-06-05 RX ADMIN — BUDESONIDE AND FORMOTEROL FUMARATE DIHYDRATE 2 PUFF(S): 160; 4.5 AEROSOL RESPIRATORY (INHALATION) at 21:24

## 2023-06-05 RX ADMIN — SIMVASTATIN 20 MILLIGRAM(S): 20 TABLET, FILM COATED ORAL at 21:26

## 2023-06-05 RX ADMIN — Medication 3 MILLILITER(S): at 19:50

## 2023-06-05 RX ADMIN — Medication 25 MILLIGRAM(S): at 21:45

## 2023-06-05 RX ADMIN — SUMATRIPTAN SUCCINATE 6 MILLIGRAM(S): 4 INJECTION, SOLUTION SUBCUTANEOUS at 12:12

## 2023-06-05 RX ADMIN — Medication 975 MILLIGRAM(S): at 05:24

## 2023-06-05 RX ADMIN — Medication 3 MILLILITER(S): at 08:17

## 2023-06-05 RX ADMIN — Medication 1: at 11:33

## 2023-06-05 NOTE — CONSULT NOTE ADULT - ASSESSMENT
81yo Male with pmh of paroxysmal a.fib (on eliquis), s/p PPM, COPD (on bipap and home O2 prn), HF, DM II on insulin, HTN, glaucoma, chronic pancreatitis admitting with right temporal headaches. ENT consulted for further evaluation of right sided headaches and ?mastoiditis.       Plan:  ·	CT head negative, CT angio head & neck negative  81yo Male with pmh of paroxysmal a.fib (on eliquis), s/p PPM, COPD (on bipap and home O2 prn), HF, DM II on insulin, HTN, glaucoma, chronic pancreatitis admitting with right temporal headaches. ENT consulted for further evaluation of right sided headaches and ?mastoiditis.       Plan:  ·	CT head negative, CT angio head & neck negative   ·	+ right TMJ tenderness on exam    83yo Male with pmh of paroxysmal a.fib (on eliquis), s/p PPM, COPD (on bipap and home O2 prn), HF, DM II on insulin, HTN, glaucoma, chronic pancreatitis admitting with right temporal headaches. ENT consulted for further evaluation of right sided headaches and ?mastoiditis.       Plan:  ·	CT head negative, CT angio head & neck negative   ·	+ right TMJ tenderness on exam   ·	soft diet, warm compresses  ·	NSAIDS if able

## 2023-06-05 NOTE — CONSULT NOTE ADULT - PROBLEM SELECTOR RECOMMENDATION 9
+Longstanding history of chronic opioid therapy (MED = 285), likely to have significant opioid tolerance. Low-moderate risk for opioid abuse per ORT.  1) Increase oxycodone ER to 40mg Q8h   2) Increase oxycodone IR to 15mg Q4h prn  3) Change hydromorphone IV to 1mg Q6h prn  4) Continue acetaminophen 975mg Q8h standing  5) Would continue further workup to elucidate etiology for pain and AMS  6) Continue senna, start miralax

## 2023-06-05 NOTE — DISCHARGE NOTE NURSING/CASE MANAGEMENT/SOCIAL WORK - NSDCPEFALRISK_GEN_ALL_CORE
For information on Fall & Injury Prevention, visit: [email or web address] OR  [email or web address] OR  https://www.cdc.gov/steadi/patient.html

## 2023-06-05 NOTE — CONSULT NOTE ADULT - SUBJECTIVE AND OBJECTIVE BOX
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 At this point, the patient is not able to communicate regarding his pain. The team was alerted, who states that the patient's mental status and responsiveness has been waxing and waning. Per the team, the patient has been noting intermittent episodes of severe pain over the right side of the head. He has been prescribed chronic opioid therapy for many years for his chronic pancreatitis as well as chronic neck and low back pain. The patient is currently prescribed oxymorphone ER 40mg BID and percocet 10mg TID prn. He is also prescribed pregabalin 75mg BID.     Current Inpatient Medication Regimen:  acetaminophen     Tablet .. 975 milliGRAM(s) Oral every 8 hours  albuterol    90 MICROgram(s) HFA Inhaler 2 Puff(s) Inhalation two times a day  albuterol/ipratropium for Nebulization 3 milliLiter(s) Nebulizer every 6 hours  apixaban 5 milliGRAM(s) Oral two times a day  artificial  tears Solution 1 Drop(s) Both EYES two times a day  budesonide 160 MICROgram(s)/formoterol 4.5 MICROgram(s) Inhaler 2 Puff(s) Inhalation two times a day  chlorhexidine 2% Cloths 1 Application(s) Topical daily  dextrose 5%. 1000 milliLiter(s) IV Continuous <Continuous>  dextrose 5%. 1000 milliLiter(s) IV Continuous <Continuous>  dextrose 50% Injectable 25 Gram(s) IV Push once  dextrose 50% Injectable 12.5 Gram(s) IV Push once  dextrose 50% Injectable 25 Gram(s) IV Push once  dextrose Oral Gel 15 Gram(s) Oral once PRN  furosemide    Tablet 20 milliGRAM(s) Oral <User Schedule>  glucagon  Injectable 1 milliGRAM(s) IntraMuscular once  HYDROmorphone  Injectable 0.5 milliGRAM(s) IV Push two times a day PRN  insulin glargine Injectable (LANTUS) 15 Unit(s) SubCutaneous at bedtime  insulin lispro (ADMELOG) corrective regimen sliding scale   SubCutaneous three times a day before meals  insulin lispro Injectable (ADMELOG) 5 Unit(s) SubCutaneous three times a day before meals  latanoprost 0.005% Ophthalmic Solution 1 Drop(s) Right EYE at bedtime  losartan 25 milliGRAM(s) Oral daily  montelukast 10 milliGRAM(s) Oral daily  NIFEdipine XL 60 milliGRAM(s) Oral daily  oxyCODONE    IR 5 milliGRAM(s) Oral every 6 hours PRN  oxyCODONE  ER Tablet 40 milliGRAM(s) Oral every 12 hours  pancrelipase  (CREON 12,000 Lipase Units) 3 Capsule(s) Oral four times a day with meals  pantoprazole    Tablet 40 milliGRAM(s) Oral before breakfast  pregabalin 200 milliGRAM(s) Oral three times a day  senna 2 Tablet(s) Oral at bedtime  simvastatin 20 milliGRAM(s) Oral at bedtime  SUMAtriptan Injectable 6 milliGRAM(s) SubCutaneous two times a day PRN      Home Analgesic Regimen:  oxymorphone 40mg BID  percocet 10-325mg 3x/day prn (MED = 285)  pregabalin 75mg BID    Allergies:  No Known Allergies      Past Medical History:  Atrial fibrillation  Diabetes  Hypertension  Pacemaker  Dyslipidemia  Chronic pancreatitis  PAD  COPD (chronic obstructive pulmonary disease)  CHESTER on CPAP  History of EtOH abuse    Past Surgical History:  Cholecystectomy  Pancreatic surgery  Penile implant  PPM placement     Family History:  Hypertension (Sibling)    Physical Exam:  T(C): 35.9 (06-05-23 @ 12:59), Max: 36.6 (06-04-23 @ 19:44)  HR: 78 (06-05-23 @ 12:59) (74 - 84)  BP: 103/62 (06-05-23 @ 12:59) (103/62 - 165/74)  RR: 18 (06-05-23 @ 12:59) (18 - 18)  SpO2: 98% (06-05-23 @ 12:59) (96% - 98%)  Gen: Patient appears uncomfortable, reaches his arms toward his head intermittently   Eyes: no glasses or scleral icterus  Head: Normocephalic / Atraumatic  CV: no JVD  Lungs: nonlabored breathing  Abdomen: nondistended, soft  : no jordan catheter in place  Neuro: Cranial nerves appear intact  Extremities: full ROM in upper/lower extremities      Labs:  CBC  5.80 > 13.1 / 38.9 < 209      Imaging Studies:  CT Angio Head/Neck (5/31/2023)  FINDINGS:    AORTIC ARCH: Moderate calcified plaque without stenosis.    RIGHT ANTERIOR CIRCULATION:  There is diffuse scattered atherosclerotic calcification throughout the   common carotid artery (CCA)  without flow-limiting stenosis. Of note, the   distal CCA takes a retropharyngeal course. There is moderate focal   calcification at the carotid bulb extending into the proximal cervical   ICA resulting in mild stenosis (40%). The external carotid artery (ECA)   and its proximal branches are patent without stenosis. The remaining   cervical internal carotid artery (ICA) is patent without stenosis. There   is extensive calcification of the right carotid siphon contributing to   mild stenosis in the cavernous/clinoid ICA.    The anterior and middle cerebral arteries are patent without stenosis.      LEFT ANTERIOR CIRCULATION:  There is diffuse scattered atherosclerotic calcification throughout the   CCA without flow-limiting stenosis. There is retropharyngeal course of   the distal CCA. There is mild calcific atherosclerosis in the carotid   bulb extending into the cervical ICA resulting in mild stenosis in the   proximal cervical ICA (20%). The ECA and its proximal branches are patent   without stenosis. The remaining cervical internal carotid artery is   patent without stenosis. There is extensive calcific atherosclerosis in   the carotid siphon resulting in mild stenosis in the cavernous/clinoid   ICA.    The anterior and middle cerebral arteries are patent without stenosis.      POSTERIOR CIRCULATION:  Focal mild stenosis in the origin of the right vertebral artery due to   underlying calcific plaque. The remaining vertebral arteries are patent.   The basilar artery is patent without stenosis.    The posterior cerebral arteries are patent without stenosis.      OTHERS:  No filling defects of the dural venous sinuses or deep cerebral veins.    Scattered patchy groundglass opacities in bilateral lungs, right more   than left.    Subcentimeter mediastinal lymph nodes.      IMPRESSION:    No large vessel occlusion or high-grade vascular stenosis.    Mild atherosclerotic stenosis in the proximal bilateral cervical ICAs,   right more than left.    Mild atherosclerotic stenosis in bilateral carotid siphons.      Opioid Risk Assessment Tool                                                                         Female       Male  Family History  Alcohol                                                              1                3  Illegal drugs                                                       2                3  Rx drugs                                                            4                4    Personal History   Alcohol                                                              3                3  Illegal drugs                                                       4                4  Rx drugs                                                            5                5    Age between 16—45 years                                1                1  History of preadolescent sexual abuse               3                0    Psychological disease  ADD, OCD, bipolar, schizophrenia                      2                2  Depression                                                       1                1    Total Score                                                      __              3    0 - 3 = low risk for future opioid abuse  4 - 7 = moderate risk for future opioid abuse  8+ = high risk for future opioid abuse

## 2023-06-05 NOTE — CONSULT NOTE ADULT - SUBJECTIVE AND OBJECTIVE BOX
EZ ALCARAZ  82y, Male  Allergy: No Known Allergies      CHIEF COMPLAINT: Right sided headache (05 Jun 2023 11:17)      LOS  5d    HPI:  81 yo male, HO paroxysmal Afib on Eliquis s/p PPM, COPD on Bipap and as needed 2 L o2 at home, 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 Patient reports a mechanical fall end [facility] april [facility] diagnosed with rib contusion w/o Fx. Patient was discharged home on pain medications.  However last week patient started complaining of right sided headache, temporal side, radiating to the neck. He still reports right arm weakness secondary to the fall. He reports chronic blurry vision. No tingling or numbness. He denies fever, seizures or LOC. He has no neck stiffness. 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      COPD (chronic obstructive pulmonary disease)      CHESTER on CPAP      H/O intestinal obstruction      History of cholecystectomy      History of pancreatic surgery      History of penile implant      Cardiac pacemaker          FAMILY HISTORY  FH: hypertension (Sibling)        SOCIAL HISTORY  Social History:  quit smoking 45 years ago  lives at home  ambulates with cane/walker (31 May 2023 11:26)        ROS  General: Denies rigors, nightsweats  HEENT: Denies headache, rhinorrhea, sore throat, eye pain  CV: Denies CP, palpitations  PULM: Denies wheezing, hemoptysis  GI: Denies hematemesis, hematochezia, melena  : Denies discharge, hematuria  MSK: Denies arthralgias, myalgias  SKIN: Denies rash, lesions  NEURO: Denies paresthesias, weakness  PSYCH: Denies depression, anxiety    VITALS:  T(F): 96.7, Max: 97.9 (06-04-23 @ 19:44)  HR: 78  BP: 103/62  RR: 18Vital Signs Last 24 Hrs  T(C): 35.9 (05 Jun 2023 12:59), Max: 36.6 (04 Jun 2023 19:44)  T(F): 96.7 (05 Jun 2023 12:59), Max: 97.9 (04 Jun 2023 19:44)  HR: 78 (05 Jun 2023 12:59) (74 - 84)  BP: 103/62 (05 Jun 2023 12:59) (103/62 - 165/74)  BP(mean): --  RR: 18 (05 Jun 2023 12:59) (18 - 19)  SpO2: 98% (05 Jun 2023 12:59) (96% - 98%)        PHYSICAL EXAM:  Gen: NAD, resting in bed  HEENT: Normocephalic, atraumatic  Neck: supple, no lymphadenopathy  CV: Regular rate & regular rhythm  Lungs: decreased BS at bases, no fremitus  Abdomen: Soft, BS present  Ext: Warm, well perfused  Neuro: non focal, awake  Skin: no rash, no erythema  Lines: no phlebitis    TESTS & MEASUREMENTS:                        13.1   5.80  )-----------( 209      ( 04 Jun 2023 06:59 )             38.9     06-04    141  |  108  |  16  ----------------------------<  155<H>  4.0   |  19  |  0.9    Ca    8.9      04 Jun 2023 06:59  Mg     1.9     06-04    TPro  6.7  /  Alb  3.9  /  TBili  <0.2  /  DBili  x   /  AST  19  /  ALT  17  /  AlkPhos  59  06-04      LIVER FUNCTIONS - ( 04 Jun 2023 06:59 )  Alb: 3.9 g/dL / Pro: 6.7 g/dL / ALK PHOS: 59 U/L / ALT: 17 U/L / AST: 19 U/L / GGT: x               Culture - Blood (collected 06-20-22 @ 21:40)  Source: .Blood None  Final Report (06-26-22 @ 09:00):    No Growth Final            INFECTIOUS DISEASES TESTING  COVID-19 PCR: NotDetec (06-23-22 @ 15:50)  COVID-19 PCR: NotDetec (06-20-22 @ 10:00)      RADIOLOGY & ADDITIONAL TESTS:  I have personally reviewed the last Chest xray  CXR      CT      CARDIOLOGY TESTING  12 Lead ECG:   Ventricular Rate 78 BPM    Atrial Rate 78 BPM    P-R Interval 386 ms    QRS Duration 166 ms    Q-T Interval 576 ms    QTC Calculation(Bazett) 656 ms    P Axis 37 degrees    R Axis 261 degrees    T Axis 21 degrees    Diagnosis Line Sinus rhythm [id number] degree A-V block with occasional ventricular-paced  complexes and Premature supraventricular complexes and Fusion complexes  Right bundle branch block  Possible Lateral infarct , age undetermined  Abnormal ECG    Confirmed by Jesus Rivas ([id number])on 6/1/2023 5:09:36 PM (06-01-23 @ 14:54)  12 Lead ECG:   Ventricular Rate 72 BPM    Atrial Rate 72 BPM    P-R Interval 382 ms    QRS Duration 166 ms    Q-T Interval 430 ms    QTC Calculation(Bazett) 470 ms    P Axis 47 degrees    R Axis -71 degrees    T Axis 32 degrees    Diagnosis Line Sinus rhythm [id number] degree A-V block  Right bundle branch block  Left anterior fascicular block  *** Bifascicular block ***  Possible Lateral infarct , age undetermined  Abnormal ECG    Confirmed by Jesus Rivas ([id number]) on 5/31/2023 8:57:48 AM (05-30-23 @ 22:41)      MEDICATIONS  acetaminophen     Tablet .. 975 Oral every 8 hours  albuterol    90 MICROgram(s) HFA Inhaler 2 Inhalation two times a day  albuterol/ipratropium for Nebulization 3 Nebulizer every 6 hours  apixaban 5 Oral two times a day  artificial  tears Solution 1 Both EYES two times a day  budesonide 160 MICROgram(s)/formoterol 4.5 MICROgram(s) Inhaler 2 Inhalation two times a day  chlorhexidine 2% Cloths 1 Topical daily  dexAMETHasone  Injectable 4 IV Push once  dextrose 5%. 1000 IV Continuous <Continuous>  dextrose 5%. 1000 IV Continuous <Continuous>  dextrose 50% Injectable 25 IV Push once  dextrose 50% Injectable 25 IV Push once  dextrose 50% Injectable 12.5 IV Push once  furosemide    Tablet 20 Oral <User Schedule>  glucagon  Injectable 1 IntraMuscular once  insulin glargine Injectable (LANTUS) 15 SubCutaneous at bedtime  insulin lispro (ADMELOG) corrective regimen sliding scale  SubCutaneous three times a day before meals  insulin lispro Injectable (ADMELOG) 5 SubCutaneous three times a day before meals  latanoprost 0.005% Ophthalmic Solution 1 Right EYE at bedtime  losartan 25 Oral daily  montelukast 10 Oral daily  NIFEdipine XL 60 Oral daily  oxyCODONE  ER Tablet 40 Oral every 12 hours  pancrelipase  (CREON 12,000 Lipase Units) 3 Oral four times a day with meals  pantoprazole    Tablet 40 Oral before breakfast  pregabalin 100 Oral two times a day  senna 2 Oral at bedtime  simvastatin 20 Oral at bedtime      Weight  Weight (kg): 86.5 (06-01-23 @ 17:00)    ANTIBIOTICS:      ALLERGIES:  No Known Allergies           EZ ALCARAZ  82y, Male  Allergy: No Known Allergies      CHIEF COMPLAINT: Right sided headache (05 Jun 2023 11:17)      LOS  5d    HPI:  83 yo male, HO paroxysmal Afib on Eliquis s/p PPM, COPD on Bipap and as needed 2 L o2 at home, 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 Patient reports a mechanical fall end of april when he was admitted to [facility] [place] where he was diagnosed with rib contusion w/o Fx. Patient was discharged home on pain medications.  However last week patient started complaining of right sided headache, temporal side, radiating to the neck. He still reports right arm weakness secondary to the fall. He reports chronic blurry vision. No tingling or numbness. He denies fever, seizures or LOC. He has no neck stiffness. ROS otherwise negative  ED vitals remarkable for hypertension  (31 May 2023 11:26)      INFECTIOUS DISEASE HISTORY:  ID consulted for possible mastoiditis.  Limited interview due to pain and unable to answer further questions.       PAST MEDICAL & SURGICAL HISTORY:  Diabetes      Hypertension      Pacemaker      Dyslipidemia      History of chronic pancreatitis      Atherosclerosis of native artery of lower extremity      COPD (chronic obstructive pulmonary disease)      CHESTER on CPAP      H/O intestinal obstruction      History of cholecystectomy      History of pancreatic surgery      History of penile implant      Cardiac pacemaker          FAMILY HISTORY  FH: hypertension (Sibling)        SOCIAL HISTORY  Social History:  quit smoking 45 years ago  lives at home  ambulates with cane/walker (31 May 2023 11:26)        ROS  unable to obtain history secondary to patient's mental status and/or sedation      VITALS:  T(F): 96.7, Max: 97.9 (06-04-23 @ 19:44)  HR: 78  BP: 103/62  RR: 18Vital Signs Last 24 Hrs  T(C): 35.9 (05 Jun 2023 12:59), Max: 36.6 (04 Jun 2023 19:44)  T(F): 96.7 (05 Jun 2023 12:59), Max: 97.9 (04 Jun 2023 19:44)  HR: 78 (05 Jun 2023 12:59) (74 - 84)  BP: 103/62 (05 Jun 2023 12:59) (103/62 - 165/74)  BP(mean): --  RR: 18 (05 Jun 2023 12:59) (18 - 19)  SpO2: 98% (05 Jun 2023 12:59) (96% - 98%)        PHYSICAL EXAM:  Gen: NAD, resting in bed  HEENT: Normocephalic, atraumatic  Neck: supple, no lymphadenopathy  CV: Regular rate & regular rhythm  Lungs: decreased BS at bases, no fremitus  Abdomen: Soft, BS present  Ext: Warm, well perfused  Neuro: non focal, awake  Skin: no rash, no erythema  Lines: no phlebitis    TESTS & MEASUREMENTS:                        13.1   5.80  )-----------( 209      ( 04 Jun 2023 06:59 )             38.9     06-04    141  |  108  |  16  ----------------------------<  155<H>  4.0   |  19  |  0.9    Ca    8.9      04 Jun 2023 06:59  Mg     1.9     06-04    TPro  6.7  /  Alb  3.9  /  TBili  <0.2  /  DBili  x   /  AST  19  /  ALT  17  /  AlkPhos  59  06-04      LIVER FUNCTIONS - ( 04 Jun 2023 06:59 )  Alb: 3.9 g/dL / Pro: 6.7 g/dL / ALK PHOS: 59 U/L / ALT: 17 U/L / AST: 19 U/L / GGT: x               Culture - Blood (collected 06-20-22 @ 21:40)  Source: .Blood None  Final Report (06-26-22 @ 09:00):    No Growth Final            INFECTIOUS DISEASES TESTING  COVID-19 PCR: NotDetec (06-23-22 @ 15:50)  COVID-19 PCR: NotDetec (06-20-22 @ 10:00)      RADIOLOGY & ADDITIONAL TESTS:  I have personally reviewed the last Chest xray  CXR      CT      CARDIOLOGY TESTING  12 Lead ECG:   Ventricular Rate 78 BPM    Atrial Rate 78 BPM    P-R Interval 386 ms    QRS Duration 166 ms    Q-T Interval 576 ms    QTC Calculation(Bazett) 656 ms    P Axis 37 degrees    R Axis 261 degrees    T Axis 21 degrees    Diagnosis Line Sinus rhythm [id number] degree A-V block with occasional ventricular-paced  complexes and Premature supraventricular complexes and Fusion complexes  Right bundle branch block  Possible Lateral infarct , age undetermined  Abnormal ECG    Confirmed by Jesus Rivas ([id number])on 6/1/2023 5:09:36 PM (06-01-23 @ 14:54)  12 Lead ECG:   Ventricular Rate 72 BPM    Atrial Rate 72 BPM    P-R Interval 382 ms    QRS Duration 166 ms    Q-T Interval 430 ms    QTC Calculation(Bazett) 470 ms    P Axis 47 degrees    R Axis -71 degrees    T Axis 32 degrees    Diagnosis Line Sinus rhythm [id number] degree A-V block  Right bundle branch block  Left anterior fascicular block  *** Bifascicular block ***  Possible Lateral infarct , age undetermined  Abnormal ECG    Confirmed by Jesus Rivas ([id number]) on 5/31/2023 8:57:48 AM (05-30-23 @ 22:41)      MEDICATIONS  acetaminophen     Tablet .. 975 Oral every 8 hours  albuterol    90 MICROgram(s) HFA Inhaler 2 Inhalation two times a day  albuterol/ipratropium for Nebulization 3 Nebulizer every 6 hours  apixaban 5 Oral two times a day  artificial  tears Solution 1 Both EYES two times a day  budesonide 160 MICROgram(s)/formoterol 4.5 MICROgram(s) Inhaler 2 Inhalation two times a day  chlorhexidine 2% Cloths 1 Topical daily  dexAMETHasone  Injectable 4 IV Push once  dextrose 5%. 1000 IV Continuous <Continuous>  dextrose 5%. 1000 IV Continuous <Continuous>  dextrose 50% Injectable 25 IV Push once  dextrose 50% Injectable 25 IV Push once  dextrose 50% Injectable 12.5 IV Push once  furosemide    Tablet 20 Oral <User Schedule>  glucagon  Injectable 1 IntraMuscular once  insulin glargine Injectable (LANTUS) 15 SubCutaneous at bedtime  insulin lispro (ADMELOG) corrective regimen sliding scale  SubCutaneous three times a day before meals  insulin lispro Injectable (ADMELOG) 5 SubCutaneous three times a day before meals  latanoprost 0.005% Ophthalmic Solution 1 Right EYE at bedtime  losartan 25 Oral daily  montelukast 10 Oral daily  NIFEdipine XL 60 Oral daily  oxyCODONE  ER Tablet 40 Oral every 12 hours  pancrelipase  (CREON 12,000 Lipase Units) 3 Oral four times a day with meals  pantoprazole    Tablet 40 Oral before breakfast  pregabalin 100 Oral two times a day  senna 2 Oral at bedtime  simvastatin 20 Oral at bedtime      Weight  Weight (kg): 86.5 (06-01-23 @ 17:00)    ANTIBIOTICS:      ALLERGIES:  No Known Allergies

## 2023-06-05 NOTE — CONSULT NOTE ADULT - SUBJECTIVE AND OBJECTIVE BOX
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 Patient reports a mechanical fall end of april when he was admitted to [facility] where he was diagnosed with rib contusion w/o Fx. Patient was discharged home on pain medications.  However last week patient started complaining of right sided headache, temporal side, radiating to the neck. He still reports right arm weakness secondary to the fall. He reports chronic blurry vision. No tingling or numbness. He denies fever, seizures or LOC. He has no neck stiffness. ROS otherwise negative  ED vitals remarkable for hypertension  (31 May 2023 11:26)    ENT CONSULT NOTE:    Pt is a 81yo Male with pmh of paroxysmal a.fib (on eliquis), s/p PPM, COPD (on bipap and home O2 prn), HF, DM II on insulin, HTN, glaucoma, chronic pancreatitis admitting with right temporal headaches. ENT consulted for further evaluation of right sided headaches and ?mastoiditis.     PAST MEDICAL & SURGICAL HISTORY:  Diabetes      Hypertension      Pacemaker      Dyslipidemia      History of chronic pancreatitis      Atherosclerosis of native artery of lower extremity      COPD (chronic obstructive pulmonary disease)      CHESTER on CPAP      H/O intestinal obstruction      History of cholecystectomy      History of pancreatic surgery      History of penile implant      Cardiac pacemaker          MEDICATIONS  (STANDING):  acetaminophen     Tablet .. 975 milliGRAM(s) Oral every 8 hours  albuterol    90 MICROgram(s) HFA Inhaler 2 Puff(s) Inhalation two times a day  albuterol/ipratropium for Nebulization 3 milliLiter(s) Nebulizer every 6 hours  apixaban 5 milliGRAM(s) Oral two times a day  artificial  tears Solution 1 Drop(s) Both EYES two times a day  budesonide 160 MICROgram(s)/formoterol 4.5 MICROgram(s) Inhaler 2 Puff(s) Inhalation two times a day  chlorhexidine 2% Cloths 1 Application(s) Topical daily  dexAMETHasone  Injectable 4 milliGRAM(s) IV Push once  dextrose 5%. 1000 milliLiter(s) (100 mL/Hr) IV Continuous <Continuous>  dextrose 5%. 1000 milliLiter(s) (50 mL/Hr) IV Continuous <Continuous>  dextrose 50% Injectable 25 Gram(s) IV Push once  dextrose 50% Injectable 25 Gram(s) IV Push once  dextrose 50% Injectable 12.5 Gram(s) IV Push once  furosemide    Tablet 20 milliGRAM(s) Oral <User Schedule>  glucagon  Injectable 1 milliGRAM(s) IntraMuscular once  insulin glargine Injectable (LANTUS) 15 Unit(s) SubCutaneous at bedtime  insulin lispro (ADMELOG) corrective regimen sliding scale   SubCutaneous three times a day before meals  insulin lispro Injectable (ADMELOG) 5 Unit(s) SubCutaneous three times a day before meals  latanoprost 0.005% Ophthalmic Solution 1 Drop(s) Right EYE at bedtime  losartan 25 milliGRAM(s) Oral daily  montelukast 10 milliGRAM(s) Oral daily  NIFEdipine XL 60 milliGRAM(s) Oral daily  oxyCODONE  ER Tablet 40 milliGRAM(s) Oral every 12 hours  pancrelipase  (CREON 12,000 Lipase Units) 3 Capsule(s) Oral four times a day with meals  pantoprazole    Tablet 40 milliGRAM(s) Oral before breakfast  pregabalin 100 milliGRAM(s) Oral two times a day  senna 2 Tablet(s) Oral at bedtime  simvastatin 20 milliGRAM(s) Oral at bedtime    MEDICATIONS  (PRN):  dextrose Oral Gel 15 Gram(s) Oral once PRN Blood Glucose LESS THAN 70 milliGRAM(s)/deciliter  oxyCODONE    IR 5 milliGRAM(s) Oral every 6 hours PRN Moderate Pain (4 - 6)  SUMAtriptan Injectable 6 milliGRAM(s) SubCutaneous two times a day PRN Migraine      Allergies  No Known Allergies          FAMILY HISTORY:  FH: hypertension (Sibling)        REVIEW OF SYSTEMS   [ ] A ten-point review of systems was otherwise negative except as noted.  [ ] Due to altered mental status/intubation, subjective information were not able to be obtained from patient. History was obtained, to the extent possible, from review of the chart and collateral sources of information.    Vital Signs Last 24 Hrs  T(C): 35.6 (05 Jun 2023 04:00), Max: 36.6 (04 Jun 2023 19:44)  T(F): 96 (05 Jun 2023 04:00), Max: 97.9 (04 Jun 2023 19:44)  HR: 80 (05 Jun 2023 04:00) (74 - 84)  BP: 141/81 (05 Jun 2023 04:00) (141/81 - 165/74)  RR: 18 (04 Jun 2023 19:44) (18 - 19)  SpO2: 96% (05 Jun 2023 04:00) (96% - 98%)      Physical Exam  GEN: NAD, awake and alert. No drooling or pooling of secretions. No stridor or stertor. Good vocal quality, no hoarseness.   SKIN: Good color, non diaphoretic.  HEENT: Oral mucosa pink and moist. No erythema or edema noted to buccal mucosa, tongue, FOM, uvula or posterior oropharynx. Uvula midline.   NECK: Trachea midline, Neck supple, no TTP to B/L lateral neck, no cervical LAD.  RESP: No dyspnea, non-labored breathing. No use of accessory muscles.   CARDIO: +S1/S2  ABDO: Soft, NT.  EXT: BENSON x 4      LABS:                        13.1   5.80  )-----------( 209      ( 04 Jun 2023 06:59 )             38.9     06-04    141  |  108  |  16  ----------------------------<  155<H>  4.0   |  19  |  0.9    Ca    8.9      04 Jun 2023 06:59  Mg     1.9     06-04    TPro  6.7  /  Alb  3.9  /  TBili  <0.2  /  DBili  x   /  AST  19  /  ALT  17  /  AlkPhos  59  06-04          RADIOLOGY & ADDITIONAL STUDIES:  < from: CT Head No Cont (05.30.23 @ 20:53) >    ACC: [id number] EXAM:  CT BRAIN   ORDERED BY: OLIVERIO DANIEL     PROCEDURE DATE:  05/30/2023          INTERPRETATION:  Clinical History / Reason for exam: headache    Technique: CT head without contrast. CT of the head was performed without   contrast injection. Coronal and sagittal reformatted images were   submitted for anatomic correlation.    COMPARISON CT: 6/24/2022    FINDINGS:  Ventricles and sulci are compatible with parenchymal volume loss.    There is no acute intracranial hemorrhage, extra-axial fluid collections   or midline shift.    Bilateral subcortical and periventricular white matter hypodensities,   which likely represent chronic microvascular changes.    There is no depressed calvarial fracture. The mastoid air cells are   aerated.  The paranasal sinuses are aerated.    Beam hardening artifact is noted overlying the brain stem and posterior   fossa which is inherent to CT in this location.    IMPRESSION:    No acute intracranial hemorrhage, mass-effect or midline shift.        --- End of Report ---            JACKSON YOST MD; Attending Radiologist  This document has been electronically signed. May 30 2023 10:15PM    < end of copied text >    < from: CT Neck Soft Tissue w/ IV Cont (06.02.23 @ 23:31) >    ACC: [id number] EXAM:  CT NECK SOFT TISSUE IC   ORDERED BY: TWAN EMERSON     PROCEDURE DATE:  06/02/2023          INTERPRETATION:  Clinical history: Tenderness in the jaw.    Technique:  Multidetector axial CT images of the neck were obtained   following the intravenous administration of 200 cc of nonionic contrast.   Images were reformatted on multiple planes and reviewed in bone and   soft-tissue windows.    Comparison: Correlated with the CTA of the brain dated 5/20/2023    Findings:  Soft tissues: No abnormal enhancement or inflammation in the mandibular   region. Incidental 1.5 cm intramuscular lipoma in the right serratus   anterior.    Aerodigestive structures: Unremarkable. No evidence of abnormal   enhancement.    Thyroid gland: Unremarkable.  Parotid and submandibular glands:  Nonspecific punctate calcification in   the right parotid gland. Otherwise unremarkable bilateral parotid and   submandibular glands.    Lymph nodes: No pathologic adenopathy by imaging size criteria.    Vascular structures: Moderate scattered atherosclerotic calcifications.    Osseous structures: No fracture, dislocation or destructive lesion.   Reversed cervical lordosis with mild anterolisthesis of C3 on C4, C4 on   C5, and C5 and C6.    Paranasal sinuses: Clear.  Mastoid air cells:  Clear.    Partially visualized intracranial structures: Unremarkable. Status post   left cataract surgery.    Partially visualized lung apices: Unremarkable. Partially imaged left   chest wall cardiac device.      Impression:    The etiology of the patient's jaw pain is not elucidated on the current   neck CT.    --- End of Report ---            ANNE KLEIN MD; Attending Radiologist  This document has been electronically signed. Navjot  3 2023  1:16PM    < end of copied text >    < from: CT Angio Head w/ IV Cont (05.31.23 @ 00:15) >    ACC: [id number] EXAM:  CT ANGIO NECK (W)AW IC   ORDERED BY: OLIVERIO DANIEL     ACC: [id number] EXAM:  CT ANGIO BRAIN (W)AW IC   ORDERED BY: OLIVERIO DANIEL     PROCEDURE DATE:  05/31/2023          INTERPRETATION:  CLINICAL INDICATION: Headache    TECHNIQUE: CTA of the head and neck was performed after the intravenous   administration of  70 mL Omnipaque 350 nonionic IV contrast. 3-D   reconstructions were performed under physician supervision on a separate   workstation and reviewed.    NASCET CRITERIA FOR CAROTID STENOSIS:  Mild: 0% to 49%, Moderate: 50% to 69%, Severe: 70% to 99%, Complete   Occlusion      COMPARISON: None available.    FINDINGS:    AORTIC ARCH: Moderate calcified plaque without stenosis.    RIGHT ANTERIOR CIRCULATION:  There is diffuse scattered atherosclerotic calcification throughout the   common carotid artery (CCA)  without flow-limiting stenosis. Of note, the   distal CCA takes a retropharyngeal course. There is moderate focal   calcification at the carotid bulb extending into the proximal cervical   ICA resulting in mild stenosis (40%). The external carotid artery (ECA)   and its proximal branches are patent without stenosis. The remaining   cervical internal carotid artery (ICA) is patent without stenosis. There   isextensive calcification of the right carotid siphon contributing to   mild stenosis in the cavernous/clinoid ICA.    The anterior and middle cerebral arteries are patent without stenosis.      LEFT ANTERIOR CIRCULATION:  There is diffuse scattered atherosclerotic calcification throughout the   CCA without flow-limiting stenosis. There is retropharyngeal course of   the distal CCA. There is mild calcific atherosclerosis in the carotid   bulb extending into the cervical ICA resulting in mild stenosis in the   proximal cervical ICA (20%). The ECA and its proximal branches are patent   without stenosis. The remaining cervical internal carotid artery is   patent without stenosis. There is extensive calcific atherosclerosis in   the carotid siphon resulting in mild stenosis in the cavernous/clinoid   ICA.    The anterior and middle cerebral arteries are patent without stenosis.      POSTERIOR CIRCULATION:  Focal mild stenosis in the origin of the right vertebral artery due to   underlying calcific plaque. The remaining vertebral arteries are patent.   The basilar artery is patent without stenosis.    The posterior cerebral arteries are patent without stenosis.      OTHERS:  No filling defects of the dural venous sinuses or deep cerebral veins.    Scattered patchy groundglass opacities in bilateral lungs, right more   than left.    Subcentimeter mediastinal lymph nodes.      IMPRESSION:    No large vessel occlusion or high-grade vascular stenosis.    Mild atherosclerotic stenosis in the proximal bilateral cervical ICAs,   right more than left.    Mild atherosclerotic stenosis in bilateral carotid siphons.      ATTENDING ADDENDUM:  Agree with above.    --- End of Report ---          CONCEPCION MAYA MD; Resident Radiologist  This documenthas been electronically signed.  ANNE KLEIN MD; Attending Radiologist  This document has been electronically signed. 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 Patient reports a mechanical fall end of april when he was admitted to [facility] where he was diagnosed with rib contusion w/o Fx. Patient was discharged home on pain medications.  However last week patient started complaining of right sided headache, temporal side, radiating to the neck. He still reports right arm weakness secondary to the fall. He reports chronic blurry vision. No tingling or numbness. He denies fever, seizures or LOC. He has no neck stiffness. ROS otherwise negative  ED vitals remarkable for hypertension  (31 May 2023 11:26)    ENT CONSULT NOTE:    Pt is a 81yo Male with pmh of paroxysmal a.fib (on eliquis), s/p PPM, COPD (on bipap and home O2 prn), HF, DM II on insulin, HTN, glaucoma, chronic pancreatitis admitting with right temporal headaches. ENT consulted for further evaluation of right sided headaches and ?mastoiditis. Pt seen and examined by Dr. Fink this morning. Pt admits to pain when biting down to the right side of his jaw. Pt also admits to grinding/clenching teeth sometimes. Denies following with an ENT specialist before. Pt denies fever, chills, otorrhea, tinnitus, hearing loss, or recent illnesses.      PAST MEDICAL & SURGICAL HISTORY:  Diabetes      Hypertension      Pacemaker      Dyslipidemia      History of chronic pancreatitis      Atherosclerosis of native artery of lower extremity      COPD (chronic obstructive pulmonary disease)      CHESTER on CPAP      H/O intestinal obstruction      History of cholecystectomy      History of pancreatic surgery      History of penile implant      Cardiac pacemaker          MEDICATIONS  (STANDING):  acetaminophen     Tablet .. 975 milliGRAM(s) Oral every 8 hours  albuterol    90 MICROgram(s) HFA Inhaler 2 Puff(s) Inhalation two times a day  albuterol/ipratropium for Nebulization 3 milliLiter(s) Nebulizer every 6 hours  apixaban 5 milliGRAM(s) Oral two times a day  artificial  tears Solution 1 Drop(s) Both EYES two times a day  budesonide 160 MICROgram(s)/formoterol 4.5 MICROgram(s) Inhaler 2 Puff(s) Inhalation two times a day  chlorhexidine 2% Cloths 1 Application(s) Topical daily  dexAMETHasone  Injectable 4 milliGRAM(s) IV Push once  dextrose 5%. 1000 milliLiter(s) (100 mL/Hr) IV Continuous <Continuous>  dextrose 5%. 1000 milliLiter(s) (50 mL/Hr) IV Continuous <Continuous>  dextrose 50% Injectable 25 Gram(s) IV Push once  dextrose 50% Injectable 25 Gram(s) IV Push once  dextrose 50% Injectable 12.5 Gram(s) IV Push once  furosemide    Tablet 20 milliGRAM(s) Oral <User Schedule>  glucagon  Injectable 1 milliGRAM(s) IntraMuscular once  insulin glargine Injectable (LANTUS) 15 Unit(s) SubCutaneous at bedtime  insulin lispro (ADMELOG) corrective regimen sliding scale   SubCutaneous three times a day before meals  insulin lispro Injectable (ADMELOG) 5 Unit(s) SubCutaneous three times a day before meals  latanoprost 0.005% Ophthalmic Solution 1 Drop(s) Right EYE at bedtime  losartan 25 milliGRAM(s) Oral daily  montelukast 10 milliGRAM(s) Oral daily  NIFEdipine XL 60 milliGRAM(s) Oral daily  oxyCODONE  ER Tablet 40 milliGRAM(s) Oral every 12 hours  pancrelipase  (CREON 12,000 Lipase Units) 3 Capsule(s) Oral four times a day with meals  pantoprazole    Tablet 40 milliGRAM(s) Oral before breakfast  pregabalin 100 milliGRAM(s) Oral two times a day  senna 2 Tablet(s) Oral at bedtime  simvastatin 20 milliGRAM(s) Oral at bedtime    MEDICATIONS  (PRN):  dextrose Oral Gel 15 Gram(s) Oral once PRN Blood Glucose LESS THAN 70 milliGRAM(s)/deciliter  oxyCODONE    IR 5 milliGRAM(s) Oral every 6 hours PRN Moderate Pain (4 - 6)  SUMAtriptan Injectable 6 milliGRAM(s) SubCutaneous two times a day PRN Migraine      Allergies  No Known Allergies          FAMILY HISTORY:  FH: hypertension (Sibling)        REVIEW OF SYSTEMS   [x] A ten-point review of systems was otherwise negative except as noted.      Vital Signs Last 24 Hrs  T(C): 35.6 (05 Jun 2023 04:00), Max: 36.6 (04 Jun 2023 19:44)  T(F): 96 (05 Jun 2023 04:00), Max: 97.9 (04 Jun 2023 19:44)  HR: 80 (05 Jun 2023 04:00) (74 - 84)  BP: 141/81 (05 Jun 2023 04:00) (141/81 - 165/74)  RR: 18 (04 Jun 2023 19:44) (18 - 19)  SpO2: 96% (05 Jun 2023 04:00) (96% - 98%)      Physical Exam  GEN: NAD, awake and alert. No drooling or pooling of secretions. No stridor or stertor. Good vocal quality, no hoarseness.   SKIN: Good color, non diaphoretic.  HEENT: Oral mucosa pink and moist. No erythema or edema noted to buccal mucosa, tongue, FOM, uvula or posterior oropharynx. Uvula midline. + RIGHT TMJ tenderness noted.    Ears: Right - No pre/post auricular or tragal TTP. No post auricular erythema or fluctuance. EAC WNL. TM intact without effusion or erythema.            Left - No pre/post auricular or tragal TTP. No post auricular erythema or fluctuance. EAC WNL. TM intact without effusion or erythema.  NECK: Trachea midline, Neck supple, no TTP to B/L lateral neck, no cervical LAD.  RESP: No dyspnea, non-labored breathing. No use of accessory muscles.   ABDO: Soft, NT.  EXT: BENSON x 4        LABS:                        13.1   5.80  )-----------( 209      ( 04 Jun 2023 06:59 )             38.9     06-04    141  |  108  |  16  ----------------------------<  155<H>  4.0   |  19  |  0.9    Ca    8.9      04 Jun 2023 06:59  Mg     1.9     06-04    TPro  6.7  /  Alb  3.9  /  TBili  <0.2  /  DBili  x   /  AST  19  /  ALT  17  /  AlkPhos  59  06-04          RADIOLOGY & ADDITIONAL STUDIES:  < from: CT Head No Cont (05.30.23 @ 20:53) >    ACC: [id number] EXAM:  CT BRAIN   ORDERED BY: OLIVERIO DANIEL     PROCEDURE DATE:  05/30/2023          INTERPRETATION:  Clinical History / Reason for exam: headache    Technique: CT head without contrast. CT of the head was performed without   contrast injection. Coronal and sagittal reformatted images were   submitted for anatomic correlation.    COMPARISON CT: 6/24/2022    FINDINGS:  Ventricles and sulci are compatible with parenchymal volume loss.    There is no acute intracranial hemorrhage, extra-axial fluid collections   or midline shift.    Bilateral subcortical and periventricular white matter hypodensities,   which likely represent chronic microvascular changes.    There is no depressed calvarial fracture. The mastoid air cells are   aerated.  The paranasal sinuses are aerated.    Beam hardening artifact is noted overlying the brain stem and posterior   fossa which is inherent to CT in this location.    IMPRESSION:    No acute intracranial hemorrhage, mass-effect or midline shift.        --- End of Report ---            JACKSON YOST MD; Attending Radiologist  This document has been electronically signed. May 30 2023 10:15PM    < end of copied text >    < from: CT Neck Soft Tissue w/ IV Cont (06.02.23 @ 23:31) >    ACC: [id number] EXAM:  CT NECK SOFT TISSUE IC   ORDERED BY: TWAN EMERSON     PROCEDURE DATE:  06/02/2023          INTERPRETATION:  Clinical history: Tenderness in the jaw.    Technique:  Multidetector axial CT images of the neck were obtained   following the intravenous administration of 200 cc of nonionic contrast.   Images were reformatted on multiple planes and reviewed in bone and   soft-tissue windows.    Comparison: Correlated with the CTA of the brain dated 5/20/2023    Findings:  Soft tissues: No abnormal enhancement or inflammation in the mandibular   region. Incidental 1.5 cm intramuscular lipoma in the right serratus   anterior.    Aerodigestive structures: Unremarkable. No evidence of abnormal   enhancement.    Thyroid gland: Unremarkable.  Parotid and submandibular glands:  Nonspecific punctate calcification in   the right parotid gland. Otherwise unremarkable bilateral parotid and   submandibular glands.    Lymph nodes: No pathologic adenopathy by imaging size criteria.    Vascular structures: Moderate scattered atherosclerotic calcifications.    Osseous structures: No fracture, dislocation or destructive lesion.   Reversed cervical lordosis with mild anterolisthesis of C3 on C4, C4 on   C5, and C5 and C6.    Paranasal sinuses: Clear.  Mastoid air cells:  Clear.    Partially visualized intracranial structures: Unremarkable. Status post   left cataract surgery.    Partially visualized lung apices: Unremarkable. Partially imaged left   chest wall cardiac device.      Impression:    The etiology of the patient's jaw pain is not elucidated on the current   neck CT.    --- End of Report ---            ANNE KLEIN MD; Attending Radiologist  This document has been electronically signed. Navjot  3 2023  1:16PM    < end of copied text >    < from: CT Angio Head w/ IV Cont (05.31.23 @ 00:15) >    ACC: [id number] EXAM:  CT ANGIO NECK (W)AW IC   ORDERED BY: OLIVERIO DANIEL     ACC: [id number] EXAM:  CT ANGIO BRAIN (W)AW IC   ORDERED BY: OLIVERIO DANIEL     PROCEDURE DATE:  05/31/2023          INTERPRETATION:  CLINICAL INDICATION: Headache    TECHNIQUE: CTA of the head and neck was performed after the intravenous   administration of  70 mL Omnipaque 350 nonionic IV contrast. 3-D   reconstructions were performed under physician supervision on a separate   workstation and reviewed.    NASCET CRITERIA FOR CAROTID STENOSIS:  Mild: 0% to 49%, Moderate: 50% to 69%, Severe: 70% to 99%, Complete   Occlusion      COMPARISON: None available.    FINDINGS:    AORTIC ARCH: Moderate calcified plaque without stenosis.    RIGHT ANTERIOR CIRCULATION:  There is diffuse scattered atherosclerotic calcification throughout the   common carotid artery (CCA)  without flow-limiting stenosis. Of note, the   distal CCA takes a retropharyngeal course. There is moderate focal   calcification at the carotid bulb extending into the proximal cervical   ICA resulting in mild stenosis (40%). The external carotid artery (ECA)   and its proximal branches are patent without stenosis. The remaining   cervical internal carotid artery (ICA) is patent without stenosis. There   isextensive calcification of the right carotid siphon contributing to   mild stenosis in the cavernous/clinoid ICA.    The anterior and middle cerebral arteries are patent without stenosis.      LEFT ANTERIOR CIRCULATION:  There is diffuse scattered atherosclerotic calcification throughout the   CCA without flow-limiting stenosis. There is retropharyngeal course of   the distal CCA. There is mild calcific atherosclerosis in the carotid   bulb extending into the cervical ICA resulting in mild stenosis in the   proximal cervical ICA (20%). The ECA and its proximal branches are patent   without stenosis. The remaining cervical internal carotid artery is   patent without stenosis. There is extensive calcific atherosclerosis in   the carotid siphon resulting in mild stenosis in the cavernous/clinoid   ICA.    The anterior and middle cerebral arteries are patent without stenosis.      POSTERIOR CIRCULATION:  Focal mild stenosis in the origin of the right vertebral artery due to   underlying calcific plaque. The remaining vertebral arteries are patent.   The basilar artery is patent without stenosis.    The posterior cerebral arteries are patent without stenosis.      OTHERS:  No filling defects of the dural venous sinuses or deep cerebral veins.    Scattered patchy groundglass opacities in bilateral lungs, right more   than left.    Subcentimeter mediastinal lymph nodes.      IMPRESSION:    No large vessel occlusion or high-grade vascular stenosis.    Mild atherosclerotic stenosis in the proximal bilateral cervical ICAs,   right more than left.    Mild atherosclerotic stenosis in bilateral carotid siphons.      ATTENDING ADDENDUM:  Agree with above.    --- End of Report ---          CONCEPCION MAYA MD; Resident Radiologist  This documenthas been electronically signed.  ANNE KLENI MD; Attending Radiologist  This document has been electronically signed. May 31 2023  9:08AM    < end of copied text >

## 2023-06-05 NOTE — CONSULT NOTE ADULT - CONSULT REQUESTED DATE/TIME
01-Jun-2023 18:00
01-Jun-2023 11:50
04-Jun-2023 15:21
05-Jun-2023 13:51
01-Jun-2023 13:14
05-Jun-2023 11:18
05-Jun-2023 00:16

## 2023-06-05 NOTE — PROGRESS NOTE ADULT - SUBJECTIVE AND OBJECTIVE BOX
Patient is a 82y old  Male who presents with a chief complaint of Right sided headache (04 Jun 2023 15:20)      OVERNIGHT EVENTS:   headache is better, no blurry visions,   denies fever, chills, syncope, seizure, headache, cough, SOB, abd pain, N/V/D, urinary symptoms, legs swelling,    SUBJECTIVE / INTERVAL HPI: Patient seen and examined at bedside.     VITAL SIGNS:  Vital Signs Last 24 Hrs  T(C): 35.6 (05 Jun 2023 04:00), Max: 36.6 (04 Jun 2023 19:44)  T(F): 96 (05 Jun 2023 04:00), Max: 97.9 (04 Jun 2023 19:44)  HR: 80 (05 Jun 2023 04:00) (74 - 84)  BP: 141/81 (05 Jun 2023 04:00) (141/81 - 165/74)  BP(mean): --  RR: 18 (04 Jun 2023 19:44) (18 - 19)  SpO2: 96% (05 Jun 2023 04:00) (96% - 98%)        PHYSICAL EXAM:    General: WDWN  HEENT: NC/AT; PERRL, clear conjunctiva  Neck: supple, resolving tenderness at the rt temporal and around the right ear   Cardiovascular: +S1/S2; RRR  Respiratory: CTA b/l; no W/R/R  Gastrointestinal: soft, NT/ND; +BSx4  Extremities: WWP; 2+ peripheral pulses; no edema   Neurological: AAOx3; no focal deficits    MEDICATIONS:  MEDICATIONS  (STANDING):  acetaminophen     Tablet .. 975 milliGRAM(s) Oral every 8 hours  albuterol    90 MICROgram(s) HFA Inhaler 2 Puff(s) Inhalation two times a day  albuterol/ipratropium for Nebulization 3 milliLiter(s) Nebulizer every 6 hours  apixaban 5 milliGRAM(s) Oral two times a day  artificial  tears Solution 1 Drop(s) Both EYES two times a day  budesonide 160 MICROgram(s)/formoterol 4.5 MICROgram(s) Inhaler 2 Puff(s) Inhalation two times a day  chlorhexidine 2% Cloths 1 Application(s) Topical daily  dextrose 5%. 1000 milliLiter(s) (50 mL/Hr) IV Continuous <Continuous>  dextrose 5%. 1000 milliLiter(s) (100 mL/Hr) IV Continuous <Continuous>  dextrose 50% Injectable 12.5 Gram(s) IV Push once  dextrose 50% Injectable 25 Gram(s) IV Push once  dextrose 50% Injectable 25 Gram(s) IV Push once  furosemide    Tablet 20 milliGRAM(s) Oral <User Schedule>  glucagon  Injectable 1 milliGRAM(s) IntraMuscular once  insulin glargine Injectable (LANTUS) 15 Unit(s) SubCutaneous at bedtime  insulin lispro (ADMELOG) corrective regimen sliding scale   SubCutaneous three times a day before meals  insulin lispro Injectable (ADMELOG) 5 Unit(s) SubCutaneous three times a day before meals  latanoprost 0.005% Ophthalmic Solution 1 Drop(s) Right EYE at bedtime  losartan 25 milliGRAM(s) Oral daily  montelukast 10 milliGRAM(s) Oral daily  NIFEdipine XL 60 milliGRAM(s) Oral daily  oxyCODONE  ER Tablet 40 milliGRAM(s) Oral every 12 hours  pancrelipase  (CREON 12,000 Lipase Units) 3 Capsule(s) Oral four times a day with meals  pantoprazole    Tablet 40 milliGRAM(s) Oral before breakfast  pregabalin 100 milliGRAM(s) Oral two times a day  senna 2 Tablet(s) Oral at bedtime  simvastatin 20 milliGRAM(s) Oral at bedtime    MEDICATIONS  (PRN):  dextrose Oral Gel 15 Gram(s) Oral once PRN Blood Glucose LESS THAN 70 milliGRAM(s)/deciliter  oxyCODONE    IR 5 milliGRAM(s) Oral every 6 hours PRN Moderate Pain (4 - 6)  SUMAtriptan Injectable 6 milliGRAM(s) SubCutaneous two times a day PRN Migraine      ALLERGIES:  Allergies    No Known Allergies    Intolerances        LABS:                        13.1   5.80  )-----------( 209      ( 04 Jun 2023 06:59 )             38.9     06-04    141  |  108  |  16  ----------------------------<  155<H>  4.0   |  19  |  0.9    Ca    8.9      04 Jun 2023 06:59  Mg     1.9     06-04    TPro  6.7  /  Alb  3.9  /  TBili  <0.2  /  DBili  x   /  AST  19  /  ALT  17  /  AlkPhos  59  06-04        CAPILLARY BLOOD GLUCOSE      POCT Blood Glucose.: 168 mg/dL (05 Jun 2023 07:59)      RADIOLOGY & ADDITIONAL TESTS: Reviewed.    ASSESSMENT:    PLAN:

## 2023-06-05 NOTE — CONSULT NOTE ADULT - REASON FOR ADMISSION
Right sided headache

## 2023-06-05 NOTE — CONSULT NOTE ADULT - NS ATTEND AMEND GEN_ALL_CORE FT
Agree with above, no need for acute neurosurgical intervention at this time. Mr. Rea has a longstanding history of chronic lower back pain under the care of pain management physician Dr. Bear.  He was not interested in neurosurgical intervention for his lower back nor his cervical spine at this time given his multiple medical comorbidities.    I do not feel that he needs acute neurosurgical intervention as his neurologic exam is full strength in the bilateral upper extremities and much of his neck pain is myofascial in nature.  He does not endorse radicular type of pain or signs or symptoms of myelopathy.    He does not require additional imaging of his spine at this time.  I do recommend continued medical conservative management of his pain.  Recommend inpatient pain management consult if needed and outpatient follow-up with Dr. Bear for possible trigger point injections versus epidural steroid injections.    Care and follow-up with Dr. Bear may follow-up with neurosurgery on an as-needed basis.  All the patient's questions were answered and he was in full agreement with the plan above.  Thank you for allowing us to participate in the care of this patient.
plan as per above

## 2023-06-05 NOTE — PROGRESS NOTE ADULT - ASSESSMENT
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 neg CT neck soft tissue,  - pending ID eval for mastoiditis, doubt as esr, crp and wbc not suggestive   - ENT eval  - if not and pt still c/o pain then should get biopsy, surgery as outpt   - seen by opthalmology  - other etiology could be migraine, can try dexamethasone   - pain control  - if GCA ruled out can benefit from trigger point injections versus epidural steroid injections  - dvt ppx  - from home .    - pending: ID/ ENT eval, improvement of pain

## 2023-06-05 NOTE — CONSULT NOTE ADULT - ASSESSMENT
Patient is an 81 y/o man with history of Afib on ATC, COPD, CHFmrEF (45%), DMII, HTN, HL, glaucoma, CHESTER, chronic pancreatitis, and chronic neck and low back pain who was admitted on 5/31/2023 with pain over the right side of the head.

## 2023-06-05 NOTE — DISCHARGE NOTE NURSING/CASE MANAGEMENT/SOCIAL WORK - PATIENT PORTAL LINK FT
You can access the FollowMyHealth Patient Portal offered by [facility] by registering at the following website: [email or web address]. By joining [facility]’s FollowMyHealth portal, you will also be able to view your health information using other applications (apps) compatible with our system.

## 2023-06-05 NOTE — CONSULT NOTE ADULT - CONSULT REASON
Pt with hx of glaucoma    now with acute vision change
spinal stenosis  disc bulge c3c4
Mastoiditis
r/o giant cell artritis
right sided head pain/headache
temporal artery bx to r/o temporal arteritis
right sided HAs, ?Mastoiditis?

## 2023-06-05 NOTE — CONSULT NOTE ADULT - ASSESSMENT
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     IMPRESSION  #Right Temporal Headache  - CT Neck Soft tissue 6/2 - mastoid air cells clear - no clear etiology radiologically for temporal headache   - Sedimentation Rate, Erythrocyte: 12 mm/Hr (06.01.23 @ 12:12), C-Reactive Protein, Serum: 34.5 mg/L (06.01.23 @ 12:12)    #Atrial Fibrillation s/p PPM  #COPD   #HFrEF  #DM II     #Abx allergy: No Known Allergies    RECOMMENDATIONS  This is a preliminary incomplete pended note, all final recommendations to follow after interview and examination of the patient.    Please call or message on [employer] Teams if with any questions.  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     IMPRESSION  #Right Temporal Headache/Facial Pain   - CT Neck Soft tissue 6/2 - mastoid air cells clear - no clear etiology radiologically for temporal headache   - Sedimentation Rate, Erythrocyte: 12 mm/Hr (06.01.23 @ 12:12), C-Reactive Protein, Serum: 34.5 mg/L (06.01.23 @ 12:12)    #Atrial Fibrillation s/p PPM  #COPD   #HFrEF  #DM II     #Abx allergy: No Known Allergies    RECOMMENDATIONS  - CT neck soft tissue with clear mastoid air cells -- no overlying rash --check Varicella zoster IgM and IgG to see if potentially Zoster sine herpete although low suspicion   - differential includes painful post-traumatic trigeminal neuralgia (reported history of fall) - also on chronic opioids   - monitor off antibiotics     Please call or message on [employer] Teams if with any questions.  Spectra [id number]

## 2023-06-06 LAB
ANION GAP SERPL CALC-SCNC: 14 MMOL/L — SIGNIFICANT CHANGE UP (ref 7–14)
BUN SERPL-MCNC: 22 MG/DL — HIGH (ref 10–20)
CALCIUM SERPL-MCNC: 9.2 MG/DL — SIGNIFICANT CHANGE UP (ref 8.4–10.5)
CHLORIDE SERPL-SCNC: 103 MMOL/L — SIGNIFICANT CHANGE UP (ref 98–110)
CO2 SERPL-SCNC: 19 MMOL/L — SIGNIFICANT CHANGE UP (ref 17–32)
CREAT SERPL-MCNC: 1 MG/DL — SIGNIFICANT CHANGE UP (ref 0.7–1.5)
EGFR: 76 ML/MIN/1.73M2 — SIGNIFICANT CHANGE UP
GLUCOSE BLDC GLUCOMTR-MCNC: 105 MG/DL — HIGH (ref 70–99)
GLUCOSE BLDC GLUCOMTR-MCNC: 122 MG/DL — HIGH (ref 70–99)
GLUCOSE BLDC GLUCOMTR-MCNC: 147 MG/DL — HIGH (ref 70–99)
GLUCOSE BLDC GLUCOMTR-MCNC: 283 MG/DL — HIGH (ref 70–99)
GLUCOSE SERPL-MCNC: 157 MG/DL — HIGH (ref 70–99)
HCT VFR BLD CALC: 38.3 % — LOW (ref 42–52)
HGB BLD-MCNC: 12.9 G/DL — LOW (ref 14–18)
MAGNESIUM SERPL-MCNC: 2 MG/DL — SIGNIFICANT CHANGE UP (ref 1.8–2.4)
MCHC RBC-ENTMCNC: 31.9 PG — HIGH (ref 27–31)
MCHC RBC-ENTMCNC: 33.7 G/DL — SIGNIFICANT CHANGE UP (ref 32–37)
MCV RBC AUTO: 94.8 FL — HIGH (ref 80–94)
NRBC # BLD: 0 /100 WBCS — SIGNIFICANT CHANGE UP (ref 0–0)
PLATELET # BLD AUTO: 213 K/UL — SIGNIFICANT CHANGE UP (ref 130–400)
PMV BLD: 10.1 FL — SIGNIFICANT CHANGE UP (ref 7.4–10.4)
POTASSIUM SERPL-MCNC: 4.8 MMOL/L — SIGNIFICANT CHANGE UP (ref 3.5–5)
POTASSIUM SERPL-SCNC: 4.8 MMOL/L — SIGNIFICANT CHANGE UP (ref 3.5–5)
RBC # BLD: 4.04 M/UL — LOW (ref 4.7–6.1)
RBC # FLD: 13.6 % — SIGNIFICANT CHANGE UP (ref 11.5–14.5)
SODIUM SERPL-SCNC: 136 MMOL/L — SIGNIFICANT CHANGE UP (ref 135–146)
WBC # BLD: 6.81 K/UL — SIGNIFICANT CHANGE UP (ref 4.8–10.8)
WBC # FLD AUTO: 6.81 K/UL — SIGNIFICANT CHANGE UP (ref 4.8–10.8)

## 2023-06-06 PROCEDURE — 99232 SBSQ HOSP IP/OBS MODERATE 35: CPT

## 2023-06-06 RX ORDER — OXYCODONE HYDROCHLORIDE 5 MG/1
15 TABLET ORAL EVERY 4 HOURS
Refills: 0 | Status: DISCONTINUED | OUTPATIENT
Start: 2023-06-06 | End: 2023-06-07

## 2023-06-06 RX ORDER — HYDROMORPHONE HYDROCHLORIDE 2 MG/ML
1 INJECTION INTRAMUSCULAR; INTRAVENOUS; SUBCUTANEOUS EVERY 6 HOURS
Refills: 0 | Status: DISCONTINUED | OUTPATIENT
Start: 2023-06-06 | End: 2023-06-06

## 2023-06-06 RX ORDER — OXYCODONE HYDROCHLORIDE 5 MG/1
40 TABLET ORAL EVERY 8 HOURS
Refills: 0 | Status: DISCONTINUED | OUTPATIENT
Start: 2023-06-06 | End: 2023-06-07

## 2023-06-06 RX ORDER — OXYCODONE HYDROCHLORIDE 5 MG/1
15 TABLET ORAL EVERY 6 HOURS
Refills: 0 | Status: DISCONTINUED | OUTPATIENT
Start: 2023-06-06 | End: 2023-06-06

## 2023-06-06 RX ORDER — HYDROMORPHONE HYDROCHLORIDE 2 MG/ML
0.5 INJECTION INTRAMUSCULAR; INTRAVENOUS; SUBCUTANEOUS EVERY 6 HOURS
Refills: 0 | Status: DISCONTINUED | OUTPATIENT
Start: 2023-06-06 | End: 2023-06-07

## 2023-06-06 RX ADMIN — CHLORHEXIDINE GLUCONATE 1 APPLICATION(S): 213 SOLUTION TOPICAL at 11:07

## 2023-06-06 RX ADMIN — INSULIN GLARGINE 15 UNIT(S): 100 INJECTION, SOLUTION SUBCUTANEOUS at 21:23

## 2023-06-06 RX ADMIN — LATANOPROST 1 DROP(S): 0.05 SOLUTION/ DROPS OPHTHALMIC; TOPICAL at 21:15

## 2023-06-06 RX ADMIN — PANTOPRAZOLE SODIUM 40 MILLIGRAM(S): 20 TABLET, DELAYED RELEASE ORAL at 05:31

## 2023-06-06 RX ADMIN — Medication 3 CAPSULE(S): at 17:17

## 2023-06-06 RX ADMIN — Medication 1 DROP(S): at 17:20

## 2023-06-06 RX ADMIN — LOSARTAN POTASSIUM 25 MILLIGRAM(S): 100 TABLET, FILM COATED ORAL at 11:05

## 2023-06-06 RX ADMIN — OXYCODONE HYDROCHLORIDE 40 MILLIGRAM(S): 5 TABLET ORAL at 14:55

## 2023-06-06 RX ADMIN — Medication 975 MILLIGRAM(S): at 05:32

## 2023-06-06 RX ADMIN — MONTELUKAST 10 MILLIGRAM(S): 4 TABLET, CHEWABLE ORAL at 11:05

## 2023-06-06 RX ADMIN — Medication 1 DROP(S): at 05:32

## 2023-06-06 RX ADMIN — Medication 3 CAPSULE(S): at 12:26

## 2023-06-06 RX ADMIN — Medication 3 CAPSULE(S): at 21:15

## 2023-06-06 RX ADMIN — APIXABAN 5 MILLIGRAM(S): 2.5 TABLET, FILM COATED ORAL at 17:17

## 2023-06-06 RX ADMIN — BUDESONIDE AND FORMOTEROL FUMARATE DIHYDRATE 2 PUFF(S): 160; 4.5 AEROSOL RESPIRATORY (INHALATION) at 09:32

## 2023-06-06 RX ADMIN — Medication 60 MILLIGRAM(S): at 11:05

## 2023-06-06 RX ADMIN — OXYCODONE HYDROCHLORIDE 40 MILLIGRAM(S): 5 TABLET ORAL at 05:31

## 2023-06-06 RX ADMIN — Medication 5 UNIT(S): at 09:09

## 2023-06-06 RX ADMIN — Medication 50 MILLIGRAM(S): at 17:16

## 2023-06-06 RX ADMIN — Medication 975 MILLIGRAM(S): at 14:54

## 2023-06-06 RX ADMIN — OXYCODONE HYDROCHLORIDE 40 MILLIGRAM(S): 5 TABLET ORAL at 06:03

## 2023-06-06 RX ADMIN — Medication 3 CAPSULE(S): at 09:33

## 2023-06-06 RX ADMIN — Medication 3: at 12:27

## 2023-06-06 RX ADMIN — Medication 975 MILLIGRAM(S): at 21:14

## 2023-06-06 RX ADMIN — SIMVASTATIN 20 MILLIGRAM(S): 20 TABLET, FILM COATED ORAL at 21:15

## 2023-06-06 RX ADMIN — Medication 5 UNIT(S): at 12:27

## 2023-06-06 RX ADMIN — Medication 975 MILLIGRAM(S): at 15:15

## 2023-06-06 RX ADMIN — OXYCODONE HYDROCHLORIDE 5 MILLIGRAM(S): 5 TABLET ORAL at 04:36

## 2023-06-06 RX ADMIN — OXYCODONE HYDROCHLORIDE 5 MILLIGRAM(S): 5 TABLET ORAL at 05:06

## 2023-06-06 RX ADMIN — APIXABAN 5 MILLIGRAM(S): 2.5 TABLET, FILM COATED ORAL at 05:32

## 2023-06-06 NOTE — PROGRESS NOTE ADULT - SUBJECTIVE AND OBJECTIVE BOX
EZ ALCARAZ 82y Male  MRN#: [id number]   CODE STATUS: full    Hospital Day: 6d    Pt is currently admitted with the primary diagnosis of headache    SUBJECTIVE  Hospital Course    Overnight events: No acute events overnight. Pain regimen adjusted this AM per pain team. Pain has been improving even without adjustments of pain meds yesterday, but still present. Has chronic belly pain that is stable. Otherwise, no other symptomatic complaints.     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    COPD (chronic obstructive pulmonary disease)    CHESTER on CPAP    H/O intestinal obstruction    History of cholecystectomy    History of pancreatic surgery    History of penile implant    Cardiac pacemaker                                              -----------------------------------------------------------  ALLERGIES:  No Known Allergies                                            ------------------------------------------------------------    HOME MEDICATIONS  Home Medications:  Breo Ellipta 100 mcg-25 mcg/inh inhalation powder: 1 puff(s) inhaled once a day (20 Jun 2022 18:22)  Eliquis 5 mg tablet: 1 tab(s) orally every 12 hours (21 Jun 2022 14:17)  latanoprost 0.005% ophthalmic solution: 1 drop(s) to each affected eye once a day (in the evening) (20 Jun 2022 18:22)  montelukast 10 mg oral tablet: 1 tab(s) orally once a day (20 Jun 2022 18:22)  omeprazole 40 mg oral delayed release capsule: 1 cap(s) orally once a day (20 Jun 2022 18:22)  oxyMORphone 40 mg oral tablet, extended release: 1 tab(s) orally every 12 hours (20 Jun 2022 18:26)  pregabalin 75 mg capsule: 1 cap(s) orally once a day (21 Jun 2022 14:17)                           MEDICATIONS:  STANDING MEDICATIONS  acetaminophen     Tablet .. 975 milliGRAM(s) Oral every 8 hours  albuterol    90 MICROgram(s) HFA Inhaler 2 Puff(s) Inhalation two times a day  albuterol/ipratropium for Nebulization 3 milliLiter(s) Nebulizer every 6 hours  apixaban 5 milliGRAM(s) Oral two times a day  artificial  tears Solution 1 Drop(s) Both EYES two times a day  budesonide 160 MICROgram(s)/formoterol 4.5 MICROgram(s) Inhaler 2 Puff(s) Inhalation two times a day  chlorhexidine 2% Cloths 1 Application(s) Topical daily  dextrose 5%. 1000 milliLiter(s) IV Continuous <Continuous>  dextrose 5%. 1000 milliLiter(s) IV Continuous <Continuous>  dextrose 50% Injectable 12.5 Gram(s) IV Push once  dextrose 50% Injectable 25 Gram(s) IV Push once  dextrose 50% Injectable 25 Gram(s) IV Push once  furosemide    Tablet 20 milliGRAM(s) Oral <User Schedule>  glucagon  Injectable 1 milliGRAM(s) IntraMuscular once  insulin glargine Injectable (LANTUS) 15 Unit(s) SubCutaneous at bedtime  insulin lispro (ADMELOG) corrective regimen sliding scale   SubCutaneous three times a day before meals  insulin lispro Injectable (ADMELOG) 5 Unit(s) SubCutaneous three times a day before meals  latanoprost 0.005% Ophthalmic Solution 1 Drop(s) Right EYE at bedtime  losartan 25 milliGRAM(s) Oral daily  montelukast 10 milliGRAM(s) Oral daily  NIFEdipine XL 60 milliGRAM(s) Oral daily  oxyCODONE  ER Tablet 40 milliGRAM(s) Oral every 8 hours  pancrelipase  (CREON 12,000 Lipase Units) 3 Capsule(s) Oral four times a day with meals  pantoprazole    Tablet 40 milliGRAM(s) Oral before breakfast  senna 2 Tablet(s) Oral at bedtime  simvastatin 20 milliGRAM(s) Oral at bedtime    PRN MEDICATIONS  dextrose Oral Gel 15 Gram(s) Oral once PRN  HYDROmorphone  Injectable 1 milliGRAM(s) IV Push every 6 hours PRN  oxyCODONE    IR 15 milliGRAM(s) Oral every 6 hours PRN  SUMAtriptan Injectable 6 milliGRAM(s) SubCutaneous two times a day PRN                                            ------------------------------------------------------------  VITAL SIGNS: Last 24 Hours  T(C): 36.6 (06 Jun 2023 05:00), Max: 36.6 (06 Jun 2023 05:00)  T(F): 97.9 (06 Jun 2023 05:00), Max: 97.9 (06 Jun 2023 05:00)  HR: 73 (06 Jun 2023 05:00) (72 - 78)  BP: 108/57 (06 Jun 2023 05:00) (103/62 - 129/61)  BP(mean): --  RR: 18 (06 Jun 2023 05:00) (18 - 18)  SpO2: 97% (05 Jun 2023 20:05) (97% - 98%)                                             --------------------------------------------------------------  LABS:                        13.8   7.82  )-----------( 221      ( 05 Jun 2023 20:39 )             41.7     06-05    137  |  103  |  18  ----------------------------<  175<H>  4.5   |  18  |  1.1    Ca    9.2      05 Jun 2023 20:39  Mg     1.9     06-05    TPro  7.4  /  Alb  4.2  /  TBili  0.3  /  DBili  x   /  AST  20  /  ALT  17  /  AlkPhos  61  06-05        ABG - ( 04 Jun 2023 13:13 )  pH, Arterial: 7.41  pH, Blood: x     /  pCO2: 31    /  pO2: 74    / HCO3: 20    / Base Excess: -4.0  /  SaO2: 96.7                                                                  -------------------------------------------------------------  RADIOLOGY:                                            --------------------------------------------------------------    PHYSICAL EXAM:  General: Well appearing, not in acute distress  HEENT: No cervical LAD, or JVD, no tenderness to palpation   LUNGS: CTAB, no rales, on RA  HEART: RRR, no murmur  ABDOMEN: NBS, soft, mild tender to palpation  EXT: no peripheral pitting edema b/l  NEURO: AAOx3  SKIN: No skin rash, open wounds                                           --------------------------------------------------------------                                                                               --------------------------------------------------------    # Handoff

## 2023-06-06 NOTE — PROGRESS NOTE ADULT - ASSESSMENT
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  - cleared from NS for cervical radiculopathy  - seen by Rheum, atypical for GCA but given age still possible, re called the team today to evaluate for pulse steroid as most of gerardo not revealing and pt still in pain,   - pt is poor historian and occasionaly report vision problems but no blurry vision   - ID eval: not mastoiditis, can get Varicella zoster IgM and IgG to see if potentially Zoster sine herpete although low suspicion   - ENT eval appreciated   - seen by opthalmology  - didnt respond to Sumatriptan   - pain control as per pain management recommendation, watch for opiate overdose,   - if GCA ruled out can benefit from trigger point injections versus epidural steroid injections  - had episode of confusion and drowsiness yesterday suspected from hydromorphone, pending REEG  - dvt ppx  - from home .    - pending: rheum re eval, improvement of pain

## 2023-06-06 NOTE — PROGRESS NOTE ADULT - SUBJECTIVE AND OBJECTIVE BOX
Patient is a 82y old  Male who presents with a chief complaint of Right sided headache (06 Jun 2023 08:03)      OVERNIGHT EVENTS: was confused last evening suspected from opiate and increase lyrica dose  pt still c/o intermittent severe headache     SUBJECTIVE / INTERVAL HPI: Patient seen and examined at bedside.     VITAL SIGNS:  Vital Signs Last 24 Hrs  T(C): 36.6 (06 Jun 2023 05:00), Max: 36.6 (06 Jun 2023 05:00)  T(F): 97.9 (06 Jun 2023 05:00), Max: 97.9 (06 Jun 2023 05:00)  HR: 73 (06 Jun 2023 05:00) (72 - 78)  BP: 108/57 (06 Jun 2023 05:00) (103/62 - 129/61)  BP(mean): --  RR: 18 (06 Jun 2023 05:00) (18 - 18)  SpO2: 97% (05 Jun 2023 20:05) (97% - 98%)        PHYSICAL EXAM:    General: WDWN  HEENT: NC/AT; PERRL, clear conjunctiva  Neck: supple, resolving tenderness at the rt temporal and around the right ear   Cardiovascular: +S1/S2; RRR  Respiratory: CTA b/l; no W/R/R  Gastrointestinal: soft, NT/ND; +BSx4  Extremities: WWP; 2+ peripheral pulses; no edema   Neurological: AAOx3; no focal deficits    MEDICATIONS:  MEDICATIONS  (STANDING):  acetaminophen     Tablet .. 975 milliGRAM(s) Oral every 8 hours  albuterol    90 MICROgram(s) HFA Inhaler 2 Puff(s) Inhalation two times a day  apixaban 5 milliGRAM(s) Oral two times a day  artificial  tears Solution 1 Drop(s) Both EYES two times a day  budesonide 160 MICROgram(s)/formoterol 4.5 MICROgram(s) Inhaler 2 Puff(s) Inhalation two times a day  chlorhexidine 2% Cloths 1 Application(s) Topical daily  dextrose 5%. 1000 milliLiter(s) (100 mL/Hr) IV Continuous <Continuous>  dextrose 5%. 1000 milliLiter(s) (50 mL/Hr) IV Continuous <Continuous>  dextrose 50% Injectable 25 Gram(s) IV Push once  dextrose 50% Injectable 25 Gram(s) IV Push once  dextrose 50% Injectable 12.5 Gram(s) IV Push once  furosemide    Tablet 20 milliGRAM(s) Oral <User Schedule>  glucagon  Injectable 1 milliGRAM(s) IntraMuscular once  insulin glargine Injectable (LANTUS) 15 Unit(s) SubCutaneous at bedtime  insulin lispro (ADMELOG) corrective regimen sliding scale   SubCutaneous three times a day before meals  insulin lispro Injectable (ADMELOG) 5 Unit(s) SubCutaneous three times a day before meals  latanoprost 0.005% Ophthalmic Solution 1 Drop(s) Right EYE at bedtime  losartan 25 milliGRAM(s) Oral daily  montelukast 10 milliGRAM(s) Oral daily  NIFEdipine XL 60 milliGRAM(s) Oral daily  oxyCODONE  ER Tablet 40 milliGRAM(s) Oral every 8 hours  pancrelipase  (CREON 12,000 Lipase Units) 3 Capsule(s) Oral four times a day with meals  pantoprazole    Tablet 40 milliGRAM(s) Oral before breakfast  senna 2 Tablet(s) Oral at bedtime  simvastatin 20 milliGRAM(s) Oral at bedtime    MEDICATIONS  (PRN):  dextrose Oral Gel 15 Gram(s) Oral once PRN Blood Glucose LESS THAN 70 milliGRAM(s)/deciliter  HYDROmorphone  Injectable 0.5 milliGRAM(s) IV Push every 6 hours PRN Severe Pain (7 - 10)  oxyCODONE    IR 15 milliGRAM(s) Oral every 4 hours PRN Moderate Pain (4 - 6)  SUMAtriptan Injectable 6 milliGRAM(s) SubCutaneous two times a day PRN Migraine      ALLERGIES:  Allergies    No Known Allergies    Intolerances        LABS:                        12.9   6.81  )-----------( 213      ( 06 Jun 2023 07:22 )             38.3     06-06    136  |  103  |  22<H>  ----------------------------<  157<H>  4.8   |  19  |  1.0    Ca    9.2      06 Jun 2023 07:22  Mg     2.0     06-06    TPro  7.4  /  Alb  4.2  /  TBili  0.3  /  DBili  x   /  AST  20  /  ALT  17  /  AlkPhos  61  06-05        CAPILLARY BLOOD GLUCOSE      POCT Blood Glucose.: 147 mg/dL (06 Jun 2023 07:59)      RADIOLOGY & ADDITIONAL TESTS: Reviewed.    ASSESSMENT:    PLAN:

## 2023-06-06 NOTE — PROGRESS NOTE ADULT - ASSESSMENT
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 neg CT neck soft tissue,  - ID eval noted- monitor off abx. on chronic steroids. can check IgM IgG varicella zoster  - ENT eval- no acute intervention. 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 cont eliquis 5mg BID    # Chronic pancreatitis  - cont creol    # DM  - cont diabetes insulin protocol    # COPD  #Asthma  - albuterol PRN  - cont montelukast    # HLD  - cont statin    # Glaucoma  - cont latanoprost    - dvt ppx  - gi ppx  - from home .

## 2023-06-07 VITALS
DIASTOLIC BLOOD PRESSURE: 78 MMHG | HEART RATE: 66 BPM | SYSTOLIC BLOOD PRESSURE: 182 MMHG | TEMPERATURE: 96 F | OXYGEN SATURATION: 95 % | RESPIRATION RATE: 18 BRPM

## 2023-06-07 LAB
ALBUMIN SERPL ELPH-MCNC: 3.8 G/DL — SIGNIFICANT CHANGE UP (ref 3.5–5.2)
ALP SERPL-CCNC: 57 U/L — SIGNIFICANT CHANGE UP (ref 30–115)
ALT FLD-CCNC: 16 U/L — SIGNIFICANT CHANGE UP (ref 0–41)
ANION GAP SERPL CALC-SCNC: 13 MMOL/L — SIGNIFICANT CHANGE UP (ref 7–14)
AST SERPL-CCNC: 19 U/L — SIGNIFICANT CHANGE UP (ref 0–41)
BASOPHILS # BLD AUTO: 0.04 K/UL — SIGNIFICANT CHANGE UP (ref 0–0.2)
BASOPHILS NFR BLD AUTO: 0.6 % — SIGNIFICANT CHANGE UP (ref 0–1)
BILIRUB SERPL-MCNC: 0.2 MG/DL — SIGNIFICANT CHANGE UP (ref 0.2–1.2)
BUN SERPL-MCNC: 22 MG/DL — HIGH (ref 10–20)
CALCIUM SERPL-MCNC: 8.8 MG/DL — SIGNIFICANT CHANGE UP (ref 8.4–10.5)
CHLORIDE SERPL-SCNC: 101 MMOL/L — SIGNIFICANT CHANGE UP (ref 98–110)
CO2 SERPL-SCNC: 20 MMOL/L — SIGNIFICANT CHANGE UP (ref 17–32)
CREAT SERPL-MCNC: 1 MG/DL — SIGNIFICANT CHANGE UP (ref 0.7–1.5)
EGFR: 76 ML/MIN/1.73M2 — SIGNIFICANT CHANGE UP
EOSINOPHIL # BLD AUTO: 0.27 K/UL — SIGNIFICANT CHANGE UP (ref 0–0.7)
EOSINOPHIL NFR BLD AUTO: 4.3 % — SIGNIFICANT CHANGE UP (ref 0–8)
GLUCOSE BLDC GLUCOMTR-MCNC: 130 MG/DL — HIGH (ref 70–99)
GLUCOSE BLDC GLUCOMTR-MCNC: 177 MG/DL — HIGH (ref 70–99)
GLUCOSE BLDC GLUCOMTR-MCNC: 213 MG/DL — HIGH (ref 70–99)
GLUCOSE SERPL-MCNC: 129 MG/DL — HIGH (ref 70–99)
HCT VFR BLD CALC: 37.2 % — LOW (ref 42–52)
HGB BLD-MCNC: 12.5 G/DL — LOW (ref 14–18)
IMM GRANULOCYTES NFR BLD AUTO: 0.3 % — SIGNIFICANT CHANGE UP (ref 0.1–0.3)
LYMPHOCYTES # BLD AUTO: 1.98 K/UL — SIGNIFICANT CHANGE UP (ref 1.2–3.4)
LYMPHOCYTES # BLD AUTO: 31.6 % — SIGNIFICANT CHANGE UP (ref 20.5–51.1)
MAGNESIUM SERPL-MCNC: 2 MG/DL — SIGNIFICANT CHANGE UP (ref 1.8–2.4)
MCHC RBC-ENTMCNC: 31.6 PG — HIGH (ref 27–31)
MCHC RBC-ENTMCNC: 33.6 G/DL — SIGNIFICANT CHANGE UP (ref 32–37)
MCV RBC AUTO: 94.2 FL — HIGH (ref 80–94)
MONOCYTES # BLD AUTO: 0.59 K/UL — SIGNIFICANT CHANGE UP (ref 0.1–0.6)
MONOCYTES NFR BLD AUTO: 9.4 % — HIGH (ref 1.7–9.3)
NEUTROPHILS # BLD AUTO: 3.36 K/UL — SIGNIFICANT CHANGE UP (ref 1.4–6.5)
NEUTROPHILS NFR BLD AUTO: 53.8 % — SIGNIFICANT CHANGE UP (ref 42.2–75.2)
NRBC # BLD: 0 /100 WBCS — SIGNIFICANT CHANGE UP (ref 0–0)
PLATELET # BLD AUTO: 216 K/UL — SIGNIFICANT CHANGE UP (ref 130–400)
PMV BLD: 9.8 FL — SIGNIFICANT CHANGE UP (ref 7.4–10.4)
POTASSIUM SERPL-MCNC: 4.2 MMOL/L — SIGNIFICANT CHANGE UP (ref 3.5–5)
POTASSIUM SERPL-SCNC: 4.2 MMOL/L — SIGNIFICANT CHANGE UP (ref 3.5–5)
PROT SERPL-MCNC: 6.6 G/DL — SIGNIFICANT CHANGE UP (ref 6–8)
RBC # BLD: 3.95 M/UL — LOW (ref 4.7–6.1)
RBC # FLD: 13.5 % — SIGNIFICANT CHANGE UP (ref 11.5–14.5)
SODIUM SERPL-SCNC: 134 MMOL/L — LOW (ref 135–146)
WBC # BLD: 6.26 K/UL — SIGNIFICANT CHANGE UP (ref 4.8–10.8)
WBC # FLD AUTO: 6.26 K/UL — SIGNIFICANT CHANGE UP (ref 4.8–10.8)

## 2023-06-07 PROCEDURE — 95816 EEG AWAKE AND DROWSY: CPT | Mod: 26

## 2023-06-07 PROCEDURE — 99239 HOSP IP/OBS DSCHRG MGMT >30: CPT

## 2023-06-07 RX ORDER — LOSARTAN POTASSIUM 100 MG/1
1 TABLET, FILM COATED ORAL
Qty: 30 | Refills: 0
Start: 2023-06-07 | End: 2023-07-06

## 2023-06-07 RX ORDER — SIMVASTATIN 20 MG/1
1 TABLET, FILM COATED ORAL
Qty: 0 | Refills: 0 | DISCHARGE
Start: 2023-06-07

## 2023-06-07 RX ORDER — NIFEDIPINE 30 MG
1 TABLET, EXTENDED RELEASE 24 HR ORAL
Qty: 0 | Refills: 0 | DISCHARGE
Start: 2023-06-07

## 2023-06-07 RX ORDER — FUROSEMIDE 40 MG
1 TABLET ORAL
Qty: 0 | Refills: 0 | DISCHARGE
Start: 2023-06-07

## 2023-06-07 RX ORDER — DIPHENHYDRAMINE HCL 50 MG
50 CAPSULE ORAL ONCE
Refills: 0 | Status: COMPLETED | OUTPATIENT
Start: 2023-06-07 | End: 2023-06-07

## 2023-06-07 RX ADMIN — OXYCODONE HYDROCHLORIDE 15 MILLIGRAM(S): 5 TABLET ORAL at 03:16

## 2023-06-07 RX ADMIN — Medication 50 MILLIGRAM(S): at 00:35

## 2023-06-07 RX ADMIN — Medication 975 MILLIGRAM(S): at 05:31

## 2023-06-07 RX ADMIN — Medication 5 UNIT(S): at 12:44

## 2023-06-07 RX ADMIN — Medication 1: at 12:42

## 2023-06-07 RX ADMIN — Medication 975 MILLIGRAM(S): at 17:23

## 2023-06-07 RX ADMIN — Medication 50 MILLIGRAM(S): at 05:31

## 2023-06-07 RX ADMIN — OXYCODONE HYDROCHLORIDE 15 MILLIGRAM(S): 5 TABLET ORAL at 13:08

## 2023-06-07 RX ADMIN — Medication 5 UNIT(S): at 17:27

## 2023-06-07 RX ADMIN — CHLORHEXIDINE GLUCONATE 1 APPLICATION(S): 213 SOLUTION TOPICAL at 13:06

## 2023-06-07 RX ADMIN — Medication 60 MILLIGRAM(S): at 05:31

## 2023-06-07 RX ADMIN — APIXABAN 5 MILLIGRAM(S): 2.5 TABLET, FILM COATED ORAL at 05:32

## 2023-06-07 RX ADMIN — OXYCODONE HYDROCHLORIDE 40 MILLIGRAM(S): 5 TABLET ORAL at 17:22

## 2023-06-07 RX ADMIN — APIXABAN 5 MILLIGRAM(S): 2.5 TABLET, FILM COATED ORAL at 17:23

## 2023-06-07 RX ADMIN — Medication 1 DROP(S): at 17:29

## 2023-06-07 RX ADMIN — MONTELUKAST 10 MILLIGRAM(S): 4 TABLET, CHEWABLE ORAL at 13:05

## 2023-06-07 RX ADMIN — Medication 1 DROP(S): at 05:35

## 2023-06-07 RX ADMIN — Medication 2: at 17:28

## 2023-06-07 RX ADMIN — Medication 50 MILLIGRAM(S): at 17:22

## 2023-06-07 RX ADMIN — Medication 5 UNIT(S): at 08:15

## 2023-06-07 RX ADMIN — LOSARTAN POTASSIUM 25 MILLIGRAM(S): 100 TABLET, FILM COATED ORAL at 05:31

## 2023-06-07 RX ADMIN — Medication 3 CAPSULE(S): at 13:05

## 2023-06-07 RX ADMIN — Medication 3 CAPSULE(S): at 08:18

## 2023-06-07 RX ADMIN — Medication 20 MILLIGRAM(S): at 05:32

## 2023-06-07 RX ADMIN — Medication 3 CAPSULE(S): at 17:23

## 2023-06-07 RX ADMIN — PANTOPRAZOLE SODIUM 40 MILLIGRAM(S): 20 TABLET, DELAYED RELEASE ORAL at 05:32

## 2023-06-07 RX ADMIN — OXYCODONE HYDROCHLORIDE 15 MILLIGRAM(S): 5 TABLET ORAL at 08:17

## 2023-06-07 NOTE — PROGRESS NOTE ADULT - REASON FOR ADMISSION
Right sided headache

## 2023-06-07 NOTE — PROGRESS NOTE ADULT - SUBJECTIVE AND OBJECTIVE BOX
EZ ALCARAZ 82y Male  MRN#: [id number]   CODE STATUS: full    Hospital Day: 7d    Pt is currently admitted with the primary diagnosis of headache    SUBJECTIVE  Hospital Course    Overnight events: Headache improved. Patient denies using IV dilaudid yesterday. Headache still present. No other sx    Subjective complaints                                                 ----------------------------------------------------------  OBJECTIVE  PAST MEDICAL & SURGICAL HISTORY  Diabetes    Hypertension    Pacemaker    Dyslipidemia    History of chronic pancreatitis    Atherosclerosis of native artery of lower extremity    COPD (chronic obstructive pulmonary disease)    CHESTER on CPAP    H/O intestinal obstruction    History of cholecystectomy    History of pancreatic surgery    History of penile implant    Cardiac pacemaker                                              -----------------------------------------------------------  ALLERGIES:  No Known Allergies                                            ------------------------------------------------------------    HOME MEDICATIONS  Home Medications:  Breo Ellipta 100 mcg-25 mcg/inh inhalation powder: 1 puff(s) inhaled once a day (20 Jun 2022 18:22)  Eliquis 5 mg tablet: 1 tab(s) orally every 12 hours (21 Jun 2022 14:17)  latanoprost 0.005% ophthalmic solution: 1 drop(s) to each affected eye once a day (in the evening) (20 Jun 2022 18:22)  montelukast 10 mg oral tablet: 1 tab(s) orally once a day (20 Jun 2022 18:22)  omeprazole 40 mg oral delayed release capsule: 1 cap(s) orally once a day (20 Jun 2022 18:22)  oxyMORphone 40 mg oral tablet, extended release: 1 tab(s) orally every 12 hours (20 Jun 2022 18:26)  pregabalin 75 mg capsule: 1 cap(s) orally once a day (21 Jun 2022 14:17)                           MEDICATIONS:  STANDING MEDICATIONS  acetaminophen     Tablet .. 975 milliGRAM(s) Oral every 8 hours  albuterol    90 MICROgram(s) HFA Inhaler 2 Puff(s) Inhalation two times a day  apixaban 5 milliGRAM(s) Oral two times a day  artificial  tears Solution 1 Drop(s) Both EYES two times a day  budesonide 160 MICROgram(s)/formoterol 4.5 MICROgram(s) Inhaler 2 Puff(s) Inhalation two times a day  chlorhexidine 2% Cloths 1 Application(s) Topical daily  dextrose 5%. 1000 milliLiter(s) IV Continuous <Continuous>  dextrose 5%. 1000 milliLiter(s) IV Continuous <Continuous>  dextrose 50% Injectable 25 Gram(s) IV Push once  dextrose 50% Injectable 12.5 Gram(s) IV Push once  dextrose 50% Injectable 25 Gram(s) IV Push once  furosemide    Tablet 20 milliGRAM(s) Oral <User Schedule>  glucagon  Injectable 1 milliGRAM(s) IntraMuscular once  insulin glargine Injectable (LANTUS) 15 Unit(s) SubCutaneous at bedtime  insulin lispro (ADMELOG) corrective regimen sliding scale   SubCutaneous three times a day before meals  insulin lispro Injectable (ADMELOG) 5 Unit(s) SubCutaneous three times a day before meals  latanoprost 0.005% Ophthalmic Solution 1 Drop(s) Right EYE at bedtime  losartan 25 milliGRAM(s) Oral daily  montelukast 10 milliGRAM(s) Oral daily  NIFEdipine XL 60 milliGRAM(s) Oral daily  oxyCODONE  ER Tablet 40 milliGRAM(s) Oral every 8 hours  pancrelipase  (CREON 12,000 Lipase Units) 3 Capsule(s) Oral four times a day with meals  pantoprazole    Tablet 40 milliGRAM(s) Oral before breakfast  pregabalin 50 milliGRAM(s) Oral two times a day  senna 2 Tablet(s) Oral at bedtime  simvastatin 20 milliGRAM(s) Oral at bedtime    PRN MEDICATIONS  dextrose Oral Gel 15 Gram(s) Oral once PRN  HYDROmorphone  Injectable 0.5 milliGRAM(s) IV Push every 6 hours PRN  oxyCODONE    IR 15 milliGRAM(s) Oral every 4 hours PRN  SUMAtriptan Injectable 6 milliGRAM(s) SubCutaneous two times a day PRN                                            ------------------------------------------------------------  VITAL SIGNS: Last 24 Hours  T(C): 37 (07 Jun 2023 05:00), Max: 37 (07 Jun 2023 05:00)  T(F): 98.6 (07 Jun 2023 05:00), Max: 98.6 (07 Jun 2023 05:00)  HR: 62 (07 Jun 2023 05:00) (62 - 92)  BP: 149/69 (07 Jun 2023 05:00) (127/69 - 149/69)  BP(mean): --  RR: 18 (07 Jun 2023 05:00) (18 - 18)  SpO2: 96% (06 Jun 2023 20:00) (96% - 97%)                                             --------------------------------------------------------------  LABS:                        12.5   6.26  )-----------( 216      ( 07 Jun 2023 05:06 )             37.2     06-06    136  |  103  |  22<H>  ----------------------------<  157<H>  4.8   |  19  |  1.0    Ca    9.2      06 Jun 2023 07:22  Mg     2.0     06-06    TPro  7.4  /  Alb  4.2  /  TBili  0.3  /  DBili  x   /  AST  20  /  ALT  17  /  AlkPhos  61  06-05                                                              -------------------------------------------------------------  RADIOLOGY:                                            --------------------------------------------------------------    PHYSICAL EXAM:  General: Well appearing, not in acute distress  HEENT: No cervical LAD, or JVD, no tenderness to jaw or temporal palpation  LUNGS: CTAB, no rales, on RA  HEART: RRR, no murmur  ABDOMEN: NBS, soft, nontender to palpation  EXT: no peripheral pitting edema b/l  NEURO: AAOx3  SKIN: No skin rash, open wounds                                           --------------------------------------------------------------

## 2023-06-07 NOTE — PROGRESS NOTE ADULT - ASSESSMENT
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 neg CT neck soft tissue,  - ID eval noted- monitor off abx. on chronic steroids. can check IgM IgG varicella zoster  - ENT eval- no acute intervention. 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 cont eliquis 5mg BID    # Chronic pancreatitis  - cont creol    # DM  - cont diabetes insulin protocol    # COPD  #Asthma  - albuterol PRN  - cont montelukast    # HLD  - cont statin    # Glaucoma  - cont latanoprost    - dvt ppx  - gi ppx  - from home. 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 neg CT neck soft tissue,  - ID eval noted- monitor off abx. on chronic steroids. can check IgM IgG varicella zoster  - ENT eval- no acute intervention. 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 cont eliquis 5mg BID    # Chronic pancreatitis  - cont creol    # DM  - cont diabetes insulin protocol    # COPD  #Asthma  - albuterol PRN  - cont montelukast    # HLD  - cont statin    # Glaucoma  - cont latanoprost    - dvt ppx  - gi ppx  - from home. spoke with rheum. no need for steroids on dc

## 2023-06-07 NOTE — PROGRESS NOTE ADULT - PROVIDER SPECIALTY LIST ADULT
Internal Medicine
Hospitalist
Internal Medicine
Internal Medicine
Infectious Disease

## 2023-06-07 NOTE — PROGRESS NOTE ADULT - ASSESSMENT
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     IMPRESSION  #Right Temporal Headache/Facial Pain   - CT Neck Soft tissue 6/2 - mastoid air cells clear - no clear etiology radiologically for temporal headache   - Sedimentation Rate, Erythrocyte: 12 mm/Hr (06.01.23 @ 12:12), C-Reactive Protein, Serum: 34.5 mg/L (06.01.23 @ 12:12)    #Atrial Fibrillation s/p PPM  #COPD   #HFrEF  #DM II     #Abx allergy: No Known Allergies    RECOMMENDATIONS  - follow-up Varicella serologies   - pain management per primary     Please call or message on [employer] Teams if with any questions.  Spectra [id number]

## 2023-06-07 NOTE — PROGRESS NOTE ADULT - SUBJECTIVE AND OBJECTIVE BOX
EZ ALCARAZ  82y, Male  Allergy: No Known Allergies      LOS  7d    CHIEF COMPLAINT: Right sided headache (07 Jun 2023 07:48)      INTERVAL EVENTS/HPI  - No acute events overnight  - T(F): , Max: 98.6 (06-07-23 @ 05:00)  - pain more controlled   - WBC Count: 6.26 (06-07-23 @ 05:06)  WBC Count: 6.81 (06-06-23 @ 07:22)     - Creatinine, Serum: 1.0 (06-07-23 @ 05:06)  Creatinine, Serum: 1.0 (06-06-23 @ 07:22)       ROS  General: Denies rigors, nightsweats  HEENT: Denies headache, rhinorrhea, sore throat, eye pain  CV: Denies CP, palpitations  PULM: Denies wheezing, hemoptysis  GI: Denies hematemesis, hematochezia, melena  : Denies discharge, hematuria  MSK: Denies arthralgias, myalgias  SKIN: Denies rash, lesions  NEURO: Denies paresthesias, weakness  PSYCH: Denies depression, anxiety    VITALS:  T(F): 98.6, Max: 98.6 (06-07-23 @ 05:00)  HR: 62  BP: 149/69  RR: 18Vital Signs Last 24 Hrs  T(C): 37 (07 Jun 2023 05:00), Max: 37 (07 Jun 2023 05:00)  T(F): 98.6 (07 Jun 2023 05:00), Max: 98.6 (07 Jun 2023 05:00)  HR: 62 (07 Jun 2023 05:00) (62 - 92)  BP: 149/69 (07 Jun 2023 05:00) (127/69 - 149/69)  BP(mean): --  RR: 18 (07 Jun 2023 05:00) (18 - 18)  SpO2: 96% (06 Jun 2023 20:00) (96% - 97%)    Parameters below as of 06 Jun 2023 20:00  Patient On (Oxygen Delivery Method): room air        PHYSICAL EXAM:  Gen: NAD, resting in bed  HEENT: Normocephalic, atraumatic  Neck: supple, no lymphadenopathy  CV: Regular rate & regular rhythm  Lungs: decreased BS at bases, no fremitus  Abdomen: Soft, BS present  Ext: Warm, well perfused  Neuro: non focal, awake  Skin: no rash, no erythema  Lines: no phlebitis    FH: Non-contributory  Social Hx: Non-contributory    TESTS & MEASUREMENTS:                        12.5   6.26  )-----------( 216      ( 07 Jun 2023 05:06 )             37.2     06-07    134<L>  |  101  |  22<H>  ----------------------------<  129<H>  4.2   |  20  |  1.0    Ca    8.8      07 Jun 2023 05:06  Mg     2.0     06-07    TPro  6.6  /  Alb  3.8  /  TBili  0.2  /  DBili  x   /  AST  19  /  ALT  16  /  AlkPhos  57  06-07      LIVER FUNCTIONS - ( 07 Jun 2023 05:06 )  Alb: 3.8 g/dL / Pro: 6.6 g/dL / ALK PHOS: 57 U/L / ALT: 16 U/L / AST: 19 U/L / GGT: x                     INFECTIOUS DISEASES TESTING  COVID-19 PCR: NotDetec (06-23-22 @ 15:50)  COVID-19 PCR: NotDetec (06-20-22 @ 10:00)      INFLAMMATORY MARKERS  Sedimentation Rate, Erythrocyte: 12 mm/Hr (06-01-23 @ 12:12)  C-Reactive Protein, Serum: 34.5 mg/L (06-01-23 @ 12:12)      RADIOLOGY & ADDITIONAL TESTS:  I have personally reviewed the last available Chest xray  CXR      CT      CARDIOLOGY TESTING  12 Lead ECG:   Ventricular Rate 78 BPM    Atrial Rate 78 BPM    P-R Interval 386 ms    QRS Duration 166 ms    Q-T Interval 576 ms    QTC Calculation(Bazett) 656 ms    P Axis 37 degrees    R Axis 261 degrees    T Axis 21 degrees    Diagnosis Line Sinus rhythm [id number] degree A-V block with occasional ventricular-paced  complexes and Premature supraventricular complexes and Fusion complexes  Right bundle branch block  Possible Lateral infarct , age undetermined  Abnormal ECG    Confirmed by Jesus Rivas ([id number])on 6/1/2023 5:09:36 PM (06-01-23 @ 14:54)  12 Lead ECG:   Ventricular Rate 72 BPM    Atrial Rate 72 BPM    P-R Interval 382 ms    QRS Duration 166 ms    Q-T Interval 430 ms    QTC Calculation(Bazett) 470 ms    P Axis 47 degrees    R Axis -71 degrees    T Axis 32 degrees    Diagnosis Line Sinus rhythm [id number] degree A-V block  Right bundle branch block  Left anterior fascicular block  *** Bifascicular block ***  Possible Lateral infarct , age undetermined  Abnormal ECG    Confirmed by Jesus Rivas ([id number]) on 5/31/2023 8:57:48 AM (05-30-23 @ 22:41)      MEDICATIONS  acetaminophen     Tablet .. 975 Oral every 8 hours  albuterol    90 MICROgram(s) HFA Inhaler 2 Inhalation two times a day  apixaban 5 Oral two times a day  artificial  tears Solution 1 Both EYES two times a day  budesonide 160 MICROgram(s)/formoterol 4.5 MICROgram(s) Inhaler 2 Inhalation two times a day  chlorhexidine 2% Cloths 1 Topical daily  dextrose 5%. 1000 IV Continuous <Continuous>  dextrose 5%. 1000 IV Continuous <Continuous>  dextrose 50% Injectable 25 IV Push once  dextrose 50% Injectable 25 IV Push once  dextrose 50% Injectable 12.5 IV Push once  furosemide    Tablet 20 Oral <User Schedule>  glucagon  Injectable 1 IntraMuscular once  insulin glargine Injectable (LANTUS) 15 SubCutaneous at bedtime  insulin lispro (ADMELOG) corrective regimen sliding scale  SubCutaneous three times a day before meals  insulin lispro Injectable (ADMELOG) 5 SubCutaneous three times a day before meals  latanoprost 0.005% Ophthalmic Solution 1 Right EYE at bedtime  losartan 25 Oral daily  montelukast 10 Oral daily  NIFEdipine XL 60 Oral daily  oxyCODONE  ER Tablet 40 Oral every 8 hours  pancrelipase  (CREON 12,000 Lipase Units) 3 Oral four times a day with meals  pantoprazole    Tablet 40 Oral before breakfast  pregabalin 50 Oral two times a day  senna 2 Oral at bedtime  simvastatin 20 Oral at bedtime      WEIGHT  Weight (kg): 86.5 (06-01-23 @ 17:00)  Creatinine, Serum: 1.0 mg/dL (06-07-23 @ 05:06)      ANTIBIOTICS:      All available historical records have been reviewed

## 2023-06-08 LAB
VZV IGG FLD QL IA: 1723 INDEX — SIGNIFICANT CHANGE UP
VZV IGG FLD QL IA: POSITIVE — SIGNIFICANT CHANGE UP

## 2023-06-09 LAB — VZV IGM SER-ACNC: <0.91 INDEX — SIGNIFICANT CHANGE UP (ref 0–0.9)

## 2023-06-13 DIAGNOSIS — I48.0 PAROXYSMAL ATRIAL FIBRILLATION: ICD-10-CM

## 2023-06-13 DIAGNOSIS — I11.0 HYPERTENSIVE HEART DISEASE WITH HEART FAILURE: ICD-10-CM

## 2023-06-13 DIAGNOSIS — I50.22 CHRONIC SYSTOLIC (CONGESTIVE) HEART FAILURE: ICD-10-CM

## 2023-06-13 DIAGNOSIS — J44.9 CHRONIC OBSTRUCTIVE PULMONARY DISEASE, UNSPECIFIED: ICD-10-CM

## 2023-06-13 DIAGNOSIS — M31.6 OTHER GIANT CELL ARTERITIS: ICD-10-CM

## 2023-06-13 DIAGNOSIS — K86.1 OTHER CHRONIC PANCREATITIS: ICD-10-CM

## 2023-06-13 DIAGNOSIS — E11.9 TYPE 2 DIABETES MELLITUS WITHOUT COMPLICATIONS: ICD-10-CM

## 2023-06-13 DIAGNOSIS — M48.02 SPINAL STENOSIS, CERVICAL REGION: ICD-10-CM

## 2023-06-13 DIAGNOSIS — H40.9 UNSPECIFIED GLAUCOMA: ICD-10-CM

## 2023-06-13 DIAGNOSIS — I25.10 ATHEROSCLEROTIC HEART DISEASE OF NATIVE CORONARY ARTERY WITHOUT ANGINA PECTORIS: ICD-10-CM

## 2023-06-13 DIAGNOSIS — G47.33 OBSTRUCTIVE SLEEP APNEA (ADULT) (PEDIATRIC): ICD-10-CM

## 2023-06-13 DIAGNOSIS — G44.86 CERVICOGENIC HEADACHE: ICD-10-CM

## 2023-06-20 ENCOUNTER — APPOINTMENT (OUTPATIENT)
Dept: PAIN MANAGEMENT | Facility: CLINIC | Age: 83
End: 2023-06-20

## 2023-06-30 ENCOUNTER — APPOINTMENT (OUTPATIENT)
Dept: PAIN MANAGEMENT | Facility: CLINIC | Age: 83
End: 2023-06-30
Payer: MEDICARE

## 2023-06-30 VITALS — BODY MASS INDEX: 27.64 KG/M2 | WEIGHT: 172 LBS | HEIGHT: 66 IN

## 2023-06-30 PROCEDURE — 99213 OFFICE O/P EST LOW 20 MIN: CPT

## 2023-06-30 NOTE — DATA REVIEWED
[FreeTextEntry1] : CT of the cervical lumbar spine at [facility]. CT of the cervical spine was completed in December 2020 and there is evidence of multilevel degenerative disc disease with formation of anterior osteophytes as well as multilevel spondylosis and spinal stenosis.\par \par CT of the lumbar spine was completed every did all radiology back in 2019. There is evidence of degenerative disc disease and vacuum phenomenon most appreciated L4-5 and L5-S1. 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 There is multilevel spondylosis and facet arthropathy. Multilevel spinal stenosis.

## 2023-06-30 NOTE — ASSESSMENT
[FreeTextEntry1] : Mr. Rea is an 82-year-old male with chronic cervical and lumbar pain with radiculopathy, as well as pain attributed to chronic pancreatitis. He has been having a right-sided headache and blurry vision. He will be seeing vascular in the near future. I have also suggested he see neurology. He will continue with all of his medications as is. He will follow up in 4 weeks for routine care. His UDS must be repeated on his next visit. Home

## 2023-06-30 NOTE — PHYSICAL EXAM
[de-identified] : Constitutional: GENERAL APPEARANCE OF PATIENT IS WELL DEVELOPED, WELL NOURISHED, BODY HABITUS NORMAL, WELL GROOMED, NO DEFORMITIES NOTED.\par Head - Atraumatic and Normocephalic\par Eyes, Nose, and Throat: External inspection of ears and nose are normal overall without scars, lesions, or masses noted. Assessment of hearing is normal\par Neck-Examination of neck shows no masses, overall appearance is normal, neck is symmetric, tracheal position is midline, no crepitus is noted. Examination of thyroid shows no enlargement, tenderness or masses\par Respiratory- Assessment of respiratory effort shows no intercostal retractions, no use of accessory muscles, unlabored breathing, and normal diaphragmatic movement.\par Cardiovascular- Examination of extremities show no edema or varicosities\par Musculoskeletal. Examination of gait is not antalgic and station is normal\par Inspection and palpation of digits and nails shows no clubbing, cyanosis, nodules, drainage, fluctuance, petechiae\par \par • Spine – inspection and palpation shows no misalignment, asymmetry, crepitation, defects, tenderness, masses, effusions. ROM is normal without crepitation or contracture. No instability or subluxation or laxity is noted. No abnormal movements.\par \par • Neck- inspection and palpation shows no misalignment, asymmetry, crepitation, defects, tenderness, masses, effusions. ROM is normal without crepitation or contracture. No instability or subluxation or laxity is noted. No abnormal movements.\par \par • RUE- pain on palpation of right elbow.\par \par • LUE- inspection and palpation shows no misalignment, asymmetry, crepitation, defects, tenderness, masses, effusions. ROM is normal without crepitation or contracture. No instability or subluxation or laxity is noted. No abnormal movements.\par \par • RLE- inspection and palpation shows no misalignment, asymmetry, crepitation, defects, tenderness, masses, effusions. ROM is normal without crepitation or contracture. No instability or subluxation or laxity is noted. No abnormal movements.\par \par • LLE- inspection and palpation shows no misalignment, asymmetry, crepitation, defects, tenderness, masses, effusions. ROM is normal without crepitation or contracture. No instability or subluxation or laxity is noted. No abnormal movements.\par \par • Skin- Inspection of skin and subcutaneous tissue shows no rashes, lesions or ulcers. Palpation of skin and subcutaneous tissue shows no rashes, no indurations, subcutaneous nodules or tightening.\par \par • Abdomen- Nontender\par \par • Neurologic- CN 2-12 are grossly intact. No sensory or motor deficits in the upper and lower extremities. Adequate strength in upper and lower extremities\par \par • Psychiatric- Patient’s judgment and insight are intact. Oriented to time, place and person. Recent and remote memory intact.

## 2023-06-30 NOTE — DISCUSSION/SUMMARY
[de-identified] : I have consulted the  registry for the purpose of reviewing the patient's controlled substance.\par \par Overall there is at least a 30-50% reduction in pain with the prescribed analgesics. The patient denies any adverse side effects due to the medication (sleeping disturbance, constipation, sleepiness, hallucinations and/or urination problems). \par The patient will return to the office in 4 weeks time and is aware to contact me with any question or concerns in the interim.\par \par Amalia Weathers PA-C\par Osito Bear DO\par

## 2023-06-30 NOTE — HISTORY OF PRESENT ILLNESS
[FreeTextEntry1] : HISTORY OF PRESENT ILLNESS: ORIGINAL PRESENTATION: Mr. Rea is an 82-year-old male who has a history of chronic pancreatitis. He also suffers with chronic lumbar and radicular pain secondary to disc displacement and stenosis in addition to a history of chronic cervical pain and radiculopathy, disc bulging and facet arthropathy. Patient has a significant alcoholism history, he denies current use and states that he has abstained from alcohol for "several years". He is to be monitored closely.\par \par TODAY: He presents for a revisit appointment for continued pain. He suffers with chronic neck and lower back pain with radiculopathy as well as chronic pancreatitis. He sees a GI doctor routinely. He continues to use a walker/rollator for ambulation.\par \par Since his last visit, he was hopsitalized for a right-sided headache and blurry vision. The differntial diagnosis includes GCA versus mastoiditis versus TMJ. All of his scans were unremarkable. He was seen by ophthalmology and had a normal eye exam. He was referred to see vascular who he is seeing in August.\par \par He remains on a regimen of Percocet 10/325 mg 3 times daily along with Oxymorphone ER 40 mg twice daily and Lyrica. He applies Diclofenac gel as needed. He experiences no adverse side effects. Due to his hospitalization, he ran out of his medications. When consuming his medications, they provide him 30-40% relief, which he has been satisfied with.\par \par UDS: 3/23/23 - positive Fentanyl Interp which is likely a false positive. + OXY which is consistent. UDS was supposed to be repeated today; however, because the patient ran out of his medication, his UDS will be repeated on his next visit.

## 2023-07-06 ENCOUNTER — APPOINTMENT (OUTPATIENT)
Dept: PAIN MANAGEMENT | Facility: CLINIC | Age: 83
End: 2023-07-06

## 2023-07-24 ENCOUNTER — APPOINTMENT (OUTPATIENT)
Dept: HOME HEALTH SERVICES | Facility: HOME HEALTH | Age: 83
End: 2023-07-24
Payer: MEDICARE

## 2023-07-24 VITALS
SYSTOLIC BLOOD PRESSURE: 150 MMHG | TEMPERATURE: 98.4 F | HEIGHT: 66 IN | WEIGHT: 168 LBS | RESPIRATION RATE: 20 BRPM | HEART RATE: 61 BPM | DIASTOLIC BLOOD PRESSURE: 70 MMHG | BODY MASS INDEX: 27 KG/M2 | OXYGEN SATURATION: 96 %

## 2023-07-24 DIAGNOSIS — H40.9 UNSPECIFIED GLAUCOMA: ICD-10-CM

## 2023-07-24 DIAGNOSIS — R35.0 FREQUENCY OF MICTURITION: ICD-10-CM

## 2023-07-24 DIAGNOSIS — Z87.898 PERSONAL HISTORY OF OTHER SPECIFIED CONDITIONS: ICD-10-CM

## 2023-07-24 DIAGNOSIS — S20.211A CONTUSION OF RIGHT FRONT WALL OF THORAX, INITIAL ENCOUNTER: ICD-10-CM

## 2023-07-24 DIAGNOSIS — N32.81 OVERACTIVE BLADDER: ICD-10-CM

## 2023-07-24 DIAGNOSIS — E55.9 VITAMIN D DEFICIENCY, UNSPECIFIED: ICD-10-CM

## 2023-07-24 PROCEDURE — 99344 HOME/RES VST NEW MOD MDM 60: CPT

## 2023-07-24 PROCEDURE — 99497 ADVNCD CARE PLAN 30 MIN: CPT

## 2023-07-24 NOTE — REVIEW OF SYSTEMS
[Vision Problems] : vision problems [Lower Ext Edema] : lower extremity edema [Dyspnea on Exertion] : dyspnea on exertion [Frequency] : frequency [Muscle Pain] : muscle pain [Headache] : headache [Unsteady Walk] : ataxia [Negative] : Heme/Lymph

## 2023-07-24 NOTE — COUNSELING
[Overweight - ( BMI 25.1 - 29.9 )] : overweight -  ( BMI 25.1 - 29.9 ) [DASH diet recommended] : DASH diet recommended [Continue diet as tolerated] : continue diet as tolerated based on goals of care [Non - Smoker] : non-smoker [Medical alert] : medical alert [Use assistive device to avoid falls] : use assistive device to avoid falls [Remove clutter and unsafe carpeting to avoid falls] : remove clutter and unsafe carpeting to avoid falls [] : eye exam [Date: ___] : foot exam completed [unfilled] [Improve mobility] : improve mobility [Improve pain control] : improve pain control [Maintain functional ability] : maintain functional ability [Discussed disease trajectory with patient/caregiver] : discussed disease trajectory with patient/caregiver [Patient/Caregiver is unclear of wishes] : patient/caregiver is unclear of wishes

## 2023-07-25 PROBLEM — H40.9 GLAUCOMA: Status: ACTIVE | Noted: 2023-07-25

## 2023-07-25 PROBLEM — R35.0 URINE FREQUENCY: Status: ACTIVE | Noted: 2018-02-08

## 2023-07-25 PROBLEM — E55.9 VITAMIN D DEFICIENCY: Status: ACTIVE | Noted: 2023-07-25

## 2023-07-25 RX ORDER — NIFEDIPINE 90 MG
90 TABLET, EXTENDED RELEASE ORAL
Refills: 0 | Status: DISCONTINUED | COMMUNITY
End: 2023-07-25

## 2023-07-25 RX ORDER — OXYCODONE HCL 80 MG/1
80 TABLET, FILM COATED, EXTENDED RELEASE ORAL
Qty: 90 | Refills: 0 | Status: DISCONTINUED | COMMUNITY
Start: 2017-08-15 | End: 2023-07-25

## 2023-07-25 NOTE — PHYSICAL EXAM
[No Acute Distress] : no acute distress [Normal Voice/Communication] : normal voice communication [Normal Sclera/Conjunctiva] : normal sclera/conjunctiva [Normal Outer Ear/Nose] : the ears and nose were normal in appearance [No JVD] : no jugular venous distention [No Respiratory Distress] : no respiratory distress [Normal Rate] : heart rate was normal  [Pedal Pulses Present] : the pedal pulses are present [Normal Bowel Sounds] : normal bowel sounds [Non Tender] : non-tender [Soft] : abdomen soft [Not Distended] : not distended [Normal Anterior Cervical Nodes] : no anterior cervical lymphadenopathy [No Spinal Tenderness] : no spinal tenderness [No Joint Swelling] : no joint swelling seen [No Rash] : no rash [No Motor Deficits] : the motor exam was normal [Oriented x3] : oriented to person, place, and time [Normal Affect] : the affect was normal [Normal Mood] : the mood was normal [Foot Ulcers] : no foot ulcers

## 2023-07-25 NOTE — HEALTH RISK ASSESSMENT
[HRA Reviewed] : Health risk assessment reviewed [Some assistance needed] : walking [Independent] : managing finances [Full assistance needed] : using transportation [Two or more falls in past year] : Patient reported two or more falls in the past year [TimeGetUpGo] : 10 [de-identified] : ambulates with cane and 4 wheel rollator

## 2023-07-25 NOTE — REASON FOR VISIT
[Initial Evaluation] : an initial evaluation [Pre-Visit Preparation] : pre-visit preparation was done [FreeTextEntry1] : PMH PM, HTN, HLD, Type 2 DM, pancreatitis, cervical neuritis,  chronic pain and CHF  [FreeTextEntry2] : chart review

## 2023-07-25 NOTE — HISTORY OF PRESENT ILLNESS
[Patient] : patient [In-Place] : has Home Health services in-place [A] : A [Patient is stable] : patient is stable [LastPVisitDate] : 06/2023 [FreeTextEntry4] : Dr. Greco [LastSpecialistVisitDate] : 07/2023 [FreeTextEntry1] : unsteady gait [FreeTextEntry2] : 07/24/2023 COVID SCREEN:\par Patient or caregiver denies fever, cough, trouble breathing, rash or contact with someone who tested positive for COVID-19. \par \par N95 mask, gloves, eye wear and gown (if indicated) used during visit: YES\par \par Total face to face time with patient is 60 min. \par \par \par Jeremy Rea is a 82 year old male with HTN, HLD, Type 2 DM, atrial fibrillation, CHF, cervical neuritis, chronic pancreatitis, headache and urinary frequency presents today for initial home visit and evaluation of medical conditions. \par \par \par Today patient reports ongoing right sided headaches radiating to right arm and right elbow since s/p fall in 04/2023. Denies dizziness, n/v, chest pain or weakness to extremity. Patient was evaluated on [facility] on 06/01/2023 for this, CT head was negative, patient was recommend to follow up with vascular surgery for temporal biopsy and possible surgery. Patient with upcoming appointment scheduled 8/17/23. Patient currently following pain management and is taking Percocet and pregalbin and oxymorphone. \par \par Patient reports chronic diarrhea with 6 loose bowel movements today, denies fever/chills or n/v. Patient educated on importance of hydration and to continue Creon before each meal. Educated to call if diarrhea persists or worsened. verbalized understanding. \par \par \par \par Patient reports no weight loss >10 lbs in the past 6 months. No changes in dentition or swallowing reported, No changes in hearing or vision reported. No changes in Cognition reported. Patient denies any symptoms of depression or anxiety. Patient is ADL independent/dependent and IADL dependent.\par \par Patient's home environment is safe.\par \par

## 2023-07-25 NOTE — CHRONIC CARE ASSESSMENT
[Inadequate social support] : inadequate social support [PPS Score: ____] : Palliative Performance Scale (PPS) Score: [unfilled] [de-identified] : as tolerated [de-identified] : low sodium, low fat and low carb

## 2023-07-27 ENCOUNTER — APPOINTMENT (OUTPATIENT)
Dept: PAIN MANAGEMENT | Facility: CLINIC | Age: 83
End: 2023-07-27
Payer: MEDICARE

## 2023-07-27 ENCOUNTER — LABORATORY RESULT (OUTPATIENT)
Age: 83
End: 2023-07-27

## 2023-07-27 VITALS
WEIGHT: 168 LBS | BODY MASS INDEX: 27 KG/M2 | HEART RATE: 72 BPM | HEIGHT: 66 IN | DIASTOLIC BLOOD PRESSURE: 83 MMHG | SYSTOLIC BLOOD PRESSURE: 195 MMHG

## 2023-07-27 LAB
AMP / AMPHETAMINE: NEGATIVE
BAR / SECOBARBITAL: NEGATIVE
BUP / BUPRENORPHINE: NEGATIVE
BZO / OXAZEPAM: NEGATIVE
COC / COCAINE: NEGATIVE
CREATININE: 50 MG/DL
MDMA / METHYLENEDIOXYMETHAMPHETAMINE: POSITIVE
MET / METHAMPHETAMINES: NEGATIVE
MOP / MORPHINE: NEGATIVE
MTD / METHADONE: NEGATIVE
OXY / OXYCODONE: NEGATIVE
PCP / PHENCYCLIDINE: NEGATIVE
PH: 5
SPECIFIC GRAVITY: 1.02
TEMPERATURE: 90 C
THC / MARIJUANA: NEGATIVE

## 2023-07-27 PROCEDURE — 99213 OFFICE O/P EST LOW 20 MIN: CPT

## 2023-07-27 PROCEDURE — 80305 DRUG TEST PRSMV DIR OPT OBS: CPT | Mod: QW

## 2023-07-27 NOTE — ASSESSMENT
[FreeTextEntry1] : Mr. Rea is an 82-year-old male with chronic cervical and lumbar pain with radiculopathy, as well as pain attributed to chronic pancreatitis. He has been having a right-sided headache and blurry vision. He will be seeing vascular in the near future. I have also suggested he see neurology. He will continue with all of his medications as is. He will follow up in 4 weeks for routine care. His UDS must be repeated on his next visit.

## 2023-07-27 NOTE — HISTORY OF PRESENT ILLNESS
[FreeTextEntry1] : HISTORY OF PRESENT ILLNESS: ORIGINAL PRESENTATION: Mr. Rea is an 82-year-old male who has a history of chronic pancreatitis. He also suffers with chronic lumbar and radicular pain secondary to disc displacement and stenosis in addition to a history of chronic cervical pain and radiculopathy, disc bulging and facet arthropathy. Patient has a significant alcoholism history, he denies current use and states that he has abstained from alcohol for "several years". He is to be monitored closely.\par \par TODAY: He presents for a revisit appointment for continued pain. He suffers with chronic neck and lower back pain with radiculopathy as well as chronic pancreatitis. He sees a GI doctor routinely. He continues to use a walker/rollator for ambulation.\par \par He continues to have a right-sided headache and blurry vision. The differential diagnosis includes GCA versus mastoiditis versus TMJ. All of his scans were unremarkable. He was seen by ophthalmology and had a normal eye exam. He was referred to see vascular who he is scheduled to see on 8/18/23.\par \par He remains on a regimen of Percocet 10/325 mg 3 times daily along with Oxymorphone ER 40 mg twice daily and Lyrica. He applies Diclofenac gel as needed. He experiences no adverse side effects. When consuming his medications, they provide him 30-40% relief, which he has been satisfied with.\par \par UDS: repeated today, 7/27/23 - see separate note.

## 2023-07-27 NOTE — DISCUSSION/SUMMARY
[de-identified] : I have consulted the  registry for the purpose of reviewing the patient's controlled substance.\par \par Overall there is at least a 30-50% reduction in pain with the prescribed analgesics. The patient denies any adverse side effects due to the medication (sleeping disturbance, constipation, sleepiness, hallucinations and/or urination problems). \par Urine drug screening collected today with rapid sample result consistent with given regimen. Sample to be sent for confirmatory testing.\par The patient will return to the office in 4 weeks time and is aware to contact me with any question or concerns in the interim.\par \par Amalia Weathers PA-C\par Osito Bear DO\par

## 2023-07-27 NOTE — PHYSICAL EXAM
[de-identified] : Constitutional: GENERAL APPEARANCE OF PATIENT IS WELL DEVELOPED, WELL NOURISHED, BODY HABITUS NORMAL, WELL GROOMED, NO DEFORMITIES NOTED.\par Head - Atraumatic and Normocephalic\par Eyes, Nose, and Throat: External inspection of ears and nose are normal overall without scars, lesions, or masses noted. Assessment of hearing is normal\par Neck-Examination of neck shows no masses, overall appearance is normal, neck is symmetric, tracheal position is midline, no crepitus is noted. Examination of thyroid shows no enlargement, tenderness or masses\par Respiratory- Assessment of respiratory effort shows no intercostal retractions, no use of accessory muscles, unlabored breathing, and normal diaphragmatic movement.\par Cardiovascular- Examination of extremities show no edema or varicosities\par Musculoskeletal. Examination of gait is not antalgic and station is normal\par Inspection and palpation of digits and nails shows no clubbing, cyanosis, nodules, drainage, fluctuance, petechiae\par \par • Spine – inspection and palpation shows no misalignment, asymmetry, crepitation, defects, tenderness, masses, effusions. ROM is normal without crepitation or contracture. No instability or subluxation or laxity is noted. No abnormal movements.\par \par • Neck- inspection and palpation shows no misalignment, asymmetry, crepitation, defects, tenderness, masses, effusions. ROM is normal without crepitation or contracture. No instability or subluxation or laxity is noted. No abnormal movements.\par \par • RUE- pain on palpation of right elbow.\par \par • LUE- inspection and palpation shows no misalignment, asymmetry, crepitation, defects, tenderness, masses, effusions. ROM is normal without crepitation or contracture. No instability or subluxation or laxity is noted. No abnormal movements.\par \par • RLE- inspection and palpation shows no misalignment, asymmetry, crepitation, defects, tenderness, masses, effusions. ROM is normal without crepitation or contracture. No instability or subluxation or laxity is noted. No abnormal movements.\par \par • LLE- inspection and palpation shows no misalignment, asymmetry, crepitation, defects, tenderness, masses, effusions. ROM is normal without crepitation or contracture. No instability or subluxation or laxity is noted. No abnormal movements.\par \par • Skin- Inspection of skin and subcutaneous tissue shows no rashes, lesions or ulcers. Palpation of skin and subcutaneous tissue shows no rashes, no indurations, subcutaneous nodules or tightening.\par \par • Abdomen- Nontender\par \par • Neurologic- CN 2-12 are grossly intact. No sensory or motor deficits in the upper and lower extremities. Adequate strength in upper and lower extremities\par \par • Psychiatric- Patient’s judgment and insight are intact. Oriented to time, place and person. Recent and remote memory intact.

## 2023-07-27 NOTE — DATA REVIEWED
[FreeTextEntry1] : CT of the cervical lumbar spine at [facility]. CT of the cervical spine was completed in December 2020 and there is evidence of multilevel degenerative disc disease with formation of anterior osteophytes as well as multilevel spondylosis and spinal stenosis.\par \par CT of the lumbar spine was completed every did all radiology back in 2019. There is evidence of degenerative disc disease and vacuum phenomenon most appreciated L4-5 and L5-S1. 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 There is multilevel spondylosis and facet arthropathy. Multilevel spinal stenosis.

## 2023-08-03 LAB
PM 6 MAM: NEGATIVE NG/ML
PM 7-AMINO-CLONAZ: NEGATIVE NG/ML
PM ALPHA-HYDROXY-ALPRAZOLAM: NEGATIVE NG/ML
PM ALPHA-HYDROXY-MIDAZOLAM: NEGATIVE NG/ML
PM ALPRAZOLAM: NEGATIVE NG/ML
PM AMOBARBITAL: NEGATIVE NG/ML
PM AMPHETAMINE INTERP: NEGATIVE
PM AMPHETAMINE: NEGATIVE NG/ML
PM BARBURATES INTERP: NEGATIVE
PM BEG: NEGATIVE NG/ML
PM BENZODIAZEPINES INTERP: NEGATIVE
PM BUPRENORPHINE INTERP: NEGATIVE
PM BUPRENORPHINE: NEGATIVE NG/ML
PM BUTALBITAL: NEGATIVE NG/ML
PM CLONAZEPAM: NEGATIVE NG/ML
PM COCAINE INTERP: NEGATIVE
PM COCAINE: NEGATIVE NG/ML
PM CODIENE: NEGATIVE NG/ML
PM COTININE: NEGATIVE NG/ML
PM DIAZEPAM: NEGATIVE NG/ML
PM DIHYROCODEINE: NEGATIVE NG/ML
PM EDDP: NEGATIVE NG/ML
PM FENTANYL INTERP: NEGATIVE NG/ML
PM FENTANYL: NEGATIVE NG/ML
PM FLUNITRAZEPAM: NEGATIVE NG/ML
PM FLURAZEPAM: NEGATIVE NG/ML
PM HYDROCODONE: NEGATIVE NG/ML
PM HYDROMORPHONE: NEGATIVE NG/ML
PM LORAZEPAM: NEGATIVE NG/ML
PM MARIJUANA (DELTA-9-THC): NEGATIVE NG/ML
PM MARIJUANA INTERP: NEGATIVE
PM MDA: NEGATIVE NG/ML
PM MDEA: NEGATIVE NG/ML
PM MDMA: NEGATIVE NG/ML
PM MEPERIDINE: NEGATIVE NG/ML
PM METHADONE INTERP: NEGATIVE
PM METHADONE: NEGATIVE NG/ML
PM METHAMPHETAMINE: NEGATIVE NG/ML
PM MIDAZOLAM: NEGATIVE NG/ML
PM MORPHINE: NEGATIVE NG/ML
PM NALOXONE: NEGATIVE NG/ML
PM NALTREXONE: NEGATIVE NG/ML
PM NICOTINE INTERP: NEGATIVE
PM NORBUPRENORPHINE: NEGATIVE NG/ML
PM NORDIAZEPAM: NEGATIVE NG/ML
PM NORMEPERIDINE: NEGATIVE NG/ML
PM NOROXYCODONE: >1000 NG/ML
PM OPIOID INTERP: NEGATIVE
PM OXAZEPAM: NEGATIVE NG/ML
PM OXXYCODONE INTERP: POSITIVE
PM OXYCODONE: 381 NG/ML
PM OXYMORPHONE: >1000 NG/ML
PM PCP: NEGATIVE NG/ML
PM PHENCYCLIDINE INTERP: NEGATIVE
PM PHENOBARBITAL: NEGATIVE NG/ML
PM PPX: NEGATIVE NG/ML
PM PROPOXYPHENE INTERP: NEGATIVE
PM SECOBARBITAL: NEGATIVE NG/ML
PM SUFENTANIL: NEGATIVE NG/ML
PM TAPENTADOL: NEGATIVE NG/ML
PM TEMAZEPAM: NEGATIVE NG/ML
PM TRAMADOL INTERP: NEGATIVE
PM TRAMADOL: NEGATIVE NG/ML

## 2023-08-18 ENCOUNTER — APPOINTMENT (OUTPATIENT)
Dept: VASCULAR SURGERY | Facility: CLINIC | Age: 83
End: 2023-08-18
Payer: MEDICARE

## 2023-08-18 VITALS — DIASTOLIC BLOOD PRESSURE: 110 MMHG | HEART RATE: 95 BPM | SYSTOLIC BLOOD PRESSURE: 210 MMHG

## 2023-08-18 VITALS — HEIGHT: 66 IN | BODY MASS INDEX: 27 KG/M2 | WEIGHT: 168 LBS

## 2023-08-18 PROCEDURE — 93925 LOWER EXTREMITY STUDY: CPT

## 2023-08-18 PROCEDURE — 99213 OFFICE O/P EST LOW 20 MIN: CPT

## 2023-08-21 ENCOUNTER — OUTPATIENT (OUTPATIENT)
Dept: OUTPATIENT SERVICES | Facility: HOSPITAL | Age: 83
LOS: 1 days | End: 2023-08-21
Payer: MEDICARE

## 2023-08-21 ENCOUNTER — RESULT REVIEW (OUTPATIENT)
Age: 83
End: 2023-08-21

## 2023-08-21 VITALS
RESPIRATION RATE: 18 BRPM | WEIGHT: 189.6 LBS | OXYGEN SATURATION: 100 % | DIASTOLIC BLOOD PRESSURE: 90 MMHG | HEIGHT: 66 IN | SYSTOLIC BLOOD PRESSURE: 180 MMHG | HEART RATE: 90 BPM | TEMPERATURE: 98 F

## 2023-08-21 DIAGNOSIS — Z96.0 PRESENCE OF UROGENITAL IMPLANTS: Chronic | ICD-10-CM

## 2023-08-21 DIAGNOSIS — M31.6 OTHER GIANT CELL ARTERITIS: ICD-10-CM

## 2023-08-21 DIAGNOSIS — Z98.890 OTHER SPECIFIED POSTPROCEDURAL STATES: Chronic | ICD-10-CM

## 2023-08-21 DIAGNOSIS — Z95.0 PRESENCE OF CARDIAC PACEMAKER: Chronic | ICD-10-CM

## 2023-08-21 DIAGNOSIS — Z87.19 PERSONAL HISTORY OF OTHER DISEASES OF THE DIGESTIVE SYSTEM: Chronic | ICD-10-CM

## 2023-08-21 DIAGNOSIS — Z01.818 ENCOUNTER FOR OTHER PREPROCEDURAL EXAMINATION: ICD-10-CM

## 2023-08-21 DIAGNOSIS — Z90.49 ACQUIRED ABSENCE OF OTHER SPECIFIED PARTS OF DIGESTIVE TRACT: Chronic | ICD-10-CM

## 2023-08-21 LAB
ALBUMIN SERPL ELPH-MCNC: 4.7 G/DL — SIGNIFICANT CHANGE UP (ref 3.5–5.2)
ALP SERPL-CCNC: 83 U/L — SIGNIFICANT CHANGE UP (ref 30–115)
ALT FLD-CCNC: 21 U/L — SIGNIFICANT CHANGE UP (ref 0–41)
ANION GAP SERPL CALC-SCNC: 14 MMOL/L — SIGNIFICANT CHANGE UP (ref 7–14)
APTT BLD: 36.6 SEC — SIGNIFICANT CHANGE UP (ref 27–39.2)
AST SERPL-CCNC: 32 U/L — SIGNIFICANT CHANGE UP (ref 0–41)
BASOPHILS # BLD AUTO: 0.04 K/UL — SIGNIFICANT CHANGE UP (ref 0–0.2)
BASOPHILS NFR BLD AUTO: 0.7 % — SIGNIFICANT CHANGE UP (ref 0–1)
BILIRUB SERPL-MCNC: 0.5 MG/DL — SIGNIFICANT CHANGE UP (ref 0.2–1.2)
BUN SERPL-MCNC: 16 MG/DL — SIGNIFICANT CHANGE UP (ref 10–20)
CALCIUM SERPL-MCNC: 9.4 MG/DL — SIGNIFICANT CHANGE UP (ref 8.4–10.5)
CHLORIDE SERPL-SCNC: 102 MMOL/L — SIGNIFICANT CHANGE UP (ref 98–110)
CO2 SERPL-SCNC: 23 MMOL/L — SIGNIFICANT CHANGE UP (ref 17–32)
CREAT SERPL-MCNC: 1.1 MG/DL — SIGNIFICANT CHANGE UP (ref 0.7–1.5)
EGFR: 67 ML/MIN/1.73M2 — SIGNIFICANT CHANGE UP
EOSINOPHIL # BLD AUTO: 0.18 K/UL — SIGNIFICANT CHANGE UP (ref 0–0.7)
EOSINOPHIL NFR BLD AUTO: 3 % — SIGNIFICANT CHANGE UP (ref 0–8)
GLUCOSE SERPL-MCNC: 136 MG/DL — HIGH (ref 70–99)
HCT VFR BLD CALC: 40.9 % — LOW (ref 42–52)
HGB BLD-MCNC: 13.3 G/DL — LOW (ref 14–18)
IMM GRANULOCYTES NFR BLD AUTO: 0.2 % — SIGNIFICANT CHANGE UP (ref 0.1–0.3)
INR BLD: 1.2 RATIO — SIGNIFICANT CHANGE UP (ref 0.65–1.3)
LYMPHOCYTES # BLD AUTO: 2.08 K/UL — SIGNIFICANT CHANGE UP (ref 1.2–3.4)
LYMPHOCYTES # BLD AUTO: 34.2 % — SIGNIFICANT CHANGE UP (ref 20.5–51.1)
MCHC RBC-ENTMCNC: 31.4 PG — HIGH (ref 27–31)
MCHC RBC-ENTMCNC: 32.5 G/DL — SIGNIFICANT CHANGE UP (ref 32–37)
MCV RBC AUTO: 96.7 FL — HIGH (ref 80–94)
MONOCYTES # BLD AUTO: 0.86 K/UL — HIGH (ref 0.1–0.6)
MONOCYTES NFR BLD AUTO: 14.1 % — HIGH (ref 1.7–9.3)
NEUTROPHILS # BLD AUTO: 2.92 K/UL — SIGNIFICANT CHANGE UP (ref 1.4–6.5)
NEUTROPHILS NFR BLD AUTO: 47.8 % — SIGNIFICANT CHANGE UP (ref 42.2–75.2)
NRBC # BLD: 0 /100 WBCS — SIGNIFICANT CHANGE UP (ref 0–0)
PLATELET # BLD AUTO: 229 K/UL — SIGNIFICANT CHANGE UP (ref 130–400)
PMV BLD: 11.1 FL — HIGH (ref 7.4–10.4)
POTASSIUM SERPL-MCNC: 4.1 MMOL/L — SIGNIFICANT CHANGE UP (ref 3.5–5)
POTASSIUM SERPL-SCNC: 4.1 MMOL/L — SIGNIFICANT CHANGE UP (ref 3.5–5)
PROT SERPL-MCNC: 7.6 G/DL — SIGNIFICANT CHANGE UP (ref 6–8)
PROTHROM AB SERPL-ACNC: 13.8 SEC — HIGH (ref 9.95–12.87)
RBC # BLD: 4.23 M/UL — LOW (ref 4.7–6.1)
RBC # FLD: 14.8 % — HIGH (ref 11.5–14.5)
SODIUM SERPL-SCNC: 139 MMOL/L — SIGNIFICANT CHANGE UP (ref 135–146)
WBC # BLD: 6.09 K/UL — SIGNIFICANT CHANGE UP (ref 4.8–10.8)
WBC # FLD AUTO: 6.09 K/UL — SIGNIFICANT CHANGE UP (ref 4.8–10.8)

## 2023-08-21 PROCEDURE — 80053 COMPREHEN METABOLIC PANEL: CPT

## 2023-08-21 PROCEDURE — 93005 ELECTROCARDIOGRAM TRACING: CPT

## 2023-08-21 PROCEDURE — 99214 OFFICE O/P EST MOD 30 MIN: CPT | Mod: 25

## 2023-08-21 PROCEDURE — 83036 HEMOGLOBIN GLYCOSYLATED A1C: CPT

## 2023-08-21 PROCEDURE — 85025 COMPLETE CBC W/AUTO DIFF WBC: CPT

## 2023-08-21 PROCEDURE — 85730 THROMBOPLASTIN TIME PARTIAL: CPT

## 2023-08-21 PROCEDURE — 85610 PROTHROMBIN TIME: CPT

## 2023-08-21 PROCEDURE — 93010 ELECTROCARDIOGRAM REPORT: CPT

## 2023-08-21 PROCEDURE — 71046 X-RAY EXAM CHEST 2 VIEWS: CPT | Mod: 26

## 2023-08-21 PROCEDURE — 36415 COLL VENOUS BLD VENIPUNCTURE: CPT

## 2023-08-21 PROCEDURE — 71046 X-RAY EXAM CHEST 2 VIEWS: CPT

## 2023-08-21 NOTE — H&P PST ADULT - HISTORY OF PRESENT ILLNESS
Patient is a 82 year old male presenting to PAST in preparation for right temporal artery bx  Has been experiencing headaches and was eval'd by vasc surg who is doing above to r/o temporal arteritis  Reports headaches are minimal at this time 2/10  PATIENT CURRENTLY DENIES CHEST PAIN  SHORTNESS OF BREATH  PALPITATIONS,  DYSURIA, OR UPPER RESPIRATORY INFECTION IN PAST 2 WEEKS  EXERCISE  TOLERANCE  can walk with use of a walker aprox 50 ft without SHORTNESS OF BREATH    Anesthesia Alert  NO--Difficult Airway  NO--History of neck surgery or radiation  NO--Limited ROM of neck  NO--History of Malignant hyperthermia  NO--Personal or family history of Pseudocholinesterase deficiency  NO--Prior Anesthesia Complication  NO--Latex Allergy  NO--Loose teeth  NO--History of Rheumatoid Arthritis  yes--CHESTER ( was advised to bring c-pap dop)  NO-- BLEEDING RISK  NO--Other_____    D[employer] Activity Status Index (DASI) from [employer]  on 8/21/2023      RESULT SUMMARY:  12.45 points  The higher the score (maximum 58.2), the higher the functional status.    4.27 METs        INPUTS:  Take care of self —> 2.75 = Yes  Walk indoors —> 1.75 = Yes  Walk 1&ndash;2 blocks on level ground —> 0 = No  Climb a flight of stairs or walk up a hill —> 0 = No  Run a short distance —> 0 = No  Do light work around the house —> 2.7 = Yes  Do moderate work around the house —> 0 = No  Do heavy work around the house —> 0 = No  Do yardwork —> 0 = No  Have sexual relations —> 5.25 = Yes  Participate in moderate recreational activities —> 0 = No  Participate in strenuous sports —> 0 = No      Revised Cardiac Risk Index for Pre-Operative Risk from [employer]  on 8/21/2023      RESULT SUMMARY:  3 points  Class IV Risk    15.0 %  30-day risk of death, MI, or cardiac arrest    From Juan 2017, based on pooled data from 5 high quality external validations (4 prospective). These numbers are higher than those often quoted from the now-outdated original study (Papito 1999). See Evidence for details.      INPUTS:  Elevated-risk surgery —> 0 = No  History of ischemic heart disease —> 1 = Yes  History of congestive heart failure —> 1 = Yes  History of cerebrovascular disease —> 1 = Yes  Pre-operative treatment with insulin —> 0 = No  Pre-operative creatinine >2 mg/dL / 176.8 µmol/L —> 0 = No        As per patient, this is their complete medical and surgical history, including medications both prescribed or over the counter.  Patient verbalized understanding of instructions and was given the opportunity to ask questions and have them answered.

## 2023-08-21 NOTE — H&P PST ADULT - NSICDXPASTMEDICALHX_GEN_ALL_CORE_FT
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

## 2023-08-22 DIAGNOSIS — Z01.818 ENCOUNTER FOR OTHER PREPROCEDURAL EXAMINATION: ICD-10-CM

## 2023-08-22 DIAGNOSIS — M31.6 OTHER GIANT CELL ARTERITIS: ICD-10-CM

## 2023-08-22 LAB
A1C WITH ESTIMATED AVERAGE GLUCOSE RESULT: 7 % — HIGH (ref 4–5.6)
ESTIMATED AVERAGE GLUCOSE: 154 MG/DL — HIGH (ref 68–114)

## 2023-08-24 NOTE — H&P PST ADULT - TEMPERATURE IN FAHRENHEIT (DEGREES F)
98 Cibinqo Pregnancy And Lactation Text: It is unknown if this medication will adversely affect pregnancy or breast feeding.  You should not take this medication if you are currently pregnant or planning a pregnancy or while breastfeeding.

## 2023-08-25 ENCOUNTER — APPOINTMENT (OUTPATIENT)
Dept: PAIN MANAGEMENT | Facility: CLINIC | Age: 83
End: 2023-08-25
Payer: MEDICARE

## 2023-08-25 PROBLEM — K44.9 DIAPHRAGMATIC HERNIA WITHOUT OBSTRUCTION OR GANGRENE: Chronic | Status: ACTIVE | Noted: 2023-08-21

## 2023-08-25 PROBLEM — I48.92 UNSPECIFIED ATRIAL FLUTTER: Chronic | Status: ACTIVE | Noted: 2023-08-21

## 2023-08-25 PROCEDURE — 99213 OFFICE O/P EST LOW 20 MIN: CPT

## 2023-08-25 NOTE — ASSESSMENT
[FreeTextEntry1] : Mr. Rea is an 82-year-old male with chronic cervical and lumbar pain with radiculopathy, as well as pain attributed to chronic pancreatitis. He will be undergoing a temporal artery biopsy on 8/29/23. I advised to continue with Lyrica for his radicular cervical pain. He will continue with all of his medications as is. He will follow up in 4 weeks for routine care.   Overall there is at least a 30-50% reduction in pain with the prescribed analgesics. The patient denies any adverse side effects due to the medication (sleeping disturbance, constipation, sleepiness, hallucinations and/or urination problems).  I have consulted the  registry for the purpose of reviewing the patient's controlled substance.  CHONG Jackson D.O

## 2023-08-25 NOTE — PHYSICAL EXAM
[de-identified] : Constitutional: GENERAL APPEARANCE OF PATIENT IS WELL DEVELOPED, WELL NOURISHED, BODY HABITUS NORMAL, WELL GROOMED, NO DEFORMITIES NOTED.\par  Head - Atraumatic and Normocephalic\par  Eyes, Nose, and Throat: External inspection of ears and nose are normal overall without scars, lesions, or masses noted. Assessment of hearing is normal\par  Neck-Examination of neck shows no masses, overall appearance is normal, neck is symmetric, tracheal position is midline, no crepitus is noted. Examination of thyroid shows no enlargement, tenderness or masses\par  Respiratory- Assessment of respiratory effort shows no intercostal retractions, no use of accessory muscles, unlabored breathing, and normal diaphragmatic movement.\par  Cardiovascular- Examination of extremities show no edema or varicosities\par  Musculoskeletal. Examination of gait is not antalgic and station is normal\par  Inspection and palpation of digits and nails shows no clubbing, cyanosis, nodules, drainage, fluctuance, petechiae\par  \par  - Spine - inspection and palpation shows no misalignment, asymmetry, crepitation, defects, tenderness, masses, effusions. ROM is normal without crepitation or contracture. No instability or subluxation or laxity is noted. No abnormal movements.\par  \par  - Neck- inspection and palpation shows no misalignment, asymmetry, crepitation, defects, tenderness, masses, effusions. ROM is normal without crepitation or contracture. No instability or subluxation or laxity is noted. No abnormal movements.\par  \par  - RUE- pain on palpation of right elbow.\par  \par  - LUE- inspection and palpation shows no misalignment, asymmetry, crepitation, defects, tenderness, masses, effusions. ROM is normal without crepitation or contracture. No instability or subluxation or laxity is noted. No abnormal movements.\par  \par  - RLE- inspection and palpation shows no misalignment, asymmetry, crepitation, defects, tenderness, masses, effusions. ROM is normal without crepitation or contracture. No instability or subluxation or laxity is noted. No abnormal movements.\par  \par  - LLE- inspection and palpation shows no misalignment, asymmetry, crepitation, defects, tenderness, masses, effusions. ROM is normal without crepitation or contracture. No instability or subluxation or laxity is noted. No abnormal movements.\par  \par  - Skin- Inspection of skin and subcutaneous tissue shows no rashes, lesions or ulcers. Palpation of skin and subcutaneous tissue shows no rashes, no indurations, subcutaneous nodules or tightening.\par  \par  - Abdomen- Nontender\par  \par  - Neurologic- CN 2-12 are grossly intact. No sensory or motor deficits in the upper and lower extremities. Adequate strength in upper and lower extremities\par  \par  - Psychiatric- Patient's judgment and insight are intact. Oriented to time, place and person. Recent and remote memory intact.

## 2023-08-25 NOTE — DATA REVIEWED
[FreeTextEntry1] : CT of the cervical lumbar spine at [facility]. CT of the cervical spine was completed in December 2020 and there is evidence of multilevel degenerative disc disease with formation of anterior osteophytes as well as multilevel spondylosis and spinal stenosis.\par  \par  CT of the lumbar spine was completed every did all radiology back in 2019. There is evidence of degenerative disc disease and vacuum phenomenon most appreciated L4-5 and L5-S1. 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 There is multilevel spondylosis and facet arthropathy. Multilevel spinal stenosis.

## 2023-08-25 NOTE — HISTORY OF PRESENT ILLNESS
[FreeTextEntry1] : HISTORY OF PRESENT ILLNESS: ORIGINAL PRESENTATION: Mr. Rea is an 82-year-old male who has a history of chronic pancreatitis. He also suffers with chronic lumbar and radicular pain secondary to disc displacement and stenosis in addition to a history of chronic cervical pain and radiculopathy, disc bulging and facet arthropathy. Patient has a significant alcoholism history, he denies current use and states that he has abstained from alcohol for "several years". He is to be monitored closely.  TODAY: He presents for a revisit appointment for continued pain. He suffers with chronic neck and lower back pain with radiculopathy as well as chronic pancreatitis. He continues to use a walker/rollator for ambulation. He continues to have a right-sided headache and blurry vision. He will be undergoing a temporal artery biopsy on 8/29/23 with vascular. He also notes radicular features into his right upper extremity. Pain is sharp and shooting in nature. Pain is worse at night. He bought to my attention that he was not utilizing Lyrica. I explained that this will help alleviate his nerve related pains. He remains on a regimen of Percocet 10/325 mg 3 times daily along with Oxymorphone ER 40 mg twice daily and Lyrica. He applies Diclofenac gel as needed. He experiences no adverse side effects. When consuming his medications, they provide him 30-40% relief, which he has been satisfied with.  UDS:7/27/23 - consistent

## 2023-08-28 ENCOUNTER — RESULT REVIEW (OUTPATIENT)
Age: 83
End: 2023-08-28

## 2023-08-28 PROCEDURE — 88313 SPECIAL STAINS GROUP 2: CPT | Mod: 26

## 2023-08-28 PROCEDURE — 88305 TISSUE EXAM BY PATHOLOGIST: CPT | Mod: 26

## 2023-08-29 ENCOUNTER — OUTPATIENT (OUTPATIENT)
Dept: OUTPATIENT SERVICES | Facility: HOSPITAL | Age: 83
LOS: 1 days | Discharge: HOME | End: 2023-08-29
Payer: MEDICARE

## 2023-08-29 ENCOUNTER — TRANSCRIPTION ENCOUNTER (OUTPATIENT)
Age: 83
End: 2023-08-29

## 2023-08-29 VITALS
SYSTOLIC BLOOD PRESSURE: 184 MMHG | DIASTOLIC BLOOD PRESSURE: 85 MMHG | OXYGEN SATURATION: 98 % | HEART RATE: 65 BPM | RESPIRATION RATE: 18 BRPM

## 2023-08-29 VITALS
OXYGEN SATURATION: 98 % | TEMPERATURE: 98 F | DIASTOLIC BLOOD PRESSURE: 96 MMHG | SYSTOLIC BLOOD PRESSURE: 144 MMHG | HEART RATE: 76 BPM | HEIGHT: 64 IN | RESPIRATION RATE: 16 BRPM | WEIGHT: 167.99 LBS

## 2023-08-29 DIAGNOSIS — Z96.0 PRESENCE OF UROGENITAL IMPLANTS: Chronic | ICD-10-CM

## 2023-08-29 DIAGNOSIS — Z90.49 ACQUIRED ABSENCE OF OTHER SPECIFIED PARTS OF DIGESTIVE TRACT: Chronic | ICD-10-CM

## 2023-08-29 DIAGNOSIS — Z98.890 OTHER SPECIFIED POSTPROCEDURAL STATES: Chronic | ICD-10-CM

## 2023-08-29 DIAGNOSIS — Z95.0 PRESENCE OF CARDIAC PACEMAKER: Chronic | ICD-10-CM

## 2023-08-29 DIAGNOSIS — M31.6 OTHER GIANT CELL ARTERITIS: ICD-10-CM

## 2023-08-29 DIAGNOSIS — Z87.19 PERSONAL HISTORY OF OTHER DISEASES OF THE DIGESTIVE SYSTEM: Chronic | ICD-10-CM

## 2023-08-29 LAB
GLUCOSE BLDC GLUCOMTR-MCNC: 116 MG/DL — HIGH (ref 70–99)
GLUCOSE BLDC GLUCOMTR-MCNC: 139 MG/DL — HIGH (ref 70–99)

## 2023-08-29 PROCEDURE — 37609 LIGATION/BX TEMPORAL ARTERY: CPT | Mod: RT

## 2023-08-29 PROCEDURE — 88313 SPECIAL STAINS GROUP 2: CPT

## 2023-08-29 PROCEDURE — 88305 TISSUE EXAM BY PATHOLOGIST: CPT

## 2023-08-29 PROCEDURE — 82962 GLUCOSE BLOOD TEST: CPT

## 2023-08-29 RX ORDER — MORPHINE SULFATE 50 MG/1
2 CAPSULE, EXTENDED RELEASE ORAL
Refills: 0 | Status: DISCONTINUED | OUTPATIENT
Start: 2023-08-29 | End: 2023-08-29

## 2023-08-29 RX ORDER — LABETALOL HCL 100 MG
10 TABLET ORAL
Refills: 0 | Status: DISCONTINUED | OUTPATIENT
Start: 2023-08-29 | End: 2023-08-29

## 2023-08-29 RX ORDER — ACETAMINOPHEN 500 MG
650 TABLET ORAL ONCE
Refills: 0 | Status: COMPLETED | OUTPATIENT
Start: 2023-08-29 | End: 2023-08-29

## 2023-08-29 RX ORDER — FUROSEMIDE 40 MG
1 TABLET ORAL
Refills: 0 | DISCHARGE

## 2023-08-29 RX ORDER — SODIUM CHLORIDE 9 MG/ML
500 INJECTION INTRAMUSCULAR; INTRAVENOUS; SUBCUTANEOUS
Refills: 0 | Status: DISCONTINUED | OUTPATIENT
Start: 2023-08-29 | End: 2023-08-29

## 2023-08-29 RX ORDER — ONDANSETRON 8 MG/1
4 TABLET, FILM COATED ORAL ONCE
Refills: 0 | Status: DISCONTINUED | OUTPATIENT
Start: 2023-08-29 | End: 2023-08-29

## 2023-08-29 RX ADMIN — Medication 650 MILLIGRAM(S): at 15:55

## 2023-08-29 RX ADMIN — Medication 650 MILLIGRAM(S): at 16:25

## 2023-08-29 RX ADMIN — Medication 10 MILLIGRAM(S): at 16:45

## 2023-08-29 NOTE — BRIEF OPERATIVE NOTE - OPERATION/FINDINGS
Superficial Temporal Artery was was located superior to the temporalis fascia, dissection was performed at approximately 3.5cm of artery was cut from the surrounding tissue with scissors. The wound was closed in layered fashion with Vicryl and Monocryl sutures.

## 2023-08-29 NOTE — PRE-ANESTHESIA EVALUATION ADULT - NSRADCARDRESULTSFT_GEN_ALL_CORE
1. Endocardial visualization was enhanced with intravenous echo contrast.   2. Left ventricular ejection fraction, by visual estimation, is 45 to   50%.   3. Mildly decreased global left ventricular systolic function.   4. Entire inferior septum and basal and mid inferior wall are abnormal   as described above.

## 2023-08-29 NOTE — ASU PREOP CHECKLIST - ALLERGIES REVIEWED
Date of service: 06/23/22       Chief Complaint   Patient presents with   â¢ Pre-Op Exam   â¢ Office Visit     Patient needs refills           HPI:   Kim Reid is a 78year old female  with past medical history of hypertension, chronic hepatitis C status post care with targeted medications, lumbar DJD, osteoarthritis of knees, hyperlipidemia, chronic kidney disease stage III, presents for preoperative evaluation. Scheduled to undergo right knee arthroplasty at [facility] on 7/6/2022 with . Has been dealing with arthritis of the knees for a long time and deals with chronic pain. States that range of motion has been affected. Preoperative EKG with 75 bpm, normal sinus rhythm, normal axis. Preoperative PT and PTT normal, CBC normal, CMP with findings of CKD stage III which is per her baseline, urinary analysis with small leukocyte Estrace, urine culture negative for growth. Previously has had hysterectomy, has not had any issues with anesthetic in the past she reports. Has been at her usual baseline level of activity. Not using any aspirin or other NSAIDs. There are no further complaints. Review:    Current Outpatient Medications   Medication Sig Dispense Refill   â¢ zolpidem (AMBIEN) 5 MG tablet TAKE 1 TABLET BY MOUTH NIGHTLY AS NEEDED FOR SLEEP 30 tablet 0   â¢ FeroSul 325 (65 Fe) MG tablet Take 1 tablet by mouth 2 times daily. â¢ docusate sodium (COLACE) 100 MG capsule TAKE 1 CAPSULE BY MOUTH TWICE DAILY FOR CONSTIPATION     â¢ ondansetron (ZOFRAN) 4 MG tablet Take 4 mg by mouth 3 times daily as needed. â¢ amLODIPine (NORVASC) 5 MG tablet Take 1 tablet by mouth daily. 30 tablet 11   â¢ ibuprofen (MOTRIN) 800 MG tablet Take 1 tablet by mouth every 8 hours as needed for Pain or Fever. 30 tablet 6   â¢ cloNIDine (CATAPRES) 0.1 MG tablet Take 1 tablet by mouth 3 times daily.  180 tablet 3   â¢ acetaminophen-codeine (TYLENOL NO.3) 300-30 MG per tablet Take 1-2 tablets by mouth "every 4 hours as needed for Pain. 100 tablet 0   â¢ cholecalciferol (VITAMIN D) 25 mcg (1,000 units) tablet      â¢ Multiple Vitamin-Folic Acid Tab Take 1 tablet by mouth daily. â¢ albuterol (ProAir HFA) 108 (90 Base) MCG/ACT inhaler Inhale 2 puffs into the lungs every 4 hours as needed for Shortness of Breath or Wheezing. 1 each 11   â¢ varenicline (Chantix Starting Month Quinton) 0.5 MG X 11 & 1 MG X 42 tablet Take 0.5 mg by mouth as directed. 53 tablet 0   â¢ varenicline (Chantix Continuing Month Pak) 1 MG tablet Take 1 tablet by mouth 2 times daily. 56 tablet 1     No current facility-administered medications for this visit. ALLERGIES:  No Known Allergies  Past Medical History:   Diagnosis Date   â¢ Essential (primary) hypertension    â¢ Hepatitis C    â¢ Lumbar disc disease         HYSTERECTOMY                                                Family History   Problem Relation Age of Onset   â¢ Diabetes Brother        ROS:  Review of Systems   Constitutional: Negative for chills and fever. HENT: Negative for sore throat. Respiratory: Negative for cough, shortness of breath and wheezing. Cardiovascular: Negative for chest pain. Gastrointestinal: Negative for constipation, diarrhea and vomiting. Genitourinary: Negative for frequency. Musculoskeletal: Positive for joint pain. Skin: Negative for rash. Neurological: Negative for speech change and weakness. Visit Vitals  /66 (BP Location: LUE - Left upper extremity, Patient Position: Sitting, Cuff Size: Large Adult)   Pulse 73   Resp 17   Ht 5' 4"" (1.626 m)   Wt 100.5 kg (221 lb 10.8 oz)   SpO2 95%   BMI 38.05 kg/mÂ²        Physical Exam  Constitutional:       Appearance: Normal appearance. HENT:      Head: Normocephalic and atraumatic.       Right Ear: Tympanic membrane and ear canal normal.      Left Ear: Tympanic membrane and ear canal normal.      Nose: Nose normal.      Mouth/Throat:      Mouth: Mucous membranes are moist.      Pharynx: " Oropharynx is clear. Neck: Normal range of motion and neck supple. Cardiovascular:      Rate and Rhythm: Normal rate and regular rhythm. Pulses: Normal pulses. Heart sounds: Normal heart sounds. Pulmonary:      Effort: Pulmonary effort is normal.      Breath sounds: Normal breath sounds. Abdominal:      General: Abdomen is flat. Musculoskeletal:      Right lower leg: No edema. Left lower leg: No edema. Comments: Left knee: No obvious swelling or deformity, some decrease in range of motion    Right knee: No obvious swelling or deformity, some decrease in range of motion   Skin:     Capillary Refill: Capillary refill takes less than 2 seconds. Neurological:      General: No focal deficit present. Mental Status: She is alert and oriented to person, place, and time. Assessment/Plan:  Assessment     Cristin Gil was seen today for pre-op exam and office visit. Diagnoses and all orders for this visit:    Pre-op exam  -- Preoperative lab work reviewed  --Preoperative EKG reviewed    Primary osteoarthritis of both knees  -- Scheduled for right-sided knee replacement    Essential hypertension  -- Blood pressure at goal  -- Continue current regimen      After review of preoperative lab work as well as preoperative EKG as well as consideration of current clinical status okay to proceed with right-sided knee replacement as scheduled      Lasha VITALE   Primary Care   06/23/22  [facility]- [facility] [address], [facility], [address] done

## 2023-08-29 NOTE — CHART NOTE - NSCHARTNOTEFT_GEN_A_CORE
PACU ANESTHESIA ADMISSION NOTE      Procedure: Temporal artery biopsy      Post op diagnosis:  H/O headache        ____  Intubated  TV:______       Rate: ______      FiO2: ______    ___x_  Patent Airway    ___x_  Full return of protective reflexes    __x__  Full recovery from anesthesia / back to baseline status    Vitals:  temp(F) 98  /75  spo2 98  RR 17  pulse 63    Mental Status:  x____ Awake   _____ Alert   _____ Drowsy   _____ Sedated    Nausea/Vomiting:  __x_ NO  ______Yes,   See Post - Op Orders          Pain Scale (0-10):  _____    Treatment: ____ None    ___x_ See Post - Op/PCA Orders    Post - Operative Fluids:   ____ Oral   _x___ See Post - Op Orders    Plan: Discharge:   x ____Home       _____Floor     _____Critical Care    _____  Other:_________________    Comments: uneventful anesthesia course no complications. Vitals stable. Pt transferred to PACU

## 2023-08-29 NOTE — ASU DISCHARGE PLAN (ADULT/PEDIATRIC) - ASU DC SPECIAL INSTRUCTIONSFT
Diet: You may resume your usual diet. Avoid alcohol and excessive salt intake for two weeks following surgery. This will help to minimize swelling.    Medication: Take Tylenol 650mg every 6 hours and Advil 600mg every 8hr.     Activity: Start walking as soon as possible to increase circulation and prevent blood clots.     Wound care: Keep your dressing clean, dry, and intact until seen by MD/PA.  Do not smoke! It delays wound healing and increases the risk of complications.    Personal Hygiene: Keep the wound dry for two days, you are allowed to bathe but please keep surgical area dry. Remove the dressing in 2 days, and do not remove the steristrips, they will fall off on their own.  Do not scrub the operative area with soap and water.    Sun Exposure: You may be in the sun one month following surgery. Avoid sunburn. Always use a sunscreen of at least SPF30.    Things to expect: The operative area may be bruised, swollen, and painful. You may also have temporary numbness which will improve over time.    In case of emergency: call the office any time day or night. If you do not already have an appointment, please call during regular office hours to schedule.    We wish you a pleasant recovery.

## 2023-08-29 NOTE — ASU DISCHARGE PLAN (ADULT/PEDIATRIC) - CARE PROVIDER_API CALL
Clyde Evans  Vascular Surgery  [address] [place] [address], [address]  [place] [address]  Phone: [phone]  Fax: [phone]  Follow Up Time:

## 2023-08-30 ENCOUNTER — NON-APPOINTMENT (OUTPATIENT)
Age: 83
End: 2023-08-30

## 2023-08-30 ENCOUNTER — APPOINTMENT (OUTPATIENT)
Dept: HOME HEALTH SERVICES | Facility: HOME HEALTH | Age: 83
End: 2023-08-30

## 2023-08-30 VITALS
SYSTOLIC BLOOD PRESSURE: 160 MMHG | TEMPERATURE: 97.8 F | DIASTOLIC BLOOD PRESSURE: 92 MMHG | RESPIRATION RATE: 18 BRPM | OXYGEN SATURATION: 96 % | HEART RATE: 72 BPM

## 2023-08-31 LAB — SURGICAL PATHOLOGY STUDY: SIGNIFICANT CHANGE UP

## 2023-09-01 ENCOUNTER — NON-APPOINTMENT (OUTPATIENT)
Age: 83
End: 2023-09-01

## 2023-09-01 DIAGNOSIS — Z86.73 PERSONAL HISTORY OF TRANSIENT ISCHEMIC ATTACK (TIA), AND CEREBRAL INFARCTION WITHOUT RESIDUAL DEFICITS: ICD-10-CM

## 2023-09-01 DIAGNOSIS — Z79.4 LONG TERM (CURRENT) USE OF INSULIN: ICD-10-CM

## 2023-09-01 DIAGNOSIS — E11.9 TYPE 2 DIABETES MELLITUS WITHOUT COMPLICATIONS: ICD-10-CM

## 2023-09-01 DIAGNOSIS — Z95.820 PERIPHERAL VASCULAR ANGIOPLASTY STATUS WITH IMPLANTS AND GRAFTS: ICD-10-CM

## 2023-09-01 DIAGNOSIS — Z95.810 PRESENCE OF AUTOMATIC (IMPLANTABLE) CARDIAC DEFIBRILLATOR: ICD-10-CM

## 2023-09-01 DIAGNOSIS — Z79.51 LONG TERM (CURRENT) USE OF INHALED STEROIDS: ICD-10-CM

## 2023-09-01 DIAGNOSIS — G44.89 OTHER HEADACHE SYNDROME: ICD-10-CM

## 2023-09-01 DIAGNOSIS — I49.5 SICK SINUS SYNDROME: ICD-10-CM

## 2023-09-01 DIAGNOSIS — I25.10 ATHEROSCLEROTIC HEART DISEASE OF NATIVE CORONARY ARTERY WITHOUT ANGINA PECTORIS: ICD-10-CM

## 2023-09-01 DIAGNOSIS — I50.32 CHRONIC DIASTOLIC (CONGESTIVE) HEART FAILURE: ICD-10-CM

## 2023-09-01 DIAGNOSIS — Z87.891 PERSONAL HISTORY OF NICOTINE DEPENDENCE: ICD-10-CM

## 2023-09-01 DIAGNOSIS — Z79.85 LONG-TERM (CURRENT) USE OF INJECTABLE NON-INSULIN ANTIDIABETIC DRUGS: ICD-10-CM

## 2023-09-01 DIAGNOSIS — I11.0 HYPERTENSIVE HEART DISEASE WITH HEART FAILURE: ICD-10-CM

## 2023-09-01 DIAGNOSIS — Z79.01 LONG TERM (CURRENT) USE OF ANTICOAGULANTS: ICD-10-CM

## 2023-09-01 DIAGNOSIS — E78.5 HYPERLIPIDEMIA, UNSPECIFIED: ICD-10-CM

## 2023-09-01 DIAGNOSIS — G47.30 SLEEP APNEA, UNSPECIFIED: ICD-10-CM

## 2023-09-03 DIAGNOSIS — R07.89 OTHER CHEST PAIN: ICD-10-CM

## 2023-09-06 ENCOUNTER — NON-APPOINTMENT (OUTPATIENT)
Age: 83
End: 2023-09-06

## 2023-09-06 ENCOUNTER — LABORATORY RESULT (OUTPATIENT)
Age: 83
End: 2023-09-06

## 2023-09-07 ENCOUNTER — LABORATORY RESULT (OUTPATIENT)
Age: 83
End: 2023-09-07

## 2023-09-08 ENCOUNTER — NON-APPOINTMENT (OUTPATIENT)
Age: 83
End: 2023-09-08

## 2023-09-09 ENCOUNTER — TRANSCRIPTION ENCOUNTER (OUTPATIENT)
Age: 83
End: 2023-09-09

## 2023-09-09 ENCOUNTER — NON-APPOINTMENT (OUTPATIENT)
Age: 83
End: 2023-09-09

## 2023-09-11 ENCOUNTER — APPOINTMENT (OUTPATIENT)
Dept: HOME HEALTH SERVICES | Facility: HOME HEALTH | Age: 83
End: 2023-09-11

## 2023-09-11 ENCOUNTER — NON-APPOINTMENT (OUTPATIENT)
Age: 83
End: 2023-09-11

## 2023-09-11 VITALS
TEMPERATURE: 97.6 F | SYSTOLIC BLOOD PRESSURE: 162 MMHG | OXYGEN SATURATION: 99 % | RESPIRATION RATE: 19 BRPM | DIASTOLIC BLOOD PRESSURE: 90 MMHG | HEART RATE: 61 BPM

## 2023-09-12 ENCOUNTER — NON-APPOINTMENT (OUTPATIENT)
Age: 83
End: 2023-09-12

## 2023-09-12 ENCOUNTER — TRANSCRIPTION ENCOUNTER (OUTPATIENT)
Age: 83
End: 2023-09-12

## 2023-09-13 ENCOUNTER — NON-APPOINTMENT (OUTPATIENT)
Age: 83
End: 2023-09-13

## 2023-09-13 LAB
ALBUMIN SERPL ELPH-MCNC: 4.3 G/DL
ALP BLD-CCNC: 74 U/L
ALT SERPL-CCNC: 33 U/L
ANION GAP SERPL CALC-SCNC: 13 MMOL/L
AST SERPL-CCNC: 47 U/L
BILIRUB SERPL-MCNC: 0.5 MG/DL
BUN SERPL-MCNC: 13 MG/DL
CALCIUM SERPL-MCNC: 9.3 MG/DL
CHLORIDE SERPL-SCNC: 106 MMOL/L
CO2 SERPL-SCNC: 21 MMOL/L
CREAT SERPL-MCNC: 1.07 MG/DL
EGFR: 70 ML/MIN/1.73M2
GLUCOSE SERPL-MCNC: 124 MG/DL
HCT VFR BLD CALC: 41.1 %
HGB BLD-MCNC: 13.3 G/DL
MCHC RBC-ENTMCNC: 31 PG
MCHC RBC-ENTMCNC: 32.4 GM/DL
MCV RBC AUTO: 95.8 FL
PLATELET # BLD AUTO: 233 K/UL
POTASSIUM SERPL-SCNC: 4.4 MMOL/L
PROT SERPL-MCNC: 7.1 G/DL
RBC # BLD: 4.29 M/UL
RBC # FLD: 14.6 %
SODIUM SERPL-SCNC: 140 MMOL/L
WBC # FLD AUTO: 5.01 K/UL

## 2023-09-14 ENCOUNTER — NON-APPOINTMENT (OUTPATIENT)
Age: 83
End: 2023-09-14

## 2023-09-15 ENCOUNTER — APPOINTMENT (OUTPATIENT)
Dept: HOME HEALTH SERVICES | Facility: HOME HEALTH | Age: 83
End: 2023-09-15
Payer: MEDICARE

## 2023-09-15 VITALS
RESPIRATION RATE: 18 BRPM | OXYGEN SATURATION: 96 % | SYSTOLIC BLOOD PRESSURE: 175 MMHG | DIASTOLIC BLOOD PRESSURE: 78 MMHG | TEMPERATURE: 98 F | HEART RATE: 61 BPM

## 2023-09-15 DIAGNOSIS — K52.9 NONINFECTIVE GASTROENTERITIS AND COLITIS, UNSPECIFIED: ICD-10-CM

## 2023-09-15 PROCEDURE — 99349 HOME/RES VST EST MOD MDM 40: CPT

## 2023-09-16 PROBLEM — K52.9 CHRONIC DIARRHEA: Status: ACTIVE | Noted: 2023-07-25

## 2023-09-19 ENCOUNTER — NON-APPOINTMENT (OUTPATIENT)
Age: 83
End: 2023-09-19

## 2023-09-19 ENCOUNTER — LABORATORY RESULT (OUTPATIENT)
Age: 83
End: 2023-09-19

## 2023-09-19 LAB
ALBUMIN SERPL ELPH-MCNC: 4.3 G/DL
ALP BLD-CCNC: 78 U/L
ALT SERPL-CCNC: 16 U/L
AMYLASE/CREAT SERPL: 64 U/L
ANION GAP SERPL CALC-SCNC: 17 MMOL/L
AST SERPL-CCNC: 19 U/L
BILIRUB SERPL-MCNC: 0.4 MG/DL
BUN SERPL-MCNC: 13 MG/DL
CALCIUM SERPL-MCNC: 9.6 MG/DL
CHLORIDE SERPL-SCNC: 100 MMOL/L
CO2 SERPL-SCNC: 22 MMOL/L
CREAT SERPL-MCNC: 0.98 MG/DL
EGFR: 77 ML/MIN/1.73M2
GLUCOSE SERPL-MCNC: 188 MG/DL
LPL SERPL-CCNC: 18 U/L
POTASSIUM SERPL-SCNC: 4.3 MMOL/L
PROT SERPL-MCNC: 7.2 G/DL
SODIUM SERPL-SCNC: 139 MMOL/L

## 2023-09-20 ENCOUNTER — APPOINTMENT (OUTPATIENT)
Dept: VASCULAR SURGERY | Facility: CLINIC | Age: 83
End: 2023-09-20
Payer: MEDICARE

## 2023-09-20 PROCEDURE — 99024 POSTOP FOLLOW-UP VISIT: CPT

## 2023-09-21 ENCOUNTER — APPOINTMENT (OUTPATIENT)
Dept: PAIN MANAGEMENT | Facility: CLINIC | Age: 83
End: 2023-09-21
Payer: MEDICARE

## 2023-09-21 ENCOUNTER — NON-APPOINTMENT (OUTPATIENT)
Age: 83
End: 2023-09-21

## 2023-09-21 DIAGNOSIS — M25.529 PAIN IN UNSPECIFIED ELBOW: ICD-10-CM

## 2023-09-21 PROCEDURE — 99214 OFFICE O/P EST MOD 30 MIN: CPT

## 2023-09-22 ENCOUNTER — EMERGENCY (EMERGENCY)
Facility: HOSPITAL | Age: 83
LOS: 0 days | Discharge: HOME | End: 2023-09-22
Attending: STUDENT IN AN ORGANIZED HEALTH CARE EDUCATION/TRAINING PROGRAM
Payer: MEDICARE

## 2023-09-22 ENCOUNTER — TRANSCRIPTION ENCOUNTER (OUTPATIENT)
Age: 83
End: 2023-09-22

## 2023-09-22 VITALS — HEART RATE: 64 BPM | SYSTOLIC BLOOD PRESSURE: 190 MMHG | DIASTOLIC BLOOD PRESSURE: 85 MMHG

## 2023-09-22 VITALS
WEIGHT: 179.02 LBS | SYSTOLIC BLOOD PRESSURE: 189 MMHG | HEART RATE: 70 BPM | TEMPERATURE: 98 F | HEIGHT: 64 IN | OXYGEN SATURATION: 99 % | RESPIRATION RATE: 17 BRPM | DIASTOLIC BLOOD PRESSURE: 91 MMHG

## 2023-09-22 DIAGNOSIS — Z98.890 OTHER SPECIFIED POSTPROCEDURAL STATES: Chronic | ICD-10-CM

## 2023-09-22 DIAGNOSIS — Z87.19 PERSONAL HISTORY OF OTHER DISEASES OF THE DIGESTIVE SYSTEM: Chronic | ICD-10-CM

## 2023-09-22 DIAGNOSIS — Z87.19 PERSONAL HISTORY OF OTHER DISEASES OF THE DIGESTIVE SYSTEM: ICD-10-CM

## 2023-09-22 DIAGNOSIS — Z95.0 PRESENCE OF CARDIAC PACEMAKER: Chronic | ICD-10-CM

## 2023-09-22 DIAGNOSIS — Z90.49 ACQUIRED ABSENCE OF OTHER SPECIFIED PARTS OF DIGESTIVE TRACT: Chronic | ICD-10-CM

## 2023-09-22 DIAGNOSIS — Z79.01 LONG TERM (CURRENT) USE OF ANTICOAGULANTS: ICD-10-CM

## 2023-09-22 DIAGNOSIS — E11.9 TYPE 2 DIABETES MELLITUS WITHOUT COMPLICATIONS: ICD-10-CM

## 2023-09-22 DIAGNOSIS — I48.92 UNSPECIFIED ATRIAL FLUTTER: ICD-10-CM

## 2023-09-22 DIAGNOSIS — Z98.890 OTHER SPECIFIED POSTPROCEDURAL STATES: ICD-10-CM

## 2023-09-22 DIAGNOSIS — Z90.49 ACQUIRED ABSENCE OF OTHER SPECIFIED PARTS OF DIGESTIVE TRACT: ICD-10-CM

## 2023-09-22 DIAGNOSIS — I10 ESSENTIAL (PRIMARY) HYPERTENSION: ICD-10-CM

## 2023-09-22 DIAGNOSIS — G47.33 OBSTRUCTIVE SLEEP APNEA (ADULT) (PEDIATRIC): ICD-10-CM

## 2023-09-22 DIAGNOSIS — R51.9 HEADACHE, UNSPECIFIED: ICD-10-CM

## 2023-09-22 DIAGNOSIS — Z79.4 LONG TERM (CURRENT) USE OF INSULIN: ICD-10-CM

## 2023-09-22 DIAGNOSIS — Z96.0 PRESENCE OF UROGENITAL IMPLANTS: Chronic | ICD-10-CM

## 2023-09-22 DIAGNOSIS — J44.9 CHRONIC OBSTRUCTIVE PULMONARY DISEASE, UNSPECIFIED: ICD-10-CM

## 2023-09-22 DIAGNOSIS — E78.5 HYPERLIPIDEMIA, UNSPECIFIED: ICD-10-CM

## 2023-09-22 DIAGNOSIS — Z86.79 PERSONAL HISTORY OF OTHER DISEASES OF THE CIRCULATORY SYSTEM: ICD-10-CM

## 2023-09-22 DIAGNOSIS — Z95.0 PRESENCE OF CARDIAC PACEMAKER: ICD-10-CM

## 2023-09-22 LAB
ALBUMIN SERPL ELPH-MCNC: 4.1 G/DL — SIGNIFICANT CHANGE UP (ref 3.5–5.2)
ALP SERPL-CCNC: 68 U/L — SIGNIFICANT CHANGE UP (ref 30–115)
ALT FLD-CCNC: 15 U/L — SIGNIFICANT CHANGE UP (ref 0–41)
ANION GAP SERPL CALC-SCNC: 11 MMOL/L — SIGNIFICANT CHANGE UP (ref 7–14)
AST SERPL-CCNC: 35 U/L — SIGNIFICANT CHANGE UP (ref 0–41)
BILIRUB SERPL-MCNC: 0.5 MG/DL — SIGNIFICANT CHANGE UP (ref 0.2–1.2)
BUN SERPL-MCNC: 18 MG/DL — SIGNIFICANT CHANGE UP (ref 10–20)
CALCIUM SERPL-MCNC: 9.5 MG/DL — SIGNIFICANT CHANGE UP (ref 8.4–10.4)
CHLORIDE SERPL-SCNC: 103 MMOL/L — SIGNIFICANT CHANGE UP (ref 98–110)
CO2 SERPL-SCNC: 25 MMOL/L — SIGNIFICANT CHANGE UP (ref 17–32)
CREAT SERPL-MCNC: 1 MG/DL — SIGNIFICANT CHANGE UP (ref 0.7–1.5)
EGFR: 76 ML/MIN/1.73M2 — SIGNIFICANT CHANGE UP
GLUCOSE SERPL-MCNC: 100 MG/DL — HIGH (ref 70–99)
HCT VFR BLD CALC: 39.8 % — LOW (ref 42–52)
HGB BLD-MCNC: 13.2 G/DL — LOW (ref 14–18)
MCHC RBC-ENTMCNC: 31.1 PG — HIGH (ref 27–31)
MCHC RBC-ENTMCNC: 33.2 G/DL — SIGNIFICANT CHANGE UP (ref 32–37)
MCV RBC AUTO: 93.9 FL — SIGNIFICANT CHANGE UP (ref 80–94)
NRBC # BLD: 0 /100 WBCS — SIGNIFICANT CHANGE UP (ref 0–0)
PLATELET # BLD AUTO: 201 K/UL — SIGNIFICANT CHANGE UP (ref 130–400)
PMV BLD: 9.8 FL — SIGNIFICANT CHANGE UP (ref 7.4–10.4)
POTASSIUM SERPL-MCNC: 4.1 MMOL/L — SIGNIFICANT CHANGE UP (ref 3.5–5)
POTASSIUM SERPL-MCNC: 5.7 MMOL/L — HIGH (ref 3.5–5)
POTASSIUM SERPL-SCNC: 4.1 MMOL/L — SIGNIFICANT CHANGE UP (ref 3.5–5)
POTASSIUM SERPL-SCNC: 5.7 MMOL/L — HIGH (ref 3.5–5)
PROT SERPL-MCNC: 7.8 G/DL — SIGNIFICANT CHANGE UP (ref 6–8)
RBC # BLD: 4.24 M/UL — LOW (ref 4.7–6.1)
RBC # FLD: 13.7 % — SIGNIFICANT CHANGE UP (ref 11.5–14.5)
SODIUM SERPL-SCNC: 139 MMOL/L — SIGNIFICANT CHANGE UP (ref 135–146)
WBC # BLD: 5.25 K/UL — SIGNIFICANT CHANGE UP (ref 4.8–10.8)
WBC # FLD AUTO: 5.25 K/UL — SIGNIFICANT CHANGE UP (ref 4.8–10.8)

## 2023-09-22 PROCEDURE — 99285 EMERGENCY DEPT VISIT HI MDM: CPT

## 2023-09-22 PROCEDURE — 80053 COMPREHEN METABOLIC PANEL: CPT

## 2023-09-22 PROCEDURE — 70450 CT HEAD/BRAIN W/O DYE: CPT | Mod: 26,MA

## 2023-09-22 PROCEDURE — 84132 ASSAY OF SERUM POTASSIUM: CPT

## 2023-09-22 PROCEDURE — 36415 COLL VENOUS BLD VENIPUNCTURE: CPT

## 2023-09-22 PROCEDURE — 85027 COMPLETE CBC AUTOMATED: CPT

## 2023-09-22 PROCEDURE — 99284 EMERGENCY DEPT VISIT MOD MDM: CPT | Mod: 25

## 2023-09-22 PROCEDURE — 96374 THER/PROPH/DIAG INJ IV PUSH: CPT

## 2023-09-22 PROCEDURE — 70450 CT HEAD/BRAIN W/O DYE: CPT | Mod: MA

## 2023-09-22 RX ORDER — ACETAMINOPHEN 500 MG
650 TABLET ORAL ONCE
Refills: 0 | Status: COMPLETED | OUTPATIENT
Start: 2023-09-22 | End: 2023-09-22

## 2023-09-22 RX ORDER — METOCLOPRAMIDE HCL 10 MG
10 TABLET ORAL ONCE
Refills: 0 | Status: COMPLETED | OUTPATIENT
Start: 2023-09-22 | End: 2023-09-22

## 2023-09-22 RX ADMIN — Medication 650 MILLIGRAM(S): at 17:24

## 2023-09-22 RX ADMIN — Medication 10 MILLIGRAM(S): at 17:24

## 2023-09-22 NOTE — ED ADULT NURSE NOTE - NSFALLHARMRISKINTERV_ED_ALL_ED

## 2023-09-22 NOTE — ED PROVIDER NOTE - PATIENT PORTAL LINK FT
You can access the FollowMyHealth Patient Portal offered by [facility] by registering at the following website: [email or web address]. By joining [employer]’s FollowMyHealth portal, you will also be able to view your health information using other applications (apps) compatible with our system.

## 2023-09-22 NOTE — ED PROVIDER NOTE - OBJECTIVE STATEMENT
82 yr old m w/ a pmh significant for htn, chronic pancreatitis, COPD, atrial flutter, HLD, DM, HTN, who presents with hypertension. As per pt for the past 6-7 mnths he has been having uncontrolled htn. Pt states that his bp has been above 200/100. Pt states that he went to his optho doctor today for his cataract surgery when he was told that his bp was elevated. Pt states that while waiting in the ED, he had developed a HA. Pt states that the headache is similar to his past episodes, no numbness, no tingling, no change in vision, no nausea, no vomiting or any other medical complaints.

## 2023-09-22 NOTE — ED ADULT TRIAGE NOTE - CHIEF COMPLAINT QUOTE
Pt here for HTN at home. Pt from home sent in by jossue for uncontrolled HTN. Denies vision changes/ HA/ cp./

## 2023-09-22 NOTE — ED ADULT NURSE NOTE - SUICIDE SCREENING DEPRESSION
Addended by: JANEL CANALES on: 5/3/2023 03:44 PM     Modules accepted: Level of Service     Negative

## 2023-09-22 NOTE — ED PROVIDER NOTE - NSFOLLOWUPINSTRUCTIONS_ED_ALL_ED_FT
PLEASE FOLLOW UP WITH YOUR PRIMARY CARE DOCTOR 24 TO 48 HOURS AFTER THIS VISIT.     Hypertension    Hypertension, commonly called high blood pressure, is when the force of blood pumping through your arteries is too strong. Hypertension forces your heart to work harder to pump blood. Your arteries may become narrow or stiff. Having untreated or uncontrolled hypertension for a long period of time can cause heart attack, stroke, kidney disease, and other problems. If started on a medication, take exactly as prescribed by your health care professional. Maintain a healthy lifestyle and follow up with your primary care physician.    SEEK IMMEDIATE MEDICAL CARE IF YOU HAVE ANY OF THE FOLLOWING SYMPTOMS: severe headache, confusion, chest pain, abdominal pain, vomiting, or shortness of breath.

## 2023-09-22 NOTE — ED ADULT NURSE NOTE - OBJECTIVE STATEMENT
Pt. reports to ED with HTN. Pt. reports right eye vision changes, headache and stomach pain. Pt. denies SOB, fever, chills.

## 2023-09-22 NOTE — ED PROVIDER NOTE - CLINICAL SUMMARY MEDICAL DECISION MAKING FREE TEXT BOX
82 yr old m that presents with hypertension, headache, neurovascularly intact. pt reports improvement and there is improvement in bp. Labs were ordered and reviewed.  Imaging was ordered and reviewed by me.  Appropriate medications for patient's presenting complaints were ordered and effects were reassessed.  Patient's records (prior hospital, ED visit, and/or nursing home notes if available) were reviewed.  Additional history was obtained from EMS, family, and/or PCP (where available).  Escalation to admission/observation was considered.   However patient feels much better and is comfortable with discharge.  Appropriate follow-up was arranged.     I have discussed the discharge plan with the patient. The patient agrees with the plan, as discussed.  The patient understands Emergency Department diagnosis is a preliminary diagnosis often based on limited information and that the patient must adhere to the follow-up plan as discussed.  The patient understands that if the symptoms worsen or if prescribed medications do not have the desired/planned effect that the patient may return to the Emergency Department at any time for further evaluation and treatment.

## 2023-09-22 NOTE — ED PROVIDER NOTE - PHYSICAL EXAMINATION
VITAL SIGNS: I have reviewed nursing notes and confirm.  CONSTITUTIONAL: non-toxic, well appearing  SKIN: no rash, no petechiae.  EYES: EOMI, pink conjunctiva, anicteric  ENT: tongue midline, no exudates, MMM  NECK: Supple; no meningismus, no JVD  CARD: RRR, no murmurs, equal radial pulses bilaterally 2+  RESP: CTAB, no respiratory distress  ABD: Soft, non-tender, non-distended, no peritoneal signs, no HSM, no CVA tenderness  EXT: Normal ROM x4. No edema. No calves tenderness  NEURO: Alert, oriented x3. CN2-12 intact, equal strength bilaterally, nl gait.  PSYCH: Cooperative, appropriate.

## 2023-09-23 ENCOUNTER — NON-APPOINTMENT (OUTPATIENT)
Age: 83
End: 2023-09-23

## 2023-09-26 ENCOUNTER — NON-APPOINTMENT (OUTPATIENT)
Age: 83
End: 2023-09-26

## 2023-09-28 ENCOUNTER — LABORATORY RESULT (OUTPATIENT)
Age: 83
End: 2023-09-28

## 2023-09-29 ENCOUNTER — APPOINTMENT (OUTPATIENT)
Dept: HOME HEALTH SERVICES | Facility: HOME HEALTH | Age: 83
End: 2023-09-29

## 2023-09-29 ENCOUNTER — NON-APPOINTMENT (OUTPATIENT)
Age: 83
End: 2023-09-29

## 2023-09-29 VITALS
RESPIRATION RATE: 18 BRPM | HEART RATE: 78 BPM | DIASTOLIC BLOOD PRESSURE: 96 MMHG | TEMPERATURE: 97.9 F | OXYGEN SATURATION: 98 % | SYSTOLIC BLOOD PRESSURE: 210 MMHG

## 2023-10-02 ENCOUNTER — NON-APPOINTMENT (OUTPATIENT)
Age: 83
End: 2023-10-02

## 2023-10-05 ENCOUNTER — NON-APPOINTMENT (OUTPATIENT)
Age: 83
End: 2023-10-05

## 2023-10-11 ENCOUNTER — NON-APPOINTMENT (OUTPATIENT)
Age: 83
End: 2023-10-11

## 2023-10-11 NOTE — PRE-ANESTHESIA EVALUATION ADULT - NSANTHDISPORD_GEN_ALL_CORE
Per Dr. Babcock's LOS:    Return in about 6 months (around 4/11/2024) for MD visit with cbc, comp, ferritin.- lab orders entered, scheduled 4/18, lab @ 1914, MD @ 0274         PACU

## 2023-10-13 ENCOUNTER — TRANSCRIPTION ENCOUNTER (OUTPATIENT)
Age: 83
End: 2023-10-13

## 2023-10-17 ENCOUNTER — EMERGENCY (EMERGENCY)
Facility: HOSPITAL | Age: 83
LOS: 0 days | Discharge: HOME | End: 2023-10-18
Attending: EMERGENCY MEDICINE
Payer: MEDICARE

## 2023-10-17 ENCOUNTER — APPOINTMENT (OUTPATIENT)
Dept: PAIN MANAGEMENT | Facility: CLINIC | Age: 83
End: 2023-10-17
Payer: MEDICARE

## 2023-10-17 VITALS
HEART RATE: 68 BPM | SYSTOLIC BLOOD PRESSURE: 200 MMHG | DIASTOLIC BLOOD PRESSURE: 93 MMHG | TEMPERATURE: 98 F | WEIGHT: 229.94 LBS | HEIGHT: 64 IN | RESPIRATION RATE: 18 BRPM | OXYGEN SATURATION: 98 %

## 2023-10-17 DIAGNOSIS — Z95.0 PRESENCE OF CARDIAC PACEMAKER: Chronic | ICD-10-CM

## 2023-10-17 DIAGNOSIS — Z96.0 PRESENCE OF UROGENITAL IMPLANTS: Chronic | ICD-10-CM

## 2023-10-17 DIAGNOSIS — Z98.890 OTHER SPECIFIED POSTPROCEDURAL STATES: Chronic | ICD-10-CM

## 2023-10-17 DIAGNOSIS — Z90.49 ACQUIRED ABSENCE OF OTHER SPECIFIED PARTS OF DIGESTIVE TRACT: Chronic | ICD-10-CM

## 2023-10-17 DIAGNOSIS — Z87.19 PERSONAL HISTORY OF OTHER DISEASES OF THE DIGESTIVE SYSTEM: Chronic | ICD-10-CM

## 2023-10-17 DIAGNOSIS — M25.521 PAIN IN RIGHT ELBOW: ICD-10-CM

## 2023-10-17 DIAGNOSIS — M54.12 RADICULOPATHY, CERVICAL REGION: ICD-10-CM

## 2023-10-17 DIAGNOSIS — Z79.891 LONG TERM (CURRENT) USE OF OPIATE ANALGESIC: ICD-10-CM

## 2023-10-17 DIAGNOSIS — R42 DIZZINESS AND GIDDINESS: ICD-10-CM

## 2023-10-17 LAB
ALBUMIN SERPL ELPH-MCNC: 4.2 G/DL — SIGNIFICANT CHANGE UP (ref 3.5–5.2)
ALBUMIN SERPL ELPH-MCNC: 4.2 G/DL — SIGNIFICANT CHANGE UP (ref 3.5–5.2)
ALP SERPL-CCNC: 70 U/L — SIGNIFICANT CHANGE UP (ref 30–115)
ALP SERPL-CCNC: 70 U/L — SIGNIFICANT CHANGE UP (ref 30–115)
ALT FLD-CCNC: 14 U/L — SIGNIFICANT CHANGE UP (ref 0–41)
ALT FLD-CCNC: 14 U/L — SIGNIFICANT CHANGE UP (ref 0–41)
ANION GAP SERPL CALC-SCNC: 9 MMOL/L — SIGNIFICANT CHANGE UP (ref 7–14)
ANION GAP SERPL CALC-SCNC: 9 MMOL/L — SIGNIFICANT CHANGE UP (ref 7–14)
APPEARANCE UR: CLEAR — SIGNIFICANT CHANGE UP
APPEARANCE UR: CLEAR — SIGNIFICANT CHANGE UP
APTT BLD: 36.5 SEC — SIGNIFICANT CHANGE UP (ref 27–39.2)
APTT BLD: 36.5 SEC — SIGNIFICANT CHANGE UP (ref 27–39.2)
AST SERPL-CCNC: 24 U/L — SIGNIFICANT CHANGE UP (ref 0–41)
AST SERPL-CCNC: 24 U/L — SIGNIFICANT CHANGE UP (ref 0–41)
BASOPHILS # BLD AUTO: 0.02 K/UL — SIGNIFICANT CHANGE UP (ref 0–0.2)
BASOPHILS # BLD AUTO: 0.02 K/UL — SIGNIFICANT CHANGE UP (ref 0–0.2)
BASOPHILS NFR BLD AUTO: 0.3 % — SIGNIFICANT CHANGE UP (ref 0–1)
BASOPHILS NFR BLD AUTO: 0.3 % — SIGNIFICANT CHANGE UP (ref 0–1)
BILIRUB SERPL-MCNC: 0.6 MG/DL — SIGNIFICANT CHANGE UP (ref 0.2–1.2)
BILIRUB SERPL-MCNC: 0.6 MG/DL — SIGNIFICANT CHANGE UP (ref 0.2–1.2)
BILIRUB UR-MCNC: NEGATIVE — SIGNIFICANT CHANGE UP
BILIRUB UR-MCNC: NEGATIVE — SIGNIFICANT CHANGE UP
BUN SERPL-MCNC: 14 MG/DL — SIGNIFICANT CHANGE UP (ref 10–20)
BUN SERPL-MCNC: 14 MG/DL — SIGNIFICANT CHANGE UP (ref 10–20)
CALCIUM SERPL-MCNC: 8.8 MG/DL — SIGNIFICANT CHANGE UP (ref 8.4–10.5)
CALCIUM SERPL-MCNC: 8.8 MG/DL — SIGNIFICANT CHANGE UP (ref 8.4–10.5)
CHLORIDE SERPL-SCNC: 102 MMOL/L — SIGNIFICANT CHANGE UP (ref 98–110)
CHLORIDE SERPL-SCNC: 102 MMOL/L — SIGNIFICANT CHANGE UP (ref 98–110)
CO2 SERPL-SCNC: 25 MMOL/L — SIGNIFICANT CHANGE UP (ref 17–32)
CO2 SERPL-SCNC: 25 MMOL/L — SIGNIFICANT CHANGE UP (ref 17–32)
COLOR SPEC: YELLOW — SIGNIFICANT CHANGE UP
COLOR SPEC: YELLOW — SIGNIFICANT CHANGE UP
CREAT SERPL-MCNC: 0.9 MG/DL — SIGNIFICANT CHANGE UP (ref 0.7–1.5)
CREAT SERPL-MCNC: 0.9 MG/DL — SIGNIFICANT CHANGE UP (ref 0.7–1.5)
DIFF PNL FLD: NEGATIVE — SIGNIFICANT CHANGE UP
DIFF PNL FLD: NEGATIVE — SIGNIFICANT CHANGE UP
EGFR: 86 ML/MIN/1.73M2 — SIGNIFICANT CHANGE UP
EGFR: 86 ML/MIN/1.73M2 — SIGNIFICANT CHANGE UP
EOSINOPHIL # BLD AUTO: 0.25 K/UL — SIGNIFICANT CHANGE UP (ref 0–0.7)
EOSINOPHIL # BLD AUTO: 0.25 K/UL — SIGNIFICANT CHANGE UP (ref 0–0.7)
EOSINOPHIL NFR BLD AUTO: 3.4 % — SIGNIFICANT CHANGE UP (ref 0–8)
EOSINOPHIL NFR BLD AUTO: 3.4 % — SIGNIFICANT CHANGE UP (ref 0–8)
GLUCOSE SERPL-MCNC: 106 MG/DL — HIGH (ref 70–99)
GLUCOSE SERPL-MCNC: 106 MG/DL — HIGH (ref 70–99)
GLUCOSE UR QL: NEGATIVE MG/DL — SIGNIFICANT CHANGE UP
GLUCOSE UR QL: NEGATIVE MG/DL — SIGNIFICANT CHANGE UP
HCT VFR BLD CALC: 39.9 % — LOW (ref 42–52)
HCT VFR BLD CALC: 39.9 % — LOW (ref 42–52)
HGB BLD-MCNC: 13.4 G/DL — LOW (ref 14–18)
HGB BLD-MCNC: 13.4 G/DL — LOW (ref 14–18)
IMM GRANULOCYTES NFR BLD AUTO: 0.3 % — SIGNIFICANT CHANGE UP (ref 0.1–0.3)
IMM GRANULOCYTES NFR BLD AUTO: 0.3 % — SIGNIFICANT CHANGE UP (ref 0.1–0.3)
INR BLD: 1.3 RATIO — SIGNIFICANT CHANGE UP (ref 0.65–1.3)
INR BLD: 1.3 RATIO — SIGNIFICANT CHANGE UP (ref 0.65–1.3)
KETONES UR-MCNC: NEGATIVE MG/DL — SIGNIFICANT CHANGE UP
KETONES UR-MCNC: NEGATIVE MG/DL — SIGNIFICANT CHANGE UP
LEUKOCYTE ESTERASE UR-ACNC: NEGATIVE — SIGNIFICANT CHANGE UP
LEUKOCYTE ESTERASE UR-ACNC: NEGATIVE — SIGNIFICANT CHANGE UP
LYMPHOCYTES # BLD AUTO: 2.57 K/UL — SIGNIFICANT CHANGE UP (ref 1.2–3.4)
LYMPHOCYTES # BLD AUTO: 2.57 K/UL — SIGNIFICANT CHANGE UP (ref 1.2–3.4)
LYMPHOCYTES # BLD AUTO: 35.2 % — SIGNIFICANT CHANGE UP (ref 20.5–51.1)
LYMPHOCYTES # BLD AUTO: 35.2 % — SIGNIFICANT CHANGE UP (ref 20.5–51.1)
MCHC RBC-ENTMCNC: 31.3 PG — HIGH (ref 27–31)
MCHC RBC-ENTMCNC: 31.3 PG — HIGH (ref 27–31)
MCHC RBC-ENTMCNC: 33.6 G/DL — SIGNIFICANT CHANGE UP (ref 32–37)
MCHC RBC-ENTMCNC: 33.6 G/DL — SIGNIFICANT CHANGE UP (ref 32–37)
MCV RBC AUTO: 93.2 FL — SIGNIFICANT CHANGE UP (ref 80–94)
MCV RBC AUTO: 93.2 FL — SIGNIFICANT CHANGE UP (ref 80–94)
MONOCYTES # BLD AUTO: 1.1 K/UL — HIGH (ref 0.1–0.6)
MONOCYTES # BLD AUTO: 1.1 K/UL — HIGH (ref 0.1–0.6)
MONOCYTES NFR BLD AUTO: 15.1 % — HIGH (ref 1.7–9.3)
MONOCYTES NFR BLD AUTO: 15.1 % — HIGH (ref 1.7–9.3)
NEUTROPHILS # BLD AUTO: 3.34 K/UL — SIGNIFICANT CHANGE UP (ref 1.4–6.5)
NEUTROPHILS # BLD AUTO: 3.34 K/UL — SIGNIFICANT CHANGE UP (ref 1.4–6.5)
NEUTROPHILS NFR BLD AUTO: 45.7 % — SIGNIFICANT CHANGE UP (ref 42.2–75.2)
NEUTROPHILS NFR BLD AUTO: 45.7 % — SIGNIFICANT CHANGE UP (ref 42.2–75.2)
NITRITE UR-MCNC: NEGATIVE — SIGNIFICANT CHANGE UP
NITRITE UR-MCNC: NEGATIVE — SIGNIFICANT CHANGE UP
NRBC # BLD: 0 /100 WBCS — SIGNIFICANT CHANGE UP (ref 0–0)
NRBC # BLD: 0 /100 WBCS — SIGNIFICANT CHANGE UP (ref 0–0)
PH UR: 5.5 — SIGNIFICANT CHANGE UP (ref 5–8)
PH UR: 5.5 — SIGNIFICANT CHANGE UP (ref 5–8)
PLATELET # BLD AUTO: 154 K/UL — SIGNIFICANT CHANGE UP (ref 130–400)
PLATELET # BLD AUTO: 154 K/UL — SIGNIFICANT CHANGE UP (ref 130–400)
PMV BLD: 10.2 FL — SIGNIFICANT CHANGE UP (ref 7.4–10.4)
PMV BLD: 10.2 FL — SIGNIFICANT CHANGE UP (ref 7.4–10.4)
POTASSIUM SERPL-MCNC: 4.6 MMOL/L — SIGNIFICANT CHANGE UP (ref 3.5–5)
POTASSIUM SERPL-MCNC: 4.6 MMOL/L — SIGNIFICANT CHANGE UP (ref 3.5–5)
POTASSIUM SERPL-SCNC: 4.6 MMOL/L — SIGNIFICANT CHANGE UP (ref 3.5–5)
POTASSIUM SERPL-SCNC: 4.6 MMOL/L — SIGNIFICANT CHANGE UP (ref 3.5–5)
PROT SERPL-MCNC: 7.2 G/DL — SIGNIFICANT CHANGE UP (ref 6–8)
PROT SERPL-MCNC: 7.2 G/DL — SIGNIFICANT CHANGE UP (ref 6–8)
PROT UR-MCNC: SIGNIFICANT CHANGE UP MG/DL
PROT UR-MCNC: SIGNIFICANT CHANGE UP MG/DL
PROTHROM AB SERPL-ACNC: 14.9 SEC — HIGH (ref 9.95–12.87)
PROTHROM AB SERPL-ACNC: 14.9 SEC — HIGH (ref 9.95–12.87)
RBC # BLD: 4.28 M/UL — LOW (ref 4.7–6.1)
RBC # BLD: 4.28 M/UL — LOW (ref 4.7–6.1)
RBC # FLD: 14.6 % — HIGH (ref 11.5–14.5)
RBC # FLD: 14.6 % — HIGH (ref 11.5–14.5)
SODIUM SERPL-SCNC: 136 MMOL/L — SIGNIFICANT CHANGE UP (ref 135–146)
SODIUM SERPL-SCNC: 136 MMOL/L — SIGNIFICANT CHANGE UP (ref 135–146)
SP GR SPEC: 1.02 — SIGNIFICANT CHANGE UP (ref 1–1.03)
SP GR SPEC: 1.02 — SIGNIFICANT CHANGE UP (ref 1–1.03)
TROPONIN T SERPL-MCNC: <0.01 NG/ML — SIGNIFICANT CHANGE UP
UROBILINOGEN FLD QL: 1 MG/DL — SIGNIFICANT CHANGE UP (ref 0.2–1)
UROBILINOGEN FLD QL: 1 MG/DL — SIGNIFICANT CHANGE UP (ref 0.2–1)
WBC # BLD: 7.3 K/UL — SIGNIFICANT CHANGE UP (ref 4.8–10.8)
WBC # BLD: 7.3 K/UL — SIGNIFICANT CHANGE UP (ref 4.8–10.8)
WBC # FLD AUTO: 7.3 K/UL — SIGNIFICANT CHANGE UP (ref 4.8–10.8)
WBC # FLD AUTO: 7.3 K/UL — SIGNIFICANT CHANGE UP (ref 4.8–10.8)

## 2023-10-17 PROCEDURE — 74174 CTA ABD&PLVS W/CONTRAST: CPT | Mod: MA

## 2023-10-17 PROCEDURE — 36415 COLL VENOUS BLD VENIPUNCTURE: CPT

## 2023-10-17 PROCEDURE — 99214 OFFICE O/P EST MOD 30 MIN: CPT

## 2023-10-17 PROCEDURE — 99223 1ST HOSP IP/OBS HIGH 75: CPT

## 2023-10-17 PROCEDURE — 99285 EMERGENCY DEPT VISIT HI MDM: CPT

## 2023-10-17 PROCEDURE — 71045 X-RAY EXAM CHEST 1 VIEW: CPT

## 2023-10-17 PROCEDURE — 93010 ELECTROCARDIOGRAM REPORT: CPT | Mod: 77

## 2023-10-17 PROCEDURE — 85025 COMPLETE CBC W/AUTO DIFF WBC: CPT

## 2023-10-17 PROCEDURE — 71275 CT ANGIOGRAPHY CHEST: CPT | Mod: 26,MA

## 2023-10-17 PROCEDURE — 74174 CTA ABD&PLVS W/CONTRAST: CPT | Mod: 26,MA

## 2023-10-17 PROCEDURE — 81003 URINALYSIS AUTO W/O SCOPE: CPT

## 2023-10-17 PROCEDURE — 85610 PROTHROMBIN TIME: CPT

## 2023-10-17 PROCEDURE — 93010 ELECTROCARDIOGRAM REPORT: CPT

## 2023-10-17 PROCEDURE — G0378: CPT

## 2023-10-17 PROCEDURE — 85730 THROMBOPLASTIN TIME PARTIAL: CPT

## 2023-10-17 PROCEDURE — 93306 TTE W/DOPPLER COMPLETE: CPT

## 2023-10-17 PROCEDURE — 71045 X-RAY EXAM CHEST 1 VIEW: CPT | Mod: 26

## 2023-10-17 PROCEDURE — 80053 COMPREHEN METABOLIC PANEL: CPT

## 2023-10-17 PROCEDURE — 93005 ELECTROCARDIOGRAM TRACING: CPT

## 2023-10-17 PROCEDURE — 84484 ASSAY OF TROPONIN QUANT: CPT

## 2023-10-17 PROCEDURE — 71275 CT ANGIOGRAPHY CHEST: CPT | Mod: MA

## 2023-10-17 RX ORDER — LOSARTAN POTASSIUM 100 MG/1
100 TABLET, FILM COATED ORAL DAILY
Refills: 0 | Status: DISCONTINUED | OUTPATIENT
Start: 2023-10-17 | End: 2023-10-18

## 2023-10-17 RX ORDER — ACETAMINOPHEN 500 MG
975 TABLET ORAL ONCE
Refills: 0 | Status: COMPLETED | OUTPATIENT
Start: 2023-10-17 | End: 2023-10-17

## 2023-10-17 RX ORDER — NIFEDIPINE 30 MG
60 TABLET, EXTENDED RELEASE 24 HR ORAL ONCE
Refills: 0 | Status: COMPLETED | OUTPATIENT
Start: 2023-10-17 | End: 2023-10-17

## 2023-10-17 RX ORDER — METOPROLOL TARTRATE 50 MG
50 TABLET ORAL DAILY
Refills: 0 | Status: DISCONTINUED | OUTPATIENT
Start: 2023-10-17 | End: 2023-10-18

## 2023-10-17 RX ORDER — APIXABAN 2.5 MG/1
5 TABLET, FILM COATED ORAL ONCE
Refills: 0 | Status: COMPLETED | OUTPATIENT
Start: 2023-10-17 | End: 2023-10-18

## 2023-10-17 RX ORDER — SIMVASTATIN 20 MG/1
20 TABLET, FILM COATED ORAL AT BEDTIME
Refills: 0 | Status: DISCONTINUED | OUTPATIENT
Start: 2023-10-17 | End: 2023-10-18

## 2023-10-17 RX ADMIN — Medication 975 MILLIGRAM(S): at 21:51

## 2023-10-17 RX ADMIN — Medication 60 MILLIGRAM(S): at 23:32

## 2023-10-17 RX ADMIN — Medication 975 MILLIGRAM(S): at 22:43

## 2023-10-17 NOTE — ED PROVIDER NOTE - OBJECTIVE STATEMENT
82-year-old male history of hypertension, COPD on home O2 as needed, chronic pancreatitis, a flutter on Eliquis, CHF last EF 45% presenting to ER status post episode of syncope.  Patient states woke up this morning in normal health went to doctor's appointment while sitting in office started to feel lightheaded with some blurry vision patient had episode of abdominal pain radiating to chest and had witnessed episode of syncope.  States was brief no head trauma/LOC.  Patient asymptomatic in the ER now.  Denies fever, cough, URI symptoms, extremity numbness or weakness, diarrhea, urinary symptoms, lower extremity swelling, headache, neck pain.

## 2023-10-17 NOTE — ED ADULT TRIAGE NOTE - NS ED NURSE BANDS TYPE
H&P- Hans Grew 1958, 62 y o  male MRN: [id number]    Unit/Bed#: E4 -01 Encounter: [id number]    Primary Care Provider: Bethanie Erickson DO   Date and time admitted to hospital: 12/28/2018  3:02 PM        * Atrial flutter [facility])   Assessment & Plan    As was witnessed in ED  Patient was noted for a SVT at PCPs office with heart rate and 170, was in SVT in the ED, he received adenosine and later developed atrial flutter  The patient does state that he felt palpitations and mild shortness of breath during this episode  Patient's heart rate is now controlled heart rate of 80 - patient was given IV Cardizem bolus but never started on Cardizem drip  - patient admitted to [facility] with telemetry  - will start on Cardizem 30 mg every 6 hours p o   - [id number] is 1 in anticoagulation is not indicated  - TSH within normal limits  - patient does report increased alcohol use, 2-3 drinks daily in the past couple of days versus normally 1 glass of wine daily  - CBC, BMP, magnesium and lipid profile in the morning     Acute diastolic (congestive) heart failure (HCC)   Assessment & Plan    Secondary to new onset atrial flutter - patient reported associated shortness of breath  Elevated proBNP  Patient appears euvolemic on examined currently asymptomatic, will not be initiating diuretics  Will need to obtain an echocardiogram  Cardiology consult  Monitor daily weights     SVT (supraventricular tachycardia) ([facility] [address] )   Assessment & Plan    Was noted in the emergency department, adenosine was given and patient then developed atrial flutter  Telemetry monitoring     Essential hypertension   Assessment & Plan    On losartan at home, will continue  Added Cardizem due to a flutter  Monitor blood pressure trend       Hypercholesterolemia   Assessment & Plan    Continue atorvastatin 10 mg daily  Lipid profile in a m         VTE Prophylaxis: Enoxaparin (Lovenox)  / reason for no mechanical VTE prophylaxis low risk - ambulate Code Status: Full  POLST: POLST form is not discussed and not completed at this time  Discussion with family: Patient    Anticipated Length of Stay:  Patient will be admitted on an Inpatient basis with an anticipated length of stay of longer than 2 midnights  Justification for Hospital Stay: need for close monitoring, specialist evaluation    Total Time for Visit, including Counseling / Coordination of Care: 1 hour  Greater than 50% of this total time spent on direct patient counseling and coordination of care  Chief Complaint:   Fast HR    History of Present Illness:    Sonny Orlando is a 62 y o  male with history of hypertension and hyperlipidemia who presents [facility] ER ED sent by his PCP when he was noted to have rapid heart rate in 170s  The patient was found to be in supraventricular tachycardia in emergency department and was given adenosine  The patient subsequently developed atrial flutter of which he was symptomatic with mild shortness of breath and palpitations  The patient was given Cardizem bolus with his heart rate normalizing into the 80s  The patient does not report chest pain  He states that he has a good functional status, keeps active, and denies exertional shortness of breath or chest pain  He denies recent illnesses  Denies nausea, vomiting, diarrhea or constipation  The patient does state that he had a visit to the emergency department on December 25th at which time he had severe had neck and back pain  He states that he was treated with pain medications with this pain subsequently resolving  He states the reason for his PCP visit today was the follow-up of the ER visit  The patient does note that he has been using and increased amount of alcohol in the past couple of days from his usual 1 drink per day to 2-3 drinks  He denies problem with alcohol use  He states that he is a former smoker and quit smoking 5 years ago    He reports history of rheumatic heart disease in his father who  of complications at age 39  Review of Systems:    Review of Systems   Constitutional: Negative for chills, fatigue and fever  HENT: Negative for congestion and rhinorrhea  Eyes: Negative for visual disturbance  Respiratory: Positive for shortness of breath  Negative for cough  Cardiovascular: Positive for palpitations  Negative for chest pain and leg swelling  Gastrointestinal: Negative for constipation, diarrhea, nausea and vomiting  Endocrine: Negative for polydipsia and polyuria  Genitourinary: Negative for dysuria  Musculoskeletal: Negative for gait problem  Skin: Negative for rash  Neurological: Negative for dizziness  Hematological: Negative for adenopathy  Psychiatric/Behavioral: Negative for confusion  Past Medical and Surgical History:     Past Medical History:   Diagnosis Date    Hyperlipidemia     Hypertension        Past Surgical History:   Procedure Laterality Date    CHOLECYSTECTOMY      COLONOSCOPY      x2 - last was  (neg)    NOSE SURGERY      See ENT note 2016       Meds/Allergies:    Prior to Admission medications    Medication Sig Start Date End Date Taking? Authorizing Provider   atorvastatin (LIPITOR) 10 mg tablet Take 1 tablet (10 mg total) by mouth daily 18  Yes Omero Garcia MD   EPINEPHrine (EPIPEN) 0 3 mg/0 3 mL SOAJ Inject as directed 5/31/15  Yes Historical Provider, MD   losartan (COZAAR) 50 mg tablet Take 1 tablet (50 mg total) by mouth daily 18  Yes Reford Stall, DO   methocarbamol (ROBAXIN) 500 mg tablet Take 1 tablet by mouth 17  Yes Historical Provider, MD     I have reviewed home medications with a medical source (PCP, Pharmacy, other)  Allergies:    Allergies   Allergen Reactions    Dust Mite Extract     Lisinopril Edema     Annotation - [id number]: lip swelling x 2     Short Ragweed Pollen Ext        Social History:     Marital Status: Single   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   Patient Pre-hospital Living Situation: independent  Patient Pre-hospital Level of Mobility: independent  Patient Pre-hospital Diet Restrictions: none  Substance Use History:   History   Alcohol Use    4 2 oz/week    7 Glasses of wine per week     Comment: Social drinker     History   Smoking Status    Former Smoker    Quit date: 12/2014   Smokeless Tobacco    Never Used     History   Drug Use No       Family History:    Family History   Problem Relation Age of Onset    Other Mother         Sarcoma    Hypertension Father        Physical Exam:     Vitals:   Blood Pressure: 142/80 (12/28/18 1742)  Pulse: 82 (12/28/18 1742)  Temperature: 99 °F (37 2 °C) (12/28/18 1742)  Temp Source: Tympanic (12/28/18 1742)  Respirations: 20 (12/28/18 1742)  Height: 6' 1" (185 4 cm) (12/28/18 1742)  Weight - Scale: 94 1 kg (207 lb 7 3 oz) (12/28/18 1847)  SpO2: 95 % (12/28/18 1742)    Physical Exam   Constitutional: He is oriented to person, place, and time  No distress  HENT:   Head: Normocephalic and atraumatic  Eyes: Conjunctivae are normal    Neck: No JVD present  Cardiovascular: Normal rate and regular rhythm  No murmur heard  Pulmonary/Chest: Effort normal  No respiratory distress  He has no wheezes  He has no rales  Abdominal: Soft  He exhibits no distension  There is no tenderness  There is no guarding  Musculoskeletal: He exhibits no edema  Neurological: He is alert and oriented to person, place, and time  Skin: Skin is warm  Psychiatric: He has a normal mood and affect  Additional Data:     Lab Results: I have personally reviewed pertinent reports          Results from last 7 days  Lab Units 12/28/18  1511   WBC Thousand/uL 11 30*   HEMOGLOBIN g/dL 14 9   HEMATOCRIT % 42 9   PLATELETS Thousands/uL 260   NEUTROS PCT % 51   LYMPHS PCT % 40   MONOS PCT % 8   EOS PCT % 1       Results from last 7 days  Lab Units 12/28/18  1511 12/25/18  2328   SODIUM mmol/L 139 140   POTASSIUM mmol/L 4 0 4 1   CHLORIDE mmol/L 104 105 CO2 mmol/L 25 28   BUN mg/dL 22 16   CREATININE mg/dL 1 10 1 31*   ANION GAP mmol/L 10 7   CALCIUM mg/dL 8 7 8 4   ALBUMIN g/dL  --  3 5   TOTAL BILIRUBIN mg/dL  --  0 26   ALK PHOS U/L  --  78   ALT U/L  --  47   AST U/L  --  30   GLUCOSE RANDOM mg/dL 93 128                   Results from last 7 days  Lab Units 12/25/18  2328   LACTIC ACID mmol/L 1 4       Imaging: I have personally reviewed pertinent reports  XR chest portable   ED Interpretation by Balaji Domingo MD (12/28 2094)   No acute disease      Final Result by Eileen Smith MD (12/28 1619)      No active pulmonary disease  Workstation performed: [id number]             EKG, Pathology, and Other Studies Reviewed on Admission:   · EKG: reviewed    Allscripts / Epic Records Reviewed: Yes     ** Please Note: This note has been constructed using a voice recognition system   ** Name band;

## 2023-10-17 NOTE — ED PROVIDER NOTE - CLINICAL SUMMARY MEDICAL DECISION MAKING FREE TEXT BOX
82yM p/w syncope at the end of his dr's appointment today.  In the ED, pt is hypertensive (BP 200s but at his baseline) and otherwise well appearing w/o focal neuro deficits.  Labs reassuring.  CXR w/o ptx or pna.  CTA w/o aortic pathology.  EKG unchanged from prior.  Given age, ?hx CHF and syncopal episode w/o apparent vasovagal cause, will place in obs for further monitoring and management.

## 2023-10-17 NOTE — ED CDU PROVIDER INITIAL DAY NOTE - ATTENDING APP SHARED VISIT CONTRIBUTION OF CARE
82yM p/w syncope at dr's appointment - no focal neuro deficits.  ED workup included labs (reassuring), EKG (stable), CTA w/o aortic pathology (though w/ incidental findings).  Pt with ?hx CHF.  No likely vasovagal triggers for his syncope so pt placed in obs for monitoring and syncope eval.

## 2023-10-17 NOTE — ED PROVIDER NOTE - PHYSICAL EXAMINATION
CONSTITUTIONAL: Well-appearing; well-nourished; in no apparent distress.   EYES: PERRL; EOM intact.   ENT: normal nose; no rhinorrhea; normal pharynx with no tonsillar hypertrophy.   NECK: Supple; non-tender; no cervical lymphadenopathy  CARDIOVASCULAR: Normal S1, S2; no murmurs, rubs, or gallops. Equal radial pulses   RESPIRATORY: Normal chest excursion with respiration; breath sounds clear and equal bilaterally; no wheezes, rhonchi, or rales.  GI/: Normal bowel sounds; non-distended; non-tender; no palpable organomegaly.   MS: No evidence of trauma or deformity. Normal ROM in all four extremities; non-tender to palpation; distal pulses are normal.   SKIN: Normal for age and race; warm; dry; good turgor; no apparent lesions or exudate.   NEURO/PSYCH: A & O x 4; grossly unremarkable. No focal deficits. No facial droop. No tongue deviation. Cerebellar intact. Sensation intact

## 2023-10-17 NOTE — ED PROVIDER NOTE - ATTENDING APP SHARED VISIT CONTRIBUTION OF CARE
82yM HTN DLD DM COPD chronic pancreatitis p/w syncope.  Pt now awake, alert, interactive.  BP in triage 200s systolic, which is typical for him.

## 2023-10-17 NOTE — ED ADULT NURSE NOTE - NSFALLHARMRISKINTERV_ED_ALL_ED

## 2023-10-17 NOTE — ED CDU PROVIDER INITIAL DAY NOTE - CLINICAL SUMMARY MEDICAL DECISION MAKING FREE TEXT BOX
82yM placed in obs for syncope - plan to repeat trop/EKG and obtain echo in AM.  Pt currently markedly hypertensive (though this is common for him) but w/o focal neuro deficits or any recurrent syncope or near syncope.

## 2023-10-18 ENCOUNTER — NON-APPOINTMENT (OUTPATIENT)
Age: 83
End: 2023-10-18

## 2023-10-18 ENCOUNTER — TRANSCRIPTION ENCOUNTER (OUTPATIENT)
Age: 83
End: 2023-10-18

## 2023-10-18 VITALS
DIASTOLIC BLOOD PRESSURE: 83 MMHG | HEART RATE: 96 BPM | OXYGEN SATURATION: 97 % | RESPIRATION RATE: 18 BRPM | SYSTOLIC BLOOD PRESSURE: 189 MMHG | TEMPERATURE: 98 F

## 2023-10-18 PROCEDURE — 93306 TTE W/DOPPLER COMPLETE: CPT | Mod: 26

## 2023-10-18 PROCEDURE — 99239 HOSP IP/OBS DSCHRG MGMT >30: CPT

## 2023-10-18 RX ORDER — OXYCODONE AND ACETAMINOPHEN 5; 325 MG/1; MG/1
1 TABLET ORAL ONCE
Refills: 0 | Status: DISCONTINUED | OUTPATIENT
Start: 2023-10-18 | End: 2023-10-18

## 2023-10-18 RX ADMIN — OXYCODONE AND ACETAMINOPHEN 1 TABLET(S): 5; 325 TABLET ORAL at 04:00

## 2023-10-18 RX ADMIN — Medication 50 MILLIGRAM(S): at 03:14

## 2023-10-18 RX ADMIN — LOSARTAN POTASSIUM 100 MILLIGRAM(S): 100 TABLET, FILM COATED ORAL at 03:14

## 2023-10-18 RX ADMIN — APIXABAN 5 MILLIGRAM(S): 2.5 TABLET, FILM COATED ORAL at 01:21

## 2023-10-18 NOTE — ED CDU PROVIDER DISPOSITION NOTE - PROVIDER TOKENS
FREE:[LAST:[your PMD],PHONE:[(   )    -],FAX:[(   )    -],FOLLOWUP:[1-3 Days]],PROVIDER:[TOKEN:[[id number]],FOLLOWUP:[1-3 Days]]
absent

## 2023-10-18 NOTE — ED CDU PROVIDER DISPOSITION NOTE - PATIENT PORTAL LINK FT
You can access the FollowMyHealth Patient Portal offered by [facility] by registering at the following website: [email or web address]. By joining [facility]’s FollowMyHealth portal, you will also be able to view your health information using other applications (apps) compatible with our system.

## 2023-10-18 NOTE — ED CDU PROVIDER DISPOSITION NOTE - CARE PROVIDERS DIRECT ADDRESSES
,DirectAddress_Unknown,[email or web address]

## 2023-10-18 NOTE — ED CDU PROVIDER DISPOSITION NOTE - NSFOLLOWUPINSTRUCTIONS_ED_ALL_ED_FT
Abdominal Pain    Many things can cause abdominal pain. Many times, abdominal pain is not caused by a disease and will improve without treatment. Your health care provider will do a physical exam to determine if there is a dangerous cause of your pain; blood tests and imaging may help determine the cause of your pain. However, in many cases, no cause may be found and you may need further testing as an outpatient. Monitor your abdominal pain for any changes.     SEEK IMMEDIATE MEDICAL CARE IF YOU HAVE ANY OF THE FOLLOWING SYMPTOMS: worsening abdominal pain, uncontrollable vomiting, profuse diarrhea, inability to have bowel movements or pass gas, black or bloody stools, fever accompanying chest pain or back pain, or fainting. These symptoms may represent a serious problem that is an emergency. Do not wait to see if the symptoms will go away. Get medical help right away. Call 911 and do not drive yourself to the hospital.    Syncope    Syncope is when you temporarily lose consciousness, also called fainting or passing out. It is caused by a sudden decrease in blood flow to the brain. Even though most causes of syncope are not dangerous, syncope can be a sign of a serious medical problem. Signs that you may be about to faint include feeling dizzy, lightheaded, nauseas, visual changes, cold/clammy skin. Do not drive, use machinery, or play sports until your health care provider says it is okay.    SEEK IMMEDIATE MEDICAL CARE IF YOU HAVE THE FOLLOWING SYMPTOMS: severe headache, pain in your chest/abdomen/back, bleeding from your mouth or rectum, palpitations, shortness of breath, pain with breathing, seizure, confusion, trouble walking.

## 2023-10-18 NOTE — ED CDU PROVIDER DISPOSITION NOTE - ATTENDING CONTRIBUTION TO CARE
See MDM
no gym or sports until 11/10. be sure to follow your schools concussion guidelines for return to sports.

## 2023-10-18 NOTE — ED CDU PROVIDER SUBSEQUENT DAY NOTE - PROGRESS NOTE DETAILS
Pt resting comfortably, denies complaints at this time. Patient comfortable, results noted, will dc home

## 2023-10-18 NOTE — ED CDU PROVIDER SUBSEQUENT DAY NOTE - CLINICAL SUMMARY MEDICAL DECISION MAKING FREE TEXT BOX
pt presenting sp syncopal episode. +chronic abdominal pain, unchanged from baseline. currently asymptomatic no acute complaints. pending echo results.

## 2023-10-18 NOTE — ED CDU PROVIDER DISPOSITION NOTE - CLINICAL COURSE
pt presenting sp syncopal episode. +chronic abdominal pain, unchanged from baseline. currently asymptomatic no acute complaints. has reliable op follow-up with Dr. Patel. BP improved. Aware of all results, given a copy of all available results, comfortable with discharge and follow-up outpatient, strict return precautions given. Endorses understanding of all of this and aware that they can return at any time for new or concerning symptoms. No further questions or concerns at this time

## 2023-10-19 DIAGNOSIS — H53.8 OTHER VISUAL DISTURBANCES: ICD-10-CM

## 2023-10-19 DIAGNOSIS — I48.92 UNSPECIFIED ATRIAL FLUTTER: ICD-10-CM

## 2023-10-19 DIAGNOSIS — R55 SYNCOPE AND COLLAPSE: ICD-10-CM

## 2023-10-19 DIAGNOSIS — Z95.0 PRESENCE OF CARDIAC PACEMAKER: ICD-10-CM

## 2023-10-19 DIAGNOSIS — E78.5 HYPERLIPIDEMIA, UNSPECIFIED: ICD-10-CM

## 2023-10-19 DIAGNOSIS — R10.9 UNSPECIFIED ABDOMINAL PAIN: ICD-10-CM

## 2023-10-19 DIAGNOSIS — J44.9 CHRONIC OBSTRUCTIVE PULMONARY DISEASE, UNSPECIFIED: ICD-10-CM

## 2023-10-19 DIAGNOSIS — R42 DIZZINESS AND GIDDINESS: ICD-10-CM

## 2023-10-19 DIAGNOSIS — I11.0 HYPERTENSIVE HEART DISEASE WITH HEART FAILURE: ICD-10-CM

## 2023-10-19 DIAGNOSIS — G47.33 OBSTRUCTIVE SLEEP APNEA (ADULT) (PEDIATRIC): ICD-10-CM

## 2023-10-19 DIAGNOSIS — Z79.01 LONG TERM (CURRENT) USE OF ANTICOAGULANTS: ICD-10-CM

## 2023-10-19 DIAGNOSIS — Z99.81 DEPENDENCE ON SUPPLEMENTAL OXYGEN: ICD-10-CM

## 2023-10-19 DIAGNOSIS — E11.9 TYPE 2 DIABETES MELLITUS WITHOUT COMPLICATIONS: ICD-10-CM

## 2023-10-19 DIAGNOSIS — K86.1 OTHER CHRONIC PANCREATITIS: ICD-10-CM

## 2023-10-19 DIAGNOSIS — I50.9 HEART FAILURE, UNSPECIFIED: ICD-10-CM

## 2023-10-23 ENCOUNTER — APPOINTMENT (OUTPATIENT)
Dept: HOME HEALTH SERVICES | Facility: HOME HEALTH | Age: 83
End: 2023-10-23

## 2023-10-23 VITALS
SYSTOLIC BLOOD PRESSURE: 188 MMHG | HEART RATE: 77 BPM | OXYGEN SATURATION: 99 % | RESPIRATION RATE: 18 BRPM | DIASTOLIC BLOOD PRESSURE: 80 MMHG | TEMPERATURE: 98.5 F

## 2023-10-23 VITALS — SYSTOLIC BLOOD PRESSURE: 184 MMHG | DIASTOLIC BLOOD PRESSURE: 82 MMHG

## 2023-10-27 ENCOUNTER — APPOINTMENT (OUTPATIENT)
Dept: HOME HEALTH SERVICES | Facility: HOME HEALTH | Age: 83
End: 2023-10-27
Payer: MEDICARE

## 2023-10-27 DIAGNOSIS — R26.81 UNSTEADINESS ON FEET: ICD-10-CM

## 2023-10-27 PROCEDURE — 99349 HOME/RES VST EST MOD MDM 40: CPT

## 2023-10-30 VITALS
TEMPERATURE: 98.6 F | SYSTOLIC BLOOD PRESSURE: 164 MMHG | DIASTOLIC BLOOD PRESSURE: 78 MMHG | RESPIRATION RATE: 18 BRPM | HEART RATE: 66 BPM | OXYGEN SATURATION: 99 %

## 2023-10-30 PROBLEM — R26.81 UNSTEADY GAIT: Status: ACTIVE | Noted: 2023-07-25

## 2023-11-02 ENCOUNTER — APPOINTMENT (OUTPATIENT)
Dept: HOME HEALTH SERVICES | Facility: HOME HEALTH | Age: 83
End: 2023-11-02

## 2023-11-02 VITALS
HEART RATE: 66 BPM | TEMPERATURE: 97.4 F | RESPIRATION RATE: 17 BRPM | OXYGEN SATURATION: 99 % | DIASTOLIC BLOOD PRESSURE: 70 MMHG | SYSTOLIC BLOOD PRESSURE: 162 MMHG

## 2023-11-02 RX ORDER — METOPROLOL SUCCINATE 25 MG/1
25 TABLET, EXTENDED RELEASE ORAL
Qty: 1 | Refills: 0 | Status: COMPLETED | COMMUNITY
Start: 2023-09-09 | End: 2023-11-02

## 2023-11-02 RX ORDER — FUROSEMIDE 20 MG/1
20 TABLET ORAL
Qty: 45 | Refills: 0 | Status: COMPLETED | COMMUNITY
Start: 2017-06-12 | End: 2023-11-02

## 2023-11-02 RX ORDER — POTASSIUM CHLORIDE 1500 MG/1
20 TABLET, FILM COATED, EXTENDED RELEASE ORAL
Qty: 90 | Refills: 0 | Status: COMPLETED | COMMUNITY
Start: 2023-07-07 | End: 2023-11-02

## 2023-11-02 RX ORDER — METOPROLOL SUCCINATE 25 MG/1
25 TABLET, EXTENDED RELEASE ORAL
Refills: 0 | Status: COMPLETED | COMMUNITY
Start: 2023-06-03 | End: 2023-11-02

## 2023-11-02 RX ORDER — INSULIN GLARGINE 300 U/ML
300 INJECTION, SOLUTION SUBCUTANEOUS
Refills: 0 | Status: COMPLETED | COMMUNITY
Start: 2023-07-06 | End: 2023-11-02

## 2023-11-02 RX ORDER — AMLODIPINE BESYLATE 2.5 MG/1
2.5 TABLET ORAL DAILY
Qty: 30 | Refills: 0 | Status: COMPLETED | COMMUNITY
Start: 2023-10-27 | End: 2023-11-02

## 2023-11-07 ENCOUNTER — NON-APPOINTMENT (OUTPATIENT)
Age: 83
End: 2023-11-07

## 2023-11-09 ENCOUNTER — APPOINTMENT (OUTPATIENT)
Dept: PULMONOLOGY | Facility: CLINIC | Age: 83
End: 2023-11-09
Payer: MEDICARE

## 2023-11-09 VITALS
HEART RATE: 70 BPM | DIASTOLIC BLOOD PRESSURE: 80 MMHG | SYSTOLIC BLOOD PRESSURE: 132 MMHG | BODY MASS INDEX: 30.05 KG/M2 | TEMPERATURE: 97.4 F | OXYGEN SATURATION: 98 % | WEIGHT: 187 LBS | HEIGHT: 66 IN

## 2023-11-09 DIAGNOSIS — M47.27 OTHER SPONDYLOSIS WITH RADICULOPATHY, LUMBOSACRAL REGION: ICD-10-CM

## 2023-11-09 DIAGNOSIS — E78.5 HYPERLIPIDEMIA, UNSPECIFIED: ICD-10-CM

## 2023-11-09 DIAGNOSIS — R06.02 SHORTNESS OF BREATH: ICD-10-CM

## 2023-11-09 DIAGNOSIS — Z78.9 OTHER SPECIFIED HEALTH STATUS: ICD-10-CM

## 2023-11-09 DIAGNOSIS — Z82.49 FAMILY HISTORY OF ISCHEMIC HEART DISEASE AND OTHER DISEASES OF THE CIRCULATORY SYSTEM: ICD-10-CM

## 2023-11-09 DIAGNOSIS — G47.33 OBSTRUCTIVE SLEEP APNEA (ADULT) (PEDIATRIC): ICD-10-CM

## 2023-11-09 DIAGNOSIS — Z87.898 PERSONAL HISTORY OF OTHER SPECIFIED CONDITIONS: ICD-10-CM

## 2023-11-09 DIAGNOSIS — Z87.891 PERSONAL HISTORY OF NICOTINE DEPENDENCE: ICD-10-CM

## 2023-11-09 PROCEDURE — 99204 OFFICE O/P NEW MOD 45 MIN: CPT

## 2023-11-10 ENCOUNTER — APPOINTMENT (OUTPATIENT)
Dept: HOME HEALTH SERVICES | Facility: HOME HEALTH | Age: 83
End: 2023-11-10

## 2023-11-10 VITALS — HEIGHT: 66 IN | WEIGHT: 189 LBS | BODY MASS INDEX: 30.37 KG/M2

## 2023-11-10 VITALS
SYSTOLIC BLOOD PRESSURE: 142 MMHG | DIASTOLIC BLOOD PRESSURE: 80 MMHG | RESPIRATION RATE: 18 BRPM | HEART RATE: 67 BPM | TEMPERATURE: 98.7 F | OXYGEN SATURATION: 99 %

## 2023-11-10 RX ORDER — DULAGLUTIDE 0.75 MG/.5ML
0.75 INJECTION, SOLUTION SUBCUTANEOUS
Qty: 2 | Refills: 8 | Status: COMPLETED | COMMUNITY
Start: 2020-05-22 | End: 2023-11-10

## 2023-11-16 ENCOUNTER — OUTPATIENT (OUTPATIENT)
Dept: OUTPATIENT SERVICES | Facility: HOSPITAL | Age: 83
LOS: 1 days | End: 2023-11-16
Payer: MEDICARE

## 2023-11-16 ENCOUNTER — APPOINTMENT (OUTPATIENT)
Dept: PULMONOLOGY | Facility: HOSPITAL | Age: 83
End: 2023-11-16
Payer: MEDICARE

## 2023-11-16 DIAGNOSIS — Z87.19 PERSONAL HISTORY OF OTHER DISEASES OF THE DIGESTIVE SYSTEM: Chronic | ICD-10-CM

## 2023-11-16 DIAGNOSIS — Z95.0 PRESENCE OF CARDIAC PACEMAKER: Chronic | ICD-10-CM

## 2023-11-16 DIAGNOSIS — Z98.890 OTHER SPECIFIED POSTPROCEDURAL STATES: Chronic | ICD-10-CM

## 2023-11-16 DIAGNOSIS — Z96.0 PRESENCE OF UROGENITAL IMPLANTS: Chronic | ICD-10-CM

## 2023-11-16 DIAGNOSIS — Z90.49 ACQUIRED ABSENCE OF OTHER SPECIFIED PARTS OF DIGESTIVE TRACT: Chronic | ICD-10-CM

## 2023-11-16 DIAGNOSIS — R06.02 SHORTNESS OF BREATH: ICD-10-CM

## 2023-11-16 PROCEDURE — 94729 DIFFUSING CAPACITY: CPT | Mod: 26

## 2023-11-16 PROCEDURE — 94664 DEMO&/EVAL PT USE INHALER: CPT

## 2023-11-16 PROCEDURE — 94726 PLETHYSMOGRAPHY LUNG VOLUMES: CPT

## 2023-11-16 PROCEDURE — 94060 EVALUATION OF WHEEZING: CPT | Mod: 26

## 2023-11-16 PROCEDURE — 94727 GAS DIL/WSHOT DETER LNG VOL: CPT | Mod: 26

## 2023-11-16 PROCEDURE — 94070 EVALUATION OF WHEEZING: CPT

## 2023-11-16 PROCEDURE — 94729 DIFFUSING CAPACITY: CPT

## 2023-11-17 DIAGNOSIS — R06.02 SHORTNESS OF BREATH: ICD-10-CM

## 2023-11-26 ENCOUNTER — RX RENEWAL (OUTPATIENT)
Age: 83
End: 2023-11-26

## 2023-11-28 ENCOUNTER — APPOINTMENT (OUTPATIENT)
Dept: PAIN MANAGEMENT | Facility: CLINIC | Age: 83
End: 2023-11-28

## 2023-12-08 ENCOUNTER — APPOINTMENT (OUTPATIENT)
Dept: HOME HEALTH SERVICES | Facility: HOME HEALTH | Age: 83
End: 2023-12-08

## 2023-12-08 VITALS
SYSTOLIC BLOOD PRESSURE: 148 MMHG | TEMPERATURE: 98.6 F | DIASTOLIC BLOOD PRESSURE: 72 MMHG | OXYGEN SATURATION: 97 % | HEART RATE: 69 BPM

## 2023-12-17 ENCOUNTER — APPOINTMENT (OUTPATIENT)
Dept: SLEEP CENTER | Facility: HOSPITAL | Age: 83
End: 2023-12-17
Payer: MEDICARE

## 2023-12-17 ENCOUNTER — OUTPATIENT (OUTPATIENT)
Dept: OUTPATIENT SERVICES | Facility: HOSPITAL | Age: 83
LOS: 1 days | Discharge: HOME | End: 2023-12-17
Payer: MEDICARE

## 2023-12-17 DIAGNOSIS — Z96.0 PRESENCE OF UROGENITAL IMPLANTS: Chronic | ICD-10-CM

## 2023-12-17 DIAGNOSIS — Z90.49 ACQUIRED ABSENCE OF OTHER SPECIFIED PARTS OF DIGESTIVE TRACT: Chronic | ICD-10-CM

## 2023-12-17 DIAGNOSIS — Z87.19 PERSONAL HISTORY OF OTHER DISEASES OF THE DIGESTIVE SYSTEM: Chronic | ICD-10-CM

## 2023-12-17 DIAGNOSIS — Z95.0 PRESENCE OF CARDIAC PACEMAKER: Chronic | ICD-10-CM

## 2023-12-17 DIAGNOSIS — Z98.890 OTHER SPECIFIED POSTPROCEDURAL STATES: Chronic | ICD-10-CM

## 2023-12-17 DIAGNOSIS — G47.33 OBSTRUCTIVE SLEEP APNEA (ADULT) (PEDIATRIC): ICD-10-CM

## 2023-12-17 PROCEDURE — 95810 POLYSOM 6/> YRS 4/> PARAM: CPT | Mod: 26

## 2023-12-17 PROCEDURE — 95810 POLYSOM 6/> YRS 4/> PARAM: CPT

## 2023-12-19 DIAGNOSIS — G47.33 OBSTRUCTIVE SLEEP APNEA (ADULT) (PEDIATRIC): ICD-10-CM

## 2023-12-20 ENCOUNTER — RX RENEWAL (OUTPATIENT)
Age: 83
End: 2023-12-20

## 2023-12-23 ENCOUNTER — APPOINTMENT (OUTPATIENT)
Dept: SLEEP CENTER | Facility: HOSPITAL | Age: 83
End: 2023-12-23

## 2024-01-02 ENCOUNTER — APPOINTMENT (OUTPATIENT)
Dept: PAIN MANAGEMENT | Facility: CLINIC | Age: 84
End: 2024-01-02
Payer: MEDICARE

## 2024-01-02 ENCOUNTER — RESULT CHARGE (OUTPATIENT)
Age: 84
End: 2024-01-02

## 2024-01-02 ENCOUNTER — LABORATORY RESULT (OUTPATIENT)
Age: 84
End: 2024-01-02

## 2024-01-02 ENCOUNTER — INPATIENT (INPATIENT)
Facility: HOSPITAL | Age: 84
LOS: 0 days | Discharge: HOME | End: 2024-01-03
Attending: STUDENT IN AN ORGANIZED HEALTH CARE EDUCATION/TRAINING PROGRAM | Admitting: STUDENT IN AN ORGANIZED HEALTH CARE EDUCATION/TRAINING PROGRAM
Payer: MEDICARE

## 2024-01-02 VITALS
HEART RATE: 60 BPM | DIASTOLIC BLOOD PRESSURE: 68 MMHG | SYSTOLIC BLOOD PRESSURE: 128 MMHG | TEMPERATURE: 98 F | OXYGEN SATURATION: 96 % | RESPIRATION RATE: 20 BRPM

## 2024-01-02 VITALS
DIASTOLIC BLOOD PRESSURE: 74 MMHG | BODY MASS INDEX: 30.37 KG/M2 | HEIGHT: 66 IN | WEIGHT: 189 LBS | SYSTOLIC BLOOD PRESSURE: 126 MMHG | HEART RATE: 62 BPM

## 2024-01-02 DIAGNOSIS — Z87.19 PERSONAL HISTORY OF OTHER DISEASES OF THE DIGESTIVE SYSTEM: Chronic | ICD-10-CM

## 2024-01-02 DIAGNOSIS — Z79.891 LONG TERM (CURRENT) USE OF OPIATE ANALGESIC: ICD-10-CM

## 2024-01-02 DIAGNOSIS — Z98.890 OTHER SPECIFIED POSTPROCEDURAL STATES: Chronic | ICD-10-CM

## 2024-01-02 DIAGNOSIS — Z95.0 PRESENCE OF CARDIAC PACEMAKER: Chronic | ICD-10-CM

## 2024-01-02 DIAGNOSIS — Z96.0 PRESENCE OF UROGENITAL IMPLANTS: Chronic | ICD-10-CM

## 2024-01-02 DIAGNOSIS — Z90.49 ACQUIRED ABSENCE OF OTHER SPECIFIED PARTS OF DIGESTIVE TRACT: Chronic | ICD-10-CM

## 2024-01-02 LAB
ALBUMIN SERPL ELPH-MCNC: 4 G/DL — SIGNIFICANT CHANGE UP (ref 3.5–5.2)
ALBUMIN SERPL ELPH-MCNC: 4 G/DL — SIGNIFICANT CHANGE UP (ref 3.5–5.2)
ALBUMIN SERPL ELPH-MCNC: 4.4 G/DL — SIGNIFICANT CHANGE UP (ref 3.5–5.2)
ALBUMIN SERPL ELPH-MCNC: 4.4 G/DL — SIGNIFICANT CHANGE UP (ref 3.5–5.2)
ALP SERPL-CCNC: 73 U/L — SIGNIFICANT CHANGE UP (ref 30–115)
ALP SERPL-CCNC: 73 U/L — SIGNIFICANT CHANGE UP (ref 30–115)
ALP SERPL-CCNC: 75 U/L — SIGNIFICANT CHANGE UP (ref 30–115)
ALP SERPL-CCNC: 75 U/L — SIGNIFICANT CHANGE UP (ref 30–115)
ALT FLD-CCNC: 19 U/L — SIGNIFICANT CHANGE UP (ref 0–41)
ALT FLD-CCNC: 19 U/L — SIGNIFICANT CHANGE UP (ref 0–41)
ALT FLD-CCNC: 21 U/L — SIGNIFICANT CHANGE UP (ref 0–41)
ALT FLD-CCNC: 21 U/L — SIGNIFICANT CHANGE UP (ref 0–41)
AMP / AMPHETAMINE: NEGATIVE
ANION GAP SERPL CALC-SCNC: 10 MMOL/L — SIGNIFICANT CHANGE UP (ref 7–14)
ANION GAP SERPL CALC-SCNC: 10 MMOL/L — SIGNIFICANT CHANGE UP (ref 7–14)
ANION GAP SERPL CALC-SCNC: 12 MMOL/L — SIGNIFICANT CHANGE UP (ref 7–14)
ANION GAP SERPL CALC-SCNC: 12 MMOL/L — SIGNIFICANT CHANGE UP (ref 7–14)
APPEARANCE UR: CLEAR — SIGNIFICANT CHANGE UP
APPEARANCE UR: CLEAR — SIGNIFICANT CHANGE UP
AST SERPL-CCNC: 46 U/L — HIGH (ref 0–41)
AST SERPL-CCNC: 46 U/L — HIGH (ref 0–41)
AST SERPL-CCNC: 62 U/L — HIGH (ref 0–41)
AST SERPL-CCNC: 62 U/L — HIGH (ref 0–41)
BAR / SECOBARBITAL: NEGATIVE
BASOPHILS # BLD AUTO: 0.02 K/UL — SIGNIFICANT CHANGE UP (ref 0–0.2)
BASOPHILS # BLD AUTO: 0.02 K/UL — SIGNIFICANT CHANGE UP (ref 0–0.2)
BASOPHILS NFR BLD AUTO: 0.3 % — SIGNIFICANT CHANGE UP (ref 0–1)
BASOPHILS NFR BLD AUTO: 0.3 % — SIGNIFICANT CHANGE UP (ref 0–1)
BILIRUB SERPL-MCNC: 0.5 MG/DL — SIGNIFICANT CHANGE UP (ref 0.2–1.2)
BILIRUB SERPL-MCNC: 0.5 MG/DL — SIGNIFICANT CHANGE UP (ref 0.2–1.2)
BILIRUB SERPL-MCNC: 0.6 MG/DL — SIGNIFICANT CHANGE UP (ref 0.2–1.2)
BILIRUB SERPL-MCNC: 0.6 MG/DL — SIGNIFICANT CHANGE UP (ref 0.2–1.2)
BILIRUB UR-MCNC: NEGATIVE — SIGNIFICANT CHANGE UP
BILIRUB UR-MCNC: NEGATIVE — SIGNIFICANT CHANGE UP
BUN SERPL-MCNC: 12 MG/DL — SIGNIFICANT CHANGE UP (ref 10–20)
BUP / BUPRENORPHINE: NEGATIVE
BZO / OXAZEPAM: NEGATIVE
CALCIUM SERPL-MCNC: 8.7 MG/DL — SIGNIFICANT CHANGE UP (ref 8.4–10.5)
CALCIUM SERPL-MCNC: 8.7 MG/DL — SIGNIFICANT CHANGE UP (ref 8.4–10.5)
CALCIUM SERPL-MCNC: 9.1 MG/DL — SIGNIFICANT CHANGE UP (ref 8.4–10.4)
CALCIUM SERPL-MCNC: 9.1 MG/DL — SIGNIFICANT CHANGE UP (ref 8.4–10.4)
CHLORIDE SERPL-SCNC: 102 MMOL/L — SIGNIFICANT CHANGE UP (ref 98–110)
CHLORIDE SERPL-SCNC: 102 MMOL/L — SIGNIFICANT CHANGE UP (ref 98–110)
CHLORIDE SERPL-SCNC: 103 MMOL/L — SIGNIFICANT CHANGE UP (ref 98–110)
CHLORIDE SERPL-SCNC: 103 MMOL/L — SIGNIFICANT CHANGE UP (ref 98–110)
CO2 SERPL-SCNC: 21 MMOL/L — SIGNIFICANT CHANGE UP (ref 17–32)
CO2 SERPL-SCNC: 21 MMOL/L — SIGNIFICANT CHANGE UP (ref 17–32)
CO2 SERPL-SCNC: 22 MMOL/L — SIGNIFICANT CHANGE UP (ref 17–32)
CO2 SERPL-SCNC: 22 MMOL/L — SIGNIFICANT CHANGE UP (ref 17–32)
COC / COCAINE: NEGATIVE
COLOR SPEC: SIGNIFICANT CHANGE UP
COLOR SPEC: SIGNIFICANT CHANGE UP
CREAT SERPL-MCNC: 1.1 MG/DL — SIGNIFICANT CHANGE UP (ref 0.7–1.5)
CREATININE: NORMAL MG/DL
DIFF PNL FLD: NEGATIVE — SIGNIFICANT CHANGE UP
DIFF PNL FLD: NEGATIVE — SIGNIFICANT CHANGE UP
EGFR: 67 ML/MIN/1.73M2 — SIGNIFICANT CHANGE UP
EOSINOPHIL # BLD AUTO: 0.07 K/UL — SIGNIFICANT CHANGE UP (ref 0–0.7)
EOSINOPHIL # BLD AUTO: 0.07 K/UL — SIGNIFICANT CHANGE UP (ref 0–0.7)
EOSINOPHIL NFR BLD AUTO: 1.1 % — SIGNIFICANT CHANGE UP (ref 0–8)
EOSINOPHIL NFR BLD AUTO: 1.1 % — SIGNIFICANT CHANGE UP (ref 0–8)
FLUAV AG NPH QL: SIGNIFICANT CHANGE UP
FLUAV AG NPH QL: SIGNIFICANT CHANGE UP
FLUBV AG NPH QL: SIGNIFICANT CHANGE UP
FLUBV AG NPH QL: SIGNIFICANT CHANGE UP
GAS PNL BLDV: SIGNIFICANT CHANGE UP
GAS PNL BLDV: SIGNIFICANT CHANGE UP
GLUCOSE SERPL-MCNC: 109 MG/DL — HIGH (ref 70–99)
GLUCOSE SERPL-MCNC: 109 MG/DL — HIGH (ref 70–99)
GLUCOSE SERPL-MCNC: 124 MG/DL — HIGH (ref 70–99)
GLUCOSE SERPL-MCNC: 124 MG/DL — HIGH (ref 70–99)
GLUCOSE UR QL: NEGATIVE MG/DL — SIGNIFICANT CHANGE UP
GLUCOSE UR QL: NEGATIVE MG/DL — SIGNIFICANT CHANGE UP
HCT VFR BLD CALC: 39.3 % — LOW (ref 42–52)
HCT VFR BLD CALC: 39.3 % — LOW (ref 42–52)
HGB BLD-MCNC: 12.9 G/DL — LOW (ref 14–18)
HGB BLD-MCNC: 12.9 G/DL — LOW (ref 14–18)
IMM GRANULOCYTES NFR BLD AUTO: 0.3 % — SIGNIFICANT CHANGE UP (ref 0.1–0.3)
IMM GRANULOCYTES NFR BLD AUTO: 0.3 % — SIGNIFICANT CHANGE UP (ref 0.1–0.3)
KETONES UR-MCNC: NEGATIVE MG/DL — SIGNIFICANT CHANGE UP
KETONES UR-MCNC: NEGATIVE MG/DL — SIGNIFICANT CHANGE UP
LEUKOCYTE ESTERASE UR-ACNC: NEGATIVE — SIGNIFICANT CHANGE UP
LEUKOCYTE ESTERASE UR-ACNC: NEGATIVE — SIGNIFICANT CHANGE UP
LIDOCAIN IGE QN: 22 U/L — SIGNIFICANT CHANGE UP (ref 7–60)
LIDOCAIN IGE QN: 22 U/L — SIGNIFICANT CHANGE UP (ref 7–60)
LYMPHOCYTES # BLD AUTO: 0.83 K/UL — LOW (ref 1.2–3.4)
LYMPHOCYTES # BLD AUTO: 0.83 K/UL — LOW (ref 1.2–3.4)
LYMPHOCYTES # BLD AUTO: 12.5 % — LOW (ref 20.5–51.1)
LYMPHOCYTES # BLD AUTO: 12.5 % — LOW (ref 20.5–51.1)
MAGNESIUM SERPL-MCNC: 2.3 MG/DL — SIGNIFICANT CHANGE UP (ref 1.8–2.4)
MAGNESIUM SERPL-MCNC: 2.3 MG/DL — SIGNIFICANT CHANGE UP (ref 1.8–2.4)
MCHC RBC-ENTMCNC: 31.9 PG — HIGH (ref 27–31)
MCHC RBC-ENTMCNC: 31.9 PG — HIGH (ref 27–31)
MCHC RBC-ENTMCNC: 32.8 G/DL — SIGNIFICANT CHANGE UP (ref 32–37)
MCHC RBC-ENTMCNC: 32.8 G/DL — SIGNIFICANT CHANGE UP (ref 32–37)
MCV RBC AUTO: 97 FL — HIGH (ref 80–94)
MCV RBC AUTO: 97 FL — HIGH (ref 80–94)
MDMA / METHYLENEDIOXYMETHAMPHETAMINE: NEGATIVE
MET / METHAMPHETAMINES: NEGATIVE
MONOCYTES # BLD AUTO: 0.74 K/UL — HIGH (ref 0.1–0.6)
MONOCYTES # BLD AUTO: 0.74 K/UL — HIGH (ref 0.1–0.6)
MONOCYTES NFR BLD AUTO: 11.2 % — HIGH (ref 1.7–9.3)
MONOCYTES NFR BLD AUTO: 11.2 % — HIGH (ref 1.7–9.3)
MOP / MORPHINE: NEGATIVE
MTD / METHADONE: NEGATIVE
NEUTROPHILS # BLD AUTO: 4.94 K/UL — SIGNIFICANT CHANGE UP (ref 1.4–6.5)
NEUTROPHILS # BLD AUTO: 4.94 K/UL — SIGNIFICANT CHANGE UP (ref 1.4–6.5)
NEUTROPHILS NFR BLD AUTO: 74.6 % — SIGNIFICANT CHANGE UP (ref 42.2–75.2)
NEUTROPHILS NFR BLD AUTO: 74.6 % — SIGNIFICANT CHANGE UP (ref 42.2–75.2)
NITRITE UR-MCNC: NEGATIVE — SIGNIFICANT CHANGE UP
NITRITE UR-MCNC: NEGATIVE — SIGNIFICANT CHANGE UP
NRBC # BLD: 0 /100 WBCS — SIGNIFICANT CHANGE UP (ref 0–0)
NRBC # BLD: 0 /100 WBCS — SIGNIFICANT CHANGE UP (ref 0–0)
NT-PROBNP SERPL-SCNC: 744 PG/ML — HIGH (ref 0–300)
NT-PROBNP SERPL-SCNC: 744 PG/ML — HIGH (ref 0–300)
OXY / OXYCODONE: POSITIVE
PCP / PHENCYCLIDINE: NEGATIVE
PH UR: 5.5 — SIGNIFICANT CHANGE UP (ref 5–8)
PH UR: 5.5 — SIGNIFICANT CHANGE UP (ref 5–8)
PH: NORMAL
PHOSPHATE SERPL-MCNC: 4 MG/DL — SIGNIFICANT CHANGE UP (ref 2.1–4.9)
PHOSPHATE SERPL-MCNC: 4 MG/DL — SIGNIFICANT CHANGE UP (ref 2.1–4.9)
PLATELET # BLD AUTO: 232 K/UL — SIGNIFICANT CHANGE UP (ref 130–400)
PLATELET # BLD AUTO: 232 K/UL — SIGNIFICANT CHANGE UP (ref 130–400)
PMV BLD: 10.1 FL — SIGNIFICANT CHANGE UP (ref 7.4–10.4)
PMV BLD: 10.1 FL — SIGNIFICANT CHANGE UP (ref 7.4–10.4)
POTASSIUM SERPL-MCNC: 5.8 MMOL/L — HIGH (ref 3.5–5)
POTASSIUM SERPL-MCNC: 5.8 MMOL/L — HIGH (ref 3.5–5)
POTASSIUM SERPL-MCNC: SIGNIFICANT CHANGE UP MMOL/L (ref 3.5–5)
POTASSIUM SERPL-MCNC: SIGNIFICANT CHANGE UP MMOL/L (ref 3.5–5)
POTASSIUM SERPL-SCNC: 5.8 MMOL/L — HIGH (ref 3.5–5)
POTASSIUM SERPL-SCNC: 5.8 MMOL/L — HIGH (ref 3.5–5)
POTASSIUM SERPL-SCNC: SIGNIFICANT CHANGE UP MMOL/L (ref 3.5–5)
POTASSIUM SERPL-SCNC: SIGNIFICANT CHANGE UP MMOL/L (ref 3.5–5)
PROT SERPL-MCNC: 7.7 G/DL — SIGNIFICANT CHANGE UP (ref 6–8)
PROT SERPL-MCNC: 7.7 G/DL — SIGNIFICANT CHANGE UP (ref 6–8)
PROT SERPL-MCNC: 8.4 G/DL — HIGH (ref 6–8)
PROT SERPL-MCNC: 8.4 G/DL — HIGH (ref 6–8)
PROT UR-MCNC: SIGNIFICANT CHANGE UP MG/DL
PROT UR-MCNC: SIGNIFICANT CHANGE UP MG/DL
RBC # BLD: 4.05 M/UL — LOW (ref 4.7–6.1)
RBC # BLD: 4.05 M/UL — LOW (ref 4.7–6.1)
RBC # FLD: 16.1 % — HIGH (ref 11.5–14.5)
RBC # FLD: 16.1 % — HIGH (ref 11.5–14.5)
RSV RNA NPH QL NAA+NON-PROBE: SIGNIFICANT CHANGE UP
RSV RNA NPH QL NAA+NON-PROBE: SIGNIFICANT CHANGE UP
SARS-COV-2 RNA SPEC QL NAA+PROBE: DETECTED
SARS-COV-2 RNA SPEC QL NAA+PROBE: DETECTED
SODIUM SERPL-SCNC: 134 MMOL/L — LOW (ref 135–146)
SODIUM SERPL-SCNC: 134 MMOL/L — LOW (ref 135–146)
SODIUM SERPL-SCNC: 136 MMOL/L — SIGNIFICANT CHANGE UP (ref 135–146)
SODIUM SERPL-SCNC: 136 MMOL/L — SIGNIFICANT CHANGE UP (ref 135–146)
SP GR SPEC: >1.03 — HIGH (ref 1–1.03)
SP GR SPEC: >1.03 — HIGH (ref 1–1.03)
SPECIFIC GRAVITY: NORMAL
TEMPERATURE: 90 F
THC / MARIJUANA: NEGATIVE
TROPONIN T, HIGH SENSITIVITY RESULT: 9 NG/L — SIGNIFICANT CHANGE UP (ref 6–21)
UROBILINOGEN FLD QL: 1 MG/DL — SIGNIFICANT CHANGE UP (ref 0.2–1)
UROBILINOGEN FLD QL: 1 MG/DL — SIGNIFICANT CHANGE UP (ref 0.2–1)
WBC # BLD: 6.62 K/UL — SIGNIFICANT CHANGE UP (ref 4.8–10.8)
WBC # BLD: 6.62 K/UL — SIGNIFICANT CHANGE UP (ref 4.8–10.8)
WBC # FLD AUTO: 6.62 K/UL — SIGNIFICANT CHANGE UP (ref 4.8–10.8)
WBC # FLD AUTO: 6.62 K/UL — SIGNIFICANT CHANGE UP (ref 4.8–10.8)

## 2024-01-02 PROCEDURE — 71275 CT ANGIOGRAPHY CHEST: CPT | Mod: 26,MA

## 2024-01-02 PROCEDURE — 71045 X-RAY EXAM CHEST 1 VIEW: CPT | Mod: 26

## 2024-01-02 PROCEDURE — 80305 DRUG TEST PRSMV DIR OPT OBS: CPT | Mod: QW

## 2024-01-02 PROCEDURE — 70450 CT HEAD/BRAIN W/O DYE: CPT | Mod: 26,MA

## 2024-01-02 PROCEDURE — 99285 EMERGENCY DEPT VISIT HI MDM: CPT

## 2024-01-02 PROCEDURE — 93010 ELECTROCARDIOGRAM REPORT: CPT | Mod: 77

## 2024-01-02 PROCEDURE — 74174 CTA ABD&PLVS W/CONTRAST: CPT | Mod: 26,MA

## 2024-01-02 PROCEDURE — 99214 OFFICE O/P EST MOD 30 MIN: CPT

## 2024-01-02 PROCEDURE — 72125 CT NECK SPINE W/O DYE: CPT | Mod: 26,MA

## 2024-01-02 RX ORDER — SODIUM CHLORIDE 9 MG/ML
500 INJECTION, SOLUTION INTRAVENOUS ONCE
Refills: 0 | Status: COMPLETED | OUTPATIENT
Start: 2024-01-02 | End: 2024-01-02

## 2024-01-02 RX ADMIN — SODIUM CHLORIDE 250 MILLILITER(S): 9 INJECTION, SOLUTION INTRAVENOUS at 22:39

## 2024-01-02 NOTE — ED PROVIDER NOTE - CLINICAL SUMMARY MEDICAL DECISION MAKING FREE TEXT BOX
ED w/u without acute findings  imaging negative for acute process, trauma, ich  I interpreted the EKG- sinus rhythm, rate62 , no acute ischaemic changes, no high degree blocks, rbbb  neuro consulted  will admit for sz/syncope w/u

## 2024-01-02 NOTE — ED PROVIDER NOTE - OBJECTIVE STATEMENT
Patient is 83 year old male with pmhx of htn, afib on eliquis, copd noncompliant with 2L home O2, chf, chronic pancreatitis, chronic abdominal and lower back pain presents for evaluation of syncope vs seizure. Patient was at pain management appointment and felt light headed. He was lowered into chair and family member bedside states patient had rhythmic shaking movements of the upper/lower extremities, no incontinence or tongue biting occurred. They admitted to possibly history of epilepsy but he no longer follows with neurology/does not take medications for seizure d/o. He denies any fever, cough, chest pain, shortness of breath, abdominal pain, or urinary complaints.

## 2024-01-02 NOTE — ED PROVIDER NOTE - NS ED ATTENDING STATEMENT MOD
This was a shared visit with the PUAL. I reviewed and verified the documentation. This was a shared visit with the PAUL. I reviewed and verified the documentation.

## 2024-01-02 NOTE — DISCUSSION/SUMMARY
[Medication Risks Reviewed] : Medication risks reviewed [de-identified] : Mr. Rea is an 83-year-old male with chronic cervical and lumbar pain with radiculopathy, as well as pain attributed to chronic pancreatitis. We will start weaning him off narcotics today by decreasing his Oxymorphone ER 40mg BID to 30mg BID, and continue with Percocet 10/325mg TID unchanged. RTO in 4 weeks. UDS: will repeat today.  In regards to his lower back and neck pain it is recommended to start Physical Therapy and the script was given today.  Physical therapy of the Cervical and Lumbar spine 2-3 times a week for 4-8 weeks stressing a home exercise program of walking, shoulder griddle strengthening, swimming, elliptical , recumbent bike, Momo chi and Yoga. Use things that heat like hot shower or icy heat before rehab, exercising and at the beginning of the day, and ice (ice in a bag never directly on the skin) after activity and at the end of the day.  Total clinician time spent today on the patient is 30 minutes including preparing to see the patient, obtaining and/or reviewing and confirming history, performing medically necessary and appropriate examination, counseling and educating the patient and/or family, documenting clinical information in the EHR and communicating and/or referring to other healthcare professionals.   CHONG Hays, DO

## 2024-01-02 NOTE — ED PROVIDER NOTE - CARE PLAN
1 Principal Discharge DX:	Syncope   Principal Discharge DX:	Syncope  Secondary Diagnosis:	2019 novel coronavirus disease (COVID-19)

## 2024-01-02 NOTE — ED PROVIDER NOTE - PROGRESS NOTE DETAILS
KA - Neurology consult placed, pending rest of assessment including ct/labs KA - Patient COVID +. Neurology evaluated patient. Recommended admission to [facility] for veeg under medicine, discussed reasoning for admit south when veeg can be done here at [place]. Neurology agreed patient can be admitted to medicine [place]. KA - Patient COVID +. Neurology evaluated patient. Recommended admission to [facility] for veeg under medicine, discussed reasoning for admit south when veeg can be done here at [place]. Neurology agreed patient can be admitted to medicine [place].

## 2024-01-02 NOTE — ED ADULT TRIAGE NOTE - CHIEF COMPLAINT QUOTE
Pt was at pain management and had a near syncopal episode. Now c/o right arm pain, dizziness, and SOB

## 2024-01-02 NOTE — CONSULT NOTE ADULT - SUBJECTIVE AND OBJECTIVE BOX
NEUROLOGY CONSULT    HPI: 82-year-old male with alcohol use with PMHx of epilepsy (not on AEDs now), hypertension, COPD (on home O2 as needed), chronic pancreatitis, atrial flutter (on Eliquis), CHF (last EF 35-40%), presents for evaluation of possible seizure. when he was at pain management office. After urinating, pt started feeling generalized weakness, and family placed him on a bed. Family states pt had several episodes of seizure like activity describing it as tonic clonic shaking, no tongue bite or incontinence. episodes were intermittent lasting ~5 minutes at a time over 30 minutes. After that, pt appeared confused afterwards but began to become verbal again after arriving to ED. no cp. Family reports patient has history of epilepsy in the past and was with medications but were dc after leaving a NH and has had not had neuro f/u since. Patient also states he had a fall last week, hit his head but did not seek medical care.     mRankin score 0 at baseline     MEDICATIONS  Home Medications:  azelastine 0.05% ophthalmic solution: 1 in each affected eye (29 Aug 2023 14:04)  Breo Ellipta 100 mcg-25 mcg/inh inhalation powder: 1 puff(s) inhaled once a day (29 Aug 2023 14:04)  Creon 12,000 units oral delayed release capsule: 1 orally (29 Aug 2023 14:04)  Eliquis 5 mg tablet: 1 tab(s) orally every 12 hours (29 Aug 2023 14:04)  furosemide 20 mg oral tablet: 1 orally (29 Aug 2023 14:04)  ivabradine 5 mg oral tablet: 1 orally (29 Aug 2023 14:04)  latanoprost 0.005% ophthalmic solution: 1 drop(s) to each affected eye once a day (in the evening) (29 Aug 2023 14:04)  losartan 50 mg oral tablet: 1 orally once a day (29 Aug 2023 14:04)  montelukast 10 mg oral tablet: 1 tab(s) orally once a day (29 Aug 2023 14:04)  omeprazole 40 mg oral delayed release capsule: 1 cap(s) orally once a day (29 Aug 2023 14:04)  oxyMORphone 40 mg oral tablet, extended release: 1 tab(s) orally every 12 hours (29 Aug 2023 14:04)  Potassium Chloride 20eq:  (29 Aug 2023 14:04)  pregabalin 75 mg capsule: 1 cap(s) orally once a day (29 Aug 2023 14:04)  simvastatin 20 mg oral tablet: 1 tab(s) orally once a day (at bedtime) (29 Aug 2023 14:04)  Toujeo Max SoloStar 300 units/mL subcutaneous solution: subcutaneous (29 Aug 2023 14:04)  Trulicity Pen 0.75 mg/0.5 mL subcutaneous solution: 0.75 subcutaneously (29 Aug 2023 14:04)  Vitamin D 50,000 units:  (29 Aug 2023 14:04)    FAMILY HISTORY:  FH: hypertension (Sibling)    SOCIAL HISTORY: negative for tobacco, alcohol, or ilicit drug use.    Allergies  No Known Allergies    GEN: NAD, pleasant, cooperative    NEURO:   MENTAL STATUS: Awake. AAOx3  LANG/SPEECH: Fluent, intact naming, repetition & comprehension  CRANIAL NERVES:  II: Pupils equal and reactive, no RAPD, normal visual field  III, IV, VI: EOM intact, no gaze preference or deviation  V: normal  VII: no facial asymmetry  VIII: normal hearing to speech  MOTOR: 5/5 in both upper and lower extremities  REFLEXES: 2/4 throughout, bilateral flexor plantars  SENSORY: Normal to touch, temperature & pin prick in all extremiteis  COORD: Normal finger to nose and heel to shin, no tremor, no dysmetria  BALANCE/GAIT: Non-assessed    LABS:                        12.9   6.62  )-----------( 232      ( 02 Jan 2024 17:15 )             39.3     01-02    136  |  102  |  12  ----------------------------<  124<H>  tnp;see comment Hemolyzed. Interpret with caution  Critical value:  sample hemolyzed.   |  22  |  1.1    Ca    9.1      02 Jan 2024 17:05    TPro  8.4<H>  /  Alb  4.4  /  TBili  0.6  /  DBili  x   /  AST  62<H>  /  ALT  21  /  AlkPhos  73  01-02    Urinalysis Basic - ( 02 Jan 2024 17:05 )    Color: x / Appearance: x / SG: x / pH: x  Gluc: 124 mg/dL / Ketone: x  / Bili: x / Urobili: x   Blood: x / Protein: x / Nitrite: x   Leuk Esterase: x / RBC: x / WBC x   Sq Epi: x / Non Sq Epi: x / Bacteria: x    RADIOLOGY, EKG AND ADDITIONAL TESTS: Reviewed.           NEUROLOGY CONSULT    HPI: 82-year-old male with alcohol use with PMHx of hypertension, dyslipidemia, diabetes, CAD,  PPM, glaucoma, epilepsy (not on AEDs now), COPD (on home O2 as needed), chronic pancreatitis, atrial flutter (on Eliquis), CHF (last EF 35-40%), presents for evaluation of possible seizure. when he was at pain management office. After urinating, pt started feeling generalized weakness, and family placed him on a bed. Family states pt had several episodes of seizure like activity describing it as tonic clonic shaking, no tongue bite or incontinence. episodes were intermittent lasting ~5 minutes at a time over 30 minutes. After that, pt appeared confused afterwards but began to become verbal again after arriving to ED. no cp. Family reports patient has history of epilepsy in the past and was with medications but were dc after leaving a NH and has had not had neuro f/u since. Patient also states he had a fall last week, hit his head but did not seek medical care. Patient is poor historian and denies any episode. Neurology saw this patient in 6/2022, back then seizure was unlikely but can not totally be ruled out. REEG showed mild to moderate slowing and check. Cardiology and medical work up was done for syncopal event.    mRankin score 0 at baseline     MEDICATIONS  Home Medications:  azelastine 0.05% ophthalmic solution: 1 in each affected eye (29 Aug 2023 14:04)  Breo Ellipta 100 mcg-25 mcg/inh inhalation powder: 1 puff(s) inhaled once a day (29 Aug 2023 14:04)  Creon 12,000 units oral delayed release capsule: 1 orally (29 Aug 2023 14:04)  Eliquis 5 mg tablet: 1 tab(s) orally every 12 hours (29 Aug 2023 14:04)  furosemide 20 mg oral tablet: 1 orally (29 Aug 2023 14:04)  ivabradine 5 mg oral tablet: 1 orally (29 Aug 2023 14:04)  latanoprost 0.005% ophthalmic solution: 1 drop(s) to each affected eye once a day (in the evening) (29 Aug 2023 14:04)  losartan 50 mg oral tablet: 1 orally once a day (29 Aug 2023 14:04)  montelukast 10 mg oral tablet: 1 tab(s) orally once a day (29 Aug 2023 14:04)  omeprazole 40 mg oral delayed release capsule: 1 cap(s) orally once a day (29 Aug 2023 14:04)  oxyMORphone 40 mg oral tablet, extended release: 1 tab(s) orally every 12 hours (29 Aug 2023 14:04)  Potassium Chloride 20eq:  (29 Aug 2023 14:04)  pregabalin 75 mg capsule: 1 cap(s) orally once a day (29 Aug 2023 14:04)  simvastatin 20 mg oral tablet: 1 tab(s) orally once a day (at bedtime) (29 Aug 2023 14:04)  Toujeo Max SoloStar 300 units/mL subcutaneous solution: subcutaneous (29 Aug 2023 14:04)  Trulicity Pen 0.75 mg/0.5 mL subcutaneous solution: 0.75 subcutaneously (29 Aug 2023 14:04)  Vitamin D 50,000 units:  (29 Aug 2023 14:04)    FAMILY HISTORY:  FH: hypertension (Sibling)    SOCIAL HISTORY: negative for tobacco, alcohol, or ilicit drug use.    Allergies  No Known Allergies    GEN: NAD, pleasant, cooperative    NEURO:   MENTAL STATUS: Awake. AAOx3  LANG/SPEECH: Fluent, intact naming, repetition & comprehension  CRANIAL NERVES:  II: Pupils equal and reactive, no RAPD, normal visual field  III, IV, VI: EOM intact, no gaze preference or deviation  V: normal  VII: no facial asymmetry  VIII: normal hearing to speech  MOTOR: 5/5 in both upper and lower extremities  REFLEXES: 2/4 throughout, bilateral flexor plantars  SENSORY: Normal to touch, temperature & pin prick in all extremiteis  COORD: Normal finger to nose and heel to shin, no tremor, no dysmetria  BALANCE/GAIT: Non-assessed    LABS:                        12.9   6.62  )-----------( 232      ( 02 Jan 2024 17:15 )             39.3     01-02    136  |  102  |  12  ----------------------------<  124<H>  tnp;see comment Hemolyzed. Interpret with caution  Critical value:  sample hemolyzed.   |  22  |  1.1    Ca    9.1      02 Jan 2024 17:05    TPro  8.4<H>  /  Alb  4.4  /  TBili  0.6  /  DBili  x   /  AST  62<H>  /  ALT  21  /  AlkPhos  73  01-02    Urinalysis Basic - ( 02 Jan 2024 17:05 )    Color: x / Appearance: x / SG: x / pH: x  Gluc: 124 mg/dL / Ketone: x  / Bili: x / Urobili: x   Blood: x / Protein: x / Nitrite: x   Leuk Esterase: x / RBC: x / WBC x   Sq Epi: x / Non Sq Epi: x / Bacteria: x    RADIOLOGY, EKG AND ADDITIONAL TESTS: Reviewed.           NEUROLOGY CONSULT    (Collateral taken from patient with his proxy Tracy Saxena)    HPI: 82-year-old male with alcohol use with PMHx of hypertension, dyslipidemia, diabetes, CAD, PPM, glaucoma, epilepsy (not on AEDs now, was diagnosed a few months ago at [facility], no reports about it, does not follow with neurology), COPD (on home O2 as needed), chronic pancreatitis, atrial flutter (on Eliquis), CHF (last EF 35-40%), chronic back pain (assessed by NSx 6/2022, with multilevel cervical spondylosis and spondylolisthesis, worse at C3-4 with   uncovered disc bulge contributing to mild spinal stenosis, and severeleft/moderate-severe right foraminal stenosis, no intervention planned, follows with pain management), presents for evaluation of possible seizure. when he was at pain management office. After urinating, pt started feeling generalized weakness, and family placed him on a bed. Family states pt had several episodes of seizure like activity describing it as tonic clonic shaking, no tongue bite or incontinence. Episodes were intermittent lasting ~5 minutes at a time over 30 minutes, patient states that recalls the episode but is a poor historian. After that, allegedly as per the witnesses, pt appeared confused afterwards but began to become verbal again after arriving to ED. Patient also states he had a fall last week, hit his head but did not seek medical care. Patient describes generalized pain. Neurology saw this patient in 6/2022, back then seizure was unlikely but can not totally be ruled out. REEG showed mild to moderate slowing and check. Cardiology and medical work up was done for syncopal event. A few months ago, described per his proxy, patient present with an 3 episodes of generalized shaking, one in home and 2 in the ambulance. Patient transferred [facility] for assessment, was diagnosed with epilepsy and placed on AED that does not remember. Patient has home aid that helps him partially with medication, he gets meds from her in the morning and he has his own in the night.     mRankin score 4 at baseline     MEDICATIONS  Home Medications:  azelastine 0.05% ophthalmic solution: 1 in each affected eye (29 Aug 2023 14:04)  Breo Ellipta 100 mcg-25 mcg/inh inhalation powder: 1 puff(s) inhaled once a day (29 Aug 2023 14:04)  Creon 12,000 units oral delayed release capsule: 1 orally (29 Aug 2023 14:04)  Eliquis 5 mg tablet: 1 tab(s) orally every 12 hours (29 Aug 2023 14:04)  furosemide 20 mg oral tablet: 1 orally (29 Aug 2023 14:04)  ivabradine 5 mg oral tablet: 1 orally (29 Aug 2023 14:04)  latanoprost 0.005% ophthalmic solution: 1 drop(s) to each affected eye once a day (in the evening) (29 Aug 2023 14:04)  losartan 50 mg oral tablet: 1 orally once a day (29 Aug 2023 14:04)  montelukast 10 mg oral tablet: 1 tab(s) orally once a day (29 Aug 2023 14:04)  omeprazole 40 mg oral delayed release capsule: 1 cap(s) orally once a day (29 Aug 2023 14:04)  oxyMORphone 40 mg oral tablet, extended release: 1 tab(s) orally every 12 hours (29 Aug 2023 14:04)  Potassium Chloride 20eq:  (29 Aug 2023 14:04)  pregabalin 75 mg capsule: 1 cap(s) orally once a day (29 Aug 2023 14:04)  simvastatin 20 mg oral tablet: 1 tab(s) orally once a day (at bedtime) (29 Aug 2023 14:04)  Toujeo Max SoloStar 300 units/mL subcutaneous solution: subcutaneous (29 Aug 2023 14:04)  Trulicity Pen 0.75 mg/0.5 mL subcutaneous solution: 0.75 subcutaneously (29 Aug 2023 14:04)  Vitamin D 50,000 units:  (29 Aug 2023 14:04)    FAMILY HISTORY:  FH: hypertension (Sibling)    SOCIAL HISTORY: negative for tobacco, alcohol, or ilicit drug use.    Allergies  No Known Allergies    (Poor historian, poor collaboration)  GEN: NAD, pleasant, cooperative    NEURO:   MENTAL STATUS: Awake. AAOx3  LANG/SPEECH: Fluent, intact naming, repetition & comprehension  CRANIAL NERVES:  II: Pupils equal and reactive (myotic), no RAPD, normal visual field  III, IV, VI: EOM with limitation to external gaze, mild nystagmus in all positions, no gaze preference or deviation  V: normal  VII: no facial asymmetry  VIII: normal hearing to speech  MOTOR: 5/5 in both upper and lower extremities, impossible to assess due to lack of collaboration, patient opposes movement due to back pain  REFLEXES: 1/4 throughout, bilateral flexor plantars  SENSORY: Normal to touch, arthrokinetic, temperature & pin prick in all extremiteis. Left lower extremity shows reduced distal vibration, temperature and arthrokinetic sensation and dysesthesia (difficult to assess due to inconsistency during the test)  COORD: Normal finger to nose normal, no possible heel to shin, bilateral high frequency irregular tremor  BALANCE/GAIT: Non-assessed  B/l straight leg test positive with 20 degrees.     LABS:                        12.9   6.62  )-----------( 232      ( 02 Jan 2024 17:15 )             39.3     01-02    136  |  102  |  12  ----------------------------<  124<H>  tnp;see comment Hemolyzed. Interpret with caution  Critical value:  sample hemolyzed.   |  22  |  1.1    Ca    9.1      02 Jan 2024 17:05    TPro  8.4<H>  /  Alb  4.4  /  TBili  0.6  /  DBili  x   /  AST  62<H>  /  ALT  21  /  AlkPhos  73  01-02    Urinalysis Basic - ( 02 Jan 2024 17:05 )    Color: x / Appearance: x / SG: x / pH: x  Gluc: 124 mg/dL / Ketone: x  / Bili: x / Urobili: x   Blood: x / Protein: x / Nitrite: x   Leuk Esterase: x / RBC: x / WBC x   Sq Epi: x / Non Sq Epi: x / Bacteria: x    RADIOLOGY, EKG AND ADDITIONAL TESTS: Reviewed.           NEUROLOGY CONSULT    (Collateral taken from patient with his proxy Tracy Saxena)    HPI: 82-year-old male with alcohol use with PMHx of hypertension, dyslipidemia, diabetes, CAD, PPM, glaucoma, epilepsy (not on AEDs now, was diagnosed a few months ago at [facility], no reports about it, does not follow with neurology), COPD (on home O2 as needed), chronic pancreatitis, atrial flutter (on Eliquis), CHF (last EF 35-40%), chronic back pain (assessed by NSx 6/2022, with multilevel cervical spondylosis and spondylolisthesis, worse at C3-4 with   uncovered disc bulge contributing to mild spinal stenosis, and severeleft/moderate-severe right foraminal stenosis, no intervention planned, follows with pain management), presents for evaluation of possible seizure. when he was at pain management office. After urinating, pt started feeling generalized weakness, and family placed him on a bed. Family states pt had several episodes of seizure like activity describing it as tonic clonic shaking, no tongue bite or incontinence. Episodes were intermittent lasting ~5 minutes at a time over 30 minutes, patient states that recalls the episode but is a poor historian. After that, allegedly as per the witnesses, pt appeared confused afterwards but began to become verbal again after arriving to ED. Patient also states he had a fall last week, hit his head but did not seek medical care. Patient describes generalized pain. Neurology saw this patient in 6/2022, back then seizure was unlikely but can not totally be ruled out. REEG showed mild to moderate slowing and check. Cardiology and medical work up was done for syncopal event. A few months ago, described per his proxy, patient present with an 3 episodes of generalized shaking, one in home and 2 in the ambulance. Patient transferred [facility] for assessment, was diagnosed with epilepsy and placed on AED that does not remember. Patient has home aid that helps him partially with medication, he gets meds from her in the morning and he has his own in the night.     mRankin score 4 at baseline     MEDICATIONS  Home Medications:  azelastine 0.05% ophthalmic solution: 1 in each affected eye (29 Aug 2023 14:04)  Breo Ellipta 100 mcg-25 mcg/inh inhalation powder: 1 puff(s) inhaled once a day (29 Aug 2023 14:04)  Creon 12,000 units oral delayed release capsule: 1 orally (29 Aug 2023 14:04)  Eliquis 5 mg tablet: 1 tab(s) orally every 12 hours (29 Aug 2023 14:04)  furosemide 20 mg oral tablet: 1 orally (29 Aug 2023 14:04)  ivabradine 5 mg oral tablet: 1 orally (29 Aug 2023 14:04)  latanoprost 0.005% ophthalmic solution: 1 drop(s) to each affected eye once a day (in the evening) (29 Aug 2023 14:04)  losartan 50 mg oral tablet: 1 orally once a day (29 Aug 2023 14:04)  montelukast 10 mg oral tablet: 1 tab(s) orally once a day (29 Aug 2023 14:04)  omeprazole 40 mg oral delayed release capsule: 1 cap(s) orally once a day (29 Aug 2023 14:04)  oxyMORphone 40 mg oral tablet, extended release: 1 tab(s) orally every 12 hours (29 Aug 2023 14:04)  Potassium Chloride 20eq:  (29 Aug 2023 14:04)  pregabalin 75 mg capsule: 1 cap(s) orally once a day (29 Aug 2023 14:04)  simvastatin 20 mg oral tablet: 1 tab(s) orally once a day (at bedtime) (29 Aug 2023 14:04)  Toujeo Max SoloStar 300 units/mL subcutaneous solution: subcutaneous (29 Aug 2023 14:04)  Trulicity Pen 0.75 mg/0.5 mL subcutaneous solution: 0.75 subcutaneously (29 Aug 2023 14:04)  Vitamin D 50,000 units:  (29 Aug 2023 14:04)    FAMILY HISTORY:  FH: hypertension (Sibling)    SOCIAL HISTORY: negative for tobacco, alcohol, or ilicit drug use.    Allergies  No Known Allergies    (Poor historian, poor collaboration)  GEN: NAD, pleasant, cooperative    NEURO:   MENTAL STATUS: Awake. AAOx3  LANG/SPEECH: Fluent, intact naming, repetition & comprehension  CRANIAL NERVES:  II: Pupils equal and reactive (myotic), no RAPD, normal visual field  III, IV, VI: EOM with limitation to external gaze, mild nystagmus in all positions, no gaze preference or deviation  V: normal  VII: no facial asymmetry  VIII: normal hearing to speech  MOTOR: 5/5 in both upper and lower extremities, impossible to assess due to lack of collaboration, patient opposes movement due to back pain  REFLEXES: 1/4 throughout, bilateral flexor plantars  SENSORY: Normal to touch, arthrokinetic, temperature & pin prick in all extremiteis. Left lower extremity shows reduced distal vibration, temperature and arthrokinetic sensation and dysesthesia (difficult to assess due to inconsistency during the test)  COORD: Normal finger to nose normal, no possible heel to shin, bilateral high frequency irregular tremor  BALANCE/GAIT: Non-assessed  B/l straight leg test positive with 20 degrees.     LABS:                        12.9   6.62  )-----------( 232      ( 02 Jan 2024 17:15 )             39.3     01-02    136  |  102  |  12  ----------------------------<  124<H>  tnp;see comment Hemolyzed. Interpret with caution  Critical value:  sample hemolyzed.   |  22  |  1.1    Ca    9.1      02 Jan 2024 17:05    TPro  8.4<H>  /  Alb  4.4  /  TBili  0.6  /  DBili  x   /  AST  62<H>  /  ALT  21  /  AlkPhos  73  01-02    Urinalysis Basic - ( 02 Jan 2024 17:05 )    Color: x / Appearance: x / SG: x / pH: x  Gluc: 124 mg/dL / Ketone: x  / Bili: x / Urobili: x   Blood: x / Protein: x / Nitrite: x   Leuk Esterase: x / RBC: x / WBC x   Sq Epi: x / Non Sq Epi: x / Bacteria: x    RADIOLOGY, EKG AND ADDITIONAL TESTS: Reviewed.           NEUROLOGY CONSULT    (Collateral taken from patient with his proxy Tracy Saxena)    HPI: 82-year-old male with alcohol use with PMHx of hypertension, dyslipidemia, diabetes, CAD, PPM, glaucoma, epilepsy (not on AEDs now, was diagnosed a few months ago at [facility], no reports about it, does not follow with neurology), COPD (on home O2 as needed), chronic pancreatitis, atrial flutter (on Eliquis), CHF (last EF 35-40%), chronic back pain (assessed by NSx 6/2022, with multilevel cervical spondylosis and spondylolisthesis and severe left/moderate-severe right foraminal stenosis, no intervention planned, follows with pain management), presents for evaluation of possible seizure when he was at pain management office. After urinating, pt started feeling generalized weakness, and family placed him on a bed. Family states pt had several episodes of seizure like activity describing it as tonic clonic shaking, no tongue bite or incontinence. Episodes were intermittent lasting ~5 minutes at a time over 30 minutes, patient states that recalls the episode but is a poor historian. After that, allegedly as per the witnesses, pt appeared confused afterwards but began to become verbal again after arriving to ED. Patient also states he had a fall last week, hit his head but did not seek medical care. Patient describes generalized pain. Neurology saw this patient in 6/2022, back then seizure was unlikely but can not totally be ruled out. REEG showed mild to moderate slowing and check. Cardiology and medical work up was done for syncopal event. A few months ago, described per his proxy, patient present with an 3 episodes of generalized shaking, one in home and 2 in the ambulance. Patient transferred [facility] for assessment, was diagnosed with epilepsy and placed on AED that does not remember. Patient has home aid that helps him partially with medication, he gets meds from her in the morning and he has his own in the night.     mRankin score 4 at baseline     MEDICATIONS  Home Medications:  azelastine 0.05% ophthalmic solution: 1 in each affected eye (29 Aug 2023 14:04)  Breo Ellipta 100 mcg-25 mcg/inh inhalation powder: 1 puff(s) inhaled once a day (29 Aug 2023 14:04)  Creon 12,000 units oral delayed release capsule: 1 orally (29 Aug 2023 14:04)  Eliquis 5 mg tablet: 1 tab(s) orally every 12 hours (29 Aug 2023 14:04)  furosemide 20 mg oral tablet: 1 orally (29 Aug 2023 14:04)  ivabradine 5 mg oral tablet: 1 orally (29 Aug 2023 14:04)  latanoprost 0.005% ophthalmic solution: 1 drop(s) to each affected eye once a day (in the evening) (29 Aug 2023 14:04)  losartan 50 mg oral tablet: 1 orally once a day (29 Aug 2023 14:04)  montelukast 10 mg oral tablet: 1 tab(s) orally once a day (29 Aug 2023 14:04)  omeprazole 40 mg oral delayed release capsule: 1 cap(s) orally once a day (29 Aug 2023 14:04)  oxyMORphone 40 mg oral tablet, extended release: 1 tab(s) orally every 12 hours (29 Aug 2023 14:04)  Potassium Chloride 20eq:  (29 Aug 2023 14:04)  pregabalin 75 mg capsule: 1 cap(s) orally once a day (29 Aug 2023 14:04)  simvastatin 20 mg oral tablet: 1 tab(s) orally once a day (at bedtime) (29 Aug 2023 14:04)  Toujeo Max SoloStar 300 units/mL subcutaneous solution: subcutaneous (29 Aug 2023 14:04)  Trulicity Pen 0.75 mg/0.5 mL subcutaneous solution: 0.75 subcutaneously (29 Aug 2023 14:04)  Vitamin D 50,000 units:  (29 Aug 2023 14:04)    FAMILY HISTORY:  FH: hypertension (Sibling)    SOCIAL HISTORY: negative for tobacco, alcohol, or ilicit drug use.    Allergies  No Known Allergies    (Poor historian, poor collaboration)  GEN: NAD, pleasant, cooperative    NEURO:   MENTAL STATUS: Awake. AAOx3  LANG/SPEECH: Fluent, intact naming, repetition & comprehension  CRANIAL NERVES:  II: Pupils equal and reactive (myotic), no RAPD, normal visual field  III, IV, VI: EOM with limitation to external gaze, mild nystagmus in all positions, no gaze preference or deviation  V: normal  VII: no facial asymmetry  VIII: normal hearing to speech  MOTOR: 5/5 in both upper and lower extremities, impossible to assess due to lack of collaboration, patient opposes movement due to back pain  REFLEXES: 1/4 throughout, bilateral flexor plantars  SENSORY: Normal to touch, arthrokinetic, temperature & pin prick in all extremiteis. Left lower extremity shows reduced distal vibration, temperature and arthrokinetic sensation and dysesthesia (difficult to assess due to inconsistency during the test)  COORD: Normal finger to nose normal, no possible heel to shin, bilateral high frequency irregular tremor  BALANCE/GAIT: Non-assessed  B/l straight leg test positive with 20 degrees.     LABS:                        12.9   6.62  )-----------( 232      ( 02 Jan 2024 17:15 )             39.3     01-02    136  |  102  |  12  ----------------------------<  124<H>  tnp;see comment Hemolyzed. Interpret with caution  Critical value:  sample hemolyzed.   |  22  |  1.1    Ca    9.1      02 Jan 2024 17:05    TPro  8.4<H>  /  Alb  4.4  /  TBili  0.6  /  DBili  x   /  AST  62<H>  /  ALT  21  /  AlkPhos  73  01-02    Urinalysis Basic - ( 02 Jan 2024 17:05 )    Color: x / Appearance: x / SG: x / pH: x  Gluc: 124 mg/dL / Ketone: x  / Bili: x / Urobili: x   Blood: x / Protein: x / Nitrite: x   Leuk Esterase: x / RBC: x / WBC x   Sq Epi: x / Non Sq Epi: x / Bacteria: x    RADIOLOGY, EKG AND ADDITIONAL TESTS: Reviewed.           NEUROLOGY CONSULT    (Collateral taken from patient with his proxy Tracy Saxena)    HPI: 82-year-old male with alcohol use with PMHx of hypertension, dyslipidemia, diabetes, CAD, PPM, glaucoma, epilepsy (not on AEDs now, was diagnosed a few months ago at [facility], no reports about it, does not follow with neurology), COPD (on home O2 as needed), chronic pancreatitis, atrial flutter (on Eliquis), CHF (last EF 35-40%), chronic back pain (assessed by NSx 6/2022, with multilevel cervical spondylosis and spondylolisthesis and severe left/moderate-severe right foraminal stenosis, no intervention planned, follows with pain management), presents for evaluation of possible seizure when he was at pain management office. After urinating, pt started feeling generalized weakness, and family placed him on a bed. Family states pt had several episodes of seizure like activity describing it as tonic clonic shaking, no tongue bite or incontinence. Episodes were intermittent lasting ~5 minutes at a time over 30 minutes, patient states that recalls the episode but is a poor historian. After that, allegedly as per the witnesses, pt appeared confused afterwards but began to become verbal again after arriving to ED. Patient also states he had a fall last week, hit his head but did not seek medical care. Patient describes generalized pain. Neurology saw this patient in 6/2022, back then seizure was unlikely but can not totally be ruled out. REEG showed mild to moderate slowing and check. Cardiology and medical work up was done for syncopal event. A few months ago, described per his proxy, patient present with an 3 episodes of generalized shaking, one in home and 2 in the ambulance. Patient transferred [facility] for assessment, was diagnosed with epilepsy and placed on AED that does not remember. Patient has home aid that helps him partially with medication, he gets meds from her in the morning and he has his own in the night.     mRankin score 4 at baseline     MEDICATIONS  Home Medications:  azelastine 0.05% ophthalmic solution: 1 in each affected eye (29 Aug 2023 14:04)  Breo Ellipta 100 mcg-25 mcg/inh inhalation powder: 1 puff(s) inhaled once a day (29 Aug 2023 14:04)  Creon 12,000 units oral delayed release capsule: 1 orally (29 Aug 2023 14:04)  Eliquis 5 mg tablet: 1 tab(s) orally every 12 hours (29 Aug 2023 14:04)  furosemide 20 mg oral tablet: 1 orally (29 Aug 2023 14:04)  ivabradine 5 mg oral tablet: 1 orally (29 Aug 2023 14:04)  latanoprost 0.005% ophthalmic solution: 1 drop(s) to each affected eye once a day (in the evening) (29 Aug 2023 14:04)  losartan 50 mg oral tablet: 1 orally once a day (29 Aug 2023 14:04)  montelukast 10 mg oral tablet: 1 tab(s) orally once a day (29 Aug 2023 14:04)  omeprazole 40 mg oral delayed release capsule: 1 cap(s) orally once a day (29 Aug 2023 14:04)  oxyMORphone 40 mg oral tablet, extended release: 1 tab(s) orally every 12 hours (29 Aug 2023 14:04)  Potassium Chloride 20eq:  (29 Aug 2023 14:04)  pregabalin 75 mg capsule: 1 cap(s) orally once a day (29 Aug 2023 14:04)  simvastatin 20 mg oral tablet: 1 tab(s) orally once a day (at bedtime) (29 Aug 2023 14:04)  Toujeo Max SoloStar 300 units/mL subcutaneous solution: subcutaneous (29 Aug 2023 14:04)  Trulicity Pen 0.75 mg/0.5 mL subcutaneous solution: 0.75 subcutaneously (29 Aug 2023 14:04)  Vitamin D 50,000 units:  (29 Aug 2023 14:04)    FAMILY HISTORY:  FH: hypertension (Sibling)    SOCIAL HISTORY: negative for tobacco, alcohol, or ilicit drug use.    Allergies  No Known Allergies    (Poor historian, poor collaboration)  GEN: NAD, pleasant, cooperative    NEURO:   MENTAL STATUS: Awake. AAOx3  LANG/SPEECH: Fluent, intact naming, repetition & comprehension  CRANIAL NERVES:  II: Pupils equal and reactive (myotic), no RAPD, normal visual field  III, IV, VI: EOM with limitation to external gaze, mild nystagmus in all positions, no gaze preference or deviation  V: normal  VII: no facial asymmetry  VIII: normal hearing to speech  MOTOR: 5/5 in both upper and lower extremities, impossible to assess due to lack of collaboration, patient opposes movement due to back pain  REFLEXES: 1/4 throughout, bilateral flexor plantars  SENSORY: Normal to touch, arthrokinetic, temperature & pin prick in all extremiteis. Left lower extremity shows reduced distal vibration, temperature and arthrokinetic sensation and dysesthesia (difficult to assess due to inconsistency during the test)  COORD: Normal finger to nose normal, no possible heel to shin, bilateral high frequency irregular tremor  BALANCE/GAIT: Non-assessed  B/l straight leg test positive with 20 degrees.     LABS:                        12.9   6.62  )-----------( 232      ( 02 Jan 2024 17:15 )             39.3     01-02    136  |  102  |  12  ----------------------------<  124<H>  tnp;see comment Hemolyzed. Interpret with caution  Critical value:  sample hemolyzed.   |  22  |  1.1    Ca    9.1      02 Jan 2024 17:05    TPro  8.4<H>  /  Alb  4.4  /  TBili  0.6  /  DBili  x   /  AST  62<H>  /  ALT  21  /  AlkPhos  73  01-02    Urinalysis Basic - ( 02 Jan 2024 17:05 )    Color: x / Appearance: x / SG: x / pH: x  Gluc: 124 mg/dL / Ketone: x  / Bili: x / Urobili: x   Blood: x / Protein: x / Nitrite: x   Leuk Esterase: x / RBC: x / WBC x   Sq Epi: x / Non Sq Epi: x / Bacteria: x    RADIOLOGY, EKG AND ADDITIONAL TESTS: Reviewed.           NEUROLOGY CONSULT    (Collateral taken from patient with his proxy Tracy Saxena)    HPI: 82-year-old male who denies alcohol use (only 12/30, [birth year] his birthday), former smoker, with PMHx of hypertension, dyslipidemia, diabetes, CAD, PPM, glaucoma, epilepsy (not on AEDs now, was diagnosed a few months ago at [facility], [place], no reports about it, does not follow with neurology), COPD (on home O2 as needed), chronic pancreatitis, atrial flutter (on Eliquis), CHF (last EF 35-40%), chronic back pain (assessed by NSx 6/2022, with multilevel cervical spondylosis and spondylolisthesis and severe left/moderate-severe right foraminal stenosis, no intervention planned, follows with pain management), presents for evaluation of possible seizure when he was at pain management office. After urinating, pt started feeling generalized weakness, and family placed him on a bed. Family states pt had several episodes of seizure like activity describing it as tonic clonic shaking, no tongue bite or incontinence. Episodes were intermittent lasting ~5 minutes at a time over 30 minutes, patient states that recalls the episode but is a poor historian. After that, allegedly as per the witnesses, pt appeared confused afterwards but began to become verbal again after arriving to ED. Patient also states he had a fall last week, hit his head but did not seek medical care. Patient describes generalized pain. Neurology saw this patient in 6/2022, back then seizure was unlikely but can not totally be ruled out. REEG showed mild to moderate slowing and check. Cardiology and medical work up was done for syncopal event. A few months ago, described per his proxy, patient present with an 3 episodes of generalized shaking, one in home and 2 in the ambulance. Patient transferred [facility], [place] for assessment, was diagnosed with epilepsy and placed on AED that does not remember. Patient has home aid that helps him partially with medication, he gets meds from her in the morning and he has his own in the night.     mRankin score 4 at baseline     MEDICATIONS  Home Medications:  azelastine 0.05% ophthalmic solution: 1 in each affected eye (29 Aug 2023 14:04)  Breo Ellipta 100 mcg-25 mcg/inh inhalation powder: 1 puff(s) inhaled once a day (29 Aug 2023 14:04)  Creon 12,000 units oral delayed release capsule: 1 orally (29 Aug 2023 14:04)  Eliquis 5 mg tablet: 1 tab(s) orally every 12 hours (29 Aug 2023 14:04)  furosemide 20 mg oral tablet: 1 orally (29 Aug 2023 14:04)  ivabradine 5 mg oral tablet: 1 orally (29 Aug 2023 14:04)  latanoprost 0.005% ophthalmic solution: 1 drop(s) to each affected eye once a day (in the evening) (29 Aug 2023 14:04)  losartan 50 mg oral tablet: 1 orally once a day (29 Aug 2023 14:04)  montelukast 10 mg oral tablet: 1 tab(s) orally once a day (29 Aug 2023 14:04)  omeprazole 40 mg oral delayed release capsule: 1 cap(s) orally once a day (29 Aug 2023 14:04)  oxyMORphone 40 mg oral tablet, extended release: 1 tab(s) orally every 12 hours (29 Aug 2023 14:04)  Potassium Chloride 20eq:  (29 Aug 2023 14:04)  pregabalin 75 mg capsule: 1 cap(s) orally once a day (29 Aug 2023 14:04)  simvastatin 20 mg oral tablet: 1 tab(s) orally once a day (at bedtime) (29 Aug 2023 14:04)  Toujeo Max SoloStar 300 units/mL subcutaneous solution: subcutaneous (29 Aug 2023 14:04)  Trulicity Pen 0.75 mg/0.5 mL subcutaneous solution: 0.75 subcutaneously (29 Aug 2023 14:04)  Vitamin D 50,000 units:  (29 Aug 2023 14:04)    FAMILY HISTORY:  FH: hypertension (Sibling)    SOCIAL HISTORY: negative for tobacco, alcohol, or ilicit drug use.    Allergies  No Known Allergies    (Poor historian, poor collaboration)  GEN: NAD, pleasant, cooperative    NEURO:   MENTAL STATUS: Awake. AAOx3  LANG/SPEECH: Fluent, intact naming, repetition & comprehension  CRANIAL NERVES:  II: Pupils equal and reactive (myotic), no RAPD, normal visual field  III, IV, VI: EOM with limitation to external gaze, mild nystagmus in all positions, no gaze preference or deviation  V: normal  VII: no facial asymmetry  VIII: normal hearing to speech  MOTOR: 5/5 in both upper and lower extremities, impossible to assess due to lack of collaboration, patient opposes movement due to back pain  REFLEXES: 1/4 throughout, bilateral flexor plantars  SENSORY: Normal to touch, arthrokinetic, temperature & pin prick in all extremiteis. Left lower extremity shows reduced distal vibration, temperature and arthrokinetic sensation and dysesthesia (difficult to assess due to inconsistency during the test)  COORD: Normal finger to nose normal, no possible heel to shin, bilateral high frequency irregular tremor  BALANCE/GAIT: Non-assessed  B/l straight leg test positive with 20 degrees.     LABS:                        12.9   6.62  )-----------( 232      ( 02 Jan 2024 17:15 )             39.3     01-02    136  |  102  |  12  ----------------------------<  124<H>  tnp;see comment Hemolyzed. Interpret with caution  Critical value:  sample hemolyzed.   |  22  |  1.1    Ca    9.1      02 Jan 2024 17:05    TPro  8.4<H>  /  Alb  4.4  /  TBili  0.6  /  DBili  x   /  AST  62<H>  /  ALT  21  /  AlkPhos  73  01-02    Urinalysis Basic - ( 02 Jan 2024 17:05 )    Color: x / Appearance: x / SG: x / pH: x  Gluc: 124 mg/dL / Ketone: x  / Bili: x / Urobili: x   Blood: x / Protein: x / Nitrite: x   Leuk Esterase: x / RBC: x / WBC x   Sq Epi: x / Non Sq Epi: x / Bacteria: x    RADIOLOGY, EKG AND ADDITIONAL TESTS: Reviewed.           NEUROLOGY CONSULT    (Collateral taken from patient with his proxy Tracy Saxena)    HPI: 82-year-old male who denies alcohol use (only 12/30, on his birthday), former smoker, with PMHx of hypertension, dyslipidemia, diabetes, CAD, PPM, glaucoma, epilepsy (not on AEDs now, was diagnosed a few months ago at [facility], no reports about it, does not follow with neurology), COPD (on home O2 as needed), chronic pancreatitis, atrial flutter (on Eliquis), CHF (last EF 35-40%), chronic back pain (assessed by NSx 6/2022, with multilevel cervical spondylosis and spondylolisthesis and severe left/moderate-severe right foraminal stenosis, no intervention planned, follows with pain management), presents for evaluation of possible seizure when he was at pain management office. After urinating, pt started feeling generalized weakness, and family placed him on a bed. Family states pt had several episodes of seizure like activity describing it as tonic clonic shaking, no tongue bite or incontinence. Episodes were intermittent lasting ~5 minutes at a time over 30 minutes, patient states that recalls the episode but is a poor historian. After that, allegedly as per the witnesses, pt appeared confused afterwards but began to become verbal again after arriving to ED. Patient also states he had a fall last week, hit his head but did not seek medical care. Patient describes generalized pain. Neurology saw this patient in 6/2022, back then seizure was unlikely but can not totally be ruled out. REEG showed mild to moderate slowing and check. Cardiology and medical work up was done for syncopal event. A few months ago, described per his proxy, patient present with an 3 episodes of generalized shaking, one in home and 2 in the ambulance. Patient transferred [facility] for assessment, was diagnosed with epilepsy and placed on AED that does not remember. Patient has home aid that helps him partially with medication, he gets meds from her in the morning and he has his own in the night.     mRankin score 4 at baseline     MEDICATIONS  Home Medications:  azelastine 0.05% ophthalmic solution: 1 in each affected eye (29 Aug 2023 14:04)  Breo Ellipta 100 mcg-25 mcg/inh inhalation powder: 1 puff(s) inhaled once a day (29 Aug 2023 14:04)  Creon 12,000 units oral delayed release capsule: 1 orally (29 Aug 2023 14:04)  Eliquis 5 mg tablet: 1 tab(s) orally every 12 hours (29 Aug 2023 14:04)  furosemide 20 mg oral tablet: 1 orally (29 Aug 2023 14:04)  ivabradine 5 mg oral tablet: 1 orally (29 Aug 2023 14:04)  latanoprost 0.005% ophthalmic solution: 1 drop(s) to each affected eye once a day (in the evening) (29 Aug 2023 14:04)  losartan 50 mg oral tablet: 1 orally once a day (29 Aug 2023 14:04)  montelukast 10 mg oral tablet: 1 tab(s) orally once a day (29 Aug 2023 14:04)  omeprazole 40 mg oral delayed release capsule: 1 cap(s) orally once a day (29 Aug 2023 14:04)  oxyMORphone 40 mg oral tablet, extended release: 1 tab(s) orally every 12 hours (29 Aug 2023 14:04)  Potassium Chloride 20eq:  (29 Aug 2023 14:04)  pregabalin 75 mg capsule: 1 cap(s) orally once a day (29 Aug 2023 14:04)  simvastatin 20 mg oral tablet: 1 tab(s) orally once a day (at bedtime) (29 Aug 2023 14:04)  Toujeo Max SoloStar 300 units/mL subcutaneous solution: subcutaneous (29 Aug 2023 14:04)  Trulicity Pen 0.75 mg/0.5 mL subcutaneous solution: 0.75 subcutaneously (29 Aug 2023 14:04)  Vitamin D 50,000 units:  (29 Aug 2023 14:04)    FAMILY HISTORY:  FH: hypertension (Sibling)    SOCIAL HISTORY: negative for tobacco, alcohol, or ilicit drug use.    Allergies  No Known Allergies    (Poor historian, poor collaboration)  GEN: NAD, pleasant, cooperative    NEURO:   MENTAL STATUS: Awake. AAOx3  LANG/SPEECH: Fluent, intact naming, repetition & comprehension  CRANIAL NERVES:  II: Pupils equal and reactive (myotic), no RAPD, normal visual field  III, IV, VI: EOM with limitation to external gaze, mild nystagmus in all positions, no gaze preference or deviation  V: normal  VII: no facial asymmetry  VIII: normal hearing to speech  MOTOR: 5/5 in both upper and lower extremities, impossible to assess due to lack of collaboration, patient opposes movement due to back pain  REFLEXES: 1/4 throughout, bilateral flexor plantars  SENSORY: Normal to touch, arthrokinetic, temperature & pin prick in all extremiteis. Left lower extremity shows reduced distal vibration, temperature and arthrokinetic sensation and dysesthesia (difficult to assess due to inconsistency during the test)  COORD: Normal finger to nose normal, no possible heel to shin, bilateral high frequency irregular tremor  BALANCE/GAIT: Non-assessed  B/l straight leg test positive with 20 degrees.     LABS:                        12.9   6.62  )-----------( 232      ( 02 Jan 2024 17:15 )             39.3     01-02    136  |  102  |  12  ----------------------------<  124<H>  tnp;see comment Hemolyzed. Interpret with caution  Critical value:  sample hemolyzed.   |  22  |  1.1    Ca    9.1      02 Jan 2024 17:05    TPro  8.4<H>  /  Alb  4.4  /  TBili  0.6  /  DBili  x   /  AST  62<H>  /  ALT  21  /  AlkPhos  73  01-02    Urinalysis Basic - ( 02 Jan 2024 17:05 )    Color: x / Appearance: x / SG: x / pH: x  Gluc: 124 mg/dL / Ketone: x  / Bili: x / Urobili: x   Blood: x / Protein: x / Nitrite: x   Leuk Esterase: x / RBC: x / WBC x   Sq Epi: x / Non Sq Epi: x / Bacteria: x    RADIOLOGY, EKG AND ADDITIONAL TESTS: Reviewed.

## 2024-01-02 NOTE — ED PROVIDER NOTE - PHYSICAL EXAMINATION
As Follows:  CONST: Lying in stretcher.   EYES: PERRL, EOMI, Sclera and conjunctiva clear.   ENT: No nasal discharge. Oropharynx normal appearing, no erythema or exudates. Uvula midline  CARD: No murmurs, rubs, or gallops; Normal rate and rhythm  RESP: BS Equal B/L, No wheezes, rhonchi or rales. No distress or accessory breathing  GI: Soft, non-tender, non-distended.  MS: Normal ROM in all extremities. No midline Cervical/Thoracic/Lumbar spinal tenderness.  SKIN: Warm, dry, no acute rashes. MMM  NEURO: A&Ox3, No focal deficits. Strength and sensation intact. No leg drop.

## 2024-01-02 NOTE — ED PROVIDER NOTE - ATTENDING APP SHARED VISIT CONTRIBUTION OF CARE
82-year-old male history of hypertension, COPD on home O2 as needed, chronic pancreatitis, a flutter on Eliquis, CHF last EF 45%  pt presents for eval of possible sz. pt was at pain management office. after urinating, pt started feeling generalized weakness. family cough pt and placed him on a bed. family states pt had several episodes of sz like activity. family states tonic clonic shaking, no tongue bite or incontinence. episodes were intermittent lasting ~5 minutes at a time over 30 minutes. pt appeared confused afterwards but began to become verbal again after arriving to ed. no cp. + chronic ap. no focal numbness/weakness/ss.  fmaily states pt had sz in the past but has not been on medications for  a while (family states it was dc after leaving a NH). pt has not had neuro f/u since. pt also states he had a fall last week, hit his head but did not seek medical care.     vss  gen- NAD, aaox3  card-rrr  lungs-ctab, no wheezing or rhonchi  abd-soft, mild diffuse abd tenderness, no guarding or rebound  neuro- full str/sensation, cn ii-xii grossly intact, normal coordination, speech clear

## 2024-01-02 NOTE — CONSULT NOTE ADULT - ASSESSMENT
82-year-old male, mRankin score 4 at baseline (walks with a walker, partial help with a HHA), with alcohol use (patient denies alcohol use, but his birthday), former smoker, with PMHx of hypertension, dyslipidemia, diabetes, CAD, PPM, glaucoma, epilepsy (not on AEDs now, was diagnosed a few months ago at [facility], no reports about it, does not follow with neurology), COPD (on home O2 as needed), chronic pancreatitis, atrial flutter (on Eliquis), CHF (last EF 35-40%), chronic back pain (assessed by NSx 6/2022, with multilevel cervical spondylosis and spondylolisthesis, worse at C3-4 with uncovered disc bulge contributing to mild spinal stenosis, and severe left/moderate-severe right foraminal stenosis, no intervention planned, follows with pain management), presents for evaluation of possible repetitive episodes of generalized shaking concerning of seizures when he was at pain management office. Episodes were intermittent lasting ~5 minutes at a time over 30 minutes, patient states that recalls the episode but is a poor historian. Patient also states he had a fall last week. Neurology saw this patient in 6/2022, back then seizure was unlikely but can not totally be ruled out. REEG showed mild to moderate slowing and check. A few months ago, described per his proxy, patient present with an 3 episodes of generalized shaking, one in home and 2 in the ambulance. Patient transferred [facility] for assessment, was diagnosed with epilepsy and placed on AED that does not remember. Patient has home aid that helps him partially with medication, he gets meds from her in the morning and he has his own in the night. Covid positive. CTH shows since October 22, 2023, no definite evidence of acute intracranial hemorrhage or mass effect. CT cervical redemonstrates multilevel cervical spondylosis and spondylolisthesis.    Impression: Repetitive episodes of possible breakthrough seizures in patient recently diagnosed with epilepsy not compliant with AED, COVID positive and poor intake with PMHx of DM, HTN, DLP, atrial fibrillation, COPD and chronic back patient 2/2 multilevel cervical spondylosis and spondylolisthesis, worse at C3-4 and severe left/moderate-severe right foraminal stenosis    Recommendations:  Get more collateral from [facility] about previous admission  Consult nutrition  Evaluate for CIWA protocol  Order vitamin B1, B6, B12, folate, alcohol level, UA, Utox TSH, Mg and Phos, trend lactate  rEEG  Seizure & Fall precautions  Ativan 2mg IVP for seizures > 2mins  Keep Mg>2 and K >4.   Management per primary team.     Please call us if any questions or neurological changes.     Case discussed with Neurology attending Dr. Stewart 82-year-old male, mRankin score 4 at baseline (walks with a walker, partial help with a HHA), with alcohol use (patient denies alcohol use, but his birthday), former smoker, with PMHx of hypertension, dyslipidemia, diabetes, CAD, PPM, glaucoma, epilepsy (not on AEDs now, was diagnosed a few months ago at [facility], no reports about it, does not follow with neurology), COPD (on home O2 as needed), chronic pancreatitis, atrial flutter (on Eliquis), CHF (last EF 35-40%), chronic back pain (assessed by NSx 6/2022, with multilevel cervical spondylosis and spondylolisthesis, worse at C3-4 with uncovered disc bulge contributing to mild spinal stenosis, and severe left/moderate-severe right foraminal stenosis, no intervention planned, follows with pain management), presents for evaluation of possible repetitive episodes of generalized shaking concerning of seizures when he was at pain management office. Episodes were intermittent lasting ~5 minutes at a time over 30 minutes, patient states that recalls the episode but is a poor historian. Patient also states he had a fall last week. Neurology saw this patient in 6/2022, back then seizure was unlikely but can not totally be ruled out. REEG showed mild to moderate slowing and check. A few months ago, described per his proxy, patient present with an 3 episodes of generalized shaking, one in home and 2 in the ambulance. Patient transferred [facility] for assessment, was diagnosed with epilepsy and placed on AED that does not remember. Patient has home aid that helps him partially with medication, he gets meds from her in the morning and he has his own in the night. Covid positive. CTH shows since October 22, 2023, no definite evidence of acute intracranial hemorrhage or mass effect. CT cervical redemonstrates multilevel cervical spondylosis and spondylolisthesis.    Impression: Repetitive episodes of possible breakthrough seizures in patient recently diagnosed with epilepsy not compliant with AED, COVID positive and poor intake with PMHx of DM, HTN, DLP, atrial fibrillation, COPD and chronic back patient 2/2 multilevel cervical spondylosis and spondylolisthesis, worse at C3-4 and severe left/moderate-severe right foraminal stenosis    Recommendations:  Get more collateral from [facility] about previous admission  Consult nutrition  Evaluate for CIWA protocol  Order vitamin B1, B6, B12, folate, alcohol level, UA, Utox TSH, Mg and Phos, trend lactate  rEEG  Seizure & Fall precautions  Ativan 2mg IVP for seizures > 2mins  Keep Mg>2 and K >4.   Management per primary team.     Please call us if any questions or neurological changes.     Case discussed with Neurology attending Dr. Stewart 82-year-old male, mRankin score 4 at baseline (walks with a walker, partial help with a HHA), with alcohol use (patient denies alcohol use, but his birthday), former smoker, with PMHx of hypertension, dyslipidemia, diabetes, CAD, PPM, glaucoma, epilepsy (not on AEDs now, was diagnosed a few months ago at [facility], no reports about it, does not follow with neurology), COPD (on home O2 as needed), chronic pancreatitis, atrial flutter (on Eliquis), CHF (last EF 35-40%), chronic back pain (assessed by NSx 6/2022, with multilevel cervical spondylosis and spondylolisthesis, worse at C3-4 with uncovered disc bulge contributing to mild spinal stenosis, and severe left/moderate-severe right foraminal stenosis, no intervention planned, follows with pain management), presents for evaluation of possible repetitive episodes of generalized shaking concerning of seizures when he was at pain management office. Episodes were intermittent lasting ~5 minutes at a time over 30 minutes, patient states that recalls the episode but is a poor historian. Patient also states he had a fall last week. Neurology saw this patient in 6/2022, back then seizure was unlikely but can not totally be ruled out. REEG showed mild to moderate slowing and check. A few months ago, described per his proxy, patient present with an 3 episodes of generalized shaking, one in home and 2 in the ambulance. Patient transferred [facility] for assessment, was diagnosed with epilepsy and placed on AED that does not remember. AST/ALT 62/21. Covid positive. CTH shows since October 22, 2023, no definite evidence of acute intracranial hemorrhage or mass effect. CT cervical redemonstrates multilevel cervical spondylosis and spondylolisthesis.    Impression: Repetitive episodes of possible provoked breakthrough seizures in patient recently diagnosed with epilepsy not compliant with AED, COVID positive and poor intake with PMHx of DM, HTN, DLP, atrial fibrillation, COPD and chronic back patient 2/2 multilevel cervical spondylosis and spondylolisthesis, worse at C3-4 and severe left/moderate-severe right foraminal stenosis    Recommendations:  Get more collateral from [facility] about previous admission  No AEDs for now  Consult nutrition  Evaluate for CIWA protocol  Order vitamin B1, B6, B12, folate, alcohol level, UA, Utox TSH, Mg and Phos, trend lactate  rEEG  Seizure & Fall precautions  Ativan 2mg IVP for seizures > 2mins  Keep Mg>2 and K >4.   Management per primary team.     Please call us if any questions or neurological changes.     Case discussed with Neurology attending Dr. Stewart 82-year-old male, mRankin score 4 at baseline (walks with a walker, partial help with a HHA), with alcohol use (patient denies alcohol use, but his birthday), former smoker, with PMHx of hypertension, dyslipidemia, diabetes, CAD, PPM, glaucoma, epilepsy (not on AEDs now, was diagnosed a few months ago at [facility], no reports about it, does not follow with neurology), COPD (on home O2 as needed), chronic pancreatitis, atrial flutter (on Eliquis), CHF (last EF 35-40%), chronic back pain (assessed by NSx 6/2022, with multilevel cervical spondylosis and spondylolisthesis, worse at C3-4 with uncovered disc bulge contributing to mild spinal stenosis, and severe left/moderate-severe right foraminal stenosis, no intervention planned, follows with pain management), presents for evaluation of possible repetitive episodes of generalized shaking concerning of seizures when he was at pain management office. Episodes were intermittent lasting ~5 minutes at a time over 30 minutes, patient states that recalls the episode but is a poor historian. Patient also states he had a fall last week. Neurology saw this patient in 6/2022, back then seizure was unlikely but can not totally be ruled out. REEG showed mild to moderate slowing and check. A few months ago, described per his proxy, patient present with an 3 episodes of generalized shaking, one in home and 2 in the ambulance. Patient transferred [facility] for assessment, was diagnosed with epilepsy and placed on AED that does not remember. AST/ALT 62/21. Covid positive. CTH shows since October 22, 2023, no definite evidence of acute intracranial hemorrhage or mass effect. CT cervical redemonstrates multilevel cervical spondylosis and spondylolisthesis.    Impression: Repetitive episodes of possible provoked breakthrough seizures in patient recently diagnosed with epilepsy not compliant with AED, COVID positive and poor intake with PMHx of DM, HTN, DLP, atrial fibrillation, COPD and chronic back patient 2/2 multilevel cervical spondylosis and spondylolisthesis, worse at C3-4 and severe left/moderate-severe right foraminal stenosis    Recommendations:  Get more collateral from [facility] about previous admission  No AEDs for now  Consult nutrition  Evaluate for CIWA protocol  Order vitamin B1, B6, B12, folate, alcohol level, UA, Utox TSH, Mg and Phos, trend lactate  rEEG  Seizure & Fall precautions  Ativan 2mg IVP for seizures > 2mins  Keep Mg>2 and K >4.   Management per primary team.     Please call us if any questions or neurological changes.     Case discussed with Neurology attending Dr. Stewart 82-year-old male, mRankin score 4 at baseline (walks with a walker, partial help with a HHA), with alcohol use, former smoker, with PMHx of hypertension, dyslipidemia, diabetes, CAD, PPM, glaucoma, epilepsy (not on AEDs now, was diagnosed a few months ago at [facility], no reports about it, does not follow with neurology), COPD (on home O2 as needed), chronic pancreatitis, atrial flutter (on Eliquis), CHF (last EF 35-40%), chronic back pain (assessed by NSx 6/2022, with multilevel cervical spondylosis and spondylolisthesis, worse at C3-4 with uncovered disc bulge contributing to mild spinal stenosis, and severe left/moderate-severe right foraminal stenosis, no intervention planned, follows with pain management), presents for evaluation of possible repetitive episodes of generalized shaking concerning of seizures when he was at pain management office. Episodes were intermittent lasting ~5 minutes at a time over 30 minutes, patient states that recalls the episode but is a poor historian. Patient also states he had a fall last week. Neurology saw this patient in 6/2022, back then seizure was unlikely but can not totally be ruled out. REEG showed mild to moderate slowing and check. A few months ago, described per his proxy, patient present with an 3 episodes of generalized shaking, one in home and 2 in the ambulance. Patient transferred [facility] for assessment, was diagnosed with epilepsy and placed on AED that does not remember. AST/ALT 62/21. Covid positive. CTH shows since October 22, 2023, unremarkable. CT cervical redemonstrates multilevel cervical spondylosis and spondylolisthesis.    Impression: Repetitive episodes of possible provoked breakthrough seizures in patient recently diagnosed with epilepsy not compliant with AED, COVID positive and poor intake with PMHx of DM, HTN, DLP, atrial fibrillation, COPD and chronic back patient 2/2 multilevel cervical spondylosis and spondylolisthesis, worse at C3-4 and severe left/moderate-severe right foraminal stenosis    Recommendations:  Get more collateral from [facility] about previous admission  No AEDs for now  Consult nutrition  Evaluate for CIWA protocol  Order vitamin B1, B6, B12, folate, alcohol level, UA, Utox TSH, Mg and Phos, trend lactate  rEEG  Seizure & Fall precautions  Ativan 2mg IVP for seizures > 2mins  Keep Mg>2 and K >4.   Management per primary team.     Please call us if any questions or neurological changes.     Case discussed with Neurology attending Dr. Stewart 82-year-old male, mRankin score 4 at baseline (walks with a walker, partial help with a HHA), with alcohol use, former smoker, with PMHx of hypertension, dyslipidemia, diabetes, CAD, PPM, glaucoma, epilepsy (not on AEDs now, was diagnosed a few months ago at [facility], no reports about it, does not follow with neurology), COPD (on home O2 as needed), chronic pancreatitis, atrial flutter (on Eliquis), CHF (last EF 35-40%), chronic back pain (assessed by NSx 6/2022, with multilevel cervical spondylosis and spondylolisthesis, worse at C3-4 with uncovered disc bulge contributing to mild spinal stenosis, and severe left/moderate-severe right foraminal stenosis, no intervention planned, follows with pain management), presents for evaluation of possible repetitive episodes of generalized shaking concerning of seizures when he was at pain management office. Episodes were intermittent lasting ~5 minutes at a time over 30 minutes, patient states that recalls the episode but is a poor historian. Patient also states he had a fall last week. Neurology saw this patient in 6/2022, back then seizure was unlikely but can not totally be ruled out. REEG showed mild to moderate slowing and check. A few months ago, described per his proxy, patient present with an 3 episodes of generalized shaking, one in home and 2 in the ambulance. Patient transferred [facility] for assessment, was diagnosed with epilepsy and placed on AED that does not remember. AST/ALT 62/21. Covid positive. CTH shows since October 22, 2023, unremarkable. CT cervical redemonstrates multilevel cervical spondylosis and spondylolisthesis.    Impression: Repetitive episodes of possible provoked breakthrough seizures in patient recently diagnosed with epilepsy not compliant with AED, COVID positive and poor intake with PMHx of DM, HTN, DLP, atrial fibrillation, COPD and chronic back patient 2/2 multilevel cervical spondylosis and spondylolisthesis, worse at C3-4 and severe left/moderate-severe right foraminal stenosis    Recommendations:  Get more collateral from [facility] about previous admission  No AEDs for now  Consult nutrition  Evaluate for CIWA protocol  Order vitamin B1, B6, B12, folate, alcohol level, UA, Utox TSH, Mg and Phos, trend lactate  rEEG  Seizure & Fall precautions  Ativan 2mg IVP for seizures > 2mins  Keep Mg>2 and K >4.   Management per primary team.     Please call us if any questions or neurological changes.     Case discussed with Neurology attending Dr. Stewart 82-year-old male, mRankin score 4 at baseline (walks with a walker, partial help with a HHA), with alcohol use, former smoker, with PMHx of hypertension, dyslipidemia, diabetes, CAD, PPM, glaucoma, epilepsy (not on AEDs now, was diagnosed a few months ago at [facility], no reports about it, does not follow with neurology), COPD (on home O2 as needed), chronic pancreatitis, atrial flutter (on Eliquis), CHF (last EF 35-40%), chronic back pain (assessed by NSx 6/2022, with multilevel cervical spondylosis and spondylolisthesis, worse at C3-4 with uncovered disc bulge contributing to mild spinal stenosis, and severe left/moderate-severe right foraminal stenosis, no intervention planned, follows with pain management), presents for evaluation of possible repetitive episodes of generalized shaking concerning of seizures when he was at pain management office. Episodes were intermittent lasting ~5 minutes at a time over 30 minutes, patient states that recalls the episode but is a poor historian. Patient also states he had a fall last week. Neurology saw this patient in 6/2022, back then seizure was unlikely but can not totally be ruled out. REEG showed mild to moderate slowing and check. A few months ago, described per his proxy, patient present with an 3 episodes of generalized shaking, one in home and 2 in the ambulance. Patient transferred [facility] for assessment, was diagnosed with epilepsy and placed on AED that does not remember. AST/ALT 62/21. Covid positive. CTH shows since October 22, 2023, unremarkable. CT cervical redemonstrates multilevel cervical spondylosis and spondylolisthesis.    Impression: Repetitive episodes of possible provoked breakthrough seizures in patient recently diagnosed with epilepsy not compliant with AED, COVID positive and poor intake with PMHx of DM, HTN, DLP, atrial fibrillation, COPD and chronic back patient 2/2 multilevel cervical spondylosis and spondylolisthesis, worse at C3-4 and severe left/moderate-severe right foraminal stenosis    Recommendations:  Get more collateral from [facility] about previous admission  Transfer to [facility] for epilepsy monitoring with vEEG under hospitalist service  No AEDs for now  Consult nutrition  Evaluate for CIWA protocol  Order vitamin B1, B6, B12, folate, alcohol level, UA, Utox TSH, Mg and Phos, trend lactate  Order PT/OT  Seizure & Fall precautions  Ativan 2mg IVP for seizures > 2mins  Keep Mg>2 and K >4.   Management per primary team.     Please call us if any questions or neurological changes.     Case discussed with Neurology attending Dr. Stewart 82-year-old male, mRankin score 4 at baseline (walks with a walker, partial help with a HHA), with alcohol use, former smoker, with PMHx of hypertension, dyslipidemia, diabetes, CAD, PPM, glaucoma, epilepsy (not on AEDs now, was diagnosed a few months ago at [facility], no reports about it, does not follow with neurology), COPD (on home O2 as needed), chronic pancreatitis, atrial flutter (on Eliquis), CHF (last EF 35-40%), chronic back pain (assessed by NSx 6/2022, with multilevel cervical spondylosis and spondylolisthesis, worse at C3-4 with uncovered disc bulge contributing to mild spinal stenosis, and severe left/moderate-severe right foraminal stenosis, no intervention planned, follows with pain management), presents for evaluation of possible repetitive episodes of generalized shaking concerning of seizures when he was at pain management office. Episodes were intermittent lasting ~5 minutes at a time over 30 minutes, patient states that recalls the episode but is a poor historian. Patient also states he had a fall last week. Neurology saw this patient in 6/2022, back then seizure was unlikely but can not totally be ruled out. REEG showed mild to moderate slowing and check. A few months ago, described per his proxy, patient present with an 3 episodes of generalized shaking, one in home and 2 in the ambulance. Patient transferred [facility] for assessment, was diagnosed with epilepsy and placed on AED that does not remember. AST/ALT 62/21. Covid positive. CTH shows since October 22, 2023, unremarkable. CT cervical redemonstrates multilevel cervical spondylosis and spondylolisthesis.    Impression: Repetitive episodes of possible provoked breakthrough seizures in patient recently diagnosed with epilepsy not compliant with AED, COVID positive and poor intake with PMHx of DM, HTN, DLP, atrial fibrillation, COPD and chronic back patient 2/2 multilevel cervical spondylosis and spondylolisthesis, worse at C3-4 and severe left/moderate-severe right foraminal stenosis    Recommendations:  Get more collateral from [facility] about previous admission  Transfer to [facility] for epilepsy monitoring with vEEG under hospitalist service  No AEDs for now  Consult nutrition  Evaluate for CIWA protocol  Order vitamin B1, B6, B12, folate, alcohol level, UA, Utox TSH, Mg and Phos, trend lactate  Order PT/OT  Seizure & Fall precautions  Ativan 2mg IVP for seizures > 2mins  Keep Mg>2 and K >4.   Management per primary team.     Please call us if any questions or neurological changes.     Case discussed with Neurology attending Dr. Stewart 82-year-old male, mRankin score 4 at baseline (walks with a walker, partial help with a HHA), with alcohol use, former smoker, with PMHx of hypertension, dyslipidemia, diabetes, CAD, PPM, glaucoma, epilepsy (not on AEDs now, was diagnosed a few months ago at [facility], no reports about it, does not follow with neurology), COPD (on home O2 as needed), chronic pancreatitis, atrial flutter (on Eliquis), CHF (last EF 35-40%), chronic back pain (assessed by NSx 6/2022, with multilevel cervical spondylosis and spondylolisthesis, worse at C3-4 with uncovered disc bulge contributing to mild spinal stenosis, and severe left/moderate-severe right foraminal stenosis, no intervention planned, follows with pain management), presents for evaluation of possible repetitive episodes of generalized shaking concerning of seizures when he was at pain management office. Episodes were intermittent lasting ~5 minutes at a time over 30 minutes, patient states that recalls the episode but is a poor historian. Patient also states he had a fall last week. Neurology saw this patient in 6/2022, back then seizure was unlikely but can not totally be ruled out. REEG showed mild to moderate slowing and check. A few months ago, described per his proxy, patient present with an 3 episodes of generalized shaking, one in home and 2 in the ambulance. Patient transferred [facility] for assessment, was diagnosed with epilepsy and placed on AED that does not remember. AST/ALT 62/21. Covid positive. CTH shows since October 22, 2023, unremarkable. CT cervical redemonstrates multilevel cervical spondylosis and spondylolisthesis.    Impression: Possible repetitive episodes of possible provoked breakthrough seizures in patient recently diagnosed with epilepsy not compliant with AED, COVID positive and poor intake with PMHx of DM, HTN, DLP, atrial fibrillation, COPD and chronic back patient 2/2 multilevel cervical spondylosis and spondylolisthesis, worse at C3-4 and severe left/moderate-severe right foraminal stenosis    Recommendations:  Get more collateral from [facility] about previous admission  Transfer to [facility] for epilepsy monitoring with vEEG under hospitalist service  No AEDs for now  Consult nutrition  Evaluate for CIWA protocol  Order vitamin B1, B6, B12, folate, alcohol level, UA, Utox TSH, Mg and Phos, trend lactate  Order PT/OT  Seizure & Fall precautions  Ativan 2mg IVP for seizures > 2mins  Keep Mg>2 and K >4.   Management per primary team.     Please call us if any questions or neurological changes.     Case discussed with Neurology attending Dr. Stewart 82-year-old male, mRankin score 4 at baseline (walks with a walker, partial help with a HHA), with alcohol use, former smoker, with PMHx of hypertension, dyslipidemia, diabetes, CAD, PPM, glaucoma, epilepsy (not on AEDs now, was diagnosed a few months ago at [facility], no reports about it, does not follow with neurology), COPD (on home O2 as needed), chronic pancreatitis, atrial flutter (on Eliquis), CHF (last EF 35-40%), chronic back pain (assessed by NSx 6/2022, with multilevel cervical spondylosis and spondylolisthesis, worse at C3-4 with uncovered disc bulge contributing to mild spinal stenosis, and severe left/moderate-severe right foraminal stenosis, no intervention planned, follows with pain management), presents for evaluation of possible repetitive episodes of generalized shaking concerning of seizures when he was at pain management office. Episodes were intermittent lasting ~5 minutes at a time over 30 minutes, patient states that recalls the episode but is a poor historian. Patient also states he had a fall last week. Neurology saw this patient in 6/2022, back then seizure was unlikely but can not totally be ruled out. REEG showed mild to moderate slowing and check. A few months ago, described per his proxy, patient present with an 3 episodes of generalized shaking, one in home and 2 in the ambulance. Patient transferred [facility] for assessment, was diagnosed with epilepsy and placed on AED that does not remember. AST/ALT 62/21. Covid positive. CTH shows since October 22, 2023, unremarkable. CT cervical redemonstrates multilevel cervical spondylosis and spondylolisthesis.    Impression: Possible repetitive episodes of possible provoked breakthrough seizures in patient recently diagnosed with epilepsy not compliant with AED, COVID positive and poor intake with PMHx of DM, HTN, DLP, atrial fibrillation, COPD and chronic back patient 2/2 multilevel cervical spondylosis and spondylolisthesis, worse at C3-4 and severe left/moderate-severe right foraminal stenosis    Recommendations:  Get more collateral from [facility] about previous admission  Transfer to [facility] for epilepsy monitoring with vEEG under hospitalist service  No AEDs for now  Consult nutrition  Evaluate for CIWA protocol  Order vitamin B1, B6, B12, folate, alcohol level, UA, Utox TSH, Mg and Phos, trend lactate  Order PT/OT  Seizure & Fall precautions  Ativan 2mg IVP for seizures > 2mins  Keep Mg>2 and K >4.   Management per primary team.     Please call us if any questions or neurological changes.     Case discussed with Neurology attending Dr. Stewart

## 2024-01-02 NOTE — HISTORY OF PRESENT ILLNESS
[FreeTextEntry1] : HISTORY OF PRESENT ILLNESS: ORIGINAL PRESENTATION: Mr. Rea is an 83-year-old male who has a history of chronic pancreatitis. He also suffers with chronic lumbar and radicular pain secondary to disc displacement and stenosis in addition to a history of chronic cervical pain and radiculopathy, disc bulging and facet arthropathy. Patient has a significant alcoholism history, he denies current use and states that he has abstained from alcohol for "several years". He is to be monitored closely.  TODAY: Last seen on 10/17/2023 and since then there has been no new complaints or acute changes to his condition. We are seeing him for his chronic lower back and neck pain, as well as chronic Pancreatitis pain. He is still using walker/rollator for ambulation. We will start weaning him off narcotics today by decreasing his Oxymorphone ER 40mg BID to 30mg BID, and continue with Percocet 10/325mg TID unchanged. UDS: will repeat today.

## 2024-01-03 VITALS
OXYGEN SATURATION: 99 % | DIASTOLIC BLOOD PRESSURE: 67 MMHG | TEMPERATURE: 97 F | RESPIRATION RATE: 18 BRPM | HEART RATE: 69 BPM | SYSTOLIC BLOOD PRESSURE: 137 MMHG

## 2024-01-03 DIAGNOSIS — R55 SYNCOPE AND COLLAPSE: ICD-10-CM

## 2024-01-03 LAB
CULTURE RESULTS: SIGNIFICANT CHANGE UP
CULTURE RESULTS: SIGNIFICANT CHANGE UP
SPECIMEN SOURCE: SIGNIFICANT CHANGE UP
SPECIMEN SOURCE: SIGNIFICANT CHANGE UP

## 2024-01-03 RX ORDER — APIXABAN 2.5 MG/1
1 TABLET, FILM COATED ORAL
Qty: 0 | Refills: 0 | DISCHARGE

## 2024-01-03 RX ORDER — OXYCODONE HYDROCHLORIDE 5 MG/1
40 TABLET ORAL EVERY 12 HOURS
Refills: 0 | Status: DISCONTINUED | OUTPATIENT
Start: 2024-01-03 | End: 2024-01-03

## 2024-01-03 RX ORDER — IVABRADINE 7.5 MG/1
1 TABLET, FILM COATED ORAL
Refills: 0 | DISCHARGE

## 2024-01-03 RX ORDER — OXYCODONE AND ACETAMINOPHEN 5; 325 MG/1; MG/1
2 TABLET ORAL EVERY 4 HOURS
Refills: 0 | Status: DISCONTINUED | OUTPATIENT
Start: 2024-01-03 | End: 2024-01-03

## 2024-01-03 RX ORDER — PANTOPRAZOLE SODIUM 20 MG/1
40 TABLET, DELAYED RELEASE ORAL
Refills: 0 | Status: DISCONTINUED | OUTPATIENT
Start: 2024-01-03 | End: 2024-01-03

## 2024-01-03 RX ORDER — LIPASE/PROTEASE/AMYLASE 16-48-48K
1 CAPSULE,DELAYED RELEASE (ENTERIC COATED) ORAL
Refills: 0 | Status: DISCONTINUED | OUTPATIENT
Start: 2024-01-03 | End: 2024-01-03

## 2024-01-03 RX ORDER — OMEPRAZOLE 10 MG/1
1 CAPSULE, DELAYED RELEASE ORAL
Qty: 0 | Refills: 0 | DISCHARGE

## 2024-01-03 RX ORDER — ERGOCALCIFEROL 1.25 MG/1
0 CAPSULE ORAL
Refills: 0 | DISCHARGE

## 2024-01-03 RX ORDER — INSULIN LISPRO 100/ML
VIAL (ML) SUBCUTANEOUS
Refills: 0 | Status: DISCONTINUED | OUTPATIENT
Start: 2024-01-03 | End: 2024-01-03

## 2024-01-03 RX ORDER — POTASSIUM CHLORIDE 20 MEQ
20 PACKET (EA) ORAL DAILY
Refills: 0 | Status: DISCONTINUED | OUTPATIENT
Start: 2024-01-03 | End: 2024-01-03

## 2024-01-03 RX ORDER — APIXABAN 2.5 MG/1
5 TABLET, FILM COATED ORAL
Refills: 0 | Status: DISCONTINUED | OUTPATIENT
Start: 2024-01-03 | End: 2024-01-03

## 2024-01-03 RX ORDER — KETOTIFEN FUMARATE 0.34 MG/ML
1 SOLUTION OPHTHALMIC DAILY
Refills: 0 | Status: DISCONTINUED | OUTPATIENT
Start: 2024-01-03 | End: 2024-01-03

## 2024-01-03 RX ORDER — IVABRADINE 7.5 MG/1
0.5 TABLET, FILM COATED ORAL
Refills: 0 | DISCHARGE

## 2024-01-03 RX ORDER — INSULIN GLARGINE 100 [IU]/ML
10 INJECTION, SOLUTION SUBCUTANEOUS AT BEDTIME
Refills: 0 | Status: DISCONTINUED | OUTPATIENT
Start: 2024-01-03 | End: 2024-01-03

## 2024-01-03 RX ORDER — FUROSEMIDE 40 MG
20 TABLET ORAL DAILY
Refills: 0 | Status: DISCONTINUED | OUTPATIENT
Start: 2024-01-03 | End: 2024-01-03

## 2024-01-03 RX ORDER — AZELASTINE HCL 0.05 %
1 DROPS OPHTHALMIC (EYE)
Refills: 0 | DISCHARGE

## 2024-01-03 RX ORDER — AMLODIPINE BESYLATE 2.5 MG/1
1 TABLET ORAL
Refills: 0 | DISCHARGE

## 2024-01-03 RX ORDER — ACETAMINOPHEN 500 MG
650 TABLET ORAL EVERY 6 HOURS
Refills: 0 | Status: DISCONTINUED | OUTPATIENT
Start: 2024-01-03 | End: 2024-01-03

## 2024-01-03 RX ORDER — INSULIN LISPRO 100/ML
3 VIAL (ML) SUBCUTANEOUS
Refills: 0 | Status: DISCONTINUED | OUTPATIENT
Start: 2024-01-03 | End: 2024-01-03

## 2024-01-03 RX ORDER — LIPASE/PROTEASE/AMYLASE 16-48-48K
3 CAPSULE,DELAYED RELEASE (ENTERIC COATED) ORAL
Refills: 0 | Status: DISCONTINUED | OUTPATIENT
Start: 2024-01-03 | End: 2024-01-03

## 2024-01-03 RX ORDER — LOSARTAN POTASSIUM 100 MG/1
1 TABLET, FILM COATED ORAL
Refills: 0 | DISCHARGE

## 2024-01-03 RX ORDER — SIMVASTATIN 20 MG/1
20 TABLET, FILM COATED ORAL AT BEDTIME
Refills: 0 | Status: DISCONTINUED | OUTPATIENT
Start: 2024-01-03 | End: 2024-01-03

## 2024-01-03 RX ORDER — MONTELUKAST 4 MG/1
1 TABLET, CHEWABLE ORAL
Qty: 0 | Refills: 0 | DISCHARGE

## 2024-01-03 RX ORDER — FUROSEMIDE 40 MG
1 TABLET ORAL
Qty: 0 | Refills: 0 | DISCHARGE

## 2024-01-03 RX ORDER — METOPROLOL TARTRATE 50 MG
1 TABLET ORAL
Refills: 0 | DISCHARGE

## 2024-01-03 RX ORDER — PANTOPRAZOLE SODIUM 20 MG/1
1 TABLET, DELAYED RELEASE ORAL
Refills: 0 | DISCHARGE

## 2024-01-03 RX ORDER — METOPROLOL TARTRATE 50 MG
100 TABLET ORAL DAILY
Refills: 0 | Status: DISCONTINUED | OUTPATIENT
Start: 2024-01-03 | End: 2024-01-03

## 2024-01-03 RX ORDER — DULAGLUTIDE 4.5 MG/.5ML
0.75 INJECTION, SOLUTION SUBCUTANEOUS
Refills: 0 | DISCHARGE

## 2024-01-03 RX ORDER — INSULIN GLARGINE 100 [IU]/ML
0 INJECTION, SOLUTION SUBCUTANEOUS
Refills: 0 | DISCHARGE

## 2024-01-03 RX ORDER — BUDESONIDE AND FORMOTEROL FUMARATE DIHYDRATE 160; 4.5 UG/1; UG/1
2 AEROSOL RESPIRATORY (INHALATION)
Refills: 0 | Status: DISCONTINUED | OUTPATIENT
Start: 2024-01-03 | End: 2024-01-03

## 2024-01-03 RX ORDER — LATANOPROST 0.05 MG/ML
1 SOLUTION/ DROPS OPHTHALMIC; TOPICAL AT BEDTIME
Refills: 0 | Status: DISCONTINUED | OUTPATIENT
Start: 2024-01-03 | End: 2024-01-03

## 2024-01-03 RX ORDER — AMLODIPINE BESYLATE 2.5 MG/1
10 TABLET ORAL DAILY
Refills: 0 | Status: DISCONTINUED | OUTPATIENT
Start: 2024-01-03 | End: 2024-01-03

## 2024-01-03 RX ORDER — LOSARTAN POTASSIUM 100 MG/1
100 TABLET, FILM COATED ORAL DAILY
Refills: 0 | Status: DISCONTINUED | OUTPATIENT
Start: 2024-01-03 | End: 2024-01-03

## 2024-01-03 RX ORDER — REMDESIVIR 5 MG/ML
200 INJECTION INTRAVENOUS ONCE
Refills: 0 | Status: DISCONTINUED | OUTPATIENT
Start: 2024-01-03 | End: 2024-01-03

## 2024-01-03 RX ORDER — LIPASE/PROTEASE/AMYLASE 16-48-48K
1 CAPSULE,DELAYED RELEASE (ENTERIC COATED) ORAL
Refills: 0 | DISCHARGE

## 2024-01-03 RX ORDER — MONTELUKAST 4 MG/1
10 TABLET, CHEWABLE ORAL DAILY
Refills: 0 | Status: DISCONTINUED | OUTPATIENT
Start: 2024-01-03 | End: 2024-01-03

## 2024-01-03 NOTE — DISCHARGE NOTE PROVIDER - HOSPITAL COURSE
83-year-old male who denies alcohol use (only 12/30, on his birthday), former smoker, with PMHx of hypertension, dyslipidemia, diabetes, CAD, PPM, glaucoma, epilepsy (not on AEDs now, was diagnosed a few months ago at [facility], no reports about it, does not follow with neurology), COPD (on home O2 as needed), chronic pancreatitis, atrial flutter (on Eliquis), CHF (last EF 35-40%), chronic back pain (assessed by NSx 6/2022, with multilevel cervical spondylosis and spondylolisthesis and severe left/moderate-severe right foraminal stenosis, no intervention planned, follows with pain management).    Presents for evaluation of possible seizure when he was at pain management office. After urinating, pt started feeling generalized weakness, and family placed him on a bed. Family states pt had several episodes of seizure like activity describing it as tonic clonic shaking, no tongue bite or incontinence. Episodes were intermittent lasting ~5 minutes at a time over 30 minutes, patient states that recalls the episode but is a poor historian. After that, allegedly as per the witnesses, pt appeared confused afterwards but began to become verbal again after arriving to ED. Patient also states he had a fall last week, hit his head but did not seek medical care. Patient describes generalized pain. Neurology saw this patient in 6/2022, back then seizure was unlikely but can not totally be ruled out. REEG showed mild to moderate slowing and check. Cardiology and medical work up was done for syncopal event. A few months ago, described per his proxy, patient present with an 3 episodes of generalized shaking, one in home and 2 in the ambulance. Patient transferred [facility] for assessment, was diagnosed with epilepsy and placed on AED that does not remember. Patient has home aid that helps him partially with medication, he gets meds from her in the morning and he has his own in the night.     Vital Signs T(F): 96.8 HR: 69 BP: 137/67 RR: 18 SpO2: 99%  nasal cannula 4L    CT Angio Abdomen and Pelvis w/ IV Cont IMPRESSION: No evidence of thoracic or abdominal aortic aneurysm or dissection.    # Possible repetitive episodes of possible provoked breakthrough seizures in patient recently diagnosed with epilepsy not compliant with AED  - Evaluated by neurology: follow up their recommendations   - Get more collateral from [facility] about previous admission  - Transfer to [facility] for epilepsy monitoring with vEEG under hospitalist service  - No AEDs for now  - Consult nutrition  - Follow up vitamin B1, B6, B12, folate, alcohol level, UA, Utox TSH, Mg and Phos, trend lactate  - PT/OT  - Seizure & Fall precautions  - Ativan 2mg IVP for seizures > 2mins  - Keep Mg>2 and K >4.       # hypertension  # dyslipidemia  # CAD  # PPM  # atrial flutter (on Eliquis)  # CHF (last EF 35-40%) - not in exacerbation   - On home amlodipine 10mg, Ivabradine 2.5X2, Eliquis 5X2, Lasix 20mg, losartan 100mg, metoprolol , simvastatin 20mg  - Continue home medications       # Diabetes  - On home Trulicity   - Start Lantus and Lispro  - follow up FS      # COPD (on home O2 as needed)  # COVID positive   - Asymptomatic COVID, no worsening cough, SOB or chest pain  - On home Breo Ellipta, montelukast 10mg  - CT Angio Chest Aorta w/wo IV Cont Reticular opacities are noted in the right upper lobe and at both lung bases, compatible with fibrotic and/or atelectatic changes. Mild bronchiectatic changes are noted in bothlower lobes.   - Continue Symbicort and montelukast   - Give 1 dose of RDV   - ID consult for possible continuation of RDV        # Glaucoma  - On home azelastine, latanoprost  - Continue home medications       # Chronic back pain  # chronic pancreatitis  - assessed by NSx 6/2022, with multilevel cervical spondylosis and spondylolisthesis and severe left/moderate-severe right foraminal stenosis, no intervention planned, follows with pain management  - On home oxymorphone 40mg X2, Percocet 10 q4h PRN, Creon, Lyrica 75 mg   - Continue home medications       The patient wasn't given any treatment, he decided to leave [place] despite explaining that he might die if he left the hospital, he totally understands it and willing to go. 83-year-old male who denies alcohol use (only 12/30, on his birthday), former smoker, with PMHx of hypertension, dyslipidemia, diabetes, CAD, PPM, glaucoma, epilepsy (not on AEDs now, was diagnosed a few months ago at [facility], no reports about it, does not follow with neurology), COPD (on home O2 as needed), chronic pancreatitis, atrial flutter (on Eliquis), CHF (last EF 35-40%), chronic back pain (assessed by NSx 6/2022, with multilevel cervical spondylosis and spondylolisthesis and severe left/moderate-severe right foraminal stenosis, no intervention planned, follows with pain management).    Presents for evaluation of possible seizure when he was at pain management office. After urinating, pt started feeling generalized weakness, and family placed him on a bed. Family states pt had several episodes of seizure like activity describing it as tonic clonic shaking, no tongue bite or incontinence. Episodes were intermittent lasting ~5 minutes at a time over 30 minutes, patient states that recalls the episode but is a poor historian. After that, allegedly as per the witnesses, pt appeared confused afterwards but began to become verbal again after arriving to ED. Patient also states he had a fall last week, hit his head but did not seek medical care. Patient describes generalized pain. Neurology saw this patient in 6/2022, back then seizure was unlikely but can not totally be ruled out. REEG showed mild to moderate slowing and check. Cardiology and medical work up was done for syncopal event. A few months ago, described per his proxy, patient present with an 3 episodes of generalized shaking, one in home and 2 in the ambulance. Patient transferred [facility] for assessment, was diagnosed with epilepsy and placed on AED that does not remember. Patient has home aid that helps him partially with medication, he gets meds from her in the morning and he has his own in the night.     Vital Signs T(F): 96.8 HR: 69 BP: 137/67 RR: 18 SpO2: 99%  nasal cannula 4L    CT Angio Abdomen and Pelvis w/ IV Cont IMPRESSION: No evidence of thoracic or abdominal aortic aneurysm or dissection.    # Possible repetitive episodes of possible provoked breakthrough seizures in patient recently diagnosed with epilepsy not compliant with AED  - Evaluated by neurology: follow up their recommendations   - Get more collateral from [facility] about previous admission  - Transfer to [facility] for epilepsy monitoring with vEEG under hospitalist service  - No AEDs for now  - Consult nutrition  - Follow up vitamin B1, B6, B12, folate, alcohol level, UA, Utox TSH, Mg and Phos, trend lactate  - PT/OT  - Seizure & Fall precautions  - Ativan 2mg IVP for seizures > 2mins  - Keep Mg>2 and K >4.       # hypertension  # dyslipidemia  # CAD  # PPM  # atrial flutter (on Eliquis)  # CHF (last EF 35-40%) - not in exacerbation   - On home amlodipine 10mg, Ivabradine 2.5X2, Eliquis 5X2, Lasix 20mg, losartan 100mg, metoprolol , simvastatin 20mg  - Continue home medications       # Diabetes  - On home Trulicity   - Start Lantus and Lispro  - follow up FS      # COPD (on home O2 as needed)  # COVID positive   - Asymptomatic COVID, no worsening cough, SOB or chest pain  - On home Breo Ellipta, montelukast 10mg  - CT Angio Chest Aorta w/wo IV Cont Reticular opacities are noted in the right upper lobe and at both lung bases, compatible with fibrotic and/or atelectatic changes. Mild bronchiectatic changes are noted in bothlower lobes.   - Continue Symbicort and montelukast   - Give 1 dose of RDV   - ID consult for possible continuation of RDV        # Glaucoma  - On home azelastine, latanoprost  - Continue home medications       # Chronic back pain  # chronic pancreatitis  - assessed by NSx 6/2022, with multilevel cervical spondylosis and spondylolisthesis and severe left/moderate-severe right foraminal stenosis, no intervention planned, follows with pain management  - On home oxymorphone 40mg X2, Percocet 10 q4h PRN, Creon, Lyrica 75 mg   - Continue home medications       The patient wasn't given any treatment, he decided to leave [place] despite explaining that he might die if he left the hospital, he totally understands it and willing to go.

## 2024-01-03 NOTE — DISCHARGE NOTE PROVIDER - NSDCFUSCHEDAPPT_GEN_ALL_CORE_FT
Bill Horne  [facility]  ON[address]PAINMGT [address]  Scheduled Appointment: 01/29/2024    Cliff Cool  [facility]  PULMMED [address]  Scheduled Appointment: 02/14/2024    Clyde Evans  [facility]  VASCULAR [address]  Scheduled Appointment: 02/21/2024     Bill Horne  [facility]  ONCPAINMGT [address]  Scheduled Appointment: 01/29/2024    Cliff Cool  [facility]  PULMMED [address]  Scheduled Appointment: 02/14/2024    Clyde Evans  [facility]  VASCULAR [address]  Scheduled Appointment: 02/21/2024

## 2024-01-03 NOTE — DISCHARGE NOTE PROVIDER - NSDCMRMEDTOKEN_GEN_ALL_CORE_FT
amLODIPine 10 mg oral tablet: 1 tab(s) orally once a day  azelastine 0.05% ophthalmic solution: 1 in each affected eye  Breo Ellipta 100 mcg-25 mcg/inh inhalation powder: 1 puff(s) inhaled once a day  Creon 36,000 units oral delayed release capsule: 1 cap(s) orally 3 times a day  dicyclomine 10 mg oral capsule: 2 cap(s) orally every 8 hours  Eliquis 5 mg tablet: 1 tab(s) orally every 12 hours  furosemide 20 mg oral tablet: 1 tab(s) orally once a day  ivabradine 5 mg oral tablet: 0.5 tab(s) orally 2 times a day  latanoprost 0.005% ophthalmic solution: 1 drop(s) to each affected eye once a day (in the evening)  losartan 100 mg oral tablet: 1 tab(s) orally once a day  metoprolol succinate 100 mg oral tablet, extended release: 1 tab(s) orally once a day  montelukast 10 mg oral tablet: 1 tab(s) orally once a day  oxyMORphone 40 mg oral tablet, extended release: 1 tab(s) orally every 12 hours  pantoprazole 40 mg oral delayed release tablet: 1 tab(s) orally once a day  Percocet 10 mg-325 mg oral tablet: 1 tab(s) orally every 4 hours as needed for  severe pain  Potassium Chloride (Eqv-Klor-Con M20) 20 mEq oral tablet, extended release: 1 tab(s) orally once a day  pregabalin 75 mg capsule: 1 cap(s) orally once a day  simvastatin 20 mg oral tablet: 1 tab(s) orally once a day (at bedtime)  Trulicity Pen 0.75 mg/0.5 mL subcutaneous solution: 0.75 subcutaneously

## 2024-01-03 NOTE — H&P ADULT - NSHPPHYSICALEXAM_GEN_ALL_CORE
PHYSICAL EXAM:  GENERAL: NAD, on nasal cannula, looks in pain   EYES: conjunctiva and sclera clear  ENMT: No tonsillar erythema, exudates, or enlargement; Moist mucous membranes  NECK: Supple, No JVD, Normal thyroid  HEART: Regular rate and rhythm; No murmurs, rubs, or gallops, Normal S1 S2   RESPIRATORY: decreased air entry bilaterally with scattered wheezing   ABDOMEN: Soft, Nontender, distended; Bowel sounds present, midline extensive scar   NEUROLOGY: A&Ox3, grossly intact power and sensation   EXTREMITIES:  bilateral trace lower limb edema   SKIN: warm, dry, normal color, no rash or abnormal lesions

## 2024-01-03 NOTE — ED ADULT NURSE REASSESSMENT NOTE - NS ED NURSE REASSESS COMMENT FT1
Pt attempting to leave/[place]. Pt aox3. MD Kennedy called with no response. RN attempted calling daughter Tracy 3X from hospital phone and no answer. Security and manager Johana Called bc pt refused to cooperate. Pt was able to contact aid named jass and requested pickup. Aid jass claims to be coming for pt for 6am. Pt educated of risks and benefits of staying/leaving  hospital. MD Kennedy contacted again via teams, awaiting response to help pt sign out AMA. Bed alarm on.

## 2024-01-03 NOTE — H&P ADULT - HISTORY OF PRESENT ILLNESS
82-year-old male who denies alcohol use (only 12/30, on his birthday), former smoker, with PMHx of hypertension, dyslipidemia, diabetes, CAD, PPM, glaucoma, epilepsy (not on AEDs now, was diagnosed a few months ago at [facility], no reports about it, does not follow with neurology), COPD (on home O2 as needed), chronic pancreatitis, atrial flutter (on Eliquis), CHF (last EF 35-40%), chronic back pain (assessed by NSx 6/2022, with multilevel cervical spondylosis and spondylolisthesis and severe left/moderate-severe right foraminal stenosis, no intervention planned, follows with pain management), presents for evaluation of possible seizure when he was at pain management office. After urinating, pt started feeling generalized weakness, and family placed him on a bed. Family states pt had several episodes of seizure like activity describing it as tonic clonic shaking, no tongue bite or incontinence. Episodes were intermittent lasting ~5 minutes at a time over 30 minutes, patient states that recalls the episode but is a poor historian. After that, allegedly as per the witnesses, pt appeared confused afterwards but began to become verbal again after arriving to ED. Patient also states he had a fall last week, hit his head but did not seek medical care. Patient describes generalized pain. Neurology saw this patient in 6/2022, back then seizure was unlikely but can not totally be ruled out. REEG showed mild to moderate slowing and check. Cardiology and medical work up was done for syncopal event. A few months ago, described per his proxy, patient present with an 3 episodes of generalized shaking, one in home and 2 in the ambulance. Patient transferred [facility] for assessment, was diagnosed with epilepsy and placed on AED that does not remember. Patient has home aid that helps him partially with medication, he gets meds from her in the morning and he has his own in the night.     Vital Signs T(F): 96.8 HR: 69 BP: 137/67 RR: 18 SpO2: 99%  nasal cannula 4L    CT Angio Abdomen and Pelvis w/ IV Cont IMPRESSION: No evidence of thoracic or abdominal aortic aneurysm or dissection.             82-year-old male who denies alcohol use (only 12/30, on his birthday), former smoker, with PMHx of hypertension, dyslipidemia, diabetes, CAD, PPM, glaucoma, epilepsy (not on AEDs now, was diagnosed a few months ago at [facility], no reports about it, does not follow with neurology), COPD (on home O2 as needed), chronic pancreatitis, atrial flutter (on Eliquis), CHF (last EF 35-40%), chronic back pain (assessed by NSx 6/2022, with multilevel cervical spondylosis and spondylolisthesis and severe left/moderate-severe right foraminal stenosis, no intervention planned, follows with pain management), presents for evaluation of possible seizure when he was at pain management office. After urinating, pt started feeling generalized weakness, and family placed him on a bed. Family states pt had several episodes of seizure like activity describing it as tonic clonic shaking, no tongue bite or incontinence. Episodes were intermittent lasting ~5 minutes at a time over 30 minutes, patient states that recalls the episode but is a poor historian. After that, allegedly as per the witnesses, pt appeared confused afterwards but began to become verbal again after arriving to ED. Patient also states he had a fall last week, hit his head but did not seek medical care. Patient describes generalized pain. Neurology saw this patient in 6/2022, back then seizure was unlikely but can not totally be ruled out. REEG showed mild to moderate slowing and check. Cardiology and medical work up was done for syncopal event. A few months ago, described per his proxy, patient present with an 3 episodes of generalized shaking, one in home and 2 in the ambulance. Patient transferred [facility] for assessment, was diagnosed with epilepsy and placed on AED that does not remember. Patient has home aid that helps him partially with medication, he gets meds from her in the morning and he has his own in the night.     Vital Signs T(F): 96.8 HR: 69 BP: 137/67 RR: 18 SpO2: 99%  nasal cannula 4L    CT Angio Abdomen and Pelvis w/ IV Cont IMPRESSION: No evidence of thoracic or abdominal aortic aneurysm or dissection.             83-year-old male who denies alcohol use (only 12/30, on his birthday), former smoker, with PMHx of hypertension, dyslipidemia, diabetes, CAD, PPM, glaucoma, epilepsy (not on AEDs now, was diagnosed a few months ago at [facility], no reports about it, does not follow with neurology), COPD (on home O2 as needed), chronic pancreatitis, atrial flutter (on Eliquis), CHF (last EF 35-40%), chronic back pain (assessed by NSx 6/2022, with multilevel cervical spondylosis and spondylolisthesis and severe left/moderate-severe right foraminal stenosis, no intervention planned, follows with pain management).    Presents for evaluation of possible seizure when he was at pain management office. After urinating, pt started feeling generalized weakness, and family placed him on a bed. Family states pt had several episodes of seizure like activity describing it as tonic clonic shaking, no tongue bite or incontinence. Episodes were intermittent lasting ~5 minutes at a time over 30 minutes, patient states that recalls the episode but is a poor historian. After that, allegedly as per the witnesses, pt appeared confused afterwards but began to become verbal again after arriving to ED. Patient also states he had a fall last week, hit his head but did not seek medical care. Patient describes generalized pain. Neurology saw this patient in 6/2022, back then seizure was unlikely but can not totally be ruled out. REEG showed mild to moderate slowing and check. Cardiology and medical work up was done for syncopal event. A few months ago, described per his proxy, patient present with an 3 episodes of generalized shaking, one in home and 2 in the ambulance. Patient transferred [facility] for assessment, was diagnosed with epilepsy and placed on AED that does not remember. Patient has home aid that helps him partially with medication, he gets meds from her in the morning and he has his own in the night.     Vital Signs T(F): 96.8 HR: 69 BP: 137/67 RR: 18 SpO2: 99%  nasal cannula 4L    CT Angio Abdomen and Pelvis w/ IV Cont IMPRESSION: No evidence of thoracic or abdominal aortic aneurysm or dissection.             83-year-old male who denies alcohol use (only 12/30, on his birthday), former smoker, with PMHx of hypertension, dyslipidemia, diabetes, CAD, PPM, glaucoma, epilepsy (not on AEDs now, was diagnosed a few months ago at [facility] [place], no reports about it, does not follow with neurology), COPD (on home O2 as needed), chronic pancreatitis, atrial flutter (on Eliquis), CHF (last EF 35-40%), chronic back pain (assessed by NSx 6/2022, with multilevel cervical spondylosis and spondylolisthesis and severe left/moderate-severe right foraminal stenosis, no intervention planned, follows with pain management).    Presents for evaluation of possible seizure when he was at pain management office. After urinating, pt started feeling generalized weakness, and family placed him on a bed. Family states pt had several episodes of seizure like activity describing it as tonic clonic shaking, no tongue bite or incontinence. Episodes were intermittent lasting ~5 minutes at a time over 30 minutes, patient states that recalls the episode but is a poor historian. After that, allegedly as per the witnesses, pt appeared confused afterwards but began to become verbal again after arriving to ED. Patient also states he had a fall last week, hit his head but did not seek medical care. Patient describes generalized pain. Neurology saw this patient in 6/2022, back then seizure was unlikely but can not totally be ruled out. REEG showed mild to moderate slowing and check. Cardiology and medical work up was done for syncopal event. A few months ago, described per his proxy, patient present with an 3 episodes of generalized shaking, one in home and 2 in the ambulance. Patient transferred [facility] [place] for assessment, was diagnosed with epilepsy and placed on AED that does not remember. Patient has home aid that helps him partially with medication, he gets meds from her in the morning and he has his own in the night.     Vital Signs T(F): 96.8 HR: 69 BP: 137/67 RR: 18 SpO2: 99%  nasal cannula 4L    CT Angio Abdomen and Pelvis w/ IV Cont IMPRESSION: No evidence of thoracic or abdominal aortic aneurysm or dissection.

## 2024-01-03 NOTE — ED ADULT NURSE REASSESSMENT NOTE - NS ED NURSE REASSESS COMMENT FT1
Pt signed AMA forms with MD Rodney. Pt left with home aid named jass. Aid came to bedside and pt and aid walked out together. Charge RN made aware.

## 2024-01-03 NOTE — DISCHARGE NOTE PROVIDER - NSDCCPCAREPLAN_GEN_ALL_CORE_FT
PRINCIPAL DISCHARGE DIAGNOSIS  Diagnosis: Syncope  Assessment and Plan of Treatment: Syncope  Syncope is when you temporarily lose consciousness, also called fainting or passing out. It is caused by a sudden decrease in blood flow to the brain. Even though most causes of syncope are not dangerous, syncope can possibly be a sign of a serious medical problem. Signs that you may be about to faint include feeling dizzy, lightheaded, nausea, visual changes, or cold/clammy skin. Do not drive, operate heavy machinery, or play sports until your health care provider says it is okay.  SEEK IMMEDIATE MEDICAL CARE IF YOU HAVE ANY OF THE FOLLOWING SYMPTOMS: severe headache, pain in your chest/abdomen/back, bleeding from your mouth or rectum, palpitations, shortness of breath, pain with breathing, seizure, confusion, or trouble walking.      SECONDARY DISCHARGE DIAGNOSES  Diagnosis: 2019 novel coronavirus disease (COVID-19)  Assessment and Plan of Treatment:

## 2024-01-03 NOTE — H&P ADULT - NSHPLABSRESULTS_GEN_ALL_CORE
12.9   6.62  )-----------( 232      ( 02 Jan 2024 17:15 )             39.3       01-02    134<L>  |  103  |  12  ----------------------------<  109<H>  5.8<H>   |  21  |  1.1    Ca    8.7      02 Jan 2024 22:41  Phos  4.0     01-02  Mg     2.3     01-02    TPro  7.7  /  Alb  4.0  /  TBili  0.5  /  DBili  x   /  AST  46<H>  /  ALT  19  /  AlkPhos  75  01-02              Urinalysis Basic - ( 02 Jan 2024 22:41 )    Color: x / Appearance: x / SG: x / pH: x  Gluc: 109 mg/dL / Ketone: x  / Bili: x / Urobili: x   Blood: x / Protein: x / Nitrite: x   Leuk Esterase: x / RBC: x / WBC x   Sq Epi: x / Non Sq Epi: x / Bacteria: x            Lactate Trend            CAPILLARY BLOOD GLUCOSE

## 2024-01-03 NOTE — H&P ADULT - ASSESSMENT
83-year-old male who denies alcohol use (only 12/30, on his birthday), former smoker, with PMHx of hypertension, dyslipidemia, diabetes, CAD, PPM, glaucoma, epilepsy (not on AEDs now, was diagnosed a few months ago at [facility], no reports about it, does not follow with neurology), COPD (on home O2 as needed), chronic pancreatitis, atrial flutter (on Eliquis), CHF (last EF 35-40%), chronic back pain (assessed by NSx 6/2022, with multilevel cervical spondylosis and spondylolisthesis and severe left/moderate-severe right foraminal stenosis, no intervention planned, follows with pain management).    Presents for evaluation of possible seizure when he was at pain management office.       # Possible repetitive episodes of possible provoked breakthrough seizures in patient recently diagnosed with epilepsy not compliant with AED  - Evaluated by neurology: follow up their recommendations   - Get more collateral from [facility] about previous admission  - Transfer to [facility] for epilepsy monitoring with vEEG under hospitalist service  - No AEDs for now  - Consult nutrition  - Follow up vitamin B1, B6, B12, folate, alcohol level, UA, Utox TSH, Mg and Phos, trend lactate  - PT/OT  - Seizure & Fall precautions  - Ativan 2mg IVP for seizures > 2mins  - Keep Mg>2 and K >4.       # hypertension  # dyslipidemia  # CAD  # PPM  # atrial flutter (on Eliquis)  # CHF (last EF 35-40%) - not in exacerbation   - On home amlodipine 10mg, Ivabradine 2.5X2, Eliquis 5X2, Lasix 20mg, losartan 100mg, metoprolol , simvastatin 20mg  - Continue home medications       # Diabetes  - On home Trulicity   - Start Lantus and Lispro  - follow up FS      # COPD (on home O2 as needed)  # COVID positive   - Asymptomatic COVID, no worsening cough, SOB or chest pain  - On home Breo Ellipta, montelukast 10mg  - CT Angio Chest Aorta w/wo IV Cont Reticular opacities are noted in the right upper lobe and at both lung bases, compatible with fibrotic and/or atelectatic changes. Mild bronchiectatic changes are noted in bothlower lobes.   - Continue Symbicort and montelukast   - Give 1 dose of RDV   - ID consult for possible continuation of RDV        # Glaucoma  - On home azelastine, latanoprost  - Continue home medications       # Chronic back pain  # chronic pancreatitis  - assessed by NSx 6/2022, with multilevel cervical spondylosis and spondylolisthesis and severe left/moderate-severe right foraminal stenosis, no intervention planned, follows with pain management  - On home oxymorphone 40mg X2, Percocet 10 q4h PRN, Creon, Lyrica 75 mg   - Continue home medications       # GI prophylaxis: pantoprazole  # DVT prophylaxis: Eliquis  # Full code  # Diet: Regular DASH with fluid restriction 83-year-old male who denies alcohol use (only 12/30, on his birthday), former smoker, with PMHx of hypertension, dyslipidemia, diabetes, CAD, PPM, glaucoma, epilepsy (not on AEDs now, was diagnosed a few months ago at [facility], no reports about it, does not follow with neurology), COPD (on home O2 as needed), chronic pancreatitis, atrial flutter (on Eliquis), CHF (last EF 35-40%), chronic back pain (assessed by NSx 6/2022, with multilevel cervical spondylosis and spondylolisthesis and severe left/moderate-severe right foraminal stenosis, no intervention planned, follows with pain management).    Presents for evaluation of possible seizure when he was at pain management office.       # Possible repetitive episodes of possible provoked breakthrough seizures in patient recently diagnosed with epilepsy not compliant with AED  - Evaluated by neurology: follow up their recommendations   - Get more collateral from [facility] about previous admission  - Transfer to [facility] for epilepsy monitoring with vEEG under hospitalist service  - No AEDs for now  - Consult nutrition  - Follow up vitamin B1, B6, B12, folate, alcohol level, UA, Utox TSH, Mg and Phos, trend lactate  - PT/OT  - Seizure & Fall precautions  - Ativan 2mg IVP for seizures > 2mins  - Keep Mg>2 and K >4.       # hypertension  # dyslipidemia  # CAD  # PPM  # atrial flutter (on Eliquis)  # CHF (last EF 35-40%) - not in exacerbation   - On home amlodipine 10mg, Ivabradine 2.5X2, Eliquis 5X2, Lasix 20mg, losartan 100mg, metoprolol , simvastatin 20mg  - Continue home medications       # Diabetes  - On home Trulicity   - Start Lantus and Lispro  - follow up FS      # COPD (on home O2 as needed)  # COVID positive   - Asymptomatic COVID, no worsening cough, SOB or chest pain  - On home Breo Ellipta, montelukast 10mg  - CT Angio Chest Aorta w/wo IV Cont Reticular opacities are noted in the right upper lobe and at both lung bases, compatible with fibrotic and/or atelectatic changes. Mild bronchiectatic changes are noted in bothlower lobes.   - Continue Symbicort and montelukast   - Give 1 dose of RDV   - ID consult for possible continuation of RDV        # Glaucoma  - On home azelastine, latanoprost  - Continue home medications       # Chronic back pain  # chronic pancreatitis  - assessed by NSx 6/2022, with multilevel cervical spondylosis and spondylolisthesis and severe left/moderate-severe right foraminal stenosis, no intervention planned, follows with pain management  - On home oxymorphone 40mg X2, Percocet 10 q4h PRN, Creon, Lyrica 75 mg   - Continue home medications       # GI prophylaxis: pantoprazole  # DVT prophylaxis: Eliquis  # Full code  # Diet: Regular DASH with fluid restriction

## 2024-01-04 ENCOUNTER — APPOINTMENT (OUTPATIENT)
Dept: HOME HEALTH SERVICES | Facility: HOME HEALTH | Age: 84
End: 2024-01-04

## 2024-01-04 ENCOUNTER — LABORATORY RESULT (OUTPATIENT)
Age: 84
End: 2024-01-04

## 2024-01-04 LAB

## 2024-01-05 ENCOUNTER — NON-APPOINTMENT (OUTPATIENT)
Age: 84
End: 2024-01-05

## 2024-01-05 ENCOUNTER — APPOINTMENT (OUTPATIENT)
Dept: ORTHOPEDIC SURGERY | Facility: CLINIC | Age: 84
End: 2024-01-05

## 2024-01-05 LAB
25(OH)D3 SERPL-MCNC: 60.9 NG/ML
ALBUMIN SERPL ELPH-MCNC: 4.1 G/DL
ALP BLD-CCNC: 76 U/L
ALT SERPL-CCNC: 14 U/L
ANION GAP SERPL CALC-SCNC: 12 MMOL/L
AST SERPL-CCNC: 20 U/L
BASOPHILS # BLD AUTO: 0.02 K/UL
BASOPHILS NFR BLD AUTO: 0.4 %
BILIRUB SERPL-MCNC: 0.6 MG/DL
BUN SERPL-MCNC: 9 MG/DL
CALCIUM SERPL-MCNC: 9.2 MG/DL
CHLORIDE SERPL-SCNC: 104 MMOL/L
CO2 SERPL-SCNC: 25 MMOL/L
CREAT SERPL-MCNC: 1 MG/DL
EGFR: 75 ML/MIN/1.73M2
EOSINOPHIL # BLD AUTO: 0.13 K/UL
EOSINOPHIL NFR BLD AUTO: 2.5 %
FOLATE SERPL-MCNC: >20 NG/ML
GLUCOSE SERPL-MCNC: 70 MG/DL
HCT VFR BLD CALC: 39.2 %
HGB BLD-MCNC: 13.1 G/DL
IMM GRANULOCYTES NFR BLD AUTO: 0.2 %
LYMPHOCYTES # BLD AUTO: 1.6 K/UL
LYMPHOCYTES NFR BLD AUTO: 31.3 %
MAGNESIUM SERPL-MCNC: 1.8 MG/DL
MAN DIFF?: NORMAL
MCHC RBC-ENTMCNC: 32.3 PG
MCHC RBC-ENTMCNC: 33.4 GM/DL
MCV RBC AUTO: 96.6 FL
MONOCYTES # BLD AUTO: 1.13 K/UL
MONOCYTES NFR BLD AUTO: 22.1 %
NEUTROPHILS # BLD AUTO: 2.23 K/UL
NEUTROPHILS NFR BLD AUTO: 43.5 %
PLATELET # BLD AUTO: 202 K/UL
POTASSIUM SERPL-SCNC: 4.5 MMOL/L
PROT SERPL-MCNC: 7.2 G/DL
RBC # BLD: 4.06 M/UL
RBC # FLD: 16.4 %
SODIUM SERPL-SCNC: 141 MMOL/L
VIT B12 SERPL-MCNC: >2000 PG/ML
WBC # FLD AUTO: 5.12 K/UL

## 2024-01-06 NOTE — H&P ADULT - NSCORESITESY/N_GEN_A_CORE_RD
Rush Mag Harry, [facility]                  " No

## 2024-01-08 ENCOUNTER — NON-APPOINTMENT (OUTPATIENT)
Age: 84
End: 2024-01-08

## 2024-01-08 DIAGNOSIS — J98.11 ATELECTASIS: ICD-10-CM

## 2024-01-08 DIAGNOSIS — G40.909 EPILEPSY, UNSPECIFIED, NOT INTRACTABLE, WITHOUT STATUS EPILEPTICUS: ICD-10-CM

## 2024-01-08 DIAGNOSIS — I48.91 UNSPECIFIED ATRIAL FIBRILLATION: ICD-10-CM

## 2024-01-08 DIAGNOSIS — Z79.4 LONG TERM (CURRENT) USE OF INSULIN: ICD-10-CM

## 2024-01-08 DIAGNOSIS — M47.812 SPONDYLOSIS WITHOUT MYELOPATHY OR RADICULOPATHY, CERVICAL REGION: ICD-10-CM

## 2024-01-08 DIAGNOSIS — I11.0 HYPERTENSIVE HEART DISEASE WITH HEART FAILURE: ICD-10-CM

## 2024-01-08 DIAGNOSIS — Z95.0 PRESENCE OF CARDIAC PACEMAKER: ICD-10-CM

## 2024-01-08 DIAGNOSIS — J47.9 BRONCHIECTASIS, UNCOMPLICATED: ICD-10-CM

## 2024-01-08 DIAGNOSIS — G47.33 OBSTRUCTIVE SLEEP APNEA (ADULT) (PEDIATRIC): ICD-10-CM

## 2024-01-08 DIAGNOSIS — M43.12 SPONDYLOLISTHESIS, CERVICAL REGION: ICD-10-CM

## 2024-01-08 DIAGNOSIS — J44.9 CHRONIC OBSTRUCTIVE PULMONARY DISEASE, UNSPECIFIED: ICD-10-CM

## 2024-01-08 DIAGNOSIS — Z99.81 DEPENDENCE ON SUPPLEMENTAL OXYGEN: ICD-10-CM

## 2024-01-08 DIAGNOSIS — R42 DIZZINESS AND GIDDINESS: ICD-10-CM

## 2024-01-08 DIAGNOSIS — K86.1 OTHER CHRONIC PANCREATITIS: ICD-10-CM

## 2024-01-08 DIAGNOSIS — I25.10 ATHEROSCLEROTIC HEART DISEASE OF NATIVE CORONARY ARTERY WITHOUT ANGINA PECTORIS: ICD-10-CM

## 2024-01-08 DIAGNOSIS — I48.92 UNSPECIFIED ATRIAL FLUTTER: ICD-10-CM

## 2024-01-08 DIAGNOSIS — E78.5 HYPERLIPIDEMIA, UNSPECIFIED: ICD-10-CM

## 2024-01-08 DIAGNOSIS — Z79.01 LONG TERM (CURRENT) USE OF ANTICOAGULANTS: ICD-10-CM

## 2024-01-08 DIAGNOSIS — Z87.891 PERSONAL HISTORY OF NICOTINE DEPENDENCE: ICD-10-CM

## 2024-01-08 DIAGNOSIS — I50.9 HEART FAILURE, UNSPECIFIED: ICD-10-CM

## 2024-01-08 DIAGNOSIS — Z91.199 PATIENT'S NONCOMPLIANCE WITH OTHER MEDICAL TREATMENT AND REGIMEN DUE TO UNSPECIFIED REASON: ICD-10-CM

## 2024-01-08 DIAGNOSIS — M48.02 SPINAL STENOSIS, CERVICAL REGION: ICD-10-CM

## 2024-01-08 DIAGNOSIS — E11.51 TYPE 2 DIABETES MELLITUS WITH DIABETIC PERIPHERAL ANGIOPATHY WITHOUT GANGRENE: ICD-10-CM

## 2024-01-08 DIAGNOSIS — H40.9 UNSPECIFIED GLAUCOMA: ICD-10-CM

## 2024-01-08 DIAGNOSIS — U07.1 COVID-19: ICD-10-CM

## 2024-01-12 ENCOUNTER — APPOINTMENT (OUTPATIENT)
Dept: HOME HEALTH SERVICES | Facility: HOME HEALTH | Age: 84
End: 2024-01-12
Payer: MEDICARE

## 2024-01-12 DIAGNOSIS — M31.6 OTHER GIANT CELL ARTERITIS: ICD-10-CM

## 2024-01-12 DIAGNOSIS — Z86.39 PERSONAL HISTORY OF OTHER ENDOCRINE, NUTRITIONAL AND METABOLIC DISEASE: ICD-10-CM

## 2024-01-12 DIAGNOSIS — G89.29 OTHER CHRONIC PAIN: ICD-10-CM

## 2024-01-12 PROCEDURE — 99495 TRANSJ CARE MGMT MOD F2F 14D: CPT

## 2024-01-12 NOTE — CURRENT MEDS
[Adherentt to medications as prescribed] : the patient is adherent to medications as prescribed [Medication and Allergies Reconciled] : medication and allergies reconciled [High Risk Medications Reviewed and Reconciled (Beers Criteria)] : high risk medications reviewed and reconciled [Adherent to medications] : Patient is adherent to medications as prescribed

## 2024-01-12 NOTE — COUNSELING
[Overweight - ( BMI 25.1 - 29.9 )] : overweight -  ( BMI 25.1 - 29.9 ) [DASH diet recommended] : DASH diet recommended [Continue diet as tolerated] : continue diet as tolerated based on goals of care [Medical alert] : medical alert [] : eye exam [Date: ___] : foot exam completed [unfilled] [Normal Weight (BMI <25)] : normal weight - BMI <25 [DASH diet given] : DASH diet given [Non - Smoker] : non-smoker [Use assistive device to avoid falls] : use assistive device to avoid falls [Remove clutter and unsafe carpeting to avoid falls] : remove clutter and unsafe carpeting to avoid falls [Improve mobility] : improve mobility [Improve pain control] : improve pain control [Maintain functional ability] : maintain functional ability [Discussed disease trajectory with patient/caregiver] : discussed disease trajectory with patient/caregiver [Patient/Caregiver is unclear of wishes] : patient/caregiver is unclear of wishes

## 2024-01-13 VITALS
DIASTOLIC BLOOD PRESSURE: 70 MMHG | SYSTOLIC BLOOD PRESSURE: 118 MMHG | TEMPERATURE: 98 F | HEART RATE: 68 BPM | OXYGEN SATURATION: 98 % | RESPIRATION RATE: 20 BRPM

## 2024-01-13 PROBLEM — G89.29 CHRONIC PAIN: Status: ACTIVE | Noted: 2023-07-24

## 2024-01-13 PROBLEM — M31.6 TEMPORAL ARTERITIS: Status: ACTIVE | Noted: 2023-08-18

## 2024-01-13 PROBLEM — Z86.39 HISTORY OF HYPERKALEMIA: Status: RESOLVED | Noted: 2024-01-04 | Resolved: 2024-01-13

## 2024-01-13 RX ORDER — LOSARTAN POTASSIUM 100 MG/1
100 TABLET, FILM COATED ORAL
Qty: 90 | Refills: 0 | Status: DISCONTINUED | COMMUNITY
Start: 2023-07-07 | End: 2024-01-13

## 2024-01-13 NOTE — HISTORY OF PRESENT ILLNESS
[Patient] : patient [In-Place] : has Home Health services in-place [A] : A [Patient is stable] : patient is stable [FreeTextEntry1] : unsteady gait [FreeTextEntry2] : 01/12/2024 COVID SCREEN: Patient or caregiver denies fever, cough, trouble breathing, rash or contact with someone who tested positive for COVID-19.   N95 mask, gloves, eye wear and gown (if indicated) used during visit: YES  Total face to face time with patient is 45 min.    Jeremy Rea is a 82-year-old male with HTN, HLD, Type 2 DM, atrial fibrillation, CHF, cervical neuritis, chronic pancreatitis, headache and urinary frequency presents for follow up post leaving hospital AMA.   Today patient denies seizure activity since hospital D/C. Reports feeling well denies headache, dizziness, SOB or chest pain.   Patient reports compliance with Keppra 500 mg Q 12 hours. Upcoming community PCP, neurology, pain management and pulmonology follow up pending.   Bilateral lower extremity dryness noted, with flaking. Pedal pulses +2. Educated on importance of moisturizing. Will start amonium lactate.  [LastPVisitDate] : 06/2023 [FreeTextEntry4] : Dr. Greco [LastSpecialistVisitDate] : 07/2023

## 2024-01-13 NOTE — CHRONIC CARE ASSESSMENT
[Inadequate social support] : inadequate social support [Oriented To Person] : ~L oriented to person [Oriented To Time] : ~L oriented to time [Oriented To Situation] : ~L oriented to situation [No Action Needed] : No action needed [PPS Score: ____] : Palliative Performance Scale (PPS) Score: [unfilled] [de-identified] : low sodium, low fat and low carb [de-identified] : as tolerated [de-identified] : low fat and low sodium

## 2024-01-13 NOTE — REASON FOR VISIT
[Post-hospitalization from ___ Hospital] : Post-hospitalization from [unfilled] Hospital [Admitted on: ___] : The patient was admitted on [unfilled] [Discharged on ___] : discharged on [unfilled] [Post Hospitalization] : a post hospitalization visit [Pre-Visit Preparation] : pre-visit preparation was done [Discharge Summary] : discharge summary [Pertinent Labs] : pertinent labs [Patient Contacted By: ____] : and contacted by [unfilled] [FreeTextEntry4] : chart review [FreeTextEntry1] : PMH PM, HTN, HLD, Type 2 DM, pancreatitis, cervical neuritis,  chronic pain and CHF  [FreeTextEntry2] : chart review

## 2024-01-13 NOTE — HEALTH RISK ASSESSMENT
[HRA Reviewed] : Health risk assessment reviewed [Some assistance needed] : walking [Independent] : managing finances [Full assistance needed] : using transportation [Any fall with injury in past year] : Patient reported fall with injury in the past year [Yes] : The patient does have visual impairment [[facility]] : 10 [FreeTextEntry2] : ambulates with cane and 4 wheel rollator [[employer]] : 10 [de-identified] : ambulates with cane and 4 wheel rollator

## 2024-01-13 NOTE — PHYSICAL EXAM
[No Acute Distress] : no acute distress [Normal Voice/Communication] : normal voice communication [Normal Sclera/Conjunctiva] : normal sclera/conjunctiva [Normal Outer Ear/Nose] : the ears and nose were normal in appearance [No JVD] : no jugular venous distention [No Respiratory Distress] : no respiratory distress [No Accessory Muscle Use] : no accessory muscle use [Normal Rate] : heart rate was normal  [Pedal Pulses Present] : the pedal pulses are present [Non Tender] : non-tender [Soft] : abdomen soft [Not Distended] : not distended [Normal Anterior Cervical Nodes] : no anterior cervical lymphadenopathy [No Spinal Tenderness] : no spinal tenderness [No Joint Swelling] : no joint swelling seen [No Rash] : no rash [No Skin Lesions] : no skin lesions [No Motor Deficits] : the motor exam was normal [Oriented x3] : oriented to person, place, and time [Foot Ulcers] : no foot ulcers [de-identified] : Patient sitting upright in chair

## 2024-01-17 ENCOUNTER — NON-APPOINTMENT (OUTPATIENT)
Age: 84
End: 2024-01-17

## 2024-01-17 LAB
ALBUMIN SERPL ELPH-MCNC: 4 G/DL
ALP BLD-CCNC: 65 U/L
ALT SERPL-CCNC: 16 U/L
ANION GAP SERPL CALC-SCNC: 16 MMOL/L
AST SERPL-CCNC: 23 U/L
BASOPHILS # BLD AUTO: 0.02 K/UL
BASOPHILS NFR BLD AUTO: 0.3 %
BILIRUB SERPL-MCNC: 0.3 MG/DL
BUN SERPL-MCNC: 20 MG/DL
CALCIUM SERPL-MCNC: 9.8 MG/DL
CHLORIDE SERPL-SCNC: 103 MMOL/L
CHOLEST SERPL-MCNC: 130 MG/DL
CO2 SERPL-SCNC: 21 MMOL/L
CREAT SERPL-MCNC: 1.61 MG/DL
EGFR: 42 ML/MIN/1.73M2
EOSINOPHIL # BLD AUTO: 0.22 K/UL
EOSINOPHIL NFR BLD AUTO: 3.7 %
ESTIMATED AVERAGE GLUCOSE: 126 MG/DL
GLUCOSE SERPL-MCNC: 122 MG/DL
HBA1C MFR BLD HPLC: 6 %
HCT VFR BLD CALC: 43.2 %
HDLC SERPL-MCNC: 47 MG/DL
HGB BLD-MCNC: 14.2 G/DL
IMM GRANULOCYTES NFR BLD AUTO: 0.2 %
LDLC SERPL CALC-MCNC: 65 MG/DL
LYMPHOCYTES # BLD AUTO: 2.19 K/UL
LYMPHOCYTES NFR BLD AUTO: 36.7 %
MAN DIFF?: NORMAL
MCHC RBC-ENTMCNC: 30.9 PG
MCHC RBC-ENTMCNC: 32.9 GM/DL
MCV RBC AUTO: 94.1 FL
MONOCYTES # BLD AUTO: 0.57 K/UL
MONOCYTES NFR BLD AUTO: 9.5 %
NEUTROPHILS # BLD AUTO: 2.96 K/UL
NEUTROPHILS NFR BLD AUTO: 49.6 %
NONHDLC SERPL-MCNC: 83 MG/DL
PLATELET # BLD AUTO: 277 K/UL
POTASSIUM SERPL-SCNC: 4.5 MMOL/L
PROT SERPL-MCNC: 7.2 G/DL
RBC # BLD: 4.59 M/UL
RBC # FLD: 14.6 %
SODIUM SERPL-SCNC: 139 MMOL/L
TRIGL SERPL-MCNC: 96 MG/DL
TSH SERPL-ACNC: 1.63 UIU/ML
WBC # FLD AUTO: 5.97 K/UL

## 2024-01-18 ENCOUNTER — NON-APPOINTMENT (OUTPATIENT)
Age: 84
End: 2024-01-18

## 2024-01-18 LAB — LEVETIRACETAM SERPL-MCNC: 21.9 UG/ML

## 2024-01-22 ENCOUNTER — NON-APPOINTMENT (OUTPATIENT)
Age: 84
End: 2024-01-22

## 2024-01-23 ENCOUNTER — NON-APPOINTMENT (OUTPATIENT)
Age: 84
End: 2024-01-23

## 2024-01-24 ENCOUNTER — APPOINTMENT (OUTPATIENT)
Dept: NEUROLOGY | Facility: CLINIC | Age: 84
End: 2024-01-24
Payer: MEDICARE

## 2024-01-24 VITALS — DIASTOLIC BLOOD PRESSURE: 82 MMHG | SYSTOLIC BLOOD PRESSURE: 145 MMHG | HEART RATE: 65 BPM

## 2024-01-24 VITALS
OXYGEN SATURATION: 99 % | WEIGHT: 175 LBS | BODY MASS INDEX: 28.12 KG/M2 | TEMPERATURE: 97.8 F | SYSTOLIC BLOOD PRESSURE: 125 MMHG | DIASTOLIC BLOOD PRESSURE: 69 MMHG | HEART RATE: 89 BPM | HEIGHT: 66 IN

## 2024-01-24 VITALS — SYSTOLIC BLOOD PRESSURE: 138 MMHG | HEART RATE: 62 BPM | DIASTOLIC BLOOD PRESSURE: 78 MMHG

## 2024-01-24 PROCEDURE — 99204 OFFICE O/P NEW MOD 45 MIN: CPT

## 2024-01-24 PROCEDURE — 95816 EEG AWAKE AND DROWSY: CPT

## 2024-01-26 NOTE — DISCUSSION/SUMMARY
[FreeTextEntry1] : Mr Rea is an 83 year old right handed male with PMH of hypertension, dyslipidemia, diabetes, CAD,CHF, COPD, PPM, glaucoma, A.fib on Eliquis, GERD, chronic back pain (assessed by NSx 6/2022, with multilevel cervical spondylosis and spondylolisthesis and severe left/moderate-severe right foraminal stenosis, no intervention planned, follows with pain management), chronic pancreatitis presented for seizure evaluation. Episode sound more like syncope however considering ?Seizure diagnosis at [facility] will need seizure work up.  Plan: - start with REEG and if normal will proceed with VEEG - request records from [facility] - No AEDs for now - + orthostatic - discussed importance of getting up slowly  - fall precuation - follow up after VEEG  Case discussed with Dr. Jay Luis, DNP, ACNP-BC

## 2024-01-26 NOTE — PHYSICAL EXAM
[Paresis Pronator Drift Left-Sided] : no pronator drift on the left [Paresis Pronator Drift Right-Sided] : no pronator drift on the right [Past-pointing] : there was no past-pointing [FreeTextEntry8] : Significant LE weakness. Pt c/o dizziness as soon as he stands up. [Tremor] : no tremor present

## 2024-01-26 NOTE — HISTORY OF PRESENT ILLNESS
[FreeTextEntry1] :  Mr Rea is an 83 year old right handed male with PMH of hypertension, dyslipidemia, diabetes, CAD,CHF, COPD, PPM, glaucoma, A.fib on Eliquis, GERD, chronic back pain (assessed by [facility] 2022, with multilevel cervical spondylosis and spondylolisthesis and severe left/moderate-severe right foraminal stenosis no intervention planned, follows with pain management ) chronic pancreatitis presented for seizure evaluation.  Pt is a poor historian and presented with his home health aide. According to EMR pt had few episodes documented as seizure first one was 2022. At that time pt was living at SNF. He approached the nursing station and then became dizzy and fell to the ground and had a few second episode of LOC with UE/LE shaking and he was confused after the episode. Pt was admitted to the hospital and had REEG which showed generalized slowing.  After that had few other episodes and for one of them was hospitalized at [facility] and was diagnosed with seizure?. Will request records to review.  2024 pt  was at pain management office. After urinating, pt started feeling generalized weakness, and aide placed him on the sofa chair. Right after pt had several episodes of seizure like activity describing it as tonic clonic shaking, no tongue bite or incontinence. Episodes were intermittent lasting ~5 minutes at a time over 30 minutes, patient states that recalls the episode but is a poor historian. After that, allegedly as per the witnesses, pt appeared confused afterwards but began to become verbal again after arriving to ED. Pt was advised to have VEEG but he left the hospital without additional work up.  Social: [place] [facility] Has [facility] 5 days a week 5 hours each day Denies smoking, recreational drug use or alcohol intake Dropped out of , used to work as a  before retiering  Risk Factors: Denies head trauma or CNS infections Born full term without complications Denies family history of seizure disorder  Neuroimagin2022 REEG- generalized slowing

## 2024-01-29 ENCOUNTER — APPOINTMENT (OUTPATIENT)
Dept: PAIN MANAGEMENT | Facility: CLINIC | Age: 84
End: 2024-01-29
Payer: MEDICARE

## 2024-01-29 VITALS
SYSTOLIC BLOOD PRESSURE: 140 MMHG | HEIGHT: 66 IN | WEIGHT: 175 LBS | DIASTOLIC BLOOD PRESSURE: 78 MMHG | BODY MASS INDEX: 28.12 KG/M2 | HEART RATE: 69 BPM

## 2024-01-29 DIAGNOSIS — M48.02 SPINAL STENOSIS, CERVICAL REGION: ICD-10-CM

## 2024-01-29 DIAGNOSIS — M48.07 SPINAL STENOSIS, LUMBOSACRAL REGION: ICD-10-CM

## 2024-01-29 LAB
ALBUMIN SERPL ELPH-MCNC: 4 G/DL
ALP BLD-CCNC: 86 U/L
ALT SERPL-CCNC: 17 U/L
ANION GAP SERPL CALC-SCNC: 14 MMOL/L
AST SERPL-CCNC: 47 U/L
BASOPHILS # BLD AUTO: 0.02 K/UL
BASOPHILS NFR BLD AUTO: 0.4 %
BILIRUB SERPL-MCNC: 0.4 MG/DL
BUN SERPL-MCNC: 15 MG/DL
CALCIUM SERPL-MCNC: 9.1 MG/DL
CHLORIDE SERPL-SCNC: 105 MMOL/L
CO2 SERPL-SCNC: 20 MMOL/L
CREAT SERPL-MCNC: 1.11 MG/DL
EGFR: 66 ML/MIN/1.73M2
EOSINOPHIL # BLD AUTO: 0.24 K/UL
EOSINOPHIL NFR BLD AUTO: 4.9 %
GLUCOSE SERPL-MCNC: 136 MG/DL
HCT VFR BLD CALC: 41 %
HGB BLD-MCNC: 13.6 G/DL
IMM GRANULOCYTES NFR BLD AUTO: 0.2 %
LYMPHOCYTES # BLD AUTO: 1.73 K/UL
LYMPHOCYTES NFR BLD AUTO: 35.2 %
MAN DIFF?: NORMAL
MCHC RBC-ENTMCNC: 31.8 PG
MCHC RBC-ENTMCNC: 33.2 GM/DL
MCV RBC AUTO: 95.8 FL
MONOCYTES # BLD AUTO: 0.63 K/UL
MONOCYTES NFR BLD AUTO: 12.8 %
NEUTROPHILS # BLD AUTO: 2.28 K/UL
NEUTROPHILS NFR BLD AUTO: 46.5 %
PLATELET # BLD AUTO: 253 K/UL
POTASSIUM SERPL-SCNC: 4.8 MMOL/L
PROT SERPL-MCNC: 7.1 G/DL
RBC # BLD: 4.28 M/UL
RBC # FLD: 14.8 %
SODIUM SERPL-SCNC: 139 MMOL/L
WBC # FLD AUTO: 4.91 K/UL

## 2024-01-29 PROCEDURE — 99214 OFFICE O/P EST MOD 30 MIN: CPT

## 2024-01-29 NOTE — DISCUSSION/SUMMARY
[Medication Risks Reviewed] : Medication risks reviewed [de-identified] : Mr. Rea is an 83-year-old male with chronic cervical and lumbar pain with radiculopathy, as well as pain attributed to chronic pancreatitis. We started weaning him off narcotics on the last  by decreasing his Oxymorphone ER 40mg BID to 30mg BID, and continue with Percocet 10/325mg TID unchanged. He reports today that he would like to transfer his care to another Pain Management provider outside of practice. Disengagement letter given today and the last set of scripts sent to the pharmacy. UDS: from 01/02/2024 is consistent.  Total clinician time spent today on the patient is 30 minutes including preparing to see the patient, obtaining and/or reviewing and confirming history, performing medically necessary and appropriate examination, counseling and educating the patient and/or family, documenting clinical information in the EHR and communicating and/or referring to other healthcare professionals.   CHONG Hays, DO

## 2024-01-29 NOTE — PHYSICAL EXAM
[de-identified] : Constitutional: GENERAL APPEARANCE OF PATIENT IS WELL DEVELOPED, WELL NOURISHED, BODY HABITUS NORMAL, WELL GROOMED, NO DEFORMITIES NOTED.\par  Head - Atraumatic and Normocephalic\par  Eyes, Nose, and Throat: External inspection of ears and nose are normal overall without scars, lesions, or masses noted. Assessment of hearing is normal\par  Neck-Examination of neck shows no masses, overall appearance is normal, neck is symmetric, tracheal position is midline, no crepitus is noted. Examination of thyroid shows no enlargement, tenderness or masses\par  Respiratory- Assessment of respiratory effort shows no intercostal retractions, no use of accessory muscles, unlabored breathing, and normal diaphragmatic movement.\par  Cardiovascular- Examination of extremities show no edema or varicosities\par  Musculoskeletal. Examination of gait is not antalgic and station is normal\par  Inspection and palpation of digits and nails shows no clubbing, cyanosis, nodules, drainage, fluctuance, petechiae\par  \par  - Spine - inspection and palpation shows no misalignment, asymmetry, crepitation, defects, tenderness, masses, effusions. ROM is normal without crepitation or contracture. No instability or subluxation or laxity is noted. No abnormal movements.\par  \par  - Neck- inspection and palpation shows no misalignment, asymmetry, crepitation, defects, tenderness, masses, effusions. ROM is normal without crepitation or contracture. No instability or subluxation or laxity is noted. No abnormal movements.\par  \par  - RUE- pain on palpation of right elbow.\par  \par  - LUE- inspection and palpation shows no misalignment, asymmetry, crepitation, defects, tenderness, masses, effusions. ROM is normal without crepitation or contracture. No instability or subluxation or laxity is noted. No abnormal movements.\par  \par  - RLE- inspection and palpation shows no misalignment, asymmetry, crepitation, defects, tenderness, masses, effusions. ROM is normal without crepitation or contracture. No instability or subluxation or laxity is noted. No abnormal movements.\par  \par  - LLE- inspection and palpation shows no misalignment, asymmetry, crepitation, defects, tenderness, masses, effusions. ROM is normal without crepitation or contracture. No instability or subluxation or laxity is noted. No abnormal movements.\par  \par  - Skin- Inspection of skin and subcutaneous tissue shows no rashes, lesions or ulcers. Palpation of skin and subcutaneous tissue shows no rashes, no indurations, subcutaneous nodules or tightening.\par  \par  - Abdomen- Nontender\par  \par  - Neurologic- CN 2-12 are grossly intact. No sensory or motor deficits in the upper and lower extremities. Adequate strength in upper and lower extremities\par  \par  - Psychiatric- Patient's judgment and insight are intact. Oriented to time, place and person. Recent and remote memory intact.

## 2024-01-29 NOTE — DATA REVIEWED
[FreeTextEntry1] : CT of the cervical lumbar spine at [facility]. CT of the cervical spine was completed in December 2020 and there is evidence of multilevel degenerative disc disease with formation of anterior osteophytes as well as multilevel spondylosis and spinal stenosis.\par  \par  CT of the lumbar spine was completed every did all radiology back in 2019. There is evidence of degenerative disc disease and vacuum phenomenon most appreciated L4-5 and L5-S1. 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 There is multilevel spondylosis and facet arthropathy. Multilevel spinal stenosis.

## 2024-01-29 NOTE — HISTORY OF PRESENT ILLNESS
[FreeTextEntry1] : HISTORY OF PRESENT ILLNESS: ORIGINAL PRESENTATION: Mr. Rea is an 83-year-old male who has a history of chronic pancreatitis. He also suffers with chronic lumbar and radicular pain secondary to disc displacement and stenosis in addition to a history of chronic cervical pain and radiculopathy, disc bulging and facet arthropathy. Patient has a significant alcoholism history, he denies current use and states that he has abstained from alcohol for "several years". He is to be monitored closely.  TODAY: Last seen on 01/02/2024 and since then there has been no new complaints or acute changes to his condition. We are seeing him for his chronic lower back and neck pain, as well as chronic Pancreatitis pain. He is still using walker/rollator for ambulation. We started weaning him off narcotics on the last  by decreasing his Oxymorphone ER 40mg BID to 30mg BID, and continue with Percocet 10/325mg TID unchanged. He reports today that he would like to transfer his care to another Pain Management provider outside of practice. UDS: from 01/02/2024 is consistent.

## 2024-01-30 ENCOUNTER — RX RENEWAL (OUTPATIENT)
Age: 84
End: 2024-01-30

## 2024-02-07 ENCOUNTER — APPOINTMENT (OUTPATIENT)
Dept: HOME HEALTH SERVICES | Facility: HOME HEALTH | Age: 84
End: 2024-02-07

## 2024-02-07 VITALS
SYSTOLIC BLOOD PRESSURE: 146 MMHG | OXYGEN SATURATION: 97 % | HEART RATE: 87 BPM | TEMPERATURE: 98.2 F | RESPIRATION RATE: 18 BRPM | DIASTOLIC BLOOD PRESSURE: 74 MMHG

## 2024-02-07 VITALS — SYSTOLIC BLOOD PRESSURE: 126 MMHG | OXYGEN SATURATION: 94 % | DIASTOLIC BLOOD PRESSURE: 66 MMHG

## 2024-02-08 VITALS — OXYGEN SATURATION: 85 %

## 2024-02-09 ENCOUNTER — NON-APPOINTMENT (OUTPATIENT)
Age: 84
End: 2024-02-09

## 2024-02-11 ENCOUNTER — NON-APPOINTMENT (OUTPATIENT)
Age: 84
End: 2024-02-11

## 2024-02-12 ENCOUNTER — INPATIENT (INPATIENT)
Facility: HOSPITAL | Age: 84
LOS: 1 days | Discharge: HOME | DRG: 101 | End: 2024-02-14
Attending: INTERNAL MEDICINE | Admitting: PSYCHIATRY & NEUROLOGY
Payer: MEDICARE

## 2024-02-12 VITALS
SYSTOLIC BLOOD PRESSURE: 145 MMHG | DIASTOLIC BLOOD PRESSURE: 79 MMHG | RESPIRATION RATE: 16 BRPM | HEART RATE: 65 BPM | TEMPERATURE: 97 F

## 2024-02-12 DIAGNOSIS — Z90.49 ACQUIRED ABSENCE OF OTHER SPECIFIED PARTS OF DIGESTIVE TRACT: Chronic | ICD-10-CM

## 2024-02-12 DIAGNOSIS — G40.909 EPILEPSY, UNSPECIFIED, NOT INTRACTABLE, WITHOUT STATUS EPILEPTICUS: ICD-10-CM

## 2024-02-12 DIAGNOSIS — Z95.0 PRESENCE OF CARDIAC PACEMAKER: Chronic | ICD-10-CM

## 2024-02-12 DIAGNOSIS — Z96.0 PRESENCE OF UROGENITAL IMPLANTS: Chronic | ICD-10-CM

## 2024-02-12 DIAGNOSIS — Z98.890 OTHER SPECIFIED POSTPROCEDURAL STATES: Chronic | ICD-10-CM

## 2024-02-12 DIAGNOSIS — Z87.19 PERSONAL HISTORY OF OTHER DISEASES OF THE DIGESTIVE SYSTEM: Chronic | ICD-10-CM

## 2024-02-12 LAB
ALBUMIN SERPL ELPH-MCNC: 4.1 G/DL — SIGNIFICANT CHANGE UP (ref 3.5–5.2)
ALP SERPL-CCNC: 66 U/L — SIGNIFICANT CHANGE UP (ref 30–115)
ALT FLD-CCNC: 6 U/L — SIGNIFICANT CHANGE UP (ref 0–41)
ANION GAP SERPL CALC-SCNC: 10 MMOL/L — SIGNIFICANT CHANGE UP (ref 7–14)
AST SERPL-CCNC: 23 U/L — SIGNIFICANT CHANGE UP (ref 0–41)
BASOPHILS # BLD AUTO: 0.02 K/UL — SIGNIFICANT CHANGE UP (ref 0–0.2)
BASOPHILS NFR BLD AUTO: 0.4 % — SIGNIFICANT CHANGE UP (ref 0–1)
BILIRUB SERPL-MCNC: 0.5 MG/DL — SIGNIFICANT CHANGE UP (ref 0.2–1.2)
BUN SERPL-MCNC: 8 MG/DL — LOW (ref 10–20)
CALCIUM SERPL-MCNC: 9 MG/DL — SIGNIFICANT CHANGE UP (ref 8.4–10.5)
CHLORIDE SERPL-SCNC: 103 MMOL/L — SIGNIFICANT CHANGE UP (ref 98–110)
CO2 SERPL-SCNC: 27 MMOL/L — SIGNIFICANT CHANGE UP (ref 17–32)
CREAT SERPL-MCNC: 0.9 MG/DL — SIGNIFICANT CHANGE UP (ref 0.7–1.5)
EGFR: 85 ML/MIN/1.73M2 — SIGNIFICANT CHANGE UP
EOSINOPHIL # BLD AUTO: 0.18 K/UL — SIGNIFICANT CHANGE UP (ref 0–0.7)
EOSINOPHIL NFR BLD AUTO: 3.7 % — SIGNIFICANT CHANGE UP (ref 0–8)
GLUCOSE BLDC GLUCOMTR-MCNC: 112 MG/DL — HIGH (ref 70–99)
GLUCOSE BLDC GLUCOMTR-MCNC: 99 MG/DL — SIGNIFICANT CHANGE UP (ref 70–99)
GLUCOSE SERPL-MCNC: 104 MG/DL — HIGH (ref 70–99)
HCT VFR BLD CALC: 40.3 % — LOW (ref 42–52)
HGB BLD-MCNC: 13.7 G/DL — LOW (ref 14–18)
IMM GRANULOCYTES NFR BLD AUTO: 0.2 % — SIGNIFICANT CHANGE UP (ref 0.1–0.3)
LYMPHOCYTES # BLD AUTO: 2.13 K/UL — SIGNIFICANT CHANGE UP (ref 1.2–3.4)
LYMPHOCYTES # BLD AUTO: 43.9 % — SIGNIFICANT CHANGE UP (ref 20.5–51.1)
MAGNESIUM SERPL-MCNC: 1.6 MG/DL — LOW (ref 1.8–2.4)
MCHC RBC-ENTMCNC: 31.3 PG — HIGH (ref 27–31)
MCHC RBC-ENTMCNC: 34 G/DL — SIGNIFICANT CHANGE UP (ref 32–37)
MCV RBC AUTO: 92 FL — SIGNIFICANT CHANGE UP (ref 80–94)
MONOCYTES # BLD AUTO: 0.54 K/UL — SIGNIFICANT CHANGE UP (ref 0.1–0.6)
MONOCYTES NFR BLD AUTO: 11.1 % — HIGH (ref 1.7–9.3)
NEUTROPHILS # BLD AUTO: 1.97 K/UL — SIGNIFICANT CHANGE UP (ref 1.4–6.5)
NEUTROPHILS NFR BLD AUTO: 40.7 % — LOW (ref 42.2–75.2)
NRBC # BLD: 0 /100 WBCS — SIGNIFICANT CHANGE UP (ref 0–0)
PLATELET # BLD AUTO: 203 K/UL — SIGNIFICANT CHANGE UP (ref 130–400)
PMV BLD: 10.8 FL — HIGH (ref 7.4–10.4)
POTASSIUM SERPL-MCNC: 5.1 MMOL/L — HIGH (ref 3.5–5)
POTASSIUM SERPL-SCNC: 5.1 MMOL/L — HIGH (ref 3.5–5)
PROT SERPL-MCNC: 7.9 G/DL — SIGNIFICANT CHANGE UP (ref 6–8)
RBC # BLD: 4.38 M/UL — LOW (ref 4.7–6.1)
RBC # FLD: 15.7 % — HIGH (ref 11.5–14.5)
SODIUM SERPL-SCNC: 140 MMOL/L — SIGNIFICANT CHANGE UP (ref 135–146)
WBC # BLD: 4.85 K/UL — SIGNIFICANT CHANGE UP (ref 4.8–10.8)
WBC # FLD AUTO: 4.85 K/UL — SIGNIFICANT CHANGE UP (ref 4.8–10.8)

## 2024-02-12 PROCEDURE — 85025 COMPLETE CBC W/AUTO DIFF WBC: CPT

## 2024-02-12 PROCEDURE — 82962 GLUCOSE BLOOD TEST: CPT

## 2024-02-12 PROCEDURE — 97162 PT EVAL MOD COMPLEX 30 MIN: CPT | Mod: GP

## 2024-02-12 PROCEDURE — 83036 HEMOGLOBIN GLYCOSYLATED A1C: CPT

## 2024-02-12 PROCEDURE — 80048 BASIC METABOLIC PNL TOTAL CA: CPT

## 2024-02-12 PROCEDURE — 99221 1ST HOSP IP/OBS SF/LOW 40: CPT

## 2024-02-12 PROCEDURE — 85027 COMPLETE CBC AUTOMATED: CPT

## 2024-02-12 PROCEDURE — 36415 COLL VENOUS BLD VENIPUNCTURE: CPT

## 2024-02-12 PROCEDURE — 95700 EEG CONT REC W/VID EEG TECH: CPT

## 2024-02-12 PROCEDURE — 83735 ASSAY OF MAGNESIUM: CPT

## 2024-02-12 PROCEDURE — 80053 COMPREHEN METABOLIC PANEL: CPT

## 2024-02-12 PROCEDURE — 95716 VEEG EA 12-26HR CONT MNTR: CPT

## 2024-02-12 PROCEDURE — 80177 DRUG SCRN QUAN LEVETIRACETAM: CPT

## 2024-02-12 RX ORDER — DEXTROSE 50 % IN WATER 50 %
25 SYRINGE (ML) INTRAVENOUS ONCE
Refills: 0 | Status: DISCONTINUED | OUTPATIENT
Start: 2024-02-12 | End: 2024-02-14

## 2024-02-12 RX ORDER — MONTELUKAST 4 MG/1
10 TABLET, CHEWABLE ORAL DAILY
Refills: 0 | Status: DISCONTINUED | OUTPATIENT
Start: 2024-02-12 | End: 2024-02-14

## 2024-02-12 RX ORDER — FUROSEMIDE 40 MG
20 TABLET ORAL DAILY
Refills: 0 | Status: DISCONTINUED | OUTPATIENT
Start: 2024-02-12 | End: 2024-02-14

## 2024-02-12 RX ORDER — LATANOPROST 0.05 MG/ML
1 SOLUTION/ DROPS OPHTHALMIC; TOPICAL
Qty: 0 | Refills: 0 | DISCHARGE

## 2024-02-12 RX ORDER — KETOROLAC TROMETHAMINE 0.5 %
1 DROPS OPHTHALMIC (EYE)
Refills: 0 | DISCHARGE

## 2024-02-12 RX ORDER — FLUTICASONE FUROATE AND VILANTEROL TRIFENATATE 100; 25 UG/1; UG/1
1 POWDER RESPIRATORY (INHALATION)
Qty: 0 | Refills: 0 | DISCHARGE

## 2024-02-12 RX ORDER — LIPASE/PROTEASE/AMYLASE 16-48-48K
3 CAPSULE,DELAYED RELEASE (ENTERIC COATED) ORAL
Refills: 0 | DISCHARGE

## 2024-02-12 RX ORDER — LANOLIN ALCOHOL/MO/W.PET/CERES
3 CREAM (GRAM) TOPICAL AT BEDTIME
Refills: 0 | Status: DISCONTINUED | OUTPATIENT
Start: 2024-02-12 | End: 2024-02-14

## 2024-02-12 RX ORDER — SODIUM CHLORIDE 9 MG/ML
1000 INJECTION, SOLUTION INTRAVENOUS
Refills: 0 | Status: DISCONTINUED | OUTPATIENT
Start: 2024-02-12 | End: 2024-02-14

## 2024-02-12 RX ORDER — KETOROLAC TROMETHAMINE 0.5 %
1 DROPS OPHTHALMIC (EYE) DAILY
Refills: 0 | Status: DISCONTINUED | OUTPATIENT
Start: 2024-02-12 | End: 2024-02-14

## 2024-02-12 RX ORDER — POTASSIUM CHLORIDE 20 MEQ
1 PACKET (EA) ORAL
Refills: 0 | DISCHARGE

## 2024-02-12 RX ORDER — INSULIN LISPRO 100/ML
VIAL (ML) SUBCUTANEOUS
Refills: 0 | Status: DISCONTINUED | OUTPATIENT
Start: 2024-02-12 | End: 2024-02-14

## 2024-02-12 RX ORDER — SACUBITRIL AND VALSARTAN 24; 26 MG/1; MG/1
1 TABLET, FILM COATED ORAL
Refills: 0 | Status: DISCONTINUED | OUTPATIENT
Start: 2024-02-12 | End: 2024-02-14

## 2024-02-12 RX ORDER — OXYMORPHONE HYDROCHLORIDE 10 MG/1
1 TABLET, EXTENDED RELEASE ORAL
Qty: 0 | Refills: 0 | DISCHARGE

## 2024-02-12 RX ORDER — APIXABAN 2.5 MG/1
5 TABLET, FILM COATED ORAL EVERY 12 HOURS
Refills: 0 | Status: DISCONTINUED | OUTPATIENT
Start: 2024-02-12 | End: 2024-02-14

## 2024-02-12 RX ORDER — OXYCODONE AND ACETAMINOPHEN 5; 325 MG/1; MG/1
1 TABLET ORAL
Refills: 0 | DISCHARGE

## 2024-02-12 RX ORDER — LIPASE/PROTEASE/AMYLASE 16-48-48K
1 CAPSULE,DELAYED RELEASE (ENTERIC COATED) ORAL
Refills: 0 | DISCHARGE

## 2024-02-12 RX ORDER — PREDNISOLONE SODIUM PHOSPHATE 1 %
1 DROPS OPHTHALMIC (EYE) DAILY
Refills: 0 | Status: DISCONTINUED | OUTPATIENT
Start: 2024-02-12 | End: 2024-02-14

## 2024-02-12 RX ORDER — KETOTIFEN FUMARATE 0.34 MG/ML
1 SOLUTION OPHTHALMIC DAILY
Refills: 0 | Status: DISCONTINUED | OUTPATIENT
Start: 2024-02-12 | End: 2024-02-14

## 2024-02-12 RX ORDER — DEXTROSE 50 % IN WATER 50 %
12.5 SYRINGE (ML) INTRAVENOUS ONCE
Refills: 0 | Status: DISCONTINUED | OUTPATIENT
Start: 2024-02-12 | End: 2024-02-14

## 2024-02-12 RX ORDER — PANTOPRAZOLE SODIUM 20 MG/1
40 TABLET, DELAYED RELEASE ORAL
Refills: 0 | Status: DISCONTINUED | OUTPATIENT
Start: 2024-02-12 | End: 2024-02-14

## 2024-02-12 RX ORDER — LOSARTAN POTASSIUM 100 MG/1
1 TABLET, FILM COATED ORAL
Refills: 0 | DISCHARGE

## 2024-02-12 RX ORDER — GLUCAGON INJECTION, SOLUTION 0.5 MG/.1ML
1 INJECTION, SOLUTION SUBCUTANEOUS ONCE
Refills: 0 | Status: DISCONTINUED | OUTPATIENT
Start: 2024-02-12 | End: 2024-02-14

## 2024-02-12 RX ORDER — LEVETIRACETAM 250 MG/1
500 TABLET, FILM COATED ORAL EVERY 12 HOURS
Refills: 0 | Status: DISCONTINUED | OUTPATIENT
Start: 2024-02-12 | End: 2024-02-13

## 2024-02-12 RX ORDER — PREDNISOLONE SODIUM PHOSPHATE 1 %
1 DROPS OPHTHALMIC (EYE)
Refills: 0 | DISCHARGE

## 2024-02-12 RX ORDER — SACUBITRIL AND VALSARTAN 24; 26 MG/1; MG/1
1 TABLET, FILM COATED ORAL
Refills: 0 | DISCHARGE

## 2024-02-12 RX ORDER — LEVETIRACETAM 250 MG/1
1 TABLET, FILM COATED ORAL
Refills: 0 | DISCHARGE

## 2024-02-12 RX ORDER — AMLODIPINE BESYLATE 2.5 MG/1
10 TABLET ORAL DAILY
Refills: 0 | Status: DISCONTINUED | OUTPATIENT
Start: 2024-02-12 | End: 2024-02-14

## 2024-02-12 RX ORDER — OXYMORPHONE HYDROCHLORIDE 10 MG/1
1 TABLET, EXTENDED RELEASE ORAL
Refills: 0 | DISCHARGE

## 2024-02-12 RX ORDER — SIMVASTATIN 20 MG/1
20 TABLET, FILM COATED ORAL AT BEDTIME
Refills: 0 | Status: DISCONTINUED | OUTPATIENT
Start: 2024-02-12 | End: 2024-02-14

## 2024-02-12 RX ORDER — LIPASE/PROTEASE/AMYLASE 16-48-48K
3 CAPSULE,DELAYED RELEASE (ENTERIC COATED) ORAL
Refills: 0 | Status: DISCONTINUED | OUTPATIENT
Start: 2024-02-12 | End: 2024-02-14

## 2024-02-12 RX ORDER — DOCUSATE SODIUM 100 MG
1 CAPSULE ORAL
Refills: 0 | DISCHARGE

## 2024-02-12 RX ORDER — METOPROLOL TARTRATE 50 MG
100 TABLET ORAL DAILY
Refills: 0 | Status: DISCONTINUED | OUTPATIENT
Start: 2024-02-12 | End: 2024-02-14

## 2024-02-12 RX ORDER — DEXTROSE 50 % IN WATER 50 %
15 SYRINGE (ML) INTRAVENOUS ONCE
Refills: 0 | Status: DISCONTINUED | OUTPATIENT
Start: 2024-02-12 | End: 2024-02-14

## 2024-02-12 RX ORDER — OXYCODONE AND ACETAMINOPHEN 5; 325 MG/1; MG/1
2 TABLET ORAL
Refills: 0 | DISCHARGE

## 2024-02-12 RX ADMIN — Medication 1 DROP(S): at 12:40

## 2024-02-12 RX ADMIN — SIMVASTATIN 20 MILLIGRAM(S): 20 TABLET, FILM COATED ORAL at 22:07

## 2024-02-12 RX ADMIN — KETOTIFEN FUMARATE 1 DROP(S): 0.34 SOLUTION OPHTHALMIC at 12:42

## 2024-02-12 RX ADMIN — Medication 3 CAPSULE(S): at 12:41

## 2024-02-12 RX ADMIN — Medication 75 MILLIGRAM(S): at 12:43

## 2024-02-12 RX ADMIN — Medication 3 CAPSULE(S): at 17:59

## 2024-02-12 RX ADMIN — APIXABAN 5 MILLIGRAM(S): 2.5 TABLET, FILM COATED ORAL at 17:59

## 2024-02-12 RX ADMIN — LEVETIRACETAM 500 MILLIGRAM(S): 250 TABLET, FILM COATED ORAL at 22:08

## 2024-02-12 RX ADMIN — Medication 75 MILLIGRAM(S): at 18:00

## 2024-02-12 RX ADMIN — SACUBITRIL AND VALSARTAN 1 TABLET(S): 24; 26 TABLET, FILM COATED ORAL at 17:59

## 2024-02-12 RX ADMIN — Medication 3 CAPSULE(S): at 22:07

## 2024-02-12 RX ADMIN — Medication 1 DROP(S): at 12:41

## 2024-02-12 RX ADMIN — MONTELUKAST 10 MILLIGRAM(S): 4 TABLET, CHEWABLE ORAL at 12:44

## 2024-02-12 NOTE — CHART NOTE - NSCHARTNOTEFT_GEN_A_CORE
I stop reviewed: meds listed dispensed 2/2/2024    Pregabalin  75mg  60 tabs  30 days  Percocet 5/325mg  90 tabs 30 days  Hydrocodone ER  30mg  60 tabs  30 days

## 2024-02-12 NOTE — H&P ADULT - HISTORY OF PRESENT ILLNESS
Patient is an 84yo M w/ pmhx of HTN, HLD, DM, CAD, PPM, AFib on Eliquis, CHF (Last EF 35-40%), COPD on 3L home O2, chronic pancreatitis, chronic back patient 2/2 multilevel cervical spondylosis and spondylolisthesis, worse at C3-4 and severe left/moderate-severe right foraminal stenosis, cataract, seizure disorder, presenting for a scheduled VEEG. Reports first episode of seizure in 2002, and recently had two seizures in the past two months both while he was at pain management doctors office. Reports feeling lightheaded prior to seizures followed by rhythmic shaking all over the body. Denies incontinence tongue biting, loss of consciousness. Reports the episodes lasted about 10-15mintues. Currently denies fever, chills, n/v, chest pain, sob, abdominal pain, changes in BMs, or any urinary sxs.

## 2024-02-12 NOTE — PATIENT PROFILE ADULT - NSPRONUTRITIONRISK_GEN_A_NUR
Mother informed, would like RX sent to [place], [place]. Also mother mentioned that pt was taken to ER yesterday, states pt was tested positive with Flu B. Mother states pt was too long outside of window to get treatment. No indicators present

## 2024-02-12 NOTE — CONSULT NOTE ADULT - SUBJECTIVE AND OBJECTIVE BOX
Neurology/Epilepsy Consult:    EZ ALCARAZ 83y Male  MRN-[id number]    Patient is a 83y old right-handed Male who presents for elective VEEG monitoring      HPI: History obtained from patient and HHA. I-STOP, EMR, and outpatient records reviewed. Patient is not the best historian,  Per patient, he was first told he was having seizures in  when had an episode of fall with LOC, admitted to [facility] and recommended Keppra that he self-discontinued afterwards.  Patient reports being dizzy when getting out of bed, multiple prior falls, some unwitnessed and not associated with LOC. Most recent fall was at the end of 2023, patient did not seek medical care.  Patient was seen at [facility] in 2023 after a fall with LOC/possible seizure while in subacute rehab. He was not recommended seizure medications.   Since then he was hospitalized at [facility] for possible seizure and was restarted on Keppra in the fall . He reports taking it most of the time, may forget evening dose occasionally.  On 2024 patient was evaluated at [facility] ED after an episode of possible seizure at the pain [facility] office. Patient was walking back from the bathroom when was reported to appear weak with knees buckling. He was assisted by HHA to sitting position, then while in the chair became unresponsive and was noted to have extremities jerking. Patient was reported to be confused/nonverbal upon EMS arrival, but was able to communicated in the ED.   Patient was seen by Jay Christian recently for initial evaluation and recommended admission to EMU.  Patient states he is not using C-PAP since his machine is broken for a while. He also reports forgetting that some of his medications should be taken more than once a day.        PAST MEDICAL & SURGICAL HISTORY:  Seizures  DM  Hypertension  PPM  Dyslipidemia  Chronic pancreatitis  PAD  COPD  Severe CHESTER mot using C-PAP  Degenerative spine disease  Hiatal hernia  GERD  Glaucoma  A-fib  H/O intestinal obstruction  History of cholecystectomy  History of pancreatic surgery  History of penile implant          FAMILY HISTORY:  No seizures        Social History:  Lives alone  Has HHA 5 hours/day Mon-Fri  Denies current use of ETOH  Denies recreational drug use  Quit smoking  Currently not employed, used to work as         Allergy:  No Known Allergies        Home Medications:  amLODIPine 10 mg oral tablet: 1 tab(s) orally once a day in the morning  azelastine 0.05% ophthalmic solution: 1gtt in each affected eye   docusate sodium 100 mg oral tablet: 1 tab(s) orally once a day (at bedtime)   Eliquis 5 mg tablet: 1 tab(s) orally every 12 hours   Entresto 49 mg-51 mg oral tablet: 1 tab(s) orally 2 times a day   furosemide 20 mg oral tablet: 1 tab(s) orally once a day in the morning  ivabradine 5 mg oral tablet: 0.5 tab(s) orally 2 times a day   Keppra 500 mg oral tablet: 1 tab(s) orally 2 times a day  ketorolac 0.4% ophthalmic solution: 1 drop(s) in R eye 4 times a day   Lyrica 75 mg oral capsule: 1 cap(s) orally 2 times a day   metoprolol succinate 100 mg oral tablet, extended release: 1 tab(s) orally once a day in the morning  montelukast 10 mg oral tablet: 1 tab(s) orally once a day in the morning  oxyMORphone 30 mg oral tablet, extended release: 1 tab(s) orally every 12 hours as needed for pain   pancrelipase 12,000 units-38,000 units-60,000 units oral delayed release capsule: 3 cap(s) orally every 6 hours   pantoprazole 40 mg oral delayed release tablet: 1 tab(s) orally once a day in the morning  Percocet 10 mg-325 mg oral tablet: 1 tab(s) orally every 8 hours as needed for  severe pain  prednisoLONE sodium phosphate 1% ophthalmic solution: 1 drop(s) in R eye once a day   simvastatin 20 mg oral tablet: 1 tab(s) orally once a day (at bedtime)   Trulicity Pen 0.75 mg/0.5 mL subcutaneous solution: 0.75 subcutaneously   Vitamin D2 50,000IU weekly      MEDICATIONS  (STANDING):  amLODIPine   Tablet 10 milliGRAM(s) Oral daily  apixaban 5 milliGRAM(s) Oral every 12 hours  dextrose 5%. 1000 milliLiter(s) (50 mL/Hr) IV Continuous <Continuous>  dextrose 5%. 1000 milliLiter(s) (100 mL/Hr) IV Continuous <Continuous>  dextrose 50% Injectable 25 Gram(s) IV Push once  dextrose 50% Injectable 25 Gram(s) IV Push once  dextrose 50% Injectable 12.5 Gram(s) IV Push once  furosemide    Tablet 20 milliGRAM(s) Oral daily  glucagon  Injectable 1 milliGRAM(s) IntraMuscular once  insulin lispro (ADMELOG) corrective regimen sliding scale   SubCutaneous three times a day before meals  ketorolac 0.5% Ophthalmic Solution 1 Drop(s) Right EYE daily  ketotifen 0.025% Ophthalmic Solution 1 Drop(s) Left EYE daily  levETIRAcetam 500 milliGRAM(s) Oral every 12 hours  metoprolol succinate  milliGRAM(s) Oral daily  montelukast 10 milliGRAM(s) Oral daily  pancrelipase  (CREON 12,000 Lipase Units) 3 Capsule(s) Oral four times a day with meals  pantoprazole    Tablet 40 milliGRAM(s) Oral before breakfast  prednisoLONE acetate 1% Suspension 1 Drop(s) Right EYE daily  pregabalin 75 milliGRAM(s) Oral two times a day  sacubitril 49 mG/valsartan 51 mG 1 Tablet(s) Oral two times a day  simvastatin 20 milliGRAM(s) Oral at bedtime    MEDICATIONS  (PRN):  dextrose Oral Gel 15 Gram(s) Oral once PRN Blood Glucose LESS THAN 70 milliGRAM(s)/deciliter  LORazepam   Injectable 2 milliGRAM(s) IV Push three times a day PRN generalized tonic-clonic seizure lasting longer than 2 minutes, or two consecutive seizures without return to baseline in-between  melatonin 3 milliGRAM(s) Oral at bedtime PRN Insomnia  oxycodone    5 mG/acetaminophen 325 mG 2 Tablet(s) Oral every 8 hours PRN for severe pain          T(F): 96.7 (24 @ 10:30), Max: 96.7 (24 @ 10:30)  HR: 65 (24 @ 10:30) (65 - 65)  BP: 145/79 (24 @ 10:30) (145/79 - 145/79)  RR: 16 (24 @ 10:30) (16 - 16)  SpO2: --        Neurologic Examination:  General:  Appearance is consistent with chronologic age.  No abnormal facies.   General: The patient is oriented to person, place, time and date.  Follows 4-step directions. Fund of knowledge is intact and normal.  Language with normal repetition, comprehension and naming.  Nondysarthric.    Cranial nerves: EOMI w/o nystagmus, skew or reported double vision.  PERRL.  No ptosis/weakness of eyelid closure.  Facial sensation is normal with normal bite.  No facial asymmetry.  Hearing grossly intact b/l.  Palate elevates midline.  Tongue midline.  Motor examination:   No tenderness, twitching, tremors or involuntary movements.  Formal Muscle Strength Testin/5 UE; 4/5 LE.  No observable drift.  Reflexes:   2+ b/l   Sensory examination:   Intact to touch and pinprick.  Cerebellum:  No dysmetria or dysdiadokinesia.  Gait  - able to ambulate, unsteady upon getting up and with turns, needs 1-person assistance.         Labs:   2024 Keppra 21/9mcg/ml (random level;)  TSH 1.63, Vit D 60.9, Folate >20.0, B12 >2000,           Neuroimaging:  CT Head:   < from: CT Head No Cont (24 @ 17:46) >  Findings:    No significant change in generalized cortical volume loss, similar to   prior exam.    There is no acute intracranial hemorrhage, mass-effect or midline shift.    There is no extra-axial collection.    The visualized paranasal sinuses and mastoid complexes are unremarkable.    Impression:  1. Since 2023, no definite evidence of acute intracranial   hemorrhage or mass effect.    < end of copied text >          REEG 2024 - normal  REEG 2023 - normal  REEG 2022 - mild to moderate generalized slowing          Assessment:  This is a 83y Male with h/o DM, HTN, DLD, A-fib, PPM, PAD, COPD, chronic pancreatitis, severe CHESTER mot using C-PAP, degenerative spine disease, GERD, glaucoma, recurrent falls, and possible seizures. Patient was started on Keppra few months ago at [facility] that he reports taking most of the time.         Discussed with Dr. Thompson        Plan:   - VEEG monitoring for better characterization and treatment plan  - Seizure precautions  - Continue Keppra 75mg q12hrs for now(ordered)  - Ativan 2mg IV PRN for generalized tonic-clonic seizure lasting longer than 2 minutes, or two consecutive seizures without return to baseline in-between (ordered)  - CBC, CMP, Mg, CK, AED levels trough (ordered)  - Keep Mg above 2  - Orthostatic vital signs  - PT    Plan discussed with patient in details, all questions answered.    Discussed with nursing team and PA. Neurology/Epilepsy Consult:    EZ ALCARAZ 83y Male  MRN-[id number]    Patient is a 83y old right-handed Male who presents for elective VEEG monitoring      HPI: History obtained from patient and HHA. I-STOP, EMR, and outpatient records reviewed. Patient is not the best historian,  Per patient, he was first told he was having seizures in  when had an episode of fall with LOC, admitted to [facility] and recommended Keppra that he self-discontinued afterwards.  Patient reports being dizzy when getting out of bed, multiple prior falls, some unwitnessed and not associated with LOC. Most recent fall was at the end of 2023, patient did not seek medical care.  Patient was seen at [facility] in 2023 after a fall with LOC/possible seizure while in subacute rehab. He was not recommended seizure medications.   Since then he was hospitalized at [facility] for possible seizure and was restarted on Keppra in the fall . He reports taking it most of the time, may forget evening dose occasionally.  On 2024 patient was evaluated at [facility] ED after an episode of possible seizure at the pain [facility] office. Patient was walking back from the bathroom when was reported to appear weak with knees buckling. He was assisted by HHA to sitting position, then while in the chair became unresponsive and was noted to have extremities jerking. Patient was reported to be confused/nonverbal upon EMS arrival, but was able to communicated in the ED.   Patient was seen by Jay Christian recently for initial evaluation and recommended admission to EMU.  Patient states he is not using C-PAP since his machine is broken for a while. He also reports forgetting that some of his medications should be taken more than once a day.        PAST MEDICAL & SURGICAL HISTORY:  Seizures  DM  Hypertension  PPM  Dyslipidemia  Chronic pancreatitis  PAD  COPD  Severe CHESTER mot using C-PAP  Degenerative spine disease  Hiatal hernia  GERD  Glaucoma  A-fib  H/O intestinal obstruction  History of cholecystectomy  History of pancreatic surgery  History of penile implant          FAMILY HISTORY:  No seizures        Social History:  Lives alone  Has HHA 5 hours/day Mon-Fri  Denies current use of ETOH  Denies recreational drug use  Quit smoking  Currently not employed, used to work as         Allergy:  No Known Allergies        Home Medications:  amLODIPine 10 mg oral tablet: 1 tab(s) orally once a day in the morning  azelastine 0.05% ophthalmic solution: 1gtt in each affected eye   docusate sodium 100 mg oral tablet: 1 tab(s) orally once a day (at bedtime)   Eliquis 5 mg tablet: 1 tab(s) orally every 12 hours   Entresto 49 mg-51 mg oral tablet: 1 tab(s) orally 2 times a day   furosemide 20 mg oral tablet: 1 tab(s) orally once a day in the morning  ivabradine 5 mg oral tablet: 0.5 tab(s) orally 2 times a day   Keppra 500 mg oral tablet: 1 tab(s) orally 2 times a day  ketorolac 0.4% ophthalmic solution: 1 drop(s) in R eye 4 times a day   Lyrica 75 mg oral capsule: 1 cap(s) orally 2 times a day   metoprolol succinate 100 mg oral tablet, extended release: 1 tab(s) orally once a day in the morning  montelukast 10 mg oral tablet: 1 tab(s) orally once a day in the morning  oxyMORphone 30 mg oral tablet, extended release: 1 tab(s) orally every 12 hours as needed for pain   pancrelipase 12,000 units-38,000 units-60,000 units oral delayed release capsule: 3 cap(s) orally every 6 hours   pantoprazole 40 mg oral delayed release tablet: 1 tab(s) orally once a day in the morning  Percocet 10 mg-325 mg oral tablet: 1 tab(s) orally every 8 hours as needed for  severe pain  prednisoLONE sodium phosphate 1% ophthalmic solution: 1 drop(s) in R eye once a day   simvastatin 20 mg oral tablet: 1 tab(s) orally once a day (at bedtime)   Trulicity Pen 0.75 mg/0.5 mL subcutaneous solution: 0.75 subcutaneously   Vitamin D2 50,000IU weekly      MEDICATIONS  (STANDING):  amLODIPine   Tablet 10 milliGRAM(s) Oral daily  apixaban 5 milliGRAM(s) Oral every 12 hours  dextrose 5%. 1000 milliLiter(s) (50 mL/Hr) IV Continuous <Continuous>  dextrose 5%. 1000 milliLiter(s) (100 mL/Hr) IV Continuous <Continuous>  dextrose 50% Injectable 25 Gram(s) IV Push once  dextrose 50% Injectable 25 Gram(s) IV Push once  dextrose 50% Injectable 12.5 Gram(s) IV Push once  furosemide    Tablet 20 milliGRAM(s) Oral daily  glucagon  Injectable 1 milliGRAM(s) IntraMuscular once  insulin lispro (ADMELOG) corrective regimen sliding scale   SubCutaneous three times a day before meals  ketorolac 0.5% Ophthalmic Solution 1 Drop(s) Right EYE daily  ketotifen 0.025% Ophthalmic Solution 1 Drop(s) Left EYE daily  levETIRAcetam 500 milliGRAM(s) Oral every 12 hours  metoprolol succinate  milliGRAM(s) Oral daily  montelukast 10 milliGRAM(s) Oral daily  pancrelipase  (CREON 12,000 Lipase Units) 3 Capsule(s) Oral four times a day with meals  pantoprazole    Tablet 40 milliGRAM(s) Oral before breakfast  prednisoLONE acetate 1% Suspension 1 Drop(s) Right EYE daily  pregabalin 75 milliGRAM(s) Oral two times a day  sacubitril 49 mG/valsartan 51 mG 1 Tablet(s) Oral two times a day  simvastatin 20 milliGRAM(s) Oral at bedtime    MEDICATIONS  (PRN):  dextrose Oral Gel 15 Gram(s) Oral once PRN Blood Glucose LESS THAN 70 milliGRAM(s)/deciliter  LORazepam   Injectable 2 milliGRAM(s) IV Push three times a day PRN generalized tonic-clonic seizure lasting longer than 2 minutes, or two consecutive seizures without return to baseline in-between  melatonin 3 milliGRAM(s) Oral at bedtime PRN Insomnia  oxycodone    5 mG/acetaminophen 325 mG 2 Tablet(s) Oral every 8 hours PRN for severe pain          T(F): 96.7 (24 @ 10:30), Max: 96.7 (24 @ 10:30)  HR: 65 (24 @ 10:30) (65 - 65)  BP: 145/79 (24 @ 10:30) (145/79 - 145/79)  RR: 16 (24 @ 10:30) (16 - 16)  SpO2: --        Neurologic Examination:  General:  Appearance is consistent with chronologic age.  No abnormal facies.   General: The patient is oriented to person, place, time and date.  Follows 4-step directions. Fund of knowledge is intact and normal.  Language with normal repetition, comprehension and naming.  Nondysarthric.    Cranial nerves: EOMI w/o nystagmus, skew or reported double vision.  PERRL.  No ptosis/weakness of eyelid closure.  Facial sensation is normal with normal bite.  No facial asymmetry.  Hearing grossly intact b/l.  Palate elevates midline.  Tongue midline.  Motor examination:   No twitching, tremors or involuntary movements.  Formal Muscle Strength Testin/5 UE; 4/5 LE.    Reflexes:   2+ b/l   Sensory examination:   Intact to touch and pinprick.  Cerebellum:  No dysmetria or dysdiadokinesia.  Gait  - able to ambulate, unsteady upon getting up and with turns, needs 1-person assistance.         Labs:   2024 Keppra 21/9mcg/ml (random level;)  TSH 1.63, Vit D 60.9, Folate >20.0, B12 >2000,           Neuroimaging:  CT Head:   < from: CT Head No Cont (24 @ 17:46) >  Findings:    No significant change in generalized cortical volume loss, similar to   prior exam.    There is no acute intracranial hemorrhage, mass-effect or midline shift.    There is no extra-axial collection.    The visualized paranasal sinuses and mastoid complexes are unremarkable.    Impression:  1. Since 2023, no definite evidence of acute intracranial   hemorrhage or mass effect.    < end of copied text >          REEG 2024 - normal  REEG 2023 - normal  REEG 2022 - mild to moderate generalized slowing          Assessment:  This is a 83y Male with h/o DM, HTN, DLD, A-fib, PPM, PAD, COPD, chronic pancreatitis, severe CHESTER mot using C-PAP, degenerative spine disease, GERD, glaucoma, recurrent falls, and possible seizures. Patient was started on Keppra few months ago at [facility] that he reports taking most of the time.         Discussed with Dr. Thompson        Plan:   - VEEG monitoring for better characterization and treatment plan  - Seizure precautions  - Continue Keppra 75mg q12hrs for now(ordered)  - Ativan 2mg IV PRN for generalized tonic-clonic seizure lasting longer than 2 minutes, or two consecutive seizures without return to baseline in-between (ordered)  - CBC, CMP, Mg, CK, AED levels trough (ordered)  - Keep Mg above 2  - Orthostatic vital signs  - PT    Plan discussed with patient in details, all questions answered.    Discussed with nursing team and PA.

## 2024-02-12 NOTE — H&P ADULT - ASSESSMENT
Patient is an 84yo M w/ pmhx of HTN, HLD, DM, CAD, PPM, AFib on Eliquis, CHF (Last EF 35-40%), COPD on 3L home O2, chronic pancreatitis, chronic back patient 2/2 multilevel cervical spondylosis and spondylolisthesis, worse at C3-4 and severe left/moderate-severe right foraminal stenosis, cataract, seizure disorder, presenting for a scheduled VEEG. Reports first episode of seizure in 2002, and recently had two seizures in the past two months both while he was at pain management doctors office. Reports feeling lightheaded prior to seizures followed by rhythmic shaking all over the body. Denies incontinence tongue biting, loss of consciousness. Reports the episodes lasted about 10-15mintues. Currently denies fever, chills, n/v, chest pain, sob, abdominal pain, changes in BMs, or any urinary sxs.     #Seizure disorder  - VEEG  - c/w Keppra  - Keppra serum levels  - seizure precaution  - Ativan 2mg IVP prn for status epilepticus lasting longer than 2mins of 2 consecutive seizures w/out return to baseline  - Neuro checks  - Keep Mg above 2  - BMP  - Neuro Consult     #DM  - Hold home meds  - ISS  - Monitor FS    #HTN  - c/w Entresto and Norvasc  - monitor vitals     #HLD  #CAD  #Atrial Fibrillation  #CHF (EF35-40%)  #PPM  - c/w Eliquis  - c/w simvastatin   - c/w furosemide and Corlanor     #COPD  - on 3L home O2  - Supplement O2 as needed    #Chronic back patient 2/2 multilevel cervical spondylosis and spondylolisthesis, worse at C3-4  #severe left/moderate-severe right foraminal stenosis  - Patient on percocet, pregabalin, and oxymorphone   - ISTOP [id number]    #Chronic Pancreatitis  - c/w Creon     Diet: DASH  Activity: Seizure precaution   VTE PPX: Eliquis       Plan Discussed and approved by attending on call.   Patient is an 84yo M w/ pmhx of HTN, HLD, DM, CAD, PPM, AFib on Eliquis, CHF (Last EF 35-40%), COPD on 3L home O2, chronic pancreatitis, chronic back patient 2/2 multilevel cervical spondylosis and spondylolisthesis, worse at C3-4 and severe left/moderate-severe right foraminal stenosis, cataract, seizure disorder, presenting for a scheduled VEEG. Reports first episode of seizure in 2002, and recently had two seizures in the past two months both while he was at pain management doctors office. Reports feeling lightheaded prior to seizures followed by rhythmic shaking all over the body. Denies incontinence tongue biting, loss of consciousness. Reports the episodes lasted about 10-15mintues. Currently denies fever, chills, n/v, chest pain, sob, abdominal pain, changes in BMs, or any urinary sxs.     #Seizure disorder  - VEEG  - c/w Keppra  - Keppra serum levels  - seizure precaution  - Ativan 2mg IVP prn for status epilepticus lasting longer than 2mins of 2 consecutive seizures w/out return to baseline  - Neuro checks  - Keep Mg above 2  - BMP  - Neuro Consult     #DM  - Hold home meds  - ISS  - Monitor FS    #HLD/HTN  #CAD  #Atrial Fibrillation s/p PPM  #CHF (EF35-40%)  - c/w Eliquis  - c/w simvastatin   - c/w furosemide and ivabradine, entresto and norvasc    #COPD  - on 3L home O2  - Supplement O2 as needed    #Chronic back patient 2/2 multilevel cervical spondylosis and spondylolisthesis, worse at C3-4  #severe left/moderate-severe right foraminal stenosis  - Patient on percocet, pregabalin, and oxymorphone   - ISTOP [id number]    #Chronic Pancreatitis  - c/w Creon     Diet: DASH  Activity: Seizure precaution   VTE PPX: Eliquis       Plan Discussed and approved by attending on call.

## 2024-02-12 NOTE — H&P ADULT - NS ATTEND AMEND GEN_ALL_CORE FT
I have made amendments to the note above where applicable. 83 year old 82yo M w/ pmhx of HTN, HLD, DM, CAD, PPM, AFib on Eliquis, CHF (Last EF 35-40%), COPD on 3L home O2, chronic pancreatitis, chronic back patient 2/2 multilevel cervical spondylosis and spondylolisthesis, worse at C3-4 and severe left/moderate-severe right foraminal stenosis, cataract, seizure disorder, presenting for a scheduled VEEG. Continue with vEEG, neuro consult pending. Ativan for Seizure > 2minutes

## 2024-02-12 NOTE — H&P ADULT - NSHPPHYSICALEXAM_GEN_ALL_CORE
Vital Signs Last 24 Hrs  T(C): 35.9 (12 Feb 2024 10:30), Max: 35.9 (12 Feb 2024 10:30)  T(F): 96.7 (12 Feb 2024 10:30), Max: 96.7 (12 Feb 2024 10:30)  HR: 65 (12 Feb 2024 10:30) (65 - 65)  BP: 145/79 (12 Feb 2024 10:30) (145/79 - 145/79)  RR: 16 (12 Feb 2024 10:30) (16 - 16)      GENERAL:  82y/o Male NAD, resting comfortably.  HEAD:  Atraumatic, Normocephalic  EYES: EOMI, conjunctiva and sclera clear  CHEST/LUNG: Clear to auscultation bilaterally  HEART: S1&S2  ABDOMEN: Soft, Nontender, Nondistended x 4 quadrants; Bowel sounds present  EXTREMITIES:   Peripheral Pulses Present, No clubbing, no cyanosis, or no edema, no calf tenderness  PSYCH: AAOx3, cooperative, appropriate  NEUROLOGY: WNL  SKIN: WNL

## 2024-02-13 LAB
A1C WITH ESTIMATED AVERAGE GLUCOSE RESULT: 6.2 % — HIGH (ref 4–5.6)
ANION GAP SERPL CALC-SCNC: 12 MMOL/L — SIGNIFICANT CHANGE UP (ref 7–14)
BUN SERPL-MCNC: 8 MG/DL — LOW (ref 10–20)
CALCIUM SERPL-MCNC: 8.7 MG/DL — SIGNIFICANT CHANGE UP (ref 8.4–10.5)
CHLORIDE SERPL-SCNC: 103 MMOL/L — SIGNIFICANT CHANGE UP (ref 98–110)
CO2 SERPL-SCNC: 24 MMOL/L — SIGNIFICANT CHANGE UP (ref 17–32)
CREAT SERPL-MCNC: 0.7 MG/DL — SIGNIFICANT CHANGE UP (ref 0.7–1.5)
EGFR: 92 ML/MIN/1.73M2 — SIGNIFICANT CHANGE UP
ESTIMATED AVERAGE GLUCOSE: 131 MG/DL — HIGH (ref 68–114)
GLUCOSE BLDC GLUCOMTR-MCNC: 128 MG/DL — HIGH (ref 70–99)
GLUCOSE BLDC GLUCOMTR-MCNC: 129 MG/DL — HIGH (ref 70–99)
GLUCOSE BLDC GLUCOMTR-MCNC: 84 MG/DL — SIGNIFICANT CHANGE UP (ref 70–99)
GLUCOSE BLDC GLUCOMTR-MCNC: 93 MG/DL — SIGNIFICANT CHANGE UP (ref 70–99)
GLUCOSE SERPL-MCNC: 83 MG/DL — SIGNIFICANT CHANGE UP (ref 70–99)
HCT VFR BLD CALC: 37.9 % — LOW (ref 42–52)
HGB BLD-MCNC: 13.4 G/DL — LOW (ref 14–18)
MAGNESIUM SERPL-MCNC: 1.5 MG/DL — LOW (ref 1.8–2.4)
MCHC RBC-ENTMCNC: 31.2 PG — HIGH (ref 27–31)
MCHC RBC-ENTMCNC: 35.4 G/DL — SIGNIFICANT CHANGE UP (ref 32–37)
MCV RBC AUTO: 88.1 FL — SIGNIFICANT CHANGE UP (ref 80–94)
NRBC # BLD: 0 /100 WBCS — SIGNIFICANT CHANGE UP (ref 0–0)
PLATELET # BLD AUTO: 205 K/UL — SIGNIFICANT CHANGE UP (ref 130–400)
PMV BLD: 10.3 FL — SIGNIFICANT CHANGE UP (ref 7.4–10.4)
POTASSIUM SERPL-MCNC: 3.5 MMOL/L — SIGNIFICANT CHANGE UP (ref 3.5–5)
POTASSIUM SERPL-SCNC: 3.5 MMOL/L — SIGNIFICANT CHANGE UP (ref 3.5–5)
RBC # BLD: 4.3 M/UL — LOW (ref 4.7–6.1)
RBC # FLD: 15 % — HIGH (ref 11.5–14.5)
SODIUM SERPL-SCNC: 139 MMOL/L — SIGNIFICANT CHANGE UP (ref 135–146)
WBC # BLD: 4.22 K/UL — LOW (ref 4.8–10.8)
WBC # FLD AUTO: 4.22 K/UL — LOW (ref 4.8–10.8)

## 2024-02-13 PROCEDURE — 99231 SBSQ HOSP IP/OBS SF/LOW 25: CPT

## 2024-02-13 PROCEDURE — 95720 EEG PHY/QHP EA INCR W/VEEG: CPT

## 2024-02-13 RX ORDER — MAGNESIUM OXIDE 400 MG ORAL TABLET 241.3 MG
400 TABLET ORAL
Refills: 0 | Status: DISCONTINUED | OUTPATIENT
Start: 2024-02-13 | End: 2024-02-14

## 2024-02-13 RX ORDER — LEVETIRACETAM 250 MG/1
250 TABLET, FILM COATED ORAL AT BEDTIME
Refills: 0 | Status: ACTIVE | OUTPATIENT
Start: 2024-02-13 | End: 2025-01-11

## 2024-02-13 RX ORDER — DIPHENHYDRAMINE HCL 50 MG
25 CAPSULE ORAL ONCE
Refills: 0 | Status: COMPLETED | OUTPATIENT
Start: 2024-02-13 | End: 2024-02-13

## 2024-02-13 RX ORDER — LEVETIRACETAM 250 MG/1
125 TABLET, FILM COATED ORAL ONCE
Refills: 0 | Status: COMPLETED | OUTPATIENT
Start: 2024-02-13 | End: 2024-02-13

## 2024-02-13 RX ADMIN — KETOTIFEN FUMARATE 1 DROP(S): 0.34 SOLUTION OPHTHALMIC at 08:45

## 2024-02-13 RX ADMIN — Medication 25 MILLIGRAM(S): at 01:57

## 2024-02-13 RX ADMIN — SACUBITRIL AND VALSARTAN 1 TABLET(S): 24; 26 TABLET, FILM COATED ORAL at 06:38

## 2024-02-13 RX ADMIN — Medication 20 MILLIGRAM(S): at 06:38

## 2024-02-13 RX ADMIN — APIXABAN 5 MILLIGRAM(S): 2.5 TABLET, FILM COATED ORAL at 06:38

## 2024-02-13 RX ADMIN — PANTOPRAZOLE SODIUM 40 MILLIGRAM(S): 20 TABLET, DELAYED RELEASE ORAL at 06:38

## 2024-02-13 RX ADMIN — APIXABAN 5 MILLIGRAM(S): 2.5 TABLET, FILM COATED ORAL at 17:45

## 2024-02-13 RX ADMIN — Medication 3 CAPSULE(S): at 12:00

## 2024-02-13 RX ADMIN — Medication 100 MILLIGRAM(S): at 06:38

## 2024-02-13 RX ADMIN — Medication 3 CAPSULE(S): at 21:18

## 2024-02-13 RX ADMIN — MONTELUKAST 10 MILLIGRAM(S): 4 TABLET, CHEWABLE ORAL at 12:00

## 2024-02-13 RX ADMIN — Medication 75 MILLIGRAM(S): at 17:51

## 2024-02-13 RX ADMIN — AMLODIPINE BESYLATE 10 MILLIGRAM(S): 2.5 TABLET ORAL at 06:38

## 2024-02-13 RX ADMIN — Medication 2 CAPSULE(S): at 17:44

## 2024-02-13 RX ADMIN — MAGNESIUM OXIDE 400 MG ORAL TABLET 400 MILLIGRAM(S): 241.3 TABLET ORAL at 12:00

## 2024-02-13 RX ADMIN — Medication 1 DROP(S): at 08:45

## 2024-02-13 RX ADMIN — MAGNESIUM OXIDE 400 MG ORAL TABLET 400 MILLIGRAM(S): 241.3 TABLET ORAL at 08:47

## 2024-02-13 RX ADMIN — LEVETIRACETAM 125 MILLIGRAM(S): 250 TABLET, FILM COATED ORAL at 10:46

## 2024-02-13 RX ADMIN — Medication 75 MILLIGRAM(S): at 06:38

## 2024-02-13 RX ADMIN — LEVETIRACETAM 250 MILLIGRAM(S): 250 TABLET, FILM COATED ORAL at 21:18

## 2024-02-13 RX ADMIN — SACUBITRIL AND VALSARTAN 1 TABLET(S): 24; 26 TABLET, FILM COATED ORAL at 17:46

## 2024-02-13 RX ADMIN — SIMVASTATIN 20 MILLIGRAM(S): 20 TABLET, FILM COATED ORAL at 21:18

## 2024-02-13 RX ADMIN — MAGNESIUM OXIDE 400 MG ORAL TABLET 400 MILLIGRAM(S): 241.3 TABLET ORAL at 17:44

## 2024-02-13 RX ADMIN — Medication 3 CAPSULE(S): at 08:43

## 2024-02-13 NOTE — PHYSICAL THERAPY INITIAL EVALUATION ADULT - PERTINENT HX OF CURRENT PROBLEM, REHAB EVAL
Patient is an 84yo M w/ pmhx of HTN, HLD, DM, CAD, PPM, AFib on Eliquis, CHF (Last EF 35-40%), COPD on 3L home O2, chronic pancreatitis, chronic back patient 2/2 multilevel cervical spondylosis and spondylolisthesis, worse at C3-4 and severe left/moderate-severe right foraminal stenosis, cataract, seizure disorder, presenting for a scheduled VEEG. Reports first episode of seizure in 2002, and recently had two seizures in the past two months both while he was at pain management doctors office. Reports feeling lightheaded prior to seizures followed by rhythmic shaking all over the body  Seizure disorder, VEEG

## 2024-02-13 NOTE — PHYSICAL THERAPY INITIAL EVALUATION ADULT - ADDITIONAL COMMENTS
pt lives alone in  apartment building with elevator/no stairs, amb with rollator prior to admission, has HHA 5 hours 5 days/week

## 2024-02-13 NOTE — CHART NOTE - NSCHARTNOTEFT_GEN_A_CORE
Epilepsy NP:    Orthostatic vitals checked at 11 am:    Supine /68, HR 60  Sitting /72, HR 59  Standing /76, HR 62    Patient reported having lightheadedness with every change in position

## 2024-02-13 NOTE — PHYSICAL THERAPY INITIAL EVALUATION ADULT - THERAPY FREQUENCY, PT EVAL
DOV HENLEY             MRN-[id number]    CC: right hip/back pain     HPI: increasing back/hip pain with debility. Admitted 3/13/21, s/p right hip arthropasty at [facility] and (+) metastatic lung cancer new dx      SUBJECTIVE:    ROS:  DYSPNEA: Yes	  NAUS/VOM: No 	  SECRETIONS: No 	  AGITATION: No   Pain (Y/N): Yes back and right hip   -Provocation/Palliation: weight bearing it worsens   -Quality/Quantity: sharp/ throbbing  -Radiating: right hip to back   -Severity: 7/10   -Timing/Frequency: daily constant   -Impact on ADLs: pt has decreased ability to walk or stand     OTHER REVIEW OF SYSTEMS:  UNABLE TO OBTAIN  due to:    PEx:  72y  General: AAOx3    found OOB in chair, fatigued and weak   HEENT:  NCAT PERRL EOMI Non icteric MOM  Neck: Supple no masses  CVS: RR S1S2 edema general   Resp: Unlabored (+) tachypneic . decreased BS on right   GI:  Soft NT ND BS+  :   [place]  Musc: No C/C/E    Neuro: Follows commands No focal deficits  Psych: unhappy affect   Skin: Non jaundiced   Lymph: Normal                     Last BM: still no BM documented       ALLERGIES: Moxifloxin      Intolerances dairy, lactose, and shellfish     OPIATE NAÏVE (Y/N): no     MEDICATIONS: REVIEWED  MEDICATIONS  (STANDING):  ascorbic acid 500 milliGRAM(s) Oral daily  chlorhexidine 4% Liquid 1 Application(s) Topical <User Schedule>  enoxaparin Injectable 80 milliGRAM(s) SubCutaneous every 12 hours  morphine  - Injectable 4 milliGRAM(s) IV Push every 6 hours  multivitamin 1 Tablet(s) Oral daily  pantoprazole    Tablet 40 milliGRAM(s) Oral before breakfast  polyethylene glycol 3350 17 Gram(s) Oral daily  senna 2 Tablet(s) Oral at bedtime    MEDICATIONS  (PRN):  acetaminophen   Tablet .. 650 milliGRAM(s) Oral every 6 hours PRN Mild Pain (1 - 3)  bisacodyl Suppository 10 milliGRAM(s) Rectal daily PRN Constipation  lidocaine   Patch 1 Patch Transdermal daily PRN Back pain  morphine  - Injectable 4 milliGRAM(s) IV Push every 3 hours PRN mild to severe pain      LABS: REVIEWED  CBC:    CMP:        Albumin, Serum: 2.9 g/dL (03-27-21 @ 06:53)      IMAGING: REVIEWED    ADVANCED DIRECTIVES:       DNR/DNI  HCP     DECISION MAKER: Patient   LEGAL SURROGATE: sistermariah Vizcarra and Yaritza     PSYCHOSOCIAL-SPIRITUAL ASSESSMENT:       Reviewed       Care plan unchanged       Care plan adjusted as above	    GOALS OF CARE DISCUSSION       Palliative care info/counseling provided	           Family meeting       Advanced Directives addressed please see Advance Care Planning Note	           See previous Palliative Medicine Note       Documentation of GOC:    CURRENT DISPO PLAN:     SNF for rehab when medically stable     REFERRALS	        Palliative Med        Unit SW/Case Mgmt              Speech/Swallow       Patient/Family Support      Hospice       Nutrition       Dietician       PT/OT 3-5x/week

## 2024-02-14 ENCOUNTER — TRANSCRIPTION ENCOUNTER (OUTPATIENT)
Age: 84
End: 2024-02-14

## 2024-02-14 VITALS
SYSTOLIC BLOOD PRESSURE: 135 MMHG | OXYGEN SATURATION: 98 % | RESPIRATION RATE: 18 BRPM | DIASTOLIC BLOOD PRESSURE: 68 MMHG | TEMPERATURE: 96 F | HEART RATE: 64 BPM

## 2024-02-14 LAB
GLUCOSE BLDC GLUCOMTR-MCNC: 119 MG/DL — HIGH (ref 70–99)
GLUCOSE BLDC GLUCOMTR-MCNC: 96 MG/DL — SIGNIFICANT CHANGE UP (ref 70–99)

## 2024-02-14 PROCEDURE — 99231 SBSQ HOSP IP/OBS SF/LOW 25: CPT

## 2024-02-14 PROCEDURE — 99239 HOSP IP/OBS DSCHRG MGMT >30: CPT

## 2024-02-14 PROCEDURE — 95720 EEG PHY/QHP EA INCR W/VEEG: CPT

## 2024-02-14 RX ORDER — OXYMORPHONE HYDROCHLORIDE 10 MG/1
1 TABLET, EXTENDED RELEASE ORAL
Refills: 0 | DISCHARGE

## 2024-02-14 RX ORDER — LEVETIRACETAM 250 MG/1
1 TABLET, FILM COATED ORAL
Qty: 60 | Refills: 5
Start: 2024-02-14 | End: 2024-08-11

## 2024-02-14 RX ORDER — LEVETIRACETAM 250 MG/1
250 TABLET, FILM COATED ORAL EVERY 12 HOURS
Refills: 0 | Status: DISCONTINUED | OUTPATIENT
Start: 2024-02-14 | End: 2024-02-14

## 2024-02-14 RX ORDER — DIPHENHYDRAMINE HCL 50 MG
25 CAPSULE ORAL ONCE
Refills: 0 | Status: COMPLETED | OUTPATIENT
Start: 2024-02-14 | End: 2024-02-14

## 2024-02-14 RX ADMIN — MAGNESIUM OXIDE 400 MG ORAL TABLET 400 MILLIGRAM(S): 241.3 TABLET ORAL at 12:00

## 2024-02-14 RX ADMIN — MAGNESIUM OXIDE 400 MG ORAL TABLET 400 MILLIGRAM(S): 241.3 TABLET ORAL at 08:13

## 2024-02-14 RX ADMIN — MONTELUKAST 10 MILLIGRAM(S): 4 TABLET, CHEWABLE ORAL at 12:00

## 2024-02-14 RX ADMIN — Medication 25 MILLIGRAM(S): at 02:46

## 2024-02-14 RX ADMIN — Medication 75 MILLIGRAM(S): at 05:27

## 2024-02-14 RX ADMIN — SACUBITRIL AND VALSARTAN 1 TABLET(S): 24; 26 TABLET, FILM COATED ORAL at 05:26

## 2024-02-14 RX ADMIN — Medication 1 DROP(S): at 08:15

## 2024-02-14 RX ADMIN — KETOTIFEN FUMARATE 1 DROP(S): 0.34 SOLUTION OPHTHALMIC at 08:14

## 2024-02-14 RX ADMIN — LEVETIRACETAM 250 MILLIGRAM(S): 250 TABLET, FILM COATED ORAL at 11:59

## 2024-02-14 RX ADMIN — Medication 3 CAPSULE(S): at 12:00

## 2024-02-14 RX ADMIN — APIXABAN 5 MILLIGRAM(S): 2.5 TABLET, FILM COATED ORAL at 05:27

## 2024-02-14 RX ADMIN — AMLODIPINE BESYLATE 10 MILLIGRAM(S): 2.5 TABLET ORAL at 05:27

## 2024-02-14 RX ADMIN — PANTOPRAZOLE SODIUM 40 MILLIGRAM(S): 20 TABLET, DELAYED RELEASE ORAL at 05:27

## 2024-02-14 RX ADMIN — Medication 3 CAPSULE(S): at 08:12

## 2024-02-14 RX ADMIN — Medication 20 MILLIGRAM(S): at 05:27

## 2024-02-14 RX ADMIN — Medication 100 MILLIGRAM(S): at 05:27

## 2024-02-14 NOTE — DISCHARGE NOTE PROVIDER - NSDCFUSCHEDAPPT_GEN_ALL_CORE_FT
Clyde Evans  [facility] Physician Formerly [facility], 1927–1983  VASCULAR [address]  Scheduled Appointment: 02/21/2024    Cliff Cool  [facility] Physician Former[facility], 1927–[facility]  PULMMED [address]  Scheduled Appointment: 02/23/2024

## 2024-02-14 NOTE — DISCHARGE NOTE PROVIDER - CARE PROVIDER_API CALL
Fernanda Ayala NP in Family Health  [address] [address]  Phone: [phone]  Fax: [phone]  Follow Up Time:

## 2024-02-14 NOTE — PROGRESS NOTE ADULT - SUBJECTIVE AND OBJECTIVE BOX
Epilepsy Team Discharge Note:    VEEG monitoring completed, patient is cleared for discharge from neurology standpoint.    Follow up with Dr. Thompson as previously scheduled June 27, 2024 at 1:30 pm.    [address] [place] [address], [address]  [phone]    Discharge seizure medications are:  Keppra 250mg q12hrs (dose decreased)    Rx sent to the pharmacy.    Patient was also given Rx for CBC, CMP, Keppra level trough to be done prior to follow up.    Follow up with pulmonary regarding broken C-PAP machine (has appointment scheduled later this month).    Referred to  and  regarding vising nurse services and home PT.    Detailed written and verbal instructions regarding seizure/safety precautions and follow up plan are given to the patient, all questions answered. Patient verbalized understanding.    Discussed with medical and nursing teams.  
Epilepsy Attending Note:     EZ ALCARAZ    83y Male  MRN [id number]    Vital Signs Last 24 Hrs  T(C): 36.1 (13 Feb 2024 05:10), Max: 36.1 (13 Feb 2024 05:10)  T(F): 96.9 (13 Feb 2024 05:10), Max: 96.9 (13 Feb 2024 05:10)  HR: 64 (13 Feb 2024 09:05) (58 - 64)  BP: 165/79 (13 Feb 2024 09:05) (147/70 - 169/91)  BP(mean): --  RR: 18 (13 Feb 2024 05:10) (18 - 18)  SpO2: 98% (13 Feb 2024 05:10) (98% - 98%)    Parameters below as of 13 Feb 2024 05:10  Patient On (Oxygen Delivery Method): room air                              13.4   4.22  )-----------( 205      ( 13 Feb 2024 07:51 )             37.9       02-13    139  |  103  |  8<L>  ----------------------------<  83  3.5   |  24  |  0.7    Ca    8.7      13 Feb 2024 07:51  Mg     1.5     02-13    TPro  7.9  /  Alb  4.1  /  TBili  0.5  /  DBili  x   /  AST  23  /  ALT  6   /  AlkPhos  66  02-12      MEDICATIONS  (STANDING):  amLODIPine   Tablet 10 milliGRAM(s) Oral daily  apixaban 5 milliGRAM(s) Oral every 12 hours  dextrose 5%. 1000 milliLiter(s) (50 mL/Hr) IV Continuous <Continuous>  dextrose 5%. 1000 milliLiter(s) (100 mL/Hr) IV Continuous <Continuous>  dextrose 50% Injectable 25 Gram(s) IV Push once  dextrose 50% Injectable 25 Gram(s) IV Push once  dextrose 50% Injectable 12.5 Gram(s) IV Push once  furosemide    Tablet 20 milliGRAM(s) Oral daily  glucagon  Injectable 1 milliGRAM(s) IntraMuscular once  insulin lispro (ADMELOG) corrective regimen sliding scale   SubCutaneous three times a day before meals  ketorolac 0.5% Ophthalmic Solution 1 Drop(s) Right EYE daily  ketotifen 0.025% Ophthalmic Solution 1 Drop(s) Left EYE daily  levETIRAcetam 250 milliGRAM(s) Oral at bedtime  levETIRAcetam 125 milliGRAM(s) Oral once  magnesium oxide 400 milliGRAM(s) Oral three times a day with meals  metoprolol succinate  milliGRAM(s) Oral daily  montelukast 10 milliGRAM(s) Oral daily  pancrelipase  (CREON 12,000 Lipase Units) 3 Capsule(s) Oral four times a day with meals  pantoprazole    Tablet 40 milliGRAM(s) Oral before breakfast  prednisoLONE acetate 1% Suspension 1 Drop(s) Right EYE daily  pregabalin 75 milliGRAM(s) Oral two times a day  sacubitril 49 mG/valsartan 51 mG 1 Tablet(s) Oral two times a day  simvastatin 20 milliGRAM(s) Oral at bedtime    MEDICATIONS  (PRN):  dextrose Oral Gel 15 Gram(s) Oral once PRN Blood Glucose LESS THAN 70 milliGRAM(s)/deciliter  LORazepam   Injectable 2 milliGRAM(s) IV Push three times a day PRN generalized tonic-clonic seizure lasting longer than 2 minutes, or two consecutive seizures without return to baseline in-between  melatonin 3 milliGRAM(s) Oral at bedtime PRN Insomnia  oxycodone    5 mG/acetaminophen 325 mG 2 Tablet(s) Oral every 8 hours PRN for severe pain            VEEG in the last 24 hours:    Background - continuous, symmetrical, well organized, reaching frequencies in the range of 8-9 Hz    Focal and generalized slowing - none    Interictal activity - none    Events - none    Seizures - none    Impression: Normal VEEG x 24 hrs    Plan -   Will continue monitoring  Seizure precautions  Decrease Keppra to 125mg in the morning and 250mg at might  
Epilepsy Attending Note:     EZ ALCARAZ    83y Male  MRN [id number]    Vital Signs Last 24 Hrs  T(C): 36 (14 Feb 2024 05:02), Max: 36 (14 Feb 2024 05:02)  T(F): 96.8 (14 Feb 2024 05:02), Max: 96.8 (14 Feb 2024 05:02)  HR: 59 (14 Feb 2024 08:29) (55 - 63)  BP: 137/69 (14 Feb 2024 08:29) (129/63 - 145/68)  BP(mean): --  RR: 18 (14 Feb 2024 05:02) (18 - 18)  SpO2: 100% (14 Feb 2024 05:02) (100% - 100%)    Parameters below as of 14 Feb 2024 05:02  Patient On (Oxygen Delivery Method): room air                              13.4   4.22  )-----------( 205      ( 13 Feb 2024 07:51 )             37.9       02-13    139  |  103  |  8<L>  ----------------------------<  83  3.5   |  24  |  0.7    Ca    8.7      13 Feb 2024 07:51  Mg     1.5     02-13    TPro  7.9  /  Alb  4.1  /  TBili  0.5  /  DBili  x   /  AST  23  /  ALT  6   /  AlkPhos  66  02-12      MEDICATIONS  (STANDING):  amLODIPine   Tablet 10 milliGRAM(s) Oral daily  apixaban 5 milliGRAM(s) Oral every 12 hours  dextrose 5%. 1000 milliLiter(s) (50 mL/Hr) IV Continuous <Continuous>  dextrose 5%. 1000 milliLiter(s) (100 mL/Hr) IV Continuous <Continuous>  dextrose 50% Injectable 12.5 Gram(s) IV Push once  dextrose 50% Injectable 25 Gram(s) IV Push once  dextrose 50% Injectable 25 Gram(s) IV Push once  furosemide    Tablet 20 milliGRAM(s) Oral daily  glucagon  Injectable 1 milliGRAM(s) IntraMuscular once  insulin lispro (ADMELOG) corrective regimen sliding scale   SubCutaneous three times a day before meals  ketorolac 0.5% Ophthalmic Solution 1 Drop(s) Right EYE daily  ketotifen 0.025% Ophthalmic Solution 1 Drop(s) Left EYE daily  levETIRAcetam 250 milliGRAM(s) Oral at bedtime  magnesium oxide 400 milliGRAM(s) Oral three times a day with meals  metoprolol succinate  milliGRAM(s) Oral daily  montelukast 10 milliGRAM(s) Oral daily  pancrelipase  (CREON 12,000 Lipase Units) 3 Capsule(s) Oral four times a day with meals  pantoprazole    Tablet 40 milliGRAM(s) Oral before breakfast  prednisoLONE acetate 1% Suspension 1 Drop(s) Right EYE daily  pregabalin 75 milliGRAM(s) Oral two times a day  sacubitril 49 mG/valsartan 51 mG 1 Tablet(s) Oral two times a day  simvastatin 20 milliGRAM(s) Oral at bedtime    MEDICATIONS  (PRN):  dextrose Oral Gel 15 Gram(s) Oral once PRN Blood Glucose LESS THAN 70 milliGRAM(s)/deciliter  LORazepam   Injectable 2 milliGRAM(s) IV Push three times a day PRN generalized tonic-clonic seizure lasting longer than 2 minutes, or two consecutive seizures without return to baseline in-between  melatonin 3 milliGRAM(s) Oral at bedtime PRN Insomnia  oxycodone    5 mG/acetaminophen 325 mG 2 Tablet(s) Oral every 8 hours PRN for severe pain            VEEG in the last 24 hours:    Background - continuous, symmetrical, well organized, reaching frequencies in the range of 8-9 Hz    Focal and generalized slowing - none    Interictal activity - none    Events - none    Seizures - none    Impression: Normal VEEG x 48 hrs    Plan -   Will discontinue monitoring  Discharge on Keppra 250mg q12hrs  Outpatient PT  Follow up as scheduled with level      
Progress note    INTERVAL HPI/OVERNIGHT EVENTS:    Patient seen and examined at bedside. He denies any acute distress. Veeg in progress.      REVIEW OF SYSTEMS:  All other 13 Review of systems were reviewed and are negative    FAMILY HISTORY:  FH: hypertension (Sibling)      T(C): 36.1 (02-13-24 @ 05:10), Max: 36.1 (02-13-24 @ 05:10)  HR: 64 (02-13-24 @ 09:05) (58 - 65)  BP: 165/79 (02-13-24 @ 09:05) (145/79 - 169/91)  RR: 18 (02-13-24 @ 05:10) (16 - 18)  SpO2: 98% (02-13-24 @ 05:10) (98% - 98%)  Wt(kg): --Vital Signs Last 24 Hrs  T(C): 36.1 (13 Feb 2024 05:10), Max: 36.1 (13 Feb 2024 05:10)  T(F): 96.9 (13 Feb 2024 05:10), Max: 96.9 (13 Feb 2024 05:10)  HR: 64 (13 Feb 2024 09:05) (58 - 65)  BP: 165/79 (13 Feb 2024 09:05) (145/79 - 169/91)  BP(mean): --  RR: 18 (13 Feb 2024 05:10) (16 - 18)  SpO2: 98% (13 Feb 2024 05:10) (98% - 98%)    Parameters below as of 13 Feb 2024 05:10  Patient On (Oxygen Delivery Method): room air      No Known Allergies      PHYSICAL EXAM:    GENERAL:  82y/o Male NAD, resting comfortably.  HEAD:  Atraumatic, Normocephalic  EYES: EOMI, conjunctiva and sclera clear  CHEST/LUNG: Clear to auscultation bilaterally  HEART: S1&S2  ABDOMEN: Soft, Nontender, Nondistended x 4 quadrants; Bowel sounds present  EXTREMITIES:   Peripheral Pulses Present, No clubbing, no cyanosis, or no edema, no calf tenderness  PSYCH: AAOx3, cooperative, appropriate  NEUROLOGY: WNL  SKIN: WNL    Consultant(s) Notes Reviewed:  [x ] YES  [ ] NO  Care Discussed with Consultants/Other Providers [ x] YES  [ ] NO    LABS:      RADIOLOGY & ADDITIONAL TESTS:    Imaging Personally Reviewed:  [ ] YES  [ ] NO  amLODIPine   Tablet 10 milliGRAM(s) Oral daily  apixaban 5 milliGRAM(s) Oral every 12 hours  dextrose 5%. 1000 milliLiter(s) IV Continuous <Continuous>  dextrose 5%. 1000 milliLiter(s) IV Continuous <Continuous>  dextrose 50% Injectable 25 Gram(s) IV Push once  dextrose 50% Injectable 25 Gram(s) IV Push once  dextrose 50% Injectable 12.5 Gram(s) IV Push once  dextrose Oral Gel 15 Gram(s) Oral once PRN  furosemide    Tablet 20 milliGRAM(s) Oral daily  glucagon  Injectable 1 milliGRAM(s) IntraMuscular once  insulin lispro (ADMELOG) corrective regimen sliding scale   SubCutaneous three times a day before meals  ketorolac 0.5% Ophthalmic Solution 1 Drop(s) Right EYE daily  ketotifen 0.025% Ophthalmic Solution 1 Drop(s) Left EYE daily  LORazepam   Injectable 2 milliGRAM(s) IV Push three times a day PRN  magnesium oxide 400 milliGRAM(s) Oral three times a day with meals  melatonin 3 milliGRAM(s) Oral at bedtime PRN  metoprolol succinate  milliGRAM(s) Oral daily  montelukast 10 milliGRAM(s) Oral daily  oxycodone    5 mG/acetaminophen 325 mG 2 Tablet(s) Oral every 8 hours PRN  pancrelipase  (CREON 12,000 Lipase Units) 3 Capsule(s) Oral four times a day with meals  pantoprazole    Tablet 40 milliGRAM(s) Oral before breakfast  prednisoLONE acetate 1% Suspension 1 Drop(s) Right EYE daily  pregabalin 75 milliGRAM(s) Oral two times a day  sacubitril 49 mG/valsartan 51 mG 1 Tablet(s) Oral two times a day  simvastatin 20 milliGRAM(s) Oral at bedtime      HEALTH ISSUES - PROBLEM Dx:    Patient is an 82yo M w/ pmhx of HTN, HLD, DM, CAD, PPM, AFib on Eliquis, CHF (Last EF 35-40%), COPD on 3L home O2, chronic pancreatitis, chronic back patient 2/2 multilevel cervical spondylosis and spondylolisthesis, worse at C3-4 and severe left/moderate-severe right foraminal stenosis, cataract, seizure disorder, presenting for a scheduled VEEG. Reports first episode of seizure in 2002, and recently had two seizures in the past two months both while he was at pain management doctors office. Reports feeling lightheaded prior to seizures followed by rhythmic shaking all over the body. Denies incontinence tongue biting, loss of consciousness. Reports the episodes lasted about 10-15mintues. Currently denies fever, chills, n/v, chest pain, sob, abdominal pain, changes in BMs, or any urinary sxs.     #Seizure disorder  - VEEG  - c/w Keppra  - Keppra serum levels  - seizure precaution  - Ativan 2mg IVP prn for status epilepticus lasting longer than 2mins of 2 consecutive seizures w/out return to baseline  - Neuro checks  - Keep Mg above 2  - BMP  - Neuro Consult     #DM  - Hold home meds  - ISS  - Monitor FS    #HLD/HTN  #CAD  #Atrial Fibrillation s/p PPM  #CHF (EF35-40%)  - c/w Eliquis  - c/w simvastatin   - c/w furosemide and ivabradine, entresto and norvasc    #COPD  - on 3L home O2  - Supplement O2 as needed    #Chronic back patient 2/2 multilevel cervical spondylosis and spondylolisthesis, worse at C3-4  #severe left/moderate-severe right foraminal stenosis  - Patient on percocet, pregabalin, and oxymorphone   - ISTOP [id number]    #Chronic Pancreatitis  - c/w Creon     Diet: DASH  Activity: Seizure precaution   VTE PPX: Eliquis

## 2024-02-14 NOTE — DISCHARGE NOTE NURSING/CASE MANAGEMENT/SOCIAL WORK - PATIENT PORTAL LINK FT
You can access the FollowMyHealth Patient Portal offered by [facility] by registering at the following website: [email or web address]. By joining [facility]’s FollowMyHealth portal, you will also be able to view your health information using other applications (apps) compatible with our system.

## 2024-02-14 NOTE — DISCHARGE NOTE PROVIDER - NSDCCPCAREPLAN_GEN_ALL_CORE_FT
PRINCIPAL DISCHARGE DIAGNOSIS  Diagnosis: Seizure  Assessment and Plan of Treatment: You were seen for a vEEG and seen by the seizure specialists. You were kept on your current dose of keppra. Please follow up with your PCP and epilepsy team regarding this hospitalization

## 2024-02-14 NOTE — DISCHARGE NOTE PROVIDER - NSDCMRMEDTOKEN_GEN_ALL_CORE_FT
amLODIPine 10 mg oral tablet: 1 tab(s) orally once a day  azelastine 0.05% ophthalmic solution: 1 in each affected eye  docusate sodium 100 mg oral tablet: 1 tab(s) orally once a day (at bedtime)  Eliquis 5 mg tablet: 1 tab(s) orally every 12 hours  Entresto 49 mg-51 mg oral tablet: 1 tab(s) orally 2 times a day  furosemide 20 mg oral tablet: 1 tab(s) orally once a day  ivabradine 5 mg oral tablet: 0.5 tab(s) orally 2 times a day  ketorolac 0.4% ophthalmic solution: 1 drop(s) in each affected eye once a day  levETIRAcetam 250 mg oral tablet: 1 tab(s) orally every 12 hours  Lyrica 75 mg oral capsule: 1 cap(s) orally 2 times a day  metoprolol succinate 100 mg oral tablet, extended release: 1 tab(s) orally once a day  montelukast 10 mg oral tablet: 1 tab(s) orally once a day  oxyMORphone 30 mg oral tablet, extended release: 1 tab(s) orally every 12 hours as needed for  moderate pain  pancrelipase 12,000 units-38,000 units-60,000 units oral delayed release capsule: 3 cap(s) orally every 6 hours  pantoprazole 40 mg oral delayed release tablet: 1 tab(s) orally once a day  Percocet 5 mg-325 mg oral tablet: 2 tab(s) orally every 8 hours as needed for  severe pain  prednisoLONE sodium phosphate 1% ophthalmic solution: 1 drop(s) in each affected eye once a day  simvastatin 20 mg oral tablet: 1 tab(s) orally once a day (at bedtime)  Trulicity Pen 0.75 mg/0.5 mL subcutaneous solution: 0.75 subcutaneously   amLODIPine 10 mg oral tablet: 1 tab(s) orally once a day  azelastine 0.05% ophthalmic solution: 1 in each affected eye  docusate sodium 100 mg oral tablet: 1 tab(s) orally once a day (at bedtime)  Eliquis 5 mg tablet: 1 tab(s) orally every 12 hours  Entresto 49 mg-51 mg oral tablet: 1 tab(s) orally 2 times a day  furosemide 20 mg oral tablet: 1 tab(s) orally once a day  ivabradine 5 mg oral tablet: 0.5 tab(s) orally 2 times a day  ketorolac 0.4% ophthalmic solution: 1 drop(s) in each affected eye once a day  levETIRAcetam 250 mg oral tablet: 1 tab(s) orally every 12 hours  Lyrica 75 mg oral capsule: 1 cap(s) orally 2 times a day  metoprolol succinate 100 mg oral tablet, extended release: 1 tab(s) orally once a day  montelukast 10 mg oral tablet: 1 tab(s) orally once a day  pancrelipase 12,000 units-38,000 units-60,000 units oral delayed release capsule: 3 cap(s) orally every 6 hours  pantoprazole 40 mg oral delayed release tablet: 1 tab(s) orally once a day  Percocet 5 mg-325 mg oral tablet: 2 tab(s) orally every 8 hours as needed for  severe pain  prednisoLONE sodium phosphate 1% ophthalmic solution: 1 drop(s) in each affected eye once a day  simvastatin 20 mg oral tablet: 1 tab(s) orally once a day (at bedtime)  Trulicity Pen 0.75 mg/0.5 mL subcutaneous solution: 0.75 subcutaneously

## 2024-02-14 NOTE — DISCHARGE NOTE PROVIDER - HOSPITAL COURSE
Patient is an 84yo M w/ pmhx of HTN, HLD, DM, CAD, PPM, AFib on Eliquis, CHF (Last EF 35-40%), COPD on 3L home O2, chronic pancreatitis, chronic back patient 2/2 multilevel cervical spondylosis and spondylolisthesis, worse at C3-4 and severe left/moderate-severe right foraminal stenosis, cataract, seizure disorder, presenting for a scheduled VEEG. Reports first episode of seizure in 2002, and recently had two seizures in the past two months both while he was at pain management doctors office. Reports feeling lightheaded prior to seizures followed by rhythmic shaking all over the body. Denies incontinence tongue biting, loss of consciousness. Reports the episodes lasted about 10-15mintues. Currently denies fever, chills, n/v, chest pain, sob, abdominal pain, changes in BMs, or any urinary sxs.     #Seizure disorder  - VEEG  - c/w Keppra  - Keppra serum levels  - seizure precaution  - Ativan 2mg IVP prn for status epilepticus lasting longer than 2mins of 2 consecutive seizures w/out return to baseline  - Neuro checks  - Keep Mg above 2  - BMP  - Neuro Consult     #DM  - Hold home meds  - ISS  - Monitor FS    #HLD/HTN  #CAD  #Atrial Fibrillation s/p PPM  #CHF (EF35-40%)  - c/w Eliquis  - c/w simvastatin   - c/w furosemide and ivabradine, entresto and norvasc    #COPD  - on 3L home O2  - Supplement O2 as needed    #Chronic back patient 2/2 multilevel cervical spondylosis and spondylolisthesis, worse at C3-4  #severe left/moderate-severe right foraminal stenosis  - Patient on percocet, pregabalin, and oxymorphone   - ISTOP [id number]    #Chronic Pancreatitis  - c/w Creon     Diet: DASH  Activity: Seizure precaution   VTE PPX: Eliquis

## 2024-02-14 NOTE — ED ADULT TRIAGE NOTE - NS ED NURSE BANDS TYPE
24H events:    Patient is a 89y old Female who presents with a chief complaint of Back Pain (14 Feb 2024 11:56)    Primary diagnosis of Inability to ambulate due to multiple joints        PAST MEDICAL & SURGICAL HISTORY  Chronic atrial fibrillation    GERD (gastroesophageal reflux disease)    Chronic lower back pain    No significant past surgical history      SOCIAL HISTORY:  Social History:  Currently retired (previously worked as pediatrician). Lives w/ daughter. No reported tobacco, alcohol, or illicit/recreational drug use. ADL's limited by progressively worsening ability to ambulate. Ambulates w/ walker. (24 Jan 2024 15:07)      ALLERGIES:  No Known Allergies      VITALS:   T(F): 96.4  HR: 100  BP: 113/63  RR: 20  SpO2: 98%    LABS:                        11.3   6.43  )-----------( 98       ( 13 Feb 2024 21:02 )             33.8     02-13    131<L>  |  95<L>  |  44<H>  ----------------------------<  100<H>  4.5   |  28  |  1.4    Ca    8.2<L>      13 Feb 2024 21:02  Mg     1.7     02-13    TPro  4.5<L>  /  Alb  3.1<L>  /  TBili  0.7  /  DBili  x   /  AST  23  /  ALT  15  /  AlkPhos  56  02-13    PT/INR - ( 13 Feb 2024 21:02 )   PT: 12.10 sec;   INR: 1.06 ratio         PTT - ( 13 Feb 2024 21:02 )  PTT:27.4 sec  Urinalysis Basic - ( 13 Feb 2024 21:02 )    Color: x / Appearance: x / SG: x / pH: x  Gluc: 100 mg/dL / Ketone: x  / Bili: x / Urobili: x   Blood: x / Protein: x / Nitrite: x   Leuk Esterase: x / RBC: x / WBC x   Sq Epi: x / Non Sq Epi: x / Bacteria: x                    HOME MEDICATIONS:  apixaban 2.5 mg oral tablet: 1 tab(s) orally 2 times a day  furosemide 20 mg oral tablet: 1 tab(s) orally once a day  gabapentin 100 mg oral capsule: 2 cap(s) orally 3 times a day  omeprazole 20 mg oral delayed release capsule: 1 cap(s) orally once a day  senna (sennosides) 8.6 mg oral tablet: 2 tab(s) orally once a day (at bedtime)  Vitamin C 500 mg oral tablet: 1 tab(s) orally once a day      MEDICATIONS:  STANDING MEDICATIONS  acetaminophen     Tablet .. 975 milliGRAM(s) Oral every 8 hours  calcium carbonate   1250 mG (OsCal) 1 Tablet(s) Oral daily  chlorhexidine 2% Cloths 1 Application(s) Topical daily  cholecalciferol 5000 Unit(s) Oral daily  fluticasone propionate 50 MICROgram(s)/spray Nasal Spray 1 Spray(s) Both Nostrils two times a day  furosemide    Tablet 20 milliGRAM(s) Oral daily  lactulose Syrup 10 Gram(s) Oral two times a day  lidocaine   4% Patch 1 Patch Transdermal every 24 hours  lisinopril 10 milliGRAM(s) Oral daily  magnesium sulfate  IVPB 2 Gram(s) IV Intermittent once  metoprolol tartrate 25 milliGRAM(s) Oral two times a day  multivitamin 1 Tablet(s) Oral daily  pantoprazole    Tablet 40 milliGRAM(s) Oral before breakfast  polyethylene glycol 3350 17 Gram(s) Oral two times a day  rOPINIRole 0.25 milliGRAM(s) Oral two times a day  senna 2 Tablet(s) Oral at bedtime  sodium chloride 0.9%. 1000 milliLiter(s) IV Continuous <Continuous>  tamsulosin 0.8 milliGRAM(s) Oral at bedtime    PRN MEDICATIONS  methocarbamol 750 milliGRAM(s) Oral every 8 hours PRN         Name band;

## 2024-02-15 LAB — LEVETIRACETAM SERPL-MCNC: 16.5 UG/ML — SIGNIFICANT CHANGE UP (ref 10–40)

## 2024-02-16 ENCOUNTER — APPOINTMENT (OUTPATIENT)
Dept: HOME HEALTH SERVICES | Facility: HOME HEALTH | Age: 84
End: 2024-02-16
Payer: MEDICARE

## 2024-02-16 VITALS
OXYGEN SATURATION: 96 % | DIASTOLIC BLOOD PRESSURE: 58 MMHG | TEMPERATURE: 98 F | SYSTOLIC BLOOD PRESSURE: 120 MMHG | HEART RATE: 73 BPM | RESPIRATION RATE: 19 BRPM

## 2024-02-16 VITALS — DIASTOLIC BLOOD PRESSURE: 64 MMHG | SYSTOLIC BLOOD PRESSURE: 110 MMHG

## 2024-02-16 DIAGNOSIS — R10.84 GENERALIZED ABDOMINAL PAIN: ICD-10-CM

## 2024-02-16 PROCEDURE — 99496 TRANSJ CARE MGMT HIGH F2F 7D: CPT

## 2024-02-20 PROBLEM — R10.84 GENERALIZED ABDOMINAL PAIN: Status: RESOLVED | Noted: 2023-09-16 | Resolved: 2024-02-20

## 2024-02-20 NOTE — REVIEW OF SYSTEMS
[Vision Problems] : vision problems [Hearing Loss] : hearing loss [Muscle Weakness] : muscle weakness [Negative] : Heme/Lymph

## 2024-02-21 ENCOUNTER — APPOINTMENT (OUTPATIENT)
Dept: HOME HEALTH SERVICES | Facility: HOME HEALTH | Age: 84
End: 2024-02-21

## 2024-02-21 VITALS
OXYGEN SATURATION: 96 % | TEMPERATURE: 98.1 F | DIASTOLIC BLOOD PRESSURE: 64 MMHG | RESPIRATION RATE: 18 BRPM | SYSTOLIC BLOOD PRESSURE: 128 MMHG | HEART RATE: 68 BPM

## 2024-02-21 DIAGNOSIS — G47.33 OBSTRUCTIVE SLEEP APNEA (ADULT) (PEDIATRIC): ICD-10-CM

## 2024-02-21 DIAGNOSIS — G89.29 OTHER CHRONIC PAIN: ICD-10-CM

## 2024-02-21 DIAGNOSIS — Z95.0 PRESENCE OF CARDIAC PACEMAKER: ICD-10-CM

## 2024-02-21 DIAGNOSIS — K86.1 OTHER CHRONIC PANCREATITIS: ICD-10-CM

## 2024-02-21 DIAGNOSIS — I11.0 HYPERTENSIVE HEART DISEASE WITH HEART FAILURE: ICD-10-CM

## 2024-02-21 DIAGNOSIS — I48.91 UNSPECIFIED ATRIAL FIBRILLATION: ICD-10-CM

## 2024-02-21 DIAGNOSIS — I70.209 UNSPECIFIED ATHEROSCLEROSIS OF NATIVE ARTERIES OF EXTREMITIES, UNSPECIFIED EXTREMITY: ICD-10-CM

## 2024-02-21 DIAGNOSIS — K21.9 GASTRO-ESOPHAGEAL REFLUX DISEASE WITHOUT ESOPHAGITIS: ICD-10-CM

## 2024-02-21 DIAGNOSIS — I50.9 HEART FAILURE, UNSPECIFIED: ICD-10-CM

## 2024-02-21 DIAGNOSIS — G40.909 EPILEPSY, UNSPECIFIED, NOT INTRACTABLE, WITHOUT STATUS EPILEPTICUS: ICD-10-CM

## 2024-02-21 DIAGNOSIS — Z99.81 DEPENDENCE ON SUPPLEMENTAL OXYGEN: ICD-10-CM

## 2024-02-21 DIAGNOSIS — Z99.89 DEPENDENCE ON OTHER ENABLING MACHINES AND DEVICES: ICD-10-CM

## 2024-02-21 DIAGNOSIS — M54.9 DORSALGIA, UNSPECIFIED: ICD-10-CM

## 2024-02-21 DIAGNOSIS — M48.00 SPINAL STENOSIS, SITE UNSPECIFIED: ICD-10-CM

## 2024-02-21 DIAGNOSIS — I25.10 ATHEROSCLEROTIC HEART DISEASE OF NATIVE CORONARY ARTERY WITHOUT ANGINA PECTORIS: ICD-10-CM

## 2024-02-21 DIAGNOSIS — E11.51 TYPE 2 DIABETES MELLITUS WITH DIABETIC PERIPHERAL ANGIOPATHY WITHOUT GANGRENE: ICD-10-CM

## 2024-02-21 DIAGNOSIS — E78.5 HYPERLIPIDEMIA, UNSPECIFIED: ICD-10-CM

## 2024-02-21 DIAGNOSIS — J44.9 CHRONIC OBSTRUCTIVE PULMONARY DISEASE, UNSPECIFIED: ICD-10-CM

## 2024-02-21 DIAGNOSIS — H40.9 UNSPECIFIED GLAUCOMA: ICD-10-CM

## 2024-02-21 DIAGNOSIS — Z91.81 HISTORY OF FALLING: ICD-10-CM

## 2024-02-21 DIAGNOSIS — Z79.01 LONG TERM (CURRENT) USE OF ANTICOAGULANTS: ICD-10-CM

## 2024-02-21 RX ORDER — MONTELUKAST 10 MG/1
10 TABLET, FILM COATED ORAL DAILY
Qty: 90 | Refills: 0 | Status: COMPLETED | COMMUNITY
Start: 2023-11-21 | End: 2024-02-21

## 2024-02-21 NOTE — CHRONIC CARE ASSESSMENT
[Inadequate social support] : inadequate social support [Oriented To Person] : ~L oriented to person [Oriented To Time] : ~L oriented to time [Oriented To Situation] : ~L oriented to situation [No Action Needed] : No action needed [PPS Score: ____] : Palliative Performance Scale (PPS) Score: [unfilled] [de-identified] : low sodium, low fat and low carb [de-identified] : low fat and low sodium [de-identified] : as tolerated

## 2024-02-21 NOTE — PHYSICAL EXAM
[No Acute Distress] : no acute distress [Normal Voice/Communication] : normal voice communication [Normal Sclera/Conjunctiva] : normal sclera/conjunctiva [Normal Outer Ear/Nose] : the ears and nose were normal in appearance [No JVD] : no jugular venous distention [No Respiratory Distress] : no respiratory distress [Normal Rate] : heart rate was normal  [Normal Bowel Sounds] : normal bowel sounds [Pedal Pulses Present] : the pedal pulses are present [Non Tender] : non-tender [Soft] : abdomen soft [Normal Anterior Cervical Nodes] : no anterior cervical lymphadenopathy [No CVA Tenderness] : no ~M costovertebral angle tenderness [No Spinal Tenderness] : no spinal tenderness [No Joint Swelling] : no joint swelling seen [No Rash] : no rash [No Skin Lesions] : no skin lesions [No Motor Deficits] : the motor exam was normal [Oriented x3] : oriented to person, place, and time [de-identified] : Patient sitting upright in chair

## 2024-02-21 NOTE — REASON FOR VISIT
[Post-hospitalization from ___ Hospital] : Post-hospitalization from [unfilled] Hospital [Discharge Summary] : discharge summary [Pertinent Labs] : pertinent labs [Patient Contacted By: ____] : and contacted by [unfilled] [Pre-Visit Preparation] : Pre-visit preparation was done [Admitted on: ___] : The patient was admitted on [unfilled] [Discharged on ___] : discharged on [unfilled] [FreeTextEntry2] : Hospital Course: Discharge Date	14-Feb-2024 Admission Date	12-Feb-2024 09:51 Reason for Admission	Elective EEG Hospital Course	 Patient is an 82yo M w/ pmhx of HTN, HLD, DM, CAD, PPM, AFib on Eliquis, CHF (Last EF 35-40%), COPD on 3L home O2, chronic pancreatitis, chronic back patient 2/2 multilevel cervical spondylosis and spondylolisthesis, worse at C3-4 and severe left/moderate-severe right foraminal stenosis, cataract, seizure disorder, presenting for a scheduled VEEG. Reports first episode of seizure in 2002, and recently had two seizures in the past two months both while he was at pain management doctors office. Reports feeling lightheaded prior to seizures followed by rhythmic shaking all over the body. Denies incontinence tongue biting, loss of consciousness. Reports the episodes lasted about 10-15mintues. Currently denies fever, chills, n/v, chest pain, sob, abdominal pain, changes in BMs, or any urinary sxs.   #Seizure disorder - VEEG - c/w Keppra - Keppra serum levels - seizure precaution - Ativan 2mg IVP prn for status epilepticus lasting longer than 2mins of 2 consecutive seizures w/out return to baseline - Neuro checks - Keep Mg above 2 - BMP - Neuro Consult   #DM - Hold home meds - ISS - Monitor FS   #HLD/HTN #CAD #Atrial Fibrillation s/p PPM #CHF (EF35-40%) - c/w Eliquis - c/w simvastatin - c/w furosemide and ivabradine, entresto and norvasc   #COPD - on 3L home O2 - Supplement O2 as needed   #Chronic back patient 2/2 multilevel cervical spondylosis and spondylolisthesis, worse at C3-4 #severe left/moderate-severe right foraminal stenosis - Patient on percocet, pregabalin, and oxymorphone - ISTOP [id number]   #Chronic Pancreatitis - c/w Creon [FreeTextEntry4] : chart review

## 2024-02-21 NOTE — HISTORY OF PRESENT ILLNESS
[Patient] : patient [In-Place] : has Home Health services in-place [A] : A [Patient is stable] : patient is stable [FreeTextEntry1] : unsteady gait [FreeTextEntry2] : Dr. Javed [LastPVisitDate] : 06/2023 [FreeTextEntry4] : Dr. Greco [LastSpecialistVisitDate] : 07/2023

## 2024-02-21 NOTE — HEALTH RISK ASSESSMENT
[HRA Reviewed] : Health risk assessment reviewed [Some assistance needed] : walking [Independent] : managing finances [Full assistance needed] : using transportation [Any fall with injury in past year] : Patient reported fall with injury in the past year [Yes] : The patient does have visual impairment [[facility]] : 10 [FreeTextEntry2] : ambulates with cane and 4 wheel rollator [[employer]] : 10 [de-identified] : ambulates with cane and 4 wheel rollator

## 2024-02-22 ENCOUNTER — NON-APPOINTMENT (OUTPATIENT)
Age: 84
End: 2024-02-22

## 2024-02-23 ENCOUNTER — NON-APPOINTMENT (OUTPATIENT)
Age: 84
End: 2024-02-23

## 2024-02-23 ENCOUNTER — APPOINTMENT (OUTPATIENT)
Dept: PULMONOLOGY | Facility: CLINIC | Age: 84
End: 2024-02-23

## 2024-02-29 ENCOUNTER — NON-APPOINTMENT (OUTPATIENT)
Age: 84
End: 2024-02-29

## 2024-03-01 ENCOUNTER — NON-APPOINTMENT (OUTPATIENT)
Age: 84
End: 2024-03-01

## 2024-03-05 ENCOUNTER — APPOINTMENT (OUTPATIENT)
Dept: HOME HEALTH SERVICES | Facility: HOME HEALTH | Age: 84
End: 2024-03-05

## 2024-03-11 ENCOUNTER — NON-APPOINTMENT (OUTPATIENT)
Age: 84
End: 2024-03-11

## 2024-03-11 LAB — LEVETIRACETAM SERPL-MCNC: 10.1 UG/ML

## 2024-03-13 ENCOUNTER — APPOINTMENT (OUTPATIENT)
Dept: VASCULAR SURGERY | Facility: CLINIC | Age: 84
End: 2024-03-13
Payer: MEDICARE

## 2024-03-13 VITALS — BODY MASS INDEX: 28.12 KG/M2 | HEIGHT: 66 IN | WEIGHT: 175 LBS

## 2024-03-13 VITALS — DIASTOLIC BLOOD PRESSURE: 80 MMHG | SYSTOLIC BLOOD PRESSURE: 167 MMHG | HEART RATE: 80 BPM

## 2024-03-13 DIAGNOSIS — I70.219 ATHEROSCLEROSIS OF NATIVE ARTERIES OF EXTREMITIES WITH INTERMITTENT CLAUDICATION, UNSPECIFIED EXTREMITY: ICD-10-CM

## 2024-03-13 PROCEDURE — 93925 LOWER EXTREMITY STUDY: CPT

## 2024-03-13 PROCEDURE — 99213 OFFICE O/P EST LOW 20 MIN: CPT

## 2024-03-13 NOTE — PHYSICAL EXAM
[2+] : left 2+ [0] : left 0 [FreeTextEntry1] : No evidence of tissue loss Feet are warm to touch bilaterally with good capillary refill

## 2024-03-13 NOTE — REASON FOR VISIT
[Follow-Up: _____] : a [unfilled] follow-up visit [FreeTextEntry1] : Peripheral vascular disease history of left SFA stent right SFA shockwave angioplasty

## 2024-03-13 NOTE — DATA REVIEWED
[FreeTextEntry1] : Arterial duplex right history of SFA angioplasty.  The common femoral, superficial femoral and popliteal arteries are calcified but patent with no evidence of hemodynamically significant disease.  The deep femoral artery is stenotic at the origin.  Left history of SFA stent.  Diffuse atherosclerotic plaque visualized throughout the femoral-popliteal tibial arteries with no evidence of significant stenosis present.  Superficial femoral artery stent is patent with no evidence of in-stent restenosis

## 2024-03-13 NOTE — ASSESSMENT
[FreeTextEntry1] : The patient is an 83-year-old male who presents for follow-up he is status post SFA angioplasty on the right and on the left leg SFA angioplasty and stent.  The patient presents today denies rest pain to his feet and has no active wounds present.  The patient complains of some pain that radiates from his hips down his legs.  The patient has a history of lower back syndrome and radiculopathy.  From my standpoint no vascular surgery intervention warranted at this time I performed an arterial duplex that shows a patent common femoral superficial femoral and popliteal arteries bilaterally.  The left leg superficial femoral artery stent was visualized with no evidence of rein-stent stenosis.  I would like to see the patient back in my office in 1 years time or sooner if he develops any new symptoms such as rest pain or tissue loss.  I spent a total of 25 minutes examining the patient discussing the test results and providing a treatment plan.   I, Dr. Evans, personally performed the evaluation and management (E/M) services for this established patient who presents today with (a) new problem(s)/exacerbation of (an) existing condition(s). That E/M includes conducting the clinically appropriate interval history &/or exam, assessing all new/exacerbated conditions, and establishing a new plan of care. Today, my PAUL, Ariane Drew was here to observe my evaluation and management service for this new problem/exacerbated condition and follow the plan of care established by me going forward.  Thank you for allowing me to participate in the care of your patient.   Sincerely,   Clyde Evans MD, RPVI, FACS Associate Professor of Surgery , Vascular Fellowship Director of Limb Salvage Surgery [facility] [employer] at [facility]

## 2024-03-18 NOTE — ASU PREOP CHECKLIST - LATEX ALLERGY
Your next Skyrizi infusion is on 4/15/24  at 1030 am.
Your next Skyrizi infusion is on  5/13/24 at 1100am.

Patient observed for 15 minutes post infusion. 
Patient denies chest pain, shortness of breath or dizziness.  
Handout given and reviewed with patient.
Patient was instructed on common side effects.
Patient verbalized a complete understanding and has no questions. no

## 2024-03-19 ENCOUNTER — NON-APPOINTMENT (OUTPATIENT)
Age: 84
End: 2024-03-19

## 2024-03-20 ENCOUNTER — NON-APPOINTMENT (OUTPATIENT)
Age: 84
End: 2024-03-20

## 2024-03-21 ENCOUNTER — NON-APPOINTMENT (OUTPATIENT)
Age: 84
End: 2024-03-21

## 2024-03-23 ENCOUNTER — RX RENEWAL (OUTPATIENT)
Age: 84
End: 2024-03-23

## 2024-03-25 ENCOUNTER — RX RENEWAL (OUTPATIENT)
Age: 84
End: 2024-03-25

## 2024-04-02 ENCOUNTER — NON-APPOINTMENT (OUTPATIENT)
Age: 84
End: 2024-04-02

## 2024-04-23 ENCOUNTER — RX RENEWAL (OUTPATIENT)
Age: 84
End: 2024-04-23

## 2024-04-24 ENCOUNTER — APPOINTMENT (OUTPATIENT)
Dept: HOME HEALTH SERVICES | Facility: HOME HEALTH | Age: 84
End: 2024-04-24
Payer: MEDICARE

## 2024-04-24 VITALS
SYSTOLIC BLOOD PRESSURE: 136 MMHG | OXYGEN SATURATION: 95 % | TEMPERATURE: 98 F | DIASTOLIC BLOOD PRESSURE: 64 MMHG | HEART RATE: 60 BPM | RESPIRATION RATE: 18 BRPM

## 2024-04-24 DIAGNOSIS — Z86.69 PERSONAL HISTORY OF OTHER DISEASES OF THE NERVOUS SYSTEM AND SENSE ORGANS: ICD-10-CM

## 2024-04-24 DIAGNOSIS — K59.00 CONSTIPATION, UNSPECIFIED: ICD-10-CM

## 2024-04-24 DIAGNOSIS — M79.89 OTHER SPECIFIED SOFT TISSUE DISORDERS: ICD-10-CM

## 2024-04-24 DIAGNOSIS — R06.2 WHEEZING: ICD-10-CM

## 2024-04-24 DIAGNOSIS — G89.29 OTHER CHRONIC PAIN: ICD-10-CM

## 2024-04-24 PROCEDURE — 99349 HOME/RES VST EST MOD MDM 40: CPT

## 2024-04-24 NOTE — COUNSELING
[Overweight - ( BMI 25.1 - 29.9 )] : overweight -  ( BMI 25.1 - 29.9 ) [DASH diet recommended] : DASH diet recommended [Continue diet as tolerated] : continue diet as tolerated based on goals of care [Non - Smoker] : non-smoker [Medical alert] : medical alert [Use assistive device to avoid falls] : use assistive device to avoid falls [Remove clutter and unsafe carpeting to avoid falls] : remove clutter and unsafe carpeting to avoid falls [] : eye exam [Improve mobility] : improve mobility [Improve pain control] : improve pain control [Maintain functional ability] : maintain functional ability [Discussed disease trajectory with patient/caregiver] : discussed disease trajectory with patient/caregiver [Patient/Caregiver is unclear of wishes] : patient/caregiver is unclear of wishes

## 2024-04-25 ENCOUNTER — RX RENEWAL (OUTPATIENT)
Age: 84
End: 2024-04-25

## 2024-04-25 PROBLEM — R06.2 EXPIRATORY WHEEZING: Status: ACTIVE | Noted: 2024-04-25

## 2024-04-25 PROBLEM — Z86.69 HISTORY OF BLURRED VISION: Status: RESOLVED | Noted: 2023-07-25 | Resolved: 2024-04-25

## 2024-04-25 PROBLEM — K59.00 CONSTIPATION, ACUTE: Status: RESOLVED | Noted: 2023-09-19 | Resolved: 2024-04-25

## 2024-04-25 PROBLEM — M79.89 BILATERAL SWELLING OF FEET: Status: ACTIVE | Noted: 2024-04-25

## 2024-04-25 PROBLEM — G89.29 PAIN, CHRONIC: Status: ACTIVE | Noted: 2023-01-19

## 2024-04-25 RX ORDER — FUROSEMIDE 20 MG/1
20 TABLET ORAL DAILY
Qty: 30 | Refills: 5 | Status: DISCONTINUED | COMMUNITY
Start: 2023-11-02 | End: 2024-04-25

## 2024-04-25 NOTE — REASON FOR VISIT
[Pre-Visit Preparation] : pre-visit preparation was done [Follow-Up] : a follow-up visit [FreeTextEntry1] : PMH PM, HTN, HLD, Type 2 DM, pancreatitis, cervical neuritis,  chronic pain and CHF  [FreeTextEntry2] : chart review

## 2024-04-25 NOTE — CHRONIC CARE ASSESSMENT
[Inadequate social support] : inadequate social support [PPS Score: ____] : Palliative Performance Scale (PPS) Score: [unfilled] [de-identified] : as tolerated [de-identified] : low sodium, low fat and low carb

## 2024-04-25 NOTE — HEALTH RISK ASSESSMENT
[HRA Reviewed] : Health risk assessment reviewed [Some assistance needed] : walking [Independent] : managing finances [Full assistance needed] : using transportation [Two or more falls in past year] : Patient reported two or more falls in the past year [TimeGetUpGo] : 10 [de-identified] : ambulates with cane and 4 wheel rollator

## 2024-04-25 NOTE — HISTORY OF PRESENT ILLNESS
[In-Place] : has Home Health services in-place [A] : A [Patient] : patient [LastPVisitDate] : 06/2023 [FreeTextEntry4] : Dr. Elizabeth [LastSpecialistVisitDate] : 03/2024 [FreeTextEntry1] : unsteady gait [FreeTextEntry2] : 04/24/2024 COVID SCREEN: Patient or caregiver denies fever, cough, trouble breathing, rash or contact with someone who tested positive for COVID-19.   N95 mask, gloves, eye wear and gown (if indicated) used during visit: YES  Total face to face time with patient is 45 min.   Jeremy Rea is an 83-year-old male with HTN, HLD, Type 2 DM, atrial fibrillation, CHF, cervical neuritis, chronic pancreatitis, headache and urinary frequency presents today for follow up visit.    History provided by patient   Today patient reports chronic right arm, shoulder, right side head pain. Patient previously following pain management Dr. Haas was receiving Percocet  and Oxymorphone 30 mg ER. Pain was controlled with pain regimen, but patient now d/c from pain management care and not being prescribed narcotics. Patient recently rejected from new pain management Dr. Rebolledo due to non-agreement with continuing narcotics for pain control. Patient with upcoming pain management appointment with Dr. Medrano of [address] [place] [address].    Patient with bilateral +2 foot swelling, no recent weight gain as per weight log. Will obtain labs. advised to continue Torsemide 20 mg daily, educated on elevation and compression socks use.  Will continue to monitor.   + YESICA and LLL expiratory wheezing noted, Oxygens saturation 95% on room air. Will obtain chest x/ray.   Patient reports no weight loss >10 lbs in the past 6 months. No changes in dentition or swallowing reported, No changes in hearing or vision reported. No changes in Cognition reported. Patient denies any symptoms of depression or anxiety. Patient is ADL independent and IADL dependent. No changes in gait or falls reported.   Patient's home environment is safe.

## 2024-04-29 ENCOUNTER — NON-APPOINTMENT (OUTPATIENT)
Age: 84
End: 2024-04-29

## 2024-04-30 DIAGNOSIS — E87.6 HYPOKALEMIA: ICD-10-CM

## 2024-04-30 LAB
25(OH)D3 SERPL-MCNC: 55.8 NG/ML
ALBUMIN SERPL ELPH-MCNC: 4.2 G/DL
ALP BLD-CCNC: 77 U/L
ALT SERPL-CCNC: 16 U/L
ANION GAP SERPL CALC-SCNC: 20 MMOL/L
AST SERPL-CCNC: 40 U/L
BASOPHILS # BLD AUTO: 0.04 K/UL
BASOPHILS NFR BLD AUTO: 0.9 %
BILIRUB SERPL-MCNC: 0.4 MG/DL
BUN SERPL-MCNC: 11 MG/DL
CALCIUM SERPL-MCNC: 9.1 MG/DL
CHLORIDE SERPL-SCNC: 100 MMOL/L
CHOLEST SERPL-MCNC: 153 MG/DL
CO2 SERPL-SCNC: 24 MMOL/L
CREAT SERPL-MCNC: 0.99 MG/DL
DEPRECATED D DIMER PPP IA-ACNC: <150 NG/ML DDU
EGFR: 76 ML/MIN/1.73M2
EOSINOPHIL # BLD AUTO: 0.2 K/UL
EOSINOPHIL NFR BLD AUTO: 4.4 %
ESTIMATED AVERAGE GLUCOSE: 111 MG/DL
FOLATE SERPL-MCNC: >20 NG/ML
GLUCOSE SERPL-MCNC: 82 MG/DL
HBA1C MFR BLD HPLC: 5.5 %
HCT VFR BLD CALC: 41.8 %
HDLC SERPL-MCNC: 75 MG/DL
HGB BLD-MCNC: 13.8 G/DL
IMM GRANULOCYTES NFR BLD AUTO: 0.2 %
IRON SATN MFR SERPL: 33 %
IRON SERPL-MCNC: 88 UG/DL
LDLC SERPL CALC-MCNC: 53 MG/DL
LEVETIRACETAM SERPL-MCNC: 13.5 UG/ML
LYMPHOCYTES # BLD AUTO: 1.97 K/UL
LYMPHOCYTES NFR BLD AUTO: 43 %
MAGNESIUM SERPL-MCNC: 1.7 MG/DL
MAN DIFF?: NORMAL
MCHC RBC-ENTMCNC: 31.7 PG
MCHC RBC-ENTMCNC: 33 GM/DL
MCV RBC AUTO: 95.9 FL
MONOCYTES # BLD AUTO: 0.72 K/UL
MONOCYTES NFR BLD AUTO: 15.7 %
NEUTROPHILS # BLD AUTO: 1.64 K/UL
NEUTROPHILS NFR BLD AUTO: 35.8 %
NONHDLC SERPL-MCNC: 77 MG/DL
NT-PROBNP SERPL-MCNC: 564 PG/ML
PLATELET # BLD AUTO: 202 K/UL
POTASSIUM SERPL-SCNC: 3.1 MMOL/L
PROT SERPL-MCNC: 7.4 G/DL
RBC # BLD: 4.36 M/UL
RBC # FLD: 16.8 %
SODIUM SERPL-SCNC: 143 MMOL/L
TIBC SERPL-MCNC: 264 UG/DL
TRIGL SERPL-MCNC: 150 MG/DL
TSH SERPL-ACNC: 1.65 UIU/ML
UIBC SERPL-MCNC: 177 UG/DL
VIT B12 SERPL-MCNC: 1064 PG/ML
WBC # FLD AUTO: 4.58 K/UL

## 2024-05-06 ENCOUNTER — NON-APPOINTMENT (OUTPATIENT)
Age: 84
End: 2024-05-06

## 2024-05-06 LAB
ALBUMIN SERPL ELPH-MCNC: 4.1 G/DL
ALP BLD-CCNC: 80 U/L
ALT SERPL-CCNC: 16 U/L
ANION GAP SERPL CALC-SCNC: 14 MMOL/L
AST SERPL-CCNC: 44 U/L
BILIRUB SERPL-MCNC: 0.4 MG/DL
BUN SERPL-MCNC: 12 MG/DL
CALCIUM SERPL-MCNC: 8.8 MG/DL
CHLORIDE SERPL-SCNC: 99 MMOL/L
CO2 SERPL-SCNC: 25 MMOL/L
CREAT SERPL-MCNC: 1.03 MG/DL
EGFR: 73 ML/MIN/1.73M2
GLUCOSE SERPL-MCNC: 127 MG/DL
POTASSIUM SERPL-SCNC: 3.7 MMOL/L
PROT SERPL-MCNC: 7.1 G/DL
SODIUM SERPL-SCNC: 138 MMOL/L

## 2024-05-09 ENCOUNTER — APPOINTMENT (OUTPATIENT)
Dept: HOME HEALTH SERVICES | Facility: HOME HEALTH | Age: 84
End: 2024-05-09
Payer: MEDICARE

## 2024-05-09 VITALS
SYSTOLIC BLOOD PRESSURE: 112 MMHG | TEMPERATURE: 98 F | DIASTOLIC BLOOD PRESSURE: 64 MMHG | OXYGEN SATURATION: 96 % | HEART RATE: 71 BPM | RESPIRATION RATE: 20 BRPM

## 2024-05-09 DIAGNOSIS — E11.9 TYPE 2 DIABETES MELLITUS W/OUT COMPLICATIONS: ICD-10-CM

## 2024-05-09 DIAGNOSIS — K86.1 OTHER CHRONIC PANCREATITIS: ICD-10-CM

## 2024-05-09 DIAGNOSIS — R56.9 UNSPECIFIED CONVULSIONS: ICD-10-CM

## 2024-05-09 DIAGNOSIS — K21.9 GASTRO-ESOPHAGEAL REFLUX DISEASE W/OUT ESOPHAGITIS: ICD-10-CM

## 2024-05-09 DIAGNOSIS — J44.9 CHRONIC OBSTRUCTIVE PULMONARY DISEASE, UNSPECIFIED: ICD-10-CM

## 2024-05-09 DIAGNOSIS — E11.65 TYPE 2 DIABETES MELLITUS WITH HYPERGLYCEMIA: ICD-10-CM

## 2024-05-09 DIAGNOSIS — Z79.4 TYPE 2 DIABETES MELLITUS W/OUT COMPLICATIONS: ICD-10-CM

## 2024-05-09 PROCEDURE — 99349 HOME/RES VST EST MOD MDM 40: CPT

## 2024-05-09 NOTE — COUNSELING
[Overweight - ( BMI 25.1 - 29.9 )] : overweight -  ( BMI 25.1 - 29.9 ) [DASH diet recommended] : DASH diet recommended [Continue diet as tolerated] : continue diet as tolerated based on goals of care [Non - Smoker] : non-smoker [Medical alert] : medical alert [Use assistive device to avoid falls] : use assistive device to avoid falls [Remove clutter and unsafe carpeting to avoid falls] : remove clutter and unsafe carpeting to avoid falls [] : foot exam [Improve mobility] : improve mobility [Improve pain control] : improve pain control [Maintain functional ability] : maintain functional ability [Discussed disease trajectory with patient/caregiver] : discussed disease trajectory with patient/caregiver [Patient/Caregiver is unclear of wishes] : patient/caregiver is unclear of wishes

## 2024-05-10 PROBLEM — E11.9 INSULIN USE (LONG-TERM) IN TYPE 2 DIABETES: Status: ACTIVE | Noted: 2023-07-25

## 2024-05-10 PROBLEM — K21.9 GERD (GASTROESOPHAGEAL REFLUX DISEASE): Status: ACTIVE | Noted: 2023-07-25

## 2024-05-10 PROBLEM — R56.9 SEIZURE: Status: ACTIVE | Noted: 2024-01-04

## 2024-05-10 PROBLEM — K86.1 CHRONIC PANCREATITIS, UNSPECIFIED PANCREATITIS TYPE: Status: ACTIVE | Noted: 2017-12-08

## 2024-05-10 PROBLEM — J44.9 COPD (CHRONIC OBSTRUCTIVE PULMONARY DISEASE): Status: ACTIVE | Noted: 2023-07-25

## 2024-05-10 PROBLEM — E11.65 CONTROLLED DIABETES MELLITUS WITH HYPERGLYCEMIA: Status: ACTIVE | Noted: 2024-04-25

## 2024-05-10 PROBLEM — E11.9 TYPE 2 DIABETES MELLITUS: Noted: 2023-07-24

## 2024-05-10 NOTE — CHRONIC CARE ASSESSMENT
[Inadequate social support] : inadequate social support [PPS Score: ____] : Palliative Performance Scale (PPS) Score: [unfilled] [de-identified] : as tolerated [de-identified] : low sodium, low fat and low carb

## 2024-05-10 NOTE — HEALTH RISK ASSESSMENT
[HRA Reviewed] : Health risk assessment reviewed [Some assistance needed] : walking [Independent] : managing finances [Full assistance needed] : using transportation [Two or more falls in past year] : Patient reported two or more falls in the past year [TimeGetUpGo] : 10 [de-identified] : ambulates with cane and 4 wheel rollator

## 2024-05-10 NOTE — PHYSICAL EXAM
[No Acute Distress] : no acute distress [Normal Voice/Communication] : normal voice communication [Normal Sclera/Conjunctiva] : normal sclera/conjunctiva [Normal Outer Ear/Nose] : the ears and nose were normal in appearance [No JVD] : no jugular venous distention [No Respiratory Distress] : no respiratory distress [Normal Rate] : heart rate was normal  [Non Tender] : non-tender [Soft] : abdomen soft [Not Distended] : not distended [Normal Anterior Cervical Nodes] : no anterior cervical lymphadenopathy [No CVA Tenderness] : no ~M costovertebral angle tenderness [No Joint Swelling] : no joint swelling seen [No Rash] : no rash [No Motor Deficits] : the motor exam was normal [Oriented x3] : oriented to person, place, and time [Foot Ulcers] : no foot ulcers [de-identified] : patient sitting upright in chair

## 2024-05-10 NOTE — REASON FOR VISIT
[Follow-Up] : a follow-up visit [Pre-Visit Preparation] : pre-visit preparation was done [FreeTextEntry1] : PMH PM, HTN, HLD, Type 2 DM, pancreatitis, cervical neuritis,  chronic pain and CHF  [FreeTextEntry2] : chart review

## 2024-05-10 NOTE — REVIEW OF SYSTEMS
[Vision Problems] : vision problems [Hearing Loss] : hearing loss [Lower Ext Edema] : lower extremity edema [Muscle Weakness] : muscle weakness [Negative] : Heme/Lymph [FreeTextEntry5] : +1 bilateral foot swelling

## 2024-05-10 NOTE — HISTORY OF PRESENT ILLNESS
[In-Place] : has Home Health services in-place [A] : A [Patient] : patient [LastPVisitDate] : 06/2023 [FreeTextEntry4] : Dr. Elizabeth [LastSpecialistVisitDate] : 03/2024 [FreeTextEntry1] : unsteady gait [FreeTextEntry2] : 05/09/2024 COVID SCREEN: Patient or caregiver denies fever, cough, trouble breathing, rash or contact with someone who tested positive for COVID-19.   N95 mask, gloves, eye wear and gown (if indicated) used during visit: YES  Total face to face time with patient is 45 min.   [place] is an 83-year-old male with HTN, HLD, Type 2 DM, atrial fibrillation, CHF, cervical neuritis, chronic pancreatitis, headache and urinary frequency presents today acute visit for medical clearance for epidural procedure scheduled 05/20/2024 with pain management Dr. Greyson Lind at [address] [facility]. [phone]

## 2024-05-13 ENCOUNTER — NON-APPOINTMENT (OUTPATIENT)
Age: 84
End: 2024-05-13

## 2024-05-29 ENCOUNTER — APPOINTMENT (OUTPATIENT)
Dept: HOME HEALTH SERVICES | Facility: HOME HEALTH | Age: 84
End: 2024-05-29

## 2024-05-29 VITALS
WEIGHT: 184 LBS | BODY MASS INDEX: 29.7 KG/M2 | TEMPERATURE: 97 F | SYSTOLIC BLOOD PRESSURE: 122 MMHG | DIASTOLIC BLOOD PRESSURE: 70 MMHG | RESPIRATION RATE: 19 BRPM | HEART RATE: 84 BPM | OXYGEN SATURATION: 97 %

## 2024-05-29 RX ORDER — PANCRELIPASE 36000; 180000; 114000 [USP'U]/1; [USP'U]/1; [USP'U]/1
36000-114000 CAPSULE, DELAYED RELEASE PELLETS ORAL
Qty: 360 | Refills: 0 | Status: ACTIVE | COMMUNITY
Start: 2023-03-03 | End: 1900-01-01

## 2024-05-29 RX ORDER — INSULIN GLARGINE 300 U/ML
300 INJECTION, SOLUTION SUBCUTANEOUS
Qty: 90 | Refills: 2 | Status: ACTIVE | COMMUNITY
Start: 2023-11-02

## 2024-05-29 RX ORDER — POTASSIUM CHLORIDE 1500 MG/1
20 TABLET, FILM COATED, EXTENDED RELEASE ORAL
Refills: 0 | Status: COMPLETED | COMMUNITY
End: 2024-05-29

## 2024-05-29 RX ORDER — DOCUSATE SODIUM 100 MG/1
100 CAPSULE, LIQUID FILLED ORAL
Qty: 30 | Refills: 2 | Status: ACTIVE | COMMUNITY
Start: 2023-09-19 | End: 1900-01-01

## 2024-05-29 RX ORDER — TORSEMIDE 20 MG/1
20 TABLET ORAL DAILY
Qty: 30 | Refills: 0 | Status: ACTIVE | COMMUNITY
Start: 2024-04-25

## 2024-05-29 RX ORDER — MONTELUKAST 10 MG/1
10 TABLET, FILM COATED ORAL DAILY
Qty: 90 | Refills: 0 | Status: ACTIVE | COMMUNITY
Start: 2023-07-25 | End: 1900-01-01

## 2024-05-29 RX ORDER — BLOOD SUGAR DIAGNOSTIC
STRIP MISCELLANEOUS 3 TIMES DAILY
Qty: 1 | Refills: 3 | Status: ACTIVE | COMMUNITY
Start: 2024-05-29 | End: 1900-01-01

## 2024-05-29 RX ORDER — ALBUTEROL SULFATE 2.5 MG/3ML
(2.5 MG/3ML) SOLUTION RESPIRATORY (INHALATION)
Qty: 1 | Refills: 3 | Status: ACTIVE | COMMUNITY
Start: 2024-04-25

## 2024-05-29 RX ORDER — LATANOPROST/PF 0.005 %
0.01 DROPS OPHTHALMIC (EYE) DAILY
Refills: 0 | Status: COMPLETED | COMMUNITY
Start: 2023-06-27 | End: 2024-05-29

## 2024-05-29 RX ORDER — SIMVASTATIN 20 MG/1
20 TABLET, FILM COATED ORAL
Qty: 90 | Refills: 0 | Status: ACTIVE | COMMUNITY
Start: 2023-07-12 | End: 1900-01-01

## 2024-05-29 RX ORDER — PEN NEEDLE, DIABETIC 31 GX3/16"
31G X 5 MM NEEDLE, DISPOSABLE MISCELLANEOUS
Qty: 3 | Refills: 5 | Status: ACTIVE | COMMUNITY
Start: 2023-11-21

## 2024-05-29 RX ORDER — ERGOCALCIFEROL 1.25 MG/1
1.25 MG CAPSULE, LIQUID FILLED ORAL
Qty: 12 | Refills: 1 | Status: ACTIVE | COMMUNITY
Start: 2023-07-25

## 2024-05-29 RX ORDER — FLUTICASONE FUROATE AND VILANTEROL TRIFENATATE 100; 25 UG/1; UG/1
100-25 POWDER RESPIRATORY (INHALATION) DAILY
Refills: 0 | Status: ACTIVE | COMMUNITY
Start: 2023-06-27

## 2024-05-29 RX ORDER — LEVETIRACETAM 250 MG/1
250 TABLET, FILM COATED ORAL TWICE DAILY
Qty: 180 | Refills: 0 | Status: ACTIVE | COMMUNITY
Start: 2024-01-05

## 2024-05-29 RX ORDER — PANTOPRAZOLE 40 MG/1
40 TABLET, DELAYED RELEASE ORAL
Qty: 90 | Refills: 0 | Status: ACTIVE | COMMUNITY
Start: 2023-05-10 | End: 1900-01-01

## 2024-05-29 RX ORDER — METOPROLOL SUCCINATE 100 MG/1
100 TABLET, EXTENDED RELEASE ORAL DAILY
Qty: 90 | Refills: 0 | Status: ACTIVE | COMMUNITY
Start: 2023-11-02

## 2024-05-29 RX ORDER — OXYMORPHONE HYDROCHLORIDE 30 MG/1
30 TABLET, FILM COATED, EXTENDED RELEASE ORAL
Qty: 28 | Refills: 0 | Status: ACTIVE | COMMUNITY
Start: 2022-07-06

## 2024-05-29 RX ORDER — HYDROCORTISONE 1 %
12 CREAM (GRAM) TOPICAL TWICE DAILY
Qty: 1 | Refills: 0 | Status: ACTIVE | COMMUNITY
Start: 2024-01-13

## 2024-05-29 RX ORDER — OXYCODONE AND ACETAMINOPHEN 10; 325 MG/1; MG/1
10-325 TABLET ORAL
Qty: 42 | Refills: 0 | Status: ACTIVE | COMMUNITY
Start: 2022-07-06

## 2024-05-29 RX ORDER — BLOOD SUGAR DIAGNOSTIC
STRIP MISCELLANEOUS 3 TIMES DAILY
Qty: 1 | Refills: 2 | Status: ACTIVE | COMMUNITY
Start: 2024-04-25 | End: 1900-01-01

## 2024-05-29 RX ORDER — SACUBITRIL AND VALSARTAN 49; 51 MG/1; MG/1
49-51 TABLET, FILM COATED ORAL TWICE DAILY
Refills: 0 | Status: ACTIVE | COMMUNITY
Start: 2024-01-13

## 2024-05-29 RX ORDER — ISOPROPYL ALCOHOL 0.7 ML/ML
SWAB TOPICAL
Qty: 1 | Refills: 3 | Status: ACTIVE | COMMUNITY
Start: 2024-05-29 | End: 1900-01-01

## 2024-05-29 RX ORDER — TIRZEPATIDE 5 MG/.5ML
5 INJECTION, SOLUTION SUBCUTANEOUS
Qty: 2 | Refills: 4 | Status: ACTIVE | COMMUNITY
Start: 2023-11-10

## 2024-05-29 RX ORDER — DICLOFENAC SODIUM 1% 10 MG/G
1 GEL TOPICAL DAILY
Qty: 1 | Refills: 2 | Status: COMPLETED | COMMUNITY
Start: 2022-12-20 | End: 2024-05-29

## 2024-05-29 RX ORDER — AZELASTINE HYDROCHLORIDE 0.5 MG/ML
0.05 SOLUTION/ DROPS OPHTHALMIC
Refills: 0 | Status: COMPLETED | COMMUNITY
Start: 2023-07-03 | End: 2024-05-29

## 2024-05-29 RX ORDER — AMLODIPINE BESYLATE 10 MG/1
10 TABLET ORAL DAILY
Qty: 40 | Refills: 0 | Status: ACTIVE | COMMUNITY
Start: 2023-11-02

## 2024-05-29 RX ORDER — PREGABALIN 100 MG/1
100 CAPSULE ORAL TWICE DAILY
Refills: 0 | Status: ACTIVE | COMMUNITY
Start: 2024-05-29

## 2024-05-29 RX ORDER — APIXABAN 5 MG/1
5 TABLET, FILM COATED ORAL
Qty: 180 | Refills: 0 | Status: ACTIVE | COMMUNITY
Start: 2023-07-25 | End: 1900-01-01

## 2024-05-29 RX ORDER — POTASSIUM CHLORIDE 1500 MG/1
20 TABLET, FILM COATED, EXTENDED RELEASE ORAL
Qty: 30 | Refills: 0 | Status: ACTIVE | COMMUNITY
Start: 2023-11-02 | End: 1900-01-01

## 2024-05-29 RX ORDER — PREGABALIN 75 MG/1
75 CAPSULE ORAL
Qty: 60 | Refills: 1 | Status: COMPLETED | COMMUNITY
End: 2024-05-29

## 2024-05-29 RX ORDER — ALCOHOL ANTISEPTIC PADS
PADS, MEDICATED (EA) TOPICAL
Qty: 1 | Refills: 3 | Status: ACTIVE | COMMUNITY
Start: 2024-05-29 | End: 1900-01-01

## 2024-05-30 ENCOUNTER — NON-APPOINTMENT (OUTPATIENT)
Age: 84
End: 2024-05-30

## 2024-06-18 ENCOUNTER — APPOINTMENT (OUTPATIENT)
Dept: ELECTROPHYSIOLOGY | Facility: CLINIC | Age: 84
End: 2024-06-18
Payer: MEDICARE

## 2024-06-18 VITALS
DIASTOLIC BLOOD PRESSURE: 70 MMHG | WEIGHT: 186 LBS | HEIGHT: 66 IN | SYSTOLIC BLOOD PRESSURE: 125 MMHG | HEART RATE: 74 BPM | BODY MASS INDEX: 29.89 KG/M2

## 2024-06-18 DIAGNOSIS — Z87.898 PERSONAL HISTORY OF OTHER SPECIFIED CONDITIONS: ICD-10-CM

## 2024-06-18 DIAGNOSIS — I48.0 PAROXYSMAL ATRIAL FIBRILLATION: ICD-10-CM

## 2024-06-18 DIAGNOSIS — I87.1 COMPRESSION OF VEIN: ICD-10-CM

## 2024-06-18 DIAGNOSIS — E78.5 HYPERLIPIDEMIA, UNSPECIFIED: ICD-10-CM

## 2024-06-18 DIAGNOSIS — Z23 ENCOUNTER FOR IMMUNIZATION: ICD-10-CM

## 2024-06-18 DIAGNOSIS — L85.3 XEROSIS CUTIS: ICD-10-CM

## 2024-06-18 DIAGNOSIS — I10 ESSENTIAL (PRIMARY) HYPERTENSION: ICD-10-CM

## 2024-06-18 DIAGNOSIS — Z95.0 PRESENCE OF CARDIAC PACEMAKER: ICD-10-CM

## 2024-06-18 DIAGNOSIS — I50.9 HEART FAILURE, UNSPECIFIED: ICD-10-CM

## 2024-06-18 DIAGNOSIS — Z71.89 OTHER SPECIFIED COUNSELING: ICD-10-CM

## 2024-06-18 DIAGNOSIS — I70.213 ATHEROSCLEROSIS OF NATIVE ARTERIES OF EXTREMITIES WITH INTERMITTENT CLAUDICATION, BILATERAL LEGS: ICD-10-CM

## 2024-06-18 PROCEDURE — 93000 ELECTROCARDIOGRAM COMPLETE: CPT | Mod: 59

## 2024-06-18 PROCEDURE — 93280 PM DEVICE PROGR EVAL DUAL: CPT

## 2024-06-18 PROCEDURE — 99215 OFFICE O/P EST HI 40 MIN: CPT | Mod: 25

## 2024-06-20 RX ORDER — PEN NEEDLE, DIABETIC 29 G X1/2"
31G X 5 MM NEEDLE, DISPOSABLE MISCELLANEOUS
Qty: 90 | Refills: 4 | Status: ACTIVE | COMMUNITY
Start: 2024-06-20 | End: 1900-01-01

## 2024-06-22 PROBLEM — I50.9 CHF (CONGESTIVE HEART FAILURE): Status: ACTIVE | Noted: 2023-07-24

## 2024-06-22 PROBLEM — E78.5 HLD (HYPERLIPIDEMIA): Status: ACTIVE | Noted: 2023-07-25

## 2024-06-22 PROBLEM — I10 HYPERTENSION, UNSPECIFIED TYPE: Status: ACTIVE | Noted: 2017-12-08

## 2024-06-22 PROBLEM — I48.0 PAROXYSMAL ATRIAL FIBRILLATION: Status: ACTIVE | Noted: 2023-07-24

## 2024-06-22 PROBLEM — I70.213 ATHEROSCLER OF NATIVE ARTERY OF BOTH LEGS WITH INTERMIT CLAUDICATION: Status: ACTIVE | Noted: 2019-09-24

## 2024-06-22 PROBLEM — Z95.0 PACEMAKER: Status: ACTIVE | Noted: 2018-02-08

## 2024-06-22 PROBLEM — I87.1 SUBCLAVIAN VEIN STENOSIS: Status: ACTIVE | Noted: 2024-06-22

## 2024-06-22 NOTE — PHYSICAL EXAM
[Well Developed] : well developed [Well Nourished] : well nourished [No Acute Distress] : no acute distress [Normal Conjunctiva] : normal conjunctiva [Normal Venous Pressure] : normal venous pressure [No Carotid Bruit] : no carotid bruit [Normal S1, S2] : normal S1, S2 [No Murmur] : no murmur [No Rub] : no rub [No Gallop] : no gallop [Clear Lung Fields] : clear lung fields [Good Air Entry] : good air entry [No Respiratory Distress] : no respiratory distress  [Soft] : abdomen soft [Non Tender] : non-tender [No Masses/organomegaly] : no masses/organomegaly [Normal Bowel Sounds] : normal bowel sounds [Normal Gait] : normal gait [No Edema] : no edema [No Cyanosis] : no cyanosis [No Clubbing] : no clubbing [No Varicosities] : no varicosities [No Rash] : no rash [No Skin Lesions] : no skin lesions [Moves all extremities] : moves all extremities [No Focal Deficits] : no focal deficits [Normal Speech] : normal speech [Alert and Oriented] : alert and oriented [Normal memory] : normal memory [Left Infraclavicular] : left infraclavicular area [Clean] : clean [Dry] : dry [Well-Healed] : well-healed

## 2024-06-22 NOTE — HISTORY OF PRESENT ILLNESS
[Palpitations] : no palpitations [SOB] : no dyspnea [Syncope] : no syncope [Dizziness] : no dizziness [Chest Pain] : no chest pain or discomfort [Shoulder Pain] : no shoulder pain [Pain at Site] : no pain at device site [Erythema at Site] : no erythema at device site [Swelling at Site] : no swelling at device site

## 2024-06-22 NOTE — REASON FOR VISIT
[New Patient Device Check] : is here today for a new patient device check visit for [Arrhythmias (seen on stored data)] : arrhythmias seen on stored data [Lead Malfunction] : lead malfunction

## 2024-06-22 NOTE — PROCEDURE
[No] : not [NSR] : normal sinus rhythm [See Scanned Paceart Report] : See scanned paceart report [See Device Printout] : See device printout [Pacemaker] : pacemaker [Threshold Testing Performed] : Threshold testing was performed [Atrial] : Atrial [Ventricular] : Ventricular [Lead Imp:  ___ohms] : lead impedance was [unfilled] ohms [Sensing Amplitude ___mv] : sensing amplitude was [unfilled] mv [___V @] : [unfilled] V [___ ms] : [unfilled] ms [Programmed for Longevity] : output reprogrammed for improved battery longevity [de-identified] : 75 [de-identified] : ANALY [de-identified] : [id number] [de-identified] : [id number] [de-identified] : 9/25/15 [de-identified] : 61 [de-identified] : RRT since 4/22/24 [de-identified] : AP 7.3%  32.2% Hx of AFL/SVT, NSVT episodes AF [place] 0.1% since 4/26/24

## 2024-06-26 ENCOUNTER — TRANSCRIPTION ENCOUNTER (OUTPATIENT)
Age: 84
End: 2024-06-26

## 2024-06-27 ENCOUNTER — APPOINTMENT (OUTPATIENT)
Dept: NEUROLOGY | Facility: CLINIC | Age: 84
End: 2024-06-27

## 2024-06-27 PROCEDURE — 99213 OFFICE O/P EST LOW 20 MIN: CPT

## 2024-07-03 ENCOUNTER — APPOINTMENT (OUTPATIENT)
Dept: CARDIOLOGY | Facility: CLINIC | Age: 84
End: 2024-07-03

## 2024-07-09 ENCOUNTER — APPOINTMENT (OUTPATIENT)
Dept: HOME HEALTH SERVICES | Facility: HOME HEALTH | Age: 84
End: 2024-07-09
Payer: MEDICARE

## 2024-07-09 ENCOUNTER — APPOINTMENT (OUTPATIENT)
Dept: CARDIOLOGY | Facility: CLINIC | Age: 84
End: 2024-07-09
Payer: MEDICARE

## 2024-07-09 VITALS
OXYGEN SATURATION: 98 % | DIASTOLIC BLOOD PRESSURE: 70 MMHG | SYSTOLIC BLOOD PRESSURE: 118 MMHG | RESPIRATION RATE: 18 BRPM | HEART RATE: 84 BPM | TEMPERATURE: 97.5 F

## 2024-07-09 DIAGNOSIS — K86.1 OTHER CHRONIC PANCREATITIS: ICD-10-CM

## 2024-07-09 DIAGNOSIS — R06.2 WHEEZING: ICD-10-CM

## 2024-07-09 DIAGNOSIS — I10 ESSENTIAL (PRIMARY) HYPERTENSION: ICD-10-CM

## 2024-07-09 DIAGNOSIS — J44.9 CHRONIC OBSTRUCTIVE PULMONARY DISEASE, UNSPECIFIED: ICD-10-CM

## 2024-07-09 DIAGNOSIS — E11.65 TYPE 2 DIABETES MELLITUS WITH HYPERGLYCEMIA: ICD-10-CM

## 2024-07-09 DIAGNOSIS — Z79.4 TYPE 2 DIABETES MELLITUS W/OUT COMPLICATIONS: ICD-10-CM

## 2024-07-09 DIAGNOSIS — R56.9 UNSPECIFIED CONVULSIONS: ICD-10-CM

## 2024-07-09 DIAGNOSIS — I50.9 HEART FAILURE, UNSPECIFIED: ICD-10-CM

## 2024-07-09 DIAGNOSIS — E78.5 HYPERLIPIDEMIA, UNSPECIFIED: ICD-10-CM

## 2024-07-09 DIAGNOSIS — M79.89 OTHER SPECIFIED SOFT TISSUE DISORDERS: ICD-10-CM

## 2024-07-09 DIAGNOSIS — I70.213 ATHEROSCLEROSIS OF NATIVE ARTERIES OF EXTREMITIES WITH INTERMITTENT CLAUDICATION, BILATERAL LEGS: ICD-10-CM

## 2024-07-09 DIAGNOSIS — I48.0 PAROXYSMAL ATRIAL FIBRILLATION: ICD-10-CM

## 2024-07-09 DIAGNOSIS — K21.9 GASTRO-ESOPHAGEAL REFLUX DISEASE W/OUT ESOPHAGITIS: ICD-10-CM

## 2024-07-09 DIAGNOSIS — E11.9 TYPE 2 DIABETES MELLITUS W/OUT COMPLICATIONS: ICD-10-CM

## 2024-07-09 PROCEDURE — 99349 HOME/RES VST EST MOD MDM 40: CPT

## 2024-07-09 PROCEDURE — 93306 TTE W/DOPPLER COMPLETE: CPT

## 2024-07-09 PROCEDURE — 93970 EXTREMITY STUDY: CPT

## 2024-07-10 PROBLEM — E11.9 INSULIN USE (LONG-TERM) IN TYPE 2 DIABETES: Noted: 2023-07-25

## 2024-07-10 PROBLEM — R06.2 EXPIRATORY WHEEZING: Status: RESOLVED | Noted: 2024-04-25 | Resolved: 2024-07-10

## 2024-07-11 ENCOUNTER — OUTPATIENT (OUTPATIENT)
Dept: OUTPATIENT SERVICES | Facility: HOSPITAL | Age: 84
LOS: 1 days | End: 2024-07-11
Payer: MEDICARE

## 2024-07-11 ENCOUNTER — RESULT REVIEW (OUTPATIENT)
Age: 84
End: 2024-07-11

## 2024-07-11 VITALS
TEMPERATURE: 98 F | OXYGEN SATURATION: 96 % | RESPIRATION RATE: 18 BRPM | WEIGHT: 179.02 LBS | HEART RATE: 76 BPM | SYSTOLIC BLOOD PRESSURE: 137 MMHG | DIASTOLIC BLOOD PRESSURE: 81 MMHG | HEIGHT: 66 IN

## 2024-07-11 DIAGNOSIS — Z87.19 PERSONAL HISTORY OF OTHER DISEASES OF THE DIGESTIVE SYSTEM: Chronic | ICD-10-CM

## 2024-07-11 DIAGNOSIS — Z98.890 OTHER SPECIFIED POSTPROCEDURAL STATES: Chronic | ICD-10-CM

## 2024-07-11 DIAGNOSIS — Z96.0 PRESENCE OF UROGENITAL IMPLANTS: Chronic | ICD-10-CM

## 2024-07-11 DIAGNOSIS — Z01.818 ENCOUNTER FOR OTHER PREPROCEDURAL EXAMINATION: ICD-10-CM

## 2024-07-11 DIAGNOSIS — Z90.49 ACQUIRED ABSENCE OF OTHER SPECIFIED PARTS OF DIGESTIVE TRACT: Chronic | ICD-10-CM

## 2024-07-11 DIAGNOSIS — I48.19 OTHER PERSISTENT ATRIAL FIBRILLATION: ICD-10-CM

## 2024-07-11 DIAGNOSIS — Z95.0 PRESENCE OF CARDIAC PACEMAKER: Chronic | ICD-10-CM

## 2024-07-11 LAB
ALBUMIN SERPL ELPH-MCNC: 4.2 G/DL — SIGNIFICANT CHANGE UP (ref 3.5–5.2)
ALP SERPL-CCNC: 68 U/L — SIGNIFICANT CHANGE UP (ref 30–115)
ALT FLD-CCNC: 9 U/L — SIGNIFICANT CHANGE UP (ref 0–41)
ANION GAP SERPL CALC-SCNC: 14 MMOL/L — SIGNIFICANT CHANGE UP (ref 7–14)
APPEARANCE UR: CLEAR — SIGNIFICANT CHANGE UP
AST SERPL-CCNC: 16 U/L — SIGNIFICANT CHANGE UP (ref 0–41)
BASOPHILS # BLD AUTO: 0.02 K/UL — SIGNIFICANT CHANGE UP (ref 0–0.2)
BASOPHILS NFR BLD AUTO: 0.3 % — SIGNIFICANT CHANGE UP (ref 0–1)
BILIRUB SERPL-MCNC: 0.6 MG/DL — SIGNIFICANT CHANGE UP (ref 0.2–1.2)
BILIRUB UR-MCNC: NEGATIVE — SIGNIFICANT CHANGE UP
BLD GP AB SCN SERPL QL: SIGNIFICANT CHANGE UP
BUN SERPL-MCNC: 20 MG/DL — SIGNIFICANT CHANGE UP (ref 10–20)
CALCIUM SERPL-MCNC: 9 MG/DL — SIGNIFICANT CHANGE UP (ref 8.4–10.5)
CHLORIDE SERPL-SCNC: 101 MMOL/L — SIGNIFICANT CHANGE UP (ref 98–110)
CO2 SERPL-SCNC: 24 MMOL/L — SIGNIFICANT CHANGE UP (ref 17–32)
COLOR SPEC: YELLOW — SIGNIFICANT CHANGE UP
CREAT SERPL-MCNC: 1.1 MG/DL — SIGNIFICANT CHANGE UP (ref 0.7–1.5)
DIFF PNL FLD: NEGATIVE — SIGNIFICANT CHANGE UP
EGFR: 67 ML/MIN/1.73M2 — SIGNIFICANT CHANGE UP
EOSINOPHIL # BLD AUTO: 0.2 K/UL — SIGNIFICANT CHANGE UP (ref 0–0.7)
EOSINOPHIL NFR BLD AUTO: 3 % — SIGNIFICANT CHANGE UP (ref 0–8)
GLUCOSE SERPL-MCNC: 107 MG/DL — HIGH (ref 70–99)
GLUCOSE UR QL: NEGATIVE MG/DL — SIGNIFICANT CHANGE UP
HCT VFR BLD CALC: 37.7 % — LOW (ref 42–52)
HGB BLD-MCNC: 12.8 G/DL — LOW (ref 14–18)
IMM GRANULOCYTES NFR BLD AUTO: 0.2 % — SIGNIFICANT CHANGE UP (ref 0.1–0.3)
KETONES UR-MCNC: NEGATIVE MG/DL — SIGNIFICANT CHANGE UP
LEUKOCYTE ESTERASE UR-ACNC: NEGATIVE — SIGNIFICANT CHANGE UP
LYMPHOCYTES # BLD AUTO: 2.41 K/UL — SIGNIFICANT CHANGE UP (ref 1.2–3.4)
LYMPHOCYTES # BLD AUTO: 36.3 % — SIGNIFICANT CHANGE UP (ref 20.5–51.1)
MCHC RBC-ENTMCNC: 31.8 PG — HIGH (ref 27–31)
MCHC RBC-ENTMCNC: 34 G/DL — SIGNIFICANT CHANGE UP (ref 32–37)
MCV RBC AUTO: 93.5 FL — SIGNIFICANT CHANGE UP (ref 80–94)
MONOCYTES # BLD AUTO: 1 K/UL — HIGH (ref 0.1–0.6)
MONOCYTES NFR BLD AUTO: 15.1 % — HIGH (ref 1.7–9.3)
MRSA PCR RESULT.: POSITIVE
NEUTROPHILS # BLD AUTO: 3 K/UL — SIGNIFICANT CHANGE UP (ref 1.4–6.5)
NEUTROPHILS NFR BLD AUTO: 45.1 % — SIGNIFICANT CHANGE UP (ref 42.2–75.2)
NITRITE UR-MCNC: NEGATIVE — SIGNIFICANT CHANGE UP
NRBC # BLD: 0 /100 WBCS — SIGNIFICANT CHANGE UP (ref 0–0)
PH UR: 6 — SIGNIFICANT CHANGE UP (ref 5–8)
PLATELET # BLD AUTO: 202 K/UL — SIGNIFICANT CHANGE UP (ref 130–400)
PMV BLD: 10.8 FL — HIGH (ref 7.4–10.4)
POTASSIUM SERPL-MCNC: 4.3 MMOL/L — SIGNIFICANT CHANGE UP (ref 3.5–5)
POTASSIUM SERPL-SCNC: 4.3 MMOL/L — SIGNIFICANT CHANGE UP (ref 3.5–5)
PROT SERPL-MCNC: 7.9 G/DL — SIGNIFICANT CHANGE UP (ref 6–8)
PROT UR-MCNC: NEGATIVE MG/DL — SIGNIFICANT CHANGE UP
RBC # BLD: 4.03 M/UL — LOW (ref 4.7–6.1)
RBC # FLD: 14.1 % — SIGNIFICANT CHANGE UP (ref 11.5–14.5)
SODIUM SERPL-SCNC: 139 MMOL/L — SIGNIFICANT CHANGE UP (ref 135–146)
SP GR SPEC: 1.01 — SIGNIFICANT CHANGE UP (ref 1–1.03)
UROBILINOGEN FLD QL: 0.2 MG/DL — SIGNIFICANT CHANGE UP (ref 0.2–1)
WBC # BLD: 6.64 K/UL — SIGNIFICANT CHANGE UP (ref 4.8–10.8)
WBC # FLD AUTO: 6.64 K/UL — SIGNIFICANT CHANGE UP (ref 4.8–10.8)

## 2024-07-11 PROCEDURE — 86901 BLOOD TYPING SEROLOGIC RH(D): CPT

## 2024-07-11 PROCEDURE — 99214 OFFICE O/P EST MOD 30 MIN: CPT | Mod: 25

## 2024-07-11 PROCEDURE — 36415 COLL VENOUS BLD VENIPUNCTURE: CPT

## 2024-07-11 PROCEDURE — 87086 URINE CULTURE/COLONY COUNT: CPT

## 2024-07-11 PROCEDURE — 93010 ELECTROCARDIOGRAM REPORT: CPT

## 2024-07-11 PROCEDURE — 86850 RBC ANTIBODY SCREEN: CPT

## 2024-07-11 PROCEDURE — 87641 MR-STAPH DNA AMP PROBE: CPT

## 2024-07-11 PROCEDURE — 93005 ELECTROCARDIOGRAM TRACING: CPT

## 2024-07-11 PROCEDURE — 86900 BLOOD TYPING SEROLOGIC ABO: CPT

## 2024-07-11 PROCEDURE — 85025 COMPLETE CBC W/AUTO DIFF WBC: CPT

## 2024-07-11 PROCEDURE — 72050 X-RAY EXAM NECK SPINE 4/5VWS: CPT

## 2024-07-11 PROCEDURE — 80053 COMPREHEN METABOLIC PANEL: CPT

## 2024-07-11 PROCEDURE — 87640 STAPH A DNA AMP PROBE: CPT

## 2024-07-11 PROCEDURE — 72050 X-RAY EXAM NECK SPINE 4/5VWS: CPT | Mod: 26

## 2024-07-11 PROCEDURE — 81003 URINALYSIS AUTO W/O SCOPE: CPT

## 2024-07-11 NOTE — H&P PST ADULT - NSICDXPASTMEDICALHX_GEN_ALL_CORE_FT
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

## 2024-07-11 NOTE — H&P PST ADULT - HISTORY OF PRESENT ILLNESS
Patient is a 83 year old  male presenting to PAST in preparation for PPM MICRA AV IMPLANT  on 7-  under  Local Standby anesthesia by Dr.Philippe Yoselyn Law  .    Patient reports needing a new pacemaker. Denies heart palpitations, syncope, dizziness, and chest pain.    PATIENT  CURRENTLY DENIES CHEST PAIN   PALPITATIONS,  DYSURIA, OR UPPER RESPIRATORY INFECTION IN PAST 2 WEEKS    Patient reports occasional SOB on exertion and at rest.       denies travel outside the [place] in the past 30 days      Anesthesia Alert  NO--Difficult Airway  NO--History of neck surgery or radiation  YES--Limited ROM of neck  NO--History of Malignant hyperthermia  NO--No personal or family history of Pseudocholinesterase deficiency.  NO--Prior Anesthesia Complication  NO--Latex Allergy  NO--Loose teeth  NO--History of Rheumatoid Arthritis  yes--Bleeding risk on eliquis  yes--CHESTER on cpap  NO--Other_____    PT  DENIES ANY RASHES, ABRASION, OR OPEN WOUNDS OR CUTS    AS PER THE PATIENT, THIS IS HIS/HER COMPLETE MEDICAL AND SURGICAL HX, INCLUDING MEDICATIONS PRESCRIBED AND OVER THE COUNTER    Patient communicated understanding of instructions and was given the opportunity to ask questions and have them answered.    pt denies any suicidal ideation or thoughts, pt states not a threat to self or others    Revised Cardiac Risk Index for Pre-Operative Risk from [email or web address]  on 7/11/2024  ** All calculations should be rechecked by clinician prior to use **    RESULT SUMMARY:  3 points  Class IV Risk    15.0 %  30-day risk of death, MI, or cardiac arrest    From Duceppe 2017. These numbers are higher than those from the original study (Papito 1999). See Evidence for details.      INPUTS:  Elevated-risk surgery —> 0 = No  History of ischemic heart disease —> 0 = No  History of congestive heart failure —> 1 = Yes  History of cerebrovascular disease —> 1 = Yes  Pre-operative treatment with insulin —> 1 = Yes  Pre-operative creatinine >2 mg/dL / 176.8 µmol/L —> 0 = No

## 2024-07-11 NOTE — H&P PST ADULT - REASON FOR ADMISSION
Final Anesthesia Post-op Assessment    Patient: Catalina Wilson  Procedure(s) Performed: LABOR ANALGESIA  Anesthesia type: L&D Epidural    Vitals Value Taken Time   Temp 36.9 1/11/2020  1:06 PM   Pulse 74 1/11/2020  1:06 PM   Resp 16 1/11/2020  1:06 PM   SpO2 98 1/11/2020  1:06 PM   /84 1/11/2020  1:06 PM       Last 24 I/O: No intake or output data in the 24 hours ending 01/11/20 1306      Patient Location: [facility] Surgical Floor  Post-op Vital Signs:stable  Level of Consciousness: participates in exam, awake, oriented, answers questions appropriately and alert  Respiratory Status: spontaneous ventilation  Cardiovascular blood pressure returned to baseline  Hydration: euvolemic  Pain Management: well controlled  Nausea: None  Airway Patency:patent  Post-op Assessment: awake, alert, appropriately conversant, or baseline, no complications and patient tolerated procedure well with no complications  Comments: No evidence of post-dural headache or neurologic deficits   Denies questions or concerns       Case Type: OP Block TimeSuite: EP LabProceduralist: Dontae Law  Confirmed Surgery DateTime: 07- - 10:00PAST DateTime: 07- - 13:30Procedure: PPM MICRA AV IMPLANT  ERP?: UnavailableLaterality: N/ALength of Procedure: 180 Minutes  Anesthesia Type: Local Standby

## 2024-07-11 NOTE — H&P PST ADULT - RESPIRATORY COMMENTS
PATIENT REPORTS USING HOME O2 BUT DOES NOT KNOW WHO \[facility]\"" PULMONOLOGIST IS. PER DR. GUARDADO NO NEED FOR PULM EVAL, AND WILL EVALUATE ON DAY OF PROCEDURE

## 2024-07-12 DIAGNOSIS — Z22.322 CARRIER OR SUSPECTED CARRIER OF METHICILLIN RESISTANT STAPHYLOCOCCUS AUREUS: ICD-10-CM

## 2024-07-12 DIAGNOSIS — Z01.818 ENCOUNTER FOR OTHER PREPROCEDURAL EXAMINATION: ICD-10-CM

## 2024-07-12 DIAGNOSIS — I48.19 OTHER PERSISTENT ATRIAL FIBRILLATION: ICD-10-CM

## 2024-07-12 LAB
CULTURE RESULTS: NO GROWTH — SIGNIFICANT CHANGE UP
SPECIMEN SOURCE: SIGNIFICANT CHANGE UP

## 2024-07-12 RX ORDER — MUPIROCIN 20 MG/G
2 OINTMENT TOPICAL
Qty: 1 | Refills: 0 | Status: ACTIVE | COMMUNITY
Start: 2024-07-12 | End: 1900-01-01

## 2024-07-25 ENCOUNTER — OUTPATIENT (OUTPATIENT)
Dept: OUTPATIENT SERVICES | Facility: HOSPITAL | Age: 84
LOS: 1 days | Discharge: HOME | End: 2024-07-25
Payer: MEDICARE

## 2024-07-25 ENCOUNTER — APPOINTMENT (OUTPATIENT)
Dept: ELECTROPHYSIOLOGY | Facility: HOSPITAL | Age: 84
End: 2024-07-25

## 2024-07-25 ENCOUNTER — TRANSCRIPTION ENCOUNTER (OUTPATIENT)
Age: 84
End: 2024-07-25

## 2024-07-25 VITALS
HEART RATE: 72 BPM | RESPIRATION RATE: 16 BRPM | WEIGHT: 188.94 LBS | HEIGHT: 66 IN | SYSTOLIC BLOOD PRESSURE: 138 MMHG | OXYGEN SATURATION: 97 % | DIASTOLIC BLOOD PRESSURE: 63 MMHG

## 2024-07-25 DIAGNOSIS — Z95.0 PRESENCE OF CARDIAC PACEMAKER: Chronic | ICD-10-CM

## 2024-07-25 DIAGNOSIS — Z87.19 PERSONAL HISTORY OF OTHER DISEASES OF THE DIGESTIVE SYSTEM: Chronic | ICD-10-CM

## 2024-07-25 DIAGNOSIS — Z96.0 PRESENCE OF UROGENITAL IMPLANTS: Chronic | ICD-10-CM

## 2024-07-25 DIAGNOSIS — I48.19 OTHER PERSISTENT ATRIAL FIBRILLATION: ICD-10-CM

## 2024-07-25 DIAGNOSIS — Z98.890 OTHER SPECIFIED POSTPROCEDURAL STATES: Chronic | ICD-10-CM

## 2024-07-25 DIAGNOSIS — Z90.49 ACQUIRED ABSENCE OF OTHER SPECIFIED PARTS OF DIGESTIVE TRACT: Chronic | ICD-10-CM

## 2024-07-25 PROCEDURE — 75822 VEIN X-RAY ARMS/LEGS: CPT

## 2024-07-25 PROCEDURE — 93280 PM DEVICE PROGR EVAL DUAL: CPT | Mod: 26

## 2024-07-25 PROCEDURE — 75822 VEIN X-RAY ARMS/LEGS: CPT | Mod: 26

## 2024-07-25 PROCEDURE — 93280 PM DEVICE PROGR EVAL DUAL: CPT

## 2024-07-25 PROCEDURE — 36005 INJECTION EXT VENOGRAPHY: CPT | Mod: 50

## 2024-07-25 RX ORDER — OXYMORPHONE HYDROCHLORIDE 5 MG/1
1 TABLET ORAL
Refills: 0 | DISCHARGE

## 2024-07-25 RX ORDER — CEFAZOLIN 10 G/1
2000 INJECTION, POWDER, FOR SOLUTION INTRAVENOUS ONCE
Refills: 0 | Status: DISCONTINUED | OUTPATIENT
Start: 2024-07-25 | End: 2024-07-25

## 2024-07-25 RX ORDER — INSULIN GLARGINE 100 [IU]/ML
0 INJECTION, SOLUTION SUBCUTANEOUS
Refills: 0 | DISCHARGE

## 2024-07-25 RX ORDER — AMMONIUM LACTATE 12 %
0 LOTION (GRAM) TOPICAL
Refills: 0 | DISCHARGE

## 2024-07-25 RX ORDER — FLUTICASONE FUROATE AND VILANTEROL 100; 25 UG/1; UG/1
1 POWDER RESPIRATORY (INHALATION)
Refills: 0 | DISCHARGE

## 2024-07-25 RX ORDER — TIRZEPATIDE 12.5 MG/.5ML
5 INJECTION, SOLUTION SUBCUTANEOUS
Refills: 0 | DISCHARGE

## 2024-07-25 RX ORDER — ALBUTEROL 90 MCG
2 AEROSOL REFILL (GRAM) INHALATION
Refills: 0 | DISCHARGE

## 2024-07-25 RX ORDER — LIDOCAINE HCL 28 MG/G
0 GEL TOPICAL
Refills: 0 | DISCHARGE

## 2024-07-25 RX ORDER — TORSEMIDE 20 MG/1
1 TABLET ORAL
Refills: 0 | DISCHARGE

## 2024-07-25 NOTE — ASU DISCHARGE PLAN (ADULT/PEDIATRIC) - BATHING
Pt calling to check status of refills for     chlorzoxazone (PARAFON FORTE) 500 MG tablet    albuterol sulfate  (90 Base) MCG/ACT inhaler    HYDROcodone-acetaminophen (NORCO) 7.5-325 MG per tablet    [id number] [address] [place] [facility] [phone]    LOV: 4/28/22  FOV: 7/6/22 5 days/Do not submerge in water

## 2024-07-25 NOTE — CHART NOTE - NSCHARTNOTEFT_GEN_A_CORE
Patient presented in the fasting state for bilateral arm venogram. After informed consent, a right arm and left arm venography were performed with 10 cc contrast each. 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     Based on above a transvenous implant is not an option. Laser extraction was deemed high risk and patient did not consent. After discussion of all options, patient agreed to proceed with leadless pacemaker implant in 2 weeks.       No complications  No implants  Blood Loss: 0 cc  Contrast: 20 cc.   Needle and sponge counts were correct and the patient tolerated the procedure well.

## 2024-07-25 NOTE — ASU PATIENT PROFILE, ADULT - FALL HARM RISK - HARM RISK INTERVENTIONS

## 2024-07-25 NOTE — ASU DISCHARGE PLAN (ADULT/PEDIATRIC) - NS MD DC FALL RISK RISK
For information on Fall & Injury Prevention, visit: [email or web address] OR  [email or web address] OR  https://www.cdc.gov/steadi/patient.html

## 2024-08-07 NOTE — PHYSICAL THERAPY INITIAL EVALUATION ADULT - HEALTH SCREEN CRITERIA
Pt has been up walking.  WBC's not elevated; platelets 108K  Pt states she is not ready to start any medications and is interested in being discharged so that she can eat and get uninterrupted sleep. She wants to return in the morning.  I discussed risks again and that I do not recommend leaving at this time and she would need to sign out AMA.  Discussed with pediatrician who will also talk with patient.    yes

## 2024-08-08 ENCOUNTER — NON-APPOINTMENT (OUTPATIENT)
Age: 84
End: 2024-08-08

## 2024-08-08 ENCOUNTER — INPATIENT (INPATIENT)
Facility: HOSPITAL | Age: 84
LOS: 0 days | Discharge: HOME | DRG: 229 | End: 2024-08-09
Attending: STUDENT IN AN ORGANIZED HEALTH CARE EDUCATION/TRAINING PROGRAM | Admitting: STUDENT IN AN ORGANIZED HEALTH CARE EDUCATION/TRAINING PROGRAM
Payer: MEDICARE

## 2024-08-08 ENCOUNTER — TRANSCRIPTION ENCOUNTER (OUTPATIENT)
Age: 84
End: 2024-08-08

## 2024-08-08 ENCOUNTER — APPOINTMENT (OUTPATIENT)
Dept: ELECTROPHYSIOLOGY | Facility: HOSPITAL | Age: 84
End: 2024-08-08

## 2024-08-08 VITALS
RESPIRATION RATE: 18 BRPM | WEIGHT: 190.92 LBS | HEIGHT: 66 IN | DIASTOLIC BLOOD PRESSURE: 83 MMHG | SYSTOLIC BLOOD PRESSURE: 170 MMHG | HEART RATE: 77 BPM | TEMPERATURE: 98 F | OXYGEN SATURATION: 98 %

## 2024-08-08 DIAGNOSIS — Z96.0 PRESENCE OF UROGENITAL IMPLANTS: Chronic | ICD-10-CM

## 2024-08-08 DIAGNOSIS — I48.91 UNSPECIFIED ATRIAL FIBRILLATION: ICD-10-CM

## 2024-08-08 DIAGNOSIS — Z95.0 PRESENCE OF CARDIAC PACEMAKER: Chronic | ICD-10-CM

## 2024-08-08 DIAGNOSIS — Z98.890 OTHER SPECIFIED POSTPROCEDURAL STATES: Chronic | ICD-10-CM

## 2024-08-08 DIAGNOSIS — Z87.19 PERSONAL HISTORY OF OTHER DISEASES OF THE DIGESTIVE SYSTEM: Chronic | ICD-10-CM

## 2024-08-08 DIAGNOSIS — I48.19 OTHER PERSISTENT ATRIAL FIBRILLATION: ICD-10-CM

## 2024-08-08 DIAGNOSIS — Z90.49 ACQUIRED ABSENCE OF OTHER SPECIFIED PARTS OF DIGESTIVE TRACT: Chronic | ICD-10-CM

## 2024-08-08 LAB
GLUCOSE BLDC GLUCOMTR-MCNC: 112 MG/DL — HIGH (ref 70–99)
GLUCOSE BLDC GLUCOMTR-MCNC: 114 MG/DL — HIGH (ref 70–99)
GLUCOSE BLDC GLUCOMTR-MCNC: 117 MG/DL — HIGH (ref 70–99)
GLUCOSE BLDC GLUCOMTR-MCNC: 93 MG/DL — SIGNIFICANT CHANGE UP (ref 70–99)

## 2024-08-08 PROCEDURE — 71046 X-RAY EXAM CHEST 2 VIEWS: CPT

## 2024-08-08 PROCEDURE — 93005 ELECTROCARDIOGRAM TRACING: CPT

## 2024-08-08 PROCEDURE — 82962 GLUCOSE BLOOD TEST: CPT

## 2024-08-08 PROCEDURE — 93010 ELECTROCARDIOGRAM REPORT: CPT

## 2024-08-08 PROCEDURE — 71045 X-RAY EXAM CHEST 1 VIEW: CPT

## 2024-08-08 PROCEDURE — 33274 TCAT INSJ/RPL PERM LDLS PM: CPT | Mod: Q0

## 2024-08-08 PROCEDURE — 71045 X-RAY EXAM CHEST 1 VIEW: CPT | Mod: 26

## 2024-08-08 RX ORDER — PANTOPRAZOLE SODIUM 20 MG/1
40 TABLET, DELAYED RELEASE ORAL
Refills: 0 | Status: DISCONTINUED | OUTPATIENT
Start: 2024-08-08 | End: 2024-08-09

## 2024-08-08 RX ORDER — AMLODIPINE BESYLATE 2.5 MG/1
10 TABLET ORAL DAILY
Refills: 0 | Status: DISCONTINUED | OUTPATIENT
Start: 2024-08-08 | End: 2024-08-09

## 2024-08-08 RX ORDER — CEFAZOLIN SODIUM 10 G
1000 VIAL (EA) INJECTION EVERY 8 HOURS
Refills: 0 | Status: COMPLETED | OUTPATIENT
Start: 2024-08-08 | End: 2024-08-09

## 2024-08-08 RX ORDER — CEFAZOLIN SODIUM 10 G
2000 VIAL (EA) INJECTION ONCE
Refills: 0 | Status: COMPLETED | OUTPATIENT
Start: 2024-08-08 | End: 2024-08-08

## 2024-08-08 RX ORDER — TORSEMIDE 20 MG
20 TABLET ORAL DAILY
Refills: 0 | Status: DISCONTINUED | OUTPATIENT
Start: 2024-08-08 | End: 2024-08-09

## 2024-08-08 RX ORDER — MELATONIN 3 MG
5 TABLET ORAL AT BEDTIME
Refills: 0 | Status: DISCONTINUED | OUTPATIENT
Start: 2024-08-08 | End: 2024-08-09

## 2024-08-08 RX ORDER — SACUBITRIL AND VALSARTAN 49; 51 MG/1; MG/1
1 TABLET, FILM COATED ORAL
Refills: 0 | Status: DISCONTINUED | OUTPATIENT
Start: 2024-08-08 | End: 2024-08-09

## 2024-08-08 RX ORDER — APIXABAN 5 MG/1
5 TABLET, FILM COATED ORAL
Refills: 0 | Status: DISCONTINUED | OUTPATIENT
Start: 2024-08-08 | End: 2024-08-08

## 2024-08-08 RX ORDER — ACETAMINOPHEN 500 MG
650 TABLET ORAL EVERY 6 HOURS
Refills: 0 | Status: DISCONTINUED | OUTPATIENT
Start: 2024-08-08 | End: 2024-08-09

## 2024-08-08 RX ADMIN — Medication 10 MILLIGRAM(S): at 14:46

## 2024-08-08 RX ADMIN — SACUBITRIL AND VALSARTAN 1 TABLET(S): 49; 51 TABLET, FILM COATED ORAL at 17:37

## 2024-08-08 RX ADMIN — Medication 5 MILLIGRAM(S): at 22:02

## 2024-08-08 RX ADMIN — AMLODIPINE BESYLATE 10 MILLIGRAM(S): 2.5 TABLET ORAL at 14:46

## 2024-08-08 RX ADMIN — Medication 650 MILLIGRAM(S): at 16:00

## 2024-08-08 RX ADMIN — Medication 650 MILLIGRAM(S): at 15:26

## 2024-08-08 RX ADMIN — Medication 20 MILLIGRAM(S): at 22:04

## 2024-08-08 RX ADMIN — Medication 100 MILLIGRAM(S): at 18:28

## 2024-08-08 RX ADMIN — Medication 100 MILLIGRAM(S): at 10:55

## 2024-08-08 RX ADMIN — PANTOPRAZOLE SODIUM 40 MILLIGRAM(S): 20 TABLET, DELAYED RELEASE ORAL at 14:46

## 2024-08-08 NOTE — DISCHARGE NOTE PROVIDER - NSDCFUSCHEDAPPT_GEN_ALL_CORE_FT
Anna Bah  [facility]  [facility] [address] [facility]  Scheduled Appointment: 09/05/2024

## 2024-08-08 NOTE — PATIENT PROFILE ADULT - FALL HARM RISK - HARM RISK INTERVENTIONS
Assistance with ambulation/Assistance OOB with selected safe patient handling equipment/Communicate Risk of Fall with Harm to all staff/Discuss with provider need for PT consult/Monitor gait and stability/Provide patient with walking aids - walker, cane, crutches/Reinforce activity limits and safety measures with patient and family/Sit up slowly, dangle for a short time, stand at bedside before walking/Tailored Fall Risk Interventions/Use of alarms - bed, chair and/or voice tab/Visual Cue: Yellow wristband and red socks/Bed in lowest position, wheels locked, appropriate side rails in place/Call bell, personal items and telephone in reach/Instruct patient to call for assistance before getting out of bed or chair/Non-slip footwear when patient is out of bed/[place] to call system/Physically safe environment - no spills, clutter or unnecessary equipment/Purposeful Proactive Rounding/Room/bathroom lighting operational, light cord in reach

## 2024-08-08 NOTE — H&P ADULT - NSHPPHYSICALEXAM_GEN_ALL_CORE
Physical Exam  GENERAL: In no apparent distress, well nourished, and hydrated.  EYES: EOMI, PERRLA, conjunctiva and sclera clear  NECK: Supple  HEART: Regular rate and rhythm; No murmurs, rubs, or gallops.  PULMONARY: Clear to auscultation and perfusion.  No rales, wheezing, or rhonchi bilaterally.  EXTREMITIES:  2+ Peripheral Pulses, No clubbing, cyanosis, or edema  NEUROLOGICAL: Grossly nonfocal

## 2024-08-08 NOTE — DISCHARGE NOTE PROVIDER - NSDCCPCAREPLAN_GEN_ALL_CORE_FT
PRINCIPAL DISCHARGE DIAGNOSIS  Diagnosis: Tachy-bar syndrome  Assessment and Plan of Treatment:

## 2024-08-08 NOTE — DISCHARGE NOTE PROVIDER - HOSPITAL COURSE
83-year-old man with HTN, HLD, DM, CAD, PPM, AFib on Eliquis, CHF (Last EF 35-40%), COPD on 3L home O2, chronic pancreatitis, chronic back pain, multilevel cervical spondylosis and spondylolisthesis, C3-4 and severe left/moderate-severe right foraminal stenosis, cataract, increased thresholds on RA and RV leads, and pacemaker with device at RRT since 4/22/2024. Patient has chronic subclavian vein stenosis not allowing lead revision with Dr. Demarco. Here today for Micra PPM implant.  On 8/8/24, patient underwent successful  implantation of medtronic leadless pacemaker. Patient was monitored overnight. On POD 1 patient remains HD stable with no complaints. Examination of R groin with no swelling/hematoma. DP pulses 2+ b/l. CXR was negative for pneumothorax. Eliquis held for 48 hours to be restarted ***   Patient is being DC home in stable condition.    83-year-old man with HTN, HLD, DM, CAD, PPM, AFib on Eliquis, CHF (Last EF 35-40%), COPD on 3L home O2, chronic pancreatitis, chronic back pain, multilevel cervical spondylosis and spondylolisthesis, C3-4 and severe left/moderate-severe right foraminal stenosis, cataract, increased thresholds on RA and RV leads, and pacemaker with device at RRT since 4/22/2024. Patient has chronic subclavian vein stenosis not allowing lead revision with Dr. Demarco. Here today for Micra PPM implant.  On 8/8/24, patient underwent successful  implantation of medtronic leadless pacemaker. Patient was monitored overnight. On POD 1 patient remains HD stable with no complaints. Examination of R groin with no swelling/hematoma. DP pulses 2+ b/l. CXR was negative for pneumothorax. Eliquis held for 48 hours to be restarted 8/10 pm   Patient is being DC home in stable condition.

## 2024-08-08 NOTE — H&P ADULT - HISTORY OF PRESENT ILLNESS
Patient is an 83-year-old man with HTN, HLD, DM, CAD, PPM, AFib on Eliquis, CHF (Last EF 35-40%), COPD on 3L home O2, chronic pancreatitis, chronic back pain, multilevel cervical spondylosis and spondylolisthesis, C3-4 and severe left/moderate-severe right foraminal stenosis, cataract, increased thresholds on RA and RV leads, and pacemaker with device at RRT since 4/22/2024. Patient has chronic subclavian vein stenosis not allowing lead revision with Dr. Demarco. Here today for Micra PPM implant.

## 2024-08-08 NOTE — DISCHARGE NOTE PROVIDER - NSDCCPTREATMENT_GEN_ALL_CORE_FT
PRINCIPAL PROCEDURE  Procedure: Insertion or repositioning of leadless intracardiac pacemaker  Findings and Treatment: - You should restart your Eliquis ***  - You may shower today.  - Do not drive or operate heavy machinery for 3 days.  - Do not submerge in water (example: baths, swimming) for 1 week.  - No squatting, exertional activities, or lifting anything > 10 lbs for 1 week.  - Any sudden swelling, redness, fever, discharge, or severe pain, call the Electrophysiology Office at [phone].

## 2024-08-08 NOTE — DISCHARGE NOTE PROVIDER - NSDCMRMEDTOKEN_GEN_ALL_CORE_FT
amLODIPine 10 mg oral tablet: 1 tab(s) orally once a day  docusate sodium 100 mg oral tablet: 1 tab(s) orally once a day (at bedtime)  Eliquis 5 mg tablet: 1 tab(s) orally every 12 hours  Entresto 49 mg-51 mg oral tablet: 1 tab(s) orally 2 times a day  montelukast 10 mg oral tablet: 1 tab(s) orally once a day  Mounjaro 5 mg/0.5 mL subcutaneous solution: 5 milligram(s) subcutaneously  pantoprazole 40 mg oral delayed release tablet: 1 tab(s) orally once a day  simvastatin 20 mg oral tablet: 1 tab(s) orally once a day (at bedtime)  torsemide 20 mg oral tablet: 1 tab(s) orally  Tomodesto Weir SoloStar 300 units/mL subcutaneous solution: subcutaneous  vitamin d [id number]: 1.25 MG DAILY   amLODIPine 10 mg oral tablet: 1 tab(s) orally once a day  docusate sodium 100 mg oral tablet: 1 tab(s) orally once a day (at bedtime)  Eliquis 5 mg tablet: 1 tab(s) orally every 12 hours  Entresto 49 mg-51 mg oral tablet: 1 tab(s) orally 2 times a day  montelukast 10 mg oral tablet: 1 tab(s) orally once a day  Mounjaro 5 mg/0.5 mL subcutaneous solution: 5 milligram(s) subcutaneously  pantoprazole 40 mg oral delayed release tablet: 1 tab(s) orally once a day  simvastatin 20 mg oral tablet: 1 tab(s) orally once a day (at bedtime)  torsemide 20 mg oral tablet: 1 tab(s) orally once a day  Tomodesto Weir SoloStar 300 units/mL subcutaneous solution: subcutaneous  vitamin d 38216QO: 1.25 MG DAILY

## 2024-08-08 NOTE — CHART NOTE - NSCHARTNOTEFT_GEN_A_CORE
Electrophysiology Brief Post-Op Note      I have personally seen and examined the patient.  I agree with the history and physical which I have reviewed and noted any changes below.      PRE-OP DIAGNOSIS:  -Tachy-Christiano Syndrome/pauses with symptoms      POST-OP DIAGNOSIS:   -Tachy-Christiano Syndrome/pauses with symptoms      PROCEDURE:  -Implant of leadless pacemaker (Medtronic)    Vascular Access used (using Ultrasound Guidance and micropuncture needle)  -Right Femoral Vein: 11F (upgraded to 23 Fr)  -Right Femoral Artery: none    All sheaths and wires removed and Perclose suture applied followed by manual pressure.    Physician: Ani  Assistant: None    ANESTHESIA TYPE:  [  ]General Anesthesia  [X] Sedation  [X] Local/Regional      CONDITION  [  ] Critical  [  ] Serious  [  ]Fair  [X]Good      SPECIMENS REMOVED (IF APPLICABLE): NONE  All wires were removed    IMPLANTS (IF APPLICABLE):  MICRA-AV (Medtronic Leadless pacemaker)    FINDINGS (see below)  -Successful implant of leadless pacemaker  -No immediate complications  -Perclose suture x2 deployed at Micra sheath femoral access site  -Contrast used: 8 cc  -ESTIMATED BLOOD LOSS:  15 mL      PLAN OF CARE  -	Bed rest 4 hours  -	Admit to telemetry  -             Hold DOACs for 48 hours  -             Antibiotics x 2 d0oses IV post-op
I saw and examined patient and I reviewed his chart and blood work. I attest that there has been no clinical change in patient's condition since last assessment documented in H&P, consult, or last office visit.

## 2024-08-08 NOTE — ASU PATIENT PROFILE, ADULT - TOBACCO USE
"Anesthesia Transfer of Care Note    Patient: Pham Licea    Procedure(s) Performed: Procedure(s) (LRB):  LAPAROTOMY, EXPLORATORY (N/A)    Patient location: PACU    Anesthesia Type: general    Transport from OR: Transported from OR on room air with adequate spontaneous ventilation    Post pain: adequate analgesia    Post assessment: no apparent anesthetic complications and tolerated procedure well    Post vital signs: stable    Level of consciousness: awake, alert and oriented    Nausea/Vomiting: no nausea/vomiting    Complications: none    Transfer of care protocol was followed      Last vitals:   Visit Vitals  /60   Pulse 78   Temp (P) 36.2 °C (97.2 °F)   Resp 14   Ht 5' 4" (1.626 m)   Wt 77.1 kg (170 lb 1 oz)   LMP  (LMP Unknown)   SpO2 96%   Breastfeeding No   BMI 29.19 kg/m²     "
Never smoker

## 2024-08-08 NOTE — DISCHARGE NOTE PROVIDER - CARE PROVIDER_API CALL
Dontae Law  Cardiac Electrophysiology  [address] [place] [address], [address]  [place] [address]  Phone: [phone]  Fax: [phone]  Follow Up Time: 1 month

## 2024-08-08 NOTE — ASU PATIENT PROFILE, ADULT - FALL HARM RISK - HARM RISK INTERVENTIONS

## 2024-08-09 ENCOUNTER — TRANSCRIPTION ENCOUNTER (OUTPATIENT)
Age: 84
End: 2024-08-09

## 2024-08-09 ENCOUNTER — APPOINTMENT (OUTPATIENT)
Dept: HOME HEALTH SERVICES | Facility: HOME HEALTH | Age: 84
End: 2024-08-09

## 2024-08-09 ENCOUNTER — NON-APPOINTMENT (OUTPATIENT)
Age: 84
End: 2024-08-09

## 2024-08-09 VITALS
TEMPERATURE: 98 F | SYSTOLIC BLOOD PRESSURE: 126 MMHG | RESPIRATION RATE: 18 BRPM | OXYGEN SATURATION: 96 % | DIASTOLIC BLOOD PRESSURE: 58 MMHG | HEART RATE: 66 BPM

## 2024-08-09 LAB — GLUCOSE BLDC GLUCOMTR-MCNC: 83 MG/DL — SIGNIFICANT CHANGE UP (ref 70–99)

## 2024-08-09 PROCEDURE — 99239 HOSP IP/OBS DSCHRG MGMT >30: CPT

## 2024-08-09 PROCEDURE — 99024 POSTOP FOLLOW-UP VISIT: CPT

## 2024-08-09 PROCEDURE — 71046 X-RAY EXAM CHEST 2 VIEWS: CPT | Mod: 26

## 2024-08-09 RX ORDER — TORSEMIDE 20 MG
1 TABLET ORAL
Qty: 0 | Refills: 0 | DISCHARGE
Start: 2024-08-09

## 2024-08-09 RX ADMIN — SACUBITRIL AND VALSARTAN 1 TABLET(S): 49; 51 TABLET, FILM COATED ORAL at 05:16

## 2024-08-09 RX ADMIN — Medication 20 MILLIGRAM(S): at 05:16

## 2024-08-09 RX ADMIN — Medication 100 MILLIGRAM(S): at 05:16

## 2024-08-09 RX ADMIN — PANTOPRAZOLE SODIUM 40 MILLIGRAM(S): 20 TABLET, DELAYED RELEASE ORAL at 05:23

## 2024-08-09 RX ADMIN — AMLODIPINE BESYLATE 10 MILLIGRAM(S): 2.5 TABLET ORAL at 05:16

## 2024-08-09 NOTE — PROGRESS NOTE ADULT - SUBJECTIVE AND OBJECTIVE BOX
INTERVAL HPI/OVERNIGHT EVENTS:  Pt is POD #1 AV MICRA. No acute events over night. Currently sinus with V pacing, AK ~400 msec. Pt without complaints.  Patient denies fever, chills, dizziness, syncope, chest pain, palpitations, SOB, cough, abd pain, n/v/d/c, dysuria, hematuria or unusual rash.     MEDICATIONS  (STANDING):  amLODIPine   Tablet 10 milliGRAM(s) Oral daily  ceFAZolin   IVPB 1000 milliGRAM(s) IV Intermittent every 8 hours  melatonin 5 milliGRAM(s) Oral at bedtime  montelukast 10 milliGRAM(s) Oral daily  pantoprazole    Tablet 40 milliGRAM(s) Oral before breakfast  sacubitril 49 mG/valsartan 51 mG 1 Tablet(s) Oral two times a day  simvastatin 20 milliGRAM(s) Oral at bedtime  torsemide 20 milliGRAM(s) Oral daily    MEDICATIONS  (PRN):  acetaminophen     Tablet .. 650 milliGRAM(s) Oral every 6 hours PRN Mild Pain (1 - 3)      Allergies    No Known Allergies    Intolerances        REVIEW OF SYSTEMS    [x] A ten-point review of systems was otherwise negative except as noted.  [ ] Due to altered mental status/intubation, subjective information were not able to be obtained from the patient. History was obtained, to the extent possible, from review of the chart and collateral sources of information.      Vital Signs Last 24 Hrs  T(C): 36.4 (09 Aug 2024 07:16), Max: 36.6 (08 Aug 2024 09:41)  T(F): 97.6 (09 Aug 2024 07:16), Max: 97.8 (08 Aug 2024 17:43)  HR: 66 (09 Aug 2024 07:16) (62 - 77)  BP: 126/58 (09 Aug 2024 07:16) (108/70 - 170/83)  BP(mean): 83 (09 Aug 2024 07:16) (83 - 107)  RR: 18 (09 Aug 2024 07:16) (18 - 20)  SpO2: 96% (09 Aug 2024 07:16) (96% - 99%)    Parameters below as of 09 Aug 2024 07:16  Patient On (Oxygen Delivery Method): room air        08-08-24 @ 07:01  -  08-09-24 @ 07:00  --------------------------------------------------------  IN: 0 mL / OUT: 1051 mL / NET: -1051 mL        Physical Exam  GENERAL: Elderly male, overweight, In no apparent distress, well nourished, and hydrated.  HEART: Regular rate and rhythm; No murmurs, rubs, or gallops.  PULMONARY: Clear to auscultation and perfusion.  No rales, wheezing, or rhonchi bilaterally.  ABDOMEN: Soft, Nontender, Nondistended; Bowel sounds present  EXTREMITIES: Rt groin without hematoma or drainage noted.  2+ Peripheral Pulses, No clubbing, cyanosis, or edema  NEUROLOGICAL: AO x4 ,BENSON, speech clear    LABS:        08-08-24 @ 07:01  -  08-09-24 @ 07:00  --------------------------------------------------------  IN: 0 mL / OUT: 1051 mL / NET: -1051 mL        08-08-24 @ 07:01  -  08-09-24 @ 07:00  --------------------------------------------------------  IN: 0 mL / OUT: 1051 mL / NET: -1051 mL      RADIOLOGY:  < from: TTE Echo Complete w/ Contrast w/ Doppler (10.18.23 @ 07:50) >  Summary:   1. Moderately decreased global left ventricular systolic function.   Endocardial visualization was enhanced with intravenous echo contrast.   Left ventricular ejection fraction, by visual estimation, is 35 to 40%.   Mild (grade I) diastolic dysfunction. Mild concentric left ventricular   hypertrophy.   2. Normal right ventricular size and function.   3. Mildly enlarged left atrium.   4. Anterior mitral leaflet billowing without overt prolapse.   5. Sclerotic aortic valve with normal opening.   6. Mild tricuspid regurgitation.   7. Mild pulmonary hypertension (PASP = 43mmHg).   8. There is no evidence of pericardial effusion.   9. Dilatation of the ascending aorta (3.8cm, 1.99cm/m2).    PHYSICIAN INTERPRETATION:  Left Ventricle: Endocardial visualization was enhanced with intravenous   echo contrast. The left ventricular internal cavity size is normal. There   is mild concentric left ventricular hypertrophy. Global LV systolic   function was moderately decreased. Left ventricular ejection fraction, by   visual estimation, is 35 to 40%. Spectral Doppler shows impaired   relaxation pattern of left ventricular myocardial filling (Grade I   diastolic dysfunction). Normal LV filling pressures.  Right Ventricle: Normal right ventricular size and function. TV S' 0.1   m/s.  Left Atrium: Mildly enlarged left atrium. LA volume Index is 34.0 ml/m²   ml/m2.  Right Atrium: Normal right atrial size. RA Area is 19.00 cm² cm2.  Pericardium: There is no evidence of pericardial effusion.  Mitral Valve: The mitral valve is normal in structure. No evidence of   mitral valve stenosis. Trace mitral valve regurgitation is seen. Anterior   mitral leaflet billowing without overt prolapse.  Tricuspid Valve: Structurally normal tricuspid valve, with normal leaflet   excursion. Mild tricuspid regurgitation is visualized. Estimated   pulmonary artery systolic pressure is 42.9 mmHg assuming a right atrial   pressure of 3 mmHg, which is consistent with mild pulmonary hypertension.  Aortic Valve: The aortic valve is trileaflet. Sclerotic aortic valve with   normal opening. Trivial aortic valve regurgitation is seen.  Pulmonic Valve: Structurally normal pulmonic valve, with normal leaflet   excursion. Trace pulmonic valve regurgitation.  Aorta: The aortic root is normal in size and structure. There is   dilatation of the ascending aorta.  Venous: The inferior vena cava was normal sized, with respiratory size   variation less than 50%.  Additional Comments: A pacer wire is visualized in the right ventricle.    < end of copied text >

## 2024-08-09 NOTE — DISCHARGE NOTE NURSING/CASE MANAGEMENT/SOCIAL WORK - PATIENT PORTAL LINK FT
You can access the FollowMyHealth Patient Portal offered by [facility] by registering at the following website: [email or web address]. By joining [employer] [facility]’s FollowMyHealth portal, you will also be able to view your health information using other applications (apps) compatible with our system.

## 2024-08-09 NOTE — DISCHARGE NOTE NURSING/CASE MANAGEMENT/SOCIAL WORK - NSDCPEFALRISK_GEN_ALL_CORE
For information on Fall & Injury Prevention, visit: [email or web address] OR  [email or web address] OR  https://www.cdc.gov/steadi/patient.html

## 2024-08-09 NOTE — PROGRESS NOTE ADULT - ASSESSMENT
EP: Nilam/Ani    83-year-old man with HTN, HLD, DM, CAD, PPM, AFib on Eliquis, CHF (Last EF 35-40%), COPD on 3L home O2, chronic pancreatitis, chronic back pain, multilevel cervical spondylosis and spondylolisthesis, C3-4 and severe left/moderate-severe right foraminal stenosis, cataract, increased thresholds on RA and RV leads, and pacemaker with device at RRT since 4/22/2024. Patient has chronic subclavian vein stenosis not allowing lead revision with Dr. Demarco. Here today for Micra PPM implant.  On 8/8/24, patient underwent successful  implantation of medtronic leadless pacemaker, old PPM switched to ODO. No immediate postop complications noted.     Plan:  - Interrogation complete: Normal functioning device. VDD @ 50 bpm.  - Ambulate as tolerated  - Resume Eliquis tomorrow  - Resume PO diet  - Fu in office in 1 month for postop check    Discharge Instructions:  - Continue Eliquis   - No heavy lifting > 10 lbs, squatting, or exertional activities 2 weeks  - Can take a shower starting today  - No submerging in water for 1 week  - No driving for 3 days       EP: Nilam/Ani    83-year-old man with HTN, HLD, DM, CAD, PPM, AFib on Eliquis, CHF (Last EF 35-40%), COPD on 3L home O2, chronic pancreatitis, chronic back pain, multilevel cervical spondylosis and spondylolisthesis, C3-4 and severe left/moderate-severe right foraminal stenosis, cataract, increased thresholds on RA and RV leads, and pacemaker with device at RRT since 4/22/2024. Patient has chronic subclavian vein stenosis not allowing lead revision with Dr. Demarco. Here today for Micra PPM implant.  On 8/8/24, patient underwent successful  implantation of medtronic leadless pacemaker, old PPM switched to ODO. No immediate postop complications noted.     Plan:  - Interrogation complete: Normal functioning device. VDD @ 50 bpm.  - Ambulate as tolerated  - Resume Eliquis Sunday AM  - Resume PO diet  - Fu in office in 1 month for postop check    Discharge Instructions:  - Continue Eliquis   - No heavy lifting > 10 lbs, squatting, or exertional activities 2 weeks  - Can take a shower starting today  - No submerging in water for 1 week  - No driving for 3 days

## 2024-08-13 ENCOUNTER — APPOINTMENT (OUTPATIENT)
Dept: HOME HEALTH SERVICES | Facility: HOME HEALTH | Age: 84
End: 2024-08-13
Payer: MEDICARE

## 2024-08-13 VITALS
DIASTOLIC BLOOD PRESSURE: 68 MMHG | HEART RATE: 70 BPM | SYSTOLIC BLOOD PRESSURE: 112 MMHG | TEMPERATURE: 97.7 F | OXYGEN SATURATION: 98 % | RESPIRATION RATE: 18 BRPM

## 2024-08-13 DIAGNOSIS — I50.9 HEART FAILURE, UNSPECIFIED: ICD-10-CM

## 2024-08-13 DIAGNOSIS — R56.9 UNSPECIFIED CONVULSIONS: ICD-10-CM

## 2024-08-13 DIAGNOSIS — I10 ESSENTIAL (PRIMARY) HYPERTENSION: ICD-10-CM

## 2024-08-13 DIAGNOSIS — E11.65 TYPE 2 DIABETES MELLITUS WITH HYPERGLYCEMIA: ICD-10-CM

## 2024-08-13 DIAGNOSIS — J44.9 CHRONIC OBSTRUCTIVE PULMONARY DISEASE, UNSPECIFIED: ICD-10-CM

## 2024-08-13 DIAGNOSIS — K86.1 OTHER CHRONIC PANCREATITIS: ICD-10-CM

## 2024-08-13 DIAGNOSIS — I70.213 ATHEROSCLEROSIS OF NATIVE ARTERIES OF EXTREMITIES WITH INTERMITTENT CLAUDICATION, BILATERAL LEGS: ICD-10-CM

## 2024-08-13 DIAGNOSIS — E78.5 HYPERLIPIDEMIA, UNSPECIFIED: ICD-10-CM

## 2024-08-13 DIAGNOSIS — G47.33 OBSTRUCTIVE SLEEP APNEA (ADULT) (PEDIATRIC): ICD-10-CM

## 2024-08-13 DIAGNOSIS — I48.0 PAROXYSMAL ATRIAL FIBRILLATION: ICD-10-CM

## 2024-08-13 DIAGNOSIS — K21.9 GASTRO-ESOPHAGEAL REFLUX DISEASE W/OUT ESOPHAGITIS: ICD-10-CM

## 2024-08-13 DIAGNOSIS — E11.40 TYPE 2 DIABETES MELLITUS WITH DIABETIC NEUROPATHY, UNSPECIFIED: ICD-10-CM

## 2024-08-13 PROCEDURE — 99496 TRANSJ CARE MGMT HIGH F2F 7D: CPT

## 2024-08-14 RX ORDER — DULOXETINE HYDROCHLORIDE 30 MG/1
30 CAPSULE, DELAYED RELEASE PELLETS ORAL
Qty: 30 | Refills: 4 | Status: ACTIVE | COMMUNITY
Start: 2024-08-14 | End: 1900-01-01

## 2024-08-14 NOTE — COUNSELING
[Overweight - ( BMI 25.1 - 29.9 )] : overweight -  ( BMI 25.1 - 29.9 ) [DASH diet recommended] : DASH diet recommended [Continue diet as tolerated] : continue diet as tolerated based on goals of care [Medical alert] : medical alert [] : foot exam [Improve mobility] : improve mobility [Improve pain control] : improve pain control [Maintain functional ability] : maintain functional ability [Discussed disease trajectory with patient/caregiver] : discussed disease trajectory with patient/caregiver [Patient/Caregiver is unclear of wishes] : patient/caregiver is unclear of wishes [DASH diet given] : DASH diet given [Non - Smoker] : non-smoker [Use assistive device to avoid falls] : use assistive device to avoid falls [Remove clutter and unsafe carpeting to avoid falls] : remove clutter and unsafe carpeting to avoid falls

## 2024-08-15 DIAGNOSIS — I49.5 SICK SINUS SYNDROME: ICD-10-CM

## 2024-08-15 DIAGNOSIS — M47.812 SPONDYLOSIS WITHOUT MYELOPATHY OR RADICULOPATHY, CERVICAL REGION: ICD-10-CM

## 2024-08-15 DIAGNOSIS — M54.9 DORSALGIA, UNSPECIFIED: ICD-10-CM

## 2024-08-15 DIAGNOSIS — I50.9 HEART FAILURE, UNSPECIFIED: ICD-10-CM

## 2024-08-15 DIAGNOSIS — Z99.81 DEPENDENCE ON SUPPLEMENTAL OXYGEN: ICD-10-CM

## 2024-08-15 DIAGNOSIS — I11.0 HYPERTENSIVE HEART DISEASE WITH HEART FAILURE: ICD-10-CM

## 2024-08-15 DIAGNOSIS — J44.9 CHRONIC OBSTRUCTIVE PULMONARY DISEASE, UNSPECIFIED: ICD-10-CM

## 2024-08-15 DIAGNOSIS — Z79.85 LONG-TERM (CURRENT) USE OF INJECTABLE NON-INSULIN ANTIDIABETIC DRUGS: ICD-10-CM

## 2024-08-15 DIAGNOSIS — E11.9 TYPE 2 DIABETES MELLITUS WITHOUT COMPLICATIONS: ICD-10-CM

## 2024-08-15 DIAGNOSIS — E78.5 HYPERLIPIDEMIA, UNSPECIFIED: ICD-10-CM

## 2024-08-15 DIAGNOSIS — Z79.4 LONG TERM (CURRENT) USE OF INSULIN: ICD-10-CM

## 2024-08-15 DIAGNOSIS — I48.91 UNSPECIFIED ATRIAL FIBRILLATION: ICD-10-CM

## 2024-08-15 DIAGNOSIS — M48.02 SPINAL STENOSIS, CERVICAL REGION: ICD-10-CM

## 2024-08-15 DIAGNOSIS — Z86.73 PERSONAL HISTORY OF TRANSIENT ISCHEMIC ATTACK (TIA), AND CEREBRAL INFARCTION WITHOUT RESIDUAL DEFICITS: ICD-10-CM

## 2024-08-15 DIAGNOSIS — K86.1 OTHER CHRONIC PANCREATITIS: ICD-10-CM

## 2024-08-15 DIAGNOSIS — M43.12 SPONDYLOLISTHESIS, CERVICAL REGION: ICD-10-CM

## 2024-08-15 DIAGNOSIS — Z90.49 ACQUIRED ABSENCE OF OTHER SPECIFIED PARTS OF DIGESTIVE TRACT: ICD-10-CM

## 2024-08-15 DIAGNOSIS — I25.10 ATHEROSCLEROTIC HEART DISEASE OF NATIVE CORONARY ARTERY WITHOUT ANGINA PECTORIS: ICD-10-CM

## 2024-08-15 DIAGNOSIS — G89.29 OTHER CHRONIC PAIN: ICD-10-CM

## 2024-08-15 DIAGNOSIS — Z95.0 PRESENCE OF CARDIAC PACEMAKER: ICD-10-CM

## 2024-08-15 DIAGNOSIS — G47.33 OBSTRUCTIVE SLEEP APNEA (ADULT) (PEDIATRIC): ICD-10-CM

## 2024-08-15 DIAGNOSIS — I87.1 COMPRESSION OF VEIN: ICD-10-CM

## 2024-08-15 DIAGNOSIS — Z79.01 LONG TERM (CURRENT) USE OF ANTICOAGULANTS: ICD-10-CM

## 2024-08-15 PROBLEM — E11.40 DIABETIC NEUROPATHY: Status: ACTIVE | Noted: 2024-08-14

## 2024-08-15 NOTE — HISTORY OF PRESENT ILLNESS
[In-Place] : has Home Health services in-place [A] : A [Patient] : patient [LastPVisitDate] : 06/2023 [FreeTextEntry4] : Dr. Elizabeth [LastSpecialistVisitDate] : 03/2024 [FreeTextEntry1] : unsteady gait [FreeTextEntry2] : Cardiology: Dr. Patel [phone] Cardiology EP: Dr. Demarco [phone] and Dr. Law [phone] Endocrinology: Dr. Gregg [phone] Pulmonology: Silvina Christian [phone] Vascular: Dr. Evans [phone]  08/13/2024 COVID SCREEN: Patient or caregiver denies fever, cough, trouble breathing, rash or contact with someone who tested positive for COVID-19.   N95 mask, gloves, eye wear and gown (if indicated) used during visit: YES  Total face to face time with patient is 45 min.   Jeremy Rea is an 83-year-old male with HTN, HLD, Type 2 DM, atrial fibrillation, CHF, cervical neuritis, chronic pancreatitis, headache and urinary frequency presents today for follow up post hospital discharge.  History provided by patient   Patient reports bilateral lower extremity numbness, currently taking Lyrica 75 mg BID, reports dose was increased by pain management to 100 mg BID, but unable to tolerate due to severe dizziness. 4/26/24 HGBA1C 5.5. Will start Duloxietine 30 mg daily. Patient educated on fall and safety precautions.    Right groin incision s/p pacemaker atria insertion clean and dry. No infectious symptoms noted. No hematoma noted.   Patient reports no weight loss >10 lbs in the past 6 months. No changes in dentition or swallowing reported, No changes in hearing or vision reported. No changes in Cognition reported. Patient denies any symptoms of depression or anxiety. Patient is ADL independent and IADL dependent. No changes in gait or falls reported.   Patient's home environment is safe.

## 2024-08-15 NOTE — PHYSICAL EXAM
[No Acute Distress] : no acute distress [Normal Voice/Communication] : normal voice communication [Normal Sclera/Conjunctiva] : normal sclera/conjunctiva [Normal Outer Ear/Nose] : the ears and nose were normal in appearance [No JVD] : no jugular venous distention [No Respiratory Distress] : no respiratory distress [Normal Rate] : heart rate was normal  [Pedal Pulses Present] : the pedal pulses are present [Non Tender] : non-tender [Soft] : abdomen soft [Not Distended] : not distended [Normal Anterior Cervical Nodes] : no anterior cervical lymphadenopathy [No Spinal Tenderness] : no spinal tenderness [No Joint Swelling] : no joint swelling seen [No Rash] : no rash [No Motor Deficits] : the motor exam was normal [Oriented x3] : oriented to person, place, and time [Normal Affect] : the affect was normal [Normal Mood] : the mood was normal [de-identified] : + YESICA and LLL expiratory wheezing  [Foot Ulcers] : no foot ulcers [de-identified] : Patient sitting upright in chair [de-identified] : bilateral +2 swelling of feet

## 2024-08-15 NOTE — REVIEW OF SYSTEMS
[Vision Problems] : vision problems [Lower Ext Edema] : lower extremity edema [Dyspnea on Exertion] : dyspnea on exertion [Muscle Weakness] : muscle weakness [Unsteady Walk] : ataxia [Negative] : Heme/Lymph [FreeTextEntry5] : +1 swelling to bilateral feet

## 2024-08-15 NOTE — PHYSICAL EXAM
[No Acute Distress] : no acute distress [Normal Voice/Communication] : normal voice communication [Normal Sclera/Conjunctiva] : normal sclera/conjunctiva [Normal Outer Ear/Nose] : the ears and nose were normal in appearance [No JVD] : no jugular venous distention [No Respiratory Distress] : no respiratory distress [Normal Rate] : heart rate was normal  [Pedal Pulses Present] : the pedal pulses are present [Non Tender] : non-tender [Soft] : abdomen soft [Not Distended] : not distended [Normal Anterior Cervical Nodes] : no anterior cervical lymphadenopathy [No Spinal Tenderness] : no spinal tenderness [No Joint Swelling] : no joint swelling seen [No Rash] : no rash [No Motor Deficits] : the motor exam was normal [Oriented x3] : oriented to person, place, and time [Normal Affect] : the affect was normal [Normal Mood] : the mood was normal [de-identified] : + YESICA and LLL expiratory wheezing  [Foot Ulcers] : no foot ulcers [de-identified] : Patient sitting upright in chair [de-identified] : bilateral +2 swelling of feet

## 2024-08-15 NOTE — REASON FOR VISIT
[Follow-Up] : a follow-up visit [Pre-Visit Preparation] : pre-visit preparation was done [Post-hospitalization from ___ Hospital] : Post-hospitalization from [unfilled] Hospital [Admitted on: ___] : The patient was admitted on [unfilled] [Discharged on ___] : discharged on [unfilled] [Discharge Summary] : discharge summary [Pertinent Labs] : pertinent labs [Med Reconciliation] : medication reconciliation has been completed [Patient Contacted By: ____] : and contacted by [unfilled] [Post Hospitalization] : a post hospitalization visit [FreeTextEntry1] : PMH PM, HTN, HLD, Type 2 DM, pancreatitis, cervical neuritis,  chronic pain and CHF  [FreeTextEntry2] : chart review

## 2024-08-15 NOTE — HISTORY OF PRESENT ILLNESS
[In-Place] : has Home Health services in-place [A] : A [Patient] : patient [LastPVisitDate] : 06/2023 [FreeTextEntry4] : Dr. Elizabeth [LastSpecialistVisitDate] : 03/2024 [FreeTextEntry1] : unsteady gait [FreeTextEntry2] : Cardiology: Dr. Patel [phone] Cardiology EP: Dr. Demarco [phone] and Dr. Law [phone] Endocrinology: Dr. Gregg [phone] Pulmonology: Silvina Christian [phone] Vascular: Dr. Evans [phone]  08/13/2024 COVID SCREEN: Patient or caregiver denies fever, cough, trouble breathing, rash or contact with someone who tested positive for COVID-19.   N95 mask, gloves, eye wear and gown (if indicated) used during visit: YES  Total face to face time with patient is 45 min.   Jeremy Rea is an 83-year-old male with HTN, HLD, Type 2 DM, atrial fibrillation, CHF, cervical neuritis, chronic pancreatitis, headache and urinary frequency presents today for follow up post hospital discharge.  History provided by patient   Patient reports bilateral lower extremity numbness, currently taking Lyrica 75 mg BID, reports dose was increased by pain management to 100 mg BID, but unable to tolerate due to severe dizziness. 4/26/24 HGBA1C 5.5. Will start Duloxietine 30 mg daily. Patient educated on fall and safety precautions.    Right groin incision s/p pacemaker atria insertion clean and dry. No infectious symptoms noted. No hematoma noted.   Patient reports no weight loss >10 lbs in the past 6 months. No changes in dentition or swallowing reported, No changes in hearing or vision reported. No changes in Cognition reported. Patient denies any symptoms of depression or anxiety. Patient is ADL independent and IADL dependent. No changes in gait or falls reported.   Patient's home environment is safe.

## 2024-08-15 NOTE — HEALTH RISK ASSESSMENT
[Some assistance needed] : walking [Independent] : managing finances [Full assistance needed] : using transportation [Two or more falls in past year] : Patient reported two or more falls in the past year [Yes] : The patient has visual impairment [HRA Reviewed] : Health Risk Assessment reviewed [[facility]] : 10 [FreeTextEntry2] : ambulates with rollator  [[employer]] : 10 [de-identified] : ambulates with rollator

## 2024-08-15 NOTE — CHRONIC CARE ASSESSMENT
[Inadequate social support] : inadequate social support [PPS Score: ____] : Palliative Performance Scale (PPS) Score: [unfilled] [Oriented To Person] : ~L oriented to person [Oriented To Place] : ~L oriented to place [Oriented To Time] : ~L oriented to time [No Action Needed] : No action needed [de-identified] : low sodium, low fat and low carb [de-identified] : as tolerated [de-identified] : low fat, low sodium and low carb

## 2024-08-15 NOTE — HEALTH RISK ASSESSMENT
[Some assistance needed] : walking [Independent] : managing finances [Full assistance needed] : using transportation [Two or more falls in past year] : Patient reported two or more falls in the past year [Yes] : The patient has visual impairment [HRA Reviewed] : Health Risk Assessment reviewed [[facility]] : 10 [FreeTextEntry2] : ambulates with rollator  [[employer]] : 10 [de-identified] : ambulates with rollator

## 2024-08-15 NOTE — CHRONIC CARE ASSESSMENT
[Inadequate social support] : inadequate social support [PPS Score: ____] : Palliative Performance Scale (PPS) Score: [unfilled] [Oriented To Person] : ~L oriented to person [Oriented To Place] : ~L oriented to place [Oriented To Time] : ~L oriented to time [No Action Needed] : No action needed [de-identified] : low sodium, low fat and low carb [de-identified] : as tolerated [de-identified] : low fat, low sodium and low carb

## 2024-08-21 ENCOUNTER — NON-APPOINTMENT (OUTPATIENT)
Age: 84
End: 2024-08-21

## 2024-08-22 ENCOUNTER — NON-APPOINTMENT (OUTPATIENT)
Age: 84
End: 2024-08-22

## 2024-08-22 DIAGNOSIS — R39.198 OTHER DIFFICULTIES WITH MICTURITION: ICD-10-CM

## 2024-08-22 DIAGNOSIS — R39.9 UNSPECIFIED SYMPTOMS AND SIGNS INVOLVING THE GENITOURINARY SYSTEM: ICD-10-CM

## 2024-08-23 ENCOUNTER — LABORATORY RESULT (OUTPATIENT)
Age: 84
End: 2024-08-23

## 2024-08-30 ENCOUNTER — APPOINTMENT (OUTPATIENT)
Dept: HOME HEALTH SERVICES | Facility: HOME HEALTH | Age: 84
End: 2024-08-30

## 2024-08-30 RX ORDER — NITROFURANTOIN (MONOHYDRATE/MACROCRYSTALS) 25; 75 MG/1; MG/1
100 CAPSULE ORAL
Qty: 14 | Refills: 0 | Status: ACTIVE | COMMUNITY
Start: 2024-08-22

## 2024-09-05 ENCOUNTER — APPOINTMENT (OUTPATIENT)
Dept: ELECTROPHYSIOLOGY | Facility: CLINIC | Age: 84
End: 2024-09-05
Payer: MEDICARE

## 2024-09-05 VITALS
HEART RATE: 62 BPM | RESPIRATION RATE: 17 BRPM | HEIGHT: 66 IN | DIASTOLIC BLOOD PRESSURE: 66 MMHG | SYSTOLIC BLOOD PRESSURE: 169 MMHG | BODY MASS INDEX: 28.12 KG/M2 | WEIGHT: 175 LBS | TEMPERATURE: 97.6 F

## 2024-09-05 DIAGNOSIS — Z45.018 ENCOUNTER FOR ADJUSTMENT AND MANAGEMENT OF OTHER PART OF CARDIAC PACEMAKER: ICD-10-CM

## 2024-09-05 DIAGNOSIS — Z95.0 PRESENCE OF CARDIAC PACEMAKER: ICD-10-CM

## 2024-09-05 DIAGNOSIS — Z48.89 ENCOUNTER FOR OTHER SPECIFIED SURGICAL AFTERCARE: ICD-10-CM

## 2024-09-05 PROCEDURE — 93279 PRGRMG DEV EVAL PM/LDLS PM: CPT

## 2024-09-05 PROCEDURE — 99024 POSTOP FOLLOW-UP VISIT: CPT

## 2024-09-06 NOTE — DISCUSSION/SUMMARY
[FreeTextEntry1] : Mr. Jeremy Rea is a pleasant 83-year-old man with HTN, HLD, DM, CAD, PPM, AFib on Eliquis, CHF (Last EF 35-40%), COPD on 3L home O2, chronic pancreatitis, chronic back pain, multilevel cervical spondylosis and spondylolisthesis, C3-4 and severe left/moderate-severe right foraminal stenosis, cataract, increased thresholds on RA and RV leads, and pacemaker with device at RRT since 4/22/2024. Patient has chronic subclavian vein stenosis not allowing lead revision with Dr. Demarco.   He underwent placement of Micra AV on 8/8/2024. His prior Medtronic Advisa device remains in the pocket at RRT since April 2024. The thresholds are chronically elevated. Device now placed in ODO mode.   # Micra AV - Wound is well healed. There are no signs or symptoms of infection of inflammation. There is no redness, no swelling, no erythema. The patient has no pain.  - Device interrogation normal. I interrogated and reprogrammed the device as described above.  - I attempted MARIA D test to achieve better AV synchrony, however due to patient's extended NE interval, the only change I made was with the rate smoothing delta. This is unlikely to increase tracking. Can consider VVI only or turning OFF rate smoothing delta at NOV.  - Patient not on [facility]. He called MDT yesterday and remote monitor is being sent to his home. Advised to call us for assistance in setting up.  # Dizziness - Usually orthostatic it seems. Patient drinks at least 64oz of water daily. Already changes position slowly. - LRL increased to 60 bpm to attempt to mitigate symptoms. Advised patient to let us know if symptoms persist / worsen.   I recommend continuing same medications.   I interrogated and reprogrammed his device as described in procedure. His wound is healed properly, with no signs of inflammation, infection or bleeding. I discussed with patient plan of care in great details. I discussed remote monitoring with him, and need to call office if he does manual transmission. I answered all his questions to his satisfaction. Patient was pleased with the visit.    Patient will follow with me in 3-4 months' time. Please do not hesitate to contact me at [phone] if you have any further questions regarding this patient care.

## 2024-09-06 NOTE — HISTORY OF PRESENT ILLNESS
[Palpitations] : no palpitations [SOB] : no dyspnea [Syncope] : no syncope [Dizziness] : no dizziness [Chest Pain] : no chest pain or discomfort [Shoulder Pain] : no shoulder pain [Pain at Site] : no pain at device site [Erythema at Site] : no erythema at device site [Swelling at Site] : no swelling at device site [de-identified] : Mr. Jeremy Rea is a pleasant 83-year-old man with HTN, HLD, DM, CAD, PPM, AFib on Eliquis, CHF (Last EF 35-40%), COPD on 3L home O2, chronic pancreatitis, chronic back pain, multilevel cervical spondylosis and spondylolisthesis, C3-4 and severe left/moderate-severe right foraminal stenosis, cataract, increased thresholds on RA and RV leads, and pacemaker with device at RRT since 4/22/2024. Patient has chronic subclavian vein stenosis not allowing lead revision with Dr. Demarco.  He underwent placement of Micra AV on 8/8/2024.

## 2024-09-06 NOTE — PROCEDURE
[No] : not [See Scanned Paceart Report] : See scanned paceart report [See Device Printout] : See device printout [Pacemaker] : pacemaker [Threshold Testing Performed] : Threshold testing was performed [Atrial] : Atrial [Ventricular] : Ventricular [___V @] : [unfilled] V [___ ms] : [unfilled] ms [Programmed for Longevity] : output reprogrammed for improved battery longevity [Voltage: ___ volts] : Voltage was [unfilled] volts [Sensing Amplitude ___mv] : sensing amplitude was [unfilled] mv [Lead Imp:  ___ohms] : lead impedance was [unfilled] ohms [Timing (Intrinsic Rhythm)] : AV delay changed to promote intrinsic rhythm [Pace ___ %] : Pace [unfilled]% [de-identified] : 1st degree AVB [de-identified] : ANALY [de-identified] : Soma AV  [de-identified] : 8/8/24 [de-identified] : [id number] [de-identified] : ABHISHEK [de-identified] : 50 [de-identified] : LR increase to 60 bpm, rate smoothing delta increased to 150ms [de-identified] : - 8% AM-- 15% PT c/o feeling dizzy sometimes activity. LR increase to 60 BPM Awaiting [facility] monitor

## 2024-09-06 NOTE — PHYSICAL EXAM
[Well Developed] : well developed [Well Nourished] : well nourished [No Acute Distress] : no acute distress [Normal Conjunctiva] : normal conjunctiva [Normal Venous Pressure] : normal venous pressure [No Carotid Bruit] : no carotid bruit [Normal S1, S2] : normal S1, S2 [No Murmur] : no murmur [No Rub] : no rub [No Gallop] : no gallop [Clear Lung Fields] : clear lung fields [Good Air Entry] : good air entry [No Respiratory Distress] : no respiratory distress  [Soft] : abdomen soft [Non Tender] : non-tender [No Masses/organomegaly] : no masses/organomegaly [Normal Bowel Sounds] : normal bowel sounds [Normal Gait] : normal gait [No Edema] : no edema [No Cyanosis] : no cyanosis [No Clubbing] : no clubbing [No Varicosities] : no varicosities [No Rash] : no rash [No Skin Lesions] : no skin lesions [Moves all extremities] : moves all extremities [No Focal Deficits] : no focal deficits [Normal Speech] : normal speech [Alert and Oriented] : alert and oriented [Normal memory] : normal memory [Clean] : clean [Dry] : dry [Well-Healed] : well-healed [General Appearance - Well Developed] : well developed [Normal Appearance] : normal appearance [Well Groomed] : well groomed [General Appearance - Well Nourished] : well nourished [No Deformities] : no deformities [General Appearance - In No Acute Distress] : no acute distress [Heart Rate And Rhythm] : heart rate and rhythm were normal [Heart Sounds] : normal S1 and S2 [] : no respiratory distress [Abdomen Soft] : soft [Nail Clubbing] : no clubbing of the fingernails [Cyanosis, Localized] : no localized cyanosis [FreeTextEntry2] : Right inguinal puncture site

## 2024-09-06 NOTE — HISTORY OF PRESENT ILLNESS
[Palpitations] : no palpitations [SOB] : no dyspnea [Syncope] : no syncope [Dizziness] : no dizziness [Chest Pain] : no chest pain or discomfort [Shoulder Pain] : no shoulder pain [Pain at Site] : no pain at device site [Erythema at Site] : no erythema at device site [Swelling at Site] : no swelling at device site [de-identified] : Mr. Jeremy Rea is a pleasant 83-year-old man with HTN, HLD, DM, CAD, PPM, AFib on Eliquis, CHF (Last EF 35-40%), COPD on 3L home O2, chronic pancreatitis, chronic back pain, multilevel cervical spondylosis and spondylolisthesis, C3-4 and severe left/moderate-severe right foraminal stenosis, cataract, increased thresholds on RA and RV leads, and pacemaker with device at RRT since 4/22/2024. Patient has chronic subclavian vein stenosis not allowing lead revision with Dr. Demarco.  He underwent placement of Micra AV on 8/8/2024.

## 2024-09-06 NOTE — CARDIOLOGY SUMMARY
[de-identified] : 9/5/2024: sinus rhythm 65 bpm, severe 1AVB (6/18/2024) sinus rhythm at 74 bpm. 1st degree AV block,  ms, IVCD  ms [de-identified] : 7/9/2024: EF 32%, mild TR

## 2024-09-06 NOTE — PROCEDURE
[No] : not [See Scanned Paceart Report] : See scanned paceart report [See Device Printout] : See device printout [Pacemaker] : pacemaker [Threshold Testing Performed] : Threshold testing was performed [Atrial] : Atrial [Ventricular] : Ventricular [___V @] : [unfilled] V [___ ms] : [unfilled] ms [Programmed for Longevity] : output reprogrammed for improved battery longevity [Voltage: ___ volts] : Voltage was [unfilled] volts [Sensing Amplitude ___mv] : sensing amplitude was [unfilled] mv [Lead Imp:  ___ohms] : lead impedance was [unfilled] ohms [Timing (Intrinsic Rhythm)] : AV delay changed to promote intrinsic rhythm [Pace ___ %] : Pace [unfilled]% [de-identified] : 1st degree AVB [de-identified] : ANALY [de-identified] : Soma AV  [de-identified] : [id number] [de-identified] : 8/8/24 [de-identified] : ABHISHEK [de-identified] : 50 [de-identified] : LR increase to 60 bpm, rate smoothing delta increased to 150ms [de-identified] : - 8% AM-- 15% PT c/o feeling dizzy sometimes activity. LR increase to 60 BPM Awaiting [facility] monitor

## 2024-09-06 NOTE — DISCUSSION/SUMMARY
[FreeTextEntry1] : Mr. Jeremy Rea is a pleasant 83-year-old man with HTN, HLD, DM, CAD, PPM, AFib on Eliquis, CHF (Last EF 35-40%), COPD on 3L home O2, chronic pancreatitis, chronic back pain, multilevel cervical spondylosis and spondylolisthesis, C3-4 and severe left/moderate-severe right foraminal stenosis, cataract, increased thresholds on RA and RV leads, and pacemaker with device at RRT since 4/22/2024. Patient has chronic subclavian vein stenosis not allowing lead revision with Dr. Demarco.   He underwent placement of Micra AV on 8/8/2024. His prior Medtronic Advisa device remains in the pocket at RRT since April 2024. The thresholds are chronically elevated. Device now placed in ODO mode.   # Micra AV - Wound is well healed. There are no signs or symptoms of infection of inflammation. There is no redness, no swelling, no erythema. The patient has no pain.  - Device interrogation normal. I interrogated and reprogrammed the device as described above.  - I attempted MARIA D test to achieve better AV synchrony, however due to patient's extended NC interval, the only change I made was with the rate smoothing delta. This is unlikely to increase tracking. Can consider VVI only or turning OFF rate smoothing delta at NOV.  - Patient not on [facility]. He called MDT yesterday and remote monitor is being sent to his home. Advised to call us for assistance in setting up.  # Dizziness - Usually orthostatic it seems. Patient drinks at least 64oz of water daily. Already changes position slowly. - LRL increased to 60 bpm to attempt to mitigate symptoms. Advised patient to let us know if symptoms persist / worsen.   I recommend continuing same medications.   I interrogated and reprogrammed his device as described in procedure. His wound is healed properly, with no signs of inflammation, infection or bleeding. I discussed with patient plan of care in great details. I discussed remote monitoring with him, and need to call office if he does manual transmission. I answered all his questions to his satisfaction. Patient was pleased with the visit.    Patient will follow with me in 3-4 months' time. Please do not hesitate to contact me at [phone] if you have any further questions regarding this patient care.

## 2024-09-06 NOTE — CARDIOLOGY SUMMARY
[de-identified] : 9/5/2024: sinus rhythm 65 bpm, severe 1AVB (6/18/2024) sinus rhythm at 74 bpm. 1st degree AV block,  ms, IVCD  ms [de-identified] : 7/9/2024: EF 32%, mild TR

## 2024-09-09 ENCOUNTER — APPOINTMENT (OUTPATIENT)
Dept: HOME HEALTH SERVICES | Facility: HOME HEALTH | Age: 84
End: 2024-09-09

## 2024-09-09 ENCOUNTER — EMERGENCY (EMERGENCY)
Facility: HOSPITAL | Age: 84
LOS: 0 days | Discharge: HOME | End: 2024-09-10
Attending: EMERGENCY MEDICINE
Payer: MEDICARE

## 2024-09-09 VITALS
DIASTOLIC BLOOD PRESSURE: 89 MMHG | WEIGHT: 175.05 LBS | HEART RATE: 99 BPM | TEMPERATURE: 98 F | OXYGEN SATURATION: 93 % | RESPIRATION RATE: 20 BRPM | HEIGHT: 67 IN | SYSTOLIC BLOOD PRESSURE: 128 MMHG

## 2024-09-09 VITALS
RESPIRATION RATE: 19 BRPM | DIASTOLIC BLOOD PRESSURE: 72 MMHG | TEMPERATURE: 97.9 F | SYSTOLIC BLOOD PRESSURE: 130 MMHG | OXYGEN SATURATION: 97 % | HEART RATE: 55 BPM

## 2024-09-09 DIAGNOSIS — I25.10 ATHEROSCLEROTIC HEART DISEASE OF NATIVE CORONARY ARTERY WITHOUT ANGINA PECTORIS: ICD-10-CM

## 2024-09-09 DIAGNOSIS — Z98.890 OTHER SPECIFIED POSTPROCEDURAL STATES: Chronic | ICD-10-CM

## 2024-09-09 DIAGNOSIS — R31.9 HEMATURIA, UNSPECIFIED: ICD-10-CM

## 2024-09-09 DIAGNOSIS — Z90.49 ACQUIRED ABSENCE OF OTHER SPECIFIED PARTS OF DIGESTIVE TRACT: Chronic | ICD-10-CM

## 2024-09-09 DIAGNOSIS — I50.9 HEART FAILURE, UNSPECIFIED: ICD-10-CM

## 2024-09-09 DIAGNOSIS — Z79.01 LONG TERM (CURRENT) USE OF ANTICOAGULANTS: ICD-10-CM

## 2024-09-09 DIAGNOSIS — Z99.81 DEPENDENCE ON SUPPLEMENTAL OXYGEN: ICD-10-CM

## 2024-09-09 DIAGNOSIS — K86.1 OTHER CHRONIC PANCREATITIS: ICD-10-CM

## 2024-09-09 DIAGNOSIS — R10.9 UNSPECIFIED ABDOMINAL PAIN: ICD-10-CM

## 2024-09-09 DIAGNOSIS — I11.0 HYPERTENSIVE HEART DISEASE WITH HEART FAILURE: ICD-10-CM

## 2024-09-09 DIAGNOSIS — I48.91 UNSPECIFIED ATRIAL FIBRILLATION: ICD-10-CM

## 2024-09-09 DIAGNOSIS — E11.9 TYPE 2 DIABETES MELLITUS WITHOUT COMPLICATIONS: ICD-10-CM

## 2024-09-09 DIAGNOSIS — Z87.19 PERSONAL HISTORY OF OTHER DISEASES OF THE DIGESTIVE SYSTEM: Chronic | ICD-10-CM

## 2024-09-09 DIAGNOSIS — J44.9 CHRONIC OBSTRUCTIVE PULMONARY DISEASE, UNSPECIFIED: ICD-10-CM

## 2024-09-09 LAB
ALBUMIN SERPL ELPH-MCNC: 4 G/DL — SIGNIFICANT CHANGE UP (ref 3.5–5.2)
ALP SERPL-CCNC: 72 U/L — SIGNIFICANT CHANGE UP (ref 30–115)
ALT FLD-CCNC: 13 U/L — SIGNIFICANT CHANGE UP (ref 0–41)
ANION GAP SERPL CALC-SCNC: 14 MMOL/L — SIGNIFICANT CHANGE UP (ref 7–14)
APPEARANCE UR: CLEAR — SIGNIFICANT CHANGE UP
AST SERPL-CCNC: 21 U/L — SIGNIFICANT CHANGE UP (ref 0–41)
BACTERIA # UR AUTO: ABNORMAL /HPF
BASOPHILS # BLD AUTO: 0.03 K/UL — SIGNIFICANT CHANGE UP (ref 0–0.2)
BASOPHILS NFR BLD AUTO: 0.5 % — SIGNIFICANT CHANGE UP (ref 0–1)
BILIRUB SERPL-MCNC: 0.3 MG/DL — SIGNIFICANT CHANGE UP (ref 0.2–1.2)
BILIRUB UR-MCNC: NEGATIVE — SIGNIFICANT CHANGE UP
BUN SERPL-MCNC: 8 MG/DL — LOW (ref 10–20)
CALCIUM SERPL-MCNC: 9.4 MG/DL — SIGNIFICANT CHANGE UP (ref 8.4–10.4)
CAST: 0 /LPF — SIGNIFICANT CHANGE UP (ref 0–4)
CHLORIDE SERPL-SCNC: 103 MMOL/L — SIGNIFICANT CHANGE UP (ref 98–110)
CO2 SERPL-SCNC: 26 MMOL/L — SIGNIFICANT CHANGE UP (ref 17–32)
COLOR SPEC: YELLOW — SIGNIFICANT CHANGE UP
CREAT SERPL-MCNC: 0.9 MG/DL — SIGNIFICANT CHANGE UP (ref 0.7–1.5)
DIFF PNL FLD: ABNORMAL
EGFR: 85 ML/MIN/1.73M2 — SIGNIFICANT CHANGE UP
EOSINOPHIL # BLD AUTO: 0.39 K/UL — SIGNIFICANT CHANGE UP (ref 0–0.7)
EOSINOPHIL NFR BLD AUTO: 6.9 % — SIGNIFICANT CHANGE UP (ref 0–8)
GLUCOSE SERPL-MCNC: 142 MG/DL — HIGH (ref 70–99)
GLUCOSE UR QL: NEGATIVE MG/DL — SIGNIFICANT CHANGE UP
HCT VFR BLD CALC: 39.7 % — LOW (ref 42–52)
HGB BLD-MCNC: 13.3 G/DL — LOW (ref 14–18)
IMM GRANULOCYTES NFR BLD AUTO: 0.2 % — SIGNIFICANT CHANGE UP (ref 0.1–0.3)
KETONES UR-MCNC: NEGATIVE MG/DL — SIGNIFICANT CHANGE UP
LEUKOCYTE ESTERASE UR-ACNC: ABNORMAL
LIDOCAIN IGE QN: 21 U/L — SIGNIFICANT CHANGE UP (ref 7–60)
LYMPHOCYTES # BLD AUTO: 2.01 K/UL — SIGNIFICANT CHANGE UP (ref 1.2–3.4)
LYMPHOCYTES # BLD AUTO: 35.4 % — SIGNIFICANT CHANGE UP (ref 20.5–51.1)
MCHC RBC-ENTMCNC: 31.4 PG — HIGH (ref 27–31)
MCHC RBC-ENTMCNC: 33.5 G/DL — SIGNIFICANT CHANGE UP (ref 32–37)
MCV RBC AUTO: 93.9 FL — SIGNIFICANT CHANGE UP (ref 80–94)
MONOCYTES # BLD AUTO: 0.66 K/UL — HIGH (ref 0.1–0.6)
MONOCYTES NFR BLD AUTO: 11.6 % — HIGH (ref 1.7–9.3)
NEUTROPHILS # BLD AUTO: 2.58 K/UL — SIGNIFICANT CHANGE UP (ref 1.4–6.5)
NEUTROPHILS NFR BLD AUTO: 45.4 % — SIGNIFICANT CHANGE UP (ref 42.2–75.2)
NITRITE UR-MCNC: NEGATIVE — SIGNIFICANT CHANGE UP
NRBC # BLD: 0 /100 WBCS — SIGNIFICANT CHANGE UP (ref 0–0)
PH UR: 6.5 — SIGNIFICANT CHANGE UP (ref 5–8)
PLATELET # BLD AUTO: 211 K/UL — SIGNIFICANT CHANGE UP (ref 130–400)
PMV BLD: 11 FL — HIGH (ref 7.4–10.4)
POTASSIUM SERPL-MCNC: 4.5 MMOL/L — SIGNIFICANT CHANGE UP (ref 3.5–5)
POTASSIUM SERPL-SCNC: 4.5 MMOL/L — SIGNIFICANT CHANGE UP (ref 3.5–5)
PROT SERPL-MCNC: 7.3 G/DL — SIGNIFICANT CHANGE UP (ref 6–8)
PROT UR-MCNC: NEGATIVE MG/DL — SIGNIFICANT CHANGE UP
RBC # BLD: 4.23 M/UL — LOW (ref 4.7–6.1)
RBC # FLD: 13.3 % — SIGNIFICANT CHANGE UP (ref 11.5–14.5)
RBC CASTS # UR COMP ASSIST: 10 /HPF — HIGH (ref 0–4)
SODIUM SERPL-SCNC: 143 MMOL/L — SIGNIFICANT CHANGE UP (ref 135–146)
SP GR SPEC: 1.01 — SIGNIFICANT CHANGE UP (ref 1–1.03)
SQUAMOUS # UR AUTO: 0 /HPF — SIGNIFICANT CHANGE UP (ref 0–5)
UROBILINOGEN FLD QL: 0.2 MG/DL — SIGNIFICANT CHANGE UP (ref 0.2–1)
WBC # BLD: 5.68 K/UL — SIGNIFICANT CHANGE UP (ref 4.8–10.8)
WBC # FLD AUTO: 5.68 K/UL — SIGNIFICANT CHANGE UP (ref 4.8–10.8)
WBC UR QL: 2 /HPF — SIGNIFICANT CHANGE UP (ref 0–5)

## 2024-09-09 PROCEDURE — 36415 COLL VENOUS BLD VENIPUNCTURE: CPT

## 2024-09-09 PROCEDURE — 80053 COMPREHEN METABOLIC PANEL: CPT

## 2024-09-09 PROCEDURE — 81001 URINALYSIS AUTO W/SCOPE: CPT

## 2024-09-09 PROCEDURE — 96374 THER/PROPH/DIAG INJ IV PUSH: CPT | Mod: XU

## 2024-09-09 PROCEDURE — 99285 EMERGENCY DEPT VISIT HI MDM: CPT

## 2024-09-09 PROCEDURE — 87086 URINE CULTURE/COLONY COUNT: CPT

## 2024-09-09 PROCEDURE — 74177 CT ABD & PELVIS W/CONTRAST: CPT | Mod: 26,MC

## 2024-09-09 PROCEDURE — 83690 ASSAY OF LIPASE: CPT

## 2024-09-09 PROCEDURE — 85025 COMPLETE CBC W/AUTO DIFF WBC: CPT

## 2024-09-09 PROCEDURE — 99284 EMERGENCY DEPT VISIT MOD MDM: CPT | Mod: 25

## 2024-09-09 PROCEDURE — 74177 CT ABD & PELVIS W/CONTRAST: CPT | Mod: MC

## 2024-09-09 RX ORDER — KETOROLAC TROMETHAMINE 30 MG/ML
15 INJECTION, SOLUTION INTRAMUSCULAR ONCE
Refills: 0 | Status: DISCONTINUED | OUTPATIENT
Start: 2024-09-09 | End: 2024-09-09

## 2024-09-09 RX ORDER — SODIUM CHLORIDE 9 MG/ML
500 INJECTION INTRAMUSCULAR; INTRAVENOUS; SUBCUTANEOUS ONCE
Refills: 0 | Status: COMPLETED | OUTPATIENT
Start: 2024-09-09 | End: 2024-09-09

## 2024-09-09 RX ADMIN — KETOROLAC TROMETHAMINE 15 MILLIGRAM(S): 30 INJECTION, SOLUTION INTRAMUSCULAR at 18:14

## 2024-09-09 RX ADMIN — SODIUM CHLORIDE 500 MILLILITER(S): 9 INJECTION INTRAMUSCULAR; INTRAVENOUS; SUBCUTANEOUS at 18:08

## 2024-09-09 NOTE — ED PROVIDER NOTE - CLINICAL SUMMARY MEDICAL DECISION MAKING FREE TEXT BOX
83-year-old male presents to the ED for right-sided flank pain.  Patient noted to have hematuria and was seen in [facility] where Robledo was placed.  Returns to the ED for ongoing flank pain.  Vitals were reviewed by me and noted to be within acceptable parameters.  Physical exam was unremarkable.  We obtained labs along with CT imaging.  UA with trace leuk esterase.  CT abdomen pelvis unremarkable.  Overall well-appearing.  Will follow-up with outpatient urology.  Robledo replaced.  Discharged home.

## 2024-09-09 NOTE — ED PROVIDER NOTE - PATIENT PORTAL LINK FT
You can access the FollowMyHealth Patient Portal offered by [facility] by registering at the following website: [email or web address]. By joining [employer]’s FollowMyHealth portal, you will also be able to view your health information using other applications (apps) compatible with our system.

## 2024-09-09 NOTE — ED PROVIDER NOTE - OBJECTIVE STATEMENT
83-year-old male with past medical history HTN, HLD, DM, CAD, PPM, A-fib on Eliquis, CHF, COPD on 3 L home O2, chronic pancreatitis, chronic back pain presents with complaints of hematuria.  Reports he was seen at [facility] this past Wednesday where he was complaining of right-sided flank pain and decreased quantity of urination and had a Robledo placed at that time with workup showing no UTI.  States he was given antibiotics in the hospital and discharged and returned to the hospital on Sunday, where he was given antibiotics once again but not discharged with antibiotics.  States since then he has had hematuria, for which he presents to ED for evaluation.  Endorses ongoing flank pain on the right side for the past 2 weeks.  Denies fever/chills, chest pain, shortness of breath, abdominal pain, nausea/vomiting/diarrhea, change in bowel/bladder habits, lightheadedness, dizziness.

## 2024-09-09 NOTE — ED ADULT NURSE NOTE - NSFALLHARMRISKINTERV_ED_ALL_ED

## 2024-09-09 NOTE — ED ADULT NURSE NOTE - PAIN: BODY LOCATION
Spoke with patient via phone for follow up anticoagulation visit.    INR done  via self test/coaguchek.  Last INR on  was 2.6.  Dose maintained.    INR was 2.5 and is within goal range.      Patient denies any changes or concerns.      Current warfarin total weekly dose of 32.5 mg verified.  Informed the INR result is within therapeutic range and instructed to maintain current dose per protocol. Discussed dose and return date of ~1 week () via self test for next INR. See Anticoagulation flowsheet.    Dr. Guidry is in the office today supervising the treatment.    Instructed to contact the clinic with any unusual bleeding or bruising, any changes in medications, diet, health status, lifestyle, or any other changes, questions or concerns. Verbalized understanding of all discussed.    Anticoagulation Summary  As of 2021    INR goal:  2.0-3.0   TTR:  58.8 % (9.9 mo)   INR used for dosin.5 (2021)   Warfarin maintenance plan:  2.5 mg (5 mg x 0.5) every Tu; 5 mg (5 mg x 1) all other days   Weekly warfarin total:  32.5 mg   No change documented:  Renita Nunez, KEYONA   Plan last modified:  Yanci Rosa RN (2021)   Next INR check:     Target end date:  Indefinite    Indications    Atrial fibrillation and flutter (CMS/HCC) [I48.91  I48.92]  Encounter for monitoring Coumadin therapy [Z51.81  Z79.01]             Anticoagulation Episode Summary     INR check location:  Anticoagulation Clinic    Preferred lab:      Send INR reminders to:  JUNAID (OPEN ENROLLMENT) [place]    Comments:  SELF TEST/MD INR/ COAGUCHEK/ Q 1 WK.      Anticoagulation Care Providers     Provider Role Specialty Phone number    Fabricio Puckett MD Referring Internal Medicine - Clinical Cardiac Electrophysiology [phone]         
right flank, back

## 2024-09-09 NOTE — ED PROVIDER NOTE - PHYSICAL EXAMINATION
Vital Signs: I have reviewed the initial vital signs.  Constitutional: appears stated age, no acute distress  Eyes: Sclera clear, EOMI.  Cardiovascular: S1 and S2, regular rate, regular rhythm, well-perfused extremities, radial pulses equal and 2+, pedal pulses 2+ and equal  Respiratory: unlabored respiratory effort, clear to auscultation bilaterally no wheezing, rales, or rhonchi  Gastrointestinal:  abdomen soft, non-tender, + bilateral cva tenderness  Musculoskeletal: supple neck, no lower extremity edema  Integumentary: warm, dry, no rash  Neurologic: awake, alert, oriented x3, extremities’ motor and sensory functions grossly intact

## 2024-09-09 NOTE — ED ADULT NURSE NOTE - OBJECTIVE STATEMENT
pt was seen in [facility], [place], diagnosed with UTI, jordan was placed. Pt is complaining of right flank pain and bloody urine.

## 2024-09-09 NOTE — ED ADULT TRIAGE NOTE - CHIEF COMPLAINT QUOTE
Bloody urine noted in jordan since yesterday, pt also c/o RUQ abdominal pain and R flank pain x 2 weeks, 02 sat at baseline, pt has copd

## 2024-09-10 ENCOUNTER — NON-APPOINTMENT (OUTPATIENT)
Age: 84
End: 2024-09-10

## 2024-09-10 ENCOUNTER — APPOINTMENT (OUTPATIENT)
Dept: HOME HEALTH SERVICES | Facility: HOME HEALTH | Age: 84
End: 2024-09-10

## 2024-09-11 ENCOUNTER — EMERGENCY (EMERGENCY)
Facility: HOSPITAL | Age: 84
LOS: 0 days | Discharge: HOME | End: 2024-09-11
Attending: EMERGENCY MEDICINE
Payer: MEDICARE

## 2024-09-11 VITALS
HEART RATE: 80 BPM | TEMPERATURE: 98 F | DIASTOLIC BLOOD PRESSURE: 54 MMHG | OXYGEN SATURATION: 99 % | RESPIRATION RATE: 17 BRPM | SYSTOLIC BLOOD PRESSURE: 107 MMHG | WEIGHT: 175.05 LBS | HEIGHT: 67 IN

## 2024-09-11 DIAGNOSIS — Z87.891 PERSONAL HISTORY OF NICOTINE DEPENDENCE: ICD-10-CM

## 2024-09-11 DIAGNOSIS — I50.9 HEART FAILURE, UNSPECIFIED: ICD-10-CM

## 2024-09-11 DIAGNOSIS — I25.10 ATHEROSCLEROTIC HEART DISEASE OF NATIVE CORONARY ARTERY WITHOUT ANGINA PECTORIS: ICD-10-CM

## 2024-09-11 DIAGNOSIS — I48.91 UNSPECIFIED ATRIAL FIBRILLATION: ICD-10-CM

## 2024-09-11 DIAGNOSIS — E11.9 TYPE 2 DIABETES MELLITUS WITHOUT COMPLICATIONS: ICD-10-CM

## 2024-09-11 DIAGNOSIS — R39.198 OTHER DIFFICULTIES WITH MICTURITION: ICD-10-CM

## 2024-09-11 DIAGNOSIS — R33.9 RETENTION OF URINE, UNSPECIFIED: ICD-10-CM

## 2024-09-11 DIAGNOSIS — Z96.0 PRESENCE OF UROGENITAL IMPLANTS: Chronic | ICD-10-CM

## 2024-09-11 DIAGNOSIS — E78.5 HYPERLIPIDEMIA, UNSPECIFIED: ICD-10-CM

## 2024-09-11 DIAGNOSIS — I11.0 HYPERTENSIVE HEART DISEASE WITH HEART FAILURE: ICD-10-CM

## 2024-09-11 DIAGNOSIS — J44.9 CHRONIC OBSTRUCTIVE PULMONARY DISEASE, UNSPECIFIED: ICD-10-CM

## 2024-09-11 DIAGNOSIS — N39.0 URINARY TRACT INFECTION, SITE NOT SPECIFIED: ICD-10-CM

## 2024-09-11 DIAGNOSIS — Z87.19 PERSONAL HISTORY OF OTHER DISEASES OF THE DIGESTIVE SYSTEM: Chronic | ICD-10-CM

## 2024-09-11 DIAGNOSIS — Z98.890 OTHER SPECIFIED POSTPROCEDURAL STATES: Chronic | ICD-10-CM

## 2024-09-11 LAB
APPEARANCE UR: ABNORMAL
BACTERIA # UR AUTO: ABNORMAL /HPF
BILIRUB UR-MCNC: ABNORMAL
CAST: 15 /LPF — HIGH (ref 0–4)
COLOR SPEC: ABNORMAL
CULTURE RESULTS: SIGNIFICANT CHANGE UP
DIFF PNL FLD: ABNORMAL
GLUCOSE UR QL: NEGATIVE MG/DL — SIGNIFICANT CHANGE UP
KETONES UR-MCNC: ABNORMAL MG/DL
LEUKOCYTE ESTERASE UR-ACNC: ABNORMAL
NITRITE UR-MCNC: NEGATIVE — SIGNIFICANT CHANGE UP
PH UR: 6 — SIGNIFICANT CHANGE UP (ref 5–8)
PROT UR-MCNC: 300 MG/DL
RBC CASTS # UR COMP ASSIST: 1738 /HPF — HIGH (ref 0–4)
SP GR SPEC: 1.02 — SIGNIFICANT CHANGE UP (ref 1–1.03)
SPECIMEN SOURCE: SIGNIFICANT CHANGE UP
SQUAMOUS # UR AUTO: 27 /HPF — HIGH (ref 0–5)
UROBILINOGEN FLD QL: 1 MG/DL — SIGNIFICANT CHANGE UP (ref 0.2–1)
WBC UR QL: >998 /HPF — HIGH (ref 0–5)

## 2024-09-11 PROCEDURE — 87077 CULTURE AEROBIC IDENTIFY: CPT

## 2024-09-11 PROCEDURE — 99284 EMERGENCY DEPT VISIT MOD MDM: CPT

## 2024-09-11 PROCEDURE — 81001 URINALYSIS AUTO W/SCOPE: CPT

## 2024-09-11 PROCEDURE — 99283 EMERGENCY DEPT VISIT LOW MDM: CPT

## 2024-09-11 PROCEDURE — 87086 URINE CULTURE/COLONY COUNT: CPT

## 2024-09-11 RX ORDER — CEFPODOXIME PROXETIL 100 MG/5ML
100 GRANULE, FOR SUSPENSION ORAL ONCE
Refills: 0 | Status: COMPLETED | OUTPATIENT
Start: 2024-09-11 | End: 2024-09-11

## 2024-09-11 RX ORDER — CEFPODOXIME PROXETIL 100 MG/5ML
1 GRANULE, FOR SUSPENSION ORAL
Qty: 14 | Refills: 0
Start: 2024-09-11 | End: 2024-09-17

## 2024-09-11 RX ADMIN — CEFPODOXIME PROXETIL 100 MILLIGRAM(S): 100 GRANULE, FOR SUSPENSION ORAL at 12:55

## 2024-09-11 NOTE — ED PROVIDER NOTE - OBJECTIVE STATEMENT
83-year-old male with a past medical history of HTN, HLD, CAD, CHF, COPD on 3L home O2, DM, A-fib presents complaining of difficulty urinating since last night.  Patient has a Robledo in place and states that when trying to urinate he has pain.  Patient currently has urine in Robledo bag. pt denies any other symptoms including fevers, chill, headache, recent illness/travel, cough, abdominal pain, chest pain, or SOB.

## 2024-09-11 NOTE — ED PROVIDER NOTE - PATIENT PORTAL LINK FT
You can access the FollowMyHealth Patient Portal offered by [facility] by registering at the following website: [email or web address]. By joining [employer]’s FollowMyHealth portal, you will also be able to view your health information using other applications (apps) compatible with our system.

## 2024-09-11 NOTE — ED PROVIDER NOTE - NSFOLLOWUPINSTRUCTIONS_ED_ALL_ED_FT
Please follow up with your primary care physician within 24-72 hours and return immediately if symptoms worsen.    Our Emergency Department Referral Coordinators will be reaching out to you in the next 24-48 hours from 9:00am to 5:00pm with a follow up appointment. Please expect a phone call from the hospital in that time frame. If you do not receive a call or if you have any questions or concerns, you can reach them at   [phone]    Urinary Tract Infection, Adult  ImageA urinary tract infection (UTI) is an infection of any part of the urinary tract. The urinary tract includes the:  Kidneys.  Ureters.  Bladder.  Urethra.  These organs make, store, and get rid of pee (urine) in the body.    Follow these instructions at home:  Image   Take over-the-counter and prescription medicines only as told by your doctor.  If you were prescribed an antibiotic medicine, take it as told by your doctor. Do not stop taking it even if you start to feel better.  Drink enough fluid to keep your pee (urine) pale yellow. For most people, this is 6–10 glasses of water each day.  Keep all follow-up visits as told by your doctor. This is important.  Make sure you:  Empty your bladder often and completely. Do not hold pee for long periods of time.  Empty your bladder after sex.  Wipe from front to back after a bowel movement if you are female. Use each tissue one time when you wipe.  Contact a doctor if:  Your symptoms do not get better after 1–2 days.  Your symptoms go away and then come back.  Get help right away if:  You have very bad pain in your back.  You have very bad pain in your lower belly (abdomen).  You have a fever.  You are sick to your stomach (nauseous).  You are throwing up (vomiting).  Summary  A urinary tract infection (UTI) is an infection of any part of the urinary tract.  If you were prescribed an antibiotic medicine, take it as told by your doctor. Do not stop taking it even if you start to feel better.  Drink enough fluid to keep your pee (urine) pale yellow.  This information is not intended to replace advice given to you by your health care provider. Make sure you discuss any questions you have with your health care provider.

## 2024-09-11 NOTE — ED PROVIDER NOTE - CLINICAL SUMMARY MEDICAL DECISION MAKING FREE TEXT BOX
Patient with dysuria.  Jordan was placed 2 days ago.  Urine in the jordan bag.  Bedside sono negative for retention.  Abdomen soft, non tender.  UA positive.  DC with Cefpodoxime.  Strict return instructions discussed at bedside.

## 2024-09-12 ENCOUNTER — NON-APPOINTMENT (OUTPATIENT)
Age: 84
End: 2024-09-12

## 2024-09-12 ENCOUNTER — APPOINTMENT (OUTPATIENT)
Dept: HOME HEALTH SERVICES | Facility: HOME HEALTH | Age: 84
End: 2024-09-12

## 2024-09-15 LAB
CULTURE RESULTS: SIGNIFICANT CHANGE UP
SPECIMEN SOURCE: SIGNIFICANT CHANGE UP

## 2024-10-16 NOTE — ED ADULT NURSE NOTE - SUICIDE SCREENING QUESTION 1
External Referral To Physical Therapy  -     DME Order for (Specify) as OP for SCDs     Chronic diastolic heart failure (HCC)- stable   No signs of acute failure, continue diuretics and other cardiac meds as prescribed     Chronic respiratory failure with hypoxia and hypercapnia- controlled   Continue current oxygen orders 24 hours / day   NIVV at night / PRN day   Long term systemic steroid user  Discontinue daily use due to side effects -     predniSONE (DELTASONE) 10 MG tablet; Take 0.5 tablet to 1 tablet daily PRN for shortness of breath    Obesity : stable   Pt counseled on obesity, health risks associated with obesity and counseled on need for weight reduction.   (Please note that portions of this note may have been with a voice recognition program.  Efforts were made to edit the dictation but occasionally words are mis-transcribed )     Will see Ginna Fang in: 4 -6 weeks for medication follow up with EKG and BMP   billing based on time: total time on encounter 70 min   Electronically signed by SEVERIANO Moran CNP on 10/16/2024 at 12:48 PM    No

## 2024-10-21 ENCOUNTER — TRANSCRIPTION ENCOUNTER (OUTPATIENT)
Age: 84
End: 2024-10-21

## 2024-10-28 ENCOUNTER — TRANSCRIPTION ENCOUNTER (OUTPATIENT)
Age: 84
End: 2024-10-28

## 2024-11-05 ENCOUNTER — TRANSCRIPTION ENCOUNTER (OUTPATIENT)
Age: 84
End: 2024-11-05

## 2024-11-11 ENCOUNTER — TRANSCRIPTION ENCOUNTER (OUTPATIENT)
Age: 84
End: 2024-11-11

## 2024-11-29 ENCOUNTER — TRANSCRIPTION ENCOUNTER (OUTPATIENT)
Age: 84
End: 2024-11-29

## 2024-12-05 ENCOUNTER — TRANSCRIPTION ENCOUNTER (OUTPATIENT)
Age: 84
End: 2024-12-05

## 2024-12-13 ENCOUNTER — TRANSCRIPTION ENCOUNTER (OUTPATIENT)
Age: 84
End: 2024-12-13

## 2024-12-16 ENCOUNTER — TRANSCRIPTION ENCOUNTER (OUTPATIENT)
Age: 84
End: 2024-12-16

## 2024-12-23 ENCOUNTER — TRANSCRIPTION ENCOUNTER (OUTPATIENT)
Age: 84
End: 2024-12-23

## 2024-12-24 ENCOUNTER — APPOINTMENT (OUTPATIENT)
Dept: NEUROLOGY | Facility: CLINIC | Age: 84
End: 2024-12-24

## 2024-12-24 PROBLEM — F10.90 ALCOHOL USE: Status: ACTIVE | Noted: 2017-12-08

## 2025-01-05 NOTE — DISCHARGE NOTE PROVIDER - ATTENDING ATTESTATION STATEMENT
I have personally seen and examined the patient. I have collaborated with and supervised the
See below attestation

## 2025-01-21 ENCOUNTER — NON-APPOINTMENT (OUTPATIENT)
Age: 85
End: 2025-01-21

## 2025-01-24 ENCOUNTER — NON-APPOINTMENT (OUTPATIENT)
Age: 85
End: 2025-01-24

## 2025-01-27 ENCOUNTER — NON-APPOINTMENT (OUTPATIENT)
Age: 85
End: 2025-01-27

## 2025-02-03 NOTE — CONSULT NOTE ADULT - NS ATTEND AMEND GEN_ALL_CORE FT
Agree with history and physical exam.  Patient is here for risk assessment and possible medication changes.  The history to sonal extent is weak and also not clearly suggestive of seizure, or natural course of epilepsy syndrome.  Will start VEEG monitoring.  Seizure precautions.  Continue home dose of Keppra for now.  Check orthostatics. Duration Of Freeze Thaw-Cycle (Seconds): 0 Post-Care Instructions: I reviewed with the patient in detail post-care instructions. Patient is to wear sunprotection, and avoid picking at any of the treated lesions. Patient may apply Vaseline to crusted or scabbing areas. Detail Level: Detailed Render Post-Care Instructions In Note?: yes Consent: The patient's verbal consent was obtained including but not limited to risks of crusting, scabbing, blistering, scarring, darker or lighter pigmentary change, recurrence, incomplete removal and infection. Render Note In Bullet Format When Appropriate: No

## 2025-02-10 ENCOUNTER — APPOINTMENT (OUTPATIENT)
Dept: ELECTROPHYSIOLOGY | Facility: CLINIC | Age: 85
End: 2025-02-10

## 2025-03-04 ENCOUNTER — TRANSCRIPTION ENCOUNTER (OUTPATIENT)
Age: 85
End: 2025-03-04

## 2025-03-06 ENCOUNTER — TRANSCRIPTION ENCOUNTER (OUTPATIENT)
Age: 85
End: 2025-03-06

## 2025-03-12 ENCOUNTER — APPOINTMENT (OUTPATIENT)
Dept: VASCULAR SURGERY | Facility: CLINIC | Age: 85
End: 2025-03-12

## 2025-03-14 ENCOUNTER — TRANSCRIPTION ENCOUNTER (OUTPATIENT)
Age: 85
End: 2025-03-14

## 2025-03-21 ENCOUNTER — TRANSCRIPTION ENCOUNTER (OUTPATIENT)
Age: 85
End: 2025-03-21

## 2025-03-27 ENCOUNTER — APPOINTMENT (OUTPATIENT)
Dept: PULMONOLOGY | Facility: CLINIC | Age: 85
End: 2025-03-27

## 2025-04-02 ENCOUNTER — TRANSCRIPTION ENCOUNTER (OUTPATIENT)
Age: 85
End: 2025-04-02

## 2025-04-07 ENCOUNTER — TRANSCRIPTION ENCOUNTER (OUTPATIENT)
Age: 85
End: 2025-04-07

## 2025-04-07 ENCOUNTER — NON-APPOINTMENT (OUTPATIENT)
Age: 85
End: 2025-04-07

## 2025-04-08 ENCOUNTER — APPOINTMENT (OUTPATIENT)
Dept: HOME HEALTH SERVICES | Facility: HOME HEALTH | Age: 85
End: 2025-04-08

## 2025-04-08 VITALS
OXYGEN SATURATION: 97 % | HEART RATE: 88 BPM | TEMPERATURE: 97.8 F | RESPIRATION RATE: 20 BRPM | DIASTOLIC BLOOD PRESSURE: 78 MMHG | SYSTOLIC BLOOD PRESSURE: 132 MMHG

## 2025-04-08 DIAGNOSIS — I10 ESSENTIAL (PRIMARY) HYPERTENSION: ICD-10-CM

## 2025-04-08 DIAGNOSIS — E11.40 TYPE 2 DIABETES MELLITUS WITH DIABETIC NEUROPATHY, UNSPECIFIED: ICD-10-CM

## 2025-04-08 DIAGNOSIS — J44.9 CHRONIC OBSTRUCTIVE PULMONARY DISEASE, UNSPECIFIED: ICD-10-CM

## 2025-04-08 DIAGNOSIS — I50.9 HEART FAILURE, UNSPECIFIED: ICD-10-CM

## 2025-04-08 DIAGNOSIS — I70.219 ATHEROSCLEROSIS OF NATIVE ARTERIES OF EXTREMITIES WITH INTERMITTENT CLAUDICATION, UNSPECIFIED EXTREMITY: ICD-10-CM

## 2025-04-08 DIAGNOSIS — E11.65 TYPE 2 DIABETES MELLITUS WITH HYPERGLYCEMIA: ICD-10-CM

## 2025-04-08 DIAGNOSIS — M31.6 OTHER GIANT CELL ARTERITIS: ICD-10-CM

## 2025-04-08 DIAGNOSIS — L85.3 XEROSIS CUTIS: ICD-10-CM

## 2025-04-08 DIAGNOSIS — I48.0 PAROXYSMAL ATRIAL FIBRILLATION: ICD-10-CM

## 2025-04-08 DIAGNOSIS — R56.9 UNSPECIFIED CONVULSIONS: ICD-10-CM

## 2025-04-08 DIAGNOSIS — E78.5 HYPERLIPIDEMIA, UNSPECIFIED: ICD-10-CM

## 2025-04-08 DIAGNOSIS — K86.1 OTHER CHRONIC PANCREATITIS: ICD-10-CM

## 2025-04-08 DIAGNOSIS — K21.9 GASTRO-ESOPHAGEAL REFLUX DISEASE W/OUT ESOPHAGITIS: ICD-10-CM

## 2025-04-08 PROCEDURE — 99496 TRANSJ CARE MGMT HIGH F2F 7D: CPT

## 2025-04-09 PROBLEM — L85.3 XEROSIS OF SKIN: Status: ACTIVE | Noted: 2025-04-09

## 2025-04-09 RX ORDER — AMMONIUM LACTATE 12 %
12 CREAM (GRAM) TOPICAL TWICE DAILY
Qty: 1 | Refills: 3 | Status: ACTIVE | COMMUNITY
Start: 2025-04-09 | End: 1900-01-01

## 2025-04-18 ENCOUNTER — NON-APPOINTMENT (OUTPATIENT)
Age: 85
End: 2025-04-18

## 2025-04-21 PROBLEM — R39.9 UTI SYMPTOMS: Status: RESOLVED | Noted: 2024-08-22 | Resolved: 2025-04-21

## 2025-04-21 PROBLEM — G89.29 PAIN, CHRONIC: Noted: 2023-01-19

## 2025-04-21 PROBLEM — M25.521 RIGHT ELBOW PAIN: Status: RESOLVED | Noted: 2023-05-23 | Resolved: 2025-04-21

## 2025-04-21 PROBLEM — R39.9 LOWER URINARY TRACT SYMPTOMS (LUTS): Status: RESOLVED | Noted: 2018-02-08 | Resolved: 2025-04-21

## 2025-04-22 ENCOUNTER — NON-APPOINTMENT (OUTPATIENT)
Age: 85
End: 2025-04-22

## 2025-04-22 PROBLEM — E83.42 HYPOMAGNESEMIA: Status: ACTIVE | Noted: 2025-04-22

## 2025-04-22 PROBLEM — E83.51 HYPOCALCEMIA: Status: ACTIVE | Noted: 2025-04-22

## 2025-04-22 PROBLEM — N40.0 BPH (BENIGN PROSTATIC HYPERPLASIA): Status: ACTIVE | Noted: 2025-04-22

## 2025-04-22 PROBLEM — M54.9 CHRONIC BACK PAIN: Status: ACTIVE | Noted: 2025-04-22

## 2025-05-05 ENCOUNTER — TRANSCRIPTION ENCOUNTER (OUTPATIENT)
Age: 85
End: 2025-05-05

## 2025-05-06 ENCOUNTER — APPOINTMENT (OUTPATIENT)
Dept: HOME HEALTH SERVICES | Facility: HOME HEALTH | Age: 85
End: 2025-05-06

## 2025-05-06 DIAGNOSIS — R30.0 DYSURIA: ICD-10-CM

## 2025-05-06 DIAGNOSIS — C61 MALIGNANT NEOPLASM OF PROSTATE: ICD-10-CM

## 2025-05-06 PROCEDURE — 99349 HOME/RES VST EST MOD MDM 40: CPT

## 2025-05-07 PROBLEM — R30.0 DYSURIA: Status: ACTIVE | Noted: 2025-05-07

## 2025-05-07 PROBLEM — C61 PROSTATE CANCER: Status: ACTIVE | Noted: 2025-05-07

## 2025-05-08 ENCOUNTER — LABORATORY RESULT (OUTPATIENT)
Age: 85
End: 2025-05-08

## 2025-05-15 DIAGNOSIS — N39.0 URINARY TRACT INFECTION, SITE NOT SPECIFIED: ICD-10-CM

## 2025-05-15 LAB
25(OH)D3 SERPL-MCNC: 34.3 NG/ML
ALBUMIN SERPL ELPH-MCNC: 3.9 G/DL
ALP BLD-CCNC: 81 U/L
ALT SERPL-CCNC: 9 U/L
ANION GAP SERPL CALC-SCNC: 19 MMOL/L
APPEARANCE: CLEAR
AST SERPL-CCNC: 17 U/L
BASOPHILS # BLD AUTO: 0.02 K/UL
BASOPHILS NFR BLD AUTO: 0.3 %
BILIRUB SERPL-MCNC: 0.2 MG/DL
BILIRUBIN URINE: NEGATIVE
BLOOD URINE: NEGATIVE
BUN SERPL-MCNC: 18 MG/DL
CALCIUM SERPL-MCNC: 9.8 MG/DL
CHLORIDE SERPL-SCNC: 101 MMOL/L
CHOLEST SERPL-MCNC: 166 MG/DL
CO2 SERPL-SCNC: 20 MMOL/L
COLOR: YELLOW
CREAT SERPL-MCNC: 1.09 MG/DL
EGFRCR SERPLBLD CKD-EPI 2021: 67 ML/MIN/1.73M2
EOSINOPHIL # BLD AUTO: 0.37 K/UL
EOSINOPHIL NFR BLD AUTO: 5.7 %
ESTIMATED AVERAGE GLUCOSE: 148 MG/DL
FOLATE SERPL-MCNC: 11.4 NG/ML
GLUCOSE QUALITATIVE U: NEGATIVE MG/DL
GLUCOSE SERPL-MCNC: 110 MG/DL
HBA1C MFR BLD HPLC: 6.8 %
HCT VFR BLD CALC: 35.8 %
HDLC SERPL-MCNC: 60 MG/DL
HGB BLD-MCNC: 11.6 G/DL
IMM GRANULOCYTES NFR BLD AUTO: 0.3 %
IRON SATN MFR SERPL: 28 %
IRON SERPL-MCNC: 76 UG/DL
KETONES URINE: ABNORMAL MG/DL
LDLC SERPL-MCNC: 90 MG/DL
LEUKOCYTE ESTERASE URINE: ABNORMAL
LYMPHOCYTES # BLD AUTO: 2.66 K/UL
LYMPHOCYTES NFR BLD AUTO: 40.9 %
MAN DIFF?: NORMAL
MCHC RBC-ENTMCNC: 27.5 PG
MCHC RBC-ENTMCNC: 32.4 G/DL
MCV RBC AUTO: 84.8 FL
MONOCYTES # BLD AUTO: 0.61 K/UL
MONOCYTES NFR BLD AUTO: 9.4 %
NEUTROPHILS # BLD AUTO: 2.82 K/UL
NEUTROPHILS NFR BLD AUTO: 43.4 %
NITRITE URINE: NEGATIVE
NONHDLC SERPL-MCNC: 107 MG/DL
PH URINE: 6.5
PLATELET # BLD AUTO: 261 K/UL
POTASSIUM SERPL-SCNC: 4.4 MMOL/L
PROT SERPL-MCNC: 7.4 G/DL
PROTEIN URINE: NORMAL MG/DL
RBC # BLD: 4.22 M/UL
RBC # FLD: 17.9 %
SODIUM SERPL-SCNC: 141 MMOL/L
SPECIFIC GRAVITY URINE: 1.02
TIBC SERPL-MCNC: 271 UG/DL
TRIGL SERPL-MCNC: 90 MG/DL
TSH SERPL-ACNC: 2.43 UIU/ML
UIBC SERPL-MCNC: 195 UG/DL
UROBILINOGEN URINE: 0.2 MG/DL
VIT B12 SERPL-MCNC: 489 PG/ML
WBC # FLD AUTO: 6.5 K/UL

## 2025-05-15 RX ORDER — NITROFURANTOIN MACROCRYSTALS 100 MG/1
100 CAPSULE ORAL
Qty: 14 | Refills: 0 | Status: ACTIVE | COMMUNITY
Start: 2025-05-15 | End: 1900-01-01

## 2025-05-19 NOTE — PHYSICAL EXAM
[de-identified] : Constitutional: GENERAL APPEARANCE OF PATIENT IS WELL DEVELOPED, WELL NOURISHED, BODY HABITUS NORMAL, WELL GROOMED, NO DEFORMITIES NOTED.\par  Head - Atraumatic and Normocephalic\par  Eyes, Nose, and Throat: External inspection of ears and nose are normal overall without scars, lesions, or masses noted. Assessment of hearing is normal\par  Neck-Examination of neck shows no masses, overall appearance is normal, neck is symmetric, tracheal position is midline, no crepitus is noted. Examination of thyroid shows no enlargement, tenderness or masses\par  Respiratory- Assessment of respiratory effort shows no intercostal retractions, no use of accessory muscles, unlabored breathing, and normal diaphragmatic movement.\par  Cardiovascular- Examination of extremities show no edema or varicosities\par  Musculoskeletal. Examination of gait is not antalgic and station is normal\par  Inspection and palpation of digits and nails shows no clubbing, cyanosis, nodules, drainage, fluctuance, petechiae\par  \par  - Spine - inspection and palpation shows no misalignment, asymmetry, crepitation, defects, tenderness, masses, effusions. ROM is normal without crepitation or contracture. No instability or subluxation or laxity is noted. No abnormal movements.\par  \par  - Neck- inspection and palpation shows no misalignment, asymmetry, crepitation, defects, tenderness, masses, effusions. ROM is normal without crepitation or contracture. No instability or subluxation or laxity is noted. No abnormal movements.\par  \par  - RUE- pain on palpation of right elbow.\par  \par  - LUE- inspection and palpation shows no misalignment, asymmetry, crepitation, defects, tenderness, masses, effusions. ROM is normal without crepitation or contracture. No instability or subluxation or laxity is noted. No abnormal movements.\par  \par  - RLE- inspection and palpation shows no misalignment, asymmetry, crepitation, defects, tenderness, masses, effusions. ROM is normal without crepitation or contracture. No instability or subluxation or laxity is noted. No abnormal movements.\par  \par  - LLE- inspection and palpation shows no misalignment, asymmetry, crepitation, defects, tenderness, masses, effusions. ROM is normal without crepitation or contracture. No instability or subluxation or laxity is noted. No abnormal movements.\par  \par  - Skin- Inspection of skin and subcutaneous tissue shows no rashes, lesions or ulcers. Palpation of skin and subcutaneous tissue shows no rashes, no indurations, subcutaneous nodules or tightening.\par  \par  - Abdomen- Nontender\par  \par  - Neurologic- CN 2-12 are grossly intact. No sensory or motor deficits in the upper and lower extremities. Adequate strength in upper and lower extremities\par  \par  - Psychiatric- Patient's judgment and insight are intact. Oriented to time, place and person. Recent and remote memory intact.
n/a

## 2025-05-29 PROBLEM — D68.59 HYPERCOAGULABLE STATE: Status: ACTIVE | Noted: 2025-05-29

## 2025-05-29 PROBLEM — M25.529 ELBOW PAIN: Status: RESOLVED | Noted: 2023-09-21 | Resolved: 2025-05-29

## 2025-06-11 ENCOUNTER — APPOINTMENT (OUTPATIENT)
Dept: HOME HEALTH SERVICES | Facility: HOME HEALTH | Age: 85
End: 2025-06-11

## 2025-06-11 ENCOUNTER — NON-APPOINTMENT (OUTPATIENT)
Age: 85
End: 2025-06-11

## 2025-06-11 VITALS
RESPIRATION RATE: 18 BRPM | SYSTOLIC BLOOD PRESSURE: 126 MMHG | HEART RATE: 91 BPM | TEMPERATURE: 97.6 F | OXYGEN SATURATION: 98 % | DIASTOLIC BLOOD PRESSURE: 78 MMHG

## 2025-06-11 RX ORDER — AZELASTINE HYDROCHLORIDE 0.5 MG/ML
0.05 SOLUTION/ DROPS OPHTHALMIC
Qty: 6 | Refills: 0 | Status: ACTIVE | COMMUNITY
Start: 2025-06-11

## 2025-06-27 ENCOUNTER — TRANSCRIPTION ENCOUNTER (OUTPATIENT)
Age: 85
End: 2025-06-27

## 2025-06-27 ENCOUNTER — APPOINTMENT (OUTPATIENT)
Dept: HOME HEALTH SERVICES | Facility: HOME HEALTH | Age: 85
End: 2025-06-27
Payer: MEDICARE

## 2025-06-27 ENCOUNTER — APPOINTMENT (OUTPATIENT)
Dept: AFTER HOURS CARE | Facility: EMERGENCY ROOM | Age: 85
End: 2025-06-27
Payer: MEDICARE

## 2025-06-27 PROBLEM — R60.0 LOWER EXTREMITY EDEMA: Status: ACTIVE | Noted: 2025-06-27

## 2025-06-27 PROCEDURE — 99215 OFFICE O/P EST HI 40 MIN: CPT | Mod: 2W

## 2025-06-27 PROCEDURE — 99349 HOME/RES VST EST MOD MDM 40: CPT

## 2025-07-01 ENCOUNTER — APPOINTMENT (OUTPATIENT)
Dept: HOME HEALTH SERVICES | Facility: HOME HEALTH | Age: 85
End: 2025-07-01

## 2025-07-01 VITALS
SYSTOLIC BLOOD PRESSURE: 146 MMHG | HEART RATE: 52 BPM | OXYGEN SATURATION: 97 % | TEMPERATURE: 97.1 F | DIASTOLIC BLOOD PRESSURE: 62 MMHG | RESPIRATION RATE: 18 BRPM

## 2025-07-01 RX ORDER — AMLODIPINE BESYLATE 10 MG/1
10 TABLET ORAL
Qty: 90 | Refills: 0 | Status: ACTIVE | COMMUNITY
Start: 2025-06-28

## 2025-07-01 RX ORDER — METOLAZONE 2.5 MG/1
2.5 TABLET ORAL
Qty: 1 | Refills: 0 | Status: ACTIVE | COMMUNITY
Start: 2025-07-01

## 2025-07-02 LAB
ALBUMIN SERPL ELPH-MCNC: 4.2 G/DL
ALP BLD-CCNC: 69 U/L
ALT SERPL-CCNC: 14 U/L
ANION GAP SERPL CALC-SCNC: 17 MMOL/L
APTT BLD: 36.6 SEC
AST SERPL-CCNC: 19 U/L
BASOPHILS # BLD AUTO: 0.02 K/UL
BASOPHILS NFR BLD AUTO: 0.3 %
BILIRUB SERPL-MCNC: 0.7 MG/DL
BUN SERPL-MCNC: 13 MG/DL
CALCIUM SERPL-MCNC: 9.6 MG/DL
CHLORIDE SERPL-SCNC: 98 MMOL/L
CO2 SERPL-SCNC: 25 MMOL/L
CREAT SERPL-MCNC: 1.02 MG/DL
EGFRCR SERPLBLD CKD-EPI 2021: 73 ML/MIN/1.73M2
EOSINOPHIL # BLD AUTO: 0.19 K/UL
EOSINOPHIL NFR BLD AUTO: 3.1 %
GLUCOSE SERPL-MCNC: 86 MG/DL
HCT VFR BLD CALC: 37.3 %
HGB BLD-MCNC: 11.6 G/DL
IMM GRANULOCYTES NFR BLD AUTO: 0.2 %
INR PPP: 1.49 RATIO
LYMPHOCYTES # BLD AUTO: 1.86 K/UL
LYMPHOCYTES NFR BLD AUTO: 30 %
MAN DIFF?: NORMAL
MCHC RBC-ENTMCNC: 28.8 PG
MCHC RBC-ENTMCNC: 31.1 G/DL
MCV RBC AUTO: 92.6 FL
MONOCYTES # BLD AUTO: 0.74 K/UL
MONOCYTES NFR BLD AUTO: 11.9 %
NEUTROPHILS # BLD AUTO: 3.38 K/UL
NEUTROPHILS NFR BLD AUTO: 54.5 %
PLATELET # BLD AUTO: 209 K/UL
POTASSIUM SERPL-SCNC: 3.3 MMOL/L
PROT SERPL-MCNC: 7.4 G/DL
PT BLD: 17.5 SEC
RBC # BLD: 4.03 M/UL
RBC # FLD: 19.1 %
SODIUM SERPL-SCNC: 140 MMOL/L
WBC # FLD AUTO: 6.2 K/UL

## 2025-07-02 RX ORDER — LOSARTAN POTASSIUM 100 MG/1
100 TABLET, FILM COATED ORAL DAILY
Qty: 30 | Refills: 5 | Status: ACTIVE | COMMUNITY
Start: 2025-07-01 | End: 1900-01-01

## 2025-07-02 RX ORDER — FUROSEMIDE 20 MG/1
20 TABLET ORAL DAILY
Qty: 90 | Refills: 0 | Status: ACTIVE | COMMUNITY
Start: 2025-07-01 | End: 1900-01-01

## 2025-07-03 ENCOUNTER — NON-APPOINTMENT (OUTPATIENT)
Age: 85
End: 2025-07-03

## 2025-07-07 ENCOUNTER — NON-APPOINTMENT (OUTPATIENT)
Age: 85
End: 2025-07-07

## 2025-07-07 ENCOUNTER — TRANSCRIPTION ENCOUNTER (OUTPATIENT)
Age: 85
End: 2025-07-07

## 2025-07-10 PROBLEM — Z01.818 PREOPERATIVE CLEARANCE: Status: ACTIVE | Noted: 2025-06-29

## 2025-07-27 ENCOUNTER — EMERGENCY (EMERGENCY)
Facility: HOSPITAL | Age: 85
LOS: 0 days | Discharge: HOME | End: 2025-07-28
Attending: EMERGENCY MEDICINE
Payer: MEDICARE

## 2025-07-27 VITALS
HEART RATE: 60 BPM | WEIGHT: 166.89 LBS | DIASTOLIC BLOOD PRESSURE: 62 MMHG | TEMPERATURE: 98 F | SYSTOLIC BLOOD PRESSURE: 127 MMHG | RESPIRATION RATE: 17 BRPM | OXYGEN SATURATION: 100 %

## 2025-07-27 DIAGNOSIS — Z95.0 PRESENCE OF CARDIAC PACEMAKER: Chronic | ICD-10-CM

## 2025-07-27 DIAGNOSIS — R05.9 COUGH, UNSPECIFIED: ICD-10-CM

## 2025-07-27 DIAGNOSIS — E78.5 HYPERLIPIDEMIA, UNSPECIFIED: ICD-10-CM

## 2025-07-27 DIAGNOSIS — Z87.19 PERSONAL HISTORY OF OTHER DISEASES OF THE DIGESTIVE SYSTEM: Chronic | ICD-10-CM

## 2025-07-27 DIAGNOSIS — R07.89 OTHER CHEST PAIN: ICD-10-CM

## 2025-07-27 DIAGNOSIS — I10 ESSENTIAL (PRIMARY) HYPERTENSION: ICD-10-CM

## 2025-07-27 DIAGNOSIS — Z90.49 ACQUIRED ABSENCE OF OTHER SPECIFIED PARTS OF DIGESTIVE TRACT: Chronic | ICD-10-CM

## 2025-07-27 DIAGNOSIS — Z96.0 PRESENCE OF UROGENITAL IMPLANTS: Chronic | ICD-10-CM

## 2025-07-27 DIAGNOSIS — R06.02 SHORTNESS OF BREATH: ICD-10-CM

## 2025-07-27 DIAGNOSIS — R91.8 OTHER NONSPECIFIC ABNORMAL FINDING OF LUNG FIELD: ICD-10-CM

## 2025-07-27 DIAGNOSIS — Z95.0 PRESENCE OF CARDIAC PACEMAKER: ICD-10-CM

## 2025-07-27 DIAGNOSIS — E11.9 TYPE 2 DIABETES MELLITUS WITHOUT COMPLICATIONS: ICD-10-CM

## 2025-07-27 DIAGNOSIS — Z98.890 OTHER SPECIFIED POSTPROCEDURAL STATES: Chronic | ICD-10-CM

## 2025-07-27 LAB
ALBUMIN SERPL ELPH-MCNC: 4 G/DL — SIGNIFICANT CHANGE UP (ref 3.5–5.2)
ALP SERPL-CCNC: 85 U/L — SIGNIFICANT CHANGE UP (ref 30–115)
ALT FLD-CCNC: 12 U/L — SIGNIFICANT CHANGE UP (ref 0–41)
ANION GAP SERPL CALC-SCNC: 13 MMOL/L — SIGNIFICANT CHANGE UP (ref 7–14)
AST SERPL-CCNC: 20 U/L — SIGNIFICANT CHANGE UP (ref 0–41)
BASOPHILS # BLD AUTO: 0.02 K/UL — SIGNIFICANT CHANGE UP (ref 0–0.2)
BASOPHILS NFR BLD AUTO: 0.4 % — SIGNIFICANT CHANGE UP (ref 0–1)
BILIRUB SERPL-MCNC: 0.4 MG/DL — SIGNIFICANT CHANGE UP (ref 0.2–1.2)
BUN SERPL-MCNC: 14 MG/DL — SIGNIFICANT CHANGE UP (ref 10–20)
CALCIUM SERPL-MCNC: 8.9 MG/DL — SIGNIFICANT CHANGE UP (ref 8.4–10.5)
CHLORIDE SERPL-SCNC: 102 MMOL/L — SIGNIFICANT CHANGE UP (ref 98–110)
CO2 SERPL-SCNC: 23 MMOL/L — SIGNIFICANT CHANGE UP (ref 17–32)
CREAT SERPL-MCNC: 0.9 MG/DL — SIGNIFICANT CHANGE UP (ref 0.7–1.5)
EGFR: 85 ML/MIN/1.73M2 — SIGNIFICANT CHANGE UP
EGFR: 85 ML/MIN/1.73M2 — SIGNIFICANT CHANGE UP
EOSINOPHIL # BLD AUTO: 0.3 K/UL — SIGNIFICANT CHANGE UP (ref 0–0.7)
EOSINOPHIL NFR BLD AUTO: 5.3 % — SIGNIFICANT CHANGE UP (ref 0–8)
GLUCOSE SERPL-MCNC: 187 MG/DL — HIGH (ref 70–99)
HCT VFR BLD CALC: 31.1 % — LOW (ref 42–52)
HGB BLD-MCNC: 10.4 G/DL — LOW (ref 14–18)
IMM GRANULOCYTES NFR BLD AUTO: 0.2 % — SIGNIFICANT CHANGE UP (ref 0.1–0.3)
LYMPHOCYTES # BLD AUTO: 1.69 K/UL — SIGNIFICANT CHANGE UP (ref 1.2–3.4)
LYMPHOCYTES # BLD AUTO: 29.9 % — SIGNIFICANT CHANGE UP (ref 20.5–51.1)
MCHC RBC-ENTMCNC: 29 PG — SIGNIFICANT CHANGE UP (ref 27–31)
MCHC RBC-ENTMCNC: 33.4 G/DL — SIGNIFICANT CHANGE UP (ref 32–37)
MCV RBC AUTO: 86.6 FL — SIGNIFICANT CHANGE UP (ref 80–94)
MONOCYTES # BLD AUTO: 0.67 K/UL — HIGH (ref 0.1–0.6)
MONOCYTES NFR BLD AUTO: 11.8 % — HIGH (ref 1.7–9.3)
NEUTROPHILS # BLD AUTO: 2.97 K/UL — SIGNIFICANT CHANGE UP (ref 1.4–6.5)
NEUTROPHILS NFR BLD AUTO: 52.4 % — SIGNIFICANT CHANGE UP (ref 42.2–75.2)
NRBC BLD AUTO-RTO: 0 /100 WBCS — SIGNIFICANT CHANGE UP (ref 0–0)
PLATELET # BLD AUTO: 169 K/UL — SIGNIFICANT CHANGE UP (ref 130–400)
PMV BLD: 10.5 FL — HIGH (ref 7.4–10.4)
POTASSIUM SERPL-MCNC: 5.2 MMOL/L — HIGH (ref 3.5–5)
POTASSIUM SERPL-SCNC: 5.2 MMOL/L — HIGH (ref 3.5–5)
PROT SERPL-MCNC: 7.6 G/DL — SIGNIFICANT CHANGE UP (ref 6–8)
RBC # BLD: 3.59 M/UL — LOW (ref 4.7–6.1)
RBC # FLD: 16.2 % — HIGH (ref 11.5–14.5)
SODIUM SERPL-SCNC: 138 MMOL/L — SIGNIFICANT CHANGE UP (ref 135–146)
TROPONIN T, HIGH SENSITIVITY RESULT: 12 NG/L — SIGNIFICANT CHANGE UP (ref 6–21)
TROPONIN T, HIGH SENSITIVITY RESULT: 13 NG/L — SIGNIFICANT CHANGE UP (ref 6–21)
WBC # BLD: 5.66 K/UL — SIGNIFICANT CHANGE UP (ref 4.8–10.8)
WBC # FLD AUTO: 5.66 K/UL — SIGNIFICANT CHANGE UP (ref 4.8–10.8)

## 2025-07-27 PROCEDURE — 85025 COMPLETE CBC W/AUTO DIFF WBC: CPT

## 2025-07-27 PROCEDURE — 80053 COMPREHEN METABOLIC PANEL: CPT

## 2025-07-27 PROCEDURE — 93010 ELECTROCARDIOGRAM REPORT: CPT

## 2025-07-27 PROCEDURE — 99285 EMERGENCY DEPT VISIT HI MDM: CPT | Mod: 25

## 2025-07-27 PROCEDURE — 93005 ELECTROCARDIOGRAM TRACING: CPT

## 2025-07-27 PROCEDURE — 84484 ASSAY OF TROPONIN QUANT: CPT

## 2025-07-27 PROCEDURE — 71045 X-RAY EXAM CHEST 1 VIEW: CPT

## 2025-07-27 PROCEDURE — 36415 COLL VENOUS BLD VENIPUNCTURE: CPT

## 2025-07-27 PROCEDURE — 82962 GLUCOSE BLOOD TEST: CPT

## 2025-07-27 PROCEDURE — 71045 X-RAY EXAM CHEST 1 VIEW: CPT | Mod: 26

## 2025-07-27 PROCEDURE — G0378: CPT

## 2025-07-27 PROCEDURE — 99223 1ST HOSP IP/OBS HIGH 75: CPT

## 2025-07-27 RX ORDER — GLUCAGON 3 MG/1
1 POWDER NASAL ONCE
Refills: 0 | Status: DISCONTINUED | OUTPATIENT
Start: 2025-07-27 | End: 2025-07-28

## 2025-07-27 RX ORDER — DEXTROSE 50 % IN WATER 50 %
15 SYRINGE (ML) INTRAVENOUS ONCE
Refills: 0 | Status: DISCONTINUED | OUTPATIENT
Start: 2025-07-27 | End: 2025-07-28

## 2025-07-27 RX ORDER — MONTELUKAST SODIUM 10 MG/1
10 TABLET ORAL DAILY
Refills: 0 | Status: DISCONTINUED | OUTPATIENT
Start: 2025-07-27 | End: 2025-07-28

## 2025-07-27 RX ORDER — APIXABAN 5 MG/1
5 TABLET, FILM COATED ORAL EVERY 12 HOURS
Refills: 0 | Status: DISCONTINUED | OUTPATIENT
Start: 2025-07-27 | End: 2025-07-28

## 2025-07-27 RX ORDER — REGADENOSON 0.08 MG/ML
0.4 INJECTION, SOLUTION INTRAVENOUS ONCE
Refills: 0 | Status: DISCONTINUED | OUTPATIENT
Start: 2025-07-27 | End: 2025-07-28

## 2025-07-27 RX ORDER — DEXTROSE 50 % IN WATER 50 %
12.5 SYRINGE (ML) INTRAVENOUS ONCE
Refills: 0 | Status: DISCONTINUED | OUTPATIENT
Start: 2025-07-27 | End: 2025-07-28

## 2025-07-27 RX ORDER — TORSEMIDE 10 MG
20 TABLET ORAL DAILY
Refills: 0 | Status: DISCONTINUED | OUTPATIENT
Start: 2025-07-27 | End: 2025-07-28

## 2025-07-27 RX ORDER — INSULIN LISPRO 100 U/ML
INJECTION, SOLUTION INTRAVENOUS; SUBCUTANEOUS
Refills: 0 | Status: DISCONTINUED | OUTPATIENT
Start: 2025-07-27 | End: 2025-07-28

## 2025-07-27 RX ORDER — AMLODIPINE BESYLATE 10 MG/1
10 TABLET ORAL DAILY
Refills: 0 | Status: DISCONTINUED | OUTPATIENT
Start: 2025-07-27 | End: 2025-07-28

## 2025-07-27 RX ORDER — DEXTROSE 50 % IN WATER 50 %
25 SYRINGE (ML) INTRAVENOUS ONCE
Refills: 0 | Status: DISCONTINUED | OUTPATIENT
Start: 2025-07-27 | End: 2025-07-28

## 2025-07-27 RX ORDER — SODIUM CHLORIDE 9 G/1000ML
1000 INJECTION, SOLUTION INTRAVENOUS
Refills: 0 | Status: DISCONTINUED | OUTPATIENT
Start: 2025-07-27 | End: 2025-07-28

## 2025-07-27 RX ORDER — SACUBITRIL AND VALSARTAN 6; 6 MG/1; MG/1
1 PELLET ORAL
Refills: 0 | Status: DISCONTINUED | OUTPATIENT
Start: 2025-07-27 | End: 2025-07-28

## 2025-07-27 RX ORDER — ATORVASTATIN CALCIUM 80 MG/1
10 TABLET, FILM COATED ORAL AT BEDTIME
Refills: 0 | Status: DISCONTINUED | OUTPATIENT
Start: 2025-07-27 | End: 2025-07-28

## 2025-07-27 RX ORDER — SODIUM CHLORIDE 9 G/1000ML
1000 INJECTION, SOLUTION INTRAVENOUS
Refills: 0 | Status: DISCONTINUED | OUTPATIENT
Start: 2025-07-27 | End: 2025-07-27

## 2025-07-27 RX ADMIN — ATORVASTATIN CALCIUM 10 MILLIGRAM(S): 80 TABLET, FILM COATED ORAL at 20:55

## 2025-07-27 RX ADMIN — SACUBITRIL AND VALSARTAN 1 TABLET(S): 6; 6 PELLET ORAL at 20:55

## 2025-07-27 RX ADMIN — APIXABAN 5 MILLIGRAM(S): 5 TABLET, FILM COATED ORAL at 19:59

## 2025-07-27 NOTE — ED ADULT TRIAGE NOTE - MEANS OF ARRIVAL
Onset:  A couple of weeks      Location / description: Feels short of breath all the time and can't sleep at night.  Has trouble laying down.  Stopped taking Trelegy, wants a refill.  Not on active medication list.  Denies chest pain, fevers, cough    Precipitating Factors: COPD    Pain Scale (1-10), 10 highest: 0/10    What improves/worsens symptoms: NA    Symptom specific medications: Trelegy Ellipta    LMP : Only if pertains to symptom. NA     Are you pregnant or breast feeding: NA    Recent visits (last 3-4 weeks) for same reason or recent surgery:  Has not been seen       PLAN:    Go to the Emergency Department  Pt instructed to go to ER and medication refill request would be sent to provider to address on the next business day.  Pt states it was useless to call and talk to RN.  She has appointment on Monday with Pulmonology and can get the refill then.      Unsure if patient/caller will follow recommendations.    Reason for Disposition   MODERATE difficulty breathing (e.g., speaks in phrases, SOB even at rest, pulse 100-120) of new-onset or worse than normal    Additional Information   Negative: SEVERE difficulty breathing (e.g., struggling for each breath, speaks in single words, pulse > 120)     Talking in full sentences    Protocols used: Breathing Difficulty-A-OH    
Regarding: ROSELIA Griffiths trouble breathing  / cannot breathe  ----- Message from Harishalbina LANCE sent at 4/26/2025  9:00 AM CDT -----  Patient Name: Jenny Nuñez    Specialist or PCP Name: ABBY GASTON     Symptoms: [place] trouble breathing  / cannot breathe     Pregnant (females aged 13-60. If Yes, how long?) : n/a     Call Back # : [phone]    Which State are you currently located in?: [place]     Name of Clinic Site / Acct# : [facility]      Call arrived during: After Hours    
stretcher

## 2025-07-27 NOTE — ED ADULT TRIAGE NOTE - CHIEF COMPLAINT QUOTE
Pt BIBA from home. hx of pacemaker complaining of chest pain x 1 hour. pt [id number] aspirin. EKG done in triage. hx of COPD

## 2025-07-27 NOTE — ED CDU PROVIDER INITIAL DAY NOTE - OBJECTIVE STATEMENT
Pt is a 84y male pmhx htn hld dm ppm p/w chest pain described as midsternal assoc with sob that occurred earlier today around 10am, lasting for 15 min, alleviated after taking aspirin. Otherwise denies any fever, chills, headache, changes in vision, cough, congestion, n/v/d, abd pain, constipation, urinary complaints, lower extremity pain/swelling.

## 2025-07-27 NOTE — ED PROVIDER NOTE - EKG/XRAY ADDITIONAL INFORMATION
EKG Interpretation by Dr. Scot Almodovar: normal  EKG: paced, nml axis, normal intervals, no ST Elevations   Independent interpretation of the chest  film performed by: Dr. Scot Almodovar: CAITLIN

## 2025-07-27 NOTE — ED PROVIDER NOTE - CLINICAL SUMMARY MEDICAL DECISION MAKING FREE TEXT BOX
Pt is a 84y male pmhx htn hld dm ppm p/w chest pain described as midsternal assoc with sob that occurred earlier today, states pain improved after administration of aspirin. on exam elderly male no acute distress, lungs CTA, heart RRR, abd soft non tender, pt followed by Dr. Arias and Dr. Demarco. labs cxr wnl trop x2 whl, ekg no acute changes will place into obs for stress test further eval and disposition

## 2025-07-27 NOTE — ED CDU PROVIDER INITIAL DAY NOTE - PROGRESS NOTE DETAILS
pt found to have retrocardiac opacity on single view chest xray, pt denies acute cough, fever, chills

## 2025-07-27 NOTE — ED ADULT NURSE NOTE - CHIEF COMPLAINT QUOTE
Pt BIBA from home. hx of pacemaker complaining of chest pain x 1 hour. pt [id number] aspirin. EKG done in triage. hx of COPD

## 2025-07-27 NOTE — ED ADULT NURSE NOTE - NSFALLHARMRISKINTERV_ED_ALL_ED

## 2025-07-27 NOTE — ED PROVIDER NOTE - OBJECTIVE STATEMENT
Pt is a 84y male pmhx htn hld dm ppm p/w chest pain described as midsternal assoc with sob that occurred earlier today, states pain improved after administration of aspirin. Otherwise denies any fever, chills, headache, changes in vision, cough, congestion, n/v/d, abd pain, constipation, urinary complaints, lower extremity pain/swelling.

## 2025-07-27 NOTE — ED CDU PROVIDER INITIAL DAY NOTE - EKG/XRAY ADDITIONAL INFORMATION
EKG Interpretation by Dr. Scot Almodovar: normal  EKG: NSR, nml axis, normal intervals, no ST Elevations   Independent interpretation of the chest  film performed by: Dr. Scot Almodovar: CAITLIN

## 2025-07-28 ENCOUNTER — NON-APPOINTMENT (OUTPATIENT)
Age: 85
End: 2025-07-28

## 2025-07-28 VITALS
TEMPERATURE: 98 F | SYSTOLIC BLOOD PRESSURE: 137 MMHG | HEART RATE: 70 BPM | DIASTOLIC BLOOD PRESSURE: 72 MMHG | OXYGEN SATURATION: 99 % | RESPIRATION RATE: 18 BRPM

## 2025-07-28 PROCEDURE — 99239 HOSP IP/OBS DSCHRG MGMT >30: CPT

## 2025-07-28 RX ORDER — DOXYCYCLINE HYCLATE 100 MG
1 TABLET ORAL
Qty: 14 | Refills: 0
Start: 2025-07-28 | End: 2025-08-03

## 2025-07-28 RX ADMIN — SACUBITRIL AND VALSARTAN 1 TABLET(S): 6; 6 PELLET ORAL at 06:02

## 2025-07-28 RX ADMIN — Medication 20 MILLIGRAM(S): at 06:02

## 2025-07-28 RX ADMIN — AMLODIPINE BESYLATE 10 MILLIGRAM(S): 10 TABLET ORAL at 06:02

## 2025-07-28 NOTE — ED ADULT NURSE REASSESSMENT NOTE - NS ED NURSE REASSESS COMMENT FT1
Report received from PM RN. Pt assessed, A&Ox4. Denies pain/discomfort at this time. IVL maintained. Cardiac monitoring continued. Safety and comfort maintained. Will continue w/ goals of care.

## 2025-07-28 NOTE — ED CDU PROVIDER DISPOSITION NOTE - PATIENT PORTAL LINK FT
You can access the FollowMyHealth Patient Portal offered by [facility] by registering at the following website: [email or web address]. By joining [employer]’s FollowMyHealth portal, you will also be able to view your health information using other applications (apps) compatible with our system.

## 2025-07-28 NOTE — ED CDU PROVIDER SUBSEQUENT DAY NOTE - HISTORY
FF: pt family is at the bedside who reports pt saw dr gomez last tuesday and pt is having a procedure this tuesday to fix his pacemaker. pt was told by dr. gomez not to do any stress test before the pacemaker is fixed. pt reports he was told this last week cause the cardiologist wanted to do a stress test before. will reach out to dr. gomez this morning

## 2025-07-28 NOTE — ED CDU PROVIDER SUBSEQUENT DAY NOTE - PROGRESS NOTE DETAILS
Patient resting comfortably states had episode of mid sternal chest pain that lasted 15 minutes at 10 am yesterday and resolved after taking ASA. Patient scheduled for pacemaker battery change tomorrow. Attempted to call Dr Demarco and office closed and no service.

## 2025-07-28 NOTE — ED CDU PROVIDER DISPOSITION NOTE - CLINICAL COURSE
Labs and EKG were ordered and reviewed.  Imaging was ordered and reviewed by me.  Appropriate medications for patient's presenting complaints were ordered and effects were reassessed.  Patient's records (prior hospital, ED visit, and/or nursing home notes if available) were reviewed.  Additional history was obtained from EMS, family, and/or PCP (where available).  Escalation to admission/observation was considered.  Patient placed in obs for monitoring and further testing.  He had only 15 minutes of pain which resolved after taking ASA.  He is tender to palpation. He has thorough cardiac follow up as he is scheduled for maintenance on his pacemaker with Dr. Demarco tomorrow and is well known to [facility] cardiologist, Dr. Doe.  At this time, he does not want the nuclear stress test as he was told not to have this before the pacemaker repair.  Will discharge with results and have his outpatient cardiac team complete the workup.  He had normal troponins and has been asymptomatic. Labs and EKG were ordered and reviewed.  Imaging was ordered and reviewed by me.  Appropriate medications for patient's presenting complaints were ordered and effects were reassessed.  Patient's records (prior hospital, ED visit, and/or nursing home notes if available) were reviewed.  Additional history was obtained from EMS, family, and/or PCP (where available).  Escalation to admission/observation was considered.  Patient placed in obs for monitoring and further testing.  He had only 15 minutes of pain which resolved after taking ASA.  He is tender to palpation. He has thorough cardiac follow up as he is scheduled for maintenance on his pacemaker with Dr. Demarco tomorrow and is well known to [facility] cardiologist, Dr. Doe.  At this time, he does not want the nuclear stress test as he was told not to have this before the pacemaker repair.  Will discharge with results and have his outpatient cardiac team complete the workup.  He had normal troponin and has been asymptomatic.  He states that he has also had a cough despite no fever and no leukocytosis, he has a retrocardiac opacity and decision was made to treat with a course of doxycycline with close follow up.

## 2025-07-28 NOTE — ED CDU PROVIDER SUBSEQUENT DAY NOTE - CLINICAL SUMMARY MEDICAL DECISION MAKING FREE TEXT BOX
Labs and EKG were ordered and reviewed.  Imaging was ordered and reviewed by me.  Appropriate medications for patient's presenting complaints were ordered and effects were reassessed.  Patient's records (prior hospital, ED visit, and/or nursing home notes if available) were reviewed.  Additional history was obtained from EMS, family, and/or PCP (where available).  Escalation to admission/observation was considered.  Patient placed in obs for cardiac monitoring.

## 2025-08-06 ENCOUNTER — APPOINTMENT (OUTPATIENT)
Dept: HOME HEALTH SERVICES | Facility: HOME HEALTH | Age: 85
End: 2025-08-06
Payer: MEDICARE

## 2025-08-06 DIAGNOSIS — D68.59 OTHER PRIMARY THROMBOPHILIA: ICD-10-CM

## 2025-08-06 DIAGNOSIS — F32.A ANXIETY DISORDER, UNSPECIFIED: ICD-10-CM

## 2025-08-06 DIAGNOSIS — C61 MALIGNANT NEOPLASM OF PROSTATE: ICD-10-CM

## 2025-08-06 DIAGNOSIS — K86.1 OTHER CHRONIC PANCREATITIS: ICD-10-CM

## 2025-08-06 DIAGNOSIS — E11.40 TYPE 2 DIABETES MELLITUS WITH DIABETIC NEUROPATHY, UNSPECIFIED: ICD-10-CM

## 2025-08-06 DIAGNOSIS — J44.9 CHRONIC OBSTRUCTIVE PULMONARY DISEASE, UNSPECIFIED: ICD-10-CM

## 2025-08-06 DIAGNOSIS — I70.213 ATHEROSCLEROSIS OF NATIVE ARTERIES OF EXTREMITIES WITH INTERMITTENT CLAUDICATION, BILATERAL LEGS: ICD-10-CM

## 2025-08-06 DIAGNOSIS — E11.65 TYPE 2 DIABETES MELLITUS WITH HYPERGLYCEMIA: ICD-10-CM

## 2025-08-06 DIAGNOSIS — I10 ESSENTIAL (PRIMARY) HYPERTENSION: ICD-10-CM

## 2025-08-06 DIAGNOSIS — I50.9 HEART FAILURE, UNSPECIFIED: ICD-10-CM

## 2025-08-06 DIAGNOSIS — F41.9 ANXIETY DISORDER, UNSPECIFIED: ICD-10-CM

## 2025-08-06 DIAGNOSIS — I48.0 PAROXYSMAL ATRIAL FIBRILLATION: ICD-10-CM

## 2025-08-06 PROCEDURE — 99495 TRANSJ CARE MGMT MOD F2F 14D: CPT

## 2025-08-06 RX ORDER — CHLORHEXIDINE GLUCONATE 4 %
5 LIQUID (ML) TOPICAL
Qty: 90 | Refills: 0 | Status: ACTIVE | COMMUNITY
Start: 2025-08-06 | End: 1900-01-01

## 2025-08-06 RX ORDER — ESCITALOPRAM OXALATE 5 MG/1
5 TABLET ORAL
Qty: 30 | Refills: 0 | Status: ACTIVE | COMMUNITY
Start: 2025-08-06 | End: 1900-01-01

## 2025-08-10 VITALS
SYSTOLIC BLOOD PRESSURE: 145 MMHG | HEIGHT: 66 IN | OXYGEN SATURATION: 98 % | RESPIRATION RATE: 16 BRPM | TEMPERATURE: 98.1 F | BODY MASS INDEX: 28.12 KG/M2 | WEIGHT: 175 LBS | HEART RATE: 65 BPM | DIASTOLIC BLOOD PRESSURE: 70 MMHG

## 2025-08-10 PROBLEM — F41.9 ANXIETY AND DEPRESSION: Status: ACTIVE | Noted: 2025-08-06

## 2025-08-27 ENCOUNTER — NON-APPOINTMENT (OUTPATIENT)
Age: 85
End: 2025-08-27

## 2025-08-28 ENCOUNTER — NON-APPOINTMENT (OUTPATIENT)
Age: 85
End: 2025-08-28

## 2025-08-29 ENCOUNTER — NON-APPOINTMENT (OUTPATIENT)
Age: 85
End: 2025-08-29

## 2025-09-03 ENCOUNTER — APPOINTMENT (OUTPATIENT)
Dept: HOME HEALTH SERVICES | Facility: HOME HEALTH | Age: 85
End: 2025-09-03
Payer: MEDICARE

## 2025-09-03 DIAGNOSIS — C61 MALIGNANT NEOPLASM OF PROSTATE: ICD-10-CM

## 2025-09-03 DIAGNOSIS — E11.40 TYPE 2 DIABETES MELLITUS WITH DIABETIC NEUROPATHY, UNSPECIFIED: ICD-10-CM

## 2025-09-03 DIAGNOSIS — E11.65 TYPE 2 DIABETES MELLITUS WITH HYPERGLYCEMIA: ICD-10-CM

## 2025-09-03 DIAGNOSIS — K86.1 OTHER CHRONIC PANCREATITIS: ICD-10-CM

## 2025-09-03 DIAGNOSIS — I70.213 ATHEROSCLEROSIS OF NATIVE ARTERIES OF EXTREMITIES WITH INTERMITTENT CLAUDICATION, BILATERAL LEGS: ICD-10-CM

## 2025-09-03 DIAGNOSIS — F41.9 ANXIETY DISORDER, UNSPECIFIED: ICD-10-CM

## 2025-09-03 DIAGNOSIS — I50.9 HEART FAILURE, UNSPECIFIED: ICD-10-CM

## 2025-09-03 DIAGNOSIS — K59.01 SLOW TRANSIT CONSTIPATION: ICD-10-CM

## 2025-09-03 DIAGNOSIS — R56.9 UNSPECIFIED CONVULSIONS: ICD-10-CM

## 2025-09-03 DIAGNOSIS — D68.59 OTHER PRIMARY THROMBOPHILIA: ICD-10-CM

## 2025-09-03 DIAGNOSIS — F32.A ANXIETY DISORDER, UNSPECIFIED: ICD-10-CM

## 2025-09-03 DIAGNOSIS — I48.0 PAROXYSMAL ATRIAL FIBRILLATION: ICD-10-CM

## 2025-09-03 PROCEDURE — 99495 TRANSJ CARE MGMT MOD F2F 14D: CPT

## 2025-09-08 VITALS
BODY MASS INDEX: 28.12 KG/M2 | OXYGEN SATURATION: 98 % | SYSTOLIC BLOOD PRESSURE: 110 MMHG | HEART RATE: 65 BPM | TEMPERATURE: 97.8 F | RESPIRATION RATE: 16 BRPM | DIASTOLIC BLOOD PRESSURE: 65 MMHG | WEIGHT: 175 LBS | HEIGHT: 66 IN

## 2025-09-08 RX ORDER — SENNOSIDES 8.6 MG/1
8.6 TABLET ORAL
Qty: 120 | Refills: 0 | Status: ACTIVE | COMMUNITY
Start: 2025-09-03

## 2025-09-10 ENCOUNTER — APPOINTMENT (OUTPATIENT)
Dept: HOME HEALTH SERVICES | Facility: HOME HEALTH | Age: 85
End: 2025-09-10

## 2025-09-10 VITALS
OXYGEN SATURATION: 95 % | SYSTOLIC BLOOD PRESSURE: 104 MMHG | HEART RATE: 71 BPM | TEMPERATURE: 97.7 F | DIASTOLIC BLOOD PRESSURE: 58 MMHG | RESPIRATION RATE: 18 BRPM

## 2025-09-10 RX ORDER — IVABRADINE 7.5 MG/1
7.5 TABLET ORAL
Refills: 0 | Status: ACTIVE | COMMUNITY
Start: 2025-09-10

## 2025-09-10 RX ORDER — TORSEMIDE 20 MG/1
20 TABLET ORAL DAILY
Qty: 90 | Refills: 0 | Status: ACTIVE | COMMUNITY
Start: 2025-09-08

## 2025-09-10 RX ORDER — ASPIRIN 81 MG/1
81 TABLET, DELAYED RELEASE ORAL DAILY
Qty: 90 | Refills: 0 | Status: ACTIVE | COMMUNITY
Start: 2025-09-10

## 2025-09-10 RX ORDER — BISACODYL 10 MG/1
10 SUPPOSITORY RECTAL
Qty: 1 | Refills: 5 | Status: ACTIVE | COMMUNITY
Start: 2025-09-03

## 2025-09-10 RX ORDER — SACUBITRIL AND VALSARTAN 49; 51 MG/1; MG/1
49-51 TABLET, FILM COATED ORAL TWICE DAILY
Qty: 180 | Refills: 3 | Status: ACTIVE | COMMUNITY
Start: 2025-09-08

## 2025-09-10 RX ORDER — LEVETIRACETAM 500 MG/1
500 TABLET, FILM COATED ORAL TWICE DAILY
Qty: 180 | Refills: 0 | Status: ACTIVE | COMMUNITY
Start: 2025-09-08